# Patient Record
Sex: FEMALE | Race: WHITE | Employment: OTHER | URBAN - METROPOLITAN AREA
[De-identification: names, ages, dates, MRNs, and addresses within clinical notes are randomized per-mention and may not be internally consistent; named-entity substitution may affect disease eponyms.]

---

## 2019-06-01 ENCOUNTER — HOSPITAL ENCOUNTER (INPATIENT)
Age: 64
LOS: 4 days | Discharge: HOME HEALTH | DRG: 897 | End: 2019-06-05
Attending: EMERGENCY MEDICINE | Admitting: INTERNAL MEDICINE
Payer: COMMERCIAL

## 2019-06-01 DIAGNOSIS — F10.939 ALCOHOL WITHDRAWAL SYNDROME WITH COMPLICATION (HCC): Primary | ICD-10-CM

## 2019-06-01 DIAGNOSIS — F10.931 ALCOHOL WITHDRAWAL DELIRIUM (HCC): ICD-10-CM

## 2019-06-01 DIAGNOSIS — N30.00 ACUTE CYSTITIS WITHOUT HEMATURIA: ICD-10-CM

## 2019-06-01 PROBLEM — N39.0 UTI (URINARY TRACT INFECTION): Status: ACTIVE | Noted: 2019-06-01

## 2019-06-01 PROBLEM — I10 ESSENTIAL HYPERTENSION: Status: ACTIVE | Noted: 2019-06-01

## 2019-06-01 LAB
A/G RATIO: 1.3 (ref 1.1–2.2)
ALBUMIN SERPL-MCNC: 4.8 G/DL (ref 3.4–5)
ALP BLD-CCNC: 73 U/L (ref 40–129)
ALT SERPL-CCNC: 78 U/L (ref 10–40)
ANION GAP SERPL CALCULATED.3IONS-SCNC: 16 MMOL/L (ref 3–16)
AST SERPL-CCNC: 140 U/L (ref 15–37)
BACTERIA: ABNORMAL /HPF
BASOPHILS ABSOLUTE: 0 K/UL (ref 0–0.2)
BASOPHILS RELATIVE PERCENT: 0.6 %
BILIRUB SERPL-MCNC: 0.7 MG/DL (ref 0–1)
BILIRUBIN URINE: NEGATIVE
BLOOD, URINE: ABNORMAL
BUN BLDV-MCNC: 8 MG/DL (ref 7–20)
CALCIUM SERPL-MCNC: 10.1 MG/DL (ref 8.3–10.6)
CHLORIDE BLD-SCNC: 96 MMOL/L (ref 99–110)
CLARITY: ABNORMAL
CO2: 25 MMOL/L (ref 21–32)
COLOR: YELLOW
CREAT SERPL-MCNC: 0.6 MG/DL (ref 0.6–1.2)
EOSINOPHILS ABSOLUTE: 0.1 K/UL (ref 0–0.6)
EOSINOPHILS RELATIVE PERCENT: 1.6 %
EPITHELIAL CELLS, UA: ABNORMAL /HPF
ETHANOL: NORMAL MG/DL (ref 0–0.08)
GFR AFRICAN AMERICAN: >60
GFR NON-AFRICAN AMERICAN: >60
GLOBULIN: 3.6 G/DL
GLUCOSE BLD-MCNC: 96 MG/DL (ref 70–99)
GLUCOSE URINE: NEGATIVE MG/DL
HCT VFR BLD CALC: 41.2 % (ref 36–48)
HEMOGLOBIN: 14.2 G/DL (ref 12–16)
KETONES, URINE: NEGATIVE MG/DL
LEUKOCYTE ESTERASE, URINE: ABNORMAL
LYMPHOCYTES ABSOLUTE: 1.6 K/UL (ref 1–5.1)
LYMPHOCYTES RELATIVE PERCENT: 24.7 %
MCH RBC QN AUTO: 35.2 PG (ref 26–34)
MCHC RBC AUTO-ENTMCNC: 34.4 G/DL (ref 31–36)
MCV RBC AUTO: 102.4 FL (ref 80–100)
MICROSCOPIC EXAMINATION: YES
MONOCYTES ABSOLUTE: 0.7 K/UL (ref 0–1.3)
MONOCYTES RELATIVE PERCENT: 11.2 %
NEUTROPHILS ABSOLUTE: 4 K/UL (ref 1.7–7.7)
NEUTROPHILS RELATIVE PERCENT: 61.9 %
NITRITE, URINE: NEGATIVE
PDW BLD-RTO: 15 % (ref 12.4–15.4)
PH UA: 7 (ref 5–8)
PLATELET # BLD: 283 K/UL (ref 135–450)
PMV BLD AUTO: 7.5 FL (ref 5–10.5)
POTASSIUM SERPL-SCNC: 4.1 MMOL/L (ref 3.5–5.1)
PROTEIN UA: 30 MG/DL
RBC # BLD: 4.02 M/UL (ref 4–5.2)
RBC UA: ABNORMAL /HPF (ref 0–2)
SODIUM BLD-SCNC: 137 MMOL/L (ref 136–145)
SPECIFIC GRAVITY UA: 1.01 (ref 1–1.03)
TOTAL PROTEIN: 8.4 G/DL (ref 6.4–8.2)
URINE REFLEX TO CULTURE: YES
URINE TYPE: ABNORMAL
UROBILINOGEN, URINE: 0.2 E.U./DL
WBC # BLD: 6.5 K/UL (ref 4–11)
WBC UA: >100 /HPF (ref 0–5)

## 2019-06-01 PROCEDURE — 94760 N-INVAS EAR/PLS OXIMETRY 1: CPT

## 2019-06-01 PROCEDURE — 96361 HYDRATE IV INFUSION ADD-ON: CPT

## 2019-06-01 PROCEDURE — 85025 COMPLETE CBC W/AUTO DIFF WBC: CPT

## 2019-06-01 PROCEDURE — 2500000003 HC RX 250 WO HCPCS: Performed by: PHYSICIAN ASSISTANT

## 2019-06-01 PROCEDURE — 2060000000 HC ICU INTERMEDIATE R&B

## 2019-06-01 PROCEDURE — 6360000002 HC RX W HCPCS: Performed by: INTERNAL MEDICINE

## 2019-06-01 PROCEDURE — 2580000003 HC RX 258: Performed by: INTERNAL MEDICINE

## 2019-06-01 PROCEDURE — G0480 DRUG TEST DEF 1-7 CLASSES: HCPCS

## 2019-06-01 PROCEDURE — 87077 CULTURE AEROBIC IDENTIFY: CPT

## 2019-06-01 PROCEDURE — 80053 COMPREHEN METABOLIC PANEL: CPT

## 2019-06-01 PROCEDURE — 87086 URINE CULTURE/COLONY COUNT: CPT

## 2019-06-01 PROCEDURE — 96374 THER/PROPH/DIAG INJ IV PUSH: CPT

## 2019-06-01 PROCEDURE — 6360000002 HC RX W HCPCS

## 2019-06-01 PROCEDURE — 6370000000 HC RX 637 (ALT 250 FOR IP): Performed by: INTERNAL MEDICINE

## 2019-06-01 PROCEDURE — 2580000003 HC RX 258: Performed by: PHYSICIAN ASSISTANT

## 2019-06-01 PROCEDURE — 87186 SC STD MICRODIL/AGAR DIL: CPT

## 2019-06-01 PROCEDURE — 96375 TX/PRO/DX INJ NEW DRUG ADDON: CPT

## 2019-06-01 PROCEDURE — 99284 EMERGENCY DEPT VISIT MOD MDM: CPT

## 2019-06-01 PROCEDURE — 81001 URINALYSIS AUTO W/SCOPE: CPT

## 2019-06-01 PROCEDURE — 6360000002 HC RX W HCPCS: Performed by: PHYSICIAN ASSISTANT

## 2019-06-01 RX ORDER — POTASSIUM CHLORIDE 20 MEQ/1
40 TABLET, EXTENDED RELEASE ORAL PRN
Status: DISCONTINUED | OUTPATIENT
Start: 2019-06-01 | End: 2019-06-05 | Stop reason: HOSPADM

## 2019-06-01 RX ORDER — NIFEDIPINE 30 MG/1
30 TABLET, FILM COATED, EXTENDED RELEASE ORAL DAILY
Status: ON HOLD | COMMUNITY
End: 2019-06-05 | Stop reason: SDUPTHER

## 2019-06-01 RX ORDER — LORAZEPAM 1 MG/1
3 TABLET ORAL
Status: DISCONTINUED | OUTPATIENT
Start: 2019-06-01 | End: 2019-06-01

## 2019-06-01 RX ORDER — LORAZEPAM 2 MG/ML
4 INJECTION INTRAMUSCULAR
Status: DISCONTINUED | OUTPATIENT
Start: 2019-06-01 | End: 2019-06-01

## 2019-06-01 RX ORDER — SODIUM CHLORIDE 0.9 % (FLUSH) 0.9 %
10 SYRINGE (ML) INJECTION PRN
Status: DISCONTINUED | OUTPATIENT
Start: 2019-06-01 | End: 2019-06-01 | Stop reason: HOSPADM

## 2019-06-01 RX ORDER — MULTIVITAMIN WITH FOLIC ACID 400 MCG
1 TABLET ORAL DAILY
Status: DISCONTINUED | OUTPATIENT
Start: 2019-06-02 | End: 2019-06-05 | Stop reason: HOSPADM

## 2019-06-01 RX ORDER — LORAZEPAM 2 MG/ML
3 INJECTION INTRAMUSCULAR
Status: DISCONTINUED | OUTPATIENT
Start: 2019-06-01 | End: 2019-06-01

## 2019-06-01 RX ORDER — LORAZEPAM 2 MG/ML
INJECTION INTRAMUSCULAR
Status: COMPLETED
Start: 2019-06-01 | End: 2019-06-01

## 2019-06-01 RX ORDER — ONDANSETRON 2 MG/ML
4 INJECTION INTRAMUSCULAR; INTRAVENOUS EVERY 6 HOURS PRN
Status: DISCONTINUED | OUTPATIENT
Start: 2019-06-01 | End: 2019-06-05 | Stop reason: HOSPADM

## 2019-06-01 RX ORDER — FAMOTIDINE 20 MG/1
20 TABLET, FILM COATED ORAL 2 TIMES DAILY
Status: DISCONTINUED | OUTPATIENT
Start: 2019-06-01 | End: 2019-06-05 | Stop reason: HOSPADM

## 2019-06-01 RX ORDER — LORAZEPAM 1 MG/1
4 TABLET ORAL
Status: DISCONTINUED | OUTPATIENT
Start: 2019-06-01 | End: 2019-06-05 | Stop reason: HOSPADM

## 2019-06-01 RX ORDER — LORAZEPAM 1 MG/1
3 TABLET ORAL
Status: DISCONTINUED | OUTPATIENT
Start: 2019-06-01 | End: 2019-06-05 | Stop reason: HOSPADM

## 2019-06-01 RX ORDER — ACETAMINOPHEN 325 MG/1
650 TABLET ORAL EVERY 4 HOURS PRN
Status: DISCONTINUED | OUTPATIENT
Start: 2019-06-01 | End: 2019-06-05 | Stop reason: HOSPADM

## 2019-06-01 RX ORDER — LORAZEPAM 2 MG/ML
2 INJECTION INTRAMUSCULAR
Status: DISCONTINUED | OUTPATIENT
Start: 2019-06-01 | End: 2019-06-05 | Stop reason: HOSPADM

## 2019-06-01 RX ORDER — LORAZEPAM 2 MG/ML
1 INJECTION INTRAMUSCULAR
Status: DISCONTINUED | OUTPATIENT
Start: 2019-06-01 | End: 2019-06-01

## 2019-06-01 RX ORDER — FLUOXETINE HYDROCHLORIDE 20 MG/1
40 CAPSULE ORAL DAILY
Status: ON HOLD | COMMUNITY
End: 2019-09-15 | Stop reason: SDUPTHER

## 2019-06-01 RX ORDER — LORAZEPAM 2 MG/ML
4 INJECTION INTRAMUSCULAR
Status: DISCONTINUED | OUTPATIENT
Start: 2019-06-01 | End: 2019-06-05 | Stop reason: HOSPADM

## 2019-06-01 RX ORDER — LORAZEPAM 1 MG/1
4 TABLET ORAL
Status: DISCONTINUED | OUTPATIENT
Start: 2019-06-01 | End: 2019-06-01

## 2019-06-01 RX ORDER — PHENAZOPYRIDINE HYDROCHLORIDE 100 MG/1
200 TABLET, FILM COATED ORAL
Status: DISCONTINUED | OUTPATIENT
Start: 2019-06-02 | End: 2019-06-05 | Stop reason: HOSPADM

## 2019-06-01 RX ORDER — LORAZEPAM 2 MG/ML
1 INJECTION INTRAMUSCULAR
Status: DISCONTINUED | OUTPATIENT
Start: 2019-06-01 | End: 2019-06-05 | Stop reason: HOSPADM

## 2019-06-01 RX ORDER — FLUOXETINE HYDROCHLORIDE 20 MG/1
20 CAPSULE ORAL DAILY
Status: DISCONTINUED | OUTPATIENT
Start: 2019-06-01 | End: 2019-06-05 | Stop reason: HOSPADM

## 2019-06-01 RX ORDER — SODIUM CHLORIDE 0.9 % (FLUSH) 0.9 %
10 SYRINGE (ML) INJECTION EVERY 12 HOURS SCHEDULED
Status: DISCONTINUED | OUTPATIENT
Start: 2019-06-01 | End: 2019-06-05 | Stop reason: HOSPADM

## 2019-06-01 RX ORDER — LORAZEPAM 2 MG/ML
3 INJECTION INTRAMUSCULAR
Status: DISCONTINUED | OUTPATIENT
Start: 2019-06-01 | End: 2019-06-05 | Stop reason: HOSPADM

## 2019-06-01 RX ORDER — ZOLPIDEM TARTRATE 5 MG/1
5 TABLET ORAL NIGHTLY PRN
Status: DISCONTINUED | OUTPATIENT
Start: 2019-06-01 | End: 2019-06-05 | Stop reason: HOSPADM

## 2019-06-01 RX ORDER — SODIUM CHLORIDE 0.9 % (FLUSH) 0.9 %
10 SYRINGE (ML) INJECTION PRN
Status: DISCONTINUED | OUTPATIENT
Start: 2019-06-01 | End: 2019-06-05 | Stop reason: HOSPADM

## 2019-06-01 RX ORDER — LORAZEPAM 1 MG/1
2 TABLET ORAL
Status: DISCONTINUED | OUTPATIENT
Start: 2019-06-01 | End: 2019-06-01

## 2019-06-01 RX ORDER — LORAZEPAM 1 MG/1
1 TABLET ORAL
Status: DISCONTINUED | OUTPATIENT
Start: 2019-06-01 | End: 2019-06-01

## 2019-06-01 RX ORDER — POTASSIUM CHLORIDE 7.45 MG/ML
10 INJECTION INTRAVENOUS PRN
Status: DISCONTINUED | OUTPATIENT
Start: 2019-06-01 | End: 2019-06-05 | Stop reason: HOSPADM

## 2019-06-01 RX ORDER — LORAZEPAM 2 MG/ML
2 INJECTION INTRAMUSCULAR
Status: DISCONTINUED | OUTPATIENT
Start: 2019-06-01 | End: 2019-06-01

## 2019-06-01 RX ORDER — LORAZEPAM 1 MG/1
1 TABLET ORAL
Status: DISCONTINUED | OUTPATIENT
Start: 2019-06-01 | End: 2019-06-05 | Stop reason: HOSPADM

## 2019-06-01 RX ORDER — CHLORDIAZEPOXIDE HYDROCHLORIDE 10 MG/1
10 CAPSULE, GELATIN COATED ORAL 2 TIMES DAILY
Status: DISCONTINUED | OUTPATIENT
Start: 2019-06-01 | End: 2019-06-02

## 2019-06-01 RX ORDER — LORAZEPAM 1 MG/1
2 TABLET ORAL
Status: DISCONTINUED | OUTPATIENT
Start: 2019-06-01 | End: 2019-06-05 | Stop reason: HOSPADM

## 2019-06-01 RX ORDER — NIFEDIPINE 30 MG/1
30 TABLET, EXTENDED RELEASE ORAL DAILY
Status: DISCONTINUED | OUTPATIENT
Start: 2019-06-01 | End: 2019-06-05 | Stop reason: HOSPADM

## 2019-06-01 RX ORDER — SODIUM CHLORIDE 0.9 % (FLUSH) 0.9 %
10 SYRINGE (ML) INJECTION EVERY 12 HOURS SCHEDULED
Status: DISCONTINUED | OUTPATIENT
Start: 2019-06-01 | End: 2019-06-01 | Stop reason: HOSPADM

## 2019-06-01 RX ADMIN — CHLORDIAZEPOXIDE HYDROCHLORIDE 10 MG: 10 CAPSULE ORAL at 22:28

## 2019-06-01 RX ADMIN — NIFEDIPINE 30 MG: 30 TABLET, FILM COATED, EXTENDED RELEASE ORAL at 16:59

## 2019-06-01 RX ADMIN — FLUOXETINE 20 MG: 20 CAPSULE ORAL at 16:59

## 2019-06-01 RX ADMIN — FAMOTIDINE 20 MG: 20 TABLET ORAL at 22:28

## 2019-06-01 RX ADMIN — FOLIC ACID: 5 INJECTION, SOLUTION INTRAMUSCULAR; INTRAVENOUS; SUBCUTANEOUS at 10:51

## 2019-06-01 RX ADMIN — LORAZEPAM 1 MG: 1 TABLET ORAL at 16:59

## 2019-06-01 RX ADMIN — ZOLPIDEM TARTRATE 5 MG: 5 TABLET ORAL at 22:31

## 2019-06-01 RX ADMIN — LORAZEPAM 1 MG: 2 INJECTION, SOLUTION INTRAMUSCULAR; INTRAVENOUS at 13:28

## 2019-06-01 RX ADMIN — LORAZEPAM 3 MG: 2 INJECTION INTRAMUSCULAR; INTRAVENOUS at 19:30

## 2019-06-01 RX ADMIN — FAMOTIDINE 20 MG: 20 TABLET ORAL at 16:59

## 2019-06-01 RX ADMIN — Medication 1 G: at 13:27

## 2019-06-01 RX ADMIN — LORAZEPAM 1 MG: 2 INJECTION INTRAMUSCULAR at 13:28

## 2019-06-01 RX ADMIN — Medication 10 ML: at 22:28

## 2019-06-01 ASSESSMENT — ENCOUNTER SYMPTOMS
DIARRHEA: 0
NAUSEA: 0
BACK PAIN: 0
BLOOD IN STOOL: 0
ABDOMINAL PAIN: 0
SHORTNESS OF BREATH: 0
VOMITING: 0

## 2019-06-01 ASSESSMENT — PAIN SCALES - GENERAL
PAINLEVEL_OUTOF10: 4
PAINLEVEL_OUTOF10: 7
PAINLEVEL_OUTOF10: 0

## 2019-06-01 ASSESSMENT — PATIENT HEALTH QUESTIONNAIRE - PHQ9: SUM OF ALL RESPONSES TO PHQ QUESTIONS 1-9: 9

## 2019-06-01 NOTE — ED PROVIDER NOTES
chills and fever. Eyes: Negative for visual disturbance. Respiratory: Negative for shortness of breath. Cardiovascular: Negative for chest pain. Gastrointestinal: Negative for abdominal pain, blood in stool, diarrhea, nausea and vomiting. Genitourinary: Negative for dysuria and hematuria. Musculoskeletal: Negative for back pain and gait problem. Skin: Negative for rash. Neurological: Positive for headaches. Positives and Pertinent negatives as per HPI. Except as noted abovein the ROS, all other systems were reviewed and negative. PAST MEDICAL HISTORY     Past Medical History:   Diagnosis Date    Alcoholism [facility])          SURGICAL HISTORY   History reviewed. No pertinent surgical history. CURRENTMEDICATIONS       Previous Medications    B COMPLEX VITAMINS (B COMPLEX 1 PO)    Take  by mouth. CALCIUM CARBONATE 600 MG TABS TABLET    Take 2 tablets by mouth daily. CHOLECALCIFEROL (VITAMIN D3) 5000 UNITS TABS    Take  by mouth. FLUOXETINE (PROZAC) 20 MG CAPSULE    Take 20 mg by mouth daily    FOLIC ACID (FOLVITE) 1 MG TABLET    Take 1 mg by mouth daily. MILK THISTLE 250 MG CAPS    Take  by mouth. MULTIPLE VITAMINS-MINERALS (MULTIVITAMIN PO)    Take  by mouth.     NIFEDIPINE (ADALAT CC) 30 MG EXTENDED RELEASE TABLET    Take 30 mg by mouth daily    UNABLE TO FIND    Estrogen & testosterone injections -pellets    ZOLPIDEM (AMBIEN) 10 MG TABLET    TAKE ONE TABLET BY MOUTH EVERY NIGHT AT BEDTIME AS NEEDED FOR SLEEP         ALLERGIES     Penicillins and Sulfa antibiotics    FAMILYHISTORY       Family History   Problem Relation Age of Onset    Stroke Mother     Substance Abuse Mother     Substance Abuse Father     Heart Disease Father     Heart Disease Brother     Substance Abuse Brother     Heart Disease Paternal Aunt     Heart Disease Paternal Uncle     Heart Disease Maternal Grandmother     Diabetes Maternal Grandmother     Diabetes Maternal Grandfather     Heart Disease Maternal Grandfather     Other Paternal Grandmother     Other Paternal Grandfather           SOCIAL HISTORY       Social History     Socioeconomic History    Marital status:      Spouse name: None    Number of children: None    Years of education: None    Highest education level: None   Occupational History    None   Social Needs    Financial resource strain: None    Food insecurity:     Worry: None     Inability: None    Transportation needs:     Medical: None     Non-medical: None   Tobacco Use    Smoking status: Former Smoker     Types: Cigarettes     Last attempt to quit: 1987     Years since quittin.9    Smokeless tobacco: Never Used   Substance and Sexual Activity    Alcohol use: Yes     Alcohol/week: 33.6 oz     Types: 56 Glasses of wine per week    Drug use: No    Sexual activity: Yes     Partners: Male   Lifestyle    Physical activity:     Days per week: None     Minutes per session: None    Stress: None   Relationships    Social connections:     Talks on phone: None     Gets together: None     Attends Christianity service: None     Active member of club or organization: None     Attends meetings of clubs or organizations: None     Relationship status: None    Intimate partner violence:     Fear of current or ex partner: None     Emotionally abused: None     Physically abused: None     Forced sexual activity: None   Other Topics Concern    None   Social History Narrative    None       SCREENINGS             PHYSICAL EXAM    (up to 7 for level 4, 8 or more for level 5)     ED Triage Vitals [19 0936]   BP Temp Temp Source Pulse Resp SpO2 Height Weight   137/82 98 °F (36.7 °C) Infrared 91 17 98 % 5' 4\" (1.626 m) 149 lb (67.6 kg)       Physical Exam   Constitutional: She is oriented to person, place, and time. She appears well-developed and well-nourished. HENT:   Head: Atraumatic. Mouth/Throat: No oropharyngeal exudate.    Eyes: Pupils are equal, round, and reactive to light. EOM are normal. Right eye exhibits no discharge. Left eye exhibits no discharge. Neck: Normal range of motion. Cardiovascular: Normal rate, regular rhythm and normal heart sounds. Exam reveals no gallop and no friction rub. No murmur heard. Pulmonary/Chest: Effort normal and breath sounds normal. No stridor. No respiratory distress. She has no wheezes. She has no rales. Abdominal: Soft. Bowel sounds are normal. She exhibits no distension and no mass. There is no tenderness. There is no rebound and no guarding. No hernia. Musculoskeletal: Normal range of motion. She exhibits no edema, tenderness or deformity. Neurological: She is alert and oriented to person, place, and time. No cranial nerve deficit. Skin: Skin is warm and dry. No rash noted. She is not diaphoretic. No erythema. Psychiatric: She has a normal mood and affect. Her behavior is normal.   Nursing note and vitals reviewed.       DIAGNOSTIC RESULTS   LABS:    Labs Reviewed   CBC WITH AUTO DIFFERENTIAL - Abnormal; Notable for the following components:       Result Value    .4 (*)     MCH 35.2 (*)     All other components within normal limits    Narrative:     Performed at:  [facility] [address], [place], [address] [place] [address]   Phone [phone]   COMPREHENSIVE METABOLIC PANEL - Abnormal; Notable for the following components:    Chloride 96 (*)     Total Protein 8.4 (*)     ALT 78 (*)      (*)     All other components within normal limits    Narrative:     Performed at:  [facility] [address], [place], [facility] [address]   Phone [phone]   URINE RT REFLEX TO CULTURE - Abnormal; Notable for the following components:    Clarity, UA CLOUDY (*)     Blood, Urine SMALL (*)     Protein, UA 30 (*)     Leukocyte Esterase, Urine LARGE (*)     All other components within normal limits    Narrative:     Performed at:  [facility]  [address] [facility], [place]   Phone [phone]   MICROSCOPIC URINALYSIS - Abnormal; Notable for the following components:    WBC, UA >100 (*)     Bacteria, UA 1+ (*)     All other components within normal limits    Narrative:     Performed at:  [facility]  [address] [facility], [place]   Phone [phone]   URINE CULTURE   ETHANOL    Narrative:     Performed at:  [facility]  [address] [facility],  [place], [place]   Phone [phone]       All other labs were within normal range or not returned as of this dictation. EKG: All EKG's are interpreted by the Emergency Department Physician who either signs orCo-signs this chart in the absence of a cardiologist.  Please see their note for interpretation of EKG. RADIOLOGY:   Non-plain film images such as CT, Ultrasound and MRI are read by the radiologist. Plain radiographic images are visualized andpreliminarily interpreted by the  ED Provider with the below findings:        Interpretation [facility] Radiologist below, if available at the time of this note:    No orders to display     No results found.        PROCEDURES   Unless otherwise noted below, none     Procedures    CRITICAL CARE TIME   N/A    CONSULTS:  IP CONSULT TO HOSPITALIST      EMERGENCY DEPARTMENT COURSE and DIFFERENTIALDIAGNOSIS/MDM:   Vitals:    Vitals:    06/01/19 1100 06/01/19 1130 06/01/19 1200 06/01/19 1230   BP: (!) 167/78 (!) 155/85 (!) 159/77 (!) 154/75   Pulse:       Resp:       Temp:       TempSrc:       SpO2: 98% 99% 98% 99%   Weight:       Height:           Patient was given thefollowing medications:  Medications   sodium chloride flush 0.9 % injection 10 mL (has no administration in time range)   sodium chloride flush 0.9 % injection 10 mL (has no administration in time range)   FLUoxetine (PROZAC) capsule 20 mg (has no administration in time range)   NIFEdipine (PROCARDIA XL) extended release tablet 30 mg (has no administration in time range)   zolpidem (AMBIEN) tablet 5 mg (has no administration in time range)   sodium chloride flush 0.9 % injection 10 mL (has no administration in time range)   sodium chloride flush 0.9 % injection 10 mL (has no administration in time range)   ondansetron (ZOFRAN) injection 4 mg (has no administration in time range)   enoxaparin (LOVENOX) injection 40 mg (has no administration in time range)   sodium chloride 0.9 % 2,131 mL with folic acid 1 mg, adult multi-vitamin with vitamin k 10 mL, thiamine 100 mg (has no administration in time range)   multivitamin 1 tablet (has no administration in time range)   potassium chloride (KLOR-CON M) extended release tablet 40 mEq (has no administration in time range)     Or   potassium bicarb-citric acid (EFFER-K) effervescent tablet 40 mEq (has no administration in time range)     Or   potassium chloride 10 mEq/100 mL IVPB (Peripheral Line) (has no administration in time range)   acetaminophen (TYLENOL) tablet 650 mg (has no administration in time range)   LORazepam (ATIVAN) tablet 1 mg (has no administration in time range)     Or   LORazepam (ATIVAN) injection 1 mg (has no administration in time range)     Or   LORazepam (ATIVAN) tablet 2 mg (has no administration in time range)     Or   LORazepam (ATIVAN) injection 2 mg (has no administration in time range)     Or   LORazepam (ATIVAN) tablet 3 mg (has no administration in time range)     Or   LORazepam (ATIVAN) injection 3 mg (has no administration in time range)     Or   LORazepam (ATIVAN) tablet 4 mg (has no administration in time range)     Or   LORazepam (ATIVAN) injection 4 mg (has no administration in time range)   cefTRIAXone (ROCEPHIN) 1 g in sterile water 10 mL IV syringe (has no administration in time range)   chlordiazePOXIDE (LIBRIUM) capsule 10 mg (has no administration in time range)   famotidine (PEPCID) tablet 20 mg (has no administration in time range) sodium chloride 0.9 % 6,644 mL with folic acid 1 mg, adult multi-vitamin with vitamin k 10 mL, thiamine 100 mg ( Intravenous New Bag 6/1/19 1051)   cefTRIAXone (ROCEPHIN) 1 g in sterile water 10 mL IV syringe (1 g Intravenous Given 6/1/19 1327)       Patient presented requesting detox mouth call. Last drink was less than 12 hours before presenting to the emergency department. She had recent admission with hallucinations, confusion and tremulousness with UTI and hypertensive urgency. There is some question whether her symptoms are related to another neurologic episode where her withdrawal but she did improve with Librium. Her initial CIWA scale was 4 but during her stay here she starts to become tremulous and her blood pressure begins to elevate her recheck of her CIWA is now 8. She also continues to have urinary tract infection. Recent culture was pan sensitive. She was started on Rocephin. Even her most recent withdrawal symptoms with question for DT's and worsening CIWA's and symptoms here will admit the patient for further workup and treatment. Patient understood why she needed to be admitted with stable time of admission. FINAL IMPRESSION      1. Alcohol withdrawal syndrome with complication ([facility] [address]    2. Acute cystitis without hematuria          DISPOSITION/PLAN   DISPOSITION Admitted 06/01/2019 01:14:44 PM      PATIENT REFERREDTO:  No follow-up provider specified.     DISCHARGE MEDICATIONS:  New Prescriptions    No medications on file       DISCONTINUED MEDICATIONS:  Discontinued Medications    CITALOPRAM (CELEXA) 20 MG TABLET    TAKE ONE TABLET BY MOUTH DAILY    FUROSEMIDE (LASIX) 20 MG TABLET    TAKE ONE TABLET BY MOUTH DAILY    HYDROCHLOROTHIAZIDE (HYDRODIURIL) 25 MG TABLET    Take 1 tablet by mouth daily    TEMAZEPAM (RESTORIL) 15 MG CAPSULE    Take 1 capsule by mouth nightly as needed for Sleep              (Please note that portions ofthis note were completed with a voice recognition program.  Efforts were made to edit the dictations but occasionally words are mis-transcribed.)    Jenny Siddiqi PA-C (electronically signed)            Jenny Siddiqi PA-C  06/01/19 3740

## 2019-06-01 NOTE — ED NOTES
Emilia Lin RN @ bedside. Report given. Pt transferred to floor via wheelchair.      Mary Parkinson RN  06/01/19 0160

## 2019-06-01 NOTE — PROGRESS NOTES
4 Eyes Skin Assessment     The patient is being assess for  {Blank single:44993::\"Admission\",\"Transfer to New Unit\",\"Post-Op Surgical\",\"Cath Lab Post-Op\",\"Shift Handoff\"}    I agree that 2 RN's have performed a thorough Head to Toe Skin Assessment on the patient. ALL assessment sites listed below have been assessed. Areas assessed by both nurses: yes  [x]   Head, Face, and Ears   [x]   Shoulders, Back, and Chest  [x]   Arms, Elbows, and Hands   [x]   Coccyx, Sacrum, and IschIum  [x]   Legs, Feet, and Heels        Does the Patient have Skin Breakdown?   No         Ernie Prevention initiated:  NA   Wound Care Orders initiated:  NA      [facility] nurse consulted for Pressure Injury (Stage 3,4, Unstageable, DTI, NWPT, and Complex wounds), New and Established Ostomies:  No      Nurse 1 eSignature: Electronically signed by Amarilis Pop RN on 6/1/19 at 7:18 PM    **SHARE this note so that the co-signing nurse is able to place an eSignature**    Nurse 2 eSignature: {Esignature:[id number]}

## 2019-06-01 NOTE — PROGRESS NOTES
Pt. Admitted to the floor at this time. Admission is almost complete. CIWA is 9 pt. Is emotional and talking about her journey through trying to reach sobriety. She has been  37 years to her , no children both parents are  on both sides.

## 2019-06-01 NOTE — H&P
HOSPITALISTS HISTORY AND PHYSICAL    6/1/2019 1:17 PM    Patient Information:  Mary Yanez is a 58 y.o. female [id number]  PCP:  No primary care provider on file. (Tel: None )    Chief complaint:    Chief Complaint   Patient presents with    Withdrawal     Pt would like to withdrawal from Alcohol to continue with [address]. Last drink last night. History of Present Illness:    Arabella Eldridge is 58 y.o. female who presented with complaint of alcohol withdrawal. Symptom onset was acute for a time period of 1 day. The severity is described as moderate. The course of his symptoms over time is worsening. The symptoms improved with ativan and worsened with none. The patient's symptom is associated with depression. Arabella Eldridge is 58 y.o. female with history of alcohol withdrawal and HTN   He presents to the ER with complaint of alcohol withdrawal   She drinks 2 bottles of wine daily, binge drinks due to depression. She was recently (4/22) admitted to [facility] for 7 days for alcohol withdrawal and UTI due to Klebsiella  She follows up with [facility] for detox but she was sent this hospital due to concern of DT  Patient states she wants to stop drinking but she has relapsed in the past.       History obtained from patient and chart review   Old medical records show - no recent admission to this hospital     REVIEW OF SYSTEMS:   Constitutional:  Negative for fever,chills or night sweats  ENT:  Negative for rhinorrhea, epistaxis, hoarseness, sore throat. Respiratory:   Negative for shortness of breath,wheezing  Cardiovascular:   Negative for  chest pain, palpitations   Gastrointestinal:  Negative for nausea, vomiting, diarrhea  Genitourinary:  Negative for polyuria, dysuria   Hematologic/Lymphatic:  Negative for  bleeding tendency, easy bruising  Musculoskeletal:  Negative for myalgias and arthralgias  Neurologic:  Negative for  confusion,dysarthria.   Skin:  Negative for itching,rash  Psychiatric: Negative for depression,anxiety, agitation. Endocrine:  Negative for polydipsia,polyuria,heat /cold intolerance. Past Medical History:   has a past medical history of Alcoholism ([address] Past Surgical History: She denies past surgical history        Medications:  Prior to Admission medications    Medication Sig Start Date End Date Taking? Authorizing Provider   FLUoxetine (PROZAC) 20 MG capsule Take 20 mg by mouth daily   Yes Historical Provider, MD   NIFEdipine (ADALAT CC) 30 MG extended release tablet Take 30 mg by mouth daily   Yes Historical Provider, MD   zolpidem (AMBIEN) 10 MG tablet TAKE ONE TABLET BY MOUTH EVERY NIGHT AT BEDTIME AS NEEDED FOR SLEEP 7/26/16  Yes Jasiel Wallace, DO   Milk Thistle 250 MG CAPS Take  by mouth. Yes Historical Provider, MD   Multiple Vitamins-Minerals (MULTIVITAMIN PO) Take  by mouth. Yes Historical Provider, MD   Cholecalciferol (VITAMIN D3) 5000 UNITS TABS Take  by mouth. Yes Historical Provider, MD   B Complex Vitamins (B COMPLEX 1 PO) Take  by mouth. Yes Historical Provider, MD   folic acid (FOLVITE) 1 MG tablet Take 1 mg by mouth daily. Yes Historical Provider, MD   calcium carbonate 600 MG TABS tablet Take 2 tablets by mouth daily. Yes Historical Provider, MD   UNABLE TO FIND Estrogen & testosterone injections -pellets    Historical Provider, MD         Allergies: Allergies   Allergen Reactions    Penicillins     Sulfa Antibiotics         Social History:   reports that she quit smoking about 31 years ago. Her smoking use included cigarettes. She has never used smokeless tobacco. She reports that she drinks about 33.6 oz of alcohol per week. She reports that she does not use drugs. Family History:  family history includes Diabetes in her maternal grandfather and maternal grandmother; Heart Disease in her brother, father, maternal grandfather, maternal grandmother, paternal aunt, and paternal uncle;  Other in her paternal grandfather and paternal grandmother; Stroke in her mother; Substance Abuse in her brother, father, and mother. Physical Exam:  BP (!) 154/75   Pulse 91   Temp 98 °F (36.7 °C) (Infrared)   Resp 17   Ht 5' 4\" (1.626 m)   Wt 149 lb (67.6 kg)   SpO2 99%   BMI 25.58 kg/m²     General appearance:  Appears comfortable. Well nourished  Eyes: Sclera clear, pupils equal  ENT: Moist mucus membranes, no thrush. Trachea midline. Cardiovascular: Regular rhythm, normal S1, S2. No murmur, gallop, rub. No edema in lower extremities  Respiratory: Clear to auscultation bilaterally, no wheeze, good inspiratory effort  Gastrointestinal: Abdomen soft, non-tender, not distended, normal bowel sounds  Musculoskeletal: No cyanosis in digits, neck supple  Neurology: Cranial nerves grossly intact. Alert and oriented in time, place and person. No speech or motor deficits  Psychiatry: Appropriate affect. Not agitated  Skin: Warm, dry, normal turgor, no rash    Labs:  CBC:   Lab Results   Component Value Date    WBC 6.5 06/01/2019    RBC 4.02 06/01/2019    HGB 14.2 06/01/2019    HCT 41.2 06/01/2019    .4 06/01/2019    MCH 35.2 06/01/2019    MCHC 34.4 06/01/2019    RDW 15.0 06/01/2019     06/01/2019    MPV 7.5 06/01/2019     BMP:    Lab Results   Component Value Date     06/01/2019    K 4.1 06/01/2019    CL 96 06/01/2019    CO2 25 06/01/2019    BUN 8 06/01/2019    CREATININE 0.6 06/01/2019    CALCIUM 10.1 06/01/2019    GFRAA >60 06/01/2019    LABGLOM >60 06/01/2019    GLUCOSE 96 06/01/2019       Discussed case with ED provider - Amador Aquino PA-C     Problem List  Principal Problem:    Alcohol withdrawal delirium ([address]  Active Problems:    Essential hypertension    UTI (urinary tract infection)  Resolved Problems:    * No resolved hospital problems. *        Assessment/Plan:   1. Alcohol withdrawal - admit to telemetry unit. CIWA. Librium bid. IV banana bag ordered. PT/OT  2. UTI - IV ceftriaxone pending urine culture.  ddx colonized by gram negative bacteria without infection. 3. HTN - Continue nifedipine     Admit as inpatient. I anticipate hospitalization spanning at least two midnights for investigation and treatment of the above medically necessary diagnoses.     Humza Foote MD    6/1/2019 1:17 PM

## 2019-06-01 NOTE — ED NOTES
Pt ambulated to bathroom with unsteady gait. Arm tremors noted. Viki WINN made aware.      Kaela Earl RN  06/01/19 9730

## 2019-06-01 NOTE — ED NOTES
Pt resting quietly in bed. No c/o at this time. Respirations easy and unlabored. Side rails up x 2 with call light in reach. Bed in low position.      Kaela Earl RN  06/01/19 4200

## 2019-06-02 LAB — PHOSPHORUS: 4 MG/DL (ref 2.5–4.9)

## 2019-06-02 PROCEDURE — 94760 N-INVAS EAR/PLS OXIMETRY 1: CPT

## 2019-06-02 PROCEDURE — 6370000000 HC RX 637 (ALT 250 FOR IP): Performed by: INTERNAL MEDICINE

## 2019-06-02 PROCEDURE — 2580000003 HC RX 258: Performed by: INTERNAL MEDICINE

## 2019-06-02 PROCEDURE — 36415 COLL VENOUS BLD VENIPUNCTURE: CPT

## 2019-06-02 PROCEDURE — 2060000000 HC ICU INTERMEDIATE R&B

## 2019-06-02 PROCEDURE — 84100 ASSAY OF PHOSPHORUS: CPT

## 2019-06-02 PROCEDURE — 6360000002 HC RX W HCPCS: Performed by: NURSE PRACTITIONER

## 2019-06-02 PROCEDURE — 6370000000 HC RX 637 (ALT 250 FOR IP): Performed by: NURSE PRACTITIONER

## 2019-06-02 PROCEDURE — 6360000002 HC RX W HCPCS: Performed by: INTERNAL MEDICINE

## 2019-06-02 PROCEDURE — 2500000003 HC RX 250 WO HCPCS: Performed by: INTERNAL MEDICINE

## 2019-06-02 RX ORDER — CHLORDIAZEPOXIDE HYDROCHLORIDE 10 MG/1
10 CAPSULE, GELATIN COATED ORAL 3 TIMES DAILY
Status: DISCONTINUED | OUTPATIENT
Start: 2019-06-02 | End: 2019-06-03

## 2019-06-02 RX ADMIN — LORAZEPAM 4 MG: 2 INJECTION INTRAMUSCULAR; INTRAVENOUS at 18:40

## 2019-06-02 RX ADMIN — NIFEDIPINE 30 MG: 30 TABLET, FILM COATED, EXTENDED RELEASE ORAL at 10:40

## 2019-06-02 RX ADMIN — LORAZEPAM 1 MG: 1 TABLET ORAL at 00:05

## 2019-06-02 RX ADMIN — PHENAZOPYRIDINE HYDROCHLORIDE 200 MG: 100 TABLET ORAL at 10:40

## 2019-06-02 RX ADMIN — PHENAZOPYRIDINE HYDROCHLORIDE 200 MG: 100 TABLET ORAL at 13:27

## 2019-06-02 RX ADMIN — THERA TABS 1 TABLET: TAB at 10:41

## 2019-06-02 RX ADMIN — CHLORDIAZEPOXIDE HYDROCHLORIDE 10 MG: 10 CAPSULE ORAL at 10:41

## 2019-06-02 RX ADMIN — CHLORDIAZEPOXIDE HYDROCHLORIDE 10 MG: 10 CAPSULE ORAL at 13:28

## 2019-06-02 RX ADMIN — ZOLPIDEM TARTRATE 5 MG: 5 TABLET ORAL at 21:55

## 2019-06-02 RX ADMIN — LORAZEPAM 4 MG: 2 INJECTION INTRAMUSCULAR; INTRAVENOUS at 16:47

## 2019-06-02 RX ADMIN — LORAZEPAM 1 MG: 1 TABLET ORAL at 04:22

## 2019-06-02 RX ADMIN — LORAZEPAM 3 MG: 2 INJECTION INTRAMUSCULAR; INTRAVENOUS at 11:00

## 2019-06-02 RX ADMIN — FAMOTIDINE 20 MG: 20 TABLET ORAL at 21:55

## 2019-06-02 RX ADMIN — LORAZEPAM 3 MG: 1 TABLET ORAL at 20:14

## 2019-06-02 RX ADMIN — Medication 10 ML: at 10:39

## 2019-06-02 RX ADMIN — FAMOTIDINE 20 MG: 20 TABLET ORAL at 10:41

## 2019-06-02 RX ADMIN — LORAZEPAM 3 MG: 2 INJECTION INTRAMUSCULAR; INTRAVENOUS at 21:56

## 2019-06-02 RX ADMIN — LORAZEPAM 4 MG: 2 INJECTION INTRAMUSCULAR; INTRAVENOUS at 17:54

## 2019-06-02 RX ADMIN — Medication 10 ML: at 16:47

## 2019-06-02 RX ADMIN — Medication 1 G: at 13:28

## 2019-06-02 RX ADMIN — Medication 10 ML: at 18:39

## 2019-06-02 RX ADMIN — Medication 10 ML: at 21:55

## 2019-06-02 RX ADMIN — FOLIC ACID: 5 INJECTION, SOLUTION INTRAMUSCULAR; INTRAVENOUS; SUBCUTANEOUS at 11:00

## 2019-06-02 RX ADMIN — Medication 10 ML: at 13:37

## 2019-06-02 RX ADMIN — PHENAZOPYRIDINE HYDROCHLORIDE 200 MG: 100 TABLET ORAL at 18:25

## 2019-06-02 RX ADMIN — ENOXAPARIN SODIUM 40 MG: 40 INJECTION SUBCUTANEOUS at 10:39

## 2019-06-02 RX ADMIN — LORAZEPAM 3 MG: 2 INJECTION INTRAMUSCULAR; INTRAVENOUS at 13:38

## 2019-06-02 RX ADMIN — CHLORDIAZEPOXIDE HYDROCHLORIDE 10 MG: 10 CAPSULE ORAL at 21:56

## 2019-06-02 RX ADMIN — FLUOXETINE 20 MG: 20 CAPSULE ORAL at 10:41

## 2019-06-02 RX ADMIN — Medication 10 ML: at 17:26

## 2019-06-02 RX ADMIN — ONDANSETRON 4 MG: 2 INJECTION INTRAMUSCULAR; INTRAVENOUS at 17:25

## 2019-06-02 ASSESSMENT — PAIN DESCRIPTION - PAIN TYPE
TYPE: CHRONIC PAIN
TYPE: ACUTE PAIN

## 2019-06-02 ASSESSMENT — PAIN SCALES - GENERAL
PAINLEVEL_OUTOF10: 5
PAINLEVEL_OUTOF10: 0
PAINLEVEL_OUTOF10: 0
PAINLEVEL_OUTOF10: 4
PAINLEVEL_OUTOF10: 0

## 2019-06-02 ASSESSMENT — PAIN DESCRIPTION - LOCATION
LOCATION: BACK
LOCATION: PERINEUM

## 2019-06-02 ASSESSMENT — PAIN DESCRIPTION - ORIENTATION: ORIENTATION: LOWER

## 2019-06-02 ASSESSMENT — PAIN DESCRIPTION - DESCRIPTORS
DESCRIPTORS: BURNING;SPASM
DESCRIPTORS: SPASM

## 2019-06-02 NOTE — PLAN OF CARE
Back in bed with walker & minimal assist of 1 @ 1115. The patient is resting with eyes closed, doesn't arouse to soft voice. Bed in lowest position, wheels locked, side rails up times 3, bed alarm in place, door open, phone and call light are within reach. Will continue to monitor. Selene Gee RN, BSN, PCCN.

## 2019-06-02 NOTE — PROGRESS NOTES
The patient is resting with eyes closed, doesn't arouse to soft voice. Will continue to monitor. Gianni Pardo RN, BSN.

## 2019-06-02 NOTE — CARE COORDINATION
Discharge Planning Assessment  SW discharge planner met with patient to discuss reason for admission, current living situation, and potential needs at the time of discharge. Pt in hospital d/t alcohol withdrawal and detox    Demographics/Insurance verified Yes/No    Current type of dwelling:  [facility] arrangements:  w/spouse    Level of function/Support:  Pt reports she's a little \"unsteady\" on her feet at this time. Spouse is supportive but works full time. PCP:  Does not have one. SW provided [phone] phone number to establish PCP    Last Visit to PCP:  Saw internal med doctor at [place] [address] on 5/1/19    DME:    missy Pérez. Pt reported no needs    Active with any community resources/agencies/skilled home care:  Pt is active with [address] [place] [address] outpatient alcohol treatment program.  Pt reports she likes the agency. Reported no interesting in inpatient treatment at this time. Reported she has done inpatient before and [facility] because it is \"smaller and more intimate. \"      Medication compliance issues:  No    Financial issues that could impact healthcare:  No    Transportation at the time of discharge:  Spouse    Tentative discharge plan:  Home most likely continuing with [address] [place] [address] outpatient services. Pt reported her spouse can take her to her appointments at [address] [place] [address] in [facility], but pt needs a ride back. Pt stated [address] [place] [address] has been helping with Lyft rides back to her house after appointments, but unsure if they can always do this. SW needs to call [facility] location Monday and ask if they can do this consistently, or if not, help with other transportation resources. RN stated pt may need HHC. RN stated will be talking with doctor about PT/OT eval.  Pt agreeable to [facility] if recommended. No preference in agencies.     Electronically signed by SAJI Her, JAMMIE on 6/2/2019 at 11:44 AM

## 2019-06-02 NOTE — PLAN OF CARE
1000 Awake & restless. To bathroom, ambulated in rodriguez & up to chair with gait belt. complains of mild perineal/ urethral burning & spasms. CIWA score 20. Given ativan 3 mg iv per request. chair wheels locked, side rails up times 2, chair alarm in place, door open, encouraged to use call light for needs, phone and call light are within reach. Will continue to monitor.   Niurka Maradiaga RN, BSN

## 2019-06-02 NOTE — PROGRESS NOTES
Hospitalist Progress Note      PCP: No primary care provider on file. Date of Admission: 6/1/2019    Chief Complaint: alcohol withdrawal     Subjective: still having tremors. Confusion intermittently     Medications:  Reviewed    Infusion Medications   Scheduled Medications    chlordiazePOXIDE  10 mg Oral TID    FLUoxetine  20 mg Oral Daily    NIFEdipine  30 mg Oral Daily    sodium chloride flush  10 mL Intravenous 2 times per day    enoxaparin  40 mg Subcutaneous Daily    folic acid, thiamine, multi-vitamin with vitamin K infusion   Intravenous Daily    multivitamin  1 tablet Oral Daily    famotidine  20 mg Oral BID    cefTRIAXone (ROCEPHIN) IV  1 g Intravenous Q24H    phenazopyridine  200 mg Oral TID      PRN Meds: zolpidem, sodium chloride flush, ondansetron, potassium chloride **OR** potassium alternative oral replacement **OR** potassium chloride, acetaminophen, LORazepam **OR** LORazepam **OR** LORazepam **OR** LORazepam **OR** LORazepam **OR** LORazepam **OR** LORazepam **OR** LORazepam      Intake/Output Summary (Last 24 hours) at 6/2/2019 1026  Last data filed at 6/1/2019 1510  Gross per 24 hour   Intake 1000 ml   Output --   Net 1000 ml       Physical Exam Performed:    /82   Pulse 100   Temp 98 °F (36.7 °C) (Oral)   Resp 18   Ht 5' 4\" (1.626 m)   Wt 147 lb 9.6 oz (67 kg)   SpO2 96%   BMI 25.34 kg/m²     General appearance: No apparent distress ; confused some what about the recent events ; tremors noted. HEENT: Pupils equal, round, and reactive to light. Conjunctivae/corneas clear. Neck: Supple, with full range of motion. No jugular venous distention. Trachea midline. Respiratory:  Normal respiratory effort. Clear to auscultation, bilaterally without Rales/Wheezes/Rhonchi. Cardiovascular: Regular rate and rhythm with normal S1/S2 without murmurs, rubs or gallops. Abdomen: Soft, non-tender, non-distended with normal bowel sounds.   Musculoskeletal: No clubbing,

## 2019-06-03 LAB
ORGANISM: ABNORMAL
URINE CULTURE, ROUTINE: ABNORMAL
URINE CULTURE, ROUTINE: ABNORMAL

## 2019-06-03 PROCEDURE — 97535 SELF CARE MNGMENT TRAINING: CPT

## 2019-06-03 PROCEDURE — 97166 OT EVAL MOD COMPLEX 45 MIN: CPT

## 2019-06-03 PROCEDURE — 2060000000 HC ICU INTERMEDIATE R&B

## 2019-06-03 PROCEDURE — 97162 PT EVAL MOD COMPLEX 30 MIN: CPT

## 2019-06-03 PROCEDURE — 6360000002 HC RX W HCPCS: Performed by: NURSE PRACTITIONER

## 2019-06-03 PROCEDURE — 2500000003 HC RX 250 WO HCPCS: Performed by: INTERNAL MEDICINE

## 2019-06-03 PROCEDURE — 94760 N-INVAS EAR/PLS OXIMETRY 1: CPT

## 2019-06-03 PROCEDURE — 97530 THERAPEUTIC ACTIVITIES: CPT

## 2019-06-03 PROCEDURE — 6370000000 HC RX 637 (ALT 250 FOR IP): Performed by: NURSE PRACTITIONER

## 2019-06-03 PROCEDURE — 97116 GAIT TRAINING THERAPY: CPT

## 2019-06-03 PROCEDURE — 6370000000 HC RX 637 (ALT 250 FOR IP): Performed by: INTERNAL MEDICINE

## 2019-06-03 PROCEDURE — 6360000002 HC RX W HCPCS: Performed by: INTERNAL MEDICINE

## 2019-06-03 PROCEDURE — 2580000003 HC RX 258: Performed by: INTERNAL MEDICINE

## 2019-06-03 RX ORDER — CHLORDIAZEPOXIDE HYDROCHLORIDE 10 MG/1
10 CAPSULE, GELATIN COATED ORAL 4 TIMES DAILY
Status: DISCONTINUED | OUTPATIENT
Start: 2019-06-03 | End: 2019-06-05 | Stop reason: HOSPADM

## 2019-06-03 RX ADMIN — Medication 10 ML: at 13:54

## 2019-06-03 RX ADMIN — Medication 10 ML: at 08:55

## 2019-06-03 RX ADMIN — LORAZEPAM 3 MG: 2 INJECTION INTRAMUSCULAR; INTRAVENOUS at 08:55

## 2019-06-03 RX ADMIN — THERA TABS 1 TABLET: TAB at 08:56

## 2019-06-03 RX ADMIN — PHENAZOPYRIDINE HYDROCHLORIDE 200 MG: 100 TABLET ORAL at 17:28

## 2019-06-03 RX ADMIN — Medication 1 G: at 13:54

## 2019-06-03 RX ADMIN — Medication 10 ML: at 21:37

## 2019-06-03 RX ADMIN — ZOLPIDEM TARTRATE 5 MG: 5 TABLET ORAL at 21:36

## 2019-06-03 RX ADMIN — PHENAZOPYRIDINE HYDROCHLORIDE 200 MG: 100 TABLET ORAL at 13:55

## 2019-06-03 RX ADMIN — NIFEDIPINE 30 MG: 30 TABLET, FILM COATED, EXTENDED RELEASE ORAL at 08:56

## 2019-06-03 RX ADMIN — FAMOTIDINE 20 MG: 20 TABLET ORAL at 21:37

## 2019-06-03 RX ADMIN — FLUOXETINE 20 MG: 20 CAPSULE ORAL at 08:56

## 2019-06-03 RX ADMIN — LORAZEPAM 2 MG: 2 INJECTION INTRAMUSCULAR; INTRAVENOUS at 01:01

## 2019-06-03 RX ADMIN — LORAZEPAM 2 MG: 1 TABLET ORAL at 13:55

## 2019-06-03 RX ADMIN — LORAZEPAM 3 MG: 2 INJECTION INTRAMUSCULAR; INTRAVENOUS at 05:44

## 2019-06-03 RX ADMIN — CHLORDIAZEPOXIDE HYDROCHLORIDE 10 MG: 10 CAPSULE ORAL at 17:28

## 2019-06-03 RX ADMIN — PHENAZOPYRIDINE HYDROCHLORIDE 200 MG: 100 TABLET ORAL at 08:56

## 2019-06-03 RX ADMIN — CHLORDIAZEPOXIDE HYDROCHLORIDE 10 MG: 10 CAPSULE ORAL at 21:36

## 2019-06-03 RX ADMIN — CHLORDIAZEPOXIDE HYDROCHLORIDE 10 MG: 10 CAPSULE ORAL at 13:55

## 2019-06-03 RX ADMIN — CHLORDIAZEPOXIDE HYDROCHLORIDE 10 MG: 10 CAPSULE ORAL at 08:56

## 2019-06-03 RX ADMIN — LORAZEPAM 2 MG: 1 TABLET ORAL at 17:28

## 2019-06-03 RX ADMIN — FAMOTIDINE 20 MG: 20 TABLET ORAL at 08:56

## 2019-06-03 RX ADMIN — ENOXAPARIN SODIUM 40 MG: 40 INJECTION SUBCUTANEOUS at 08:55

## 2019-06-03 RX ADMIN — FOLIC ACID: 5 INJECTION, SOLUTION INTRAMUSCULAR; INTRAVENOUS; SUBCUTANEOUS at 11:34

## 2019-06-03 ASSESSMENT — PAIN DESCRIPTION - LOCATION
LOCATION: HEAD
LOCATION: HEAD

## 2019-06-03 ASSESSMENT — PAIN DESCRIPTION - PROGRESSION
CLINICAL_PROGRESSION: GRADUALLY WORSENING
CLINICAL_PROGRESSION: NOT CHANGED

## 2019-06-03 ASSESSMENT — PAIN SCALES - GENERAL
PAINLEVEL_OUTOF10: 5
PAINLEVEL_OUTOF10: 5
PAINLEVEL_OUTOF10: 6
PAINLEVEL_OUTOF10: 0
PAINLEVEL_OUTOF10: 0

## 2019-06-03 ASSESSMENT — PAIN DESCRIPTION - DESCRIPTORS
DESCRIPTORS: HEADACHE

## 2019-06-03 ASSESSMENT — PAIN DESCRIPTION - PAIN TYPE
TYPE: ACUTE PAIN
TYPE: ACUTE PAIN

## 2019-06-03 ASSESSMENT — PAIN DESCRIPTION - FREQUENCY
FREQUENCY: CONTINUOUS

## 2019-06-03 ASSESSMENT — PAIN DESCRIPTION - ONSET
ONSET: ON-GOING
ONSET: ON-GOING

## 2019-06-03 NOTE — PROGRESS NOTES
Pt's IV infiltrated, skin red, warm, and slight edema to the area, ice applied to site, and new 22g IV started in the right hand; will continue to monitor.

## 2019-06-03 NOTE — PLAN OF CARE
Problem: Pain:  Goal: Pain level will decrease  Description  Pain level will decrease  6/3/2019 0405 by Dany Osler, RN  Outcome: Ongoing  6/3/2019 0029 by Ursula Rodriguez RN  Outcome: Ongoing  6/3/2019 0024 by Ursula Rodriguez RN  Outcome: Ongoing  6/2/2019 1937 by Ursula Rodriguez RN  Outcome: Ongoing  6/2/2019 1934 by Ursula Rodriguez RN  Outcome: Ongoing  6/2/2019 1916 by Ursual Rodriguez RN  Outcome: Ongoing  Goal: Control of acute pain  Description  Control of acute pain  6/3/2019 0405 by Dany Osler, RN  Outcome: Ongoing  6/3/2019 0029 by Ursula Rodriguez RN  Outcome: Ongoing  6/3/2019 0024 by Ursula Rodriguez RN  Outcome: Ongoing  6/2/2019 1937 by Ursula Rodriguez RN  Outcome: Ongoing  6/2/2019 1934 by Ursula Rodriguez RN  Outcome: Ongoing  6/2/2019 1916 by Ursula Rodriguez RN  Outcome: Ongoing  Goal: Control of chronic pain  Description  Control of chronic pain  6/3/2019 0405 by Dany Osler, RN  Outcome: Ongoing  6/3/2019 0029 by Ursula Rodriguez RN  Outcome: Ongoing  6/3/2019 0024 by Ursula Rodriguez RN  Outcome: Ongoing  6/2/2019 1937 by Ursula Rodriguez RN  Outcome: Ongoing  6/2/2019 1934 by Ursula Rodriguez RN  Outcome: Ongoing  6/2/2019 1916 by Ursula Rodriguez RN  Outcome: Ongoing     Problem: Nutrition Deficit:  Goal: Ability to achieve adequate nutritional intake will improve  Description  Ability to achieve adequate nutritional intake will improve  6/3/2019 0405 by Dany Osler, RN  Outcome: Ongoing  6/3/2019 0029 by Ursula Rodriguez RN  Outcome: Ongoing  6/3/2019 0024 by Ursula Rodriguez RN  Outcome: Ongoing  6/2/2019 1937 by Ursula Rodriguez RN  Outcome: Ongoing  6/2/2019 1934 by Ursula Rodriguez RN  Outcome: Ongoing  6/2/2019 1916 by Ursula Rodriguez RN  Outcome: Ongoing     Problem: Sleep Pattern Disturbance:  Goal: Appears well-rested  Description  Appears well-rested  6/3/2019 0405 by Dany Osler, RN  Outcome: Ongoing  6/3/2019 0029 by Aj Medina

## 2019-06-03 NOTE — PROGRESS NOTES
Shift assessment complete, 2mg ativan given at this time for a ciwa score of 15. Pt is very unsteady and spasms/shakes. Pt has been calm and cooperative, eyes closed in bed. Vitals stable. No other concerns at this time, will continue to monitor.

## 2019-06-03 NOTE — PROGRESS NOTES
Assistance: Independent  Leisure & Hobbies: walking her dogs  Additional Comments: states she sometimes sits on bottom and bumps up stairs to get up to apartment, states she used to be very active, reports 1 recent falls in which she was walking dogs with her walker    Objective          AROM RLE (degrees)  RLE AROM: WFL  AROM LLE (degrees)  LLE AROM : WFL  Strength RLE  Strength RLE: WFL(grossly 5/5)  Strength LLE  Strength LLE: WFL(grossly 5/5)  Coordination  Rapid Alternating Movements: Normal  Finger to Nose: Dysmetric  Heel to Shin: Normal  Sensation  Overall Sensation Status: WFL     Transfers  Sit to Stand: Moderate Assistance;Maximum Assistance(mod A from chair - max cues for hand placement, max A from toilet)  Stand to sit: Minimal Assistance(poor eccentric control)  Ambulation 1  Surface: level tile  Device: Rolling Walker  Assistance: Minimal assistance; Moderate assistance  Quality of Gait: moderately ataxic, min A provided to give resistance to walker to dampen uncoordiated movements, occasional mod A for LOB, 1 freezing-like episode in which patient leaning anteriorly over walker, raised up on toes and eyes closed - unable to advance feet 10-20 seconds, cued to take large step and patient took 3-4 foot lunge with min/mod to correct  Distance: 300 feet     Balance  Posture: Good  Sitting - Static: Good  Sitting - Dynamic: Good  Standing - Static: Fair  Standing - Dynamic: [address]  Times per week: 3-5  Times per day: Daily  Current Treatment Recommendations: Strengthening, Endurance Training, Transfer Training, Gait Training, Balance Training, Functional Mobility Training, Stair training, Safety Education & Training, Home Exercise Program  Safety Devices  Type of devices:  All fall risk precautions in place, Call light within reach, Chair alarm in place, Gait belt, Patient at risk for falls, Left in chair, Nurse notified  Restraints  Initially in place: No      AM-PAC Score  AM-PAC Inpatient Mobility Raw Score : 14  AM-PAC Inpatient T-Scale Score : 38.1  Mobility Inpatient CMS 0-100% Score: 61.29  Mobility Inpatient CMS G-Code Modifier : CL          Goals  Short term goals  Time Frame for Short term goals: To be met prior to discharge  Short term goal 1: Bed mobility with SBA  Short term goal 2: Sit to/from stand with min A  Short term goal 3: Ambulate 300 feet with CGA  Short term goal 4: Navigate up/down 1 flight of steps  with rail with min A  Patient Goals   Patient goals :  To get better       Therapy Time   Individual Concurrent Group Co-treatment   Time In 3623         Time Out 1538         Minutes 54         Timed Code Treatment Minutes: [address], PT   Thanks, Christine Lopez, PT, DPT [id number]

## 2019-06-03 NOTE — PROGRESS NOTES
Occupational Therapy   Occupational Therapy Initial Assessment  Date: 6/3/2019   Patient Name: Yusuf Peña  MRN: [id number]     : 1955    Date of Service: 6/3/2019    Discharge Recommendations: Yusuf Peña scored a 16/24 on the AM-PAC ADL Inpatient form. Current research shows that an AM-PAC score of 17 or less is typically not associated with a discharge to the patient's home setting. Based on the patients AM-PAC score and their current ADL deficits, it is recommended that the patient have 5-7 sessions per week of Occupational Therapy at d/c to increase the patients independence. OT Equipment Recommendations  Equipment Needed: No  Other: continue to assess    Assessment   Performance deficits / Impairments: Decreased functional mobility ; Decreased endurance;Decreased coordination;Decreased ADL status; Decreased safe awareness;Decreased high-level IADLs;Decreased balance  Assessment: pt assessed secondary to not at baseline functioning and unable to return home. Treatment Diagnosis: alcohol withdrawl  Prognosis: Fair  Decision Making: Medium Complexity  Assistance / Modification: min a- max a  Patient Education: role of ot, transfers, safety d/c planning  REQUIRES OT FOLLOW UP: Yes  Activity Tolerance  Activity Tolerance: Patient Tolerated treatment well  Safety Devices  Safety Devices in place: Yes  Type of devices: All fall risk precautions in place;Gait belt;Call light within reach; Patient at risk for falls; Chair alarm in place; Left in chair  Restraints  Initially in place: No           Patient Diagnosis(es): The primary encounter diagnosis was Alcohol withdrawal syndrome with complication ([facility] [address] A diagnosis of Acute cystitis without hematuria was also pertinent to this visit. has a past medical history of Alcoholism ([address] Hyperlipidemia, and Hypertension. has a past surgical history that includes back surgery; Colonoscopy; and Endoscopy, colon, diagnostic.     Treatment Diagnosis: alcohol withdrawl      Restrictions  Restrictions/Precautions  Restrictions/Precautions: Fall Risk, Seizure(High Fall Risk)  Required Braces or Orthoses?: No  Position Activity Restriction  Other position/activity restrictions: Cammie Partida is a 58 y.o. female that presents to the emergency department for alcohol withdrawal.  Patient drinks 2 large bottles of wine on a daily basis. [place] [facility] and states she had a one-week stay she was having difficulty ambulating with generalized shaking due to her alcohol withdrawal.  She was seen at [facility] starting on May 1 but shortly after relapsed and has been drinking again on a daily basis for the past 3 weeks and not following up. She states she wants to stop drinking now. She denies suicidal homicidal ideation. She has a mild headache with some agitation this morning but denies tactile disturbances, nausea, vomiting.     Subjective   General  Chart Reviewed: Yes  Family / Caregiver Present: No  Diagnosis: Alcohol withdrawal     Social/Functional History  Social/Functional History  Lives With: Spouse  Type of Home: Apartment(2nd floor)  Home Layout: One level  Home Access: Stairs to enter with rails  Entrance Stairs - Number of Steps: 13 steps to 2nd floor apartment  Bathroom Shower/Tub: Walk-in shower  Bathroom Equipment: Grab bars in shower  Home Equipment: Rolling walker, Cane  ADL Assistance: Independent  Homemaking Responsibilities: (shares with spouse, patient does laundry (outside of her apartment))  Ambulation Assistance: Independent(states uses RW when needs it)  Transfer Assistance: [address]: walking her dogs  Additional Comments: states she sometimes sits on bottom and bumps up stairs to get up to apartment, states she used to be very active, reports 1 recent falls in which she was walking dogs with her walker       Objective   Vision: Impaired  Vision Exceptions: Wears glasses for reading  Hearing: Within functional limits    Orientation  Overall Orientation Status: Within Functional Limits  Observation/Palpation  Posture: Good  Balance  Sitting Balance: Stand by assistance  Standing Balance: Contact guard assistance  Standing Balance  Time: ~3 min   Activity: toileting, hand hygien  Functional Mobility  Functional - Mobility Device: Rolling Walker  Activity: Other  Assist Level: Moderate assistance(min - mod assist secondary to balance)  Toilet Transfers  Toilet - Technique: Ambulating  Equipment Used: Standard toilet(w/ grab bars)  Toilet Transfer: Maximum assistance(min a - sit and max a to stand due to ataxia)  ADL  Toileting: Maximum assistance(due to ataxia to pull pants up and pericare)  Additional Comments: pt declined adl skills during therapy eval, stated she got washed up earlier  Tone RUE  RUE Tone: Normotonic  Tone LUE  LUE Tone: Normotonic  Coordination  Movements Are Fluid And Coordinated: No  Coordination and Movement description: Ataxia        Transfers  Sit to stand:  Moderate assistance  Stand to sit: Minimal assistance(poor central control)     Cognition  Overall Cognitive Status: WFL  Perception  Overall Perceptual Status: WFL     Sensation  Overall Sensation Status: WFL        LUE AROM (degrees)  LUE AROM : WFL  Left Hand AROM (degrees)  Left Hand AROM: WFL  RUE AROM (degrees)  RUE AROM : WFL  Right Hand AROM (degrees)  Right Hand AROM: WFL  LUE Strength  Gross LUE Strength: WFL  RUE Strength  Gross RUE Strength: WFL                   Plan   Plan  Times per week: 3-5   Times per day: Daily  Current Treatment Recommendations: Strengthening, Endurance Training, Patient/Caregiver Education & Training, Equipment Evaluation, Education, & procurement, Balance Training, Self-Care / ADL, Safety Education & Training, Functional Mobility Training, Home Management Training    G-Code     OutComes Score                                                  AM-PAC Score        AM-PAC Inpatient Daily Activity Raw Score: 16  AM-PAC Inpatient ADL T-Scale Score : 35.96  ADL Inpatient CMS 0-100% Score: 53.32  ADL Inpatient CMS G-Code Modifier : CK    Goals  Short term goals  Time Frame for Short term goals: d/c  Short term goal 1: pt will perform toileting cga  Short term goal 2: pt will perform lb dressing sba  Short term goal 3: pt will perform toilet transfer cga  Long term goals  Time Frame for Long term goals : LTG=STG  Patient Goals   Patient goals : return home       Therapy Time   Individual Concurrent Group Co-treatment   Time In 4074         Time Out 1538         Minutes 54               Timed Code Treatment Minutes:  39 minutes    Total Treatment Minutes:  54 minutes    Alpine Elders OTR/L [id number]

## 2019-06-03 NOTE — PLAN OF CARE
CIWA 18 @ 1945, 17 @ 2151. Given ativan 3 mg per protocol with each of these CIWA scores. the patient is increasingly confused, circular thought processes, flight of ideas. the patient is increasingly unsteady, leaning backward & forward while using walker & moderate assist of 1 without warning. Given ambien per request to assist with sleep. Denies pain. Voiding hourly, no longer complaining of burning with urination. Client remains free from falls, bed in lowest position, wheels locked, side rails up times 3, bed alarm in place, door open, encouraged to use call light for needs, phone and call light are within reach. Will continue to monitor.   Michelle Lemon RN, BSN

## 2019-06-04 PROCEDURE — 2580000003 HC RX 258: Performed by: INTERNAL MEDICINE

## 2019-06-04 PROCEDURE — 6370000000 HC RX 637 (ALT 250 FOR IP): Performed by: INTERNAL MEDICINE

## 2019-06-04 PROCEDURE — 2500000003 HC RX 250 WO HCPCS: Performed by: INTERNAL MEDICINE

## 2019-06-04 PROCEDURE — 2060000000 HC ICU INTERMEDIATE R&B

## 2019-06-04 PROCEDURE — 6370000000 HC RX 637 (ALT 250 FOR IP): Performed by: NURSE PRACTITIONER

## 2019-06-04 PROCEDURE — 97116 GAIT TRAINING THERAPY: CPT

## 2019-06-04 PROCEDURE — 97535 SELF CARE MNGMENT TRAINING: CPT

## 2019-06-04 PROCEDURE — 97530 THERAPEUTIC ACTIVITIES: CPT

## 2019-06-04 PROCEDURE — 6360000002 HC RX W HCPCS: Performed by: INTERNAL MEDICINE

## 2019-06-04 RX ADMIN — NIFEDIPINE 30 MG: 30 TABLET, FILM COATED, EXTENDED RELEASE ORAL at 09:54

## 2019-06-04 RX ADMIN — CHLORDIAZEPOXIDE HYDROCHLORIDE 10 MG: 10 CAPSULE ORAL at 09:54

## 2019-06-04 RX ADMIN — CHLORDIAZEPOXIDE HYDROCHLORIDE 10 MG: 10 CAPSULE ORAL at 13:35

## 2019-06-04 RX ADMIN — FAMOTIDINE 20 MG: 20 TABLET ORAL at 09:53

## 2019-06-04 RX ADMIN — FAMOTIDINE 20 MG: 20 TABLET ORAL at 22:20

## 2019-06-04 RX ADMIN — PHENAZOPYRIDINE HYDROCHLORIDE 200 MG: 100 TABLET ORAL at 17:04

## 2019-06-04 RX ADMIN — THERA TABS 1 TABLET: TAB at 09:53

## 2019-06-04 RX ADMIN — ZOLPIDEM TARTRATE 5 MG: 5 TABLET ORAL at 22:20

## 2019-06-04 RX ADMIN — Medication 10 ML: at 22:21

## 2019-06-04 RX ADMIN — PHENAZOPYRIDINE HYDROCHLORIDE 200 MG: 100 TABLET ORAL at 09:53

## 2019-06-04 RX ADMIN — LORAZEPAM 1 MG: 2 INJECTION INTRAMUSCULAR; INTRAVENOUS at 10:01

## 2019-06-04 RX ADMIN — FLUOXETINE 20 MG: 20 CAPSULE ORAL at 09:54

## 2019-06-04 RX ADMIN — PHENAZOPYRIDINE HYDROCHLORIDE 200 MG: 100 TABLET ORAL at 13:35

## 2019-06-04 RX ADMIN — LORAZEPAM 2 MG: 2 INJECTION INTRAMUSCULAR; INTRAVENOUS at 00:42

## 2019-06-04 RX ADMIN — FOLIC ACID: 5 INJECTION, SOLUTION INTRAMUSCULAR; INTRAVENOUS; SUBCUTANEOUS at 12:16

## 2019-06-04 RX ADMIN — Medication 10 ML: at 09:54

## 2019-06-04 RX ADMIN — ENOXAPARIN SODIUM 40 MG: 40 INJECTION SUBCUTANEOUS at 09:54

## 2019-06-04 RX ADMIN — CHLORDIAZEPOXIDE HYDROCHLORIDE 10 MG: 10 CAPSULE ORAL at 17:04

## 2019-06-04 RX ADMIN — CHLORDIAZEPOXIDE HYDROCHLORIDE 10 MG: 10 CAPSULE ORAL at 22:20

## 2019-06-04 ASSESSMENT — PAIN SCALES - GENERAL
PAINLEVEL_OUTOF10: 0
PAINLEVEL_OUTOF10: 5

## 2019-06-04 NOTE — CARE COORDINATION
Patient's first choice is the [facility] pending [facility]. Christopher Daíz [phone] from the [facility]  requested an on-site visit with patient this afternoon before starting precert.

## 2019-06-04 NOTE — CARE COORDINATION
[facility]    Received referral for homecare services. [facility] will service at discharge if patient goes home. Will continue to follow. Electronically signed by Hailee Hollingsworth LPN on 1/0/96 at 9:39 AM            [facility] Transition Nurse  [phone]

## 2019-06-04 NOTE — PROGRESS NOTES
Occupational Therapy   Occupational Therapy Initial Assessment  Date: 2019   Patient Name: Valentina Coburn  MRN: [id number]     : 1955    Date of Service: 2019    Discharge Recommendations: Valentina Coburn scored a 18/24 on the AM-PAC ADL Inpatient form. Current research shows that an AM-PAC score of 18 or greater is typically associated with a discharge to the patient's home setting. Based on the patients AM-PAC score and their current ADL deficits, it is recommended that the patient have 2-3 sessions per week of Occupational Therapy at d/c to increase the patients independence. HOME HEALTH CARE: LEVEL 2 SOCIAL     - Initial home health evaluation to occur within 24-48 hours, in patient home   - Therapy to evaluate with goal of regaining prior level of functioning   - Therapy to evaluate if patient has [address] needs for personal care   -  evaluation within 24-48 hours, includes evaluation of resources and insurance to determine AL/IL/LTC/Medicaid options   - [place] on Aging Referral   - Princess Murillo to discuss home support and care needs post discharge within two weeks of discharge. OT Equipment Recommendations  Equipment Needed: No    Assessment   Performance deficits / Impairments: Decreased functional mobility ; Decreased endurance;Decreased coordination;Decreased ADL status; Decreased safe awareness;Decreased high-level IADLs;Decreased balance  Assessment: pt assessed secondary to alcohol withdrawl and decreased coordination/balance. pt reports I PLF  Treatment Diagnosis: alcohol withdrawl  Assistance / Modification: CGA- SBA  Patient Education: role of ot, transfers, safety d/c planning  REQUIRES OT FOLLOW UP: Yes  Activity Tolerance  Activity Tolerance: Patient Tolerated treatment well  Safety Devices  Safety Devices in place: Yes  Type of devices: All fall risk precautions in place;Gait belt;Patient at risk for falls;Call light within reach; Left in bed;Bed alarm in place;Nurse notified           Patient Diagnosis(es): The primary encounter diagnosis was Alcohol withdrawal syndrome with complication ([facility] [address] A diagnosis of Acute cystitis without hematuria was also pertinent to this visit. has a past medical history of Alcoholism ([facility] [address] Hyperlipidemia, and Hypertension. has a past surgical history that includes back surgery; Colonoscopy; and Endoscopy, colon, diagnostic. Treatment Diagnosis: alcohol withdrawl      Restrictions  Restrictions/Precautions  Restrictions/Precautions: Fall Risk, Seizure  Required Braces or Orthoses?: No  Position Activity Restriction  Other position/activity restrictions: Tenzin Espinal is a 58 y.o. female that presents to the emergency department for alcohol withdrawal.  Patient drinks 2 large bottles of wine on a daily basis. He was recently admitted to [facility] [place] in [place] and states she had a one-week stay she was having difficulty ambulating with generalized shaking due to her alcohol withdrawal.  She was seen at [facility] starting on May 1 but shortly after relapsed and has been drinking again on a daily basis for the past 3 weeks and not following up. She states she wants to stop drinking now. She denies suicidal homicidal ideation. She has a mild headache with some agitation this morning but denies tactile disturbances, nausea, vomiting.     Subjective   General  Chart Reviewed: Yes  Response to previous treatment: Patient with no complaints from previous session  Family / Caregiver Present: No  Diagnosis: Alcohol withdrawal  Pain Assessment  Pain Level: 5  Social/Functional History  Social/Functional History  Lives With: Spouse  Type of Home: Apartment(2nd floor)  Home Layout: One level  Home Access: Stairs to enter with rails  Entrance Stairs - Number of Steps: 13 steps to 2nd floor apartment  Bathroom Shower/Tub: Walk-in shower(w/ seat)  Bathroom Equipment: Grab bars in shower  Home Equipment: Rolling walker, Cane  ADL Assistance: Independent  Homemaking Responsibilities: (shares with spouse, patient does laundry (outside of her apartment))  Ambulation Assistance: Independent(states uses RW when needs it)  Transfer Assistance: Independent  Leisure & Hobbies: walking her dogs  Additional Comments: states she sometimes sits on bottom and bumps up stairs to get up to apartment, states she used to be very active, reports 1 recent falls in which she was walking dogs with her walker       Objective        Orientation  Overall Orientation Status: Within Functional Limits  Observation/Palpation  Posture: Good  Balance  Sitting Balance: Stand by assistance  Standing Balance: Contact guard assistance  Standing Balance  Time: ~5 min   Activity: toileting, hand hygine, transfers  Functional Mobility  Functional - Mobility Device: No device  Activity: To/from bathroom  Assist Level: Contact guard assistance(w/ verbal cues for safety)  Toilet Transfers  Toilet - Technique: Ambulating  Equipment Used: Standard toilet  Toilet Transfer: Contact guard assistance  ADL  Grooming: Setup;Contact guard assistance(seated for hair, in stance w/ CGA for oral care)  UE Bathing: Setup(seated at sink level)  LE Bathing: Contact guard assistance(CGA in stance for laquita care at sink level)  UE Dressing: Stand by assistance  LE Dressing: Contact guard assistance(CGA w/ LE dressing secondary to balance)  Toileting: Contact guard assistance(CGA secondary to balance to pull up pants)        Bed mobility  Supine to Sit: Supervision  Sit to Supine: Supervision  Scooting: Supervision  Transfers  Sit to stand: Contact guard assistance  Stand to sit: Contact guard assistance     Cognition  Overall Cognitive Status: [facility]                                        Plan   Plan  Times per week: 3-5   Times per day: Daily  Current Treatment Recommendations: Strengthening, Endurance Training, Patient/Caregiver Education & Training, Equipment Evaluation, Education, & procurement, Balance Training, Self-Care / ADL, Safety Education & Training, Functional Mobility Training, Home Management Training    G-Code     OutComes Score                                                  AM-PAC Score        AM-PAC Inpatient Daily Activity Raw Score: 18 (06/04/19 1508)  AM-PAC Inpatient ADL T-Scale Score : 38.66 (06/04/19 1508)  ADL Inpatient CMS 0-100% Score: 46.65 (06/04/19 1508)  ADL Inpatient CMS G-Code Modifier : CK (06/04/19 1508)    Goals  Short term goals  Time Frame for Short term goals: d/c  Short term goal 1: toileting CGA - met 6/4  Short term goal 2: LB dressing SBA - 6/4 CGA  Short term goal 3: toilet transfer CGA - met 6/4  Short term goal 4: new goal 6/4 - toilet transfer MOD I  Short term goal 5: new goal 6/4 - bathing SBA  Long term goals  Time Frame for Long term goals : LTG=STG  Patient Goals   Patient goals : return home       Therapy Time   Individual Concurrent Group Co-treatment   Time In 4365         Time Out 1446         Minutes 48               Total Treatment Minutes:  48 min    Rory Michelle OTR/L [id number]

## 2019-06-04 NOTE — PLAN OF CARE
Problem: Pain:  Goal: Pain level will decrease  Description  Pain level will decrease  Outcome: Not Met This Shift  Goal: Control of acute pain  Description  Control of acute pain  Outcome: Not Met This Shift  Goal: Control of chronic pain  Description  Control of chronic pain  Outcome: Not Met This Shift     Problem: Nutrition Deficit:  Goal: Ability to achieve adequate nutritional intake will improve  Description  Ability to achieve adequate nutritional intake will improve  Outcome: Not Met This Shift     Problem: Sleep Pattern Disturbance:  Goal: Appears well-rested  Description  Appears well-rested  Outcome: Not Met This Shift  Note:   Pt has slept from 2 am to 0700 without waking       Problem:   Goal: No urinary complication  Outcome: Not Met This Shift     Problem: Musculor/Skeletal Functional Status  Goal: Highest potential functional level  Outcome: Not Met This Shift  Goal: Absence of falls  Outcome: Not Met This Shift     Problem: Urinary Elimination:  Goal: Signs and symptoms of infection will decrease  Description  Signs and symptoms of infection will decrease  Outcome: Not Met This Shift

## 2019-06-04 NOTE — CARE COORDINATION
Met with patient to offer assistance with discharge, and he has no home care agency preference. Patient referred to [facility], pending a home care order. According to the patient [address] home care was never started.

## 2019-06-04 NOTE — PROGRESS NOTES
texture, turgor normal.  No rashes or lesions. Neurologic:  Neurovascularly intact without any focal sensory/motor deficits. Cranial nerves: II-XII intact, grossly non-focal.  Psychiatric: Alert and oriented, thought content appropriate, normal insight  Capillary Refill: Brisk,< 3 seconds   Peripheral Pulses: +2 palpable, equal bilaterally       Labs:   No results for input(s): WBC, HGB, HCT, PLT in the last 72 hours. Recent Labs     06/02/19  0549   PHOS 4.0     No results for input(s): AST, ALT, BILIDIR, BILITOT, ALKPHOS in the last 72 hours. No results for input(s): INR in the last 72 hours. No results for input(s): Mazin Gal in the last 72 hours. Urinalysis:      Lab Results   Component Value Date    NITRU Negative 06/01/2019    WBCUA >100 06/01/2019    BACTERIA 1+ 06/01/2019    RBCUA see below 06/01/2019    BLOODU SMALL 06/01/2019    SPECGRAV 1.006 06/01/2019    GLUCOSEU Negative 06/01/2019       Radiology:  No orders to display           Assessment/Plan:    Active Hospital Problems    Diagnosis    Alcohol withdrawal delirium ([facility] [address] [F10.231]    Essential hypertension [I10]    UTI (urinary tract infection) [N39.0]     1. Alcohol withdrawal - cont librium to 4 times a day . Cont CIWA , IV banana bag ordered. PT/OT consulted. Recent MRI brain neg for stroke  2. UTI - IV ceftriaxone -cx grew klebsiella. Cont rocephin   3.  HTN - Continue nifedipine        DVT Prophylaxis:lovenox ppx   Diet: DIET GENERAL;  Code Status: Full Code    PT/OT Eval Status: ordered    Dispo - inpt  Awaiting SNF placement     Janeth Pearson MD

## 2019-06-05 VITALS
RESPIRATION RATE: 16 BRPM | WEIGHT: 145.8 LBS | OXYGEN SATURATION: 97 % | HEART RATE: 79 BPM | HEIGHT: 64 IN | BODY MASS INDEX: 24.89 KG/M2 | DIASTOLIC BLOOD PRESSURE: 74 MMHG | TEMPERATURE: 98.1 F | SYSTOLIC BLOOD PRESSURE: 106 MMHG

## 2019-06-05 PROCEDURE — 6370000000 HC RX 637 (ALT 250 FOR IP): Performed by: NURSE PRACTITIONER

## 2019-06-05 PROCEDURE — 6370000000 HC RX 637 (ALT 250 FOR IP): Performed by: INTERNAL MEDICINE

## 2019-06-05 PROCEDURE — 2580000003 HC RX 258: Performed by: INTERNAL MEDICINE

## 2019-06-05 PROCEDURE — 6360000002 HC RX W HCPCS: Performed by: INTERNAL MEDICINE

## 2019-06-05 RX ORDER — CHLORDIAZEPOXIDE HYDROCHLORIDE 5 MG/1
CAPSULE, GELATIN COATED ORAL
Qty: 12 CAPSULE | Refills: 0 | Status: SHIPPED | OUTPATIENT
Start: 2019-06-05 | End: 2019-06-10

## 2019-06-05 RX ORDER — NIFEDIPINE 30 MG/1
30 TABLET, FILM COATED, EXTENDED RELEASE ORAL DAILY
Qty: 30 TABLET | Refills: 2 | Status: ON HOLD | OUTPATIENT
Start: 2019-06-05 | End: 2019-09-15 | Stop reason: SDUPTHER

## 2019-06-05 RX ADMIN — FAMOTIDINE 20 MG: 20 TABLET ORAL at 09:52

## 2019-06-05 RX ADMIN — CHLORDIAZEPOXIDE HYDROCHLORIDE 10 MG: 10 CAPSULE ORAL at 09:52

## 2019-06-05 RX ADMIN — THERA TABS 1 TABLET: TAB at 09:52

## 2019-06-05 RX ADMIN — Medication 10 ML: at 09:53

## 2019-06-05 RX ADMIN — PHENAZOPYRIDINE HYDROCHLORIDE 200 MG: 100 TABLET ORAL at 09:56

## 2019-06-05 RX ADMIN — NIFEDIPINE 30 MG: 30 TABLET, FILM COATED, EXTENDED RELEASE ORAL at 09:51

## 2019-06-05 RX ADMIN — ENOXAPARIN SODIUM 40 MG: 40 INJECTION SUBCUTANEOUS at 09:51

## 2019-06-05 RX ADMIN — FLUOXETINE 20 MG: 20 CAPSULE ORAL at 09:51

## 2019-06-05 ASSESSMENT — PAIN SCALES - GENERAL: PAINLEVEL_OUTOF10: 0

## 2019-06-05 NOTE — PLAN OF CARE
Problem: Pain:  Goal: Pain level will decrease  Description  Pain level will decrease  Outcome: Ongoing  Note:   Pt denies any pain at this time. Will continue to monitor.

## 2019-06-05 NOTE — PLAN OF CARE
Problem: Nutrition Deficit:  Goal: Ability to achieve adequate nutritional intake will improve  Description  Ability to achieve adequate nutritional intake will improve  Outcome: Ongoing  Note:   Pt ate 100 percent of breakfast and has been to nutrition room to several snacks and drinks this am     Problem: Musculor/Skeletal Functional Status  Goal: Absence of falls  Note:   Pt low fall risk, up ambulating in hallways with steady gait per self. Instructed pt to notify nurse if she needs assistance.

## 2019-06-05 NOTE — PROGRESS NOTES
Pt is concerned and wants MD to look at her arm where an IV infiltrated during admission before she leaves. Unknown Trey Mcdonnell to notify him.

## 2019-06-05 NOTE — DISCHARGE INSTR - COC
Continuity of Care Form    Patient Name: Lyle Smith   :  1955  MRN:  [id number]    Admit date:  2019  Discharge date:  2019    Code Status Order: Full Code   Advance Directives:   Advance Care Flowsheet Documentation     Date/Time Healthcare Directive Type of Healthcare Directive Copy in [address] Agent's Name Healthcare Agent's Phone Number    [phone]  Yes, patient has an advance directive for healthcare treatment  Living will  Other (Comment)  --  --  --          Admitting Physician:  Laci Kinney MD  PCP: No primary care provider on file. Discharging Nurse: [facility] Unit/Room#: 2LW-2003/1214-95  Discharging Unit Phone Number: [phone]    Emergency Contact:   Extended Emergency Contact Information  Primary Emergency Contact: Terence Lujan  Address: [address] [place] [address] Phone: [phone]  Mobile Phone: [phone]  Relation: Spouse    Past Surgical History:  Past Surgical History:   Procedure Laterality Date    BACK SURGERY       L5 disectomy    COLONOSCOPY      2016    ENDOSCOPY, COLON, DIAGNOSTIC             Immunization History: There is no immunization history on file for this patient.     Active Problems:  Patient Active Problem List   Diagnosis Code    Alcohol withdrawal delirium ([facility] [address] F10.231    Essential hypertension I10    UTI (urinary tract infection) N39.0       Isolation/Infection:   Isolation          No Isolation            Nurse Assessment:  Last Vital Signs: /74   Pulse 79   Temp 98.1 °F (36.7 °C) (Oral)   Resp 16   Ht 5' 4\" (1.626 m)   Wt 145 lb 12.8 oz (66.1 kg)   SpO2 97%   BMI 25.03 kg/m²     Last documented pain score (0-10 scale): Pain Level: 0  Last Weight:   Wt Readings from Last 1 Encounters:   19 145 lb 12.8 oz (66.1 kg)     Mental Status:  oriented    IV Access:  - None    Nursing Mobility/ADLs:  Walking   Independent  Transfer  Independent  Bathing  Independent  Dressing  Independent  Toileting  Independent  Feeding  Independent  Med Admin  Independent  Med Delivery   whole    Wound Care Documentation and Therapy:        Elimination:  Continence:   · Bowel: Yes  · Bladder: Yes  Urinary Catheter: None   Colostomy/Ileostomy/Ileal Conduit: No       Date of Last BM: 6/5  No intake or output data in the 24 hours ending 06/05/19 1058  No intake/output data recorded. Safety Concerns:     None    Impairments/Disabilities:      None    Nutrition Therapy:  Current Nutrition Therapy:   - Oral Diet:  General    Routes of Feeding: Oral  Liquids: Thin Liquids  Daily Fluid Restriction: no  Last Modified Barium Swallow with Video (Video Swallowing Test): not done    Treatments at the Time of Hospital Discharge:   Respiratory Treatments: n/a  Oxygen Therapy:  is not on home oxygen therapy.   Ventilator:    - No ventilator support    Rehab Therapies: Physical Therapy, and Occupational Therapy  Weight Bearing Status/Restrictions: No weight bearing restirctions  Other Medical Equipment (for information only, NOT a DME order):  {EQUIPMENT:[id number]}  Other Treatments:     Patient's personal belongings (please select all that are sent with patient):  None    RN SIGNATURE:  Electronically signed by Karen John RN on 6/5/19 at 1:40 PM    CASE MANAGEMENT/SOCIAL WORK SECTION    Inpatient Status Date:     Readmission Risk Assessment Score:  Readmission Risk              Risk of Unplanned Readmission:        8           Discharging to Facility/ [facility].  Phone: [phone]  Fax    : [phone]      Dialysis Facility (if applicable)   · Name:  · Address:  · Dialysis Schedule:  · Phone:  · Fax:    / signature: {Esignature:[id number]}    PHYSICIAN SECTION    Prognosis: Good    Condition at Discharge: Stable    Rehab Potential (if transferring to Rehab): Good    Recommended Labs or Other Treatments After Discharge: [place] [address] meetings   PCP in 1 week     Physician Certification: I certify the above information and transfer of Arabella Eldridge  is necessary for the continuing treatment of the diagnosis listed and that she requires Home Care for less 30 days.      Update Admission H&P: No change in H&P    PHYSICIAN SIGNATURE:  Electronically signed by Todd Tomlin MD on 6/5/19 at 10:58 AM

## 2019-06-05 NOTE — DISCHARGE SUMMARY
Hospital Medicine Discharge Summary    Patient ID: Bea Gary      Patient's PCP: No primary care provider on file. Admit Date: 6/1/2019   Discharge Date:   6/5/19     Admitting Physician: Mick Herr MD     Discharge Physician: Roxana Peck MD     Discharge Diagnoses: Active Hospital Problems    Diagnosis    Alcohol withdrawal delirium ([facility] [address] [F10.231]    Essential hypertension [I10]    UTI (urinary tract infection) [N39.0]       The patient was seen and examined on day of discharge and this discharge summary is in conjunction with any daily progress note from day of discharge.       History of Present Illness:    Bea Gary is 58 y.o. female who presented with complaint of alcohol withdrawal. Symptom onset was acute for a time period of 1 day. The severity is described as moderate. The course of his symptoms over time is worsening. The symptoms improved with ativan and worsened with none. The patient's symptom is associated with depression.     Bea Gary is 58 y.o. female with history of alcohol withdrawal and HTN   He presents to the ER with complaint of alcohol withdrawal   She drinks 2 bottles of wine daily, binge drinks due to depression. She was recently (4/22) admitted to [facility] for 7 days for alcohol withdrawal and UTI due to Klebsiella  She follows up with [facility] for detox but she was sent this hospital due to concern of DT  Patient states she wants to stop drinking but she has relapsed in the past.         Hospital Course:     1. Alcohol withdrawal : treated with librium ,  CIWA , IV banana bag. Discharged with librium. 2. Alcohol related ataxia:  Recent MRI brain neg for stroke prior to admission. Pt.ot- home with Ismael Arnold   3. UTI - cx grew klebsiella. Finished course of  rocephin in here. 4.  HTN -   nifedipine       Physical Exam Performed:     /74   Pulse 79   Temp 98.1 °F (36.7 °C) (Oral)   Resp 16   Ht 5' 4\" (1.626 m)   Wt 145 lb 12.8 oz (66.1 kg)   SpO2 97%   BMI 25.03 kg/m²       General appearance:  No apparent distress, appears stated age and cooperative. HEENT:  Normal cephalic, atraumatic without obvious deformity. Pupils equal, round, and reactive to light. Extra ocular muscles intact. Conjunctivae/corneas clear. Neck: Supple, with full range of motion. No jugular venous distention. Trachea midline. Respiratory:  Normal respiratory effort. Clear to auscultation, bilaterally without Rales/Wheezes/Rhonchi. Cardiovascular:  Regular rate and rhythm with normal S1/S2 without murmurs, rubs or gallops. Abdomen: Soft, non-tender, non-distended with normal bowel sounds. Musculoskeletal:  No clubbing, cyanosis or edema bilaterally. Full range of motion without deformity. Skin: Skin color, texture, turgor normal.  No rashes or lesions. Neurologic:  Neurovascularly intact without any focal sensory/motor deficits. Cranial nerves: II-XII intact, grossly non-focal.  Psychiatric:  Alert and oriented, thought content appropriate, normal insight  Capillary Refill: Brisk,< 3 seconds   Peripheral Pulses: +2 palpable, equal bilaterally       Labs:  For convenience and continuity at follow-up the following most recent labs are provided:      CBC:    Lab Results   Component Value Date    WBC 6.5 06/01/2019    HGB 14.2 06/01/2019    HCT 41.2 06/01/2019     06/01/2019       Renal:    Lab Results   Component Value Date     06/01/2019    K 4.1 06/01/2019    CL 96 06/01/2019    CO2 25 06/01/2019    BUN 8 06/01/2019    CREATININE 0.6 06/01/2019    CALCIUM 10.1 06/01/2019    PHOS 4.0 06/02/2019         Significant Diagnostic Studies    Radiology:   No orders to display          Consults:     IP CONSULT TO HOSPITALIST  IP CONSULT TO SOCIAL WORK    Disposition:  Home      Condition at Discharge: Stable    Discharge Instructions/Follow-up:  AA meetings   PCP in 1 week     Code Status:  Full Code     Activity: activity as tolerated    Diet: regular diet      Discharge Medications:     Current Discharge Medication List           Details   chlordiazePOXIDE (LIBRIUM) 5 MG capsule 1 tab three times a day for 2 days and then 1 tab two times a day for 2 days and then 1 tab once a day for 2 days  Qty: 12 capsule, Refills: 0    Associated Diagnoses: Alcohol withdrawal delirium (HCC)              Details   NIFEdipine (ADALAT CC) 30 MG extended release tablet Take 1 tablet by mouth daily  Qty: 30 tablet, Refills: 2              Details   FLUoxetine (PROZAC) 20 MG capsule Take 20 mg by mouth daily      zolpidem (AMBIEN) 10 MG tablet TAKE ONE TABLET BY MOUTH EVERY NIGHT AT BEDTIME AS NEEDED FOR SLEEP  Qty: 30 tablet, Refills: 0      Milk Thistle 250 MG CAPS Take  by mouth. Multiple Vitamins-Minerals (MULTIVITAMIN PO) Take  by mouth. Cholecalciferol (VITAMIN D3) 5000 UNITS TABS Take  by mouth. B Complex Vitamins (B COMPLEX 1 PO) Take  by mouth. folic acid (FOLVITE) 1 MG tablet Take 1 mg by mouth daily. calcium carbonate 600 MG TABS tablet Take 2 tablets by mouth daily. UNABLE TO FIND Estrogen & testosterone injections -pellets             Time Spent on discharge is more than 30 minutes in the examination, evaluation, counseling and review of medications and discharge plan. Signed:    Colton Shelton MD   6/5/2019      Thank you No primary care provider on file. for the opportunity to be involved in this patient's care. If you have any questions or concerns please feel free to contact me at [phone].

## 2019-06-05 NOTE — PROGRESS NOTES
Data- discharge order received, pt verbalized agreement to discharge, needs for [facility] with [facility] nursing  VICKI reviewed and signed by MD, to be completed by RN. Action- AVS prepared, discharge instructions prepared and given to patient, medication information packet given r/t NEW or CHANGED prescriptions, pt verbalized understanding further self-review. D/C instruction summary: Diet- as tolerated, Activity- as tolerated, follow up with Primary Care Physician No primary care provider on file. None appointment appointment made for 6/10, immunizations reviewed and up to date, medications prescriptions to be filled at preferred pharmacy. Inpatient surgical procedural reviewed: n/a. Contact information provided to above agencies used. Response- Case Management/ reported faxing completed VICKI and AVS to needed HHC/DME services stated above. Pt belongings gathered, IV removed, pt dressed . Disposition is home with HHC/DME as stated above, transported with belongings, taken to lobby via w/c with family, no complications.

## 2019-06-05 NOTE — PROGRESS NOTES
[facility] unable to staff @ this time. [facility] called and are able to accept patient. Referral faxed to [facility] ([facility]). Discharge planner notified.

## 2019-06-06 NOTE — PROGRESS NOTES
Physical Therapy    Physical Therapy Discharge Summary    Name: Eduard Worley  : 1955    The pt was evaluated by PT on 6/3/19 and seen for 1 additional treatment sessions prior to DC to home on 19 per MD order. The pt's acute therapy goals were:  Short term goals  Time Frame for Short term goals: To be met prior to discharge  Short term goal 1: Bed mobility with SBA - MET 19  Short term goal 2: Sit to/from stand with min A - MET 19  Short term goal 3: Ambulate 300 feet with CGA - MET 19  Short term goal 4: Navigate up/down 1 flight of steps  with rail with min A - MET 19  Short term goal 5: Patient will perform all bed mobility independently  Short term goal 6: Patient will perform all transfers at Mod I   Short term goal 7: Patient will ambulate 500 ft at supervision  Short term goal 8: Patient will navigate up/down flight of stairs with rail at supervision       Patient met 4 goals during stay. Number of Refusals:0  Number of Holds: 0  During this hospitalization, the patient was educated on:  Patient Education: educated on role of acute PT, POC, d/c recommendations    DC pt from PT caseload at this time. Thank you!       Favian Brock, DPT [id number]

## 2019-06-28 ENCOUNTER — HOSPITAL ENCOUNTER (INPATIENT)
Age: 64
LOS: 1 days | Discharge: LEFT BEFORE DISCHARGE | DRG: 894 | End: 2019-06-29
Attending: EMERGENCY MEDICINE | Admitting: INTERNAL MEDICINE
Payer: COMMERCIAL

## 2019-06-28 DIAGNOSIS — F10.920 ACUTE ALCOHOLIC INTOXICATION WITHOUT COMPLICATION (HCC): Primary | ICD-10-CM

## 2019-06-28 PROBLEM — F10.930 ALCOHOL WITHDRAWAL SYNDROME, UNCOMPLICATED (HCC): Status: ACTIVE | Noted: 2019-06-28

## 2019-06-28 LAB
A/G RATIO: 1.2 (ref 1.1–2.2)
ACETAMINOPHEN LEVEL: <5 UG/ML (ref 10–30)
ALBUMIN SERPL-MCNC: 4.7 G/DL (ref 3.4–5)
ALP BLD-CCNC: 78 U/L (ref 40–129)
ALT SERPL-CCNC: 53 U/L (ref 10–40)
AMPHETAMINE SCREEN, URINE: ABNORMAL
ANION GAP SERPL CALCULATED.3IONS-SCNC: 19 MMOL/L (ref 3–16)
ANISOCYTOSIS: ABNORMAL
AST SERPL-CCNC: 66 U/L (ref 15–37)
ATYPICAL LYMPHOCYTE RELATIVE PERCENT: 2 % (ref 0–6)
BACTERIA: ABNORMAL /HPF
BARBITURATE SCREEN URINE: ABNORMAL
BASOPHILS ABSOLUTE: 0.2 K/UL (ref 0–0.2)
BASOPHILS RELATIVE PERCENT: 2 %
BENZODIAZEPINE SCREEN, URINE: POSITIVE
BILIRUB SERPL-MCNC: 0.3 MG/DL (ref 0–1)
BILIRUBIN URINE: NEGATIVE
BLOOD, URINE: ABNORMAL
BUN BLDV-MCNC: 3 MG/DL (ref 7–20)
CALCIUM SERPL-MCNC: 10 MG/DL (ref 8.3–10.6)
CANNABINOID SCREEN URINE: ABNORMAL
CHLORIDE BLD-SCNC: 100 MMOL/L (ref 99–110)
CLARITY: CLEAR
CO2: 20 MMOL/L (ref 21–32)
COCAINE METABOLITE SCREEN URINE: ABNORMAL
COLOR: ABNORMAL
CREAT SERPL-MCNC: 0.6 MG/DL (ref 0.6–1.2)
EKG ATRIAL RATE: 93 BPM
EKG DIAGNOSIS: NORMAL
EKG P AXIS: 79 DEGREES
EKG P-R INTERVAL: 168 MS
EKG Q-T INTERVAL: 368 MS
EKG QRS DURATION: 80 MS
EKG QTC CALCULATION (BAZETT): 457 MS
EKG R AXIS: 72 DEGREES
EKG T AXIS: 65 DEGREES
EKG VENTRICULAR RATE: 93 BPM
EOSINOPHILS ABSOLUTE: 0 K/UL (ref 0–0.6)
EOSINOPHILS RELATIVE PERCENT: 0 %
EPITHELIAL CELLS, UA: ABNORMAL /HPF
ETHANOL: 227 MG/DL (ref 0–0.08)
GFR AFRICAN AMERICAN: >60
GFR NON-AFRICAN AMERICAN: >60
GLOBULIN: 3.8 G/DL
GLUCOSE BLD-MCNC: 100 MG/DL (ref 70–99)
GLUCOSE URINE: NEGATIVE MG/DL
HCT VFR BLD CALC: 39.6 % (ref 36–48)
HEMATOLOGY PATH CONSULT: YES
HEMOGLOBIN: 13.4 G/DL (ref 12–16)
KETONES, URINE: NEGATIVE MG/DL
LEUKOCYTE ESTERASE, URINE: NEGATIVE
LYMPHOCYTES ABSOLUTE: 5.3 K/UL (ref 1–5.1)
LYMPHOCYTES RELATIVE PERCENT: 58 %
Lab: ABNORMAL
MACROCYTES: ABNORMAL
MCH RBC QN AUTO: 34.5 PG (ref 26–34)
MCHC RBC AUTO-ENTMCNC: 33.9 G/DL (ref 31–36)
MCV RBC AUTO: 101.8 FL (ref 80–100)
METHADONE SCREEN, URINE: ABNORMAL
MICROSCOPIC EXAMINATION: YES
MONOCYTES ABSOLUTE: 0.4 K/UL (ref 0–1.3)
MONOCYTES RELATIVE PERCENT: 5 %
NEUTROPHILS ABSOLUTE: 2.9 K/UL (ref 1.7–7.7)
NEUTROPHILS RELATIVE PERCENT: 33 %
NITRITE, URINE: NEGATIVE
NUCLEATED RED BLOOD CELLS: 1 /100 WBC
OPIATE SCREEN URINE: ABNORMAL
OXYCODONE URINE: ABNORMAL
PDW BLD-RTO: 14.1 % (ref 12.4–15.4)
PH UA: 6.5
PH UA: 6.5 (ref 5–8)
PHENCYCLIDINE SCREEN URINE: ABNORMAL
PLATELET # BLD: 399 K/UL (ref 135–450)
PLATELET SLIDE REVIEW: ADEQUATE
PMV BLD AUTO: 7.3 FL (ref 5–10.5)
POTASSIUM REFLEX MAGNESIUM: 4.2 MMOL/L (ref 3.5–5.1)
PROPOXYPHENE SCREEN: ABNORMAL
PROTEIN UA: ABNORMAL MG/DL
RBC # BLD: 3.89 M/UL (ref 4–5.2)
RBC UA: ABNORMAL /HPF (ref 0–2)
SALICYLATE, SERUM: <0.3 MG/DL (ref 15–30)
SLIDE REVIEW: ABNORMAL
SODIUM BLD-SCNC: 139 MMOL/L (ref 136–145)
SPECIFIC GRAVITY UA: <=1.005 (ref 1–1.03)
TOTAL PROTEIN: 8.5 G/DL (ref 6.4–8.2)
URINE REFLEX TO CULTURE: ABNORMAL
URINE TYPE: ABNORMAL
UROBILINOGEN, URINE: 0.2 E.U./DL
WBC # BLD: 8.9 K/UL (ref 4–11)
WBC UA: ABNORMAL /HPF (ref 0–5)

## 2019-06-28 PROCEDURE — 2580000003 HC RX 258: Performed by: PHYSICIAN ASSISTANT

## 2019-06-28 PROCEDURE — 81001 URINALYSIS AUTO W/SCOPE: CPT

## 2019-06-28 PROCEDURE — 96375 TX/PRO/DX INJ NEW DRUG ADDON: CPT

## 2019-06-28 PROCEDURE — 99222 1ST HOSP IP/OBS MODERATE 55: CPT | Performed by: PHYSICIAN ASSISTANT

## 2019-06-28 PROCEDURE — 6360000002 HC RX W HCPCS: Performed by: PHYSICIAN ASSISTANT

## 2019-06-28 PROCEDURE — G0480 DRUG TEST DEF 1-7 CLASSES: HCPCS

## 2019-06-28 PROCEDURE — 93005 ELECTROCARDIOGRAM TRACING: CPT | Performed by: PHYSICIAN ASSISTANT

## 2019-06-28 PROCEDURE — 1200000000 HC SEMI PRIVATE

## 2019-06-28 PROCEDURE — 85025 COMPLETE CBC W/AUTO DIFF WBC: CPT

## 2019-06-28 PROCEDURE — 99285 EMERGENCY DEPT VISIT HI MDM: CPT

## 2019-06-28 PROCEDURE — 93010 ELECTROCARDIOGRAM REPORT: CPT | Performed by: INTERNAL MEDICINE

## 2019-06-28 PROCEDURE — 2500000003 HC RX 250 WO HCPCS: Performed by: PHYSICIAN ASSISTANT

## 2019-06-28 PROCEDURE — 6370000000 HC RX 637 (ALT 250 FOR IP): Performed by: PHYSICIAN ASSISTANT

## 2019-06-28 PROCEDURE — 80053 COMPREHEN METABOLIC PANEL: CPT

## 2019-06-28 PROCEDURE — 96361 HYDRATE IV INFUSION ADD-ON: CPT

## 2019-06-28 PROCEDURE — 96365 THER/PROPH/DIAG IV INF INIT: CPT

## 2019-06-28 PROCEDURE — 80307 DRUG TEST PRSMV CHEM ANLYZR: CPT

## 2019-06-28 RX ORDER — LORAZEPAM 2 MG/1
4 TABLET ORAL
Status: DISCONTINUED | OUTPATIENT
Start: 2019-06-28 | End: 2019-06-29 | Stop reason: HOSPADM

## 2019-06-28 RX ORDER — SODIUM CHLORIDE 0.9 % (FLUSH) 0.9 %
10 SYRINGE (ML) INJECTION EVERY 12 HOURS SCHEDULED
Status: DISCONTINUED | OUTPATIENT
Start: 2019-06-28 | End: 2019-06-29 | Stop reason: HOSPADM

## 2019-06-28 RX ORDER — LORAZEPAM 2 MG/ML
2 INJECTION INTRAMUSCULAR
Status: DISCONTINUED | OUTPATIENT
Start: 2019-06-28 | End: 2019-06-29 | Stop reason: HOSPADM

## 2019-06-28 RX ORDER — FOLIC ACID 1 MG/1
1 TABLET ORAL DAILY
Status: DISCONTINUED | OUTPATIENT
Start: 2019-06-28 | End: 2019-06-29 | Stop reason: HOSPADM

## 2019-06-28 RX ORDER — LORAZEPAM 2 MG/1
2 TABLET ORAL
Status: DISCONTINUED | OUTPATIENT
Start: 2019-06-28 | End: 2019-06-29 | Stop reason: HOSPADM

## 2019-06-28 RX ORDER — M-VIT,TX,IRON,MINS/CALC/FOLIC 27MG-0.4MG
1 TABLET ORAL DAILY
Status: DISCONTINUED | OUTPATIENT
Start: 2019-06-29 | End: 2019-06-29 | Stop reason: HOSPADM

## 2019-06-28 RX ORDER — ZOLPIDEM TARTRATE 5 MG/1
10 TABLET ORAL NIGHTLY PRN
Status: DISCONTINUED | OUTPATIENT
Start: 2019-06-28 | End: 2019-06-29 | Stop reason: HOSPADM

## 2019-06-28 RX ORDER — SODIUM CHLORIDE 0.9 % (FLUSH) 0.9 %
10 SYRINGE (ML) INJECTION PRN
Status: DISCONTINUED | OUTPATIENT
Start: 2019-06-28 | End: 2019-06-29 | Stop reason: HOSPADM

## 2019-06-28 RX ORDER — FLUOXETINE HYDROCHLORIDE 20 MG/1
40 CAPSULE ORAL DAILY
Status: DISCONTINUED | OUTPATIENT
Start: 2019-06-29 | End: 2019-06-29 | Stop reason: HOSPADM

## 2019-06-28 RX ORDER — CHLORDIAZEPOXIDE HYDROCHLORIDE 10 MG/1
10 CAPSULE, GELATIN COATED ORAL 4 TIMES DAILY PRN
COMMUNITY
End: 2019-07-30

## 2019-06-28 RX ORDER — 0.9 % SODIUM CHLORIDE 0.9 %
1000 INTRAVENOUS SOLUTION INTRAVENOUS ONCE
Status: COMPLETED | OUTPATIENT
Start: 2019-06-28 | End: 2019-06-28

## 2019-06-28 RX ORDER — LORAZEPAM 2 MG/ML
4 INJECTION INTRAMUSCULAR
Status: DISCONTINUED | OUTPATIENT
Start: 2019-06-28 | End: 2019-06-29 | Stop reason: HOSPADM

## 2019-06-28 RX ORDER — THIAMINE MONONITRATE (VIT B1) 100 MG
100 TABLET ORAL DAILY
Status: DISCONTINUED | OUTPATIENT
Start: 2019-06-29 | End: 2019-06-29 | Stop reason: HOSPADM

## 2019-06-28 RX ORDER — LORAZEPAM 2 MG/ML
1 INJECTION INTRAMUSCULAR
Status: DISCONTINUED | OUTPATIENT
Start: 2019-06-28 | End: 2019-06-29 | Stop reason: HOSPADM

## 2019-06-28 RX ORDER — LORAZEPAM 2 MG/ML
3 INJECTION INTRAMUSCULAR
Status: DISCONTINUED | OUTPATIENT
Start: 2019-06-28 | End: 2019-06-29 | Stop reason: HOSPADM

## 2019-06-28 RX ORDER — ONDANSETRON 2 MG/ML
4 INJECTION INTRAMUSCULAR; INTRAVENOUS EVERY 6 HOURS PRN
Status: DISCONTINUED | OUTPATIENT
Start: 2019-06-28 | End: 2019-06-28

## 2019-06-28 RX ORDER — LORAZEPAM 1 MG/1
1 TABLET ORAL
Status: DISCONTINUED | OUTPATIENT
Start: 2019-06-28 | End: 2019-06-29 | Stop reason: HOSPADM

## 2019-06-28 RX ORDER — ONDANSETRON 2 MG/ML
4 INJECTION INTRAMUSCULAR; INTRAVENOUS EVERY 6 HOURS PRN
Status: DISCONTINUED | OUTPATIENT
Start: 2019-06-28 | End: 2019-06-29 | Stop reason: HOSPADM

## 2019-06-28 RX ORDER — NIFEDIPINE 30 MG/1
30 TABLET, EXTENDED RELEASE ORAL DAILY
Status: DISCONTINUED | OUTPATIENT
Start: 2019-06-29 | End: 2019-06-29 | Stop reason: HOSPADM

## 2019-06-28 RX ORDER — ACETAMINOPHEN 325 MG/1
650 TABLET ORAL EVERY 6 HOURS PRN
Status: ON HOLD | COMMUNITY
End: 2019-10-14 | Stop reason: HOSPADM

## 2019-06-28 RX ADMIN — ONDANSETRON 4 MG: 2 INJECTION INTRAMUSCULAR; INTRAVENOUS at 16:28

## 2019-06-28 RX ADMIN — Medication 10 ML: at 20:13

## 2019-06-28 RX ADMIN — LORAZEPAM 3 MG: 1 TABLET ORAL at 19:25

## 2019-06-28 RX ADMIN — FOLIC ACID: 5 INJECTION, SOLUTION INTRAMUSCULAR; INTRAVENOUS; SUBCUTANEOUS at 13:59

## 2019-06-28 RX ADMIN — ONDANSETRON 4 MG: 2 INJECTION INTRAMUSCULAR; INTRAVENOUS at 12:42

## 2019-06-28 RX ADMIN — ZOLPIDEM TARTRATE 10 MG: 5 TABLET, FILM COATED ORAL at 20:37

## 2019-06-28 RX ADMIN — SODIUM CHLORIDE 1000 ML: 9 INJECTION, SOLUTION INTRAVENOUS at 12:42

## 2019-06-28 ASSESSMENT — PAIN SCALES - GENERAL
PAINLEVEL_OUTOF10: 0
PAINLEVEL_OUTOF10: 0

## 2019-06-28 ASSESSMENT — ENCOUNTER SYMPTOMS
NAUSEA: 1
RESPIRATORY NEGATIVE: 1

## 2019-06-28 NOTE — PROGRESS NOTES
4 Eyes Skin Assessment     The patient is being assess for   Admission    I agree that 2 RN's have performed a thorough Head to Toe Skin Assessment on the patient. ALL assessment sites listed below have been assessed. Areas assessed by both nurses:   [x]   Head, Face, and Ears   [x]   Shoulders, Back, and Chest, Abdomen  [x]   Arms, Elbows, and Hands   [x]   Coccyx, Sacrum, and Ischium  [x]   Legs, Feet, and Heels        Scattered bruising and abrasions noted. **SHARE this note so that the co-signing nurse is able to place an eSignature**    Co-signer eSignature: Electronically signed by Kirill Merchant RN on 6/28/19 at 4:43 PM    Does the Patient have Skin Breakdown?   No          Ernie Prevention initiated:  No   Wound Care Orders initiated:  No      [facility] nurse consulted for Pressure Injury (Stage 3,4, Unstageable, DTI, NWPT, Complex wounds)and New or Established Ostomies:  No      Primary Nurse eSignature: Electronically signed by Cristina Benjamin RN on 6/28/19 at 4:23 PM

## 2019-06-28 NOTE — H&P
Negative for syncope   Hematological: Negative for adenopathy   Psychiatric/Behavorial: + depression, + ETOH abuse     PHYSICAL EXAM:    BP (!) 153/78   Pulse 85   Temp 97.5 °F (36.4 °C) (Oral)   Resp 16   Ht 5' 4\" (1.626 m)   Wt 148 lb 1.6 oz (67.2 kg)   SpO2 98%   Breastfeeding? No   BMI 25.42 kg/m²   Gen: tearful. Alert. Eyes: PERRL. No sclera icterus. No conjunctival injection. ENT: No discharge. Pharynx clear. Neck: No JVD. No Carotid Bruit. Trachea midline. Resp: No accessory muscle use. No crackles. No wheezes. No rhonchi. CV: Regular rate. Regular rhythm. No murmur. No rub. No edema. Capillary Refill: Brisk,< 3 seconds   Peripheral Pulses: +2 palpable, equal bilaterally   GI: Non-tender. Non-distended. No masses. No organomegaly. Normal bowel sounds. No hernia. Skin: Warm and dry. No nodule on exposed extremities. No rash on exposed extremities. M/S: No cyanosis. No joint deformity. No clubbing. Neuro: Awake. Grossly nonfocal. No tremors     Psych: Oriented x 3. No anxiety or agitation.      CBC:   Recent Labs     06/28/19  1200   WBC 8.9   HGB 13.4   HCT 39.6   .8*        BMP:   Recent Labs     06/28/19  1200      K 4.2      CO2 20*   BUN 3*   CREATININE 0.6     LIVER PROFILE:   Recent Labs     06/28/19  1200   AST 66*   ALT 53*   BILITOT 0.3   ALKPHOS 78     UA:  Recent Labs     06/28/19  1300   COLORU Straw   PHUR 6.5  6.5   WBCUA 0-2   RBCUA 0-2   BACTERIA 1+*   CLARITYU Clear   SPECGRAV <=1.005   LEUKOCYTESUR Negative   UROBILINOGEN 0.2   BILIRUBINUR Negative   BLOODU SMALL*   GLUCOSEU Negative     Ethanol Lvl 592      Salicylate, Serum <7.4     Acetaminophen Level <5Low       UDS: + benzodiazepines     CULTURES  none    EKG:  I have reviewed the EKG with the following interpretation:   NSR, normal axis, normal interval, left atrial enlargement, no acute ST segment changes concerning for acute ischemia     RADIOLOGY  No orders to display     Pertinent

## 2019-06-28 NOTE — ED PROVIDER NOTES
[address] ED  EMERGENCY DEPARTMENT ENCOUNTER        Pt Name: Ying Mojica  MRN: [id number]  [place] 1955  Date of evaluation: 6/28/2019  Provider: Lord Willi PA-C  PCP: Sapphire Quinonez MD    This patient was seen and evaluated by the attending physician Dr. Kiel Motta. CHIEF COMPLAINT       Chief Complaint   Patient presents with    Alcohol Problem     pt wanting to detox off wine. Pt last drink was 15 min ago. Pt c/o abdominal pain and N/V       HISTORY OF PRESENT ILLNESS   (Location/Symptom, Timing/Onset, Context/Setting, Quality, Duration, Modifying Factors, Severity)  Note limiting factors. Ying Mojica is a 58 y.o. female brought in by private vehicle for complaints of alcohol detox. Patient states she has been drinking heavily for several months. Patient states she drinks 2 bottles of wine a day. Her last drink was about 1 hour ago. Duration of symptoms have been persistent since onset. She admits to nausea. Patient denies suicidal or homicidal ideation. Patient states she has been admitted 4 times in the past month for alcohol withdrawal.  Patient denies any history of seizures from withdrawal but does experience DTs. Patient denies any other complaints at this time. Nursing Notes were all reviewed and agreed with or any disagreements were addressed  in the HPI. REVIEW OF SYSTEMS    (2-9 systems for level 4, 10 or more for level 5)     Review of Systems   Constitutional: Negative. HENT: Negative. Respiratory: Negative. Cardiovascular: Negative. Gastrointestinal: Positive for nausea. Genitourinary: Negative. Musculoskeletal: Negative. Skin: Negative. Neurological: Negative. Psychiatric/Behavioral: The patient is nervous/anxious. Positives and Pertinent negatives as per HPI. Except as noted abovein the ROS, all other systems were reviewed and negative.        PAST MEDICAL HISTORY     Past Medical History:   Diagnosis Date    Alcoholism ([facility] [address]     Hyperlipidemia     Hypertension          SURGICAL HISTORY     Past Surgical History:   Procedure Laterality Date    BACK SURGERY      1996 L5 disectomy    COLONOSCOPY      2016    ENDOSCOPY, COLON, DIAGNOSTIC      2016         [address]       Current Discharge Medication List      CONTINUE these medications which have NOT CHANGED    Details   NIFEdipine (ADALAT CC) 30 MG extended release tablet Take 1 tablet by mouth daily  Qty: 30 tablet, Refills: 2      FLUoxetine (PROZAC) 20 MG capsule Take 40 mg by mouth daily       zolpidem (AMBIEN) 10 MG tablet TAKE ONE TABLET BY MOUTH EVERY NIGHT AT BEDTIME AS NEEDED FOR SLEEP  Qty: 30 tablet, Refills: 0      Milk Thistle 250 MG CAPS Take  by mouth. UNABLE TO FIND Estrogen & testosterone injections -pellets      Multiple Vitamins-Minerals (MULTIVITAMIN PO) Take  by mouth. Cholecalciferol (VITAMIN D3) 5000 UNITS TABS Take  by mouth. B Complex Vitamins (B COMPLEX 1 PO) Take  by mouth. folic acid (FOLVITE) 1 MG tablet Take 1 mg by mouth daily. calcium carbonate 600 MG TABS tablet Take 2 tablets by mouth daily.                ALLERGIES     Penicillins and Sulfa antibiotics    FAMILYHISTORY       Family History   Problem Relation Age of Onset    Stroke Mother     Substance Abuse Mother     Substance Abuse Father     Heart Disease Father     Heart Disease Brother     Substance Abuse Brother     Heart Disease Paternal Aunt     Heart Disease Paternal Uncle     Heart Disease Maternal Grandmother     Diabetes Maternal Grandmother     Diabetes Maternal Grandfather     Heart Disease Maternal Grandfather     Other Paternal Grandmother     Other Paternal Grandfather           SOCIAL HISTORY       Social History     Socioeconomic History    Marital status:      Spouse name: None    Number of children: None    Years of education: None    Highest education level: None   Occupational History    None

## 2019-06-29 VITALS
TEMPERATURE: 98.1 F | WEIGHT: 148.1 LBS | HEART RATE: 79 BPM | RESPIRATION RATE: 18 BRPM | HEIGHT: 64 IN | DIASTOLIC BLOOD PRESSURE: 78 MMHG | SYSTOLIC BLOOD PRESSURE: 156 MMHG | BODY MASS INDEX: 25.29 KG/M2 | OXYGEN SATURATION: 98 %

## 2019-07-01 PROBLEM — N39.0 UTI (URINARY TRACT INFECTION): Status: RESOLVED | Noted: 2019-06-01 | Resolved: 2019-07-01

## 2019-07-01 LAB — HEMATOLOGY PATH CONSULT: NORMAL

## 2019-07-06 ENCOUNTER — HOSPITAL ENCOUNTER (INPATIENT)
Age: 64
LOS: 5 days | Discharge: PSYCHIATRIC OR SUBSTANCE USE FACILITY | DRG: 896 | End: 2019-07-11
Attending: EMERGENCY MEDICINE | Admitting: HOSPITALIST
Payer: COMMERCIAL

## 2019-07-06 DIAGNOSIS — F10.920 ACUTE ALCOHOLIC INTOXICATION WITHOUT COMPLICATION (HCC): Primary | ICD-10-CM

## 2019-07-06 DIAGNOSIS — F32.A DEPRESSION, UNSPECIFIED DEPRESSION TYPE: ICD-10-CM

## 2019-07-06 DIAGNOSIS — Z72.89 DELIBERATE SELF-CUTTING: ICD-10-CM

## 2019-07-06 DIAGNOSIS — R45.851 SUICIDAL IDEATION: ICD-10-CM

## 2019-07-06 PROBLEM — F10.10 ETOH ABUSE: Status: ACTIVE | Noted: 2019-07-06

## 2019-07-06 LAB
A/G RATIO: 1.2 (ref 1.1–2.2)
ACETAMINOPHEN LEVEL: <5 UG/ML (ref 10–30)
ALBUMIN SERPL-MCNC: 4.7 G/DL (ref 3.4–5)
ALP BLD-CCNC: 91 U/L (ref 40–129)
ALT SERPL-CCNC: 76 U/L (ref 10–40)
AMPHETAMINE SCREEN, URINE: ABNORMAL
ANION GAP SERPL CALCULATED.3IONS-SCNC: 20 MMOL/L (ref 3–16)
AST SERPL-CCNC: 114 U/L (ref 15–37)
BACTERIA: ABNORMAL /HPF
BARBITURATE SCREEN URINE: ABNORMAL
BASOPHILS ABSOLUTE: 0.1 K/UL (ref 0–0.2)
BASOPHILS RELATIVE PERCENT: 0.8 %
BENZODIAZEPINE SCREEN, URINE: POSITIVE
BILIRUB SERPL-MCNC: 0.6 MG/DL (ref 0–1)
BILIRUBIN URINE: NEGATIVE
BLOOD, URINE: ABNORMAL
BUN BLDV-MCNC: 5 MG/DL (ref 7–20)
CALCIUM SERPL-MCNC: 9.8 MG/DL (ref 8.3–10.6)
CANNABINOID SCREEN URINE: ABNORMAL
CHLORIDE BLD-SCNC: 92 MMOL/L (ref 99–110)
CLARITY: CLEAR
CO2: 22 MMOL/L (ref 21–32)
COCAINE METABOLITE SCREEN URINE: ABNORMAL
COLOR: ABNORMAL
CREAT SERPL-MCNC: 0.7 MG/DL (ref 0.6–1.2)
EOSINOPHILS ABSOLUTE: 0.1 K/UL (ref 0–0.6)
EOSINOPHILS RELATIVE PERCENT: 0.8 %
EPITHELIAL CELLS, UA: ABNORMAL /HPF
ETHANOL: 185 MG/DL (ref 0–0.08)
GFR AFRICAN AMERICAN: >60
GFR NON-AFRICAN AMERICAN: >60
GLOBULIN: 4 G/DL
GLUCOSE BLD-MCNC: 101 MG/DL (ref 70–99)
GLUCOSE URINE: NEGATIVE MG/DL
HCT VFR BLD CALC: 39.9 % (ref 36–48)
HEMOGLOBIN: 13.5 G/DL (ref 12–16)
KETONES, URINE: NEGATIVE MG/DL
LEUKOCYTE ESTERASE, URINE: NEGATIVE
LIPASE: 57 U/L (ref 13–60)
LYMPHOCYTES ABSOLUTE: 3.2 K/UL (ref 1–5.1)
LYMPHOCYTES RELATIVE PERCENT: 41.1 %
Lab: ABNORMAL
MCH RBC QN AUTO: 34.6 PG (ref 26–34)
MCHC RBC AUTO-ENTMCNC: 34 G/DL (ref 31–36)
MCV RBC AUTO: 101.8 FL (ref 80–100)
METHADONE SCREEN, URINE: ABNORMAL
MICROSCOPIC EXAMINATION: YES
MONOCYTES ABSOLUTE: 0.5 K/UL (ref 0–1.3)
MONOCYTES RELATIVE PERCENT: 6.3 %
NEUTROPHILS ABSOLUTE: 3.9 K/UL (ref 1.7–7.7)
NEUTROPHILS RELATIVE PERCENT: 51 %
NITRITE, URINE: POSITIVE
OPIATE SCREEN URINE: ABNORMAL
OXYCODONE URINE: ABNORMAL
PDW BLD-RTO: 14.7 % (ref 12.4–15.4)
PH UA: 5.5
PH UA: 5.5 (ref 5–8)
PHENCYCLIDINE SCREEN URINE: ABNORMAL
PLATELET # BLD: 229 K/UL (ref 135–450)
PMV BLD AUTO: 7.8 FL (ref 5–10.5)
POTASSIUM SERPL-SCNC: 3.8 MMOL/L (ref 3.5–5.1)
PROPOXYPHENE SCREEN: ABNORMAL
PROTEIN UA: 100 MG/DL
RBC # BLD: 3.92 M/UL (ref 4–5.2)
RBC UA: ABNORMAL /HPF (ref 0–2)
SALICYLATE, SERUM: <0.3 MG/DL (ref 15–30)
SODIUM BLD-SCNC: 134 MMOL/L (ref 136–145)
SPECIFIC GRAVITY UA: 1.01 (ref 1–1.03)
TOTAL PROTEIN: 8.7 G/DL (ref 6.4–8.2)
TROPONIN: <0.01 NG/ML
URINE REFLEX TO CULTURE: YES
URINE TYPE: ABNORMAL
UROBILINOGEN, URINE: 0.2 E.U./DL
WBC # BLD: 7.7 K/UL (ref 4–11)
WBC UA: ABNORMAL /HPF (ref 0–5)

## 2019-07-06 PROCEDURE — 2580000003 HC RX 258: Performed by: HOSPITALIST

## 2019-07-06 PROCEDURE — 6360000002 HC RX W HCPCS: Performed by: HOSPITALIST

## 2019-07-06 PROCEDURE — G0480 DRUG TEST DEF 1-7 CLASSES: HCPCS

## 2019-07-06 PROCEDURE — 82746 ASSAY OF FOLIC ACID SERUM: CPT

## 2019-07-06 PROCEDURE — 81001 URINALYSIS AUTO W/SCOPE: CPT

## 2019-07-06 PROCEDURE — 99285 EMERGENCY DEPT VISIT HI MDM: CPT

## 2019-07-06 PROCEDURE — 96366 THER/PROPH/DIAG IV INF ADDON: CPT

## 2019-07-06 PROCEDURE — 80307 DRUG TEST PRSMV CHEM ANLYZR: CPT

## 2019-07-06 PROCEDURE — 96375 TX/PRO/DX INJ NEW DRUG ADDON: CPT

## 2019-07-06 PROCEDURE — 84484 ASSAY OF TROPONIN QUANT: CPT

## 2019-07-06 PROCEDURE — 96368 THER/DIAG CONCURRENT INF: CPT

## 2019-07-06 PROCEDURE — 1200000000 HC SEMI PRIVATE

## 2019-07-06 PROCEDURE — 2500000003 HC RX 250 WO HCPCS: Performed by: NURSE PRACTITIONER

## 2019-07-06 PROCEDURE — 82607 VITAMIN B-12: CPT

## 2019-07-06 PROCEDURE — 83690 ASSAY OF LIPASE: CPT

## 2019-07-06 PROCEDURE — 93005 ELECTROCARDIOGRAM TRACING: CPT | Performed by: EMERGENCY MEDICINE

## 2019-07-06 PROCEDURE — 6360000002 HC RX W HCPCS: Performed by: NURSE PRACTITIONER

## 2019-07-06 PROCEDURE — 2580000003 HC RX 258: Performed by: NURSE PRACTITIONER

## 2019-07-06 PROCEDURE — 96365 THER/PROPH/DIAG IV INF INIT: CPT

## 2019-07-06 PROCEDURE — 87086 URINE CULTURE/COLONY COUNT: CPT

## 2019-07-06 PROCEDURE — 80053 COMPREHEN METABOLIC PANEL: CPT

## 2019-07-06 PROCEDURE — 85025 COMPLETE CBC W/AUTO DIFF WBC: CPT

## 2019-07-06 RX ORDER — SODIUM CHLORIDE 0.9 % (FLUSH) 0.9 %
10 SYRINGE (ML) INJECTION PRN
Status: DISCONTINUED | OUTPATIENT
Start: 2019-07-06 | End: 2019-07-11 | Stop reason: HOSPADM

## 2019-07-06 RX ORDER — LORAZEPAM 1 MG/1
3 TABLET ORAL
Status: DISCONTINUED | OUTPATIENT
Start: 2019-07-06 | End: 2019-07-06 | Stop reason: HOSPADM

## 2019-07-06 RX ORDER — SODIUM CHLORIDE 0.9 % (FLUSH) 0.9 %
10 SYRINGE (ML) INJECTION EVERY 12 HOURS SCHEDULED
Status: DISCONTINUED | OUTPATIENT
Start: 2019-07-06 | End: 2019-07-06 | Stop reason: SDUPTHER

## 2019-07-06 RX ORDER — LORAZEPAM 1 MG/1
4 TABLET ORAL
Status: DISCONTINUED | OUTPATIENT
Start: 2019-07-06 | End: 2019-07-06 | Stop reason: HOSPADM

## 2019-07-06 RX ORDER — MULTIVITAMIN WITH FOLIC ACID 400 MCG
1 TABLET ORAL DAILY
Status: DISCONTINUED | OUTPATIENT
Start: 2019-07-07 | End: 2019-07-11 | Stop reason: HOSPADM

## 2019-07-06 RX ORDER — LORAZEPAM 1 MG/1
1 TABLET ORAL
Status: DISCONTINUED | OUTPATIENT
Start: 2019-07-06 | End: 2019-07-11 | Stop reason: HOSPADM

## 2019-07-06 RX ORDER — FLUOXETINE HYDROCHLORIDE 20 MG/1
40 CAPSULE ORAL DAILY
Status: DISCONTINUED | OUTPATIENT
Start: 2019-07-07 | End: 2019-07-11 | Stop reason: HOSPADM

## 2019-07-06 RX ORDER — FOLIC ACID 1 MG/1
1 TABLET ORAL DAILY
Status: DISCONTINUED | OUTPATIENT
Start: 2019-07-07 | End: 2019-07-11 | Stop reason: HOSPADM

## 2019-07-06 RX ORDER — 0.9 % SODIUM CHLORIDE 0.9 %
1000 INTRAVENOUS SOLUTION INTRAVENOUS ONCE
Status: COMPLETED | OUTPATIENT
Start: 2019-07-06 | End: 2019-07-06

## 2019-07-06 RX ORDER — NIFEDIPINE 30 MG/1
30 TABLET, EXTENDED RELEASE ORAL DAILY
Status: DISCONTINUED | OUTPATIENT
Start: 2019-07-07 | End: 2019-07-11 | Stop reason: HOSPADM

## 2019-07-06 RX ORDER — LORAZEPAM 1 MG/1
1 TABLET ORAL
Status: DISCONTINUED | OUTPATIENT
Start: 2019-07-06 | End: 2019-07-06 | Stop reason: HOSPADM

## 2019-07-06 RX ORDER — POTASSIUM CHLORIDE 20 MEQ/1
40 TABLET, EXTENDED RELEASE ORAL PRN
Status: DISCONTINUED | OUTPATIENT
Start: 2019-07-06 | End: 2019-07-11 | Stop reason: HOSPADM

## 2019-07-06 RX ORDER — LORAZEPAM 1 MG/1
2 TABLET ORAL
Status: DISCONTINUED | OUTPATIENT
Start: 2019-07-06 | End: 2019-07-11 | Stop reason: HOSPADM

## 2019-07-06 RX ORDER — LORAZEPAM 2 MG/ML
1 INJECTION INTRAMUSCULAR
Status: DISCONTINUED | OUTPATIENT
Start: 2019-07-06 | End: 2019-07-06 | Stop reason: HOSPADM

## 2019-07-06 RX ORDER — ACETAMINOPHEN 325 MG/1
650 TABLET ORAL EVERY 4 HOURS PRN
Status: DISCONTINUED | OUTPATIENT
Start: 2019-07-06 | End: 2019-07-11 | Stop reason: HOSPADM

## 2019-07-06 RX ORDER — LORAZEPAM 2 MG/ML
4 INJECTION INTRAMUSCULAR
Status: DISCONTINUED | OUTPATIENT
Start: 2019-07-06 | End: 2019-07-11 | Stop reason: HOSPADM

## 2019-07-06 RX ORDER — FOLIC ACID 1 MG/1
1 TABLET ORAL DAILY
Status: DISCONTINUED | OUTPATIENT
Start: 2019-07-07 | End: 2019-07-06 | Stop reason: SDUPTHER

## 2019-07-06 RX ORDER — LORAZEPAM 1 MG/1
2 TABLET ORAL
Status: DISCONTINUED | OUTPATIENT
Start: 2019-07-06 | End: 2019-07-06 | Stop reason: HOSPADM

## 2019-07-06 RX ORDER — SODIUM CHLORIDE 0.9 % (FLUSH) 0.9 %
10 SYRINGE (ML) INJECTION EVERY 12 HOURS SCHEDULED
Status: DISCONTINUED | OUTPATIENT
Start: 2019-07-06 | End: 2019-07-11 | Stop reason: HOSPADM

## 2019-07-06 RX ORDER — ZOLPIDEM TARTRATE 5 MG/1
5 TABLET ORAL NIGHTLY PRN
Status: DISCONTINUED | OUTPATIENT
Start: 2019-07-07 | End: 2019-07-11 | Stop reason: HOSPADM

## 2019-07-06 RX ORDER — LORAZEPAM 2 MG/ML
4 INJECTION INTRAMUSCULAR
Status: DISCONTINUED | OUTPATIENT
Start: 2019-07-06 | End: 2019-07-06 | Stop reason: HOSPADM

## 2019-07-06 RX ORDER — SODIUM CHLORIDE 9 MG/ML
INJECTION, SOLUTION INTRAVENOUS
Status: DISPENSED
Start: 2019-07-06 | End: 2019-07-07

## 2019-07-06 RX ORDER — LORAZEPAM 2 MG/ML
3 INJECTION INTRAMUSCULAR
Status: DISCONTINUED | OUTPATIENT
Start: 2019-07-06 | End: 2019-07-06 | Stop reason: HOSPADM

## 2019-07-06 RX ORDER — THIAMINE HCL 100 MG
100 TABLET ORAL DAILY
Status: DISCONTINUED | OUTPATIENT
Start: 2019-07-07 | End: 2019-07-11 | Stop reason: HOSPADM

## 2019-07-06 RX ORDER — LORAZEPAM 1 MG/1
3 TABLET ORAL
Status: DISCONTINUED | OUTPATIENT
Start: 2019-07-06 | End: 2019-07-11 | Stop reason: HOSPADM

## 2019-07-06 RX ORDER — LORAZEPAM 2 MG/ML
3 INJECTION INTRAMUSCULAR
Status: DISCONTINUED | OUTPATIENT
Start: 2019-07-06 | End: 2019-07-11 | Stop reason: HOSPADM

## 2019-07-06 RX ORDER — ONDANSETRON 2 MG/ML
4 INJECTION INTRAMUSCULAR; INTRAVENOUS EVERY 6 HOURS PRN
Status: DISCONTINUED | OUTPATIENT
Start: 2019-07-06 | End: 2019-07-06 | Stop reason: SDUPTHER

## 2019-07-06 RX ORDER — SODIUM CHLORIDE 0.9 % (FLUSH) 0.9 %
10 SYRINGE (ML) INJECTION PRN
Status: DISCONTINUED | OUTPATIENT
Start: 2019-07-06 | End: 2019-07-06 | Stop reason: SDUPTHER

## 2019-07-06 RX ORDER — LORAZEPAM 2 MG/ML
2 INJECTION INTRAMUSCULAR
Status: DISCONTINUED | OUTPATIENT
Start: 2019-07-06 | End: 2019-07-11 | Stop reason: HOSPADM

## 2019-07-06 RX ORDER — ONDANSETRON 2 MG/ML
4 INJECTION INTRAMUSCULAR; INTRAVENOUS EVERY 6 HOURS PRN
Status: DISCONTINUED | OUTPATIENT
Start: 2019-07-06 | End: 2019-07-09

## 2019-07-06 RX ORDER — LORAZEPAM 2 MG/ML
1 INJECTION INTRAMUSCULAR
Status: DISCONTINUED | OUTPATIENT
Start: 2019-07-06 | End: 2019-07-11 | Stop reason: HOSPADM

## 2019-07-06 RX ORDER — LORAZEPAM 2 MG/ML
2 INJECTION INTRAMUSCULAR
Status: DISCONTINUED | OUTPATIENT
Start: 2019-07-06 | End: 2019-07-06 | Stop reason: HOSPADM

## 2019-07-06 RX ORDER — POTASSIUM CHLORIDE 7.45 MG/ML
10 INJECTION INTRAVENOUS PRN
Status: DISCONTINUED | OUTPATIENT
Start: 2019-07-06 | End: 2019-07-11 | Stop reason: HOSPADM

## 2019-07-06 RX ORDER — LORAZEPAM 1 MG/1
4 TABLET ORAL
Status: DISCONTINUED | OUTPATIENT
Start: 2019-07-06 | End: 2019-07-11 | Stop reason: HOSPADM

## 2019-07-06 RX ORDER — SODIUM CHLORIDE 9 MG/ML
INJECTION, SOLUTION INTRAVENOUS CONTINUOUS
Status: DISCONTINUED | OUTPATIENT
Start: 2019-07-06 | End: 2019-07-08

## 2019-07-06 RX ORDER — MAGNESIUM SULFATE 1 G/100ML
1 INJECTION INTRAVENOUS PRN
Status: DISCONTINUED | OUTPATIENT
Start: 2019-07-06 | End: 2019-07-11 | Stop reason: HOSPADM

## 2019-07-06 RX ADMIN — THIAMINE HYDROCHLORIDE: 100 INJECTION, SOLUTION INTRAMUSCULAR; INTRAVENOUS at 20:06

## 2019-07-06 RX ADMIN — SODIUM CHLORIDE 1000 ML: 9 INJECTION, SOLUTION INTRAVENOUS at 20:06

## 2019-07-06 RX ADMIN — SODIUM CHLORIDE: 9 INJECTION, SOLUTION INTRAVENOUS at 23:11

## 2019-07-06 RX ADMIN — ONDANSETRON 4 MG: 2 INJECTION INTRAMUSCULAR; INTRAVENOUS at 20:41

## 2019-07-06 RX ADMIN — LORAZEPAM 3 MG: 2 INJECTION INTRAMUSCULAR; INTRAVENOUS at 23:09

## 2019-07-06 RX ADMIN — Medication 10 ML: at 23:12

## 2019-07-06 ASSESSMENT — PAIN DESCRIPTION - FREQUENCY: FREQUENCY: INTERMITTENT

## 2019-07-06 ASSESSMENT — PAIN DESCRIPTION - DESCRIPTORS: DESCRIPTORS: HEADACHE

## 2019-07-06 ASSESSMENT — PAIN DESCRIPTION - ONSET: ONSET: ON-GOING

## 2019-07-06 ASSESSMENT — PAIN DESCRIPTION - LOCATION: LOCATION: HEAD

## 2019-07-06 ASSESSMENT — ENCOUNTER SYMPTOMS
ABDOMINAL PAIN: 1
NAUSEA: 0
DIARRHEA: 0
CHEST TIGHTNESS: 0
SHORTNESS OF BREATH: 0
VOMITING: 0

## 2019-07-06 ASSESSMENT — PAIN - FUNCTIONAL ASSESSMENT: PAIN_FUNCTIONAL_ASSESSMENT: ACTIVITIES ARE NOT PREVENTED

## 2019-07-06 ASSESSMENT — PAIN SCALES - GENERAL
PAINLEVEL_OUTOF10: 5
PAINLEVEL_OUTOF10: 5

## 2019-07-06 ASSESSMENT — PAIN DESCRIPTION - PROGRESSION: CLINICAL_PROGRESSION: NOT CHANGED

## 2019-07-06 ASSESSMENT — PAIN DESCRIPTION - PAIN TYPE: TYPE: ACUTE PAIN

## 2019-07-06 NOTE — ED PROVIDER NOTES
[facility]        Pt Name: Steve De León  MRN: [id number]  [place] 1955  Date of evaluation: 7/6/2019  Provider: NAZ Boone CNP  PCP: No primary care provider on file. This patient was seen and evaluated by the attending physician Tejal Lauren       Chief Complaint   Patient presents with    Alcohol Problem     pt in with c/o abd pain/ chest pain when drinking wine-  states she drinks 2 bottles a day- pt states she wants to stop- pt also c/o suicidal thoughts- also states she thinks she has a UTI       HISTORY OF PRESENT ILLNESS   (Location/Symptom, Timing/Onset, Context/Setting, Quality, Duration, Modifying Factors, Severity)  Note limiting factors. Steve De León is a 58 y.o. female presents to the ER intoxicated, states that she relapsed today and drank 2 bottles of wine. Reports that she has been hospitalized 3 times since June 1 with acute alcohol withdrawal but has not gone to rehab. Patient \"swears to God that all go this time\". Patient is tearful. States that she is terribly depressed and her brain does not know what to do other than drink. She has 2 very superficial scratches to her left forearm when questioned about this, the patient states, \"I am suicidal\". Denies ever attempting to kill herself. Reports that she has not sought any psychiatric care after each hospitalization. Patient's  reports that she complains of abdominal and chest discomfort when drinking wine. Reports that she did drink 2 bottles of wine today. Patient reports that she does want to stop drinking. Patient has been reports that her alcohol withdrawal has never produced seizures but she is tremulous with abdominal pain diarrhea and gets urinary tract infections. Reports that she is also nauseated.   Patient states that she does not have any of the symptoms yet she is currently Laboratory  [address], [place], [place]   Phone [phone]   URINE CULTURE   ETHANOL    Narrative:     Performed at:  [facility]  [address],  [place], [place]   Phone [phone]   LIPASE    Narrative:     Performed at:  [facility]  [address],  [place], [address]   Phone [phone]   TROPONIN    Narrative:     Performed at:  [facility]  [address], [place], [address]   Phone [phone]   VITAMIN B12 & FOLATE       All other labs were within normal range or not returned as of this dictation. EKG: All EKG's are interpreted by the Emergency Department Physician in the absence of a cardiologist.  Please see their note for interpretation of EKG. RADIOLOGY:   Non-plain film images such as CT, Ultrasound and MRI are read by the radiologist. Plain radiographic images are visualized andpreliminarily interpreted by the  ED Provider with the below findings:        Interpretation perthe Radiologist below, if available at the time of this note:    No orders to display     No results found.         PROCEDURES   Unless otherwise noted below, none     Procedures    CRITICAL CARE TIME   N/A    CONSULTS:  IP CONSULT TO HOSPITALIST  IP CONSULT TO PSYCHIATRY      EMERGENCY DEPARTMENT COURSE and DIFFERENTIAL DIAGNOSIS/MDM:   Vitals:    Vitals:    07/06/19 2145 07/06/19 2200 07/06/19 2215 07/06/19 2258   BP: (!) 144/69 (!) 152/75 (!) 150/56    Pulse: 107 112 108    Resp: 15 15 13    Temp:       TempSrc:       SpO2: 94% 95% 95%    Weight:    137 lb 5.6 oz (62.3 kg)   Height:           Patient was given thefollowing medications:  Medications   sodium chloride flush 0.9 % injection 10 mL (has no administration in time range)   sodium chloride flush 0.9 % injection 10 mL (has no administration in time range)   LORazepam (ATIVAN) tablet 1 mg (has no administration in time range)     Or

## 2019-07-07 LAB
A/G RATIO: 1.4 (ref 1.1–2.2)
ALBUMIN SERPL-MCNC: 4 G/DL (ref 3.4–5)
ALP BLD-CCNC: 75 U/L (ref 40–129)
ALT SERPL-CCNC: 64 U/L (ref 10–40)
ANION GAP SERPL CALCULATED.3IONS-SCNC: 13 MMOL/L (ref 3–16)
AST SERPL-CCNC: 100 U/L (ref 15–37)
BASOPHILS ABSOLUTE: 0 K/UL (ref 0–0.2)
BASOPHILS RELATIVE PERCENT: 0.7 %
BILIRUB SERPL-MCNC: 0.8 MG/DL (ref 0–1)
BUN BLDV-MCNC: 5 MG/DL (ref 7–20)
CALCIUM SERPL-MCNC: 8.4 MG/DL (ref 8.3–10.6)
CHLORIDE BLD-SCNC: 101 MMOL/L (ref 99–110)
CO2: 24 MMOL/L (ref 21–32)
CREAT SERPL-MCNC: <0.5 MG/DL (ref 0.6–1.2)
EKG ATRIAL RATE: 118 BPM
EKG DIAGNOSIS: NORMAL
EKG P AXIS: 78 DEGREES
EKG P-R INTERVAL: 160 MS
EKG Q-T INTERVAL: 328 MS
EKG QRS DURATION: 86 MS
EKG QTC CALCULATION (BAZETT): 459 MS
EKG R AXIS: 69 DEGREES
EKG T AXIS: 51 DEGREES
EKG VENTRICULAR RATE: 118 BPM
EOSINOPHILS ABSOLUTE: 0.1 K/UL (ref 0–0.6)
EOSINOPHILS RELATIVE PERCENT: 1.3 %
FOLATE: >20 NG/ML (ref 4.78–24.2)
GFR AFRICAN AMERICAN: >60
GFR NON-AFRICAN AMERICAN: >60
GLOBULIN: 2.9 G/DL
GLUCOSE BLD-MCNC: 103 MG/DL (ref 70–99)
HCT VFR BLD CALC: 36.6 % (ref 36–48)
HEMOGLOBIN: 12.5 G/DL (ref 12–16)
LYMPHOCYTES ABSOLUTE: 2 K/UL (ref 1–5.1)
LYMPHOCYTES RELATIVE PERCENT: 30.8 %
MAGNESIUM: 1.8 MG/DL (ref 1.8–2.4)
MCH RBC QN AUTO: 34.7 PG (ref 26–34)
MCHC RBC AUTO-ENTMCNC: 34.1 G/DL (ref 31–36)
MCV RBC AUTO: 101.6 FL (ref 80–100)
MONOCYTES ABSOLUTE: 0.5 K/UL (ref 0–1.3)
MONOCYTES RELATIVE PERCENT: 8.2 %
NEUTROPHILS ABSOLUTE: 3.8 K/UL (ref 1.7–7.7)
NEUTROPHILS RELATIVE PERCENT: 59 %
PDW BLD-RTO: 14.6 % (ref 12.4–15.4)
PLATELET # BLD: 177 K/UL (ref 135–450)
PMV BLD AUTO: 7.9 FL (ref 5–10.5)
POTASSIUM SERPL-SCNC: 4 MMOL/L (ref 3.5–5.1)
RBC # BLD: 3.6 M/UL (ref 4–5.2)
SODIUM BLD-SCNC: 138 MMOL/L (ref 136–145)
TOTAL PROTEIN: 6.9 G/DL (ref 6.4–8.2)
VITAMIN B-12: 538 PG/ML (ref 211–911)
WBC # BLD: 6.5 K/UL (ref 4–11)

## 2019-07-07 PROCEDURE — 93010 ELECTROCARDIOGRAM REPORT: CPT | Performed by: INTERNAL MEDICINE

## 2019-07-07 PROCEDURE — 6370000000 HC RX 637 (ALT 250 FOR IP): Performed by: HOSPITALIST

## 2019-07-07 PROCEDURE — 80053 COMPREHEN METABOLIC PANEL: CPT

## 2019-07-07 PROCEDURE — 85025 COMPLETE CBC W/AUTO DIFF WBC: CPT

## 2019-07-07 PROCEDURE — 2580000003 HC RX 258: Performed by: HOSPITALIST

## 2019-07-07 PROCEDURE — 94760 N-INVAS EAR/PLS OXIMETRY 1: CPT

## 2019-07-07 PROCEDURE — 83735 ASSAY OF MAGNESIUM: CPT

## 2019-07-07 PROCEDURE — 6360000002 HC RX W HCPCS: Performed by: HOSPITALIST

## 2019-07-07 PROCEDURE — 1200000000 HC SEMI PRIVATE

## 2019-07-07 PROCEDURE — 6370000000 HC RX 637 (ALT 250 FOR IP): Performed by: INTERNAL MEDICINE

## 2019-07-07 PROCEDURE — 36415 COLL VENOUS BLD VENIPUNCTURE: CPT

## 2019-07-07 PROCEDURE — 2500000003 HC RX 250 WO HCPCS: Performed by: HOSPITALIST

## 2019-07-07 RX ORDER — CHLORDIAZEPOXIDE HYDROCHLORIDE 5 MG/1
5 CAPSULE, GELATIN COATED ORAL 3 TIMES DAILY
Status: DISCONTINUED | OUTPATIENT
Start: 2019-07-07 | End: 2019-07-11 | Stop reason: HOSPADM

## 2019-07-07 RX ADMIN — CHLORDIAZEPOXIDE HYDROCHLORIDE 5 MG: 5 CAPSULE ORAL at 20:47

## 2019-07-07 RX ADMIN — Medication 10 ML: at 12:14

## 2019-07-07 RX ADMIN — Medication 1200 MG: at 09:00

## 2019-07-07 RX ADMIN — LORAZEPAM 2 MG: 1 TABLET ORAL at 19:25

## 2019-07-07 RX ADMIN — Medication 10 ML: at 22:06

## 2019-07-07 RX ADMIN — PROGESTERONE 200 MG: 100 CAPSULE ORAL at 16:20

## 2019-07-07 RX ADMIN — SODIUM CHLORIDE: 9 INJECTION, SOLUTION INTRAVENOUS at 15:18

## 2019-07-07 RX ADMIN — ONDANSETRON 4 MG: 2 INJECTION INTRAMUSCULAR; INTRAVENOUS at 01:43

## 2019-07-07 RX ADMIN — LORAZEPAM 2 MG: 2 INJECTION INTRAMUSCULAR; INTRAVENOUS at 12:14

## 2019-07-07 RX ADMIN — CHLORDIAZEPOXIDE HYDROCHLORIDE 5 MG: 5 CAPSULE ORAL at 15:10

## 2019-07-07 RX ADMIN — Medication 10 ML: at 23:15

## 2019-07-07 RX ADMIN — FLUOXETINE 40 MG: 20 CAPSULE ORAL at 09:00

## 2019-07-07 RX ADMIN — ONDANSETRON 4 MG: 2 INJECTION INTRAMUSCULAR; INTRAVENOUS at 23:15

## 2019-07-07 RX ADMIN — LORAZEPAM 4 MG: 2 INJECTION INTRAMUSCULAR; INTRAVENOUS at 01:40

## 2019-07-07 RX ADMIN — LORAZEPAM 2 MG: 2 INJECTION INTRAMUSCULAR; INTRAVENOUS at 05:21

## 2019-07-07 RX ADMIN — LORAZEPAM 2 MG: 1 TABLET ORAL at 23:15

## 2019-07-07 RX ADMIN — Medication 10 ML: at 10:13

## 2019-07-07 RX ADMIN — LORAZEPAM 2 MG: 2 INJECTION INTRAMUSCULAR; INTRAVENOUS at 20:46

## 2019-07-07 RX ADMIN — ONDANSETRON 4 MG: 2 INJECTION INTRAMUSCULAR; INTRAVENOUS at 10:13

## 2019-07-07 RX ADMIN — Medication 10 ML: at 16:24

## 2019-07-07 RX ADMIN — Medication 10 ML: at 20:47

## 2019-07-07 RX ADMIN — THIAMINE HYDROCHLORIDE: 100 INJECTION, SOLUTION INTRAMUSCULAR; INTRAVENOUS at 20:46

## 2019-07-07 RX ADMIN — NIFEDIPINE 30 MG: 30 TABLET, FILM COATED, EXTENDED RELEASE ORAL at 09:00

## 2019-07-07 RX ADMIN — ENOXAPARIN SODIUM 40 MG: 40 INJECTION SUBCUTANEOUS at 09:00

## 2019-07-07 RX ADMIN — SODIUM CHLORIDE: 9 INJECTION, SOLUTION INTRAVENOUS at 07:30

## 2019-07-07 RX ADMIN — ONDANSETRON 4 MG: 2 INJECTION INTRAMUSCULAR; INTRAVENOUS at 16:24

## 2019-07-07 RX ADMIN — ZOLPIDEM TARTRATE 5 MG: 5 TABLET ORAL at 20:47

## 2019-07-07 RX ADMIN — LORAZEPAM 1 MG: 2 INJECTION INTRAMUSCULAR; INTRAVENOUS at 22:05

## 2019-07-07 ASSESSMENT — PAIN DESCRIPTION - ORIENTATION: ORIENTATION: PROXIMAL

## 2019-07-07 ASSESSMENT — PAIN DESCRIPTION - DIRECTION: RADIATING_TOWARDS: NON RADIATING

## 2019-07-07 ASSESSMENT — PAIN SCALES - GENERAL
PAINLEVEL_OUTOF10: 0
PAINLEVEL_OUTOF10: 5
PAINLEVEL_OUTOF10: 0
PAINLEVEL_OUTOF10: 5
PAINLEVEL_OUTOF10: 0

## 2019-07-07 ASSESSMENT — PAIN DESCRIPTION - LOCATION
LOCATION: HEAD
LOCATION: HEAD

## 2019-07-07 ASSESSMENT — PAIN DESCRIPTION - PAIN TYPE
TYPE: ACUTE PAIN
TYPE: ACUTE PAIN

## 2019-07-07 ASSESSMENT — PAIN DESCRIPTION - FREQUENCY
FREQUENCY: INTERMITTENT
FREQUENCY: INTERMITTENT

## 2019-07-07 ASSESSMENT — PAIN DESCRIPTION - DESCRIPTORS
DESCRIPTORS: HEADACHE
DESCRIPTORS: HEADACHE

## 2019-07-07 ASSESSMENT — PAIN - FUNCTIONAL ASSESSMENT: PAIN_FUNCTIONAL_ASSESSMENT: ACTIVITIES ARE NOT PREVENTED

## 2019-07-07 ASSESSMENT — PAIN DESCRIPTION - ONSET: ONSET: ON-GOING

## 2019-07-07 ASSESSMENT — PAIN DESCRIPTION - PROGRESSION
CLINICAL_PROGRESSION: NOT CHANGED
CLINICAL_PROGRESSION: NOT CHANGED

## 2019-07-07 NOTE — PROGRESS NOTES
4 Eyes Skin Assessment     The patient is being assess for  Admission    I agree that 2 RN's have performed a thorough Head to Toe Skin Assessment on the patient. ALL assessment sites listed below have been assessed. Areas assessed by both nurses: Atoka Rylan  [x]   Head, Face, and Ears   [x]   Shoulders, Back, and Chest  [x]   Arms, Elbows, and Hands   [x]   Coccyx, Sacrum, and IschIum  [x]   Legs, Feet, and Heels        Does the Patient have Skin Breakdown?   Scratches to LFA noted         Ernie Prevention initiated:  NA   Wound Care Orders initiated:  NA      WOC nurse consulted for Pressure Injury (Stage 3,4, Unstageable, DTI, NWPT, and Complex wounds), New and Established Ostomies:  NA      Nurse 1 eSignature: Electronically signed by Chaka Bazan RN on 7/7/19 at 9:05 AM    **SHARE this note so that the co-signing nurse is able to place an eSignature**    Nurse 2 eSignature: Electronically signed by Mateo Vang RN on 7/8/19 at 4:13 AM

## 2019-07-07 NOTE — H&P
_    [facility] HOSPITALIST HISTORY AND PHYSICAL    7/6/2019 9:21 PM        Patient Information:  Delano Fitzgerald is a 58 y.o. female [id number]    PCP:  No primary care provider on file. (Tel: None )        Date of Service:   Pt seen/examined on 7/6/2019   Admitted to Inpatient with expected LOS greater than two midnights due to medical therapy. Chief complaint:    Chief Complaint   Patient presents with    Alcohol Problem     pt in with c/o abd pain/ chest pain when drinking wine-  states she drinks 2 bottles a day- pt states she wants to stop- pt also c/o suicidal thoughts- also states she thinks she has a UTI       History of Present Illness:  Grayson Weston is a 58 y.o. female who presented with   · Brought to ED  : by Family   · Reported alcohol abuse at home . Drinks at least 2-3 bottles of wine each day . Las drink at 4 PM today . · Also reported suicidal ideations . Noted superficial cuts on left wrist region . Patient states that she has been depressed and feels her self as a burden on her    · Signed out AMA last admission   · Recurrent pattern of admissions noted   · Also thinks she may have a UTI  . History obtained from   · Patient  /  Family    · Prior chart  As well       So far, ED Findings/Workup/Labs shows :   · BP. HTN    · HR. Tachycardia    · Saturations. Stable   · Temperature. Afebrile       · Pertinent Abnormal Labs and assessment as below. While in ED, patient care and medications given :    · Banana Bag X 1       Currently, on my evaluation, patient is :   · Since arrival, post above Rx, patient is AO X 3   · Tremors noted on sitting upright and walking . ? Alcohol related ataxia   · No chest pain '  · No SOB         REVIEW OF SYSTEMS:     Constitutional:  Negative for fever, chills or night sweats  ENT:   Negative for rhinorrhea, epistaxis, hoarseness, sore throat.   Respiratory:   Negative for shortness of breath, wheezing  Cardiovascular:

## 2019-07-07 NOTE — PLAN OF CARE
Problem: Pain:  Goal: Control of acute pain  Description  Control of acute pain  Outcome: Ongoing     Problem: Suicide risk  Goal: Provide patient with safe environment  Description  Provide patient with safe environment  Outcome: Ongoing  Constant observer flow sheet documentation q15 minutes, constant observation 1:1 in place . Problem: Falls - Risk of:  Goal: Will remain free from falls  Description  Will remain free from falls  Outcome: Ongoing   Pt is high fall risk. Pt is wearing non skid orange socks. Bed and chair alarm on. Bed at the lowest position. [place] pt to environment.  at bedside. Tray table within reach.

## 2019-07-07 NOTE — PROGRESS NOTES
Shift assessment completed; see flowsheet data. Pt laying in bed with eyes closed, awakens to voice, denies pain. Pt A&O x 4, denies pain. CIWA 0. SR-ST on tele, VSS. Lung sounds clear, no edema noted. Scratches to L arm scabbed and NELSON. Constant observer at bedside for suicide precautions.

## 2019-07-07 NOTE — PROGRESS NOTES
Pt admitted from the ED to room 3905. Admission questionnaire done. Fall precaution and  remains in place. All pt's belongings were given to pt's .

## 2019-07-07 NOTE — PROGRESS NOTES
Reassessment completed; see flowsheet data. Pt A&O, VSS. Pt up to bathroom, with scant amount of blood in underwear and when wiping. Pt states she noticed this starting yesterday. CIWA score 11 d/t anxiety, headache, and tremors; will administer ativan per order. Pt to start scheduled librium this afternoon.

## 2019-07-08 LAB — URINE CULTURE, ROUTINE: NORMAL

## 2019-07-08 PROCEDURE — 6370000000 HC RX 637 (ALT 250 FOR IP): Performed by: INTERNAL MEDICINE

## 2019-07-08 PROCEDURE — 6360000002 HC RX W HCPCS: Performed by: HOSPITALIST

## 2019-07-08 PROCEDURE — 1200000000 HC SEMI PRIVATE

## 2019-07-08 PROCEDURE — 94760 N-INVAS EAR/PLS OXIMETRY 1: CPT

## 2019-07-08 PROCEDURE — 6370000000 HC RX 637 (ALT 250 FOR IP): Performed by: HOSPITALIST

## 2019-07-08 PROCEDURE — 2580000003 HC RX 258: Performed by: HOSPITALIST

## 2019-07-08 RX ADMIN — FLUOXETINE 40 MG: 20 CAPSULE ORAL at 07:42

## 2019-07-08 RX ADMIN — CHLORDIAZEPOXIDE HYDROCHLORIDE 5 MG: 5 CAPSULE ORAL at 22:54

## 2019-07-08 RX ADMIN — LORAZEPAM 3 MG: 1 TABLET ORAL at 07:43

## 2019-07-08 RX ADMIN — LORAZEPAM 2 MG: 2 INJECTION INTRAMUSCULAR; INTRAVENOUS at 04:47

## 2019-07-08 RX ADMIN — Medication 10 ML: at 03:03

## 2019-07-08 RX ADMIN — LORAZEPAM 4 MG: 1 TABLET ORAL at 11:21

## 2019-07-08 RX ADMIN — LORAZEPAM 4 MG: 1 TABLET ORAL at 17:34

## 2019-07-08 RX ADMIN — Medication 100 MG: at 07:43

## 2019-07-08 RX ADMIN — LORAZEPAM 2 MG: 1 TABLET ORAL at 20:05

## 2019-07-08 RX ADMIN — LORAZEPAM 3 MG: 1 TABLET ORAL at 10:07

## 2019-07-08 RX ADMIN — PROGESTERONE 200 MG: 100 CAPSULE ORAL at 20:05

## 2019-07-08 RX ADMIN — THERA TABS 1 TABLET: TAB at 07:43

## 2019-07-08 RX ADMIN — CHLORDIAZEPOXIDE HYDROCHLORIDE 5 MG: 5 CAPSULE ORAL at 14:21

## 2019-07-08 RX ADMIN — Medication 10 ML: at 06:06

## 2019-07-08 RX ADMIN — LORAZEPAM 4 MG: 1 TABLET ORAL at 14:21

## 2019-07-08 RX ADMIN — CHLORDIAZEPOXIDE HYDROCHLORIDE 5 MG: 5 CAPSULE ORAL at 07:42

## 2019-07-08 RX ADMIN — LORAZEPAM 2 MG: 2 INJECTION INTRAMUSCULAR; INTRAVENOUS at 06:06

## 2019-07-08 RX ADMIN — SODIUM CHLORIDE: 9 INJECTION, SOLUTION INTRAVENOUS at 03:02

## 2019-07-08 RX ADMIN — LORAZEPAM 2 MG: 2 INJECTION INTRAMUSCULAR; INTRAVENOUS at 03:03

## 2019-07-08 RX ADMIN — Medication 10 ML: at 07:44

## 2019-07-08 RX ADMIN — LORAZEPAM 2 MG: 1 TABLET ORAL at 01:48

## 2019-07-08 RX ADMIN — ZOLPIDEM TARTRATE 5 MG: 5 TABLET ORAL at 22:54

## 2019-07-08 RX ADMIN — FOLIC ACID 1 MG: 1 TABLET ORAL at 07:43

## 2019-07-08 RX ADMIN — Medication 10 ML: at 04:48

## 2019-07-08 RX ADMIN — Medication 1200 MG: at 07:43

## 2019-07-08 RX ADMIN — ONDANSETRON 4 MG: 2 INJECTION INTRAMUSCULAR; INTRAVENOUS at 04:47

## 2019-07-08 RX ADMIN — NIFEDIPINE 30 MG: 30 TABLET, FILM COATED, EXTENDED RELEASE ORAL at 07:42

## 2019-07-08 ASSESSMENT — PAIN SCALES - GENERAL
PAINLEVEL_OUTOF10: 0
PAINLEVEL_OUTOF10: 0

## 2019-07-08 NOTE — PROGRESS NOTES
Pt resting comfortably in bed with eyes closed. No s/s of distress or discomfort. Respirations easy and effortless. Repositioned per self as needed.

## 2019-07-08 NOTE — PLAN OF CARE
Problem: Pain:  Description  Pain management should include both nonpharmacologic and pharmacologic interventions. Goal: Control of acute pain  Description  Control of acute pain  7/7/2019 1430 by Chaz Laird RN  Outcome: Ongoing  Note:   Pt c/o slight headache, declined tylenol. Ativan given per CIWA protocol. Pt resting in bed with eyes closed upon reassessment. Problem: Suicide risk  Description  Suicide risk  Goal: Provide patient with safe environment  Description  Provide patient with safe environment  7/7/2019 2155 by Jeremy Campbell RN  Note:   One on one constant observer in place for suicide prevention. 7/7/2019 1430 by Chaz Laird RN  Outcome: Ongoing  Note:   Suicide precautions in place. Constant observer at bedside. Problem: Falls - Risk of:  Goal: Will remain free from falls  Description  Will remain free from falls  7/7/2019 2155 by Jeremy Campbell RN  Note:   Pt is a high fall risk. Bed in lowest position and wheels locked. Bed alarm engaged. One on one in room monitoring. 7/7/2019 1430 by Chaz Laird RN  Outcome: Ongoing  Note:   Pt at risk for falls. See VALENZUELA Fall Risk Scale. Universal fall precautions in place. Bed in lowest position and locked. Constant observer at bedside to ambulate pt to BR as needed.

## 2019-07-08 NOTE — PROGRESS NOTES
Pt slept two hours, awoke to urinate, worsening ataxia noted, pt encouraged to use bedside commode. Pt medicated per CIWA scale.

## 2019-07-09 PROCEDURE — 99254 IP/OBS CNSLTJ NEW/EST MOD 60: CPT | Performed by: PSYCHIATRY & NEUROLOGY

## 2019-07-09 PROCEDURE — 6370000000 HC RX 637 (ALT 250 FOR IP): Performed by: HOSPITALIST

## 2019-07-09 PROCEDURE — 94760 N-INVAS EAR/PLS OXIMETRY 1: CPT

## 2019-07-09 PROCEDURE — 6370000000 HC RX 637 (ALT 250 FOR IP): Performed by: INTERNAL MEDICINE

## 2019-07-09 PROCEDURE — 6360000002 HC RX W HCPCS: Performed by: HOSPITALIST

## 2019-07-09 PROCEDURE — 1200000000 HC SEMI PRIVATE

## 2019-07-09 RX ORDER — ONDANSETRON 4 MG/1
4 TABLET, ORALLY DISINTEGRATING ORAL EVERY 8 HOURS PRN
Status: DISCONTINUED | OUTPATIENT
Start: 2019-07-09 | End: 2019-07-11 | Stop reason: HOSPADM

## 2019-07-09 RX ORDER — DOCUSATE SODIUM 100 MG/1
100 CAPSULE, LIQUID FILLED ORAL DAILY
Status: DISCONTINUED | OUTPATIENT
Start: 2019-07-09 | End: 2019-07-11 | Stop reason: HOSPADM

## 2019-07-09 RX ORDER — PANTOPRAZOLE SODIUM 40 MG/1
40 TABLET, DELAYED RELEASE ORAL
Status: DISCONTINUED | OUTPATIENT
Start: 2019-07-09 | End: 2019-07-11 | Stop reason: HOSPADM

## 2019-07-09 RX ADMIN — CHLORDIAZEPOXIDE HYDROCHLORIDE 5 MG: 5 CAPSULE ORAL at 15:07

## 2019-07-09 RX ADMIN — LORAZEPAM 2 MG: 1 TABLET ORAL at 01:56

## 2019-07-09 RX ADMIN — CHLORDIAZEPOXIDE HYDROCHLORIDE 5 MG: 5 CAPSULE ORAL at 20:35

## 2019-07-09 RX ADMIN — ACETAMINOPHEN 650 MG: 325 TABLET, FILM COATED ORAL at 08:59

## 2019-07-09 RX ADMIN — LORAZEPAM 2 MG: 1 TABLET ORAL at 20:45

## 2019-07-09 RX ADMIN — FLUOXETINE 40 MG: 20 CAPSULE ORAL at 08:58

## 2019-07-09 RX ADMIN — ENOXAPARIN SODIUM 40 MG: 40 INJECTION SUBCUTANEOUS at 08:59

## 2019-07-09 RX ADMIN — FOLIC ACID 1 MG: 1 TABLET ORAL at 08:59

## 2019-07-09 RX ADMIN — LORAZEPAM 1 MG: 1 TABLET ORAL at 08:58

## 2019-07-09 RX ADMIN — PANTOPRAZOLE SODIUM 40 MG: 40 TABLET, DELAYED RELEASE ORAL at 08:58

## 2019-07-09 RX ADMIN — NIFEDIPINE 30 MG: 30 TABLET, FILM COATED, EXTENDED RELEASE ORAL at 08:58

## 2019-07-09 RX ADMIN — LORAZEPAM 2 MG: 1 TABLET ORAL at 04:49

## 2019-07-09 RX ADMIN — ZOLPIDEM TARTRATE 5 MG: 5 TABLET ORAL at 20:35

## 2019-07-09 RX ADMIN — Medication 1200 MG: at 08:59

## 2019-07-09 RX ADMIN — ONDANSETRON 4 MG: 4 TABLET, ORALLY DISINTEGRATING ORAL at 15:07

## 2019-07-09 RX ADMIN — LORAZEPAM 1 MG: 1 TABLET ORAL at 15:07

## 2019-07-09 RX ADMIN — Medication 100 MG: at 09:01

## 2019-07-09 RX ADMIN — THERA TABS 1 TABLET: TAB at 09:01

## 2019-07-09 RX ADMIN — CHLORDIAZEPOXIDE HYDROCHLORIDE 5 MG: 5 CAPSULE ORAL at 08:59

## 2019-07-09 RX ADMIN — PROGESTERONE 200 MG: 100 CAPSULE ORAL at 20:48

## 2019-07-09 ASSESSMENT — PAIN DESCRIPTION - LOCATION
LOCATION: HEAD
LOCATION: HEAD

## 2019-07-09 ASSESSMENT — PAIN SCALES - GENERAL
PAINLEVEL_OUTOF10: 4
PAINLEVEL_OUTOF10: 5
PAINLEVEL_OUTOF10: 4
PAINLEVEL_OUTOF10: 2

## 2019-07-09 ASSESSMENT — PAIN DESCRIPTION - PAIN TYPE: TYPE: ACUTE PAIN

## 2019-07-09 ASSESSMENT — PAIN DESCRIPTION - DESCRIPTORS: DESCRIPTORS: HEADACHE

## 2019-07-09 NOTE — CONSULTS
[address]                     [address] [facility], [address]                                  CONSULTATION    PATIENT NAME: Michael Moe                      :        1955  MED REC NO:   [id number]                          ROOM:       4567  ACCOUNT NO:   [de-identified]                           ADMIT DATE: 2019  PROVIDER:     Alondra Lewis MD    CONSULT DATE:  2019    REASON FOR CONSULTATION:  The patient is a 50-year-old  female  who was admitted to the hospital with acute intoxication of alcohol, now  presenting with suicidal thinking and acute withdrawal symptoms. CHIEF COMPLAINT AND HISTORY OF PRESENT ILLNESS:  The patient was  referred for hospitalization after having suicidal ideation. The  patient was noted to have increasing difficulty with depressive  symptoms, racing thoughts, obsessive thinking and has been  self-medicating with alcohol. The patient was drinking two to three  bottles of wine daily. The patient endorses difficulty with panic  attacks, thought of worthlessness, and was feeling increasingly  dysregulated. The patient's family was concerned about the patient's  poor impulse control and was feeling increasingly hopeless and helpless. PAST PSYCHIATRIC HISTORY:  The patient denies any previous inpatient  mental health treatment. The patient is currently on no outpatient  counseling or medication management. SUBSTANCE ABUSE HISTORY:  The patient is noted to have history of  alcoholism typically consuming two to three bottles of wine daily. MEDICAL HISTORY:  The patient denies significant medical issues    SOCIAL HISTORY:  [facility] She denies any [facility] issues, denies history of any physical or sexual  abuse. FAMILY HISTORY:  Positive for mental illness. REVIEW OF SYSTEMS:  All 12 systems are reviewed, grossly intact.      DEVELOPMENTAL HISTORY:

## 2019-07-09 NOTE — PROGRESS NOTES
[facility] PROGRESS NOTE    7/9/2019 1:44 PM        Name: Katiuska Fernández . Admitted: 7/6/2019  Primary Care Provider: No primary care provider on file. (Tel: None)    Brief Course:     CC:depression    Subjective:  .     Sitting in the chair  Having nausea  Does mentions having unsteadiness of gait    Reviewed interval ancillary notes    Current Medications    pantoprazole (PROTONIX) tablet 40 mg QAM AC   chlordiazePOXIDE (LIBRIUM) capsule 5 mg TID   progesterone (PROMETRIUM) capsule 200 mg Nightly   calcium carbonate tablet 1,200 mg Daily   FLUoxetine (PROZAC) capsule 40 mg Daily   NIFEdipine (PROCARDIA XL) extended release tablet 30 mg Daily   zolpidem (AMBIEN) tablet 5 mg Nightly PRN   sodium chloride flush 0.9 % injection 10 mL 2 times per day   sodium chloride flush 0.9 % injection 10 mL PRN   ondansetron (ZOFRAN) injection 4 mg Q6H PRN   enoxaparin (LOVENOX) injection 40 mg Daily   vitamin B-1 (THIAMINE) tablet 355 mg Daily   folic acid (FOLVITE) tablet 1 mg Daily   multivitamin 1 tablet Daily   potassium chloride (KLOR-CON M) extended release tablet 40 mEq PRN   Or    potassium bicarb-citric acid (EFFER-K) effervescent tablet 40 mEq PRN   Or    potassium chloride 10 mEq/100 mL IVPB (Peripheral Line) PRN   magnesium sulfate 1 g in dextrose 5% 100 mL IVPB PRN   acetaminophen (TYLENOL) tablet 650 mg Q4H PRN   LORazepam (ATIVAN) tablet 1 mg Q1H PRN   Or    LORazepam (ATIVAN) injection 1 mg Q1H PRN   Or    LORazepam (ATIVAN) tablet 2 mg Q1H PRN   Or    LORazepam (ATIVAN) injection 2 mg Q1H PRN   Or    LORazepam (ATIVAN) tablet 3 mg Q1H PRN   Or    LORazepam (ATIVAN) injection 3 mg Q1H PRN   Or    LORazepam (ATIVAN) tablet 4 mg Q1H PRN   Or    LORazepam (ATIVAN) injection 4 mg Q1H PRN       Objective:  BP (!) 155/71   Pulse 81   Temp 98.9 °F (37.2 °C) (Oral)   Resp 16   Ht 5' 4\" (1.626 m)   Wt 141 lb 8.6 oz (64.2 DTs  Monitor   Librium, prn ativan decreasing ativan requirements     Hypertension  Nifedipine    Elevated LFTs, Macrocytosis   Probably alcohol related     IV Access:peripheral  [place]: no  Diet: DIET GENERAL;  Code:Full Code  DVT PPX Lovenox  Disposition  Hopefully tomorrow     Courtney Osuna MD   7/9/2019 1:44 PM

## 2019-07-09 NOTE — CONSULTS
In patient Neurology consult        [facility]      MD Lory Winn  1955    Date of Service: 7/9/2019    Referring Physician: Mckenzie Nava MD      Reason for the consult and CC: Acute ataxia and tremors    HPI:   The patient is a 58y.o.  years old female with history of chronic alcohol abuse who was admitted to the hospital 3 days ago for alcohol intoxication. She was admitted to the ICU and was placed under DT precautions. The patient has been seen by psychiatry and plan for inpatient psych admission for suicidal ideation or thoughts. Neurology has been consulted for increased ataxia and tremors of the last 24 hours. Degree is moderate and duration is persistent. Other symptoms included some tremors in her hands. No weakness or significant neck or back pain. No triggers or other associated symptoms. The patient's last drink was 2 to 3 days ago. She reports intermittent dizziness and ataxia during alcohol withdrawal symptoms. She denies any headache, double vision or blurred vision or weakness or numbness or tingling. No recent or recurrent falling. Patient had an MRI of the brain 2 months ago which showed chronic small vessel disease by report. Last B12 level was within normal.  No recent TSH. She denies any other use of drugs. She denies any visual disturbance, chest pain or dysphagia or dysarthria. No focal weakness. Other review of system was unremarkable.         Past Medical History:   Diagnosis Date    Alcoholism ([address]     Hyperlipidemia     Hypertension      Family History   Problem Relation Age of Onset    Stroke Mother     Substance Abuse Mother     Substance Abuse Father     Heart Disease Father     Heart Disease Brother     Substance Abuse Brother     Heart Disease Paternal Aunt     Heart Disease Paternal Uncle     Heart Disease Maternal Grandmother     Diabetes Maternal Grandmother     Diabetes Maternal Grandfather     Heart Disease Maternal Grandfather     Other Paternal Grandmother     Other Paternal Grandfather      Past Surgical History:   Procedure Laterality Date    BACK SURGERY       L5 disectomy    COLONOSCOPY          ENDOSCOPY, COLON, DIAGNOSTIC           Past Surgical History:   Procedure Laterality Date    BACK SURGERY       L5 disectomy    COLONOSCOPY      2016    ENDOSCOPY, COLON, DIAGNOSTIC           Family History   Problem Relation Age of Onset    Stroke Mother     Substance Abuse Mother     Substance Abuse Father     Heart Disease Father     Heart Disease Brother     Substance Abuse Brother     Heart Disease Paternal Aunt     Heart Disease Paternal Uncle     Heart Disease Maternal Grandmother     Diabetes Maternal Grandmother     Diabetes Maternal Grandfather     Heart Disease Maternal Grandfather     Other Paternal Grandmother     Other Paternal Grandfather      Social History     Tobacco Use    Smoking status: Former Smoker     Types: Cigarettes     Last attempt to quit: 1987     Years since quittin.0    Smokeless tobacco: Never Used   Substance Use Topics    Alcohol use:  Yes     Alcohol/week: 33.6 oz     Types: 56 Glasses of wine per week     Comment: 2 bottles + of wine daily     Drug use: No     Allergies   Allergen Reactions    Penicillins     Sulfa Antibiotics      Current Facility-Administered Medications   Medication Dose Route Frequency Provider Last Rate Last Dose    pantoprazole (PROTONIX) tablet 40 mg  40 mg Oral QAM AC Enrique Orozco MD   40 mg at 19 0858    chlordiazePOXIDE (LIBRIUM) capsule 5 mg  5 mg Oral TID Nikita Anthony MD   5 mg at 19 0859    progesterone (PROMETRIUM) capsule 200 mg  200 mg Oral Nightly Nikita Anthony MD   200 mg at 19    calcium carbonate tablet 1,200 mg  2 tablet Oral Daily Gilson Kunz MD   1,200 mg at 19 0859    FLUoxetine (PROZAC) capsule 40 mg  40 mg Oral Daily Hearing: Intact to finger rub bilaterally  IX: Palate elevation is symmetric  XI: Shoulder shrug is intact  XII: Tongue movements are normal  Musculoskeletal: 5/5 in all 4 extremities. Tone: Normal tone. Reflexes: Bilateral biceps 2/4, triceps 2/4, brachial radialis 2/4, knee 2/4 and ankle 2/4. Planters: flexor bilaterally. Coordination: no pronator drift, no dysmetria with FNF in upper extremities. Normal REM. Intermittent hand tremors mainly intentional but no myoclonus. Sensation: normal to all modalities in both arms and legs. Gait/Posture: unsteady gait with inconsistent features and astasia-abasia. Data:  LABS:   Lab Results   Component Value Date     07/07/2019    K 4.0 07/07/2019    K 4.2 06/28/2019     07/07/2019    CO2 24 07/07/2019    BUN 5 07/07/2019    CREATININE <0.5 07/07/2019    GFRAA >60 07/07/2019    LABGLOM >60 07/07/2019    GLUCOSE 103 07/07/2019    PHOS 4.0 06/02/2019    MG 1.80 07/07/2019    CALCIUM 8.4 07/07/2019     Lab Results   Component Value Date    WBC 6.5 07/07/2019    RBC 3.60 07/07/2019    HGB 12.5 07/07/2019    HCT 36.6 07/07/2019    .6 07/07/2019    RDW 14.6 07/07/2019     07/07/2019   No results found for: INR, PROTIME    Neuroimaging and/or  labs reviewed by me and discussed results with the patient    Impression:  Acute ataxia and tremors likely secondary to alcohol withdrawal and toxic encephalopathy superimposed on some psychogenic component. Nonfocal exam otherwise. Recent MRI of the brain from 2 months ago showed small vessel disease. No need to repeat neuroimaging. She will need to recheck her thyroid function testing with her PCP.   Depression with suicidal ideation  Toxic encephalopathy  Hypothyroid    Recommendation:  Continue current supportive care  Continue DT precautions  SSRI  Blood pressure monitor with current medications  Multivitamin and B supplement  Follow-up TFT with her primary care physician  PT and OT  Telemetry  Hydration  Can be discharged from neurology if medically stable. No need for further neurological work-up  Discussed with ICU nurse  We will sign off          Thank you for referring such patient. If you have any questions regarding my consult note, please don't hesitate to call me. Pham Porter MD  [phone]    This dictation was generated by voice recognition computer software.  Although all attempts are made to edit the dictation for accuracy, there may be errors in the  transcription that are not intended

## 2019-07-10 PROCEDURE — 97535 SELF CARE MNGMENT TRAINING: CPT

## 2019-07-10 PROCEDURE — 1200000000 HC SEMI PRIVATE

## 2019-07-10 PROCEDURE — 97161 PT EVAL LOW COMPLEX 20 MIN: CPT

## 2019-07-10 PROCEDURE — 6370000000 HC RX 637 (ALT 250 FOR IP): Performed by: INTERNAL MEDICINE

## 2019-07-10 PROCEDURE — 6370000000 HC RX 637 (ALT 250 FOR IP): Performed by: PEDIATRICS

## 2019-07-10 PROCEDURE — 2580000003 HC RX 258: Performed by: HOSPITALIST

## 2019-07-10 PROCEDURE — 97530 THERAPEUTIC ACTIVITIES: CPT

## 2019-07-10 PROCEDURE — 97116 GAIT TRAINING THERAPY: CPT

## 2019-07-10 PROCEDURE — 99232 SBSQ HOSP IP/OBS MODERATE 35: CPT | Performed by: PSYCHIATRY & NEUROLOGY

## 2019-07-10 PROCEDURE — 6370000000 HC RX 637 (ALT 250 FOR IP): Performed by: HOSPITALIST

## 2019-07-10 PROCEDURE — 97165 OT EVAL LOW COMPLEX 30 MIN: CPT

## 2019-07-10 PROCEDURE — 6360000002 HC RX W HCPCS: Performed by: HOSPITALIST

## 2019-07-10 RX ADMIN — Medication 1200 MG: at 09:38

## 2019-07-10 RX ADMIN — THERA TABS 1 TABLET: TAB at 09:37

## 2019-07-10 RX ADMIN — FOLIC ACID 1 MG: 1 TABLET ORAL at 09:38

## 2019-07-10 RX ADMIN — LORAZEPAM 2 MG: 1 TABLET ORAL at 05:24

## 2019-07-10 RX ADMIN — DOCUSATE SODIUM 100 MG: 100 CAPSULE, LIQUID FILLED ORAL at 09:38

## 2019-07-10 RX ADMIN — NIFEDIPINE 30 MG: 30 TABLET, FILM COATED, EXTENDED RELEASE ORAL at 09:38

## 2019-07-10 RX ADMIN — CHLORDIAZEPOXIDE HYDROCHLORIDE 5 MG: 5 CAPSULE ORAL at 14:44

## 2019-07-10 RX ADMIN — CHLORDIAZEPOXIDE HYDROCHLORIDE 5 MG: 5 CAPSULE ORAL at 20:49

## 2019-07-10 RX ADMIN — CHLORDIAZEPOXIDE HYDROCHLORIDE 5 MG: 5 CAPSULE ORAL at 09:37

## 2019-07-10 RX ADMIN — PROGESTERONE 200 MG: 100 CAPSULE ORAL at 23:05

## 2019-07-10 RX ADMIN — ONDANSETRON 4 MG: 4 TABLET, ORALLY DISINTEGRATING ORAL at 05:15

## 2019-07-10 RX ADMIN — FLUOXETINE 40 MG: 20 CAPSULE ORAL at 09:38

## 2019-07-10 RX ADMIN — ACETAMINOPHEN 650 MG: 325 TABLET, FILM COATED ORAL at 14:44

## 2019-07-10 RX ADMIN — ZOLPIDEM TARTRATE 5 MG: 5 TABLET ORAL at 20:49

## 2019-07-10 RX ADMIN — Medication 10 ML: at 09:39

## 2019-07-10 RX ADMIN — PANTOPRAZOLE SODIUM 40 MG: 40 TABLET, DELAYED RELEASE ORAL at 05:15

## 2019-07-10 RX ADMIN — Medication 100 MG: at 09:37

## 2019-07-10 RX ADMIN — ENOXAPARIN SODIUM 40 MG: 40 INJECTION SUBCUTANEOUS at 09:38

## 2019-07-10 RX ADMIN — ONDANSETRON 4 MG: 4 TABLET, ORALLY DISINTEGRATING ORAL at 23:11

## 2019-07-10 RX ADMIN — ONDANSETRON 4 MG: 4 TABLET, ORALLY DISINTEGRATING ORAL at 14:45

## 2019-07-10 ASSESSMENT — PAIN SCALES - GENERAL
PAINLEVEL_OUTOF10: 5
PAINLEVEL_OUTOF10: 0
PAINLEVEL_OUTOF10: 0
PAINLEVEL_OUTOF10: 3
PAINLEVEL_OUTOF10: 0
PAINLEVEL_OUTOF10: 4

## 2019-07-10 ASSESSMENT — PAIN DESCRIPTION - LOCATION: LOCATION: HEAD

## 2019-07-10 ASSESSMENT — PAIN SCALES - WONG BAKER: WONGBAKER_NUMERICALRESPONSE: 0

## 2019-07-10 ASSESSMENT — PAIN DESCRIPTION - DESCRIPTORS: DESCRIPTORS: HEADACHE

## 2019-07-10 NOTE — PROGRESS NOTES
[facility] PROGRESS NOTE    7/10/2019 10:06 AM        Name: Asif Cummings . Admitted: 7/6/2019  Primary Care Provider: No primary care provider on file. (Tel: None)    Brief Course:     CC:depression    Subjective:  .     Sitting in the chair  Feels better than yesterday  No new complaints   Reviewed interval ancillary notes    Current Medications    pantoprazole (PROTONIX) tablet 40 mg QAM AC   ondansetron (ZOFRAN-ODT) disintegrating tablet 4 mg Q8H PRN   docusate sodium (COLACE) capsule 100 mg Daily   chlordiazePOXIDE (LIBRIUM) capsule 5 mg TID   progesterone (PROMETRIUM) capsule 200 mg Nightly   calcium carbonate tablet 1,200 mg Daily   FLUoxetine (PROZAC) capsule 40 mg Daily   NIFEdipine (PROCARDIA XL) extended release tablet 30 mg Daily   zolpidem (AMBIEN) tablet 5 mg Nightly PRN   sodium chloride flush 0.9 % injection 10 mL 2 times per day   sodium chloride flush 0.9 % injection 10 mL PRN   enoxaparin (LOVENOX) injection 40 mg Daily   vitamin B-1 (THIAMINE) tablet 892 mg Daily   folic acid (FOLVITE) tablet 1 mg Daily   multivitamin 1 tablet Daily   potassium chloride (KLOR-CON M) extended release tablet 40 mEq PRN   Or    potassium bicarb-citric acid (EFFER-K) effervescent tablet 40 mEq PRN   Or    potassium chloride 10 mEq/100 mL IVPB (Peripheral Line) PRN   magnesium sulfate 1 g in dextrose 5% 100 mL IVPB PRN   acetaminophen (TYLENOL) tablet 650 mg Q4H PRN   LORazepam (ATIVAN) tablet 1 mg Q1H PRN   Or    LORazepam (ATIVAN) injection 1 mg Q1H PRN   Or    LORazepam (ATIVAN) tablet 2 mg Q1H PRN   Or    LORazepam (ATIVAN) injection 2 mg Q1H PRN   Or    LORazepam (ATIVAN) tablet 3 mg Q1H PRN   Or    LORazepam (ATIVAN) injection 3 mg Q1H PRN   Or    LORazepam (ATIVAN) tablet 4 mg Q1H PRN   Or    LORazepam (ATIVAN) injection 4 mg Q1H PRN       Objective:  BP (!) 144/69   Pulse 72   Temp 97 °F (36.1 °C) (Temporal) Resp 16   Ht 5' 4\" (1.626 m)   Wt 139 lb 15.9 oz (63.5 kg)   SpO2 99%   BMI 24.03 kg/m²     Intake/Output Summary (Last 24 hours) at 7/10/2019 1006  Last data filed at 7/10/2019 0918  Gross per 24 hour   Intake 1200 ml   Output 2050 ml   Net -850 ml      Wt Readings from Last 3 Encounters:   07/10/19 139 lb 15.9 oz (63.5 kg)   06/28/19 148 lb 1.6 oz (67.2 kg)   06/05/19 145 lb 12.8 oz (66.1 kg)       General appearance:  Appears comfortable  Eyes: Sclera clear. Pupils equal.  ENT: Moist oral mucosa. Trachea midline, no adenopathy. Cardiovascular: Regular rhythm, normal S1, S2. No murmur. No edema in lower extremities  Respiratory: Not using accessory muscles. Good inspiratory effort. Clear to auscultation bilaterally, no wheeze or crackles. GI: Abdomen soft, no tenderness, not distended, normal bowel sounds  Musculoskeletal: No cyanosis in digits, neck supple  Neurology: CN 2-12 grossly intact. No speech or motor deficits  Psych: Normal affect. Alert and oriented in time, place and person  Skin: Warm, dry, normal turgor  Extremity exam shows brisk capillary refill. Peripheral pulses are palpable in lower extremities     Labs and Tests:  CBC:   No results for input(s): WBC, HGB, PLT in the last 72 hours. BMP:    No results for input(s): NA, K, CL, CO2, BUN, CREATININE, GLUCOSE in the last 72 hours. Hepatic:   No results for input(s): AST, ALT, ALB, BILITOT, ALKPHOS in the last 72 hours. No orders to display       Discussed care with family    Problem List  Active Problems:    Essential hypertension    Depression    ETOH abuse    Acute encephalopathy    Ataxia  Resolved Problems:    * No resolved hospital problems. *       Assessment & Plan:   Ataxia  New onset, get neuro input, ??  Truncal with possible psychogenic component , probably withdrawal related, improved    Suicidal ideation with attempt with depression  Psych input noticed  prozac    Alcohol withdrawal with DTs  Monitor   Librium, prn ativan

## 2019-07-10 NOTE — PROGRESS NOTES
Assessment and VS complete. See flowsheet. Pt A&O. Pt sitting up in bed, watching television. Pt states she is feeling better. Still really wants to quit drinking alcohol. States he has a headache. Rates pain 5/10. Denies pain elsewhere. States she is \"agitated. \" States she just feels like she needs to \"get up and move. \" Pt did just walk in hallway with sitter/PCA. Tolerated fairly well, with walker. Pt c/o \"constipation. \" States she hasn't had a BM since admitted to hospital and thinks her nausea could be related to that. Saltine crackers and lemon-lime soda provided for nausea. Sent message to Hospitalist per pt's request for stool softener. See new order for Colace. Night time meds given per MD order, including Ativan for CIWA of 11. See eMAR. Sitter at bedside for suicide precautions. POC discussed. Pt denies further needs.

## 2019-07-10 NOTE — PLAN OF CARE
Problem: Pain:  Goal: Pain level will decrease  Description  Pain level will decrease  Outcome: Ongoing  Goal: Control of acute pain  Description  Control of acute pain  Outcome: Ongoing  Goal: Control of chronic pain  Description  Control of chronic pain  Outcome: Ongoing     Problem: Suicide risk  Goal: Provide patient with safe environment  Description  Provide patient with safe environment  Outcome: Ongoing     Problem: Falls - Risk of:  Goal: Will remain free from falls  Description  Will remain free from falls  Outcome: Ongoing  Goal: Absence of physical injury  Description  Absence of physical injury  Outcome: Ongoing     Problem: Discharge Planning:  Goal: Discharged to appropriate level of care  Description  Discharged to appropriate level of care  Outcome: Ongoing     Problem: Fluid Volume - Deficit:  Goal: Absence of fluid volume deficit signs and symptoms  Description  Absence of fluid volume deficit signs and symptoms  Outcome: Ongoing     Problem: Sleep Pattern Disturbance:  Goal: Appears well-rested  Description  Appears well-rested  Outcome: Ongoing     Problem: Violence - Risk of, Self/Other-Directed:  Goal: Knowledge of developmental care interventions  Description  Absence of violence  Outcome: Ongoing

## 2019-07-10 NOTE — PROGRESS NOTES
history of Alcoholism ([facility] [address] Hyperlipidemia, and Hypertension. has a past surgical history that includes back surgery; Colonoscopy; and Endoscopy, colon, diagnostic. Restrictions  Restrictions/Precautions  Restrictions/Precautions: Fall Risk, Seizure(High Fall Risk: Seizure and Suicide precautions)  Required Braces or Orthoses?: No  Position Activity Restriction  Other position/activity restrictions: 58 y.o. female presents to the ER intoxicated, states that she relapsed today and drank 2 bottles of wine. Reports that she has been hospitalized 3 times since June 1 with acute alcohol withdrawal but has not gone to rehab. Patient \"swears to God that all go this time\". Patient is tearful. States that she is terribly depressed and her brain does not know what to do other than drink. She has 2 very superficial scratches to her left forearm when questioned about this, the patient states, \"I am suicidal\". Denies ever attempting to kill herself. Reports that she has not sought any psychiatric care after each hospitalization.     Subjective   General  Chart Reviewed: Yes  Family / Caregiver Present: No  Referring Practitioner: Shamar Srinivasan MD  Diagnosis: ETOH abuse  Subjective  Subjective: Pt sleeping in bed, once awake pt pleasant and agreeable to OT eval.   Patient Currently in Pain: Denies  Vital Signs  Patient Currently in Pain: Denies  Social/Functional History  Social/Functional History  Lives With: Spouse  Type of Home: Apartment  Home Layout: One level  Home Access: Stairs to enter with rails(reports she goes up and down stairs on her bottom)  Entrance Stairs - Number of Steps: 13 CALEB  Entrance Stairs - Rails: Both  Bathroom Shower/Tub: Walk-in shower  Bathroom Toilet: Standard  Bathroom Equipment: Shower chair, Hand-held shower, Grab bars in shower  Bathroom Accessibility: Walker accessible  Home Equipment: Rolling walker  ADL Assistance: [facility] [address]: [id number]

## 2019-07-10 NOTE — PROGRESS NOTES
Carma Kawasaki  Neurology Follow-up  [facility]    Date of Service: 7/10/2019    Subjective:   CC: Follow up today regarding: acute ataxia and DT. Events noted. Chart and lab reviewed. The patient denies any new symptoms today. No severe lightheadedness or dizziness or severe ataxia. Still somewhat confused. Denies any headache, chest pain, dysphagia or dysarthria. Other review of system was unremarkable. ROS : A 10-12 system review obtained and updated today and is unremarkable except as mentioned  in my interval history.        Past Medical History:   Diagnosis Date    Alcoholism ([facility] [address]     Hyperlipidemia     Hypertension      Current Facility-Administered Medications   Medication Dose Route Frequency Provider Last Rate Last Dose    pantoprazole (PROTONIX) tablet 40 mg  40 mg Oral QAM AC Enrique Saenz MD   40 mg at 07/10/19 0515    ondansetron (ZOFRAN-ODT) disintegrating tablet 4 mg  4 mg Oral Q8H PRN Enrique Saenz MD   4 mg at 07/10/19 0515    docusate sodium (COLACE) capsule 100 mg  100 mg Oral Daily Jai Flores MD        chlordiazePOXIDE (LIBRIUM) capsule 5 mg  5 mg Oral TID Raul Salinas MD   5 mg at 07/09/19 2035    progesterone (PROMETRIUM) capsule 200 mg  200 mg Oral Nightly Raul Salinas MD   200 mg at 07/09/19 2048    calcium carbonate tablet 1,200 mg  2 tablet Oral Daily Gilson Kunz MD   1,200 mg at 07/09/19 0859    FLUoxetine (PROZAC) capsule 40 mg  40 mg Oral Daily Gilson Kunz MD   40 mg at 07/09/19 0858    NIFEdipine (PROCARDIA XL) extended release tablet 30 mg  30 mg Oral Daily Gilson Kunz MD   30 mg at 07/09/19 0858    zolpidem (AMBIEN) tablet 5 mg  5 mg Oral Nightly PRN Jacob Brewster MD   5 mg at 07/09/19 2035    sodium chloride flush 0.9 % injection 10 mL  10 mL Intravenous 2 times per day Jacob Brewster MD   10 mL at 07/08/19 0744    sodium chloride flush 0.9 % injection 10 mL  10 mL Intravenous PRN Christiane Byrd MD   10 mL at 07/08/19 0606    enoxaparin (LOVENOX) injection 40 mg  40 mg Subcutaneous Daily Gilson Kunz MD   40 mg at 07/09/19 0859    vitamin B-1 (THIAMINE) tablet 100 mg  100 mg Oral Daily Gilson Kunz MD   100 mg at 03/94/02 5645    folic acid (FOLVITE) tablet 1 mg  1 mg Oral Daily Gilson Kunz MD   1 mg at 07/09/19 0859    multivitamin 1 tablet  1 tablet Oral Daily Christiane Byrd MD   1 tablet at 07/09/19 0901    potassium chloride (KLOR-CON M) extended release tablet 40 mEq  40 mEq Oral PRN Christiane Byrd MD        Or    potassium bicarb-citric acid (EFFER-K) effervescent tablet 40 mEq  40 mEq Oral PRN Christiane Byrd MD        Or    potassium chloride 10 mEq/100 mL IVPB (Peripheral Line)  10 mEq Intravenous PRN Christiane Byrd MD        magnesium sulfate 1 g in dextrose 5% 100 mL IVPB  1 g Intravenous PRN Christiane Byrd MD        acetaminophen (TYLENOL) tablet 650 mg  650 mg Oral Q4H PRN Gilson Kunz MD   650 mg at 07/09/19 0859    LORazepam (ATIVAN) tablet 1 mg  1 mg Oral Q1H PRN Christiane Byrd MD   1 mg at 07/09/19 1507    Or    LORazepam (ATIVAN) injection 1 mg  1 mg Intravenous Q1H PRN Gilson Kunz MD   1 mg at 07/07/19 2205    Or    LORazepam (ATIVAN) tablet 2 mg  2 mg Oral Q1H PRN Christiane Byrd MD   2 mg at 07/10/19 0524    Or    LORazepam (ATIVAN) injection 2 mg  2 mg Intravenous Q1H PRN Christiane Byrd MD   2 mg at 07/08/19 0606    Or    LORazepam (ATIVAN) tablet 3 mg  3 mg Oral Q1H PRN Christiane Byrd MD   3 mg at 07/08/19 1007    Or    LORazepam (ATIVAN) injection 3 mg  3 mg Intravenous Q1H PRN Gilson Kunz MD   3 mg at 07/06/19 2309    Or    LORazepam (ATIVAN) tablet 4 mg  4 mg Oral Q1H PRN Christiane Byrd MD   4 mg at 07/08/19 1734    Or    LORazepam (ATIVAN) injection 4 mg  4 mg Intravenous Q1H PRN Christiane Byrd MD   4 mg at 07/07/19 0140     Allergies   Allergen Reactions   

## 2019-07-11 VITALS
OXYGEN SATURATION: 98 % | SYSTOLIC BLOOD PRESSURE: 151 MMHG | BODY MASS INDEX: 24.13 KG/M2 | WEIGHT: 141.31 LBS | RESPIRATION RATE: 16 BRPM | HEIGHT: 64 IN | HEART RATE: 65 BPM | TEMPERATURE: 98.3 F | DIASTOLIC BLOOD PRESSURE: 71 MMHG

## 2019-07-11 PROCEDURE — 6360000002 HC RX W HCPCS: Performed by: HOSPITALIST

## 2019-07-11 PROCEDURE — 6370000000 HC RX 637 (ALT 250 FOR IP): Performed by: HOSPITALIST

## 2019-07-11 PROCEDURE — 6370000000 HC RX 637 (ALT 250 FOR IP): Performed by: PEDIATRICS

## 2019-07-11 PROCEDURE — 6370000000 HC RX 637 (ALT 250 FOR IP): Performed by: INTERNAL MEDICINE

## 2019-07-11 RX ADMIN — THERA TABS 1 TABLET: TAB at 08:33

## 2019-07-11 RX ADMIN — ENOXAPARIN SODIUM 40 MG: 40 INJECTION SUBCUTANEOUS at 08:34

## 2019-07-11 RX ADMIN — NIFEDIPINE 30 MG: 30 TABLET, FILM COATED, EXTENDED RELEASE ORAL at 08:33

## 2019-07-11 RX ADMIN — FOLIC ACID 1 MG: 1 TABLET ORAL at 08:35

## 2019-07-11 RX ADMIN — PANTOPRAZOLE SODIUM 40 MG: 40 TABLET, DELAYED RELEASE ORAL at 09:20

## 2019-07-11 RX ADMIN — FLUOXETINE 40 MG: 20 CAPSULE ORAL at 08:33

## 2019-07-11 RX ADMIN — Medication 100 MG: at 08:34

## 2019-07-11 RX ADMIN — Medication 1200 MG: at 08:33

## 2019-07-11 RX ADMIN — CHLORDIAZEPOXIDE HYDROCHLORIDE 5 MG: 5 CAPSULE ORAL at 08:34

## 2019-07-11 RX ADMIN — DOCUSATE SODIUM 100 MG: 100 CAPSULE, LIQUID FILLED ORAL at 08:33

## 2019-07-11 ASSESSMENT — PAIN SCALES - WONG BAKER
WONGBAKER_NUMERICALRESPONSE: 0

## 2019-07-11 ASSESSMENT — PAIN SCALES - GENERAL: PAINLEVEL_OUTOF10: 0

## 2019-07-11 NOTE — DISCHARGE INSTR - COC
Continuity of Care Form    Patient Name: Suman Toro   :  1955  MRN:  [id number]    Admit date:  2019  Discharge date:  2019    Code Status Order: Full Code   Advance Directives:   [facility] Documentation     Date/Time Healthcare Directive Type of Healthcare Directive Copy in [facility] [address] Agent's Name Healthcare Agent's Phone Number    [phone]  No, patient does not have an advance directive for healthcare treatment -- -- -- -- --          Admitting Physician:  Jose Angel Argueta MD  PCP: No primary care provider on file. Discharging Nurse: Carolyne Michaud Unit/Room#: OWU-3020/1640-02  Discharging Unit Phone Number: [phone]    Emergency Contact:   Extended Emergency Contact Information  Primary Emergency Contact: Terence Lujan  Address: [address] [place] [address] Phone: [phone]  Mobile Phone: [phone]  Relation: Spouse    Past Surgical History:  Past Surgical History:   Procedure Laterality Date    BACK SURGERY       L5 disectomy    COLONOSCOPY      2016    ENDOSCOPY, COLON, DIAGNOSTIC             Immunization History: There is no immunization history on file for this patient.     Active Problems:  Patient Active Problem List   Diagnosis Code    Alcohol withdrawal delirium (HCC) F10.231    Essential hypertension I10    Alcohol withdrawal syndrome, uncomplicated (HCC) H64.203    Acute alcoholic intoxication without complication ([facility] [address] R19.532    Depression F32.9    ETOH abuse F10.10    Acute encephalopathy G93.40    Ataxia R27.0       Isolation/Infection:   Isolation          No Isolation            Nurse Assessment:  Last Vital Signs: /70   Pulse 80   Temp 97.1 °F (36.2 °C) (Temporal)   Resp 16   Ht 5' 4\" (1.626 m)   Wt 141 lb 5 oz (64.1 kg)   SpO2 98%   BMI 24.26 kg/m²     Last documented pain score (0-10 scale): Pain Level: 0  Last Weight:   Wt Readings from Last 1 Encounters:   07/11/19 141 lb 5 oz (64.1 kg)     Mental Status:  oriented and alert    IV Access:  - None    Nursing Mobility/ADLs:  Walking   Independent  Transfer  Independent  Bathing  Independent  Dressing  Independent  Toileting  Independent  Feeding  Independent  Med Admin  Independent  Med Delivery   prefers mixed with whole with applesauce    Wound Care Documentation and Therapy:        Elimination:  Continence:   · Bowel: Yes  · Bladder: Yes  Urinary Catheter: None   Colostomy/Ileostomy/Ileal Conduit: No       Date of Last BM: 7/11/2019    Intake/Output Summary (Last 24 hours) at 7/11/2019 0813  Last data filed at 7/11/2019 0000  Gross per 24 hour   Intake 600 ml   Output 1300 ml   Net -700 ml     I/O last 3 completed shifts: In: 600 [P.O.:600]  Out: 1300 [Urine:1300]    Safety Concerns:     None    Impairments/Disabilities:      None    Nutrition Therapy:  Current Nutrition Therapy:   - Oral Diet:  General    Routes of Feeding: Oral  Liquids: No Restrictions  Daily Fluid Restriction: no  Last Modified Barium Swallow with Video (Video Swallowing Test): not done    Treatments at the Time of Hospital Discharge:   Respiratory Treatments: none  Oxygen Therapy:  is not on home oxygen therapy.   Ventilator:    - No ventilator support    Rehab Therapies: Physical Therapy and Occupational Therapy  Weight Bearing Status/Restrictions: No weight bearing restirctions  Other Medical Equipment (for information only, NOT a DME order):  walker  Other Treatments:       Patient's personal belongings (please select all that are sent with patient):  None    RN SIGNATURE:  Electronically signed by Avelina Carranza RN on 7/11/19 at 8:18 AM    CASE MANAGEMENT/SOCIAL WORK SECTION    Inpatient Status Date: ***    Readmission Risk Assessment Score:  Readmission Risk              Risk of Unplanned Readmission:        14           Discharging to Facility/ Agency · Name:   · Address:  · Phone:  · Fax:    Dialysis Facility (if applicable)   · Name:  · Address:  · Dialysis Schedule:  · Phone:  · Fax:    / signature: {Esignature:[id number]}    PHYSICIAN SECTION    Prognosis: {Prognosis:5888679928}    Condition at Discharge: Tina Anaya Patient Condition:[id number]}    Rehab Potential (if transferring to Rehab): {Prognosis:0387767813}    Recommended Labs or Other Treatments After Discharge: ***    Physician Certification: I certify the above information and transfer of Gaurang Martin  is necessary for the continuing treatment of the diagnosis listed and that she requires {Admit to Appropriate Level of Care:65679} for {GREATER/LESS:835094337} 30 days.      Update Admission H&P: {CHP DME Changes in SBTUK:724582718}    PHYSICIAN SIGNATURE:  {Esignature:[id number]}

## 2019-07-29 ENCOUNTER — HOSPITAL ENCOUNTER (EMERGENCY)
Age: 64
Discharge: HOME | End: 2019-07-30
Attending: EMERGENCY MEDICINE
Payer: COMMERCIAL

## 2019-07-29 DIAGNOSIS — F10.930 ALCOHOL WITHDRAWAL SYNDROME WITHOUT COMPLICATION (HCC): Primary | ICD-10-CM

## 2019-07-29 LAB
ALBUMIN SERPL-MCNC: 4.1 G/DL (ref 3.4–5)
ALP BLD-CCNC: 100 U/L (ref 40–129)
ALT SERPL-CCNC: 57 U/L (ref 10–40)
ANION GAP SERPL CALCULATED.3IONS-SCNC: 18 MMOL/L (ref 3–16)
AST SERPL-CCNC: 151 U/L (ref 15–37)
BASOPHILS ABSOLUTE: 0 K/UL (ref 0–0.2)
BASOPHILS RELATIVE PERCENT: 0.3 %
BILIRUB SERPL-MCNC: 0.7 MG/DL (ref 0–1)
BILIRUBIN DIRECT: <0.2 MG/DL (ref 0–0.3)
BILIRUBIN, INDIRECT: ABNORMAL MG/DL (ref 0–1)
BUN BLDV-MCNC: 5 MG/DL (ref 7–20)
CALCIUM SERPL-MCNC: 9.6 MG/DL (ref 8.3–10.6)
CHLORIDE BLD-SCNC: 92 MMOL/L (ref 99–110)
CO2: 24 MMOL/L (ref 21–32)
CREAT SERPL-MCNC: <0.5 MG/DL (ref 0.6–1.2)
EOSINOPHILS ABSOLUTE: 0 K/UL (ref 0–0.6)
EOSINOPHILS RELATIVE PERCENT: 0 %
ETHANOL: NORMAL MG/DL (ref 0–0.08)
GFR AFRICAN AMERICAN: >60
GFR NON-AFRICAN AMERICAN: >60
GLUCOSE BLD-MCNC: 158 MG/DL (ref 70–99)
HCT VFR BLD CALC: 36.5 % (ref 36–48)
HEMOGLOBIN: 12.9 G/DL (ref 12–16)
LIPASE: 44 U/L (ref 13–60)
LYMPHOCYTES ABSOLUTE: 1.2 K/UL (ref 1–5.1)
LYMPHOCYTES RELATIVE PERCENT: 12.1 %
MAGNESIUM: 2.1 MG/DL (ref 1.8–2.4)
MCH RBC QN AUTO: 34.7 PG (ref 26–34)
MCHC RBC AUTO-ENTMCNC: 35.3 G/DL (ref 31–36)
MCV RBC AUTO: 98.4 FL (ref 80–100)
MONOCYTES ABSOLUTE: 1.1 K/UL (ref 0–1.3)
MONOCYTES RELATIVE PERCENT: 10.6 %
NEUTROPHILS ABSOLUTE: 7.8 K/UL (ref 1.7–7.7)
NEUTROPHILS RELATIVE PERCENT: 77 %
PDW BLD-RTO: 14.1 % (ref 12.4–15.4)
PHOSPHORUS: 2.9 MG/DL (ref 2.5–4.9)
PLATELET # BLD: 289 K/UL (ref 135–450)
PMV BLD AUTO: 7.7 FL (ref 5–10.5)
POTASSIUM SERPL-SCNC: 4.4 MMOL/L (ref 3.5–5.1)
RBC # BLD: 3.71 M/UL (ref 4–5.2)
SODIUM BLD-SCNC: 134 MMOL/L (ref 136–145)
TOTAL PROTEIN: 9.8 G/DL (ref 6.4–8.2)
WBC # BLD: 10.1 K/UL (ref 4–11)

## 2019-07-29 PROCEDURE — 96361 HYDRATE IV INFUSION ADD-ON: CPT

## 2019-07-29 PROCEDURE — 83735 ASSAY OF MAGNESIUM: CPT

## 2019-07-29 PROCEDURE — 84100 ASSAY OF PHOSPHORUS: CPT

## 2019-07-29 PROCEDURE — 96360 HYDRATION IV INFUSION INIT: CPT

## 2019-07-29 PROCEDURE — 93005 ELECTROCARDIOGRAM TRACING: CPT | Performed by: EMERGENCY MEDICINE

## 2019-07-29 PROCEDURE — 80048 BASIC METABOLIC PNL TOTAL CA: CPT

## 2019-07-29 PROCEDURE — G0480 DRUG TEST DEF 1-7 CLASSES: HCPCS

## 2019-07-29 PROCEDURE — 99285 EMERGENCY DEPT VISIT HI MDM: CPT

## 2019-07-29 PROCEDURE — 85025 COMPLETE CBC W/AUTO DIFF WBC: CPT

## 2019-07-29 PROCEDURE — 2580000003 HC RX 258: Performed by: EMERGENCY MEDICINE

## 2019-07-29 PROCEDURE — 80076 HEPATIC FUNCTION PANEL: CPT

## 2019-07-29 PROCEDURE — 6370000000 HC RX 637 (ALT 250 FOR IP): Performed by: EMERGENCY MEDICINE

## 2019-07-29 PROCEDURE — 83690 ASSAY OF LIPASE: CPT

## 2019-07-29 RX ORDER — SODIUM CHLORIDE, SODIUM LACTATE, POTASSIUM CHLORIDE, CALCIUM CHLORIDE 600; 310; 30; 20 MG/100ML; MG/100ML; MG/100ML; MG/100ML
1000 INJECTION, SOLUTION INTRAVENOUS ONCE
Status: COMPLETED | OUTPATIENT
Start: 2019-07-29 | End: 2019-07-30

## 2019-07-29 RX ORDER — DIAZEPAM 10 MG/1
10 TABLET ORAL ONCE
Status: COMPLETED | OUTPATIENT
Start: 2019-07-29 | End: 2019-07-29

## 2019-07-29 RX ADMIN — SODIUM CHLORIDE, POTASSIUM CHLORIDE, SODIUM LACTATE AND CALCIUM CHLORIDE 1000 ML: 600; 310; 30; 20 INJECTION, SOLUTION INTRAVENOUS at 21:06

## 2019-07-29 RX ADMIN — DIAZEPAM 10 MG: 10 TABLET ORAL at 20:45

## 2019-07-29 ASSESSMENT — PAIN DESCRIPTION - ORIENTATION: ORIENTATION: LOWER

## 2019-07-29 ASSESSMENT — PAIN DESCRIPTION - PAIN TYPE: TYPE: ACUTE PAIN

## 2019-07-29 ASSESSMENT — PAIN DESCRIPTION - LOCATION: LOCATION: ABDOMEN

## 2019-07-29 ASSESSMENT — PAIN DESCRIPTION - FREQUENCY: FREQUENCY: CONTINUOUS

## 2019-07-29 ASSESSMENT — PAIN DESCRIPTION - ONSET: ONSET: ON-GOING

## 2019-07-29 ASSESSMENT — PAIN SCALES - GENERAL: PAINLEVEL_OUTOF10: 8

## 2019-07-29 ASSESSMENT — PAIN DESCRIPTION - PROGRESSION: CLINICAL_PROGRESSION: GRADUALLY WORSENING

## 2019-07-29 ASSESSMENT — PAIN DESCRIPTION - DESCRIPTORS: DESCRIPTORS: SPASM

## 2019-07-30 VITALS
TEMPERATURE: 98.8 F | SYSTOLIC BLOOD PRESSURE: 166 MMHG | DIASTOLIC BLOOD PRESSURE: 78 MMHG | OXYGEN SATURATION: 99 % | HEIGHT: 64 IN | RESPIRATION RATE: 14 BRPM | BODY MASS INDEX: 23.9 KG/M2 | HEART RATE: 104 BPM | WEIGHT: 140 LBS

## 2019-07-30 LAB
EKG ATRIAL RATE: 116 BPM
EKG DIAGNOSIS: NORMAL
EKG P AXIS: 88 DEGREES
EKG P-R INTERVAL: 140 MS
EKG Q-T INTERVAL: 332 MS
EKG QRS DURATION: 72 MS
EKG QTC CALCULATION (BAZETT): 461 MS
EKG R AXIS: 80 DEGREES
EKG T AXIS: 74 DEGREES
EKG VENTRICULAR RATE: 116 BPM

## 2019-07-30 RX ORDER — CHLORDIAZEPOXIDE HYDROCHLORIDE 25 MG/1
25 CAPSULE, GELATIN COATED ORAL 4 TIMES DAILY PRN
Qty: 16 CAPSULE | Refills: 0 | Status: SHIPPED | OUTPATIENT
Start: 2019-07-30 | End: 2019-08-03

## 2019-07-30 NOTE — ED PROVIDER NOTES
[address] ENCOUNTER          ATTENDING PHYSICIAN NOTE       Date of evaluation: 7/29/2019    Chief Complaint     Withdrawal (Pt sent to [facility] ED by Dr. Roly Osorio for labs and EKG, due to alcohol detox, pt reports last drink was at 1400 today (3L of wine)) and Abdominal Pain (Pt c/o lower abdominal pain x 1 day. )      History of Present Illness     Lindsay Kelly is a 58 y.o. female who presents to the emergency department with complaints of alcohol withdrawal as well as abdominal pain. Patient has a history of alcohol abuse and was cleaning up until this past weekend and since that time is been drinking relatively constantly. Last drink was at 2 PM today which is 6 hours prior to her presentation it appears that she did show up to [facility] emergency department but then left there without being seen or evaluated. She presents was now complaining of concerns that she is going through alcohol withdrawal.  She has tremor in her upper arms right worse than left which she states is a feature of her alcohol withdrawal.  She has nausea has not had any episodes of vomiting has abdominal pain which is in the mid to lower abdomen radiating through to the back. Denies any recent dysuria urinary frequency fevers or chills. Review of Systems     As documented in the HPI, otherwise all other systems were reviewed and were negative. Past Medical, Surgical, Family, and Social History     She has a past medical history of Alcoholism ([address] Hyperlipidemia, and Hypertension. She has a past surgical history that includes back surgery; Colonoscopy; and Endoscopy, colon, diagnostic. Her family history includes Diabetes in her maternal grandfather and maternal grandmother; Heart Disease in her brother, father, maternal grandfather, maternal grandmother, paternal aunt, and paternal uncle;  Other in her paternal grandfather and paternal grandmother; Stroke in her mother; Substance Abuse in her brother, father, and mother. She reports that she quit smoking about 32 years ago. Her smoking use included cigarettes. She has never used smokeless tobacco. She reports that she drinks about 56.0 standard drinks of alcohol per week. She reports that she does not use drugs. Medications     Previous Medications    ACETAMINOPHEN (TYLENOL) 325 MG TABLET    Take 650 mg by mouth every 6 hours as needed for Pain    B COMPLEX VITAMINS (B COMPLEX 1 PO)    Take  by mouth. CALCIUM CARBONATE 600 MG TABS TABLET    Take 2 tablets by mouth daily. CHLORDIAZEPOXIDE (LIBRIUM) 10 MG CAPSULE    Take 10 mg by mouth 4 times daily as needed for Anxiety. CHOLECALCIFEROL (VITAMIN D3) 5000 UNITS TABS    Take  by mouth. FLUOXETINE (PROZAC) 20 MG CAPSULE    Take 40 mg by mouth daily     MILK THISTLE 250 MG CAPS    Take  by mouth. MULTIPLE VITAMINS-MINERALS (MULTIVITAMIN PO)    Take  by mouth. NIFEDIPINE (ADALAT CC) 30 MG EXTENDED RELEASE TABLET    Take 1 tablet by mouth daily    PROGESTERONE (PROMETRIUM) 200 MG CAPSULE    Take 200 mg by mouth nightly    UNABLE TO FIND    Estrogen & testosterone injections -pellets    ZOLPIDEM (AMBIEN) 10 MG TABLET    TAKE ONE TABLET BY MOUTH EVERY NIGHT AT BEDTIME AS NEEDED FOR SLEEP       Allergies     She is allergic to penicillins and sulfa antibiotics.     Physical Exam     INITIAL VITALS: BP: (!) 179/60, Temp: 98.8 °F (37.1 °C), Pulse: 117, Resp: 20, SpO2: 98 %   General: 58-year-old female lying in bed with no acute cardiorespiratory distress  HEENT:  head is atraumatic, pupils equal round and reactive to light, sclera are clear, oropharynx is nonerythematous  Neck: supple, no lymphadenopathy  Chest: clear to auscultation bilaterally with no wheezes rhonchi, rales  Cardiovascular: Tachycardic rate with a regular rhythm, normal S1S2, no murmurs, rubs, or gallops, 2+ radial pulses bilaterally, capillary refill 2 seconds  Abdominal: Soft, nontender, nondistended, positive bowel sounds discharged.      Rodney Mcintyre MD  07/29/19 3899

## 2019-08-16 LAB
A/G RATIO: 1.5 (ref 1.1–2.2)
ALBUMIN SERPL-MCNC: 4.7 G/DL (ref 3.4–5)
ALP BLD-CCNC: 72 U/L (ref 40–129)
ALT SERPL-CCNC: 43 U/L (ref 10–40)
AMMONIA: 28 UMOL/L (ref 11–51)
ANION GAP SERPL CALCULATED.3IONS-SCNC: 12 MMOL/L (ref 3–16)
AST SERPL-CCNC: 58 U/L (ref 15–37)
BACTERIA: ABNORMAL /HPF
BILIRUB SERPL-MCNC: 0.4 MG/DL (ref 0–1)
BILIRUBIN URINE: NEGATIVE
BLOOD, URINE: NEGATIVE
BUN BLDV-MCNC: 9 MG/DL (ref 7–20)
CALCIUM SERPL-MCNC: 10.4 MG/DL (ref 8.3–10.6)
CHLORIDE BLD-SCNC: 100 MMOL/L (ref 99–110)
CLARITY: CLEAR
CO2: 28 MMOL/L (ref 21–32)
COLOR: YELLOW
COMMENT UA: ABNORMAL
CREAT SERPL-MCNC: 0.6 MG/DL (ref 0.6–1.2)
EPITHELIAL CELLS, UA: 4 /HPF (ref 0–5)
GFR AFRICAN AMERICAN: >60
GFR NON-AFRICAN AMERICAN: >60
GLOBULIN: 3.1 G/DL
GLUCOSE BLD-MCNC: 90 MG/DL (ref 70–99)
GLUCOSE URINE: NEGATIVE MG/DL
HYALINE CASTS: 5 /LPF (ref 0–8)
KETONES, URINE: NEGATIVE MG/DL
LEUKOCYTE ESTERASE, URINE: ABNORMAL
MICROSCOPIC EXAMINATION: YES
NITRITE, URINE: NEGATIVE
PH UA: 7 (ref 5–8)
POTASSIUM SERPL-SCNC: 4.5 MMOL/L (ref 3.5–5.1)
PROTEIN UA: NEGATIVE MG/DL
RBC UA: 1 /HPF (ref 0–4)
SODIUM BLD-SCNC: 140 MMOL/L (ref 136–145)
SPECIFIC GRAVITY UA: 1.01 (ref 1–1.03)
TOTAL PROTEIN: 7.8 G/DL (ref 6.4–8.2)
URINE TYPE: ABNORMAL
UROBILINOGEN, URINE: 0.2 E.U./DL
WBC UA: 1 /HPF (ref 0–5)
YEAST: PRESENT /HPF

## 2019-08-18 LAB — URINE CULTURE, ROUTINE: NORMAL

## 2019-09-13 ENCOUNTER — HOSPITAL ENCOUNTER (INPATIENT)
Age: 64
LOS: 2 days | Discharge: HOME | DRG: 897 | End: 2019-09-15
Attending: EMERGENCY MEDICINE | Admitting: INTERNAL MEDICINE
Payer: COMMERCIAL

## 2019-09-13 DIAGNOSIS — F10.230 ALCOHOL DEPENDENCE WITH UNCOMPLICATED WITHDRAWAL (HCC): ICD-10-CM

## 2019-09-13 DIAGNOSIS — F10.939 ALCOHOL WITHDRAWAL SYNDROME WITH COMPLICATION (HCC): Primary | ICD-10-CM

## 2019-09-13 PROBLEM — F10.239 ALCOHOL DEPENDENCE WITH WITHDRAWAL (HCC): Status: ACTIVE | Noted: 2019-09-13

## 2019-09-13 LAB
A/G RATIO: 1.1 (ref 1.1–2.2)
ALBUMIN SERPL-MCNC: 4.8 G/DL (ref 3.4–5)
ALP BLD-CCNC: 97 U/L (ref 40–129)
ALT SERPL-CCNC: 32 U/L (ref 10–40)
ANION GAP SERPL CALCULATED.3IONS-SCNC: 16 MMOL/L (ref 3–16)
AST SERPL-CCNC: 54 U/L (ref 15–37)
BASOPHILS ABSOLUTE: 0.1 K/UL (ref 0–0.2)
BASOPHILS RELATIVE PERCENT: 0.6 %
BILIRUB SERPL-MCNC: 1 MG/DL (ref 0–1)
BUN BLDV-MCNC: 6 MG/DL (ref 7–20)
CALCIUM SERPL-MCNC: 9.5 MG/DL (ref 8.3–10.6)
CHLORIDE BLD-SCNC: 96 MMOL/L (ref 99–110)
CO2: 24 MMOL/L (ref 21–32)
CREAT SERPL-MCNC: 0.6 MG/DL (ref 0.6–1.2)
EOSINOPHILS ABSOLUTE: 0 K/UL (ref 0–0.6)
EOSINOPHILS RELATIVE PERCENT: 0 %
ETHANOL: NORMAL MG/DL (ref 0–0.08)
GFR AFRICAN AMERICAN: >60
GFR NON-AFRICAN AMERICAN: >60
GLOBULIN: 4.4 G/DL
GLUCOSE BLD-MCNC: 134 MG/DL (ref 70–99)
HCT VFR BLD CALC: 39.1 % (ref 36–48)
HEMOGLOBIN: 13.4 G/DL (ref 12–16)
LIPASE: 34 U/L (ref 13–60)
LYMPHOCYTES ABSOLUTE: 1.2 K/UL (ref 1–5.1)
LYMPHOCYTES RELATIVE PERCENT: 13.3 %
MCH RBC QN AUTO: 33.4 PG (ref 26–34)
MCHC RBC AUTO-ENTMCNC: 34.3 G/DL (ref 31–36)
MCV RBC AUTO: 97.4 FL (ref 80–100)
MONOCYTES ABSOLUTE: 0.4 K/UL (ref 0–1.3)
MONOCYTES RELATIVE PERCENT: 4.3 %
NEUTROPHILS ABSOLUTE: 7.3 K/UL (ref 1.7–7.7)
NEUTROPHILS RELATIVE PERCENT: 81.8 %
PDW BLD-RTO: 13.7 % (ref 12.4–15.4)
PLATELET # BLD: 381 K/UL (ref 135–450)
PMV BLD AUTO: 7.1 FL (ref 5–10.5)
POTASSIUM REFLEX MAGNESIUM: 3.9 MMOL/L (ref 3.5–5.1)
RBC # BLD: 4.02 M/UL (ref 4–5.2)
SODIUM BLD-SCNC: 136 MMOL/L (ref 136–145)
TOTAL PROTEIN: 9.2 G/DL (ref 6.4–8.2)
TROPONIN: <0.01 NG/ML
WBC # BLD: 8.9 K/UL (ref 4–11)

## 2019-09-13 PROCEDURE — 2580000003 HC RX 258: Performed by: EMERGENCY MEDICINE

## 2019-09-13 PROCEDURE — 93005 ELECTROCARDIOGRAM TRACING: CPT | Performed by: EMERGENCY MEDICINE

## 2019-09-13 PROCEDURE — 2580000003 HC RX 258: Performed by: STUDENT IN AN ORGANIZED HEALTH CARE EDUCATION/TRAINING PROGRAM

## 2019-09-13 PROCEDURE — 84484 ASSAY OF TROPONIN QUANT: CPT

## 2019-09-13 PROCEDURE — 83690 ASSAY OF LIPASE: CPT

## 2019-09-13 PROCEDURE — 96374 THER/PROPH/DIAG INJ IV PUSH: CPT

## 2019-09-13 PROCEDURE — 1200000000 HC SEMI PRIVATE

## 2019-09-13 PROCEDURE — G0480 DRUG TEST DEF 1-7 CLASSES: HCPCS

## 2019-09-13 PROCEDURE — 2500000003 HC RX 250 WO HCPCS: Performed by: INTERNAL MEDICINE

## 2019-09-13 PROCEDURE — 99285 EMERGENCY DEPT VISIT HI MDM: CPT

## 2019-09-13 PROCEDURE — 80053 COMPREHEN METABOLIC PANEL: CPT

## 2019-09-13 PROCEDURE — 6360000002 HC RX W HCPCS: Performed by: STUDENT IN AN ORGANIZED HEALTH CARE EDUCATION/TRAINING PROGRAM

## 2019-09-13 PROCEDURE — 2580000003 HC RX 258: Performed by: INTERNAL MEDICINE

## 2019-09-13 PROCEDURE — 6370000000 HC RX 637 (ALT 250 FOR IP): Performed by: INTERNAL MEDICINE

## 2019-09-13 PROCEDURE — 6360000002 HC RX W HCPCS: Performed by: INTERNAL MEDICINE

## 2019-09-13 PROCEDURE — 85025 COMPLETE CBC W/AUTO DIFF WBC: CPT

## 2019-09-13 RX ORDER — LORAZEPAM 1 MG/1
3 TABLET ORAL
Status: DISCONTINUED | OUTPATIENT
Start: 2019-09-13 | End: 2019-09-15 | Stop reason: HOSPADM

## 2019-09-13 RX ORDER — LORAZEPAM 2 MG/ML
3 INJECTION INTRAMUSCULAR
Status: DISCONTINUED | OUTPATIENT
Start: 2019-09-13 | End: 2019-09-15 | Stop reason: HOSPADM

## 2019-09-13 RX ORDER — LORAZEPAM 2 MG/ML
4 INJECTION INTRAMUSCULAR
Status: DISCONTINUED | OUTPATIENT
Start: 2019-09-13 | End: 2019-09-15 | Stop reason: HOSPADM

## 2019-09-13 RX ORDER — SODIUM CHLORIDE 0.9 % (FLUSH) 0.9 %
10 SYRINGE (ML) INJECTION PRN
Status: DISCONTINUED | OUTPATIENT
Start: 2019-09-13 | End: 2019-09-15 | Stop reason: HOSPADM

## 2019-09-13 RX ORDER — LORAZEPAM 1 MG/1
4 TABLET ORAL
Status: DISCONTINUED | OUTPATIENT
Start: 2019-09-13 | End: 2019-09-15 | Stop reason: HOSPADM

## 2019-09-13 RX ORDER — LORAZEPAM 2 MG/ML
1 INJECTION INTRAMUSCULAR
Status: DISCONTINUED | OUTPATIENT
Start: 2019-09-13 | End: 2019-09-15 | Stop reason: HOSPADM

## 2019-09-13 RX ORDER — CHLORDIAZEPOXIDE HYDROCHLORIDE 25 MG/1
25 CAPSULE, GELATIN COATED ORAL 3 TIMES DAILY
Status: ON HOLD | COMMUNITY
Start: 2019-09-13 | End: 2019-09-15 | Stop reason: HOSPADM

## 2019-09-13 RX ORDER — SODIUM CHLORIDE 0.9 % (FLUSH) 0.9 %
10 SYRINGE (ML) INJECTION EVERY 12 HOURS SCHEDULED
Status: DISCONTINUED | OUTPATIENT
Start: 2019-09-13 | End: 2019-09-13 | Stop reason: SDUPTHER

## 2019-09-13 RX ORDER — SODIUM CHLORIDE 0.9 % (FLUSH) 0.9 %
10 SYRINGE (ML) INJECTION PRN
Status: DISCONTINUED | OUTPATIENT
Start: 2019-09-13 | End: 2019-09-13 | Stop reason: SDUPTHER

## 2019-09-13 RX ORDER — CHLORDIAZEPOXIDE HYDROCHLORIDE 25 MG/1
25 CAPSULE, GELATIN COATED ORAL ONCE
Status: DISCONTINUED | OUTPATIENT
Start: 2019-09-13 | End: 2019-09-13

## 2019-09-13 RX ORDER — SODIUM CHLORIDE, SODIUM LACTATE, POTASSIUM CHLORIDE, CALCIUM CHLORIDE 600; 310; 30; 20 MG/100ML; MG/100ML; MG/100ML; MG/100ML
1000 INJECTION, SOLUTION INTRAVENOUS ONCE
Status: COMPLETED | OUTPATIENT
Start: 2019-09-13 | End: 2019-09-13

## 2019-09-13 RX ORDER — LORAZEPAM 1 MG/1
2 TABLET ORAL
Status: DISCONTINUED | OUTPATIENT
Start: 2019-09-13 | End: 2019-09-15 | Stop reason: HOSPADM

## 2019-09-13 RX ORDER — ONDANSETRON 2 MG/ML
4 INJECTION INTRAMUSCULAR; INTRAVENOUS EVERY 6 HOURS PRN
Status: DISCONTINUED | OUTPATIENT
Start: 2019-09-13 | End: 2019-09-15 | Stop reason: HOSPADM

## 2019-09-13 RX ORDER — LORAZEPAM 1 MG/1
1 TABLET ORAL
Status: DISCONTINUED | OUTPATIENT
Start: 2019-09-13 | End: 2019-09-15 | Stop reason: HOSPADM

## 2019-09-13 RX ORDER — LORAZEPAM 2 MG/ML
2 INJECTION INTRAMUSCULAR
Status: DISCONTINUED | OUTPATIENT
Start: 2019-09-13 | End: 2019-09-15 | Stop reason: HOSPADM

## 2019-09-13 RX ORDER — SODIUM CHLORIDE 0.9 % (FLUSH) 0.9 %
10 SYRINGE (ML) INJECTION EVERY 12 HOURS SCHEDULED
Status: DISCONTINUED | OUTPATIENT
Start: 2019-09-13 | End: 2019-09-15 | Stop reason: HOSPADM

## 2019-09-13 RX ORDER — ONDANSETRON 4 MG/1
4 TABLET, FILM COATED ORAL EVERY 8 HOURS PRN
Status: ON HOLD | COMMUNITY
End: 2019-09-15 | Stop reason: SDUPTHER

## 2019-09-13 RX ORDER — CHLORDIAZEPOXIDE HYDROCHLORIDE 5 MG/1
10 CAPSULE, GELATIN COATED ORAL 3 TIMES DAILY
Status: DISCONTINUED | OUTPATIENT
Start: 2019-09-13 | End: 2019-09-15 | Stop reason: HOSPADM

## 2019-09-13 RX ADMIN — SODIUM CHLORIDE, POTASSIUM CHLORIDE, SODIUM LACTATE AND CALCIUM CHLORIDE 1000 ML: 600; 310; 30; 20 INJECTION, SOLUTION INTRAVENOUS at 18:17

## 2019-09-13 RX ADMIN — Medication 10 ML: at 21:44

## 2019-09-13 RX ADMIN — ENOXAPARIN SODIUM 40 MG: 40 INJECTION SUBCUTANEOUS at 21:43

## 2019-09-13 RX ADMIN — FOLIC ACID: 5 INJECTION, SOLUTION INTRAMUSCULAR; INTRAVENOUS; SUBCUTANEOUS at 21:51

## 2019-09-13 RX ADMIN — CHLORDIAZEPOXIDE HYDROCHLORIDE 10 MG: 5 CAPSULE ORAL at 21:43

## 2019-09-13 RX ADMIN — LORAZEPAM 4 MG: 2 INJECTION INTRAMUSCULAR; INTRAVENOUS at 18:17

## 2019-09-13 ASSESSMENT — ENCOUNTER SYMPTOMS
CHEST TIGHTNESS: 0
VOMITING: 0
SORE THROAT: 0
PHOTOPHOBIA: 0
NAUSEA: 1
RHINORRHEA: 0
ABDOMINAL PAIN: 0
SHORTNESS OF BREATH: 0

## 2019-09-13 ASSESSMENT — PAIN DESCRIPTION - DESCRIPTORS: DESCRIPTORS: OTHER (COMMENT)

## 2019-09-13 ASSESSMENT — PAIN DESCRIPTION - PAIN TYPE: TYPE: ACUTE PAIN

## 2019-09-13 ASSESSMENT — PAIN DESCRIPTION - LOCATION: LOCATION: CHEST

## 2019-09-13 ASSESSMENT — PAIN DESCRIPTION - FREQUENCY: FREQUENCY: CONTINUOUS

## 2019-09-13 ASSESSMENT — PAIN SCALES - GENERAL: PAINLEVEL_OUTOF10: 0

## 2019-09-13 NOTE — ED PROVIDER NOTES
[facility] RESIDENT NOTE       Date of evaluation: 9/13/2019    Chief Complaint     Withdrawal      History of Present Illness     David Doss is a 58 y.o. female who presents for alcohol withdrawal.  Her last drink was this morning, she drinks alcohol daily. States that she drinks about a liter of wine daily. Her last drink. She is currently complaining of a headache, nausea, tremors, anxiety, mild agitation, diaphoresis, and intermittent tactile hallucinations. She denies any visual or auditory hallucinations. She denies any history of seizures. She has had multiple ICU admissions for alcohol withdrawal.    Review of Systems     Review of Systems   Constitutional: Positive for diaphoresis. Negative for chills and fever. HENT: Negative for rhinorrhea and sore throat. Eyes: Negative for photophobia and visual disturbance. Respiratory: Negative for chest tightness and shortness of breath. Cardiovascular: Negative for chest pain and leg swelling. Gastrointestinal: Positive for nausea. Negative for abdominal pain and vomiting. Genitourinary: Negative for dysuria and hematuria. Musculoskeletal: Negative for arthralgias and neck pain. Neurological: Positive for tremors and headaches. Negative for dizziness, seizures, syncope, weakness, light-headedness and numbness. Psychiatric/Behavioral: Positive for agitation. The patient is nervous/anxious. All other systems reviewed and are negative. Past Medical, Surgical, Family, and Social History     She has a past medical history of Alcoholism ([address] Hyperlipidemia, and Hypertension. She has a past surgical history that includes back surgery; Colonoscopy; and Endoscopy, colon, diagnostic.   Her family history includes Diabetes in her maternal grandfather and maternal grandmother; Heart Disease in her brother, father, maternal grandfather, maternal grandmother, paternal aunt, and paternal uncle; Other in her paternal grandfather and paternal grandmother; Stroke in her mother; Substance Abuse in her brother, father, and mother. She reports that she quit smoking about 32 years ago. Her smoking use included cigarettes. She has never used smokeless tobacco. She reports that she drinks about 56.0 standard drinks of alcohol per week. She reports that she does not use drugs. Medications     Previous Medications    ACETAMINOPHEN (TYLENOL) 325 MG TABLET    Take 650 mg by mouth every 6 hours as needed for Pain    B COMPLEX VITAMINS (B COMPLEX 1 PO)    Take by mouth daily     CALCIUM CARBONATE 600 MG TABS TABLET    Take 2 tablets by mouth daily. CHLORDIAZEPOXIDE (LIBRIUM) 25 MG CAPSULE    Take 25 mg by mouth 3 times daily. CHOLECALCIFEROL (VITAMIN D3) 5000 UNITS TABS    Take by mouth daily     FLUOXETINE (PROZAC) 20 MG CAPSULE    Take 40 mg by mouth daily     MILK THISTLE 250 MG CAPS    Take by mouth daily     MULTIPLE VITAMINS-MINERALS (MULTIVITAMIN PO)    Take by mouth daily     NIFEDIPINE (ADALAT CC) 30 MG EXTENDED RELEASE TABLET    Take 1 tablet by mouth daily    ONDANSETRON (ZOFRAN) 4 MG TABLET    Take 4 mg by mouth every 8 hours as needed for Nausea or Vomiting    PROGESTERONE (PROMETRIUM) 200 MG CAPSULE    Take 200 mg by mouth nightly    UNABLE TO FIND    Estrogen & testosterone injections -pellets       Allergies     She is allergic to penicillins and sulfa antibiotics. Physical Exam     INITIAL VITALS: BP: (!) 188/93,  , Pulse: 114, Resp: 9, SpO2: 98 %   Physical Exam   Constitutional: She is oriented to person, place, and time. She appears well-developed and well-nourished. No distress. HENT:   Head: Normocephalic and atraumatic. Right Ear: External ear normal.   Left Ear: External ear normal.   Eyes: Pupils are equal, round, and reactive to light. Conjunctivae and EOM are normal. Right eye exhibits no discharge. Left eye exhibits no discharge. No scleral icterus.    Neck: Normal range of motion. Neck supple. Cardiovascular: Regular rhythm, normal heart sounds and intact distal pulses. Tachycardia present. Pulmonary/Chest: Effort normal and breath sounds normal. No respiratory distress. She has no wheezes. Abdominal: Soft. There is no tenderness. Musculoskeletal: Normal range of motion. She exhibits no edema or tenderness. Neurological: She is alert and oriented to person, place, and time. She displays tremor. No cranial nerve deficit or sensory deficit. Skin: Skin is warm. Capillary refill takes less than 2 seconds. She is diaphoretic. No erythema. Psychiatric: Her mood appears anxious. Nursing note and vitals reviewed.       DiagnosticResults     EKG   Interpreted in conjunction with emergencydepartment physician Meseret Vazquez MD  Rhythm: sinus tachycardia  Rate: 100-110  Axis: normal  Ectopy: none  Conduction: normal  ST Segments: no acute change  T Waves:no acute change  Q Waves: none  Clinical Impression: no acute changes  Comparison:  7/29/19, unchanged sinus tachy    RADIOLOGY:  No orders to display       LABS:   Results for orders placed or performed during the hospital encounter of 09/13/19   CBC Auto Differential   Result Value Ref Range    WBC 8.9 4.0 - 11.0 K/uL    RBC 4.02 4.00 - 5.20 M/uL    Hemoglobin 13.4 12.0 - 16.0 g/dL    Hematocrit 39.1 36.0 - 48.0 %    MCV 97.4 80.0 - 100.0 fL    MCH 33.4 26.0 - 34.0 pg    MCHC 34.3 31.0 - 36.0 g/dL    RDW 13.7 12.4 - 15.4 %    Platelets 313 203 - 587 K/uL    MPV 7.1 5.0 - 10.5 fL    Neutrophils % 81.8 %    Lymphocytes % 13.3 %    Monocytes % 4.3 %    Eosinophils % 0.0 %    Basophils % 0.6 %    Neutrophils Absolute 7.3 1.7 - 7.7 K/uL    Lymphocytes Absolute 1.2 1.0 - 5.1 K/uL    Monocytes Absolute 0.4 0.0 - 1.3 K/uL    Eosinophils Absolute 0.0 0.0 - 0.6 K/uL    Basophils Absolute 0.1 0.0 - 0.2 K/uL   Comprehensive Metabolic Panel w/ Reflex to MG   Result Value Ref Range    Sodium 136 136 - 145 mmol/L    Potassium

## 2019-09-14 LAB
EKG ATRIAL RATE: 97 BPM
EKG DIAGNOSIS: NORMAL
EKG P AXIS: 72 DEGREES
EKG P-R INTERVAL: 162 MS
EKG Q-T INTERVAL: 382 MS
EKG QRS DURATION: 80 MS
EKG QTC CALCULATION (BAZETT): 485 MS
EKG R AXIS: 65 DEGREES
EKG T AXIS: 50 DEGREES
EKG VENTRICULAR RATE: 97 BPM

## 2019-09-14 PROCEDURE — 2580000003 HC RX 258: Performed by: INTERNAL MEDICINE

## 2019-09-14 PROCEDURE — 2500000003 HC RX 250 WO HCPCS: Performed by: INTERNAL MEDICINE

## 2019-09-14 PROCEDURE — 6360000002 HC RX W HCPCS: Performed by: INTERNAL MEDICINE

## 2019-09-14 PROCEDURE — 2580000003 HC RX 258: Performed by: STUDENT IN AN ORGANIZED HEALTH CARE EDUCATION/TRAINING PROGRAM

## 2019-09-14 PROCEDURE — 6370000000 HC RX 637 (ALT 250 FOR IP): Performed by: HOSPITALIST

## 2019-09-14 PROCEDURE — 1200000000 HC SEMI PRIVATE

## 2019-09-14 PROCEDURE — 6370000000 HC RX 637 (ALT 250 FOR IP): Performed by: INTERNAL MEDICINE

## 2019-09-14 RX ORDER — ACETAMINOPHEN 325 MG/1
650 TABLET ORAL EVERY 6 HOURS PRN
Status: DISCONTINUED | OUTPATIENT
Start: 2019-09-14 | End: 2019-09-15 | Stop reason: HOSPADM

## 2019-09-14 RX ADMIN — FOLIC ACID: 5 INJECTION, SOLUTION INTRAMUSCULAR; INTRAVENOUS; SUBCUTANEOUS at 09:22

## 2019-09-14 RX ADMIN — CHLORDIAZEPOXIDE HYDROCHLORIDE 10 MG: 5 CAPSULE ORAL at 09:22

## 2019-09-14 RX ADMIN — LORAZEPAM 4 MG: 2 INJECTION INTRAMUSCULAR; INTRAVENOUS at 00:59

## 2019-09-14 RX ADMIN — ENOXAPARIN SODIUM 40 MG: 40 INJECTION SUBCUTANEOUS at 09:22

## 2019-09-14 RX ADMIN — LORAZEPAM 1 MG: 2 INJECTION INTRAMUSCULAR; INTRAVENOUS at 20:12

## 2019-09-14 RX ADMIN — Medication 10 ML: at 20:13

## 2019-09-14 RX ADMIN — CHLORDIAZEPOXIDE HYDROCHLORIDE 10 MG: 5 CAPSULE ORAL at 20:12

## 2019-09-14 RX ADMIN — ONDANSETRON 4 MG: 2 INJECTION INTRAMUSCULAR; INTRAVENOUS at 20:12

## 2019-09-14 RX ADMIN — Medication 10 ML: at 09:23

## 2019-09-14 RX ADMIN — LORAZEPAM 1 MG: 2 INJECTION INTRAMUSCULAR; INTRAVENOUS at 17:00

## 2019-09-14 RX ADMIN — CHLORDIAZEPOXIDE HYDROCHLORIDE 10 MG: 5 CAPSULE ORAL at 13:37

## 2019-09-14 RX ADMIN — Medication 10 ML: at 17:00

## 2019-09-14 RX ADMIN — LORAZEPAM 1 MG: 2 INJECTION INTRAMUSCULAR; INTRAVENOUS at 10:51

## 2019-09-14 RX ADMIN — ACETAMINOPHEN 650 MG: 325 TABLET ORAL at 16:59

## 2019-09-14 ASSESSMENT — PAIN DESCRIPTION - ONSET: ONSET: ON-GOING

## 2019-09-14 ASSESSMENT — PAIN DESCRIPTION - FREQUENCY: FREQUENCY: CONTINUOUS

## 2019-09-14 ASSESSMENT — PAIN DESCRIPTION - LOCATION: LOCATION: HEAD

## 2019-09-14 ASSESSMENT — PAIN SCALES - GENERAL
PAINLEVEL_OUTOF10: 3
PAINLEVEL_OUTOF10: 0

## 2019-09-14 ASSESSMENT — PAIN DESCRIPTION - DESCRIPTORS: DESCRIPTORS: ACHING

## 2019-09-14 ASSESSMENT — PAIN DESCRIPTION - PROGRESSION
CLINICAL_PROGRESSION: GRADUALLY WORSENING
CLINICAL_PROGRESSION: GRADUALLY WORSENING

## 2019-09-14 ASSESSMENT — PAIN DESCRIPTION - PAIN TYPE: TYPE: ACUTE PAIN

## 2019-09-14 ASSESSMENT — PAIN - FUNCTIONAL ASSESSMENT: PAIN_FUNCTIONAL_ASSESSMENT: ACTIVITIES ARE NOT PREVENTED

## 2019-09-15 VITALS
HEIGHT: 64 IN | TEMPERATURE: 98 F | DIASTOLIC BLOOD PRESSURE: 82 MMHG | SYSTOLIC BLOOD PRESSURE: 159 MMHG | WEIGHT: 141.09 LBS | RESPIRATION RATE: 18 BRPM | OXYGEN SATURATION: 99 % | HEART RATE: 91 BPM | BODY MASS INDEX: 24.09 KG/M2

## 2019-09-15 PROCEDURE — 6370000000 HC RX 637 (ALT 250 FOR IP): Performed by: INTERNAL MEDICINE

## 2019-09-15 PROCEDURE — 6360000002 HC RX W HCPCS: Performed by: INTERNAL MEDICINE

## 2019-09-15 PROCEDURE — 2580000003 HC RX 258: Performed by: INTERNAL MEDICINE

## 2019-09-15 PROCEDURE — 2500000003 HC RX 250 WO HCPCS: Performed by: INTERNAL MEDICINE

## 2019-09-15 PROCEDURE — 2580000003 HC RX 258: Performed by: STUDENT IN AN ORGANIZED HEALTH CARE EDUCATION/TRAINING PROGRAM

## 2019-09-15 RX ORDER — PHENOL 1.4 %
2 AEROSOL, SPRAY (ML) MUCOUS MEMBRANE DAILY
Qty: 15 TABLET | Refills: 0 | Status: ON HOLD | OUTPATIENT
Start: 2019-09-15 | End: 2020-05-11

## 2019-09-15 RX ORDER — FLUOXETINE HYDROCHLORIDE 20 MG/1
40 CAPSULE ORAL DAILY
Qty: 15 CAPSULE | Refills: 0 | Status: ON HOLD | OUTPATIENT
Start: 2019-09-15 | End: 2019-10-14 | Stop reason: SDUPTHER

## 2019-09-15 RX ORDER — ONDANSETRON 4 MG/1
4 TABLET, FILM COATED ORAL EVERY 8 HOURS PRN
Qty: 10 TABLET | Refills: 0 | Status: ON HOLD | OUTPATIENT
Start: 2019-09-15 | End: 2019-10-14 | Stop reason: SDUPTHER

## 2019-09-15 RX ORDER — CHLORDIAZEPOXIDE HYDROCHLORIDE 10 MG/1
CAPSULE, GELATIN COATED ORAL
Qty: 30 CAPSULE | Refills: 0 | Status: SHIPPED | OUTPATIENT
Start: 2019-09-15 | End: 2019-09-27 | Stop reason: ALTCHOICE

## 2019-09-15 RX ORDER — NIFEDIPINE 30 MG/1
30 TABLET, FILM COATED, EXTENDED RELEASE ORAL DAILY
Qty: 15 TABLET | Refills: 0 | Status: ON HOLD | OUTPATIENT
Start: 2019-09-15 | End: 2019-10-14 | Stop reason: SDUPTHER

## 2019-09-15 RX ADMIN — FOLIC ACID: 5 INJECTION, SOLUTION INTRAMUSCULAR; INTRAVENOUS; SUBCUTANEOUS at 09:31

## 2019-09-15 RX ADMIN — CHLORDIAZEPOXIDE HYDROCHLORIDE 10 MG: 5 CAPSULE ORAL at 09:15

## 2019-09-15 RX ADMIN — Medication 10 ML: at 09:31

## 2019-09-15 RX ADMIN — LORAZEPAM 1 MG: 1 TABLET ORAL at 09:36

## 2019-09-15 RX ADMIN — ENOXAPARIN SODIUM 40 MG: 40 INJECTION SUBCUTANEOUS at 09:31

## 2019-09-15 ASSESSMENT — PAIN SCALES - GENERAL: PAINLEVEL_OUTOF10: 0

## 2019-09-27 ENCOUNTER — HOSPITAL ENCOUNTER (INPATIENT)
Age: 64
LOS: 2 days | Discharge: HOME | DRG: 897 | End: 2019-09-29
Attending: EMERGENCY MEDICINE | Admitting: INTERNAL MEDICINE
Payer: COMMERCIAL

## 2019-09-27 DIAGNOSIS — F10.931 ACUTE HYPERACTIVE ALCOHOL WITHDRAWAL DELIRIUM (HCC): Primary | ICD-10-CM

## 2019-09-27 DIAGNOSIS — F10.931 ALCOHOL WITHDRAWAL DELIRIUM (HCC): ICD-10-CM

## 2019-09-27 LAB
A/G RATIO: 1.3 (ref 1.1–2.2)
ACETAMINOPHEN LEVEL: <5 UG/ML (ref 10–30)
ALBUMIN SERPL-MCNC: 5 G/DL (ref 3.4–5)
ALP BLD-CCNC: 75 U/L (ref 40–129)
ALT SERPL-CCNC: 58 U/L (ref 10–40)
AMPHETAMINE SCREEN, URINE: ABNORMAL
ANION GAP SERPL CALCULATED.3IONS-SCNC: 16 MMOL/L (ref 3–16)
AST SERPL-CCNC: 113 U/L (ref 15–37)
BARBITURATE SCREEN URINE: ABNORMAL
BASOPHILS ABSOLUTE: 0 K/UL (ref 0–0.2)
BASOPHILS RELATIVE PERCENT: 0.7 %
BENZODIAZEPINE SCREEN, URINE: POSITIVE
BILIRUB SERPL-MCNC: 0.4 MG/DL (ref 0–1)
BILIRUBIN DIRECT: <0.2 MG/DL (ref 0–0.3)
BILIRUBIN URINE: NEGATIVE
BILIRUBIN, INDIRECT: NORMAL MG/DL (ref 0–1)
BLOOD, URINE: NEGATIVE
BUN BLDV-MCNC: 6 MG/DL (ref 7–20)
CALCIUM SERPL-MCNC: 10 MG/DL (ref 8.3–10.6)
CANNABINOID SCREEN URINE: ABNORMAL
CHLORIDE BLD-SCNC: 100 MMOL/L (ref 99–110)
CLARITY: CLEAR
CO2: 24 MMOL/L (ref 21–32)
COCAINE METABOLITE SCREEN URINE: ABNORMAL
COLOR: YELLOW
CREAT SERPL-MCNC: 0.6 MG/DL (ref 0.6–1.2)
EOSINOPHILS ABSOLUTE: 0.1 K/UL (ref 0–0.6)
EOSINOPHILS RELATIVE PERCENT: 1.2 %
EPITHELIAL CELLS, UA: 0 /HPF (ref 0–5)
ETHANOL: 129 MG/DL (ref 0–0.08)
GFR AFRICAN AMERICAN: >60
GFR NON-AFRICAN AMERICAN: >60
GLOBULIN: 4 G/DL
GLUCOSE BLD-MCNC: 90 MG/DL (ref 70–99)
GLUCOSE URINE: NEGATIVE MG/DL
HCT VFR BLD CALC: 39.8 % (ref 36–48)
HEMOGLOBIN: 13.2 G/DL (ref 12–16)
HYALINE CASTS: 0 /LPF (ref 0–8)
INR BLD: 0.98 (ref 0.86–1.14)
KETONES, URINE: 15 MG/DL
LEUKOCYTE ESTERASE, URINE: NEGATIVE
LIPASE: 51 U/L (ref 13–60)
LYMPHOCYTES ABSOLUTE: 3.2 K/UL (ref 1–5.1)
LYMPHOCYTES RELATIVE PERCENT: 48.1 %
Lab: ABNORMAL
MCH RBC QN AUTO: 33.1 PG (ref 26–34)
MCHC RBC AUTO-ENTMCNC: 33.2 G/DL (ref 31–36)
MCV RBC AUTO: 99.6 FL (ref 80–100)
METHADONE SCREEN, URINE: ABNORMAL
MICROSCOPIC EXAMINATION: YES
MONOCYTES ABSOLUTE: 0.4 K/UL (ref 0–1.3)
MONOCYTES RELATIVE PERCENT: 6.7 %
NEUTROPHILS ABSOLUTE: 2.9 K/UL (ref 1.7–7.7)
NEUTROPHILS RELATIVE PERCENT: 43.3 %
NITRITE, URINE: NEGATIVE
OPIATE SCREEN URINE: ABNORMAL
OXYCODONE URINE: ABNORMAL
PDW BLD-RTO: 14.8 % (ref 12.4–15.4)
PH UA: 6
PH UA: 6 (ref 5–8)
PHENCYCLIDINE SCREEN URINE: ABNORMAL
PLATELET # BLD: 258 K/UL (ref 135–450)
PMV BLD AUTO: 7.8 FL (ref 5–10.5)
POTASSIUM REFLEX MAGNESIUM: 4 MMOL/L (ref 3.5–5.1)
PROPOXYPHENE SCREEN: ABNORMAL
PROTEIN UA: 30 MG/DL
PROTHROMBIN TIME: 11.2 SEC (ref 9.8–13)
RBC # BLD: 4 M/UL (ref 4–5.2)
RBC UA: 0 /HPF (ref 0–4)
SALICYLATE, SERUM: <0.3 MG/DL (ref 15–30)
SODIUM BLD-SCNC: 140 MMOL/L (ref 136–145)
SPECIFIC GRAVITY UA: 1.01 (ref 1–1.03)
TOTAL PROTEIN: 9 G/DL (ref 6.4–8.2)
URINE REFLEX TO CULTURE: ABNORMAL
URINE TYPE: ABNORMAL
UROBILINOGEN, URINE: 0.2 E.U./DL
WBC # BLD: 6.6 K/UL (ref 4–11)
WBC UA: 0 /HPF (ref 0–5)

## 2019-09-27 PROCEDURE — 2580000003 HC RX 258: Performed by: INTERNAL MEDICINE

## 2019-09-27 PROCEDURE — 6360000002 HC RX W HCPCS: Performed by: INTERNAL MEDICINE

## 2019-09-27 PROCEDURE — 81001 URINALYSIS AUTO W/SCOPE: CPT

## 2019-09-27 PROCEDURE — 6370000000 HC RX 637 (ALT 250 FOR IP): Performed by: INTERNAL MEDICINE

## 2019-09-27 PROCEDURE — G0480 DRUG TEST DEF 1-7 CLASSES: HCPCS

## 2019-09-27 PROCEDURE — 80307 DRUG TEST PRSMV CHEM ANLYZR: CPT

## 2019-09-27 PROCEDURE — 2580000003 HC RX 258: Performed by: PHYSICIAN ASSISTANT

## 2019-09-27 PROCEDURE — 80053 COMPREHEN METABOLIC PANEL: CPT

## 2019-09-27 PROCEDURE — 99284 EMERGENCY DEPT VISIT MOD MDM: CPT

## 2019-09-27 PROCEDURE — 2500000003 HC RX 250 WO HCPCS: Performed by: PHYSICIAN ASSISTANT

## 2019-09-27 PROCEDURE — 83690 ASSAY OF LIPASE: CPT

## 2019-09-27 PROCEDURE — 96374 THER/PROPH/DIAG INJ IV PUSH: CPT

## 2019-09-27 PROCEDURE — 36415 COLL VENOUS BLD VENIPUNCTURE: CPT

## 2019-09-27 PROCEDURE — 94760 N-INVAS EAR/PLS OXIMETRY 1: CPT

## 2019-09-27 PROCEDURE — 6360000002 HC RX W HCPCS: Performed by: EMERGENCY MEDICINE

## 2019-09-27 PROCEDURE — 1200000000 HC SEMI PRIVATE

## 2019-09-27 PROCEDURE — 85025 COMPLETE CBC W/AUTO DIFF WBC: CPT

## 2019-09-27 PROCEDURE — 96375 TX/PRO/DX INJ NEW DRUG ADDON: CPT

## 2019-09-27 PROCEDURE — 85610 PROTHROMBIN TIME: CPT

## 2019-09-27 PROCEDURE — 96376 TX/PRO/DX INJ SAME DRUG ADON: CPT

## 2019-09-27 PROCEDURE — 6360000002 HC RX W HCPCS: Performed by: PHYSICIAN ASSISTANT

## 2019-09-27 RX ORDER — NIFEDIPINE 30 MG/1
30 TABLET, EXTENDED RELEASE ORAL DAILY
Status: DISCONTINUED | OUTPATIENT
Start: 2019-09-27 | End: 2019-09-29 | Stop reason: HOSPADM

## 2019-09-27 RX ORDER — LORAZEPAM 1 MG/1
3 TABLET ORAL
Status: DISCONTINUED | OUTPATIENT
Start: 2019-09-27 | End: 2019-09-27 | Stop reason: HOSPADM

## 2019-09-27 RX ORDER — LORAZEPAM 2 MG/ML
1 INJECTION INTRAMUSCULAR
Status: DISCONTINUED | OUTPATIENT
Start: 2019-09-27 | End: 2019-09-27 | Stop reason: HOSPADM

## 2019-09-27 RX ORDER — LORAZEPAM 1 MG/1
3 TABLET ORAL
Status: DISCONTINUED | OUTPATIENT
Start: 2019-09-27 | End: 2019-09-29 | Stop reason: HOSPADM

## 2019-09-27 RX ORDER — LORAZEPAM 2 MG/ML
2 INJECTION INTRAMUSCULAR ONCE
Status: COMPLETED | OUTPATIENT
Start: 2019-09-27 | End: 2019-09-27

## 2019-09-27 RX ORDER — ONDANSETRON 2 MG/ML
4 INJECTION INTRAMUSCULAR; INTRAVENOUS ONCE
Status: COMPLETED | OUTPATIENT
Start: 2019-09-27 | End: 2019-09-27

## 2019-09-27 RX ORDER — LORAZEPAM 2 MG/ML
4 INJECTION INTRAMUSCULAR
Status: DISCONTINUED | OUTPATIENT
Start: 2019-09-27 | End: 2019-09-29 | Stop reason: HOSPADM

## 2019-09-27 RX ORDER — FLUOXETINE HYDROCHLORIDE 20 MG/1
40 CAPSULE ORAL DAILY
Status: DISCONTINUED | OUTPATIENT
Start: 2019-09-27 | End: 2019-09-29 | Stop reason: HOSPADM

## 2019-09-27 RX ORDER — LORAZEPAM 2 MG/ML
2 INJECTION INTRAMUSCULAR
Status: DISCONTINUED | OUTPATIENT
Start: 2019-09-27 | End: 2019-09-27 | Stop reason: HOSPADM

## 2019-09-27 RX ORDER — LORAZEPAM 2 MG/ML
4 INJECTION INTRAMUSCULAR
Status: DISCONTINUED | OUTPATIENT
Start: 2019-09-27 | End: 2019-09-27 | Stop reason: HOSPADM

## 2019-09-27 RX ORDER — SODIUM CHLORIDE 0.9 % (FLUSH) 0.9 %
10 SYRINGE (ML) INJECTION EVERY 12 HOURS SCHEDULED
Status: DISCONTINUED | OUTPATIENT
Start: 2019-09-27 | End: 2019-09-27 | Stop reason: HOSPADM

## 2019-09-27 RX ORDER — SODIUM CHLORIDE 0.9 % (FLUSH) 0.9 %
10 SYRINGE (ML) INJECTION PRN
Status: DISCONTINUED | OUTPATIENT
Start: 2019-09-27 | End: 2019-09-29 | Stop reason: HOSPADM

## 2019-09-27 RX ORDER — SODIUM CHLORIDE 9 MG/ML
INJECTION, SOLUTION INTRAVENOUS CONTINUOUS
Status: DISCONTINUED | OUTPATIENT
Start: 2019-09-27 | End: 2019-09-29 | Stop reason: HOSPADM

## 2019-09-27 RX ORDER — LORAZEPAM 2 MG/ML
1 INJECTION INTRAMUSCULAR
Status: DISCONTINUED | OUTPATIENT
Start: 2019-09-27 | End: 2019-09-29 | Stop reason: HOSPADM

## 2019-09-27 RX ORDER — LORAZEPAM 2 MG/ML
3 INJECTION INTRAMUSCULAR
Status: DISCONTINUED | OUTPATIENT
Start: 2019-09-27 | End: 2019-09-27 | Stop reason: HOSPADM

## 2019-09-27 RX ORDER — LORAZEPAM 1 MG/1
1 TABLET ORAL
Status: DISCONTINUED | OUTPATIENT
Start: 2019-09-27 | End: 2019-09-29 | Stop reason: HOSPADM

## 2019-09-27 RX ORDER — LORAZEPAM 2 MG/ML
3 INJECTION INTRAMUSCULAR
Status: DISCONTINUED | OUTPATIENT
Start: 2019-09-27 | End: 2019-09-29 | Stop reason: HOSPADM

## 2019-09-27 RX ORDER — LORAZEPAM 1 MG/1
1 TABLET ORAL
Status: DISCONTINUED | OUTPATIENT
Start: 2019-09-27 | End: 2019-09-27 | Stop reason: HOSPADM

## 2019-09-27 RX ORDER — PHENOL 1.4 %
2 AEROSOL, SPRAY (ML) MUCOUS MEMBRANE DAILY
Status: DISCONTINUED | OUTPATIENT
Start: 2019-09-27 | End: 2019-09-27 | Stop reason: RX

## 2019-09-27 RX ORDER — ONDANSETRON 2 MG/ML
4 INJECTION INTRAMUSCULAR; INTRAVENOUS EVERY 6 HOURS PRN
Status: DISCONTINUED | OUTPATIENT
Start: 2019-09-27 | End: 2019-09-29 | Stop reason: HOSPADM

## 2019-09-27 RX ORDER — SODIUM CHLORIDE 0.9 % (FLUSH) 0.9 %
10 SYRINGE (ML) INJECTION EVERY 12 HOURS SCHEDULED
Status: DISCONTINUED | OUTPATIENT
Start: 2019-09-27 | End: 2019-09-29 | Stop reason: HOSPADM

## 2019-09-27 RX ORDER — LORAZEPAM 1 MG/1
4 TABLET ORAL
Status: DISCONTINUED | OUTPATIENT
Start: 2019-09-27 | End: 2019-09-27 | Stop reason: HOSPADM

## 2019-09-27 RX ORDER — LORAZEPAM 2 MG/ML
2 INJECTION INTRAMUSCULAR
Status: DISCONTINUED | OUTPATIENT
Start: 2019-09-27 | End: 2019-09-29 | Stop reason: HOSPADM

## 2019-09-27 RX ORDER — LORAZEPAM 1 MG/1
2 TABLET ORAL
Status: DISCONTINUED | OUTPATIENT
Start: 2019-09-27 | End: 2019-09-27 | Stop reason: HOSPADM

## 2019-09-27 RX ORDER — SODIUM CHLORIDE 0.9 % (FLUSH) 0.9 %
10 SYRINGE (ML) INJECTION PRN
Status: DISCONTINUED | OUTPATIENT
Start: 2019-09-27 | End: 2019-09-27 | Stop reason: HOSPADM

## 2019-09-27 RX ORDER — LORAZEPAM 1 MG/1
2 TABLET ORAL
Status: DISCONTINUED | OUTPATIENT
Start: 2019-09-27 | End: 2019-09-29 | Stop reason: HOSPADM

## 2019-09-27 RX ORDER — LORAZEPAM 1 MG/1
4 TABLET ORAL
Status: DISCONTINUED | OUTPATIENT
Start: 2019-09-27 | End: 2019-09-29 | Stop reason: HOSPADM

## 2019-09-27 RX ORDER — ACETAMINOPHEN 325 MG/1
650 TABLET ORAL EVERY 6 HOURS PRN
Status: DISCONTINUED | OUTPATIENT
Start: 2019-09-27 | End: 2019-09-29 | Stop reason: HOSPADM

## 2019-09-27 RX ADMIN — LORAZEPAM 2 MG: 2 INJECTION, SOLUTION INTRAMUSCULAR; INTRAVENOUS at 09:15

## 2019-09-27 RX ADMIN — LORAZEPAM 3 MG: 1 TABLET ORAL at 19:47

## 2019-09-27 RX ADMIN — ONDANSETRON 4 MG: 2 INJECTION INTRAMUSCULAR; INTRAVENOUS at 07:23

## 2019-09-27 RX ADMIN — LORAZEPAM 4 MG: 2 INJECTION INTRAMUSCULAR; INTRAVENOUS at 14:01

## 2019-09-27 RX ADMIN — LORAZEPAM 2 MG: 2 INJECTION INTRAMUSCULAR; INTRAVENOUS at 10:05

## 2019-09-27 RX ADMIN — LORAZEPAM 2 MG: 2 INJECTION, SOLUTION INTRAMUSCULAR; INTRAVENOUS at 07:28

## 2019-09-27 RX ADMIN — ONDANSETRON 4 MG: 2 INJECTION INTRAMUSCULAR; INTRAVENOUS at 10:05

## 2019-09-27 RX ADMIN — FOLIC ACID: 5 INJECTION, SOLUTION INTRAMUSCULAR; INTRAVENOUS; SUBCUTANEOUS at 10:10

## 2019-09-27 RX ADMIN — SODIUM CHLORIDE: 9 INJECTION, SOLUTION INTRAVENOUS at 07:22

## 2019-09-27 RX ADMIN — LORAZEPAM 4 MG: 1 TABLET ORAL at 18:02

## 2019-09-27 RX ADMIN — Medication 10 ML: at 19:29

## 2019-09-27 RX ADMIN — ONDANSETRON 4 MG: 2 INJECTION INTRAMUSCULAR; INTRAVENOUS at 19:29

## 2019-09-27 ASSESSMENT — ENCOUNTER SYMPTOMS
SHORTNESS OF BREATH: 0
VOMITING: 0
NAUSEA: 1
BACK PAIN: 0
CHEST TIGHTNESS: 0
DIARRHEA: 0
ABDOMINAL PAIN: 0

## 2019-09-27 ASSESSMENT — PAIN SCALES - GENERAL
PAINLEVEL_OUTOF10: 0
PAINLEVEL_OUTOF10: 0

## 2019-09-28 LAB
A/G RATIO: 1.3 (ref 1.1–2.2)
ALBUMIN SERPL-MCNC: 4.2 G/DL (ref 3.4–5)
ALP BLD-CCNC: 61 U/L (ref 40–129)
ALT SERPL-CCNC: 40 U/L (ref 10–40)
ANION GAP SERPL CALCULATED.3IONS-SCNC: 13 MMOL/L (ref 3–16)
AST SERPL-CCNC: 67 U/L (ref 15–37)
BILIRUB SERPL-MCNC: 0.6 MG/DL (ref 0–1)
BUN BLDV-MCNC: 5 MG/DL (ref 7–20)
CALCIUM SERPL-MCNC: 9.3 MG/DL (ref 8.3–10.6)
CHLORIDE BLD-SCNC: 98 MMOL/L (ref 99–110)
CO2: 26 MMOL/L (ref 21–32)
CREAT SERPL-MCNC: 0.6 MG/DL (ref 0.6–1.2)
GFR AFRICAN AMERICAN: >60
GFR NON-AFRICAN AMERICAN: >60
GLOBULIN: 3.2 G/DL
GLUCOSE BLD-MCNC: 126 MG/DL (ref 70–99)
HCT VFR BLD CALC: 36.4 % (ref 36–48)
HEMOGLOBIN: 12.6 G/DL (ref 12–16)
MCH RBC QN AUTO: 34.3 PG (ref 26–34)
MCHC RBC AUTO-ENTMCNC: 34.6 G/DL (ref 31–36)
MCV RBC AUTO: 99.1 FL (ref 80–100)
PDW BLD-RTO: 15 % (ref 12.4–15.4)
PLATELET # BLD: 185 K/UL (ref 135–450)
PMV BLD AUTO: 7.4 FL (ref 5–10.5)
POTASSIUM REFLEX MAGNESIUM: 3.7 MMOL/L (ref 3.5–5.1)
RBC # BLD: 3.67 M/UL (ref 4–5.2)
SODIUM BLD-SCNC: 137 MMOL/L (ref 136–145)
TOTAL PROTEIN: 7.4 G/DL (ref 6.4–8.2)
WBC # BLD: 4.4 K/UL (ref 4–11)

## 2019-09-28 PROCEDURE — 6360000002 HC RX W HCPCS: Performed by: INTERNAL MEDICINE

## 2019-09-28 PROCEDURE — 94760 N-INVAS EAR/PLS OXIMETRY 1: CPT

## 2019-09-28 PROCEDURE — 85027 COMPLETE CBC AUTOMATED: CPT

## 2019-09-28 PROCEDURE — 2580000003 HC RX 258: Performed by: INTERNAL MEDICINE

## 2019-09-28 PROCEDURE — 36415 COLL VENOUS BLD VENIPUNCTURE: CPT

## 2019-09-28 PROCEDURE — 2500000003 HC RX 250 WO HCPCS: Performed by: INTERNAL MEDICINE

## 2019-09-28 PROCEDURE — 1200000000 HC SEMI PRIVATE

## 2019-09-28 PROCEDURE — 80053 COMPREHEN METABOLIC PANEL: CPT

## 2019-09-28 PROCEDURE — 6370000000 HC RX 637 (ALT 250 FOR IP): Performed by: INTERNAL MEDICINE

## 2019-09-28 RX ADMIN — Medication 10 ML: at 08:05

## 2019-09-28 RX ADMIN — FLUOXETINE 40 MG: 20 CAPSULE ORAL at 08:05

## 2019-09-28 RX ADMIN — ENOXAPARIN SODIUM 40 MG: 40 INJECTION SUBCUTANEOUS at 08:05

## 2019-09-28 RX ADMIN — LORAZEPAM 1 MG: 2 INJECTION, SOLUTION INTRAMUSCULAR; INTRAVENOUS at 20:20

## 2019-09-28 RX ADMIN — LORAZEPAM 4 MG: 1 TABLET ORAL at 23:39

## 2019-09-28 RX ADMIN — ONDANSETRON 4 MG: 2 INJECTION INTRAMUSCULAR; INTRAVENOUS at 11:00

## 2019-09-28 RX ADMIN — NIFEDIPINE 30 MG: 30 TABLET, FILM COATED, EXTENDED RELEASE ORAL at 08:05

## 2019-09-28 RX ADMIN — LORAZEPAM 4 MG: 2 INJECTION INTRAMUSCULAR; INTRAVENOUS at 04:23

## 2019-09-28 RX ADMIN — LORAZEPAM 2 MG: 2 INJECTION INTRAMUSCULAR; INTRAVENOUS at 14:36

## 2019-09-28 RX ADMIN — LORAZEPAM 2 MG: 2 INJECTION INTRAMUSCULAR; INTRAVENOUS at 11:00

## 2019-09-28 RX ADMIN — FOLIC ACID: 5 INJECTION, SOLUTION INTRAMUSCULAR; INTRAVENOUS; SUBCUTANEOUS at 13:00

## 2019-09-28 RX ADMIN — ONDANSETRON 4 MG: 2 INJECTION INTRAMUSCULAR; INTRAVENOUS at 04:23

## 2019-09-28 RX ADMIN — LORAZEPAM 2 MG: 2 INJECTION INTRAMUSCULAR; INTRAVENOUS at 08:13

## 2019-09-28 ASSESSMENT — PAIN SCALES - GENERAL
PAINLEVEL_OUTOF10: 0

## 2019-09-29 VITALS
RESPIRATION RATE: 14 BRPM | SYSTOLIC BLOOD PRESSURE: 169 MMHG | BODY MASS INDEX: 24.28 KG/M2 | OXYGEN SATURATION: 98 % | DIASTOLIC BLOOD PRESSURE: 75 MMHG | WEIGHT: 142.2 LBS | HEART RATE: 86 BPM | TEMPERATURE: 98.2 F | HEIGHT: 64 IN

## 2019-09-29 PROCEDURE — 6370000000 HC RX 637 (ALT 250 FOR IP): Performed by: INTERNAL MEDICINE

## 2019-09-29 PROCEDURE — 6360000002 HC RX W HCPCS: Performed by: INTERNAL MEDICINE

## 2019-09-29 PROCEDURE — 94760 N-INVAS EAR/PLS OXIMETRY 1: CPT

## 2019-09-29 PROCEDURE — 2580000003 HC RX 258: Performed by: INTERNAL MEDICINE

## 2019-09-29 RX ORDER — FOLIC ACID 1 MG/1
1 TABLET ORAL DAILY
Qty: 30 TABLET | Refills: 0 | Status: ON HOLD | OUTPATIENT
Start: 2019-09-29 | End: 2019-10-14 | Stop reason: SDUPTHER

## 2019-09-29 RX ORDER — CHLORDIAZEPOXIDE HYDROCHLORIDE 5 MG/1
5 CAPSULE, GELATIN COATED ORAL 3 TIMES DAILY PRN
Qty: 21 CAPSULE | Refills: 0 | Status: SHIPPED | OUTPATIENT
Start: 2019-09-29 | End: 2019-10-06

## 2019-09-29 RX ADMIN — Medication 10 ML: at 08:28

## 2019-09-29 RX ADMIN — NIFEDIPINE 30 MG: 30 TABLET, FILM COATED, EXTENDED RELEASE ORAL at 08:24

## 2019-09-29 RX ADMIN — LORAZEPAM 1 MG: 1 TABLET ORAL at 08:24

## 2019-09-29 RX ADMIN — FLUOXETINE 40 MG: 20 CAPSULE ORAL at 08:24

## 2019-09-29 RX ADMIN — ONDANSETRON 4 MG: 2 INJECTION INTRAMUSCULAR; INTRAVENOUS at 03:43

## 2019-09-29 RX ADMIN — ENOXAPARIN SODIUM 40 MG: 40 INJECTION SUBCUTANEOUS at 08:24

## 2019-09-29 RX ADMIN — ONDANSETRON 4 MG: 2 INJECTION INTRAMUSCULAR; INTRAVENOUS at 11:10

## 2019-09-29 ASSESSMENT — PAIN SCALES - GENERAL
PAINLEVEL_OUTOF10: 0

## 2019-10-12 ENCOUNTER — HOSPITAL ENCOUNTER (INPATIENT)
Age: 64
LOS: 2 days | Discharge: HOME | DRG: 897 | End: 2019-10-14
Attending: EMERGENCY MEDICINE | Admitting: INTERNAL MEDICINE
Payer: COMMERCIAL

## 2019-10-12 DIAGNOSIS — F10.939 ALCOHOL WITHDRAWAL SYNDROME WITH COMPLICATION (HCC): Primary | ICD-10-CM

## 2019-10-12 LAB
A/G RATIO: 1.2 (ref 1.1–2.2)
ACETAMINOPHEN LEVEL: <5 UG/ML (ref 10–30)
ALBUMIN SERPL-MCNC: 4.7 G/DL (ref 3.4–5)
ALP BLD-CCNC: 75 U/L (ref 40–129)
ALT SERPL-CCNC: 53 U/L (ref 10–40)
AMORPHOUS: ABNORMAL /HPF
AMPHETAMINE SCREEN, URINE: ABNORMAL
ANION GAP SERPL CALCULATED.3IONS-SCNC: 15 MMOL/L (ref 3–16)
AST SERPL-CCNC: 79 U/L (ref 15–37)
BACTERIA: ABNORMAL /HPF
BARBITURATE SCREEN URINE: ABNORMAL
BASE EXCESS VENOUS: 0 (ref -3–3)
BASE EXCESS VENOUS: 3.5 MMOL/L (ref -2–3)
BASOPHILS ABSOLUTE: 0 K/UL (ref 0–0.2)
BASOPHILS RELATIVE PERCENT: 0.7 %
BENZODIAZEPINE SCREEN, URINE: POSITIVE
BILIRUB SERPL-MCNC: 0.7 MG/DL (ref 0–1)
BILIRUBIN URINE: NEGATIVE
BLOOD, URINE: ABNORMAL
BUN BLDV-MCNC: 6 MG/DL (ref 7–20)
CALCIUM SERPL-MCNC: 9.6 MG/DL (ref 8.3–10.6)
CANNABINOID SCREEN URINE: ABNORMAL
CARBOXYHEMOGLOBIN: 2.2 % (ref 0–1.5)
CHLORIDE BLD-SCNC: 98 MMOL/L (ref 99–110)
CLARITY: CLEAR
CO2: 24 MMOL/L (ref 21–32)
COCAINE METABOLITE SCREEN URINE: ABNORMAL
COLOR: YELLOW
CREAT SERPL-MCNC: 0.7 MG/DL (ref 0.6–1.2)
EOSINOPHILS ABSOLUTE: 0.1 K/UL (ref 0–0.6)
EOSINOPHILS RELATIVE PERCENT: 0.8 %
EPITHELIAL CELLS, UA: ABNORMAL /HPF
ETHANOL: NORMAL MG/DL (ref 0–0.08)
GFR AFRICAN AMERICAN: >60
GFR NON-AFRICAN AMERICAN: >60
GLOBULIN: 3.8 G/DL
GLUCOSE BLD-MCNC: 153 MG/DL (ref 70–99)
GLUCOSE URINE: NEGATIVE MG/DL
HCO3 VENOUS: 24.6 MMOL/L (ref 23–29)
HCO3 VENOUS: 28 MMOL/L (ref 24–28)
HCT VFR BLD CALC: 42 % (ref 36–48)
HEMOGLOBIN, VEN, REDUCED: 20.8 %
HEMOGLOBIN: 14.3 G/DL (ref 12–16)
KETONES, URINE: ABNORMAL MG/DL
LACTATE: 1.99 MMOL/L (ref 0.4–2)
LACTIC ACID: 1.4 MMOL/L (ref 0.4–2)
LEUKOCYTE ESTERASE, URINE: ABNORMAL
LIPASE: 42 U/L (ref 13–60)
LYMPHOCYTES ABSOLUTE: 1.6 K/UL (ref 1–5.1)
LYMPHOCYTES RELATIVE PERCENT: 21.8 %
Lab: ABNORMAL
MCH RBC QN AUTO: 34.5 PG (ref 26–34)
MCHC RBC AUTO-ENTMCNC: 34 G/DL (ref 31–36)
MCV RBC AUTO: 101.7 FL (ref 80–100)
METHADONE SCREEN, URINE: ABNORMAL
METHEMOGLOBIN VENOUS: 0.5 % (ref 0–1.5)
MICROSCOPIC EXAMINATION: YES
MONOCYTES ABSOLUTE: 0.5 K/UL (ref 0–1.3)
MONOCYTES RELATIVE PERCENT: 7.3 %
NEUTROPHILS ABSOLUTE: 5 K/UL (ref 1.7–7.7)
NEUTROPHILS RELATIVE PERCENT: 69.4 %
NITRITE, URINE: POSITIVE
O2 SAT, VEN: 79 %
O2 SAT, VEN: 82 %
OPIATE SCREEN URINE: ABNORMAL
OXYCODONE URINE: ABNORMAL
PCO2, VEN: 37.8 MM HG (ref 40–50)
PCO2, VEN: 43.6 MMHG (ref 41–51)
PDW BLD-RTO: 15.8 % (ref 12.4–15.4)
PERFORMED ON: ABNORMAL
PH UA: 7
PH UA: 7 (ref 5–8)
PH VENOUS: 7.42 (ref 7.35–7.45)
PH VENOUS: 7.42 (ref 7.35–7.45)
PHENCYCLIDINE SCREEN URINE: ABNORMAL
PLATELET # BLD: 255 K/UL (ref 135–450)
PMV BLD AUTO: 7.9 FL (ref 5–10.5)
PO2, VEN: 44.4 MMHG (ref 25–40)
PO2, VEN: 45 MM HG
POC SAMPLE TYPE: ABNORMAL
POTASSIUM REFLEX MAGNESIUM: 4 MMOL/L (ref 3.5–5.1)
PROPOXYPHENE SCREEN: ABNORMAL
PROTEIN UA: ABNORMAL MG/DL
RBC # BLD: 4.13 M/UL (ref 4–5.2)
RBC UA: ABNORMAL /HPF (ref 0–2)
SALICYLATE, SERUM: <0.3 MG/DL (ref 15–30)
SODIUM BLD-SCNC: 137 MMOL/L (ref 136–145)
SPECIFIC GRAVITY UA: 1.01 (ref 1–1.03)
TCO2 CALC VENOUS: 26 MMOL/L
TCO2 CALC VENOUS: 29 MMOL/L
TOTAL PROTEIN: 8.5 G/DL (ref 6.4–8.2)
URINE REFLEX TO CULTURE: YES
URINE TYPE: ABNORMAL
UROBILINOGEN, URINE: 0.2 E.U./DL
WBC # BLD: 7.2 K/UL (ref 4–11)
WBC UA: ABNORMAL /HPF (ref 0–5)

## 2019-10-12 PROCEDURE — 6370000000 HC RX 637 (ALT 250 FOR IP): Performed by: EMERGENCY MEDICINE

## 2019-10-12 PROCEDURE — G0480 DRUG TEST DEF 1-7 CLASSES: HCPCS

## 2019-10-12 PROCEDURE — 80307 DRUG TEST PRSMV CHEM ANLYZR: CPT

## 2019-10-12 PROCEDURE — 83605 ASSAY OF LACTIC ACID: CPT

## 2019-10-12 PROCEDURE — 6370000000 HC RX 637 (ALT 250 FOR IP): Performed by: INTERNAL MEDICINE

## 2019-10-12 PROCEDURE — 96361 HYDRATE IV INFUSION ADD-ON: CPT

## 2019-10-12 PROCEDURE — 96374 THER/PROPH/DIAG INJ IV PUSH: CPT

## 2019-10-12 PROCEDURE — 6360000002 HC RX W HCPCS: Performed by: INTERNAL MEDICINE

## 2019-10-12 PROCEDURE — 2580000003 HC RX 258: Performed by: INTERNAL MEDICINE

## 2019-10-12 PROCEDURE — 6360000002 HC RX W HCPCS: Performed by: EMERGENCY MEDICINE

## 2019-10-12 PROCEDURE — 96375 TX/PRO/DX INJ NEW DRUG ADDON: CPT

## 2019-10-12 PROCEDURE — 81001 URINALYSIS AUTO W/SCOPE: CPT

## 2019-10-12 PROCEDURE — 96372 THER/PROPH/DIAG INJ SC/IM: CPT

## 2019-10-12 PROCEDURE — 2580000003 HC RX 258: Performed by: EMERGENCY MEDICINE

## 2019-10-12 PROCEDURE — 2060000000 HC ICU INTERMEDIATE R&B

## 2019-10-12 PROCEDURE — 2500000003 HC RX 250 WO HCPCS: Performed by: INTERNAL MEDICINE

## 2019-10-12 PROCEDURE — 99285 EMERGENCY DEPT VISIT HI MDM: CPT

## 2019-10-12 PROCEDURE — 83690 ASSAY OF LIPASE: CPT

## 2019-10-12 PROCEDURE — 36415 COLL VENOUS BLD VENIPUNCTURE: CPT

## 2019-10-12 PROCEDURE — 87086 URINE CULTURE/COLONY COUNT: CPT

## 2019-10-12 PROCEDURE — 87077 CULTURE AEROBIC IDENTIFY: CPT

## 2019-10-12 PROCEDURE — 85025 COMPLETE CBC W/AUTO DIFF WBC: CPT

## 2019-10-12 PROCEDURE — 87186 SC STD MICRODIL/AGAR DIL: CPT

## 2019-10-12 PROCEDURE — 80053 COMPREHEN METABOLIC PANEL: CPT

## 2019-10-12 PROCEDURE — 82803 BLOOD GASES ANY COMBINATION: CPT

## 2019-10-12 RX ORDER — LORAZEPAM 1 MG/1
3 TABLET ORAL
Status: DISCONTINUED | OUTPATIENT
Start: 2019-10-12 | End: 2019-10-14 | Stop reason: HOSPADM

## 2019-10-12 RX ORDER — LORAZEPAM 2 MG/ML
2 INJECTION INTRAMUSCULAR
Status: DISCONTINUED | OUTPATIENT
Start: 2019-10-12 | End: 2019-10-14 | Stop reason: HOSPADM

## 2019-10-12 RX ORDER — FLUOXETINE HYDROCHLORIDE 20 MG/1
40 CAPSULE ORAL DAILY
Status: DISCONTINUED | OUTPATIENT
Start: 2019-10-12 | End: 2019-10-14 | Stop reason: HOSPADM

## 2019-10-12 RX ORDER — CHLORDIAZEPOXIDE HYDROCHLORIDE 5 MG/1
5 CAPSULE, GELATIN COATED ORAL EVERY 6 HOURS PRN
Status: DISCONTINUED | OUTPATIENT
Start: 2019-10-12 | End: 2019-10-14 | Stop reason: HOSPADM

## 2019-10-12 RX ORDER — LORAZEPAM 2 MG/ML
3 INJECTION INTRAMUSCULAR
Status: DISCONTINUED | OUTPATIENT
Start: 2019-10-12 | End: 2019-10-14 | Stop reason: HOSPADM

## 2019-10-12 RX ORDER — LORAZEPAM 2 MG/ML
4 INJECTION INTRAMUSCULAR
Status: DISCONTINUED | OUTPATIENT
Start: 2019-10-12 | End: 2019-10-14 | Stop reason: HOSPADM

## 2019-10-12 RX ORDER — THIAMINE HYDROCHLORIDE 100 MG/ML
100 INJECTION, SOLUTION INTRAMUSCULAR; INTRAVENOUS ONCE
Status: COMPLETED | OUTPATIENT
Start: 2019-10-12 | End: 2019-10-12

## 2019-10-12 RX ORDER — M-VIT,TX,IRON,MINS/CALC/FOLIC 27MG-0.4MG
1 TABLET ORAL ONCE
Status: COMPLETED | OUTPATIENT
Start: 2019-10-12 | End: 2019-10-12

## 2019-10-12 RX ORDER — IBUPROFEN 400 MG/1
400 TABLET ORAL ONCE
Status: COMPLETED | OUTPATIENT
Start: 2019-10-12 | End: 2019-10-12

## 2019-10-12 RX ORDER — LORAZEPAM 1 MG/1
4 TABLET ORAL ONCE
Status: COMPLETED | OUTPATIENT
Start: 2019-10-12 | End: 2019-10-12

## 2019-10-12 RX ORDER — NIFEDIPINE 30 MG/1
30 TABLET, FILM COATED, EXTENDED RELEASE ORAL DAILY
Status: DISCONTINUED | OUTPATIENT
Start: 2019-10-12 | End: 2019-10-14 | Stop reason: HOSPADM

## 2019-10-12 RX ORDER — ONDANSETRON 2 MG/ML
4 INJECTION INTRAMUSCULAR; INTRAVENOUS EVERY 6 HOURS PRN
Status: DISCONTINUED | OUTPATIENT
Start: 2019-10-12 | End: 2019-10-14 | Stop reason: HOSPADM

## 2019-10-12 RX ORDER — CALCIUM CARBONATE 500(1250)
2 TABLET ORAL DAILY
Status: DISCONTINUED | OUTPATIENT
Start: 2019-10-12 | End: 2019-10-14 | Stop reason: HOSPADM

## 2019-10-12 RX ORDER — LORAZEPAM 1 MG/1
2 TABLET ORAL
Status: DISCONTINUED | OUTPATIENT
Start: 2019-10-12 | End: 2019-10-14 | Stop reason: HOSPADM

## 2019-10-12 RX ORDER — ZOLPIDEM TARTRATE 10 MG/1
TABLET ORAL NIGHTLY PRN
Status: ON HOLD | COMMUNITY
End: 2019-10-14 | Stop reason: HOSPADM

## 2019-10-12 RX ORDER — LORAZEPAM 1 MG/1
1 TABLET ORAL
Status: DISCONTINUED | OUTPATIENT
Start: 2019-10-12 | End: 2019-10-14 | Stop reason: HOSPADM

## 2019-10-12 RX ORDER — MULTIVITAMIN WITH FOLIC ACID 400 MCG
1 TABLET ORAL DAILY
Status: DISCONTINUED | OUTPATIENT
Start: 2019-10-12 | End: 2019-10-14 | Stop reason: HOSPADM

## 2019-10-12 RX ORDER — THIAMINE MONONITRATE (VIT B1) 100 MG
100 TABLET ORAL DAILY
Status: DISCONTINUED | OUTPATIENT
Start: 2019-10-12 | End: 2019-10-12

## 2019-10-12 RX ORDER — SODIUM CHLORIDE 9 MG/ML
INJECTION, SOLUTION INTRAVENOUS CONTINUOUS
Status: ACTIVE | OUTPATIENT
Start: 2019-10-12 | End: 2019-10-13

## 2019-10-12 RX ORDER — ONDANSETRON 2 MG/ML
4 INJECTION INTRAMUSCULAR; INTRAVENOUS ONCE
Status: COMPLETED | OUTPATIENT
Start: 2019-10-12 | End: 2019-10-12

## 2019-10-12 RX ORDER — SODIUM CHLORIDE, SODIUM LACTATE, POTASSIUM CHLORIDE, CALCIUM CHLORIDE 600; 310; 30; 20 MG/100ML; MG/100ML; MG/100ML; MG/100ML
1000 INJECTION, SOLUTION INTRAVENOUS ONCE
Status: COMPLETED | OUTPATIENT
Start: 2019-10-12 | End: 2019-10-12

## 2019-10-12 RX ORDER — POTASSIUM CHLORIDE 20 MEQ/1
40 TABLET, EXTENDED RELEASE ORAL PRN
Status: DISCONTINUED | OUTPATIENT
Start: 2019-10-12 | End: 2019-10-14 | Stop reason: HOSPADM

## 2019-10-12 RX ORDER — SODIUM CHLORIDE 0.9 % (FLUSH) 0.9 %
10 SYRINGE (ML) INJECTION PRN
Status: DISCONTINUED | OUTPATIENT
Start: 2019-10-12 | End: 2019-10-14 | Stop reason: HOSPADM

## 2019-10-12 RX ORDER — MAGNESIUM SULFATE 1 G/100ML
1 INJECTION INTRAVENOUS PRN
Status: DISCONTINUED | OUTPATIENT
Start: 2019-10-12 | End: 2019-10-14 | Stop reason: HOSPADM

## 2019-10-12 RX ORDER — LORAZEPAM 1 MG/1
2 TABLET ORAL ONCE
Status: COMPLETED | OUTPATIENT
Start: 2019-10-12 | End: 2019-10-12

## 2019-10-12 RX ORDER — SODIUM CHLORIDE 0.9 % (FLUSH) 0.9 %
10 SYRINGE (ML) INJECTION EVERY 12 HOURS SCHEDULED
Status: DISCONTINUED | OUTPATIENT
Start: 2019-10-12 | End: 2019-10-14 | Stop reason: HOSPADM

## 2019-10-12 RX ORDER — POTASSIUM CHLORIDE 7.45 MG/ML
10 INJECTION INTRAVENOUS PRN
Status: DISCONTINUED | OUTPATIENT
Start: 2019-10-12 | End: 2019-10-14 | Stop reason: HOSPADM

## 2019-10-12 RX ORDER — LORAZEPAM 2 MG/ML
2 INJECTION INTRAMUSCULAR ONCE
Status: COMPLETED | OUTPATIENT
Start: 2019-10-12 | End: 2019-10-12

## 2019-10-12 RX ORDER — LORAZEPAM 2 MG/ML
1 INJECTION INTRAMUSCULAR
Status: DISCONTINUED | OUTPATIENT
Start: 2019-10-12 | End: 2019-10-14 | Stop reason: HOSPADM

## 2019-10-12 RX ORDER — ACETAMINOPHEN 325 MG/1
650 TABLET ORAL EVERY 6 HOURS PRN
Status: DISCONTINUED | OUTPATIENT
Start: 2019-10-12 | End: 2019-10-14 | Stop reason: HOSPADM

## 2019-10-12 RX ORDER — FOLIC ACID 1 MG/1
1 TABLET ORAL DAILY
Status: DISCONTINUED | OUTPATIENT
Start: 2019-10-12 | End: 2019-10-14 | Stop reason: HOSPADM

## 2019-10-12 RX ORDER — CHLORDIAZEPOXIDE HYDROCHLORIDE 5 MG/1
10 CAPSULE, GELATIN COATED ORAL EVERY 6 HOURS
Status: DISCONTINUED | OUTPATIENT
Start: 2019-10-12 | End: 2019-10-13

## 2019-10-12 RX ORDER — LORAZEPAM 1 MG/1
4 TABLET ORAL
Status: DISCONTINUED | OUTPATIENT
Start: 2019-10-12 | End: 2019-10-14 | Stop reason: HOSPADM

## 2019-10-12 RX ADMIN — SODIUM CHLORIDE: 9 INJECTION, SOLUTION INTRAVENOUS at 06:54

## 2019-10-12 RX ADMIN — FOLIC ACID: 5 INJECTION, SOLUTION INTRAMUSCULAR; INTRAVENOUS; SUBCUTANEOUS at 06:58

## 2019-10-12 RX ADMIN — LORAZEPAM 2 MG: 2 INJECTION INTRAMUSCULAR; INTRAVENOUS at 10:00

## 2019-10-12 RX ADMIN — THERA TABS 1 TABLET: TAB at 08:04

## 2019-10-12 RX ADMIN — LORAZEPAM 2 MG: 2 INJECTION INTRAMUSCULAR; INTRAVENOUS at 14:43

## 2019-10-12 RX ADMIN — ONDANSETRON 4 MG: 2 INJECTION INTRAMUSCULAR; INTRAVENOUS at 09:23

## 2019-10-12 RX ADMIN — LORAZEPAM 2 MG: 1 TABLET ORAL at 03:07

## 2019-10-12 RX ADMIN — Medication 10 ML: at 20:17

## 2019-10-12 RX ADMIN — MULTIPLE VITAMINS W/ MINERALS TAB 1 TABLET: TAB at 03:07

## 2019-10-12 RX ADMIN — ENOXAPARIN SODIUM 40 MG: 40 INJECTION SUBCUTANEOUS at 08:07

## 2019-10-12 RX ADMIN — Medication 100 MG: at 08:04

## 2019-10-12 RX ADMIN — LORAZEPAM 2 MG: 2 INJECTION INTRAMUSCULAR; INTRAVENOUS at 07:04

## 2019-10-12 RX ADMIN — FOLIC ACID 1 MG: 1 TABLET ORAL at 08:04

## 2019-10-12 RX ADMIN — CHLORDIAZEPOXIDE HYDROCHLORIDE 10 MG: 5 CAPSULE ORAL at 08:04

## 2019-10-12 RX ADMIN — SODIUM CHLORIDE: 9 INJECTION, SOLUTION INTRAVENOUS at 19:00

## 2019-10-12 RX ADMIN — THIAMINE HYDROCHLORIDE 100 MG: 100 INJECTION, SOLUTION INTRAMUSCULAR; INTRAVENOUS at 04:14

## 2019-10-12 RX ADMIN — CALCIUM 1000 MG: 500 TABLET ORAL at 08:04

## 2019-10-12 RX ADMIN — CHLORDIAZEPOXIDE HYDROCHLORIDE 10 MG: 5 CAPSULE ORAL at 13:39

## 2019-10-12 RX ADMIN — LORAZEPAM 1 MG: 1 TABLET ORAL at 17:03

## 2019-10-12 RX ADMIN — THIAMINE HYDROCHLORIDE 250 MG: 100 INJECTION, SOLUTION INTRAMUSCULAR; INTRAVENOUS at 11:59

## 2019-10-12 RX ADMIN — ONDANSETRON 4 MG: 2 INJECTION INTRAMUSCULAR; INTRAVENOUS at 03:07

## 2019-10-12 RX ADMIN — SODIUM CHLORIDE, POTASSIUM CHLORIDE, SODIUM LACTATE AND CALCIUM CHLORIDE 1000 ML: 600; 310; 30; 20 INJECTION, SOLUTION INTRAVENOUS at 03:05

## 2019-10-12 RX ADMIN — LORAZEPAM 2 MG: 2 INJECTION INTRAMUSCULAR; INTRAVENOUS at 03:07

## 2019-10-12 RX ADMIN — LORAZEPAM 4 MG: 1 TABLET ORAL at 04:34

## 2019-10-12 RX ADMIN — LORAZEPAM 2 MG: 1 TABLET ORAL at 20:17

## 2019-10-12 RX ADMIN — NIFEDIPINE 30 MG: 30 TABLET, EXTENDED RELEASE ORAL at 08:04

## 2019-10-12 RX ADMIN — LORAZEPAM 2 MG: 1 TABLET ORAL at 23:45

## 2019-10-12 RX ADMIN — THIAMINE HYDROCHLORIDE 250 MG: 100 INJECTION, SOLUTION INTRAMUSCULAR; INTRAVENOUS at 20:25

## 2019-10-12 RX ADMIN — ONDANSETRON 4 MG: 2 INJECTION INTRAMUSCULAR; INTRAVENOUS at 20:17

## 2019-10-12 RX ADMIN — FLUOXETINE HYDROCHLORIDE 40 MG: 20 CAPSULE ORAL at 08:04

## 2019-10-12 RX ADMIN — IBUPROFEN 400 MG: 400 TABLET, FILM COATED ORAL at 04:33

## 2019-10-12 RX ADMIN — CEFTRIAXONE 1 G: 1 INJECTION, POWDER, FOR SOLUTION INTRAMUSCULAR; INTRAVENOUS at 06:34

## 2019-10-12 RX ADMIN — CHLORDIAZEPOXIDE HYDROCHLORIDE 10 MG: 5 CAPSULE ORAL at 20:17

## 2019-10-12 ASSESSMENT — ENCOUNTER SYMPTOMS
SHORTNESS OF BREATH: 0
VOMITING: 0
NAUSEA: 1

## 2019-10-12 ASSESSMENT — PAIN SCALES - GENERAL
PAINLEVEL_OUTOF10: 0
PAINLEVEL_OUTOF10: 1
PAINLEVEL_OUTOF10: 0

## 2019-10-13 LAB
A/G RATIO: 1.3 (ref 1.1–2.2)
ALBUMIN SERPL-MCNC: 3.7 G/DL (ref 3.4–5)
ALP BLD-CCNC: 58 U/L (ref 40–129)
ALT SERPL-CCNC: 38 U/L (ref 10–40)
ANION GAP SERPL CALCULATED.3IONS-SCNC: 11 MMOL/L (ref 3–16)
AST SERPL-CCNC: 60 U/L (ref 15–37)
BASOPHILS ABSOLUTE: 0 K/UL (ref 0–0.2)
BASOPHILS RELATIVE PERCENT: 0.5 %
BILIRUB SERPL-MCNC: 0.6 MG/DL (ref 0–1)
BUN BLDV-MCNC: 4 MG/DL (ref 7–20)
CALCIUM SERPL-MCNC: 9 MG/DL (ref 8.3–10.6)
CHLORIDE BLD-SCNC: 104 MMOL/L (ref 99–110)
CO2: 25 MMOL/L (ref 21–32)
CREAT SERPL-MCNC: 0.6 MG/DL (ref 0.6–1.2)
EOSINOPHILS ABSOLUTE: 0.1 K/UL (ref 0–0.6)
EOSINOPHILS RELATIVE PERCENT: 2.2 %
GFR AFRICAN AMERICAN: >60
GFR NON-AFRICAN AMERICAN: >60
GLOBULIN: 2.8 G/DL
GLUCOSE BLD-MCNC: 109 MG/DL (ref 70–99)
HCT VFR BLD CALC: 34.9 % (ref 36–48)
HEMOGLOBIN: 11.9 G/DL (ref 12–16)
LYMPHOCYTES ABSOLUTE: 1 K/UL (ref 1–5.1)
LYMPHOCYTES RELATIVE PERCENT: 22.1 %
MCH RBC QN AUTO: 34.7 PG (ref 26–34)
MCHC RBC AUTO-ENTMCNC: 34.1 G/DL (ref 31–36)
MCV RBC AUTO: 101.7 FL (ref 80–100)
MONOCYTES ABSOLUTE: 0.3 K/UL (ref 0–1.3)
MONOCYTES RELATIVE PERCENT: 7 %
NEUTROPHILS ABSOLUTE: 3.2 K/UL (ref 1.7–7.7)
NEUTROPHILS RELATIVE PERCENT: 68.2 %
PDW BLD-RTO: 15.7 % (ref 12.4–15.4)
PLATELET # BLD: 167 K/UL (ref 135–450)
PMV BLD AUTO: 7.5 FL (ref 5–10.5)
POTASSIUM REFLEX MAGNESIUM: 4 MMOL/L (ref 3.5–5.1)
RBC # BLD: 3.43 M/UL (ref 4–5.2)
SODIUM BLD-SCNC: 140 MMOL/L (ref 136–145)
TOTAL PROTEIN: 6.5 G/DL (ref 6.4–8.2)
WBC # BLD: 4.7 K/UL (ref 4–11)

## 2019-10-13 PROCEDURE — 2500000003 HC RX 250 WO HCPCS: Performed by: INTERNAL MEDICINE

## 2019-10-13 PROCEDURE — 2060000000 HC ICU INTERMEDIATE R&B

## 2019-10-13 PROCEDURE — 85025 COMPLETE CBC W/AUTO DIFF WBC: CPT

## 2019-10-13 PROCEDURE — 36415 COLL VENOUS BLD VENIPUNCTURE: CPT

## 2019-10-13 PROCEDURE — 6370000000 HC RX 637 (ALT 250 FOR IP): Performed by: INTERNAL MEDICINE

## 2019-10-13 PROCEDURE — 80053 COMPREHEN METABOLIC PANEL: CPT

## 2019-10-13 PROCEDURE — 2580000003 HC RX 258: Performed by: INTERNAL MEDICINE

## 2019-10-13 PROCEDURE — 6360000002 HC RX W HCPCS: Performed by: INTERNAL MEDICINE

## 2019-10-13 RX ORDER — CHLORDIAZEPOXIDE HYDROCHLORIDE 25 MG/1
25 CAPSULE, GELATIN COATED ORAL EVERY 6 HOURS
Status: DISCONTINUED | OUTPATIENT
Start: 2019-10-13 | End: 2019-10-14 | Stop reason: HOSPADM

## 2019-10-13 RX ADMIN — NIFEDIPINE 30 MG: 30 TABLET, EXTENDED RELEASE ORAL at 08:24

## 2019-10-13 RX ADMIN — CHLORDIAZEPOXIDE HYDROCHLORIDE 10 MG: 5 CAPSULE ORAL at 01:24

## 2019-10-13 RX ADMIN — Medication 10 ML: at 10:29

## 2019-10-13 RX ADMIN — CALCIUM 1000 MG: 500 TABLET ORAL at 08:23

## 2019-10-13 RX ADMIN — LORAZEPAM 2 MG: 1 TABLET ORAL at 12:22

## 2019-10-13 RX ADMIN — CHLORDIAZEPOXIDE HYDROCHLORIDE 10 MG: 5 CAPSULE ORAL at 06:23

## 2019-10-13 RX ADMIN — THIAMINE HYDROCHLORIDE 250 MG: 100 INJECTION, SOLUTION INTRAMUSCULAR; INTRAVENOUS at 10:28

## 2019-10-13 RX ADMIN — ENOXAPARIN SODIUM 40 MG: 40 INJECTION SUBCUTANEOUS at 08:24

## 2019-10-13 RX ADMIN — CHLORDIAZEPOXIDE HYDROCHLORIDE 25 MG: 25 CAPSULE ORAL at 21:09

## 2019-10-13 RX ADMIN — ONDANSETRON 4 MG: 2 INJECTION INTRAMUSCULAR; INTRAVENOUS at 10:40

## 2019-10-13 RX ADMIN — THIAMINE HYDROCHLORIDE 250 MG: 100 INJECTION, SOLUTION INTRAMUSCULAR; INTRAVENOUS at 17:12

## 2019-10-13 RX ADMIN — LORAZEPAM 2 MG: 1 TABLET ORAL at 08:24

## 2019-10-13 RX ADMIN — FOLIC ACID 1 MG: 1 TABLET ORAL at 08:24

## 2019-10-13 RX ADMIN — CHLORDIAZEPOXIDE HYDROCHLORIDE 25 MG: 25 CAPSULE ORAL at 12:30

## 2019-10-13 RX ADMIN — FLUOXETINE HYDROCHLORIDE 40 MG: 20 CAPSULE ORAL at 08:24

## 2019-10-13 RX ADMIN — CEFTRIAXONE 1 G: 1 INJECTION, POWDER, FOR SOLUTION INTRAMUSCULAR; INTRAVENOUS at 06:24

## 2019-10-13 RX ADMIN — LORAZEPAM 3 MG: 1 TABLET ORAL at 06:24

## 2019-10-13 RX ADMIN — FOLIC ACID: 5 INJECTION, SOLUTION INTRAMUSCULAR; INTRAVENOUS; SUBCUTANEOUS at 08:28

## 2019-10-13 RX ADMIN — THERA TABS 1 TABLET: TAB at 08:24

## 2019-10-13 ASSESSMENT — PAIN SCALES - GENERAL
PAINLEVEL_OUTOF10: 0

## 2019-10-14 VITALS
HEIGHT: 64 IN | WEIGHT: 147.27 LBS | BODY MASS INDEX: 25.14 KG/M2 | TEMPERATURE: 98.4 F | RESPIRATION RATE: 18 BRPM | SYSTOLIC BLOOD PRESSURE: 112 MMHG | OXYGEN SATURATION: 95 % | DIASTOLIC BLOOD PRESSURE: 67 MMHG | HEART RATE: 92 BPM

## 2019-10-14 LAB
ORGANISM: ABNORMAL
URINE CULTURE, ROUTINE: ABNORMAL

## 2019-10-14 PROCEDURE — 2580000003 HC RX 258: Performed by: INTERNAL MEDICINE

## 2019-10-14 PROCEDURE — 6370000000 HC RX 637 (ALT 250 FOR IP): Performed by: INTERNAL MEDICINE

## 2019-10-14 PROCEDURE — 6360000002 HC RX W HCPCS: Performed by: INTERNAL MEDICINE

## 2019-10-14 PROCEDURE — 2500000003 HC RX 250 WO HCPCS: Performed by: INTERNAL MEDICINE

## 2019-10-14 RX ORDER — FOLIC ACID 1 MG/1
1 TABLET ORAL DAILY
Qty: 30 TABLET | Refills: 0 | Status: ON HOLD | OUTPATIENT
Start: 2019-10-14 | End: 2021-06-17 | Stop reason: CLARIF

## 2019-10-14 RX ORDER — CHLORDIAZEPOXIDE HYDROCHLORIDE 25 MG/1
CAPSULE, GELATIN COATED ORAL
Qty: 18 CAPSULE | Refills: 3 | Status: SHIPPED | OUTPATIENT
Start: 2019-10-14 | End: 2019-10-22

## 2019-10-14 RX ORDER — ONDANSETRON 4 MG/1
4 TABLET, FILM COATED ORAL EVERY 8 HOURS PRN
Qty: 10 TABLET | Refills: 0 | Status: ON HOLD | OUTPATIENT
Start: 2019-10-14 | End: 2021-06-17 | Stop reason: CLARIF

## 2019-10-14 RX ORDER — NIFEDIPINE 30 MG/1
30 TABLET, FILM COATED, EXTENDED RELEASE ORAL DAILY
Qty: 15 TABLET | Refills: 0 | Status: ON HOLD | OUTPATIENT
Start: 2019-10-14 | End: 2020-05-15 | Stop reason: SDUPTHER

## 2019-10-14 RX ORDER — FLUOXETINE HYDROCHLORIDE 20 MG/1
40 CAPSULE ORAL DAILY
Qty: 15 CAPSULE | Refills: 0 | Status: ON HOLD | OUTPATIENT
Start: 2019-10-14 | End: 2020-05-15 | Stop reason: SDUPTHER

## 2019-10-14 RX ADMIN — NIFEDIPINE 30 MG: 30 TABLET, EXTENDED RELEASE ORAL at 08:57

## 2019-10-14 RX ADMIN — CEFTRIAXONE 1 G: 1 INJECTION, POWDER, FOR SOLUTION INTRAMUSCULAR; INTRAVENOUS at 05:36

## 2019-10-14 RX ADMIN — THIAMINE HYDROCHLORIDE 250 MG: 100 INJECTION, SOLUTION INTRAMUSCULAR; INTRAVENOUS at 00:16

## 2019-10-14 RX ADMIN — THERA TABS 1 TABLET: TAB at 08:57

## 2019-10-14 RX ADMIN — ENOXAPARIN SODIUM 40 MG: 40 INJECTION SUBCUTANEOUS at 08:57

## 2019-10-14 RX ADMIN — CALCIUM 1000 MG: 500 TABLET ORAL at 08:57

## 2019-10-14 RX ADMIN — THIAMINE HYDROCHLORIDE 250 MG: 100 INJECTION, SOLUTION INTRAMUSCULAR; INTRAVENOUS at 10:10

## 2019-10-14 RX ADMIN — FOLIC ACID 1 MG: 1 TABLET ORAL at 08:57

## 2019-10-14 RX ADMIN — LORAZEPAM 1 MG: 1 TABLET ORAL at 00:16

## 2019-10-14 RX ADMIN — ACETAMINOPHEN 650 MG: 325 TABLET ORAL at 08:12

## 2019-10-14 RX ADMIN — FOLIC ACID: 5 INJECTION, SOLUTION INTRAMUSCULAR; INTRAVENOUS; SUBCUTANEOUS at 08:12

## 2019-10-14 RX ADMIN — CHLORDIAZEPOXIDE HYDROCHLORIDE 25 MG: 25 CAPSULE ORAL at 03:17

## 2019-10-14 RX ADMIN — Medication 10 ML: at 00:17

## 2019-10-14 RX ADMIN — CHLORDIAZEPOXIDE HYDROCHLORIDE 25 MG: 25 CAPSULE ORAL at 08:57

## 2019-10-14 RX ADMIN — FLUOXETINE HYDROCHLORIDE 40 MG: 20 CAPSULE ORAL at 08:57

## 2019-10-14 RX ADMIN — LORAZEPAM 1 MG: 1 TABLET ORAL at 08:13

## 2019-10-14 RX ADMIN — ONDANSETRON 4 MG: 2 INJECTION INTRAMUSCULAR; INTRAVENOUS at 08:13

## 2019-10-14 ASSESSMENT — PAIN DESCRIPTION - DESCRIPTORS: DESCRIPTORS: HEADACHE

## 2019-10-14 ASSESSMENT — PAIN DESCRIPTION - PROGRESSION: CLINICAL_PROGRESSION: GRADUALLY WORSENING

## 2019-10-14 ASSESSMENT — PAIN DESCRIPTION - PAIN TYPE: TYPE: ACUTE PAIN

## 2019-10-14 ASSESSMENT — PAIN DESCRIPTION - ONSET: ONSET: ON-GOING

## 2019-10-14 ASSESSMENT — PAIN DESCRIPTION - FREQUENCY: FREQUENCY: INTERMITTENT

## 2019-10-14 ASSESSMENT — PAIN DESCRIPTION - LOCATION: LOCATION: HEAD

## 2019-10-14 ASSESSMENT — PAIN SCALES - GENERAL
PAINLEVEL_OUTOF10: 6
PAINLEVEL_OUTOF10: 0

## 2019-10-14 ASSESSMENT — PAIN - FUNCTIONAL ASSESSMENT: PAIN_FUNCTIONAL_ASSESSMENT: ACTIVITIES ARE NOT PREVENTED

## 2019-10-31 ENCOUNTER — HOSPITAL ENCOUNTER (EMERGENCY)
Age: 64
Discharge: HOME | End: 2019-10-31
Attending: EMERGENCY MEDICINE
Payer: COMMERCIAL

## 2019-10-31 VITALS
HEIGHT: 64 IN | SYSTOLIC BLOOD PRESSURE: 184 MMHG | BODY MASS INDEX: 25.61 KG/M2 | TEMPERATURE: 98.2 F | HEART RATE: 92 BPM | OXYGEN SATURATION: 96 % | WEIGHT: 150 LBS | RESPIRATION RATE: 14 BRPM | DIASTOLIC BLOOD PRESSURE: 93 MMHG

## 2019-10-31 DIAGNOSIS — F10.930 ALCOHOL WITHDRAWAL SYNDROME WITHOUT COMPLICATION (HCC): Primary | ICD-10-CM

## 2019-10-31 LAB
ANION GAP SERPL CALCULATED.3IONS-SCNC: 13 MMOL/L (ref 3–16)
BUN BLDV-MCNC: 10 MG/DL (ref 7–20)
CALCIUM SERPL-MCNC: 10.2 MG/DL (ref 8.3–10.6)
CHLORIDE BLD-SCNC: 96 MMOL/L (ref 99–110)
CO2: 29 MMOL/L (ref 21–32)
CREAT SERPL-MCNC: 0.6 MG/DL (ref 0.6–1.2)
GFR AFRICAN AMERICAN: >60
GFR NON-AFRICAN AMERICAN: >60
GLUCOSE BLD-MCNC: 111 MG/DL (ref 70–99)
POTASSIUM REFLEX MAGNESIUM: 3.9 MMOL/L (ref 3.5–5.1)
SODIUM BLD-SCNC: 138 MMOL/L (ref 136–145)

## 2019-10-31 PROCEDURE — 99284 EMERGENCY DEPT VISIT MOD MDM: CPT

## 2019-10-31 PROCEDURE — 96360 HYDRATION IV INFUSION INIT: CPT

## 2019-10-31 PROCEDURE — 2580000003 HC RX 258: Performed by: STUDENT IN AN ORGANIZED HEALTH CARE EDUCATION/TRAINING PROGRAM

## 2019-10-31 PROCEDURE — 6370000000 HC RX 637 (ALT 250 FOR IP): Performed by: STUDENT IN AN ORGANIZED HEALTH CARE EDUCATION/TRAINING PROGRAM

## 2019-10-31 PROCEDURE — 80048 BASIC METABOLIC PNL TOTAL CA: CPT

## 2019-10-31 RX ORDER — DIAZEPAM 5 MG/1
5 TABLET ORAL ONCE
Status: COMPLETED | OUTPATIENT
Start: 2019-10-31 | End: 2019-10-31

## 2019-10-31 RX ORDER — NIFEDIPINE 30 MG/1
30 TABLET, FILM COATED, EXTENDED RELEASE ORAL DAILY
Status: DISCONTINUED | OUTPATIENT
Start: 2019-10-31 | End: 2019-10-31 | Stop reason: HOSPADM

## 2019-10-31 RX ORDER — SODIUM CHLORIDE, SODIUM LACTATE, POTASSIUM CHLORIDE, CALCIUM CHLORIDE 600; 310; 30; 20 MG/100ML; MG/100ML; MG/100ML; MG/100ML
1000 INJECTION, SOLUTION INTRAVENOUS ONCE
Status: COMPLETED | OUTPATIENT
Start: 2019-10-31 | End: 2019-10-31

## 2019-10-31 RX ORDER — FOLIC ACID 1 MG/1
1 TABLET ORAL DAILY
Status: DISCONTINUED | OUTPATIENT
Start: 2019-10-31 | End: 2019-10-31 | Stop reason: HOSPADM

## 2019-10-31 RX ADMIN — DIAZEPAM 5 MG: 5 TABLET ORAL at 13:07

## 2019-10-31 RX ADMIN — NIFEDIPINE 30 MG: 30 TABLET, EXTENDED RELEASE ORAL at 15:56

## 2019-10-31 RX ADMIN — FOLIC ACID 1 MG: 1 TABLET ORAL at 14:40

## 2019-10-31 RX ADMIN — SODIUM CHLORIDE, POTASSIUM CHLORIDE, SODIUM LACTATE AND CALCIUM CHLORIDE 1000 ML: 600; 310; 30; 20 INJECTION, SOLUTION INTRAVENOUS at 13:20

## 2019-10-31 ASSESSMENT — PAIN DESCRIPTION - DESCRIPTORS: DESCRIPTORS: HEADACHE

## 2019-10-31 ASSESSMENT — PAIN SCALES - GENERAL: PAINLEVEL_OUTOF10: 5

## 2019-10-31 ASSESSMENT — ENCOUNTER SYMPTOMS
SORE THROAT: 0
VOMITING: 0
ABDOMINAL PAIN: 0
SHORTNESS OF BREATH: 0
NAUSEA: 1
PHOTOPHOBIA: 0
WHEEZING: 0

## 2019-10-31 ASSESSMENT — PAIN DESCRIPTION - PAIN TYPE: TYPE: ACUTE PAIN

## 2019-10-31 ASSESSMENT — PAIN DESCRIPTION - LOCATION: LOCATION: HEAD

## 2019-10-31 ASSESSMENT — PAIN DESCRIPTION - FREQUENCY: FREQUENCY: CONTINUOUS

## 2019-11-18 ENCOUNTER — HOSPITAL ENCOUNTER (INPATIENT)
Age: 64
LOS: 4 days | Discharge: HOME HEALTH | DRG: 897 | End: 2019-11-22
Attending: EMERGENCY MEDICINE | Admitting: INTERNAL MEDICINE
Payer: COMMERCIAL

## 2019-11-18 DIAGNOSIS — F10.929 ACUTE ALCOHOL ABUSE, WITH UNSPECIFIED COMPLICATION (HCC): Primary | ICD-10-CM

## 2019-11-18 LAB
A/G RATIO: 1.3 (ref 1.1–2.2)
ALBUMIN SERPL-MCNC: 4.9 G/DL (ref 3.4–5)
ALP BLD-CCNC: 81 U/L (ref 40–129)
ALT SERPL-CCNC: 57 U/L (ref 10–40)
AMPHETAMINE SCREEN, URINE: ABNORMAL
ANION GAP SERPL CALCULATED.3IONS-SCNC: 16 MMOL/L (ref 3–16)
AST SERPL-CCNC: 96 U/L (ref 15–37)
BARBITURATE SCREEN URINE: ABNORMAL
BASOPHILS ABSOLUTE: 0 K/UL (ref 0–0.2)
BASOPHILS RELATIVE PERCENT: 0.3 %
BENZODIAZEPINE SCREEN, URINE: POSITIVE
BILIRUB SERPL-MCNC: 0.5 MG/DL (ref 0–1)
BUN BLDV-MCNC: 8 MG/DL (ref 7–20)
CALCIUM SERPL-MCNC: 10.2 MG/DL (ref 8.3–10.6)
CANNABINOID SCREEN URINE: ABNORMAL
CHLORIDE BLD-SCNC: 95 MMOL/L (ref 99–110)
CO2: 24 MMOL/L (ref 21–32)
COCAINE METABOLITE SCREEN URINE: ABNORMAL
CREAT SERPL-MCNC: 0.6 MG/DL (ref 0.6–1.2)
EOSINOPHILS ABSOLUTE: 0.1 K/UL (ref 0–0.6)
EOSINOPHILS RELATIVE PERCENT: 1.1 %
ETHANOL: 16 MG/DL (ref 0–0.08)
GFR AFRICAN AMERICAN: >60
GFR NON-AFRICAN AMERICAN: >60
GLOBULIN: 3.7 G/DL
GLUCOSE BLD-MCNC: 87 MG/DL (ref 70–99)
HCT VFR BLD CALC: 40.5 % (ref 36–48)
HEMOGLOBIN: 14 G/DL (ref 12–16)
LIPASE: 29 U/L (ref 13–60)
LYMPHOCYTES ABSOLUTE: 2.1 K/UL (ref 1–5.1)
LYMPHOCYTES RELATIVE PERCENT: 30.9 %
Lab: ABNORMAL
MCH RBC QN AUTO: 34.1 PG (ref 26–34)
MCHC RBC AUTO-ENTMCNC: 34.6 G/DL (ref 31–36)
MCV RBC AUTO: 98.8 FL (ref 80–100)
METHADONE SCREEN, URINE: ABNORMAL
MONOCYTES ABSOLUTE: 0.3 K/UL (ref 0–1.3)
MONOCYTES RELATIVE PERCENT: 4.4 %
NEUTROPHILS ABSOLUTE: 4.3 K/UL (ref 1.7–7.7)
NEUTROPHILS RELATIVE PERCENT: 63.3 %
OPIATE SCREEN URINE: ABNORMAL
OXYCODONE URINE: ABNORMAL
PDW BLD-RTO: 14.1 % (ref 12.4–15.4)
PH UA: 6
PHENCYCLIDINE SCREEN URINE: ABNORMAL
PLATELET # BLD: 226 K/UL (ref 135–450)
PMV BLD AUTO: 8 FL (ref 5–10.5)
POTASSIUM REFLEX MAGNESIUM: 4.1 MMOL/L (ref 3.5–5.1)
PROPOXYPHENE SCREEN: ABNORMAL
RBC # BLD: 4.1 M/UL (ref 4–5.2)
SODIUM BLD-SCNC: 135 MMOL/L (ref 136–145)
TOTAL PROTEIN: 8.6 G/DL (ref 6.4–8.2)
WBC # BLD: 6.7 K/UL (ref 4–11)

## 2019-11-18 PROCEDURE — 96374 THER/PROPH/DIAG INJ IV PUSH: CPT

## 2019-11-18 PROCEDURE — 2580000003 HC RX 258: Performed by: INTERNAL MEDICINE

## 2019-11-18 PROCEDURE — 96376 TX/PRO/DX INJ SAME DRUG ADON: CPT

## 2019-11-18 PROCEDURE — 83690 ASSAY OF LIPASE: CPT

## 2019-11-18 PROCEDURE — 6360000002 HC RX W HCPCS: Performed by: EMERGENCY MEDICINE

## 2019-11-18 PROCEDURE — 99284 EMERGENCY DEPT VISIT MOD MDM: CPT

## 2019-11-18 PROCEDURE — 1200000000 HC SEMI PRIVATE

## 2019-11-18 PROCEDURE — 85025 COMPLETE CBC W/AUTO DIFF WBC: CPT

## 2019-11-18 PROCEDURE — 2580000003 HC RX 258: Performed by: EMERGENCY MEDICINE

## 2019-11-18 PROCEDURE — 80307 DRUG TEST PRSMV CHEM ANLYZR: CPT

## 2019-11-18 PROCEDURE — 2500000003 HC RX 250 WO HCPCS: Performed by: EMERGENCY MEDICINE

## 2019-11-18 PROCEDURE — 80053 COMPREHEN METABOLIC PANEL: CPT

## 2019-11-18 PROCEDURE — G0480 DRUG TEST DEF 1-7 CLASSES: HCPCS

## 2019-11-18 RX ORDER — LORAZEPAM 1 MG/1
1 TABLET ORAL
Status: DISCONTINUED | OUTPATIENT
Start: 2019-11-18 | End: 2019-11-18 | Stop reason: SDUPTHER

## 2019-11-18 RX ORDER — BUSPIRONE HYDROCHLORIDE 10 MG/1
10 TABLET ORAL 2 TIMES DAILY
Status: ON HOLD | COMMUNITY
Start: 2019-11-09 | End: 2020-05-15 | Stop reason: SDUPTHER

## 2019-11-18 RX ORDER — LORAZEPAM 2 MG/ML
4 INJECTION INTRAMUSCULAR
Status: DISCONTINUED | OUTPATIENT
Start: 2019-11-18 | End: 2019-11-18 | Stop reason: SDUPTHER

## 2019-11-18 RX ORDER — ONDANSETRON 2 MG/ML
4 INJECTION INTRAMUSCULAR; INTRAVENOUS EVERY 6 HOURS PRN
Status: DISCONTINUED | OUTPATIENT
Start: 2019-11-18 | End: 2019-11-22 | Stop reason: HOSPADM

## 2019-11-18 RX ORDER — SODIUM CHLORIDE 0.9 % (FLUSH) 0.9 %
10 SYRINGE (ML) INJECTION EVERY 12 HOURS SCHEDULED
Status: DISCONTINUED | OUTPATIENT
Start: 2019-11-18 | End: 2019-11-22 | Stop reason: HOSPADM

## 2019-11-18 RX ORDER — FLUOXETINE HYDROCHLORIDE 20 MG/1
40 CAPSULE ORAL DAILY
Status: DISCONTINUED | OUTPATIENT
Start: 2019-11-19 | End: 2019-11-22 | Stop reason: HOSPADM

## 2019-11-18 RX ORDER — LORAZEPAM 2 MG/ML
2 INJECTION INTRAMUSCULAR
Status: DISCONTINUED | OUTPATIENT
Start: 2019-11-18 | End: 2019-11-22 | Stop reason: HOSPADM

## 2019-11-18 RX ORDER — LORAZEPAM 1 MG/1
1 TABLET ORAL 3 TIMES DAILY PRN
Qty: 12 TABLET | Refills: 0 | Status: SHIPPED | OUTPATIENT
Start: 2019-11-18 | End: 2019-11-22 | Stop reason: HOSPADM

## 2019-11-18 RX ORDER — NIFEDIPINE 30 MG/1
30 TABLET, EXTENDED RELEASE ORAL DAILY
Qty: 30 TABLET | Refills: 0 | Status: SHIPPED | OUTPATIENT
Start: 2019-11-18 | End: 2019-11-22 | Stop reason: HOSPADM

## 2019-11-18 RX ORDER — LORAZEPAM 1 MG/1
2 TABLET ORAL
Status: DISCONTINUED | OUTPATIENT
Start: 2019-11-18 | End: 2019-11-22 | Stop reason: HOSPADM

## 2019-11-18 RX ORDER — LORAZEPAM 2 MG/ML
1 INJECTION INTRAMUSCULAR
Status: DISCONTINUED | OUTPATIENT
Start: 2019-11-18 | End: 2019-11-18 | Stop reason: SDUPTHER

## 2019-11-18 RX ORDER — LORAZEPAM 1 MG/1
3 TABLET ORAL
Status: DISCONTINUED | OUTPATIENT
Start: 2019-11-18 | End: 2019-11-22 | Stop reason: HOSPADM

## 2019-11-18 RX ORDER — DIAZEPAM 5 MG/1
5 TABLET ORAL EVERY 6 HOURS PRN
Status: DISCONTINUED | OUTPATIENT
Start: 2019-11-18 | End: 2019-11-18

## 2019-11-18 RX ORDER — LORAZEPAM 2 MG/ML
3 INJECTION INTRAMUSCULAR
Status: DISCONTINUED | OUTPATIENT
Start: 2019-11-18 | End: 2019-11-18 | Stop reason: SDUPTHER

## 2019-11-18 RX ORDER — HYDROXYZINE PAMOATE 25 MG/1
25 CAPSULE ORAL 3 TIMES DAILY PRN
Qty: 15 CAPSULE | Refills: 0 | Status: SHIPPED | OUTPATIENT
Start: 2019-11-18 | End: 2019-11-22 | Stop reason: SDUPTHER

## 2019-11-18 RX ORDER — SODIUM CHLORIDE 0.9 % (FLUSH) 0.9 %
10 SYRINGE (ML) INJECTION PRN
Status: DISCONTINUED | OUTPATIENT
Start: 2019-11-18 | End: 2019-11-22 | Stop reason: HOSPADM

## 2019-11-18 RX ORDER — NIFEDIPINE 30 MG/1
30 TABLET, EXTENDED RELEASE ORAL DAILY
Status: DISCONTINUED | OUTPATIENT
Start: 2019-11-18 | End: 2019-11-22 | Stop reason: HOSPADM

## 2019-11-18 RX ORDER — LORAZEPAM 1 MG/1
4 TABLET ORAL
Status: DISCONTINUED | OUTPATIENT
Start: 2019-11-18 | End: 2019-11-18 | Stop reason: SDUPTHER

## 2019-11-18 RX ORDER — LORAZEPAM 2 MG/ML
1 INJECTION INTRAMUSCULAR
Status: DISCONTINUED | OUTPATIENT
Start: 2019-11-18 | End: 2019-11-22 | Stop reason: HOSPADM

## 2019-11-18 RX ORDER — AMITRIPTYLINE HYDROCHLORIDE 50 MG/1
50 TABLET, FILM COATED ORAL NIGHTLY PRN
Status: ON HOLD | COMMUNITY
Start: 2019-11-09 | End: 2020-01-30

## 2019-11-18 RX ORDER — LORAZEPAM 2 MG/ML
3 INJECTION INTRAMUSCULAR
Status: DISCONTINUED | OUTPATIENT
Start: 2019-11-18 | End: 2019-11-22 | Stop reason: HOSPADM

## 2019-11-18 RX ORDER — SODIUM CHLORIDE 0.9 % (FLUSH) 0.9 %
10 SYRINGE (ML) INJECTION PRN
Status: DISCONTINUED | OUTPATIENT
Start: 2019-11-18 | End: 2019-11-18

## 2019-11-18 RX ORDER — LORAZEPAM 2 MG/ML
4 INJECTION INTRAMUSCULAR
Status: DISCONTINUED | OUTPATIENT
Start: 2019-11-18 | End: 2019-11-22 | Stop reason: HOSPADM

## 2019-11-18 RX ORDER — LORAZEPAM 1 MG/1
3 TABLET ORAL
Status: DISCONTINUED | OUTPATIENT
Start: 2019-11-18 | End: 2019-11-18 | Stop reason: SDUPTHER

## 2019-11-18 RX ORDER — LORAZEPAM 1 MG/1
2 TABLET ORAL
Status: DISCONTINUED | OUTPATIENT
Start: 2019-11-18 | End: 2019-11-18 | Stop reason: SDUPTHER

## 2019-11-18 RX ORDER — LORAZEPAM 2 MG/ML
2 INJECTION INTRAMUSCULAR
Status: DISCONTINUED | OUTPATIENT
Start: 2019-11-18 | End: 2019-11-18 | Stop reason: SDUPTHER

## 2019-11-18 RX ORDER — LORAZEPAM 1 MG/1
4 TABLET ORAL
Status: DISCONTINUED | OUTPATIENT
Start: 2019-11-18 | End: 2019-11-22 | Stop reason: HOSPADM

## 2019-11-18 RX ORDER — LORAZEPAM 1 MG/1
1 TABLET ORAL
Status: DISCONTINUED | OUTPATIENT
Start: 2019-11-18 | End: 2019-11-22 | Stop reason: HOSPADM

## 2019-11-18 RX ORDER — CLONIDINE HYDROCHLORIDE 0.1 MG/1
0.1 TABLET ORAL 2 TIMES DAILY
Qty: 20 TABLET | Refills: 0 | Status: SHIPPED | OUTPATIENT
Start: 2019-11-18 | End: 2019-11-22 | Stop reason: HOSPADM

## 2019-11-18 RX ORDER — SODIUM CHLORIDE 0.9 % (FLUSH) 0.9 %
10 SYRINGE (ML) INJECTION EVERY 12 HOURS SCHEDULED
Status: DISCONTINUED | OUTPATIENT
Start: 2019-11-18 | End: 2019-11-18

## 2019-11-18 RX ADMIN — LORAZEPAM 3 MG: 2 INJECTION, SOLUTION INTRAMUSCULAR; INTRAVENOUS at 13:45

## 2019-11-18 RX ADMIN — Medication 10 ML: at 22:39

## 2019-11-18 RX ADMIN — FOLIC ACID: 5 INJECTION, SOLUTION INTRAMUSCULAR; INTRAVENOUS; SUBCUTANEOUS at 10:51

## 2019-11-18 RX ADMIN — LORAZEPAM 2 MG: 2 INJECTION, SOLUTION INTRAMUSCULAR; INTRAVENOUS at 15:21

## 2019-11-18 RX ADMIN — LORAZEPAM 4 MG: 2 INJECTION, SOLUTION INTRAMUSCULAR; INTRAVENOUS at 10:51

## 2019-11-18 RX ADMIN — LORAZEPAM 4 MG: 2 INJECTION, SOLUTION INTRAMUSCULAR; INTRAVENOUS at 11:54

## 2019-11-18 ASSESSMENT — PAIN DESCRIPTION - DESCRIPTORS: DESCRIPTORS: PRESSURE

## 2019-11-18 ASSESSMENT — PAIN DESCRIPTION - LOCATION: LOCATION: ABDOMEN

## 2019-11-18 ASSESSMENT — PAIN DESCRIPTION - FREQUENCY: FREQUENCY: CONTINUOUS

## 2019-11-18 ASSESSMENT — PAIN DESCRIPTION - PAIN TYPE: TYPE: ACUTE PAIN

## 2019-11-18 ASSESSMENT — PAIN SCALES - GENERAL: PAINLEVEL_OUTOF10: 8

## 2019-11-19 PROBLEM — K70.10 ALCOHOLIC HEPATITIS: Status: ACTIVE | Noted: 2019-11-19

## 2019-11-19 PROBLEM — R11.2 NAUSEA & VOMITING: Status: ACTIVE | Noted: 2019-11-19

## 2019-11-19 PROBLEM — F10.10 ALCOHOL ABUSE, CONTINUOUS: Status: ACTIVE | Noted: 2019-11-19

## 2019-11-19 LAB
A/G RATIO: 1.5 (ref 1.1–2.2)
ALBUMIN SERPL-MCNC: 4 G/DL (ref 3.4–5)
ALP BLD-CCNC: 72 U/L (ref 40–129)
ALT SERPL-CCNC: 44 U/L (ref 10–40)
ANION GAP SERPL CALCULATED.3IONS-SCNC: 15 MMOL/L (ref 3–16)
AST SERPL-CCNC: 84 U/L (ref 15–37)
BILIRUB SERPL-MCNC: 0.7 MG/DL (ref 0–1)
BUN BLDV-MCNC: 9 MG/DL (ref 7–20)
CALCIUM IONIZED: 1.15 MMOL/L (ref 1.12–1.32)
CALCIUM SERPL-MCNC: 9.5 MG/DL (ref 8.3–10.6)
CHLORIDE BLD-SCNC: 101 MMOL/L (ref 99–110)
CO2: 22 MMOL/L (ref 21–32)
CREAT SERPL-MCNC: 0.6 MG/DL (ref 0.6–1.2)
GFR AFRICAN AMERICAN: >60
GFR NON-AFRICAN AMERICAN: >60
GLOBULIN: 2.6 G/DL
GLUCOSE BLD-MCNC: 95 MG/DL (ref 70–99)
HCT VFR BLD CALC: 41.7 % (ref 36–48)
HEMOGLOBIN: 14.2 G/DL (ref 12–16)
MCH RBC QN AUTO: 34.3 PG (ref 26–34)
MCHC RBC AUTO-ENTMCNC: 34.1 G/DL (ref 31–36)
MCV RBC AUTO: 100.7 FL (ref 80–100)
PDW BLD-RTO: 14 % (ref 12.4–15.4)
PH VENOUS: 7.42 (ref 7.35–7.45)
PLATELET # BLD: 203 K/UL (ref 135–450)
PMV BLD AUTO: 7.8 FL (ref 5–10.5)
POTASSIUM REFLEX MAGNESIUM: 4.3 MMOL/L (ref 3.5–5.1)
RBC # BLD: 4.14 M/UL (ref 4–5.2)
SODIUM BLD-SCNC: 138 MMOL/L (ref 136–145)
TOTAL PROTEIN: 6.6 G/DL (ref 6.4–8.2)
WBC # BLD: 5.2 K/UL (ref 4–11)

## 2019-11-19 PROCEDURE — 6360000002 HC RX W HCPCS: Performed by: INTERNAL MEDICINE

## 2019-11-19 PROCEDURE — 1200000000 HC SEMI PRIVATE

## 2019-11-19 PROCEDURE — 36415 COLL VENOUS BLD VENIPUNCTURE: CPT

## 2019-11-19 PROCEDURE — 2500000003 HC RX 250 WO HCPCS: Performed by: INTERNAL MEDICINE

## 2019-11-19 PROCEDURE — 2580000003 HC RX 258: Performed by: INTERNAL MEDICINE

## 2019-11-19 PROCEDURE — 94760 N-INVAS EAR/PLS OXIMETRY 1: CPT

## 2019-11-19 PROCEDURE — 85027 COMPLETE CBC AUTOMATED: CPT

## 2019-11-19 PROCEDURE — 6370000000 HC RX 637 (ALT 250 FOR IP): Performed by: INTERNAL MEDICINE

## 2019-11-19 PROCEDURE — 82330 ASSAY OF CALCIUM: CPT

## 2019-11-19 PROCEDURE — 80053 COMPREHEN METABOLIC PANEL: CPT

## 2019-11-19 RX ORDER — HYDRALAZINE HYDROCHLORIDE 20 MG/ML
10 INJECTION INTRAMUSCULAR; INTRAVENOUS EVERY 4 HOURS PRN
Status: DISCONTINUED | OUTPATIENT
Start: 2019-11-19 | End: 2019-11-22 | Stop reason: HOSPADM

## 2019-11-19 RX ORDER — ACETAMINOPHEN 325 MG/1
650 TABLET ORAL EVERY 4 HOURS PRN
Status: DISCONTINUED | OUTPATIENT
Start: 2019-11-19 | End: 2019-11-22 | Stop reason: HOSPADM

## 2019-11-19 RX ORDER — AMITRIPTYLINE HYDROCHLORIDE 50 MG/1
50 TABLET, FILM COATED ORAL NIGHTLY PRN
Status: DISCONTINUED | OUTPATIENT
Start: 2019-11-19 | End: 2019-11-22 | Stop reason: HOSPADM

## 2019-11-19 RX ORDER — KETOROLAC TROMETHAMINE 30 MG/ML
15 INJECTION, SOLUTION INTRAMUSCULAR; INTRAVENOUS EVERY 6 HOURS PRN
Status: DISCONTINUED | OUTPATIENT
Start: 2019-11-19 | End: 2019-11-22 | Stop reason: HOSPADM

## 2019-11-19 RX ORDER — BUSPIRONE HYDROCHLORIDE 5 MG/1
10 TABLET ORAL 2 TIMES DAILY
Status: DISCONTINUED | OUTPATIENT
Start: 2019-11-19 | End: 2019-11-22 | Stop reason: HOSPADM

## 2019-11-19 RX ORDER — MAGNESIUM HYDROXIDE/ALUMINUM HYDROXICE/SIMETHICONE 120; 1200; 1200 MG/30ML; MG/30ML; MG/30ML
30 SUSPENSION ORAL EVERY 6 HOURS PRN
Status: DISCONTINUED | OUTPATIENT
Start: 2019-11-19 | End: 2019-11-22 | Stop reason: HOSPADM

## 2019-11-19 RX ORDER — PANTOPRAZOLE SODIUM 40 MG/1
40 TABLET, DELAYED RELEASE ORAL
Status: DISCONTINUED | OUTPATIENT
Start: 2019-11-20 | End: 2019-11-22 | Stop reason: HOSPADM

## 2019-11-19 RX ORDER — HALOPERIDOL 5 MG/ML
5 INJECTION INTRAMUSCULAR EVERY 4 HOURS PRN
Status: DISCONTINUED | OUTPATIENT
Start: 2019-11-19 | End: 2019-11-22 | Stop reason: HOSPADM

## 2019-11-19 RX ORDER — SODIUM CHLORIDE 9 MG/ML
INJECTION, SOLUTION INTRAVENOUS CONTINUOUS
Status: DISCONTINUED | OUTPATIENT
Start: 2019-11-19 | End: 2019-11-22 | Stop reason: HOSPADM

## 2019-11-19 RX ORDER — LORAZEPAM 1 MG/1
1 TABLET ORAL EVERY 6 HOURS
Status: DISCONTINUED | OUTPATIENT
Start: 2019-11-19 | End: 2019-11-20

## 2019-11-19 RX ADMIN — LORAZEPAM 1 MG: 1 TABLET ORAL at 12:25

## 2019-11-19 RX ADMIN — Medication 10 ML: at 21:52

## 2019-11-19 RX ADMIN — FOLIC ACID: 5 INJECTION, SOLUTION INTRAMUSCULAR; INTRAVENOUS; SUBCUTANEOUS at 11:12

## 2019-11-19 RX ADMIN — Medication 1200 MG: at 08:51

## 2019-11-19 RX ADMIN — LORAZEPAM 1 MG: 2 INJECTION, SOLUTION INTRAMUSCULAR; INTRAVENOUS at 09:55

## 2019-11-19 RX ADMIN — Medication 10 ML: at 08:53

## 2019-11-19 RX ADMIN — ONDANSETRON 4 MG: 2 INJECTION INTRAMUSCULAR; INTRAVENOUS at 09:56

## 2019-11-19 RX ADMIN — BUSPIRONE HYDROCHLORIDE 10 MG: 5 TABLET ORAL at 21:52

## 2019-11-19 RX ADMIN — LORAZEPAM 1 MG: 1 TABLET ORAL at 15:39

## 2019-11-19 RX ADMIN — LORAZEPAM 1 MG: 1 TABLET ORAL at 18:38

## 2019-11-19 RX ADMIN — FLUOXETINE 40 MG: 20 CAPSULE ORAL at 08:51

## 2019-11-19 RX ADMIN — CHOLECALCIFEROL TAB 125 MCG (5000 UNIT) 5000 UNITS: 125 TAB at 08:51

## 2019-11-19 RX ADMIN — BUSPIRONE HYDROCHLORIDE 10 MG: 5 TABLET ORAL at 12:25

## 2019-11-19 RX ADMIN — SODIUM CHLORIDE: 9 INJECTION, SOLUTION INTRAVENOUS at 21:52

## 2019-11-19 RX ADMIN — LORAZEPAM 1 MG: 1 TABLET ORAL at 17:07

## 2019-11-19 RX ADMIN — NIFEDIPINE 30 MG: 30 TABLET, FILM COATED, EXTENDED RELEASE ORAL at 08:51

## 2019-11-19 ASSESSMENT — PAIN SCALES - GENERAL
PAINLEVEL_OUTOF10: 0
PAINLEVEL_OUTOF10: 4
PAINLEVEL_OUTOF10: 4

## 2019-11-20 LAB
ANION GAP SERPL CALCULATED.3IONS-SCNC: 12 MMOL/L (ref 3–16)
BASOPHILS ABSOLUTE: 0 K/UL (ref 0–0.2)
BASOPHILS RELATIVE PERCENT: 0.7 %
BUN BLDV-MCNC: 8 MG/DL (ref 7–20)
CALCIUM SERPL-MCNC: 9.3 MG/DL (ref 8.3–10.6)
CHLORIDE BLD-SCNC: 104 MMOL/L (ref 99–110)
CO2: 24 MMOL/L (ref 21–32)
CREAT SERPL-MCNC: 0.6 MG/DL (ref 0.6–1.2)
EOSINOPHILS ABSOLUTE: 0.2 K/UL (ref 0–0.6)
EOSINOPHILS RELATIVE PERCENT: 3.4 %
GFR AFRICAN AMERICAN: >60
GFR NON-AFRICAN AMERICAN: >60
GLUCOSE BLD-MCNC: 109 MG/DL (ref 70–99)
HCT VFR BLD CALC: 37.1 % (ref 36–48)
HEMOGLOBIN: 12.8 G/DL (ref 12–16)
LYMPHOCYTES ABSOLUTE: 1.2 K/UL (ref 1–5.1)
LYMPHOCYTES RELATIVE PERCENT: 24.7 %
MAGNESIUM: 1.5 MG/DL (ref 1.8–2.4)
MCH RBC QN AUTO: 34.4 PG (ref 26–34)
MCHC RBC AUTO-ENTMCNC: 34.6 G/DL (ref 31–36)
MCV RBC AUTO: 99.4 FL (ref 80–100)
MONOCYTES ABSOLUTE: 0.3 K/UL (ref 0–1.3)
MONOCYTES RELATIVE PERCENT: 6.1 %
NEUTROPHILS ABSOLUTE: 3.2 K/UL (ref 1.7–7.7)
NEUTROPHILS RELATIVE PERCENT: 65.1 %
PDW BLD-RTO: 14 % (ref 12.4–15.4)
PHOSPHORUS: 3.9 MG/DL (ref 2.5–4.9)
PLATELET # BLD: 189 K/UL (ref 135–450)
PMV BLD AUTO: 7.8 FL (ref 5–10.5)
POTASSIUM SERPL-SCNC: 3.7 MMOL/L (ref 3.5–5.1)
RBC # BLD: 3.73 M/UL (ref 4–5.2)
SODIUM BLD-SCNC: 140 MMOL/L (ref 136–145)
WBC # BLD: 4.9 K/UL (ref 4–11)

## 2019-11-20 PROCEDURE — 85025 COMPLETE CBC W/AUTO DIFF WBC: CPT

## 2019-11-20 PROCEDURE — 2500000003 HC RX 250 WO HCPCS: Performed by: INTERNAL MEDICINE

## 2019-11-20 PROCEDURE — 6360000002 HC RX W HCPCS: Performed by: INTERNAL MEDICINE

## 2019-11-20 PROCEDURE — 80048 BASIC METABOLIC PNL TOTAL CA: CPT

## 2019-11-20 PROCEDURE — 2580000003 HC RX 258: Performed by: INTERNAL MEDICINE

## 2019-11-20 PROCEDURE — 97530 THERAPEUTIC ACTIVITIES: CPT

## 2019-11-20 PROCEDURE — 94760 N-INVAS EAR/PLS OXIMETRY 1: CPT

## 2019-11-20 PROCEDURE — 1200000000 HC SEMI PRIVATE

## 2019-11-20 PROCEDURE — 97535 SELF CARE MNGMENT TRAINING: CPT

## 2019-11-20 PROCEDURE — 6370000000 HC RX 637 (ALT 250 FOR IP): Performed by: INTERNAL MEDICINE

## 2019-11-20 PROCEDURE — 83735 ASSAY OF MAGNESIUM: CPT

## 2019-11-20 PROCEDURE — 97161 PT EVAL LOW COMPLEX 20 MIN: CPT

## 2019-11-20 PROCEDURE — 84100 ASSAY OF PHOSPHORUS: CPT

## 2019-11-20 PROCEDURE — 97116 GAIT TRAINING THERAPY: CPT

## 2019-11-20 PROCEDURE — 97165 OT EVAL LOW COMPLEX 30 MIN: CPT

## 2019-11-20 RX ORDER — LORAZEPAM 1 MG/1
1 TABLET ORAL EVERY 8 HOURS
Status: DISCONTINUED | OUTPATIENT
Start: 2019-11-20 | End: 2019-11-21

## 2019-11-20 RX ORDER — MAGNESIUM SULFATE IN WATER 40 MG/ML
2 INJECTION, SOLUTION INTRAVENOUS ONCE
Status: COMPLETED | OUTPATIENT
Start: 2019-11-20 | End: 2019-11-20

## 2019-11-20 RX ADMIN — LORAZEPAM 3 MG: 1 TABLET ORAL at 22:04

## 2019-11-20 RX ADMIN — NIFEDIPINE 30 MG: 30 TABLET, FILM COATED, EXTENDED RELEASE ORAL at 10:35

## 2019-11-20 RX ADMIN — LORAZEPAM 1 MG: 1 TABLET ORAL at 15:43

## 2019-11-20 RX ADMIN — BUSPIRONE HYDROCHLORIDE 10 MG: 5 TABLET ORAL at 10:35

## 2019-11-20 RX ADMIN — Medication 1200 MG: at 10:35

## 2019-11-20 RX ADMIN — SODIUM CHLORIDE: 9 INJECTION, SOLUTION INTRAVENOUS at 06:47

## 2019-11-20 RX ADMIN — LORAZEPAM 2 MG: 1 TABLET ORAL at 18:02

## 2019-11-20 RX ADMIN — FOLIC ACID: 5 INJECTION, SOLUTION INTRAMUSCULAR; INTRAVENOUS; SUBCUTANEOUS at 15:41

## 2019-11-20 RX ADMIN — ENOXAPARIN SODIUM 40 MG: 40 INJECTION SUBCUTANEOUS at 21:47

## 2019-11-20 RX ADMIN — Medication 10 ML: at 21:47

## 2019-11-20 RX ADMIN — PANTOPRAZOLE SODIUM 40 MG: 40 TABLET, DELAYED RELEASE ORAL at 06:44

## 2019-11-20 RX ADMIN — ONDANSETRON 4 MG: 2 INJECTION INTRAMUSCULAR; INTRAVENOUS at 10:35

## 2019-11-20 RX ADMIN — LORAZEPAM 1 MG: 1 TABLET ORAL at 06:44

## 2019-11-20 RX ADMIN — AMITRIPTYLINE HYDROCHLORIDE 50 MG: 50 TABLET, FILM COATED ORAL at 22:42

## 2019-11-20 RX ADMIN — CHOLECALCIFEROL TAB 125 MCG (5000 UNIT) 5000 UNITS: 125 TAB at 10:37

## 2019-11-20 RX ADMIN — Medication 10 ML: at 10:36

## 2019-11-20 RX ADMIN — BUSPIRONE HYDROCHLORIDE 10 MG: 5 TABLET ORAL at 21:47

## 2019-11-20 RX ADMIN — MAGNESIUM SULFATE HEPTAHYDRATE 2 G: 40 INJECTION, SOLUTION INTRAVENOUS at 13:10

## 2019-11-20 RX ADMIN — LORAZEPAM 3 MG: 1 TABLET ORAL at 10:34

## 2019-11-20 RX ADMIN — FLUOXETINE 40 MG: 20 CAPSULE ORAL at 10:35

## 2019-11-20 RX ADMIN — LORAZEPAM 1 MG: 1 TABLET ORAL at 00:05

## 2019-11-20 RX ADMIN — SODIUM CHLORIDE: 9 INJECTION, SOLUTION INTRAVENOUS at 03:56

## 2019-11-20 ASSESSMENT — PAIN DESCRIPTION - LOCATION: LOCATION: ABDOMEN

## 2019-11-20 ASSESSMENT — PAIN SCALES - GENERAL
PAINLEVEL_OUTOF10: 6
PAINLEVEL_OUTOF10: 8
PAINLEVEL_OUTOF10: 5

## 2019-11-20 ASSESSMENT — PAIN DESCRIPTION - PAIN TYPE: TYPE: ACUTE PAIN

## 2019-11-21 LAB
ANION GAP SERPL CALCULATED.3IONS-SCNC: 11 MMOL/L (ref 3–16)
BASOPHILS ABSOLUTE: 0 K/UL (ref 0–0.2)
BASOPHILS RELATIVE PERCENT: 0.6 %
BUN BLDV-MCNC: 6 MG/DL (ref 7–20)
CALCIUM SERPL-MCNC: 8.9 MG/DL (ref 8.3–10.6)
CHLORIDE BLD-SCNC: 105 MMOL/L (ref 99–110)
CO2: 21 MMOL/L (ref 21–32)
CREAT SERPL-MCNC: 0.5 MG/DL (ref 0.6–1.2)
EOSINOPHILS ABSOLUTE: 0.2 K/UL (ref 0–0.6)
EOSINOPHILS RELATIVE PERCENT: 3.6 %
GFR AFRICAN AMERICAN: >60
GFR NON-AFRICAN AMERICAN: >60
GLUCOSE BLD-MCNC: 101 MG/DL (ref 70–99)
GLUCOSE BLD-MCNC: 104 MG/DL (ref 70–99)
GLUCOSE BLD-MCNC: 80 MG/DL (ref 70–99)
GLUCOSE BLD-MCNC: 95 MG/DL (ref 70–99)
GLUCOSE BLD-MCNC: 99 MG/DL (ref 70–99)
HCT VFR BLD CALC: 35.5 % (ref 36–48)
HEMOGLOBIN: 12.2 G/DL (ref 12–16)
LYMPHOCYTES ABSOLUTE: 1.9 K/UL (ref 1–5.1)
LYMPHOCYTES RELATIVE PERCENT: 42.1 %
MAGNESIUM: 2.3 MG/DL (ref 1.8–2.4)
MCH RBC QN AUTO: 34.3 PG (ref 26–34)
MCHC RBC AUTO-ENTMCNC: 34.3 G/DL (ref 31–36)
MCV RBC AUTO: 99.8 FL (ref 80–100)
MONOCYTES ABSOLUTE: 0.5 K/UL (ref 0–1.3)
MONOCYTES RELATIVE PERCENT: 11.4 %
NEUTROPHILS ABSOLUTE: 1.9 K/UL (ref 1.7–7.7)
NEUTROPHILS RELATIVE PERCENT: 42.3 %
PDW BLD-RTO: 14.1 % (ref 12.4–15.4)
PERFORMED ON: ABNORMAL
PERFORMED ON: NORMAL
PHOSPHORUS: 3.5 MG/DL (ref 2.5–4.9)
PLATELET # BLD: 178 K/UL (ref 135–450)
PMV BLD AUTO: 7.9 FL (ref 5–10.5)
POTASSIUM SERPL-SCNC: 4.1 MMOL/L (ref 3.5–5.1)
RBC # BLD: 3.55 M/UL (ref 4–5.2)
REASON FOR REJECTION: NORMAL
REJECTED TEST: NORMAL
SODIUM BLD-SCNC: 137 MMOL/L (ref 136–145)
WBC # BLD: 4.5 K/UL (ref 4–11)

## 2019-11-21 PROCEDURE — 6370000000 HC RX 637 (ALT 250 FOR IP): Performed by: INTERNAL MEDICINE

## 2019-11-21 PROCEDURE — 80048 BASIC METABOLIC PNL TOTAL CA: CPT

## 2019-11-21 PROCEDURE — 6360000002 HC RX W HCPCS: Performed by: INTERNAL MEDICINE

## 2019-11-21 PROCEDURE — 1200000000 HC SEMI PRIVATE

## 2019-11-21 PROCEDURE — 2580000003 HC RX 258: Performed by: INTERNAL MEDICINE

## 2019-11-21 PROCEDURE — 2500000003 HC RX 250 WO HCPCS: Performed by: INTERNAL MEDICINE

## 2019-11-21 PROCEDURE — 83735 ASSAY OF MAGNESIUM: CPT

## 2019-11-21 PROCEDURE — 84100 ASSAY OF PHOSPHORUS: CPT

## 2019-11-21 PROCEDURE — 85025 COMPLETE CBC W/AUTO DIFF WBC: CPT

## 2019-11-21 PROCEDURE — 36415 COLL VENOUS BLD VENIPUNCTURE: CPT

## 2019-11-21 PROCEDURE — 94760 N-INVAS EAR/PLS OXIMETRY 1: CPT

## 2019-11-21 RX ORDER — LORAZEPAM 1 MG/1
1 TABLET ORAL 2 TIMES DAILY
Status: DISCONTINUED | OUTPATIENT
Start: 2019-11-21 | End: 2019-11-22 | Stop reason: HOSPADM

## 2019-11-21 RX ADMIN — BUSPIRONE HYDROCHLORIDE 10 MG: 5 TABLET ORAL at 08:42

## 2019-11-21 RX ADMIN — ONDANSETRON 4 MG: 2 INJECTION INTRAMUSCULAR; INTRAVENOUS at 08:53

## 2019-11-21 RX ADMIN — NIFEDIPINE 30 MG: 30 TABLET, FILM COATED, EXTENDED RELEASE ORAL at 08:42

## 2019-11-21 RX ADMIN — Medication 10 ML: at 08:57

## 2019-11-21 RX ADMIN — AMITRIPTYLINE HYDROCHLORIDE 50 MG: 50 TABLET, FILM COATED ORAL at 21:30

## 2019-11-21 RX ADMIN — PANTOPRAZOLE SODIUM 40 MG: 40 TABLET, DELAYED RELEASE ORAL at 08:52

## 2019-11-21 RX ADMIN — FLUOXETINE 40 MG: 20 CAPSULE ORAL at 08:42

## 2019-11-21 RX ADMIN — SODIUM CHLORIDE: 9 INJECTION, SOLUTION INTRAVENOUS at 18:57

## 2019-11-21 RX ADMIN — LORAZEPAM 3 MG: 2 INJECTION, SOLUTION INTRAMUSCULAR; INTRAVENOUS at 11:45

## 2019-11-21 RX ADMIN — FOLIC ACID: 5 INJECTION, SOLUTION INTRAMUSCULAR; INTRAVENOUS; SUBCUTANEOUS at 10:18

## 2019-11-21 RX ADMIN — CHOLECALCIFEROL TAB 125 MCG (5000 UNIT) 5000 UNITS: 125 TAB at 08:42

## 2019-11-21 RX ADMIN — LORAZEPAM 1 MG: 1 TABLET ORAL at 21:30

## 2019-11-21 RX ADMIN — LORAZEPAM 3 MG: 1 TABLET ORAL at 02:09

## 2019-11-21 RX ADMIN — Medication 1200 MG: at 08:42

## 2019-11-21 RX ADMIN — BUSPIRONE HYDROCHLORIDE 10 MG: 5 TABLET ORAL at 21:30

## 2019-11-21 RX ADMIN — LORAZEPAM 4 MG: 2 INJECTION, SOLUTION INTRAMUSCULAR; INTRAVENOUS at 08:52

## 2019-11-21 RX ADMIN — ENOXAPARIN SODIUM 40 MG: 40 INJECTION SUBCUTANEOUS at 21:30

## 2019-11-21 RX ADMIN — LORAZEPAM 3 MG: 1 TABLET ORAL at 05:47

## 2019-11-21 ASSESSMENT — PAIN DESCRIPTION - LOCATION
LOCATION: ABDOMEN
LOCATION: ABDOMEN
LOCATION: HEAD

## 2019-11-21 ASSESSMENT — PAIN DESCRIPTION - DESCRIPTORS: DESCRIPTORS: ACHING;PRESSURE

## 2019-11-21 ASSESSMENT — PAIN DESCRIPTION - PAIN TYPE: TYPE: ACUTE PAIN

## 2019-11-21 ASSESSMENT — PAIN SCALES - GENERAL
PAINLEVEL_OUTOF10: 5
PAINLEVEL_OUTOF10: 7
PAINLEVEL_OUTOF10: 7
PAINLEVEL_OUTOF10: 4

## 2019-11-21 ASSESSMENT — PAIN DESCRIPTION - FREQUENCY: FREQUENCY: CONTINUOUS

## 2019-11-22 VITALS
RESPIRATION RATE: 18 BRPM | HEART RATE: 76 BPM | DIASTOLIC BLOOD PRESSURE: 78 MMHG | HEIGHT: 64 IN | TEMPERATURE: 96.8 F | SYSTOLIC BLOOD PRESSURE: 162 MMHG | OXYGEN SATURATION: 99 % | BODY MASS INDEX: 27.33 KG/M2 | WEIGHT: 160.1 LBS

## 2019-11-22 LAB
ANION GAP SERPL CALCULATED.3IONS-SCNC: 10 MMOL/L (ref 3–16)
BASOPHILS ABSOLUTE: 0 K/UL (ref 0–0.2)
BASOPHILS RELATIVE PERCENT: 0.5 %
BUN BLDV-MCNC: 6 MG/DL (ref 7–20)
CALCIUM SERPL-MCNC: 9.3 MG/DL (ref 8.3–10.6)
CHLORIDE BLD-SCNC: 104 MMOL/L (ref 99–110)
CO2: 24 MMOL/L (ref 21–32)
CREAT SERPL-MCNC: <0.5 MG/DL (ref 0.6–1.2)
EOSINOPHILS ABSOLUTE: 0.2 K/UL (ref 0–0.6)
EOSINOPHILS RELATIVE PERCENT: 3.6 %
GFR AFRICAN AMERICAN: >60
GFR NON-AFRICAN AMERICAN: >60
GLUCOSE BLD-MCNC: 101 MG/DL (ref 70–99)
HCT VFR BLD CALC: 36.3 % (ref 36–48)
HEMOGLOBIN: 12.4 G/DL (ref 12–16)
LYMPHOCYTES ABSOLUTE: 2 K/UL (ref 1–5.1)
LYMPHOCYTES RELATIVE PERCENT: 37.7 %
MAGNESIUM: 1.7 MG/DL (ref 1.8–2.4)
MCH RBC QN AUTO: 34 PG (ref 26–34)
MCHC RBC AUTO-ENTMCNC: 34 G/DL (ref 31–36)
MCV RBC AUTO: 99.9 FL (ref 80–100)
MONOCYTES ABSOLUTE: 0.5 K/UL (ref 0–1.3)
MONOCYTES RELATIVE PERCENT: 9.1 %
NEUTROPHILS ABSOLUTE: 2.6 K/UL (ref 1.7–7.7)
NEUTROPHILS RELATIVE PERCENT: 49.1 %
PDW BLD-RTO: 14.2 % (ref 12.4–15.4)
PHOSPHORUS: 3.7 MG/DL (ref 2.5–4.9)
PLATELET # BLD: 200 K/UL (ref 135–450)
PMV BLD AUTO: 7.6 FL (ref 5–10.5)
POTASSIUM SERPL-SCNC: 4 MMOL/L (ref 3.5–5.1)
RBC # BLD: 3.64 M/UL (ref 4–5.2)
SODIUM BLD-SCNC: 138 MMOL/L (ref 136–145)
WBC # BLD: 5.3 K/UL (ref 4–11)

## 2019-11-22 PROCEDURE — 80048 BASIC METABOLIC PNL TOTAL CA: CPT

## 2019-11-22 PROCEDURE — 84100 ASSAY OF PHOSPHORUS: CPT

## 2019-11-22 PROCEDURE — 6370000000 HC RX 637 (ALT 250 FOR IP): Performed by: INTERNAL MEDICINE

## 2019-11-22 PROCEDURE — 85025 COMPLETE CBC W/AUTO DIFF WBC: CPT

## 2019-11-22 PROCEDURE — 83735 ASSAY OF MAGNESIUM: CPT

## 2019-11-22 PROCEDURE — 36415 COLL VENOUS BLD VENIPUNCTURE: CPT

## 2019-11-22 RX ORDER — HYDROXYZINE PAMOATE 25 MG/1
25 CAPSULE ORAL 3 TIMES DAILY PRN
Qty: 30 CAPSULE | Refills: 0 | Status: SHIPPED | OUTPATIENT
Start: 2019-11-22 | End: 2019-11-30 | Stop reason: SDUPTHER

## 2019-11-22 RX ORDER — OMEPRAZOLE 20 MG/1
20 CAPSULE, DELAYED RELEASE ORAL
Qty: 30 CAPSULE | Refills: 0 | Status: ON HOLD | OUTPATIENT
Start: 2019-11-22 | End: 2020-01-30

## 2019-11-22 RX ADMIN — FLUOXETINE 40 MG: 20 CAPSULE ORAL at 08:40

## 2019-11-22 RX ADMIN — CHOLECALCIFEROL TAB 125 MCG (5000 UNIT) 5000 UNITS: 125 TAB at 08:40

## 2019-11-22 RX ADMIN — BUSPIRONE HYDROCHLORIDE 10 MG: 5 TABLET ORAL at 08:40

## 2019-11-22 RX ADMIN — Medication 1200 MG: at 08:40

## 2019-11-22 RX ADMIN — LORAZEPAM 1 MG: 1 TABLET ORAL at 08:41

## 2019-11-22 RX ADMIN — NIFEDIPINE 30 MG: 30 TABLET, FILM COATED, EXTENDED RELEASE ORAL at 08:41

## 2019-11-22 ASSESSMENT — PAIN SCALES - GENERAL
PAINLEVEL_OUTOF10: 0
PAINLEVEL_OUTOF10: 0
PAINLEVEL_OUTOF10: 4

## 2019-11-30 ENCOUNTER — APPOINTMENT (OUTPATIENT)
Dept: GENERAL RADIOLOGY | Age: 64
End: 2019-11-30
Payer: COMMERCIAL

## 2019-11-30 ENCOUNTER — HOSPITAL ENCOUNTER (EMERGENCY)
Age: 64
Discharge: HOME | End: 2019-11-30
Attending: EMERGENCY MEDICINE
Payer: COMMERCIAL

## 2019-11-30 VITALS
RESPIRATION RATE: 14 BRPM | DIASTOLIC BLOOD PRESSURE: 77 MMHG | WEIGHT: 155 LBS | TEMPERATURE: 98.1 F | OXYGEN SATURATION: 95 % | SYSTOLIC BLOOD PRESSURE: 152 MMHG | HEART RATE: 102 BPM | BODY MASS INDEX: 26.46 KG/M2 | HEIGHT: 64 IN

## 2019-11-30 DIAGNOSIS — F10.10 CHRONIC ALCOHOL ABUSE: ICD-10-CM

## 2019-11-30 DIAGNOSIS — R11.0 NAUSEA: ICD-10-CM

## 2019-11-30 DIAGNOSIS — R07.9 CHEST PAIN, UNSPECIFIED TYPE: Primary | ICD-10-CM

## 2019-11-30 DIAGNOSIS — R06.02 SHORTNESS OF BREATH: ICD-10-CM

## 2019-11-30 LAB
A/G RATIO: 1.5 (ref 1.1–2.2)
ALBUMIN SERPL-MCNC: 4.6 G/DL (ref 3.4–5)
ALP BLD-CCNC: 70 U/L (ref 40–129)
ALT SERPL-CCNC: 33 U/L (ref 10–40)
ANION GAP SERPL CALCULATED.3IONS-SCNC: 11 MMOL/L (ref 3–16)
AST SERPL-CCNC: 49 U/L (ref 15–37)
BASOPHILS ABSOLUTE: 0 K/UL (ref 0–0.2)
BASOPHILS RELATIVE PERCENT: 0.6 %
BILIRUB SERPL-MCNC: 0.5 MG/DL (ref 0–1)
BUN BLDV-MCNC: 6 MG/DL (ref 7–20)
CALCIUM SERPL-MCNC: 9.8 MG/DL (ref 8.3–10.6)
CHLORIDE BLD-SCNC: 97 MMOL/L (ref 99–110)
CO2: 29 MMOL/L (ref 21–32)
CREAT SERPL-MCNC: 0.6 MG/DL (ref 0.6–1.2)
EKG ATRIAL RATE: 104 BPM
EKG DIAGNOSIS: NORMAL
EKG P AXIS: 73 DEGREES
EKG P-R INTERVAL: 182 MS
EKG Q-T INTERVAL: 368 MS
EKG QRS DURATION: 86 MS
EKG QTC CALCULATION (BAZETT): 483 MS
EKG R AXIS: 69 DEGREES
EKG T AXIS: 57 DEGREES
EKG VENTRICULAR RATE: 104 BPM
EOSINOPHILS ABSOLUTE: 0.1 K/UL (ref 0–0.6)
EOSINOPHILS RELATIVE PERCENT: 1.6 %
GFR AFRICAN AMERICAN: >60
GFR NON-AFRICAN AMERICAN: >60
GLOBULIN: 3.1 G/DL
GLUCOSE BLD-MCNC: 111 MG/DL (ref 70–99)
HCT VFR BLD CALC: 37 % (ref 36–48)
HEMOGLOBIN: 12.9 G/DL (ref 12–16)
LIPASE: 31 U/L (ref 13–60)
LYMPHOCYTES ABSOLUTE: 1.5 K/UL (ref 1–5.1)
LYMPHOCYTES RELATIVE PERCENT: 27.5 %
MCH RBC QN AUTO: 34.4 PG (ref 26–34)
MCHC RBC AUTO-ENTMCNC: 34.8 G/DL (ref 31–36)
MCV RBC AUTO: 98.9 FL (ref 80–100)
MONOCYTES ABSOLUTE: 0.5 K/UL (ref 0–1.3)
MONOCYTES RELATIVE PERCENT: 8.8 %
NEUTROPHILS ABSOLUTE: 3.4 K/UL (ref 1.7–7.7)
NEUTROPHILS RELATIVE PERCENT: 61.5 %
PDW BLD-RTO: 13.5 % (ref 12.4–15.4)
PLATELET # BLD: 237 K/UL (ref 135–450)
PMV BLD AUTO: 7.2 FL (ref 5–10.5)
POTASSIUM SERPL-SCNC: 4.2 MMOL/L (ref 3.5–5.1)
RBC # BLD: 3.73 M/UL (ref 4–5.2)
SODIUM BLD-SCNC: 137 MMOL/L (ref 136–145)
TOTAL PROTEIN: 7.7 G/DL (ref 6.4–8.2)
TROPONIN: <0.01 NG/ML
WBC # BLD: 5.6 K/UL (ref 4–11)

## 2019-11-30 PROCEDURE — 71045 X-RAY EXAM CHEST 1 VIEW: CPT

## 2019-11-30 PROCEDURE — 96375 TX/PRO/DX INJ NEW DRUG ADDON: CPT

## 2019-11-30 PROCEDURE — 83690 ASSAY OF LIPASE: CPT

## 2019-11-30 PROCEDURE — 80053 COMPREHEN METABOLIC PANEL: CPT

## 2019-11-30 PROCEDURE — 84484 ASSAY OF TROPONIN QUANT: CPT

## 2019-11-30 PROCEDURE — 6360000002 HC RX W HCPCS: Performed by: PHYSICIAN ASSISTANT

## 2019-11-30 PROCEDURE — 96365 THER/PROPH/DIAG IV INF INIT: CPT

## 2019-11-30 PROCEDURE — 99285 EMERGENCY DEPT VISIT HI MDM: CPT

## 2019-11-30 PROCEDURE — 85025 COMPLETE CBC W/AUTO DIFF WBC: CPT

## 2019-11-30 PROCEDURE — 2500000003 HC RX 250 WO HCPCS: Performed by: PHYSICIAN ASSISTANT

## 2019-11-30 PROCEDURE — 2580000003 HC RX 258: Performed by: PHYSICIAN ASSISTANT

## 2019-11-30 PROCEDURE — 93010 ELECTROCARDIOGRAM REPORT: CPT | Performed by: INTERNAL MEDICINE

## 2019-11-30 PROCEDURE — 93005 ELECTROCARDIOGRAM TRACING: CPT | Performed by: EMERGENCY MEDICINE

## 2019-11-30 RX ORDER — ONDANSETRON 2 MG/ML
4 INJECTION INTRAMUSCULAR; INTRAVENOUS ONCE
Status: COMPLETED | OUTPATIENT
Start: 2019-11-30 | End: 2019-11-30

## 2019-11-30 RX ORDER — HYDROXYZINE PAMOATE 25 MG/1
25 CAPSULE ORAL 3 TIMES DAILY PRN
Qty: 30 CAPSULE | Refills: 0 | Status: ON HOLD | OUTPATIENT
Start: 2019-11-30 | End: 2020-01-30

## 2019-11-30 RX ORDER — LORAZEPAM 2 MG/ML
1 INJECTION INTRAMUSCULAR ONCE
Status: COMPLETED | OUTPATIENT
Start: 2019-11-30 | End: 2019-11-30

## 2019-11-30 RX ADMIN — ONDANSETRON 4 MG: 2 INJECTION INTRAMUSCULAR; INTRAVENOUS at 04:14

## 2019-11-30 RX ADMIN — LORAZEPAM 1 MG: 2 INJECTION, SOLUTION INTRAMUSCULAR; INTRAVENOUS at 04:15

## 2019-11-30 RX ADMIN — FOLIC ACID: 5 INJECTION, SOLUTION INTRAMUSCULAR; INTRAVENOUS; SUBCUTANEOUS at 04:14

## 2019-11-30 ASSESSMENT — ENCOUNTER SYMPTOMS
COUGH: 0
ABDOMINAL PAIN: 0
DIARRHEA: 0
SHORTNESS OF BREATH: 1
CHEST TIGHTNESS: 0
NAUSEA: 1
WHEEZING: 0
VOMITING: 0

## 2020-01-30 ENCOUNTER — HOSPITAL ENCOUNTER (INPATIENT)
Age: 65
LOS: 5 days | Discharge: HOME | DRG: 897 | End: 2020-02-04
Attending: EMERGENCY MEDICINE | Admitting: INTERNAL MEDICINE
Payer: COMMERCIAL

## 2020-01-30 LAB
A/G RATIO: 1.5 (ref 1.1–2.2)
ALBUMIN SERPL-MCNC: 4.6 G/DL (ref 3.4–5)
ALP BLD-CCNC: 58 U/L (ref 40–129)
ALT SERPL-CCNC: 32 U/L (ref 10–40)
AMPHETAMINE SCREEN, URINE: NORMAL
ANION GAP SERPL CALCULATED.3IONS-SCNC: 16 MMOL/L (ref 3–16)
AST SERPL-CCNC: 49 U/L (ref 15–37)
BARBITURATE SCREEN URINE: NORMAL
BASOPHILS ABSOLUTE: 0 K/UL (ref 0–0.2)
BASOPHILS RELATIVE PERCENT: 0.6 %
BENZODIAZEPINE SCREEN, URINE: NORMAL
BILIRUB SERPL-MCNC: 0.3 MG/DL (ref 0–1)
BUN BLDV-MCNC: 8 MG/DL (ref 7–20)
CALCIUM SERPL-MCNC: 9.8 MG/DL (ref 8.3–10.6)
CANNABINOID SCREEN URINE: NORMAL
CHLORIDE BLD-SCNC: 97 MMOL/L (ref 99–110)
CO2: 24 MMOL/L (ref 21–32)
COCAINE METABOLITE SCREEN URINE: NORMAL
CREAT SERPL-MCNC: 0.6 MG/DL (ref 0.6–1.2)
EKG ATRIAL RATE: 106 BPM
EKG DIAGNOSIS: NORMAL
EKG P AXIS: 74 DEGREES
EKG P-R INTERVAL: 170 MS
EKG Q-T INTERVAL: 344 MS
EKG QRS DURATION: 88 MS
EKG QTC CALCULATION (BAZETT): 456 MS
EKG R AXIS: 62 DEGREES
EKG T AXIS: 63 DEGREES
EKG VENTRICULAR RATE: 106 BPM
EOSINOPHILS ABSOLUTE: 0.1 K/UL (ref 0–0.6)
EOSINOPHILS RELATIVE PERCENT: 2 %
ETHANOL: NORMAL MG/DL (ref 0–0.08)
GFR AFRICAN AMERICAN: >60
GFR NON-AFRICAN AMERICAN: >60
GLOBULIN: 3 G/DL
GLUCOSE BLD-MCNC: 95 MG/DL (ref 70–99)
HCT VFR BLD CALC: 34.8 % (ref 36–48)
HEMOGLOBIN: 11.9 G/DL (ref 12–16)
LIPASE: 35 U/L (ref 13–60)
LYMPHOCYTES ABSOLUTE: 1.9 K/UL (ref 1–5.1)
LYMPHOCYTES RELATIVE PERCENT: 40.7 %
Lab: NORMAL
MCH RBC QN AUTO: 33.9 PG (ref 26–34)
MCHC RBC AUTO-ENTMCNC: 34.3 G/DL (ref 31–36)
MCV RBC AUTO: 99 FL (ref 80–100)
METHADONE SCREEN, URINE: NORMAL
MONOCYTES ABSOLUTE: 0.5 K/UL (ref 0–1.3)
MONOCYTES RELATIVE PERCENT: 9.8 %
NEUTROPHILS ABSOLUTE: 2.2 K/UL (ref 1.7–7.7)
NEUTROPHILS RELATIVE PERCENT: 46.9 %
OPIATE SCREEN URINE: NORMAL
OXYCODONE URINE: NORMAL
PDW BLD-RTO: 14.2 % (ref 12.4–15.4)
PH UA: 6
PHENCYCLIDINE SCREEN URINE: NORMAL
PLATELET # BLD: 238 K/UL (ref 135–450)
PMV BLD AUTO: 7.7 FL (ref 5–10.5)
POTASSIUM REFLEX MAGNESIUM: 4 MMOL/L (ref 3.5–5.1)
PROPOXYPHENE SCREEN: NORMAL
RBC # BLD: 3.51 M/UL (ref 4–5.2)
SODIUM BLD-SCNC: 137 MMOL/L (ref 136–145)
TOTAL PROTEIN: 7.6 G/DL (ref 6.4–8.2)
TROPONIN: <0.01 NG/ML
WBC # BLD: 4.7 K/UL (ref 4–11)

## 2020-01-30 PROCEDURE — 2580000003 HC RX 258: Performed by: INTERNAL MEDICINE

## 2020-01-30 PROCEDURE — 1200000000 HC SEMI PRIVATE

## 2020-01-30 PROCEDURE — 85025 COMPLETE CBC W/AUTO DIFF WBC: CPT

## 2020-01-30 PROCEDURE — 96376 TX/PRO/DX INJ SAME DRUG ADON: CPT

## 2020-01-30 PROCEDURE — 6370000000 HC RX 637 (ALT 250 FOR IP): Performed by: EMERGENCY MEDICINE

## 2020-01-30 PROCEDURE — 6370000000 HC RX 637 (ALT 250 FOR IP): Performed by: INTERNAL MEDICINE

## 2020-01-30 PROCEDURE — 80307 DRUG TEST PRSMV CHEM ANLYZR: CPT

## 2020-01-30 PROCEDURE — 6360000002 HC RX W HCPCS: Performed by: EMERGENCY MEDICINE

## 2020-01-30 PROCEDURE — 93005 ELECTROCARDIOGRAM TRACING: CPT | Performed by: EMERGENCY MEDICINE

## 2020-01-30 PROCEDURE — 2580000003 HC RX 258: Performed by: EMERGENCY MEDICINE

## 2020-01-30 PROCEDURE — 97165 OT EVAL LOW COMPLEX 30 MIN: CPT

## 2020-01-30 PROCEDURE — G0480 DRUG TEST DEF 1-7 CLASSES: HCPCS

## 2020-01-30 PROCEDURE — 96375 TX/PRO/DX INJ NEW DRUG ADDON: CPT

## 2020-01-30 PROCEDURE — 97116 GAIT TRAINING THERAPY: CPT

## 2020-01-30 PROCEDURE — 80053 COMPREHEN METABOLIC PANEL: CPT

## 2020-01-30 PROCEDURE — 83690 ASSAY OF LIPASE: CPT

## 2020-01-30 PROCEDURE — 6360000002 HC RX W HCPCS: Performed by: INTERNAL MEDICINE

## 2020-01-30 PROCEDURE — 96365 THER/PROPH/DIAG IV INF INIT: CPT

## 2020-01-30 PROCEDURE — 84484 ASSAY OF TROPONIN QUANT: CPT

## 2020-01-30 PROCEDURE — 36415 COLL VENOUS BLD VENIPUNCTURE: CPT

## 2020-01-30 PROCEDURE — 2500000003 HC RX 250 WO HCPCS: Performed by: INTERNAL MEDICINE

## 2020-01-30 PROCEDURE — 97161 PT EVAL LOW COMPLEX 20 MIN: CPT

## 2020-01-30 PROCEDURE — 99285 EMERGENCY DEPT VISIT HI MDM: CPT

## 2020-01-30 RX ORDER — LORAZEPAM 2 MG/ML
1 INJECTION INTRAMUSCULAR EVERY 30 MIN PRN
Status: COMPLETED | OUTPATIENT
Start: 2020-01-30 | End: 2020-01-30

## 2020-01-30 RX ORDER — PANTOPRAZOLE SODIUM 40 MG/1
40 TABLET, DELAYED RELEASE ORAL
Status: DISCONTINUED | OUTPATIENT
Start: 2020-01-30 | End: 2020-02-04 | Stop reason: HOSPADM

## 2020-01-30 RX ORDER — MAGNESIUM SULFATE 1 G/100ML
1 INJECTION INTRAVENOUS PRN
Status: DISCONTINUED | OUTPATIENT
Start: 2020-01-30 | End: 2020-02-04 | Stop reason: HOSPADM

## 2020-01-30 RX ORDER — CLONIDINE HYDROCHLORIDE 0.1 MG/1
0.1 TABLET ORAL ONCE
Status: COMPLETED | OUTPATIENT
Start: 2020-01-30 | End: 2020-01-30

## 2020-01-30 RX ORDER — LORAZEPAM 1 MG/1
1 TABLET ORAL
Status: DISCONTINUED | OUTPATIENT
Start: 2020-01-30 | End: 2020-02-04

## 2020-01-30 RX ORDER — LORAZEPAM 1 MG/1
2 TABLET ORAL
Status: DISCONTINUED | OUTPATIENT
Start: 2020-01-30 | End: 2020-02-04

## 2020-01-30 RX ORDER — SODIUM CHLORIDE 0.9 % (FLUSH) 0.9 %
10 SYRINGE (ML) INJECTION PRN
Status: DISCONTINUED | OUTPATIENT
Start: 2020-01-30 | End: 2020-02-04 | Stop reason: HOSPADM

## 2020-01-30 RX ORDER — FOLIC ACID 1 MG/1
1 TABLET ORAL DAILY
Status: DISCONTINUED | OUTPATIENT
Start: 2020-01-30 | End: 2020-02-04 | Stop reason: HOSPADM

## 2020-01-30 RX ORDER — DIAZEPAM 10 MG/1
10 TABLET ORAL ONCE
Status: COMPLETED | OUTPATIENT
Start: 2020-01-30 | End: 2020-01-30

## 2020-01-30 RX ORDER — LORAZEPAM 1 MG/1
3 TABLET ORAL
Status: DISCONTINUED | OUTPATIENT
Start: 2020-01-30 | End: 2020-02-04

## 2020-01-30 RX ORDER — ONDANSETRON 2 MG/ML
4 INJECTION INTRAMUSCULAR; INTRAVENOUS ONCE
Status: COMPLETED | OUTPATIENT
Start: 2020-01-30 | End: 2020-01-30

## 2020-01-30 RX ORDER — BUSPIRONE HYDROCHLORIDE 10 MG/1
10 TABLET ORAL 2 TIMES DAILY
Status: DISCONTINUED | OUTPATIENT
Start: 2020-01-30 | End: 2020-02-04 | Stop reason: HOSPADM

## 2020-01-30 RX ORDER — LORAZEPAM 2 MG/ML
2 INJECTION INTRAMUSCULAR
Status: DISCONTINUED | OUTPATIENT
Start: 2020-01-30 | End: 2020-02-04

## 2020-01-30 RX ORDER — FLUOXETINE HYDROCHLORIDE 20 MG/1
40 CAPSULE ORAL DAILY
Status: DISCONTINUED | OUTPATIENT
Start: 2020-01-30 | End: 2020-02-04 | Stop reason: HOSPADM

## 2020-01-30 RX ORDER — SODIUM CHLORIDE, SODIUM LACTATE, POTASSIUM CHLORIDE, CALCIUM CHLORIDE 600; 310; 30; 20 MG/100ML; MG/100ML; MG/100ML; MG/100ML
1000 INJECTION, SOLUTION INTRAVENOUS ONCE
Status: COMPLETED | OUTPATIENT
Start: 2020-01-30 | End: 2020-01-30

## 2020-01-30 RX ORDER — NIFEDIPINE 30 MG/1
30 TABLET, FILM COATED, EXTENDED RELEASE ORAL DAILY
Status: DISCONTINUED | OUTPATIENT
Start: 2020-01-30 | End: 2020-02-04 | Stop reason: HOSPADM

## 2020-01-30 RX ORDER — FOLIC ACID 1 MG/1
1 TABLET ORAL DAILY
Status: DISCONTINUED | OUTPATIENT
Start: 2020-01-30 | End: 2020-01-30

## 2020-01-30 RX ORDER — LORAZEPAM 2 MG/ML
4 INJECTION INTRAMUSCULAR
Status: DISCONTINUED | OUTPATIENT
Start: 2020-01-30 | End: 2020-02-04

## 2020-01-30 RX ORDER — LORAZEPAM 2 MG/ML
3 INJECTION INTRAMUSCULAR
Status: DISCONTINUED | OUTPATIENT
Start: 2020-01-30 | End: 2020-02-04

## 2020-01-30 RX ORDER — POTASSIUM CHLORIDE 7.45 MG/ML
10 INJECTION INTRAVENOUS PRN
Status: DISCONTINUED | OUTPATIENT
Start: 2020-01-30 | End: 2020-02-04 | Stop reason: HOSPADM

## 2020-01-30 RX ORDER — KETOROLAC TROMETHAMINE 30 MG/ML
15 INJECTION, SOLUTION INTRAMUSCULAR; INTRAVENOUS ONCE
Status: COMPLETED | OUTPATIENT
Start: 2020-01-30 | End: 2020-01-30

## 2020-01-30 RX ORDER — M-VIT,TX,IRON,MINS/CALC/FOLIC 27MG-0.4MG
1 TABLET ORAL ONCE
Status: COMPLETED | OUTPATIENT
Start: 2020-01-30 | End: 2020-01-30

## 2020-01-30 RX ORDER — THIAMINE MONONITRATE (VIT B1) 100 MG
100 TABLET ORAL DAILY
Status: DISCONTINUED | OUTPATIENT
Start: 2020-01-30 | End: 2020-02-04 | Stop reason: HOSPADM

## 2020-01-30 RX ORDER — ZOLPIDEM TARTRATE 5 MG/1
5 TABLET ORAL ONCE
Status: COMPLETED | OUTPATIENT
Start: 2020-01-30 | End: 2020-01-30

## 2020-01-30 RX ORDER — SODIUM CHLORIDE 0.9 % (FLUSH) 0.9 %
10 SYRINGE (ML) INJECTION EVERY 12 HOURS SCHEDULED
Status: DISCONTINUED | OUTPATIENT
Start: 2020-01-30 | End: 2020-02-04 | Stop reason: HOSPADM

## 2020-01-30 RX ORDER — LORAZEPAM 1 MG/1
4 TABLET ORAL
Status: DISCONTINUED | OUTPATIENT
Start: 2020-01-30 | End: 2020-02-04

## 2020-01-30 RX ORDER — ONDANSETRON 2 MG/ML
4 INJECTION INTRAMUSCULAR; INTRAVENOUS EVERY 6 HOURS PRN
Status: DISCONTINUED | OUTPATIENT
Start: 2020-01-30 | End: 2020-02-04 | Stop reason: HOSPADM

## 2020-01-30 RX ORDER — CLONIDINE HYDROCHLORIDE 0.1 MG/1
0.1 TABLET ORAL EVERY 8 HOURS PRN
Status: DISCONTINUED | OUTPATIENT
Start: 2020-01-30 | End: 2020-02-04 | Stop reason: HOSPADM

## 2020-01-30 RX ORDER — THIAMINE MONONITRATE (VIT B1) 100 MG
100 TABLET ORAL DAILY
Status: DISCONTINUED | OUTPATIENT
Start: 2020-01-30 | End: 2020-01-30

## 2020-01-30 RX ORDER — LORAZEPAM 2 MG/ML
1 INJECTION INTRAMUSCULAR
Status: DISCONTINUED | OUTPATIENT
Start: 2020-01-30 | End: 2020-02-04

## 2020-01-30 RX ORDER — LORAZEPAM 2 MG/ML
1 INJECTION INTRAMUSCULAR ONCE
Status: DISCONTINUED | OUTPATIENT
Start: 2020-01-30 | End: 2020-01-30

## 2020-01-30 RX ADMIN — Medication 10 ML: at 21:31

## 2020-01-30 RX ADMIN — CLONIDINE HYDROCHLORIDE 0.1 MG: 0.1 TABLET ORAL at 05:59

## 2020-01-30 RX ADMIN — ENOXAPARIN SODIUM 40 MG: 40 INJECTION SUBCUTANEOUS at 13:10

## 2020-01-30 RX ADMIN — SODIUM CHLORIDE, POTASSIUM CHLORIDE, SODIUM LACTATE AND CALCIUM CHLORIDE 1000 ML: 600; 310; 30; 20 INJECTION, SOLUTION INTRAVENOUS at 04:44

## 2020-01-30 RX ADMIN — LORAZEPAM 1 MG: 2 INJECTION INTRAMUSCULAR; INTRAVENOUS at 06:00

## 2020-01-30 RX ADMIN — LORAZEPAM 1 MG: 2 INJECTION INTRAMUSCULAR; INTRAVENOUS at 04:45

## 2020-01-30 RX ADMIN — DIAZEPAM 10 MG: 10 TABLET ORAL at 06:33

## 2020-01-30 RX ADMIN — BUSPIRONE HYDROCHLORIDE 10 MG: 10 TABLET ORAL at 13:59

## 2020-01-30 RX ADMIN — BUSPIRONE HYDROCHLORIDE 10 MG: 10 TABLET ORAL at 21:33

## 2020-01-30 RX ADMIN — ZOLPIDEM TARTRATE 5 MG: 5 TABLET ORAL at 21:33

## 2020-01-30 RX ADMIN — MULTIPLE VITAMINS W/ MINERALS TAB 1 TABLET: TAB at 05:59

## 2020-01-30 RX ADMIN — ONDANSETRON 4 MG: 2 INJECTION INTRAMUSCULAR; INTRAVENOUS at 04:45

## 2020-01-30 RX ADMIN — FOLIC ACID: 5 INJECTION, SOLUTION INTRAMUSCULAR; INTRAVENOUS; SUBCUTANEOUS at 10:20

## 2020-01-30 RX ADMIN — KETOROLAC TROMETHAMINE 15 MG: 30 INJECTION, SOLUTION INTRAMUSCULAR at 04:44

## 2020-01-30 RX ADMIN — NIFEDIPINE 30 MG: 30 TABLET, EXTENDED RELEASE ORAL at 13:59

## 2020-01-30 RX ADMIN — FLUOXETINE 40 MG: 20 CAPSULE ORAL at 13:59

## 2020-01-30 RX ADMIN — Medication 10 ML: at 09:44

## 2020-01-30 ASSESSMENT — PAIN SCALES - GENERAL
PAINLEVEL_OUTOF10: 0
PAINLEVEL_OUTOF10: 1
PAINLEVEL_OUTOF10: 5
PAINLEVEL_OUTOF10: 0

## 2020-01-30 NOTE — ED PROVIDER NOTES
Medications    AMITRIPTYLINE (ELAVIL) 50 MG TABLET    Take 50 mg by mouth nightly as needed     B COMPLEX VITAMINS (B COMPLEX 1 PO)    Take by mouth daily     BUSPIRONE (BUSPAR) 10 MG TABLET    Take 10 mg by mouth 2 times daily     CALCIUM CARBONATE 600 MG TABS TABLET    Take 2 tablets by mouth daily    CHOLECALCIFEROL (VITAMIN D3) 5000 UNITS TABS    Take 1 tablet by mouth daily    FLUOXETINE (PROZAC) 20 MG CAPSULE    Take 2 capsules by mouth daily    FOLIC ACID (FOLVITE) 1 MG TABLET    Take 1 tablet by mouth daily    HYDROXYZINE (VISTARIL) 25 MG CAPSULE    Take 1 capsule by mouth 3 times daily as needed for Itching or Anxiety    MILK THISTLE 250 MG CAPS    Take 1 capsule by mouth daily    MULTIPLE VITAMINS-MINERALS (MULTIVITAMIN PO)    Take by mouth daily     NIFEDIPINE (ADALAT CC) 30 MG EXTENDED RELEASE TABLET    Take 1 tablet by mouth daily    OMEPRAZOLE (PRILOSEC) 20 MG DELAYED RELEASE CAPSULE    Take 1 capsule by mouth every morning (before breakfast)    ONDANSETRON (ZOFRAN) 4 MG TABLET    Take 1 tablet by mouth every 8 hours as needed for Nausea or Vomiting       Allergies     She is allergic to penicillins and sulfa antibiotics. Physical Exam     INITIAL VITALS: BP: (!) 171/92, Temp: 98.2 °F (36.8 °C), Pulse: 105, Resp: 16, SpO2: 99 %   Physical Exam  General--alert, no obvious distress, mildly anxious  HEENT--no signs of trauma, oropharynx is mildly dry, no facial asymmetry on examination  Neck--trachea midline, no JVD  Chest--equal breath sounds bilaterally,  Heart--tachycardic but regular rhythm  Abdomen--soft, nontender, nondistended, bowel sounds are present  Extremities--good range of motion, good pulses throughout  Neuro---awake and alert, no facial asymmetry, voice is steady and clear, no focal weakness noted  Psych--appropriate affect and mood, slightly anxious  Diagnostic Results     EKG   Sinus tachycardia rate 106 bpm.  PA interval 170 ms. QRS duration 88 ms. QTc 456 ms.     RADIOLOGY:  No orders to display       LABS:   Results for orders placed or performed during the hospital encounter of 01/30/20   CBC Auto Differential   Result Value Ref Range    WBC 4.7 4.0 - 11.0 K/uL    RBC 3.51 (L) 4.00 - 5.20 M/uL    Hemoglobin 11.9 (L) 12.0 - 16.0 g/dL    Hematocrit 34.8 (L) 36.0 - 48.0 %    MCV 99.0 80.0 - 100.0 fL    MCH 33.9 26.0 - 34.0 pg    MCHC 34.3 31.0 - 36.0 g/dL    RDW 14.2 12.4 - 15.4 %    Platelets 260 526 - 494 K/uL    MPV 7.7 5.0 - 10.5 fL    Neutrophils % 46.9 %    Lymphocytes % 40.7 %    Monocytes % 9.8 %    Eosinophils % 2.0 %    Basophils % 0.6 %    Neutrophils Absolute 2.2 1.7 - 7.7 K/uL    Lymphocytes Absolute 1.9 1.0 - 5.1 K/uL    Monocytes Absolute 0.5 0.0 - 1.3 K/uL    Eosinophils Absolute 0.1 0.0 - 0.6 K/uL    Basophils Absolute 0.0 0.0 - 0.2 K/uL   Comprehensive Metabolic Panel w/ Reflex to MG   Result Value Ref Range    Sodium 137 136 - 145 mmol/L    Potassium reflex Magnesium 4.0 3.5 - 5.1 mmol/L    Chloride 97 (L) 99 - 110 mmol/L    CO2 24 21 - 32 mmol/L    Anion Gap 16 3 - 16    Glucose 95 70 - 99 mg/dL    BUN 8 7 - 20 mg/dL    CREATININE 0.6 0.6 - 1.2 mg/dL    GFR Non-African American >60 >60    GFR African American >60 >60    Calcium 9.8 8.3 - 10.6 mg/dL    Total Protein 7.6 6.4 - 8.2 g/dL    Alb 4.6 3.4 - 5.0 g/dL    Albumin/Globulin Ratio 1.5 1.1 - 2.2    Total Bilirubin 0.3 0.0 - 1.0 mg/dL    Alkaline Phosphatase 58 40 - 129 U/L    ALT 32 10 - 40 U/L    AST 49 (H) 15 - 37 U/L    Globulin 3.0 g/dL   Lipase   Result Value Ref Range    Lipase 35.0 13.0 - 60.0 U/L   Troponin   Result Value Ref Range    Troponin <0.01 <0.01 ng/mL   Ethanol   Result Value Ref Range    Ethanol Lvl None Detected mg/dL           RECENT VITALS:  BP: (!) 158/84,Temp: 98.2 °F (36.8 °C), Pulse: 103, Resp: 15, SpO2: 98 %     Procedures         ED Course     Nursing Notes, Past Medical Hx, Past Surgical Hx, Social Hx,Allergies, and Family Hx were reviewed.     patient was given the following medications:  Orders Placed This Encounter   Medications    lactated ringers infusion 1,000 mL    ketorolac (TORADOL) injection 15 mg    ondansetron (ZOFRAN) injection 4 mg    LORazepam (ATIVAN) injection 1 mg    cloNIDine (CATAPRES) tablet 0.1 mg    therapeutic multivitamin-minerals 1 tablet    vitamin B-1 (THIAMINE) tablet 265 mg    folic acid (FOLVITE) tablet 1 mg       CONSULTS:  None    MEDICAL DECISIONMAKING / ASSESSMENT / Price Menard is a 71 y.o. female who comes in complaining of alcohol withdrawal.  She is mildly tachycardic upon arrival.  Labs of been drawn, IV started with IV fluids along with Zofran and Ativan for her symptoms. 5:35 AM the patient was reassessed, she was calm, not tachycardic while at rest, not elevated blood pressure, had very well controlled withdrawal symptoms with 1 mg of Ativan IV and some Zofran. The patient be given multivitamin along with clonidine here, she has been in discussions with an inpatient treatment program where she would like to go this morning for treatment at that facility. She knows we can treat her in the hospital for withdrawal symptoms but we are not an alcohol treatment facility. She would prefer to be inpatient at a alcohol treatment facility, if remains stable be discharged from the ER to go to the [facility] by the [facility] for evaluation and treatment of her withdrawal.    At 6:15 AM patient was persistently tachycardic and hypertensive after treatment. In further discussion with the patient she does not have a clear treatment plan upon leaving here with an inpatient unit, I do not think it is safe for her to be discharged without further withdrawal treatment. I have ordered more Ativan and Valium orally for her alcohol withdrawal and will discuss her with the hospitalist for admission.         Clinical Impression     Alcohol withdrawal and dependence    Disposition     PATIENT REFERRED TO:  No follow-up provider specified.     DISCHARGE MEDICATIONS:  New Prescriptions    No medications on file       DISPOSITION admission       Bahman William MD  01/30/20 5320

## 2020-01-30 NOTE — PROGRESS NOTES
Occupational Therapy   Occupational Therapy Initial Assessment and Discharge  Date: 2020   Patient Name: Jessica Mcclellan  MRN: [id number]     : 1955    Date of Service: 2020    Discharge Recommendations:    Jessica Mcclellan scored a 24/24 on the AM-PAC ADL Inpatient form. At this time, no further OT is recommended upon discharge due as pt is presenting with the ability to perform all ADL and functional mobility at the SBA level or higher. Recommend patient returns to prior setting with prior services. OT Equipment Recommendations  Equipment Needed: No    Assessment    After evaluation and treatment, pt is presenting with the ability to perform all ADL and functional mobility at the SBA level or higher. Pt with no further acute OT or equipment needs, thus d/c pt. Rec d/c home with family assist prn. Decision Making: Low Complexity  No Skilled OT: No OT goals identified  REQUIRES OT FOLLOW UP: No  Activity Tolerance  Activity Tolerance: Patient Tolerated treatment well  Safety Devices  Safety Devices in place: Yes  Type of devices: Call light within reach; Left in bed;Gait belt;Nurse notified         Patient Diagnosis(es): The encounter diagnosis was Alcohol withdrawal syndrome with complication ([address] has a past medical history of Alcoholism ([address] Anxiety, Ataxia, Depression, Hyperlipidemia, and Hypertension. has a past surgical history that includes back surgery; Colonoscopy; and Endoscopy, colon, diagnostic. Restrictions  Position Activity Restriction  Other position/activity restrictions: RN approved eval despite strict bedrest order    Subjective   General  Chart Reviewed: Yes  Patient assessed for rehabilitation services?: Yes  Family / Caregiver Present: No  Referring Practitioner: Harshal Worthington MD  Diagnosis: ETOH withdrawal  Subjective  Subjective: RN cleared pt for tx. Pt supine at session start.      Social/Functional History  Social/Functional History  Lives With: Spouse()  Type of Home: Apartment  Home Layout: One level  Home Access: Stairs to enter with rails(13 CALEB)  Bathroom Shower/Tub: Walk-in shower  Bathroom Toilet: Standard  Bathroom Equipment: Shower chair  Home Equipment: Rolling walker  ADL Assistance: Independent  Homemaking Assistance: Independent  Ambulation Assistance: (no device)  Transfer Assistance: Independent  Active : No       Objective   Vision: Impaired  Vision Exceptions: Wears glasses for reading;Wears glasses for distance  Hearing: Within functional limits    Orientation  Overall Orientation Status: Within Functional Limits     Balance  Sitting Balance: Independent  Standing Balance: Independent(no device)  Functional Mobility  Functional - Mobility Device: No device  Activity: Other  Assist Level: Independent  ADL  Feeding: Independent(to drink standing)  LE Dressing: Supervision(to don/doff shoes seated)  Additional Comments: Pt declining further ADL at this time. Tone RUE  RUE Tone: Normotonic  Tone LUE  LUE Tone: Normotonic  Coordination  Movements Are Fluid And Coordinated: Yes     Bed mobility  Supine to Sit: Independent  Sit to Supine: Independent  Scooting: Independent  Comment: HOB flat, no rail  Transfers  Sit to stand: Independent  Stand to sit: Independent     Cognition  Overall Cognitive Status: WFL                                       BUE strength and AROM are WFL based on functional presentation. Education: Role of OT, discharge planning, ADL as therapeutic exercise, importance of [place]  Times per week: Eval and d/c    AM-PAC Score        AM-[facility] Inpatient Daily Activity Raw Score: 24 (01/30/20 1215)  AM-PAC Inpatient ADL T-Scale Score : 57.54 (01/30/20 1215)  ADL Inpatient CMS 0-100% Score: 0 (01/30/20 1215)  ADL Inpatient CMS G-Code Modifier : [facility] (01/30/20 1215)    Goals  Patient Goals   Patient goals : \"Go to alcohol rehab. \"       Therapy Time   Individual Concurrent Group Co-treatment   Time In

## 2020-01-30 NOTE — ED NOTES
Pt asleep at this time, no distress noted, call light with in reach will continue to monitor.      Camelia Lambert RN  01/30/20 1300

## 2020-01-30 NOTE — PROGRESS NOTES
Physical Therapy    Facility/Department: 0 [facility]  Initial Assessment/Treatment/Discharge Summary     NAME: Marco Menezes  : 1955  MRN: [id number]    Date of Service: 2020    Discharge Recommendations:    Marco Menezes scored a 24/24 on the AM-PAC short mobility form. At this time, no further PT is recommended upon discharge due to pt performing all functional .  Recommend patient returns to prior setting with prior services. PT Equipment Recommendations  Equipment Needed: No    Assessment   Assessment: 71 y.o. female who presents alcohol withdrawal symptoms. Patient states at home she can feel her heart pounding, some chest discomfort, some nausea and a feeling of ants crawling on her skin. Pt currently at functional baseline, performing all functional mobility safely and independently. Pt planning to d/c to Rehab center. Pt with no further acute PT needs at this time. Will sign off from PT services. Prognosis: Excellent  Decision Making: Low Complexity  Patient Education: role of PT, d/c planning; pt verb understanding  Barriers to Learning: none  REQUIRES PT FOLLOW UP: No  Activity Tolerance  Activity Tolerance: Patient Tolerated treatment well       Patient Diagnosis(es): The encounter diagnosis was Alcohol withdrawal syndrome with complication ([facility] [address] has a past medical history of Alcoholism ([facility] [address] Anxiety, Ataxia, Depression, Hyperlipidemia, and Hypertension. has a past surgical history that includes back surgery; Colonoscopy; and Endoscopy, colon, diagnostic.     Restrictions  Position Activity Restriction  Other position/activity restrictions: RN approved eval despite strict bedrest order  Vision/Hearing  Vision: Impaired  Vision Exceptions: Wears glasses for reading;Wears glasses for distance  Hearing: Within functional limits     Subjective  General  Chart Reviewed: Yes  Patient assessed for rehabilitation services?: Yes  Additional Pertinent Hx: 64 y.o. female who presents alcohol withdrawal symptoms. Patient states at home she can feel her heart pounding, some chest discomfort, some nausea and a feeling of ants crawling on her skin. Family / Caregiver Present: No  Referring Practitioner: Jules Paul MD  Diagnosis: Alcohol Withdrawal delirium   Follows Commands: Within Functional Limits  Subjective  Subjective: Pt found supine in bed upon arrival, denying pain and agreeable to therapy. Orientation  Orientation  Overall Orientation Status: Within Functional Limits  Social/Functional History  Social/Functional History  Lives With: Spouse()  Type of Home: Apartment  Home Layout: One level  Home Access: Stairs to enter with rails(13 CALEB)  Bathroom Shower/Tub: Walk-in shower  Bathroom Toilet: Standard  Bathroom Equipment: Shower chair  Home Equipment: Rolling walker  ADL Assistance: Independent  Homemaking Assistance: Independent  Ambulation Assistance: (no device)  Transfer Assistance: Independent  Active : No    Objective  AROM RLE (degrees)  RLE AROM: WNL  AROM LLE (degrees)  LLE AROM : WNL  Strength RLE  Strength RLE: WFL  Strength LLE  Strength LLE: WFL        Bed mobility  Supine to Sit: Independent  Sit to Supine: Independent  Scooting: Independent  Transfers  Sit to Stand: Independent  Stand to sit: Independent  Ambulation  Ambulation?: Yes  Ambulation 1  Surface: level tile  Device: No Device  Assistance: Independent  Quality of Gait: quick yue, moderate stride length and moderate Qasim. Slightly unsteady but no LOB or near LOB. Distance: 400'   Stairs/Curb  Stairs?: Yes  Stairs  # Steps : 12  Stairs Height: 6\"  Rails: Left ascending  Device: No Device  Assistance: Modified independent      Balance  Posture: Good  Sitting - Static: Good  Sitting - Dynamic: Good  Standing - Static: Good  Standing - Dynamic: Good      PT evaluation and treatment performed.  Treatment included gait and transfer training as well as patient education. Plan   Plan  Times per week: d/c Acute PT   Safety Devices  Type of devices: Nurse notified, Gait belt, Left in bed    AM-PAC Score  AM-PAC Inpatient Mobility Raw Score : 24 (01/30/20 1233)  AM-PAC Inpatient T-Scale Score : 61.14 (01/30/20 1233)  Mobility Inpatient CMS 0-100% Score: 0 (01/30/20 1233)  Mobility Inpatient CMS G-Code Modifier : [address] (01/30/20 1233)        Therapy Time   Individual Concurrent Group Co-treatment   Time In 1058         Time Out 1121         Minutes 23           Timed Code Treatment Minutes:  8    Total Treatment Minutes:  23    If the patient is discharged before the next treatment session, this note will serve as the discharge summary.      Kelly Davey, PT, DPT [id number]

## 2020-01-30 NOTE — H&P
History and Physical    Admit Date: 1/30/2020    Patient's PCP: Dr. Natasha Orantes MD    HISTORY OF PRESENT ILLNESS:    This is a very pleasant 71 y.o. female with a history of Alcoholism ([address] Anxiety, Depression, Hyperlipidemia, and Hypertension who presented to the ED with palpitations, chest pressure and nausea. She was at home and developed heart pounding, chest pressure  And some nausea and a feeling of ants crawling on her skin. She equated these to alcohol withdrawal symptoms since she has been through these before. She has required admission for this in the past as recently as November of last year. Last drink was around 2000 last night. She has been in  discussions with an inpatient treatment program for her ETOH issues, but has yet to go. In the ED she was afebrile. She was persistently tachycardic and hypertensive and was given Ativan IV and some Zofran as well as multivitamin with clonidine. She was planned for transfer from  ER to go to the [facility] by the [facility] for evaluation and treatment of her withdrawal, but this was not possible. .    She denies any new complaints. Past Medical History:   Diagnosis Date    Alcoholism ([facility] [address]     Anxiety     Ataxia     Depression     Hyperlipidemia     Hypertension        Past Surgical History:   Procedure Laterality Date    BACK SURGERY      1996 L5 disectomy    COLONOSCOPY      2016    ENDOSCOPY, COLON, DIAGNOSTIC      2016       Medications Prior to Admission:    No current facility-administered medications on file prior to encounter.       Current Outpatient Medications on File Prior to Encounter   Medication Sig Dispense Refill    Cholecalciferol (VITAMIN D3) 5000 units TABS Take 1 tablet by mouth daily 30 tablet 0    Multiple Vitamins-Minerals (MULTIVITAMIN PO) Take by mouth daily       B Complex Vitamins (B COMPLEX 1 PO) Take by mouth daily       busPIRone (BUSPAR) 10 MG tablet Take 10 mg by mouth 2 times daily       deterioration related to progression of her withdrawal/DTs and need for higher level of care including intubation for airway protection. Discussed code status: she wants to be full code. The patient and / or the family were informed of the results of any tests, a time was given to answer questions, a plan was proposed and they agreed with plan. Thank you Dr. Tameka Puente MD for the opportunity to be involved in this patients care. If you have any questions or concerns please feel free to contact me at [phone].   Full Code    Ochoa Evans MD

## 2020-01-30 NOTE — ED TRIAGE NOTES
Pt arrived with c/o withdrawal from alcohol. Last drink 2000. Pt also reports chest pressure. Pt is awake alert and oriented to baseline. Respirations equal and unlabored. Skin is warm, dry and appropriate color.
yes

## 2020-01-30 NOTE — PROGRESS NOTES
4 Eyes Admission Assessment     I agree as the admission nurse that 2 RN's have performed a thorough Head to Toe Skin Assessment on the patient. ALL assessment sites listed below have been assessed on admission. Areas assessed by both nurses:   [x]   Head, Face, and Ears   [x]   Shoulders, Back, and Chest  [x]   Arms, Elbows, and Hands   [x]   Coccyx, Sacrum, and Ischum  [x]   Legs, Feet, and Heels        Does the Patient have Skin Breakdown?   No         Ernie Prevention initiated:  NA   Wound Care Orders initiated:  NA      WOC nurse consulted for Pressure Injury (Stage 3,4, Unstageable, DTI, NWPT, and Complex wounds):  NA      Nurse 1 eSignature: Electronically signed by Gay Galeas RN on 1/30/20 at 1:36 PM    **SHARE this note so that the co-signing nurse is able to place an eSignature**    Nurse 2 eSignature: Electronically signed by Jf Watson RN on 1/30/20 at 2:40 PM

## 2020-01-30 NOTE — CARE COORDINATION
Referred to patient for ETOH withdrawal.  Spoke to patient at bedside. Patient states she has been drinking heavily since April 2019. Patient has been to [facility] in past, refuses to return there. States she was not sober for any length of time after that stay. Patient usually lives at home with her . States she no longer uses a walker for mobility. Reports she is independent in ADLs. Patient is agreeable to [facility] by the [place]. Referral made. They are requesting labs and PT/OT eval.  Referral made to PT/OT. RN and MD updated. Per facility, they do have beds available today. They are sending a representative here to do a face to face evaluation. Patient updated. Electronically signed by SAJI Mora LISW-S on 1/30/2020 at 11:03 AM     UPDATE:  Facility now states they no longer have a bed available. Once patient is through with detox, they may accept her into their residential program, however, they have stairs and would want her evaluated again for mobility. Patient has been with [facility] in past as well. RN and MD updated. Case Management Assessment           Initial Evaluation                Date / Time of Evaluation: 1/30/2020 1:02 PM                 Assessment Completed by: Pippa Patel    Patient Name: Yamilex Schumacher     YOB: 1955  Diagnosis: Alcohol withdrawal delirium ([facility] [F10.231]  Alcohol withdrawal delirium ([facility] [F10.231]     Date / Time: 1/30/2020  3:50 AM    Patient Admission Status: Inpatient    If patient is discharged prior to next notation, then this note serves as note for discharge by case management.      Current PCP: Allen Goodman MD  Clinic Patient: No    Chart Reviewed: Yes  Patient/ Family Interviewed: Yes    Initial assessment completed at bedside with: Patient    Hospitalization in the last 30 days: No    Emergency Contacts:  Extended Emergency Contact Information  Primary Emergency Contact: Destiney Low A  Address: [address], [address] [place] [address] Phone: [phone]  Mobile Phone: [phone]  Relation: Spouse    Advance Directives:   Code Status: Full 2021 Brandy Sparks: No    Financial  Payor: Melia Look / Plan: MEDICARE PART A / Product Type: *No Product type* /     Pre-cert required for SNF: Yes    Pharmacy    [facility], [address]  Phone: [phone] Fax: [phone]      Potential assistance Purchasing Medications:    Does Patient want to participate in local refill/ meds to beds program?:      Meds To Beds General Rules:  1. Can ONLY be done Monday- Friday between 8:30am-5pm  2. Prescription(s) must be in pharmacy by 3pm to be filled same day  3. Copy of patient's insurance/ prescription drug card and patient face sheet must be sent along with the prescription(s)  4. Cost of Rx cannot be added to hospital bill. If financial assistance is needed, please contact unit  or ;  or  CANNOT provide pharmacy voucher for patients co-pays  5.  Patients can then  the prescription on their way out of the hospital at discharge, or pharmacy can deliver to the bedside if staff is available. (payment due at time of pick-up or delivery - cash, check, or card accepted)     Able to afford home medications/ co-pay costs: Yes    ADLS  Support Systems:      PT AM-PAC: 24 /24  OT AM-PAC: 24 /24    [place]: apartment with 13 stairs up to it with   Steps:     Plans to RETURN to current housing: Yes  Barriers to RETURNING to current housing: medical complications    Home Care Information  Currently ACTIVE with Home Health Care: No  Home Care Agency: Not Applicable    Currently ACTIVE with [facility]: No      Durable Medical Equipment  DME Provider:   Equipment: walker    Home Oxygen and Respiratory Equipment  Has HOME OXYGEN prior to admission: No  Oxygen Company: Not Applicable      Dialysis  Active with HD/PD prior to admission: No  Nephrologist:     HD Center:  Not Applicable    DISCHARGE PLAN:  Disposition: Home- No Services Needed    Transportation PLAN for discharge: family     Factors facilitating achievement of predicted outcomes: Family support, Cooperative and Pleasant    Barriers to discharge: Medical complications    Additional Case Management Notes: see note above    The Plan for Transition of Care is related to the following treatment goals of Alcohol withdrawal delirium ([facility] [F10.231]  Alcohol withdrawal delirium ([facility] [F10.231]    The Patient and/or patient representative Elder Manjarrez and her family were provided with a choice of provider and agrees with the discharge plan Not Indicated    Freedom of choice list was provided with basic dialogue that supports the patient's individualized plan of care/goals and shares the quality data associated with the providers.  Not Indicated      Care Transition patient: No    SAJI Wing  [facility] [employer]  Case Management Department  Ph: [phone]

## 2020-01-30 NOTE — DISCHARGE INSTR - COC
Continuity of Care Form    Patient Name: Jamison Gordon   :  1955  MRN:  [id number]    Admit date:  2020  Discharge date:  ***    Code Status Order: Full Code   Advance Directives:   Advance Care Flowsheet Documentation     Date/Time Healthcare Directive Type of Healthcare Directive Copy in [address] Agent's Name Healthcare Agent's Phone Number    20 1402  Yes, patient has an advance directive for healthcare treatment  --  --  --  --  --          Admitting Physician:  Jocelyn Taylor MD  PCP: Mira Franklin MD    Discharging Nurse: [facility] Care Unit/Room#: 3646/7952-46  Discharging Unit Phone Number: ***    Emergency Contact:   Extended Emergency Contact Information  Primary Emergency Contact: Terence Lujan  Address: [address] [place] [address] Phone: [phone]  Mobile Phone: [phone]  Relation: Spouse    Past Surgical History:  Past Surgical History:   Procedure Laterality Date    BACK SURGERY       L5 disectomy    COLONOSCOPY      2016    ENDOSCOPY, COLON, DIAGNOSTIC             Immunization History: There is no immunization history on file for this patient.     Active Problems:  Patient Active Problem List   Diagnosis Code    Alcohol withdrawal delirium (HCC) F10.231    Essential hypertension I10    Alcohol withdrawal syndrome, uncomplicated (HCC) Q42.586    Acute alcoholic intoxication without complication ([facility] P48.418    Depression F32.9    ETOH abuse F10.10    Acute encephalopathy G93.40    Ataxia R27.0    Alcohol dependence with withdrawal ([facility] F10.239    Alcohol abuse, continuous U10.99    Alcoholic hepatitis N25.32    Nausea & vomiting R11.2       Isolation/Infection:   Isolation          No Isolation        Patient Infection Status     None to display          Nurse Assessment:  Last Vital Signs: BP (!) 165/80   Pulse 87   Temp Treatment  [facility] [address]     Phone: [phone]      Dialysis Facility (if applicable)   NA   · Name:  · Address:  · Dialysis Schedule:  · Phone:  · Fax:    / signature: Electronically signed by Rebecca Rodrigues, RN on 2/4/20 at 12:44 PM    PHYSICIAN SECTION    Prognosis: Good    Condition at Discharge: Stable    Rehab Potential (if transferring to Rehab): {Prognosis:3984293214}    Recommended Labs or Other Treatments After Discharge: ***    Physician Certification: I certify the above information and transfer of Guerline Galicia  is necessary for the continuing treatment of the diagnosis listed and that she requires {Admit to Appropriate Level of Care:58300} for {GREATER/LESS:866181005} 30 days.      Update Admission H&P: No change in H&P    PHYSICIAN SIGNATURE:  Electronically signed by Hilario Myrick MD on 2/4/20 at 10:53 AM

## 2020-01-30 NOTE — ED NOTES
Assumed care of patient from [facility]. Patient found asleep in bed, vitals WNL. Reported patient drinks large quantity of alcohol daily, last drink was 2000 last janna. Patient in process of admitting, will continue to monitor.       Michael Marks RN  01/30/20 0179

## 2020-01-30 NOTE — PROGRESS NOTES
Pt admitted to room 6314 by stretcher. Alert and oriented. Call light in reach, instructed on use. Tele monitor applied.

## 2020-01-31 LAB
A/G RATIO: 1.5 (ref 1.1–2.2)
ALBUMIN SERPL-MCNC: 4.5 G/DL (ref 3.4–5)
ALP BLD-CCNC: 61 U/L (ref 40–129)
ALT SERPL-CCNC: 40 U/L (ref 10–40)
ANION GAP SERPL CALCULATED.3IONS-SCNC: 14 MMOL/L (ref 3–16)
AST SERPL-CCNC: 71 U/L (ref 15–37)
BILIRUB SERPL-MCNC: 0.5 MG/DL (ref 0–1)
BUN BLDV-MCNC: 8 MG/DL (ref 7–20)
CALCIUM SERPL-MCNC: 9.9 MG/DL (ref 8.3–10.6)
CHLORIDE BLD-SCNC: 99 MMOL/L (ref 99–110)
CO2: 26 MMOL/L (ref 21–32)
CREAT SERPL-MCNC: 0.6 MG/DL (ref 0.6–1.2)
GFR AFRICAN AMERICAN: >60
GFR NON-AFRICAN AMERICAN: >60
GLOBULIN: 3.1 G/DL
GLUCOSE BLD-MCNC: 101 MG/DL (ref 70–99)
HCT VFR BLD CALC: 36.7 % (ref 36–48)
HEMOGLOBIN: 12.4 G/DL (ref 12–16)
MCH RBC QN AUTO: 33.6 PG (ref 26–34)
MCHC RBC AUTO-ENTMCNC: 33.7 G/DL (ref 31–36)
MCV RBC AUTO: 99.4 FL (ref 80–100)
PDW BLD-RTO: 14 % (ref 12.4–15.4)
PLATELET # BLD: 238 K/UL (ref 135–450)
PMV BLD AUTO: 7.6 FL (ref 5–10.5)
POTASSIUM REFLEX MAGNESIUM: 4.4 MMOL/L (ref 3.5–5.1)
RBC # BLD: 3.7 M/UL (ref 4–5.2)
SODIUM BLD-SCNC: 139 MMOL/L (ref 136–145)
TOTAL PROTEIN: 7.6 G/DL (ref 6.4–8.2)
WBC # BLD: 4.2 K/UL (ref 4–11)

## 2020-01-31 PROCEDURE — 36415 COLL VENOUS BLD VENIPUNCTURE: CPT

## 2020-01-31 PROCEDURE — 1200000000 HC SEMI PRIVATE

## 2020-01-31 PROCEDURE — 80053 COMPREHEN METABOLIC PANEL: CPT

## 2020-01-31 PROCEDURE — 6370000000 HC RX 637 (ALT 250 FOR IP): Performed by: EMERGENCY MEDICINE

## 2020-01-31 PROCEDURE — 6360000002 HC RX W HCPCS: Performed by: INTERNAL MEDICINE

## 2020-01-31 PROCEDURE — 6370000000 HC RX 637 (ALT 250 FOR IP): Performed by: INTERNAL MEDICINE

## 2020-01-31 PROCEDURE — 85027 COMPLETE CBC AUTOMATED: CPT

## 2020-01-31 PROCEDURE — 2500000003 HC RX 250 WO HCPCS: Performed by: INTERNAL MEDICINE

## 2020-01-31 PROCEDURE — 2580000003 HC RX 258: Performed by: INTERNAL MEDICINE

## 2020-01-31 RX ORDER — POLYETHYLENE GLYCOL 3350 17 G/17G
17 POWDER, FOR SOLUTION ORAL DAILY
Status: DISCONTINUED | OUTPATIENT
Start: 2020-01-31 | End: 2020-02-04 | Stop reason: HOSPADM

## 2020-01-31 RX ORDER — ZOLPIDEM TARTRATE 5 MG/1
5 TABLET ORAL ONCE
Status: COMPLETED | OUTPATIENT
Start: 2020-01-31 | End: 2020-01-31

## 2020-01-31 RX ADMIN — LORAZEPAM 1 MG: 1 TABLET ORAL at 22:18

## 2020-01-31 RX ADMIN — MAGNESIUM HYDROXIDE 30 ML: 400 SUSPENSION ORAL at 09:56

## 2020-01-31 RX ADMIN — PANTOPRAZOLE SODIUM 40 MG: 40 TABLET, DELAYED RELEASE ORAL at 08:19

## 2020-01-31 RX ADMIN — FOLIC ACID 1 MG: 1 TABLET ORAL at 08:19

## 2020-01-31 RX ADMIN — BUSPIRONE HYDROCHLORIDE 10 MG: 10 TABLET ORAL at 22:18

## 2020-01-31 RX ADMIN — POLYETHYLENE GLYCOL 3350 17 G: 17 POWDER, FOR SOLUTION ORAL at 09:56

## 2020-01-31 RX ADMIN — Medication 100 MG: at 08:19

## 2020-01-31 RX ADMIN — ZOLPIDEM TARTRATE 5 MG: 5 TABLET ORAL at 22:18

## 2020-01-31 RX ADMIN — FOLIC ACID: 5 INJECTION, SOLUTION INTRAMUSCULAR; INTRAVENOUS; SUBCUTANEOUS at 09:18

## 2020-01-31 RX ADMIN — ENOXAPARIN SODIUM 40 MG: 40 INJECTION SUBCUTANEOUS at 08:18

## 2020-01-31 RX ADMIN — NIFEDIPINE 30 MG: 30 TABLET, EXTENDED RELEASE ORAL at 08:18

## 2020-01-31 RX ADMIN — Medication 10 ML: at 08:18

## 2020-01-31 RX ADMIN — LORAZEPAM 1 MG: 1 TABLET ORAL at 16:02

## 2020-01-31 RX ADMIN — FLUOXETINE 40 MG: 20 CAPSULE ORAL at 08:18

## 2020-01-31 RX ADMIN — BUSPIRONE HYDROCHLORIDE 10 MG: 10 TABLET ORAL at 08:19

## 2020-01-31 RX ADMIN — LORAZEPAM 2 MG: 1 TABLET ORAL at 04:09

## 2020-01-31 RX ADMIN — Medication 10 ML: at 22:18

## 2020-01-31 RX ADMIN — LORAZEPAM 1 MG: 1 TABLET ORAL at 08:18

## 2020-01-31 ASSESSMENT — PAIN - FUNCTIONAL ASSESSMENT: PAIN_FUNCTIONAL_ASSESSMENT: PREVENTS OR INTERFERES SOME ACTIVE ACTIVITIES AND ADLS

## 2020-01-31 ASSESSMENT — PAIN SCALES - GENERAL
PAINLEVEL_OUTOF10: 0
PAINLEVEL_OUTOF10: 7
PAINLEVEL_OUTOF10: 0
PAINLEVEL_OUTOF10: 0

## 2020-01-31 ASSESSMENT — PAIN DESCRIPTION - LOCATION: LOCATION: HEAD

## 2020-01-31 ASSESSMENT — PAIN DESCRIPTION - PAIN TYPE: TYPE: ACUTE PAIN

## 2020-01-31 ASSESSMENT — PAIN DESCRIPTION - ONSET: ONSET: AWAKENED FROM SLEEP

## 2020-01-31 ASSESSMENT — PAIN DESCRIPTION - DESCRIPTORS: DESCRIPTORS: ACHING;HEADACHE

## 2020-01-31 ASSESSMENT — PAIN DESCRIPTION - PROGRESSION: CLINICAL_PROGRESSION: NOT CHANGED

## 2020-01-31 ASSESSMENT — PAIN DESCRIPTION - ORIENTATION: ORIENTATION: POSTERIOR

## 2020-01-31 ASSESSMENT — PAIN DESCRIPTION - FREQUENCY: FREQUENCY: CONTINUOUS

## 2020-01-31 NOTE — PROGRESS NOTES
Progress Note    Admit Date: 1/30/2020    Patient's PCP: Dr. Callie Villareal MD     71 y.o. female with a history of Alcoholism ([address] Anxiety, Depression, Hyperlipidemia, and Hypertension who presented to the ED with palpitations, chest pressure and nausea. She was at home and developed heart pounding, chest pressure  And some nausea and a feeling of ants crawling on her skin. She equated these to alcohol withdrawal symptoms since she has been through these before. She has required admission for this in the past as recently as November of last year. Last drink was around 2000 last night. She has been in  discussions with an inpatient treatment program for her ETOH issues, but has yet to go. In the ED she was afebrile. She was persistently tachycardic and hypertensive and was given Ativan IV and some Zofran as well as multivitamin with clonidine. She was planned for transfer from  ER to go to the [facility] by the [facility] for evaluation and treatment of her withdrawal, but this was not possible. .          Interval HPI  No new complaints  No abd pain    Required few doses of ativan. No DTs      She denies any new complaints. Medications: Reviewed        PHYSICAL EXAM:  /77   Pulse 94   Temp 97.6 °F (36.4 °C) (Oral)   Resp 16   Ht 5' 4\" (1.626 m)   Wt 175 lb (79.4 kg)   SpO2 96%   BMI 30.04 kg/m²     No results for input(s): POCGLU in the last 72 hours.   General--alert, no obvious distress, mildly anxious  HEENT--no signs of trauma, oropharynx is mildly dry, no facial asymmetry on examination  Neck--trachea midline, no JVD  Chest--equal breath sounds bilaterally,  Heart--tachycardic but regular rhythm  Abdomen--soft, nontender, nondistended, bowel sounds are present  Extremities--good range of motion, good pulses throughout  Neuro---awake and alert, no facial asymmetry, voice is steady and clear, no focal weakness noted  Psych--appropriate affect and mood, slightly anxious    LABS:  Recent Labs     01/30/20  0436 01/31/20  0654   WBC 4.7 4.2   HGB 11.9* 12.4   HCT 34.8* 36.7    238                                                                  Recent Labs     01/30/20  0436 01/31/20  0654    139   K 4.0 4.4   CL 97* 99   CO2 24 26   BUN 8 8   CREATININE 0.6 0.6   GLUCOSE 95 101*     Recent Labs     01/30/20  0436 01/31/20  0654   AST 49* 71*   ALT 32 40   BILITOT 0.3 0.5   ALKPHOS 58 61     Recent Labs     01/30/20  0436   TROPONINI <0.01     No results for input(s): BNP in the last 72 hours. No results found for: PHART, UXT4PJQ, PO2ART  No results for input(s): INR in the last 72 hours. Recent Labs     01/30/20 2137   PHUR 6.0      EKG Independently reviewed:Sinus tachycardia rate 106 bpm.  IN interval 170 ms. QRS duration 88 ms. QTc 456 ms        Assessment & Plan:     71 y.o. female with a history of Alcoholism ([address] Anxiety, Depression, Hyperlipidemia, and Hypertension who presented to the ED with palpitations, chest pressure and nausea. Patient Active Problem List:     Alcohol withdrawal delirium ([address]     Essential hypertension     Alcohol withdrawal syndrome, uncomplicated ([address]     Acute alcoholic intoxication without complication ([address]     Depression     ETOH abuse     Acute encephalopathy     Alcohol dependence with withdrawal ([address]     Alcohol abuse, continuous     Nausea & vomiting            Cont. CIWA protocol (last drink about 20:00 1/29/2020)  Rally Pack  DC IV hydration. Encourage orally  Supportive care    Bed rest for now  Monitor and replenish electrolytes  Telemetry      Case mgt/MSW eval.      Pxt is at high risk for deterioration related to progression of her withdrawal/DTs and need for higher level of care including intubation for airway protection.        Full Code    Heather Hernandez MD

## 2020-01-31 NOTE — PLAN OF CARE
D: Woke up around 0315 to use BR and then c/o feeling nervous and not being able to sleep and requesting Ativan for withdrawal symptoms. Per CIWA received Ativan 2mg PO. Concerned that staff would know if she was having any ectopy on monitor since she has had problems in past. Emotional support & reassurance given. NSR on monitor. Bed alarm & non skid socks on for safety. Instructed to call for assist to amb for safety.    A: Cont to monitor during hourly rounds

## 2020-01-31 NOTE — PLAN OF CARE
Problem: Falls - Risk of:  Goal: Will remain free from falls  Description  Will remain free from falls  Outcome: Ongoing   Pt is a fall risk. See Rogelio Samueli Fall Score. Pt bed is in low position, side rails up, call light and belongings are in reach. Bed alarm is on. Pt encouraged to call for assistance. Will continue with hourly rounds for po intake, pain needs, toileting and repositioning as needed. Will continue to monitor for needs     Problem: Discharge Planning:  Goal: Discharged to appropriate level of care  Description  Discharged to appropriate level of care  Outcome: Ongoing   Pt admitted for alcohol withdrawal. Pt plans to go to inpatient rehab facility at discharge. Pt currently showing little signs of withdrawal, having anxiety, tremors, HA. Pt will discharge after she has withdrawn from alcohol safely.

## 2020-01-31 NOTE — CARE COORDINATION
Case Management Assessment           Daily Note                 Date/ Time of Note: 1/31/2020 10:46 AM         Note completed by: Chely Bertrand    Patient Name: Loreto Junior  YOB: 1955    Diagnosis:Alcohol withdrawal delirium ([facility] [F10.231]  Al[facility] withdrawal delirium ([facility] [F10.231]  Patient Admission Status: Inpatient    Date of Admission:1/30/2020  3:50 AM Length of Stay: 1 GLOS:        Current Plan of Care:    Chest pain , Tachycardia nausea  r/t  ETOH  Withdrawal,      Plan  Per  MD:  We will start her on CIWA protocol. Rally Pack  IV hydration  Supportive care ________________________________________________________________________________________  PT AM-PAC: 24 / 24 per last evaluation on: 01/30/2020    OT AM-PAC: 24 / 24 per last evaluation on: 01/30/2020    DME Needs for discharge: NA    ________________________________________________________________________________________  Discharge Plan: To Be Determined DUE TO: would like to go to  [facility]  by the  [place], [place]     Tentative discharge date: 1-2  Days         Current barriers to discharge: w/d      Referrals completed: Inpatient Rehab: [facility]     Resources/ information provided: Not indicated at this time  ________________________________________________________________________________________  Case Management Notes:  CM  Following for  D/C  Planning:  Potentially  D/c to    [facility]  By the [address], [place], [address] [place] [address]   Phone: [phone]      [facility] and her family were provided with choice of provider; she and her family are in agreement with the discharge plan.     Care Transition Patient: Kate Bertrand RN  [facility]  Case Management Department  Ph: [phone]

## 2020-02-01 PROCEDURE — 6370000000 HC RX 637 (ALT 250 FOR IP): Performed by: INTERNAL MEDICINE

## 2020-02-01 PROCEDURE — 2500000003 HC RX 250 WO HCPCS: Performed by: INTERNAL MEDICINE

## 2020-02-01 PROCEDURE — 1200000000 HC SEMI PRIVATE

## 2020-02-01 PROCEDURE — 6360000002 HC RX W HCPCS: Performed by: INTERNAL MEDICINE

## 2020-02-01 PROCEDURE — 2580000003 HC RX 258: Performed by: INTERNAL MEDICINE

## 2020-02-01 PROCEDURE — 6370000000 HC RX 637 (ALT 250 FOR IP): Performed by: EMERGENCY MEDICINE

## 2020-02-01 RX ORDER — ZOLPIDEM TARTRATE 5 MG/1
5 TABLET ORAL ONCE
Status: COMPLETED | OUTPATIENT
Start: 2020-02-01 | End: 2020-02-01

## 2020-02-01 RX ADMIN — POLYETHYLENE GLYCOL 3350 17 G: 17 POWDER, FOR SOLUTION ORAL at 09:04

## 2020-02-01 RX ADMIN — ONDANSETRON 4 MG: 2 INJECTION INTRAMUSCULAR; INTRAVENOUS at 12:05

## 2020-02-01 RX ADMIN — FOLIC ACID 1 MG: 1 TABLET ORAL at 09:04

## 2020-02-01 RX ADMIN — FLUOXETINE 40 MG: 20 CAPSULE ORAL at 09:04

## 2020-02-01 RX ADMIN — Medication 10 ML: at 22:32

## 2020-02-01 RX ADMIN — LORAZEPAM 2 MG: 1 TABLET ORAL at 23:56

## 2020-02-01 RX ADMIN — LORAZEPAM 2 MG: 1 TABLET ORAL at 21:29

## 2020-02-01 RX ADMIN — BUSPIRONE HYDROCHLORIDE 10 MG: 10 TABLET ORAL at 21:29

## 2020-02-01 RX ADMIN — Medication 10 ML: at 09:04

## 2020-02-01 RX ADMIN — ONDANSETRON 4 MG: 2 INJECTION INTRAMUSCULAR; INTRAVENOUS at 21:29

## 2020-02-01 RX ADMIN — LORAZEPAM 4 MG: 1 TABLET ORAL at 05:45

## 2020-02-01 RX ADMIN — LORAZEPAM 1 MG: 1 TABLET ORAL at 22:32

## 2020-02-01 RX ADMIN — FOLIC ACID: 5 INJECTION, SOLUTION INTRAMUSCULAR; INTRAVENOUS; SUBCUTANEOUS at 09:12

## 2020-02-01 RX ADMIN — LORAZEPAM 3 MG: 2 INJECTION INTRAMUSCULAR; INTRAVENOUS at 14:06

## 2020-02-01 RX ADMIN — ONDANSETRON 4 MG: 2 INJECTION INTRAMUSCULAR; INTRAVENOUS at 05:45

## 2020-02-01 RX ADMIN — ZOLPIDEM TARTRATE 5 MG: 5 TABLET ORAL at 22:32

## 2020-02-01 RX ADMIN — BUSPIRONE HYDROCHLORIDE 10 MG: 10 TABLET ORAL at 09:04

## 2020-02-01 RX ADMIN — ENOXAPARIN SODIUM 40 MG: 40 INJECTION SUBCUTANEOUS at 09:04

## 2020-02-01 RX ADMIN — LORAZEPAM 3 MG: 2 INJECTION INTRAMUSCULAR; INTRAVENOUS at 12:05

## 2020-02-01 RX ADMIN — NIFEDIPINE 30 MG: 30 TABLET, EXTENDED RELEASE ORAL at 09:04

## 2020-02-01 RX ADMIN — Medication 100 MG: at 09:04

## 2020-02-01 RX ADMIN — LORAZEPAM 2 MG: 2 INJECTION INTRAMUSCULAR; INTRAVENOUS at 09:11

## 2020-02-01 RX ADMIN — PANTOPRAZOLE SODIUM 40 MG: 40 TABLET, DELAYED RELEASE ORAL at 09:11

## 2020-02-01 ASSESSMENT — PAIN SCALES - GENERAL
PAINLEVEL_OUTOF10: 0

## 2020-02-01 NOTE — PLAN OF CARE
Problem: Falls - Risk of:  Goal: Will remain free from falls  Description  Will remain free from falls  2/1/2020 1030 by Tyrell Trevizo RN  Outcome: Ongoing  Note:   Patient is a risk for falls. Bed locked in the lowest position. Bed alarm in use. Call light and personal belongings within reach. Instructed to call for help before getting up, patient verbalizes understanding. Door to room left open. Wearing non-skid socks. Fall sign posted. Will continue to monitor. Problem: Pain:  Goal: Control of acute pain  Description  Control of acute pain  Outcome: Ongoing  Note:   Patient resting comfortably in bed at this time, denying pain. Will continue to monitor. Problem: Discharge Planning:  Goal: Discharged to appropriate level of care  Description  Discharged to appropriate level of care  Outcome: Ongoing  Note:   Patient verbalizes wanting to go to a rehab facility. Will discuss with social work.

## 2020-02-01 NOTE — PROGRESS NOTES
Progress Note    Admit Date: 1/30/2020    Patient's PCP: Dr. Yassine Huang MD     71 y.o. female with a history of Alcoholism ([address] Anxiety, Depression, Hyperlipidemia, and Hypertension who presented to the ED with palpitations, chest pressure and nausea. She was at home and developed heart pounding, chest pressure  And some nausea and a feeling of ants crawling on her skin. She equated these to alcohol withdrawal symptoms since she has been through these before. She has required admission for this in the past as recently as November of last year. Last drink was around 2000 last night. She has been in  discussions with an inpatient treatment program for her ETOH issues, but has yet to go. In the ED she was afebrile. She was persistently tachycardic and hypertensive and was given Ativan IV and some Zofran as well as multivitamin with clonidine. She was planned for transfer from  [facility] to go to the [facility] by the [facility] for evaluation and treatment of her withdrawal, but this was not possible. .          Interval HPI  No new complaints  No abd pain    Required few doses of ativan. No DTs      She denies any new complaints. Medications: Reviewed        PHYSICAL EXAM:  BP (!) 155/79   Pulse 88   Temp 98 °F (36.7 °C) (Oral)   Resp 17   Ht 5' 4\" (1.626 m)   Wt 149 lb 14.6 oz (68 kg)   SpO2 100%   BMI 25.73 kg/m²     No results for input(s): POCGLU in the last 72 hours.   General--alert, no obvious distress, mildly anxious  HEENT--no signs of trauma, oropharynx is mildly dry, no facial asymmetry on examination  Neck--trachea midline, no JVD  Chest--equal breath sounds bilaterally,  Heart--tachycardic but regular rhythm  Abdomen--soft, nontender, nondistended, bowel sounds are present  Extremities--good range of motion, good pulses throughout  Neuro---awake and alert, no facial asymmetry, voice is steady and clear, no focal weakness noted  Psych--appropriate affect and mood, slightly anxious    LABS:  Recent Labs     01/30/20  0436 01/31/20  0654   WBC 4.7 4.2   HGB 11.9* 12.4   HCT 34.8* 36.7    238                                                                  Recent Labs     01/30/20  0436 01/31/20  0654    139   K 4.0 4.4   CL 97* 99   CO2 24 26   BUN 8 8   CREATININE 0.6 0.6   GLUCOSE 95 101*     Recent Labs     01/30/20  0436 01/31/20  0654   AST 49* 71*   ALT 32 40   BILITOT 0.3 0.5   ALKPHOS 58 61     Recent Labs     01/30/20  0436   TROPONINI <0.01     No results for input(s): BNP in the last 72 hours. No results found for: PHART, JMA2QRJ, PO2ART  No results for input(s): INR in the last 72 hours. Recent Labs     01/30/20 2137   PHUR 6.0      EKG Independently reviewed:Sinus tachycardia rate 106 bpm.  PA interval 170 ms. QRS duration 88 ms. QTc 456 ms        Assessment & Plan:     71 y.o. female with a history of Alcoholism ([address] Anxiety, Depression, Hyperlipidemia, and Hypertension who presented to the ED with palpitations, chest pressure and nausea. Patient Active Problem List:     Alcohol withdrawal delirium ([address]     Essential hypertension     Alcohol withdrawal syndrome, uncomplicated ([address]     Acute alcoholic intoxication without complication ([address]     Depression     ETOH abuse     Acute encephalopathy     Alcohol dependence with withdrawal ([address]     Alcohol abuse, continuous     Nausea & vomiting            Cont. CIWA protocol (last drink about 20:00 1/29/2020)  Rally Pack    Discontinued IV hydration. Encourage orally  Supportive care    Bed rest for now  Monitor and replenish electrolytes  Telemetry      Case mgt/MSW eval.      Disposition: Re-eval for possible DC tomorrow if no significant withdrawal noted.        Full Code    Perico Anderson MD

## 2020-02-01 NOTE — PLAN OF CARE
Problem: Falls - Risk of:  Goal: Will remain free from falls  Description  Will remain free from falls  Outcome: Ongoing  Note:   Patient at risk for falls. Patient resting quietly in bed. Side rails up x 2. Bed locked in lowest position. Bedside table and call light within reach. Patient instructed to call for assistance. Patient verbalized understanding and calls appropriately. Will continue to monitor. Problem: Pain:  Goal: Pain level will decrease  Description  Pain level will decrease  Outcome: Ongoing  Note:   Denies any pain this shift, will cont to monitor.       Problem: Sleep Pattern Disturbance:  Goal: Appears well-rested  Description  Appears well-rested  Outcome: Ongoing

## 2020-02-02 LAB
HCT VFR BLD CALC: 36.3 % (ref 36–48)
HEMOGLOBIN: 12.4 G/DL (ref 12–16)
MCH RBC QN AUTO: 33.5 PG (ref 26–34)
MCHC RBC AUTO-ENTMCNC: 34 G/DL (ref 31–36)
MCV RBC AUTO: 98.5 FL (ref 80–100)
PDW BLD-RTO: 13.9 % (ref 12.4–15.4)
PLATELET # BLD: 183 K/UL (ref 135–450)
PMV BLD AUTO: 8.1 FL (ref 5–10.5)
RBC # BLD: 3.69 M/UL (ref 4–5.2)
WBC # BLD: 5.1 K/UL (ref 4–11)

## 2020-02-02 PROCEDURE — 6370000000 HC RX 637 (ALT 250 FOR IP): Performed by: EMERGENCY MEDICINE

## 2020-02-02 PROCEDURE — 36415 COLL VENOUS BLD VENIPUNCTURE: CPT

## 2020-02-02 PROCEDURE — 6370000000 HC RX 637 (ALT 250 FOR IP): Performed by: INTERNAL MEDICINE

## 2020-02-02 PROCEDURE — 1200000000 HC SEMI PRIVATE

## 2020-02-02 PROCEDURE — 85027 COMPLETE CBC AUTOMATED: CPT

## 2020-02-02 PROCEDURE — 6360000002 HC RX W HCPCS: Performed by: INTERNAL MEDICINE

## 2020-02-02 PROCEDURE — 2580000003 HC RX 258: Performed by: INTERNAL MEDICINE

## 2020-02-02 RX ORDER — ZOLPIDEM TARTRATE 5 MG/1
5 TABLET ORAL ONCE
Status: COMPLETED | OUTPATIENT
Start: 2020-02-02 | End: 2020-02-02

## 2020-02-02 RX ADMIN — NIFEDIPINE 30 MG: 30 TABLET, EXTENDED RELEASE ORAL at 08:31

## 2020-02-02 RX ADMIN — ZOLPIDEM TARTRATE 5 MG: 5 TABLET ORAL at 20:58

## 2020-02-02 RX ADMIN — BUSPIRONE HYDROCHLORIDE 10 MG: 10 TABLET ORAL at 08:30

## 2020-02-02 RX ADMIN — ONDANSETRON 4 MG: 2 INJECTION INTRAMUSCULAR; INTRAVENOUS at 07:04

## 2020-02-02 RX ADMIN — ENOXAPARIN SODIUM 40 MG: 40 INJECTION SUBCUTANEOUS at 08:30

## 2020-02-02 RX ADMIN — Medication 100 MG: at 08:30

## 2020-02-02 RX ADMIN — Medication 10 ML: at 20:59

## 2020-02-02 RX ADMIN — FOLIC ACID 1 MG: 1 TABLET ORAL at 08:30

## 2020-02-02 RX ADMIN — LORAZEPAM 2 MG: 1 TABLET ORAL at 07:04

## 2020-02-02 RX ADMIN — Medication 10 ML: at 08:31

## 2020-02-02 RX ADMIN — LORAZEPAM 3 MG: 2 INJECTION INTRAMUSCULAR; INTRAVENOUS at 14:55

## 2020-02-02 RX ADMIN — PANTOPRAZOLE SODIUM 40 MG: 40 TABLET, DELAYED RELEASE ORAL at 06:20

## 2020-02-02 RX ADMIN — FLUOXETINE 40 MG: 20 CAPSULE ORAL at 08:30

## 2020-02-02 RX ADMIN — BUSPIRONE HYDROCHLORIDE 10 MG: 10 TABLET ORAL at 20:58

## 2020-02-02 RX ADMIN — LORAZEPAM 2 MG: 2 INJECTION INTRAMUSCULAR; INTRAVENOUS at 03:44

## 2020-02-02 RX ADMIN — POLYETHYLENE GLYCOL 3350 17 G: 17 POWDER, FOR SOLUTION ORAL at 08:30

## 2020-02-02 RX ADMIN — LORAZEPAM 1 MG: 1 TABLET ORAL at 20:58

## 2020-02-02 ASSESSMENT — PAIN SCALES - GENERAL
PAINLEVEL_OUTOF10: 0

## 2020-02-02 NOTE — PLAN OF CARE
Problem: Falls - Risk of:  Goal: Will remain free from falls  Description  Will remain free from falls  Outcome: Ongoing  Note:   Patient is a risk for falls. Bed locked in the lowest position. Bed alarm in use. Call light and personal belongings within reach. Instructed to call for help before getting up, patient verbalizes understanding. Door to room left open. Wearing non-skid socks. Fall sign posted. Will continue to monitor. Problem: Pain:  Goal: Control of acute pain  Description  Control of acute pain  Outcome: Ongoing  Note:   Patient denies pain, resting comfortably in bed at this time. Will continue to monitor.

## 2020-02-02 NOTE — PROGRESS NOTES
Progress Note    Admit Date: 1/30/2020    Patient's PCP: Dr. Mariia Foster MD     71 y.o. female with a history of Alcoholism ([address] Anxiety, Depression, Hyperlipidemia, and Hypertension who presented to the ED with palpitations, chest pressure and nausea. She was at home and developed heart pounding, chest pressure  And some nausea and a feeling of ants crawling on her skin. She equated these to alcohol withdrawal symptoms since she has been through these before. She has required admission for this in the past as recently as November of last year. Last drink was around 2000 last night. She has been in  discussions with an inpatient treatment program for her ETOH issues, but has yet to go. In the ED she was afebrile. She was persistently tachycardic and hypertensive and was given Ativan IV and some Zofran as well as multivitamin with clonidine. She was planned for transfer from  ER to go to the [facility] by the [facility] for evaluation and treatment of her withdrawal, but this was not possible. .          Interval HPI  No new complaints  No abd pain    Required few doses of ativan. No DTs      She denies any new complaints. Medications: Reviewed        PHYSICAL EXAM:  /78   Pulse 95   Temp 97.8 °F (36.6 °C) (Oral)   Resp 15   Ht 5' 4\" (1.626 m)   Wt 149 lb 14.6 oz (68 kg)   SpO2 99%   BMI 25.73 kg/m²     No results for input(s): POCGLU in the last 72 hours.   General--alert, no obvious distress, mildly anxious  HEENT--no signs of trauma, oropharynx is mildly dry, no facial asymmetry on examination  Neck--trachea midline, no JVD  Chest--equal breath sounds bilaterally,  Heart--tachycardic but regular rhythm  Abdomen--soft, nontender, nondistended, bowel sounds are present  Extremities--good range of motion, good pulses throughout  Neuro---awake and alert, no facial asymmetry, voice is steady and clear, no focal weakness noted  Psych--appropriate affect and mood, slightly anxious    LABS:  Recent Labs     01/31/20  0654 02/02/20  0645   WBC 4.2 5.1   HGB 12.4 12.4   HCT 36.7 36.3    183                                                                  Recent Labs     01/31/20  0654      K 4.4   CL 99   CO2 26   BUN 8   CREATININE 0.6   GLUCOSE 101*     Recent Labs     01/31/20  0654   AST 71*   ALT 40   BILITOT 0.5   ALKPHOS 61     No results for input(s): TROPONINI in the last 72 hours. No results for input(s): BNP in the last 72 hours. No results found for: PHART, ONZ5CDC, PO2ART  No results for input(s): INR in the last 72 hours. Recent Labs     01/30/20  2137   PHUR 6.0      EKG Independently reviewed:Sinus tachycardia rate 106 bpm.  KY interval 170 ms. QRS duration 88 ms. QTc 456 ms        Assessment & Plan:     71 y.o. female with a history of Alcoholism ([facility] Anxiety, Depression, Hyperlipidemia, and Hypertension who presented to the ED with palpitations, chest pressure and nausea. Patient Active Problem List:     Acute metabolic encephalopathy secondary to alcohol intoxication and withdrawal, treated with CIWA protocol. Alcohol withdrawal delirium (HCC)     Essential hypertension     Alcohol withdrawal syndrome, uncomplicated ([address]     Acute alcoholic intoxication without complication ([address]     Depression     ETOH abuse     Alcohol dependence with withdrawal ([address]     Alcohol abuse, continuous     Nausea & vomiting          Received some Ativan last night. Cont. CIWA protocol (last drink about 20:00 1/29/2020)  Rally Pack    Discontinued IV hydration. Encourage orally  Supportive care    Bed rest for now  Monitor and replenish electrolytes  Telemetry      Case mgt/MSW eval.      Disposition: Re-eval for possible DC tomorrow if no significant withdrawal noted.  PT/OT eval.      Full Code    Ochoa Evans MD

## 2020-02-03 PROCEDURE — 1200000000 HC SEMI PRIVATE

## 2020-02-03 PROCEDURE — 97116 GAIT TRAINING THERAPY: CPT

## 2020-02-03 PROCEDURE — 6370000000 HC RX 637 (ALT 250 FOR IP): Performed by: INTERNAL MEDICINE

## 2020-02-03 PROCEDURE — 6360000002 HC RX W HCPCS: Performed by: INTERNAL MEDICINE

## 2020-02-03 PROCEDURE — 2580000003 HC RX 258: Performed by: INTERNAL MEDICINE

## 2020-02-03 PROCEDURE — 97168 OT RE-EVAL EST PLAN CARE: CPT

## 2020-02-03 PROCEDURE — 97161 PT EVAL LOW COMPLEX 20 MIN: CPT

## 2020-02-03 PROCEDURE — 97530 THERAPEUTIC ACTIVITIES: CPT

## 2020-02-03 PROCEDURE — 6370000000 HC RX 637 (ALT 250 FOR IP): Performed by: EMERGENCY MEDICINE

## 2020-02-03 RX ADMIN — Medication 100 MG: at 08:35

## 2020-02-03 RX ADMIN — FOLIC ACID 1 MG: 1 TABLET ORAL at 08:35

## 2020-02-03 RX ADMIN — NIFEDIPINE 30 MG: 30 TABLET, EXTENDED RELEASE ORAL at 08:35

## 2020-02-03 RX ADMIN — Medication 10 ML: at 08:36

## 2020-02-03 RX ADMIN — ENOXAPARIN SODIUM 40 MG: 40 INJECTION SUBCUTANEOUS at 08:35

## 2020-02-03 RX ADMIN — BUSPIRONE HYDROCHLORIDE 10 MG: 10 TABLET ORAL at 20:39

## 2020-02-03 RX ADMIN — LORAZEPAM 2 MG: 1 TABLET ORAL at 20:39

## 2020-02-03 RX ADMIN — LORAZEPAM 2 MG: 1 TABLET ORAL at 05:20

## 2020-02-03 RX ADMIN — LORAZEPAM 2 MG: 1 TABLET ORAL at 08:35

## 2020-02-03 RX ADMIN — Medication 10 ML: at 20:39

## 2020-02-03 RX ADMIN — PANTOPRAZOLE SODIUM 40 MG: 40 TABLET, DELAYED RELEASE ORAL at 05:20

## 2020-02-03 RX ADMIN — POLYETHYLENE GLYCOL 3350 17 G: 17 POWDER, FOR SOLUTION ORAL at 08:35

## 2020-02-03 RX ADMIN — FLUOXETINE 40 MG: 20 CAPSULE ORAL at 08:34

## 2020-02-03 RX ADMIN — ONDANSETRON 4 MG: 2 INJECTION INTRAMUSCULAR; INTRAVENOUS at 00:43

## 2020-02-03 RX ADMIN — LORAZEPAM 1 MG: 1 TABLET ORAL at 00:43

## 2020-02-03 RX ADMIN — BUSPIRONE HYDROCHLORIDE 10 MG: 10 TABLET ORAL at 08:35

## 2020-02-03 ASSESSMENT — PAIN SCALES - GENERAL
PAINLEVEL_OUTOF10: 0
PAINLEVEL_OUTOF10: 0

## 2020-02-03 NOTE — PLAN OF CARE
Problem: Falls - Risk of:  Goal: Will remain free from falls  Description  Will remain free from falls  2/3/2020 1029 by Faith Stroud RN  Outcome: Ongoing  Note:   Patient at risk for falls. Patient resting quietly in bed. Side rails up x 2. Bed locked in lowest position. Bed alarm on. Bedside table and call light within reach. Patient instructed to call for assistance. Patient verbalized understanding. Will continue to monitor. Problem: Pain:  Description  Pain management should include both nonpharmacologic and pharmacologic interventions. Goal: Pain level will decrease  Description  Pain level will decrease  Note:   Pt resting at this time. No complaints of pain. Encouraged patient to call out with any needs. Will continue to monitor. Problem: Sleep Pattern Disturbance:  Goal: Appears well-rested  Description  Appears well-rested  2/3/2020 1029 by Faith Stroud RN  Note:   Pt has no c/o sleep disturbance and appears well rested at this time. Pt given ativan per CIWA scale. Call light within reach. Will continue to monitor.

## 2020-02-03 NOTE — PROGRESS NOTES
Physical Therapy    Facility/Department: [address]  Initial Assessment and Treatment/Discharge    NAME: Michel Cotton  : 1955  MRN: [id number]    Date of Service: 2/3/2020    Discharge [address] scored a 22/24 on the AM-PAC short mobility form. At this time, no further PT is recommended upon discharge due to pt supervision with mobility. Recommend patient returns to prior setting with prior services. PT Equipment Recommendations  Equipment Needed: No    Assessment   Assessment: Pt demonstrating safe mobility with supervision. Plans to return home prior to going to [facility]. Denies any concerns. No further PT needs identified. Decision Making: Low Complexity  Patient Education: role of PT, d/c planning; pt verb understanding  REQUIRES PT FOLLOW UP: No       Patient Diagnosis(es): The encounter diagnosis was Alcohol withdrawal syndrome with complication ([facility] has a past medical history of Alcoholism ([facility] Anxiety, Ataxia, Depression, Hyperlipidemia, and Hypertension. has a past surgical history that includes back surgery; Colonoscopy; and Endoscopy, colon, diagnostic. Restrictions  Position Activity Restriction  Other position/activity restrictions: activity as tolerated  Vision/Hearing  Vision: Impaired  Vision Exceptions: Wears glasses for reading;Wears glasses for distance  Hearing: Within functional limits     Subjective  General  Chart Reviewed: Yes  Additional Pertinent Hx: 71 y.o. female who presents alcohol withdrawal symptoms. Patient states at home she can feel her heart pounding, some chest discomfort, some nausea and a feeling of ants crawling on her skin. PMH: HTN, hyperlipidemia, depression, anxiety, alcoholism, back surgery  Referring Practitioner: Gudelia Deleon MD  Referral Date : 20  Subjective  Subjective: Pt found sitting up in recliner. Agreeable to PT.     Pain Screening  Patient Currently in Pain: Denies  Vital Treatment Minutes:  Remy Childs PT

## 2020-02-03 NOTE — CARE COORDINATION
Case Management Assessment           Daily Note                 Date/ Time of Note: 2/3/2020 5:14 PM         Note completed by: Chely Bertrand    Patient Name: Loreto Junior  YOB: 1955    Diagnosis:Alcohol withdrawal delirium ([facility] [F10.231]  Alcohol withdrawal delirium ([facility] [F10.231]  Patient Admission Status: Inpatient    Date of Admission:1/30/2020  3:50 AM Length of Stay: 4 GLOS:        Current Plan of Care:   etoh  Withdrawal:  CIWA  Score  Still 8   ________________________________________________________________________________________  PT AM-PAC: 22 / 24 per last evaluation on: 02/03/2020  OT AM-PAC: 24 / 24 per last evaluation on: 01/30/2020    DME Needs for discharge: NA    ________________________________________________________________________________________  Discharge Plan: To Be Determined DUE TO: would like to go to  [facility]  by the  [facility]      Tentative discharge date: 02/04/2020     Current barriers to discharge: w/d      Referrals completed: Inpatient Rehab: [facility]     Resources/ information provided: Not indicated at this time  ________________________________________________________________________________________  Case Management Notes:  CM  Following for  D/C  Planning:  Potentially  D/c to    [facility]  By the [facility], [address]   Phone: [phone]    MD  To  Follow up in AM poss  D/c  pending  W/D  . Chel Barksdale and her family were provided with choice of provider; she and her family are in agreement with the discharge plan.     Care Transition Patient: Kate Bertrand RN  [facility]  Case Management Department  Ph: [phone]

## 2020-02-03 NOTE — PROGRESS NOTES
Progress Note    Admit Date: 1/30/2020    Patient's PCP: Dr. Yassine Huang MD     71 y.o. female with a history of Alcoholism ([address] Anxiety, Depression, Hyperlipidemia, and Hypertension who presented to the ED with palpitations, chest pressure and nausea. She was at home and developed heart pounding, chest pressure  And some nausea and a feeling of ants crawling on her skin. She equated these to alcohol withdrawal symptoms since she has been through these before. She has required admission for this in the past as recently as November of last year. Last drink was around 2000 last night. She has been in  discussions with an inpatient treatment program for her ETOH issues, but has yet to go. In the ED she was afebrile. She was persistently tachycardic and hypertensive and was given Ativan IV and some Zofran as well as multivitamin with clonidine. She was planned for transfer from  ER to go to the [facility] by the [facility] for evaluation and treatment of her withdrawal, but this was not possible. .        Subjective  Patient was examined this morning. Still CIWA of 8. Patient did say that she can feel on crawling on her arms. Feeling some anxious. Denies any nausea, vomiting, fever, chills. She wants to go to the alcohol rehab. She is voluntary not into 1. Medications: Reviewed        PHYSICAL EXAM:  BP (!) 149/74   Pulse 84   Temp 97.5 °F (36.4 °C) (Oral)   Resp 18   Ht 5' 4\" (1.626 m)   Wt 149 lb 14.6 oz (68 kg)   SpO2 100%   BMI 25.73 kg/m²     No results for input(s): POCGLU in the last 72 hours.   General--alert, no obvious distress, mildly anxious  HEENT--no signs of trauma, oropharynx is mildly dry, no facial asymmetry on examination  Neck--trachea midline, no JVD  Chest--equal breath sounds bilaterally,  Heart--tachycardic but regular rhythm  Abdomen--soft, nontender, nondistended, bowel sounds are present  Extremities--good range of motion, good pulses

## 2020-02-03 NOTE — PLAN OF CARE
Problem: Falls - Risk of:  Goal: Will remain free from falls  Description  Will remain free from falls  Outcome: Ongoing  Note:   Patient at risk for falls. Patient resting quietly in bed. Side rails up x 2. Bed locked in lowest position. Bed alarm on. Bedside table and call light within reach. Patient instructed to call for assistance. Patient verbalized understanding. Will continue to monitor. Problem: Discharge Planning:  Goal: Discharged to appropriate level of care  Description  Discharged to appropriate level of care  Outcome: Ongoing  Note:   Pt desires to go to rehab center,  following for discharge planning. Pt is currently scoring 8 on CIWA scale. Given 2mg Ativan total this shift so far per orders, see MAR. Will continue to monitor.       Problem: Sleep Pattern Disturbance:  Goal: Appears well-rested  Description  Appears well-rested  Outcome: Ongoing

## 2020-02-03 NOTE — PROGRESS NOTES
Occupational Therapy   Occupational Therapy Re-evaluation/Discharge Note   Date: 2/3/2020   Patient Name: Marcelino Jaquez  MRN: [id number]     : 1955    Date of Service: 2/3/2020  Assessment: Pt doing well s/p ETOH withdrawl. Pt spvn with functional mobility and ADL performance during session. Pt plans to return home for a couple days and then attend rehab. Recommend return to prior level of care. No acute OT needs identified. Discharge [address] scored a 24/24 on the AM-PAC ADL Inpatient form. Current research shows that an AM-PAC score of 18 or greater is typically associated with a discharge to the patient's home setting. Based on the patients AM-PAC score and their current ADL deficits, it is recommended that the patient have 2-3 sessions per week of Occupational Therapy at d/c to increase the patients independence. See assessment. OT Equipment Recommendations  Equipment Needed: No    Assessment   Decision Making: Low Complexity  No Skilled OT: No OT goals identified  Activity Tolerance  Activity Tolerance: Patient Tolerated treatment well  Safety Devices  Safety Devices in place: Yes  Type of devices: Call light within reach; Left in bed;Gait belt;Nurse notified                Restrictions  Position Activity Restriction  Other position/activity restrictions: activity as tolerated    Subjective   General  Chart Reviewed: Yes  Patient assessed for rehabilitation services?: Yes  Family / Caregiver Present: No  Referring Practitioner: Georgette Flores MD  Diagnosis: ETOH withdrawal  Subjective  Subjective: Pt in bathroom with nursing upon entry.  Pt pleasant and agreeable to therapy   Patient Currently in Pain: Denies  Pain Assessment  Pain Assessment: 0-10(no c/o pain )    Social/Functional History  Social/Functional History  Lives With: Spouse()  Type of Home: Apartment  Home Layout: One level  Home Access: Stairs to enter with rails(13 [address])  Bathroom Shower/Tub: Walk-in shower  Bathroom Toilet: Standard  Bathroom Equipment: Shower chair  Home Equipment: Rolling walker  ADL Assistance: Independent  Homemaking Assistance: Independent  Ambulation Assistance: Independent(no device)  Transfer Assistance: Independent  Active : No  Additional Comments: Denies falls. Reports gets [place] when I detox. \"  Will use walker sometimes when this happens.  had stroke in September - assists him with dressing sometimes. Objective   Vision: Impaired  Vision Exceptions: Wears glasses for distance;Wears glasses for reading  Hearing: Within functional limits    Orientation  Overall Orientation Status: Within Functional Limits     Balance  Sitting Balance: Independent  Standing Balance: Independent  Standing Balance  Time: 1 min + 1 min + 5 min   Activity: functional mobility in room and bathoom; ambulation in hallway and staircase   Comment: Pt spvn for gait and transfers, independent in static stance at sink   Functional Mobility  Functional Mobility Comments: slight vearing toward L with ambulation, but pt attributes to not being out of bed in 4 days and slight dizziness.  /72   ADL  Grooming: Independent  LE Dressing: Supervision  Toileting: Supervision  Additional Comments: educated pt on safe shower technique at d/c (seated showers due to reports of dizziness) - pt verbal understanding   Coordination  Movements Are Fluid And Coordinated: Yes        Transfers  Sit to stand: Supervision  Stand to sit: Supervision     Cognition  Overall Cognitive Status: WFL        Sensation  Overall Sensation Status: WNL              Plan Discharge OT services       AM-PAC Score  AM-PAC Inpatient Daily Activity Raw Score: 24 (02/03/20 1534)  AM-PAC Inpatient ADL T-Scale Score : 57.54 (02/03/20 1534)  ADL Inpatient CMS 0-100% Score: 0 (02/03/20 1534)  ADL Inpatient CMS G-Code Modifier : CH (02/03/20 1534)      Therapy Time   Individual Concurrent Group Co-treatment   Time In 1430

## 2020-02-04 VITALS
RESPIRATION RATE: 16 BRPM | OXYGEN SATURATION: 97 % | SYSTOLIC BLOOD PRESSURE: 137 MMHG | TEMPERATURE: 98.3 F | HEIGHT: 64 IN | DIASTOLIC BLOOD PRESSURE: 78 MMHG | BODY MASS INDEX: 25.59 KG/M2 | HEART RATE: 91 BPM | WEIGHT: 149.91 LBS

## 2020-02-04 PROCEDURE — 6360000002 HC RX W HCPCS: Performed by: INTERNAL MEDICINE

## 2020-02-04 PROCEDURE — 6370000000 HC RX 637 (ALT 250 FOR IP): Performed by: EMERGENCY MEDICINE

## 2020-02-04 PROCEDURE — 2580000003 HC RX 258: Performed by: INTERNAL MEDICINE

## 2020-02-04 PROCEDURE — 6370000000 HC RX 637 (ALT 250 FOR IP): Performed by: INTERNAL MEDICINE

## 2020-02-04 RX ADMIN — Medication 10 ML: at 08:32

## 2020-02-04 RX ADMIN — Medication 100 MG: at 08:32

## 2020-02-04 RX ADMIN — PANTOPRAZOLE SODIUM 40 MG: 40 TABLET, DELAYED RELEASE ORAL at 06:08

## 2020-02-04 RX ADMIN — NIFEDIPINE 30 MG: 30 TABLET, EXTENDED RELEASE ORAL at 08:31

## 2020-02-04 RX ADMIN — FLUOXETINE 40 MG: 20 CAPSULE ORAL at 08:32

## 2020-02-04 RX ADMIN — BUSPIRONE HYDROCHLORIDE 10 MG: 10 TABLET ORAL at 08:32

## 2020-02-04 RX ADMIN — LORAZEPAM 1 MG: 1 TABLET ORAL at 00:48

## 2020-02-04 RX ADMIN — LORAZEPAM 1 MG: 1 TABLET ORAL at 08:46

## 2020-02-04 RX ADMIN — POLYETHYLENE GLYCOL 3350 17 G: 17 POWDER, FOR SOLUTION ORAL at 08:33

## 2020-02-04 RX ADMIN — ENOXAPARIN SODIUM 40 MG: 40 INJECTION SUBCUTANEOUS at 08:32

## 2020-02-04 RX ADMIN — FOLIC ACID 1 MG: 1 TABLET ORAL at 08:32

## 2020-02-04 ASSESSMENT — PAIN SCALES - GENERAL
PAINLEVEL_OUTOF10: 0
PAINLEVEL_OUTOF10: 0

## 2020-02-04 NOTE — FLOWSHEET NOTE
02/04/20 1426   Encounter Summary   Services provided to: Patient   Referral/Consult From: Rounding   Continue Visiting   (Seen 2/4/2020, CHERRY. )   Complexity of Encounter Moderate   Length of Encounter 15 minutes   Routine   Type Initial   Assessment Approachable   Intervention Nurtured hope   Outcome Expressed gratitude

## 2020-02-04 NOTE — PLAN OF CARE
Problem: Falls - Risk of:  Goal: Will remain free from falls  Description  Will remain free from falls  2/3/2020 2057 by Isis Ramirez RN  Outcome: Ongoing  Note:   Pt is free from falls at this time. Bed is in lowest position, wheels are locked and bed alarm is set. Call light and belongings are within reach. Visual fall sign/blanket are posted. Will continue to monitor. Problem: Falls - Risk of:  Goal: Absence of physical injury  Description  Absence of physical injury  Outcome: Ongoing  Note:   Pt is free from accidental physical injury at this time. Pt is AxOx4. Fall Risk assessed per shift. ID band in place. Bed in lowest position, wheels locked and alarm is set. Call light and belongings are within reach. Maintaining a safe environment. Will continue to assess and monitor. Problem: Pain:  Goal: Control of acute pain  Description  Control of acute pain  Outcome: Ongoing  Note:   Pt has no c/o pain at this time. Pt instructed to inform staff if signs of pain occur. Will continue to assess and monitor. Problem: Sleep Pattern Disturbance:  Goal: Appears well-rested  Description  Appears well-rested  2/3/2020 2057 by Isis Ramirez RN  Outcome: Ongoing  Note:   Pt appears rested. Pt reports rest as difficult. Lights turned down, curtains pulled and comfortable temperature maintained to aid in pt ability to achieve adequate rest. Will continue to monitor.

## 2020-02-04 NOTE — DISCHARGE INSTR - DIET

## 2020-02-04 NOTE — PLAN OF CARE
Problem: Falls - Risk of:  Goal: Will remain free from falls  Description  Will remain free from falls  2/4/2020 0954 by Cece Ervin RN  Outcome: Ongoing  Note:   Patient at risk for falls. Patient resting quietly in bed, A/O x 4. Side rails up x 2. Bed locked in lowest position. Bed alarm on. Bedside table and call light within reach. Patient instructed to call for assistance. Patient verbalized understanding. Will continue to monitor.        Problem: Pain:  Goal: Pain level will decrease  Description  Pain level will decrease  Outcome: Ongoing

## 2020-02-04 NOTE — CARE COORDINATION
Case Management Assessment            Discharge Note                    Date / Time of Note: 2/4/2020 12:38 PM                  Discharge Note Completed by: Nallely Winter    Patient Name: Marco Menezes   YOB: 1955  Diagnosis: Alcohol withdrawal delirium ([facility] [F10.231]  Alcohol withdrawal delirium ([facility] [facility] [F10.231]   Date / Time: 1/30/2020  3:50 AM    Current PCP: Magalie Zamora MD  Clinic patient: No    Hospitalization in the last 30 days: No    Advance Directives:  Code Status: Full Code  [place] DNR form completed and on chart: No    Financial:  Payor: Maldonado Roberts / Plan: MEDICARE PART A / Product Type: *No Product type* /      Pharmacy:    [facility] [address] [phone] Gabriel Encarnacion [phone]  [address] [place] [address]  Phone: [phone] Fax: [phone]      Assistance purchasing medications?: Potential Assistance Purchasing Medications: No  Assistance provided by Case Management: None at this time    Does patient want to participate in local refill/ meds to beds program?: No    Meds To Beds General Rules:  1. Can ONLY be done Monday- Friday between 8:30am-5pm  2. Prescription(s) must be in pharmacy by 3pm to be filled same day  3. Copy of patient's insurance/ prescription drug card and patient face sheet must be sent along with the prescription(s)  4. Cost of Rx cannot be added to hospital bill. If financial assistance is needed, please contact unit  or ;  or  CANNOT provide pharmacy voucher for patients co-pays  5.  Patients can then  the prescription on their way out of the hospital at discharge, or pharmacy can deliver to the bedside if staff is available. (payment due at time of pick-up or delivery - cash, check, or card accepted)     Able to afford home medications/ co-pay costs: Yes    ADLS:  Current PT AM-PAC Score: 22 /24  Current OT AM-PAC Score: 24 /24      DISCHARGE Disposition: Home- No Services Needed    LOC at discharge: Not Applicable  VICKI Completed: No    Notification completed in HENS/PAS?:  Not Applicable    IMM Completed:   Yes, Case management has presented and reviewed IMM letter #2 to the patient and/or family/ POA. Patient and/or family/POA verbalized understanding of their medicare rights and appeal process if needed. Patient and/or family/POA has signed, initialed and placed today's date (02/04/2020) and time ([phone]) on IMM letter #2 on the the appropriate lines. Patient and/or family/POA, copy of letter offered and they are aware that this original copy of IMM letter #2 is available prior to discharge from the paper chart on the unit. Electronic documentation has been entered into epic for IMM letter #2 and original paper copy has been added to the paper chart at the nurses station.      Transportation:  Transportation PLAN for discharge: lyft    Mode of Transport: Lyft  Reason for medical transport: Not Applicable  Name of [address] [place] [address],[address]: Not Applicable  Time of Transport: when ready to  D/C  After  YANCY moseley  Paperwork and  D/c instructions     Transport form completed: No    Home Care:  [address] ordered at discharge: No  [address]: Not Applicable  Orders faxed: No    Durable Medical Equipment:  DME Provider: none    Equipment obtained during hospitalization: none  Has  A  Walker at  home    Home Oxygen and Respiratory Equipment:  Oxygen needed at discharge?: No  [address]: Not Applicable  Portable tank available for discharge?: No    Dialysis:  Dialysis patient: No    Dialysis Center:  Not Applicable    Hospice Services:  Location: Not Applicable  Agency: Not Applicable    Consents signed: Not Indicated    Referrals made at [facility] for outpatient continued care:  Not Applicable    Additional CM Notes: CM  Met with pt at bedside  And  Confirmed  D/c plan , she is not going to [facility] , they were not able to meet her needs : She  Has  Agreed to go to the [facility], spoke with  Renetta Horton and Say Hernández from the facility , and  Brought her   An admission informational folder. Will d/c home and  Go there on Friday. The [facility], [facility]  [place] [address] [address], [address]    Phone: [phone]        The Plan for Transition of Care is related to the following treatment goals of Alcohol withdrawal delirium ([facility] [F10.231]  Alcohol withdrawal delirium ([facility] [F10.231]    The Patient and/or patient representative Geovany Knight and her family were provided with a choice of provider and agrees with the discharge plan Yes    Freedom of choice list was provided with basic dialogue that supports the patient's individualized plan of care/goals and shares the quality data associated with the providers.  Yes    Care Transitions patient: No    Mandy Benjamin RN  [facility] [employer] [facility]  Case Management Department  Ph: [phone]

## 2020-02-04 NOTE — DISCHARGE SUMMARY
Full range of motion without deformity. Skin: Skin color, texture, turgor normal.  No rashes or lesions. Neurologic:  Neurovascularly intact without any focal sensory/motor deficits. Cranial nerves: II-XII intact, grossly non-focal.  Psychiatric:  Alert and oriented, thought content appropriate, normal insight  Capillary Refill: Brisk,< 3 seconds   Peripheral Pulses: +2 palpable, equal bilaterally       Labs: For convenience and continuity at follow-up the following most recent labs are provided:      CBC:    Lab Results   Component Value Date    WBC 5.1 02/02/2020    HGB 12.4 02/02/2020    HCT 36.3 02/02/2020     02/02/2020       Renal:    Lab Results   Component Value Date     01/31/2020    K 4.4 01/31/2020    CL 99 01/31/2020    CO2 26 01/31/2020    BUN 8 01/31/2020    CREATININE 0.6 01/31/2020    CALCIUM 9.9 01/31/2020    PHOS 3.7 11/22/2019         Significant Diagnostic Studies    Radiology:   No orders to display          Consults:     IP CONSULT TO HOSPITALIST  IP CONSULT TO SOCIAL WORK    Disposition:  home     Condition at Discharge: Stable    Discharge Instructions/Follow-up: PCP, alcohol rehab.     Code Status:  Full Code     Activity: activity as tolerated    Diet: regular diet      Discharge Medications:     Current Discharge Medication List           Details   Cholecalciferol (VITAMIN D3) 5000 units TABS Take 1 tablet by mouth daily  Qty: 30 tablet, Refills: 0      Multiple Vitamins-Minerals (MULTIVITAMIN PO) Take by mouth daily       B Complex Vitamins (B COMPLEX 1 PO) Take by mouth daily       busPIRone (BUSPAR) 10 MG tablet Take 10 mg by mouth 2 times daily       FLUoxetine (PROZAC) 20 MG capsule Take 2 capsules by mouth daily  Qty: 15 capsule, Refills: 0      ondansetron (ZOFRAN) 4 MG tablet Take 1 tablet by mouth every 8 hours as needed for Nausea or Vomiting  Qty: 10 tablet, Refills: 0      NIFEdipine (ADALAT CC) 30 MG extended release tablet Take 1 tablet by mouth daily  Qty: 15 tablet, Refills: 0      folic acid (FOLVITE) 1 MG tablet Take 1 tablet by mouth daily  Qty: 30 tablet, Refills: 0      Milk Thistle 250 MG CAPS Take 1 capsule by mouth daily  Qty: 15 capsule, Refills: 0      calcium carbonate 600 MG TABS tablet Take 2 tablets by mouth daily  Qty: 15 tablet, Refills: 0             Time Spent on discharge is more than 20 minutes in the examination, evaluation, counseling and review of medications and discharge plan. Signed:    Roberta Frances MD   2/4/2020      Thank you Bam Childers MD for the opportunity to be involved in this patient's care. If you have any questions or concerns please feel free to contact me at [phone].

## 2020-02-08 ENCOUNTER — HOSPITAL ENCOUNTER (EMERGENCY)
Age: 65
Discharge: HOME | End: 2020-02-08
Attending: EMERGENCY MEDICINE
Payer: COMMERCIAL

## 2020-02-08 ENCOUNTER — APPOINTMENT (OUTPATIENT)
Dept: GENERAL RADIOLOGY | Age: 65
End: 2020-02-08
Payer: COMMERCIAL

## 2020-02-08 VITALS
SYSTOLIC BLOOD PRESSURE: 181 MMHG | WEIGHT: 155 LBS | RESPIRATION RATE: 20 BRPM | OXYGEN SATURATION: 98 % | HEIGHT: 64 IN | DIASTOLIC BLOOD PRESSURE: 80 MMHG | BODY MASS INDEX: 26.46 KG/M2 | TEMPERATURE: 98.2 F | HEART RATE: 99 BPM

## 2020-02-08 LAB
ALBUMIN SERPL-MCNC: 4.2 G/DL (ref 3.4–5)
ALP BLD-CCNC: 55 U/L (ref 40–129)
ALT SERPL-CCNC: 58 U/L (ref 10–40)
ANION GAP SERPL CALCULATED.3IONS-SCNC: 14 MMOL/L (ref 3–16)
AST SERPL-CCNC: 81 U/L (ref 15–37)
BASOPHILS ABSOLUTE: 0 K/UL (ref 0–0.2)
BASOPHILS RELATIVE PERCENT: 0.5 %
BILIRUB SERPL-MCNC: 0.3 MG/DL (ref 0–1)
BILIRUBIN DIRECT: <0.2 MG/DL (ref 0–0.3)
BILIRUBIN, INDIRECT: ABNORMAL MG/DL (ref 0–1)
BUN BLDV-MCNC: 9 MG/DL (ref 7–20)
CALCIUM SERPL-MCNC: 10 MG/DL (ref 8.3–10.6)
CHLORIDE BLD-SCNC: 101 MMOL/L (ref 99–110)
CO2: 20 MMOL/L (ref 21–32)
CREAT SERPL-MCNC: <0.5 MG/DL (ref 0.6–1.2)
EOSINOPHILS ABSOLUTE: 0.1 K/UL (ref 0–0.6)
EOSINOPHILS RELATIVE PERCENT: 1.5 %
GFR AFRICAN AMERICAN: >60
GFR NON-AFRICAN AMERICAN: >60
GLUCOSE BLD-MCNC: 105 MG/DL (ref 70–99)
HCT VFR BLD CALC: 38.5 % (ref 36–48)
HEMOGLOBIN: 12.7 G/DL (ref 12–16)
LYMPHOCYTES ABSOLUTE: 2.5 K/UL (ref 1–5.1)
LYMPHOCYTES RELATIVE PERCENT: 39.2 %
MCH RBC QN AUTO: 33.5 PG (ref 26–34)
MCHC RBC AUTO-ENTMCNC: 33.1 G/DL (ref 31–36)
MCV RBC AUTO: 101.1 FL (ref 80–100)
MONOCYTES ABSOLUTE: 0.7 K/UL (ref 0–1.3)
MONOCYTES RELATIVE PERCENT: 11.4 %
NEUTROPHILS ABSOLUTE: 3.1 K/UL (ref 1.7–7.7)
NEUTROPHILS RELATIVE PERCENT: 47.4 %
PDW BLD-RTO: 13.9 % (ref 12.4–15.4)
PLATELET # BLD: 157 K/UL (ref 135–450)
PMV BLD AUTO: 8.5 FL (ref 5–10.5)
POTASSIUM REFLEX MAGNESIUM: 4.9 MMOL/L (ref 3.5–5.1)
RBC # BLD: 3.8 M/UL (ref 4–5.2)
SODIUM BLD-SCNC: 135 MMOL/L (ref 136–145)
TOTAL PROTEIN: 7.8 G/DL (ref 6.4–8.2)
WBC # BLD: 6.5 K/UL (ref 4–11)

## 2020-02-08 PROCEDURE — 99285 EMERGENCY DEPT VISIT HI MDM: CPT

## 2020-02-08 PROCEDURE — 71046 X-RAY EXAM CHEST 2 VIEWS: CPT

## 2020-02-08 PROCEDURE — 85025 COMPLETE CBC W/AUTO DIFF WBC: CPT

## 2020-02-08 PROCEDURE — 93005 ELECTROCARDIOGRAM TRACING: CPT | Performed by: STUDENT IN AN ORGANIZED HEALTH CARE EDUCATION/TRAINING PROGRAM

## 2020-02-08 PROCEDURE — 80048 BASIC METABOLIC PNL TOTAL CA: CPT

## 2020-02-08 PROCEDURE — 80076 HEPATIC FUNCTION PANEL: CPT

## 2020-02-08 PROCEDURE — 6360000002 HC RX W HCPCS: Performed by: EMERGENCY MEDICINE

## 2020-02-08 PROCEDURE — 96374 THER/PROPH/DIAG INJ IV PUSH: CPT

## 2020-02-08 RX ORDER — LORAZEPAM 2 MG/ML
1 INJECTION INTRAMUSCULAR ONCE
Status: COMPLETED | OUTPATIENT
Start: 2020-02-08 | End: 2020-02-08

## 2020-02-08 RX ADMIN — LORAZEPAM 1 MG: 2 INJECTION INTRAMUSCULAR; INTRAVENOUS at 16:02

## 2020-02-08 ASSESSMENT — ENCOUNTER SYMPTOMS
RESPIRATORY NEGATIVE: 1
GASTROINTESTINAL NEGATIVE: 1

## 2020-02-08 NOTE — ED NOTES
Pt continues to be anxious with tremors. Ativan 1mg IVP given.       Alexandro Rivera, YANCY  02/08/20 8401

## 2020-02-08 NOTE — ED NOTES
Bed: A10-10  Expected date:   Expected time:   Means of arrival:   Comments:  [address] [place] [address]     Halina Vick RN  02/08/20 7305

## 2020-02-08 NOTE — ED PROVIDER NOTES
[address] [facility] RESIDENT NOTE       Date of evaluation: 2020    Chief Complaint     Dizziness and Anxiety      History of Present Illness     Tra Sparks is a 71 y.o. female who presents to the emergency department for shaking episodes. Pt was recently admitted to the hospital from 2020 to 2020 for alcohol withdrawal. Pt is currently staying at [facility]. Last alcoholic drink was 9371. Pt reports for the past 6-8 months she has had intermittent episodes of \"body shaking\" and having a hard time walking. She has \"spells where I get real spasticy. \" She had a similar episode today and the physician at [facility] wanted her to get evaluated. She denies alcohol withdrawal seizures. Does endorse hx of DTs but unsure when. She endorses chest pressure during the \"spells\" of her body being shaky. She does endorse a headache during these episodes as well. She denies SOB. Pt endorses strong cardiac hx with on her paternal side. Her father had 11 siblings and only 2 survived to 48years old due to atherosclerosis. Her father  at age 52 from CAD. Her brother passed away suddenly at 64 from an MI. Pt takes folic acid, vit c, vit B complex, vit D intermittently. Review of Systems     Review of Systems   Constitutional: Negative. HENT: Negative. Respiratory: Negative. Cardiovascular: Negative. Gastrointestinal: Negative. Genitourinary: Negative. Musculoskeletal: Negative. Neurological: Positive for dizziness and light-headedness. Psychiatric/Behavioral: Negative. Past Medical, Surgical, Family, and Social History     She has a past medical history of Alcoholism ([address] Anxiety, Ataxia, Depression, Hyperlipidemia, and Hypertension. She has a past surgical history that includes back surgery; Colonoscopy; and Endoscopy, colon, diagnostic.   Her family history includes Diabetes in her maternal grandfather and maternal grandmother; Heart Disease in her brother, father, maternal grandfather, maternal grandmother, paternal aunt, and paternal uncle; Other in her paternal grandfather and paternal grandmother; Stroke in her mother; Substance Abuse in her brother, father, and mother. She reports that she quit smoking about 32 years ago. Her smoking use included cigarettes. She has never used smokeless tobacco. She reports current alcohol use of about 56.0 standard drinks of alcohol per week. She reports that she does not use drugs. Medications     Previous Medications    B COMPLEX VITAMINS (B COMPLEX 1 PO)    Take by mouth daily     BUSPIRONE (BUSPAR) 10 MG TABLET    Take 10 mg by mouth 2 times daily     CALCIUM CARBONATE 600 MG TABS TABLET    Take 2 tablets by mouth daily    CHOLECALCIFEROL (VITAMIN D3) 5000 UNITS TABS    Take 1 tablet by mouth daily    FLUOXETINE (PROZAC) 20 MG CAPSULE    Take 2 capsules by mouth daily    FOLIC ACID (FOLVITE) 1 MG TABLET    Take 1 tablet by mouth daily    MILK THISTLE 250 MG CAPS    Take 1 capsule by mouth daily    MULTIPLE VITAMINS-MINERALS (MULTIVITAMIN PO)    Take by mouth daily     NIFEDIPINE (ADALAT CC) 30 MG EXTENDED RELEASE TABLET    Take 1 tablet by mouth daily    ONDANSETRON (ZOFRAN) 4 MG TABLET    Take 1 tablet by mouth every 8 hours as needed for Nausea or Vomiting       Allergies     She is allergic to penicillins and sulfa antibiotics. Physical Exam     INITIAL VITALS: BP: (!) 161/80, Temp: 98.2 °F (36.8 °C), Pulse: 99, Resp: 20, SpO2: 98 %   Physical Exam  Constitutional:       Appearance: Normal appearance. HENT:      Head: Normocephalic and atraumatic. Nose: Nose normal.      Mouth/Throat:      Mouth: Mucous membranes are moist.   Eyes:      Extraocular Movements: Extraocular movements intact. Conjunctiva/sclera: Conjunctivae normal.      Pupils: Pupils are equal, round, and reactive to light. Neck:      Musculoskeletal: Normal range of motion. No neck rigidity. Cardiovascular:      Rate and Rhythm: Normal rate and regular rhythm. Pulmonary:      Effort: Pulmonary effort is normal. No respiratory distress. Abdominal:      General: There is no distension. Palpations: Abdomen is soft. Tenderness: There is no abdominal tenderness. Skin:     General: Skin is warm and dry. Neurological:      Mental Status: She is alert. Cranial Nerves: Cranial nerves are intact. No dysarthria or facial asymmetry. Sensory: Sensation is intact. Motor: No weakness. Coordination: Finger-Nose-Finger Test normal.      Comments: RUE with resting tremor. Finger to nose intact. Unstable gait with tremors in all extremities on attempt to ambulate. DiagnosticResults     EKG   Interpreted in conjunction with emergencydepartment physician Tom Quiroz MD  Rhythm: normal sinus   Rate: normal  Axis: normal  Ectopy: none  Conduction: normal  ST Segments: normal  T Waves:normal  Q Waves: none  Clinical Impression: no acute changes. Normal sinus rhythm without ischemia or infarction  Comparison:  1/30/2020    RADIOLOGY:  XR CHEST STANDARD (2 VW)   Final Result      1. Mild bilateral basilar atelectasis.                  LABS:   Results for orders placed or performed during the hospital encounter of 17/72/50   Basic Metabolic Panel w/ Reflex to MG   Result Value Ref Range    Sodium 135 (L) 136 - 145 mmol/L    Potassium reflex Magnesium 4.9 3.5 - 5.1 mmol/L    Chloride 101 99 - 110 mmol/L    CO2 20 (L) 21 - 32 mmol/L    Anion Gap 14 3 - 16    Glucose 105 (H) 70 - 99 mg/dL    BUN 9 7 - 20 mg/dL    CREATININE <0.5 (L) 0.6 - 1.2 mg/dL    GFR Non-African American >60 >60    GFR African American >60 >60    Calcium 10.0 8.3 - 10.6 mg/dL   Hepatic Function Panel   Result Value Ref Range    Total Protein 7.8 6.4 - 8.2 g/dL    Alb 4.2 3.4 - 5.0 g/dL    Alkaline Phosphatase 55 40 - 129 U/L    ALT 58 (H) 10 - 40 U/L    AST 81 (H) 15 - 37 U/L    Total Bilirubin 0.3 0.0 - 1.0 mg/dL    Bilirubin, Direct <0.2 0.0 - 0.3 mg/dL    Bilirubin, Indirect see below 0.0 - 1.0 mg/dL   CBC Auto Differential   Result Value Ref Range    WBC 6.5 4.0 - 11.0 K/uL    RBC 3.80 (L) 4.00 - 5.20 M/uL    Hemoglobin 12.7 12.0 - 16.0 g/dL    Hematocrit 38.5 36.0 - 48.0 %    .1 (H) 80.0 - 100.0 fL    MCH 33.5 26.0 - 34.0 pg    MCHC 33.1 31.0 - 36.0 g/dL    RDW 13.9 12.4 - 15.4 %    Platelets 619 496 - 786 K/uL    MPV 8.5 5.0 - 10.5 fL    Neutrophils % 47.4 %    Lymphocytes % 39.2 %    Monocytes % 11.4 %    Eosinophils % 1.5 %    Basophils % 0.5 %    Neutrophils Absolute 3.1 1.7 - 7.7 K/uL    Lymphocytes Absolute 2.5 1.0 - 5.1 K/uL    Monocytes Absolute 0.7 0.0 - 1.3 K/uL    Eosinophils Absolute 0.1 0.0 - 0.6 K/uL    Basophils Absolute 0.0 0.0 - 0.2 K/uL     RECENT VITALS:  BP: (!) 181/80, Temp: 98.2 °F (36.8 °C), Pulse: 99,Resp: 20, SpO2: 98 %     Procedures         ED Course     Nursing Notes, Past Medical Hx, Past Surgical Hx, Social Hx, Allergies, and Family Hx were reviewed. The patient was given the followingmedications:  Orders Placed This Encounter   Medications    LORazepam (ATIVAN) injection 1 mg       CONSULTS:  None    [address] [facility] is a 71 y.o. female who presents to the ED with concern of shaking episodes and unstable gait. Pt's examination as above. Pt's workup was significant for no leukocytosis with WBC of 6.5, electrolytes WNL, mildly elevated AST and ALT, EKG with normal sinus rhythm, and CXR with mild bibasilar atelectasis. When pt was seated in comfortable position, pt did have resting tremor in RUE. However, it was not present on finger to nose test. When pt attempted to walk, she had an unsteady gait and had shaking in all extremities. Pt reports this usually happens when she is \"detoxing\" but her last drink was >8 days prior. Pt was given ativan in the emergency department.  On reassessment, pt had no tremor and was ambulating with a steady gait and without any difficulty. Neurological exam repeated without deficits. Unclear etiology of pt's unsteady gait. Pt reports she has been receiving vitamins. Additionally, she denies any recent trauma. Per pt, she has a prescription of ativan that is waiting to be filled at the [facility]. This was confirmed by pt's nurse. At this time, pt was deemed appropriate for discharge. All questions answered. Strict return precautions provided. This patient was also evaluated by the attending physician. All care plans werediscussed and agreed upon. Clinical Impression     1. Dizziness        Disposition     PATIENT REFERRED TO:  No follow-up provider specified.     DISCHARGE MEDICATIONS:  New Prescriptions    No medications on file       DISPOSITION Decision To Discharge 02/08/2020 05:02:28 Talha Rapp MD  Resident  02/11/20 2026

## 2020-02-08 NOTE — ED PROVIDER NOTES
ED Attending Attestation Note     Date of evaluation: 2/8/2020    This patient was seen by the resident. I have seen and examined the patient, agree with the workup, evaluation, management and diagnosis. The care plan has been discussed. I have reviewed the ECG and concur with the resident's interpretation. My assessment reveals patient here from the [facility] rehab where she is getting treatment for alcoholism with complaints of tremors and ataxia similar to when she goes thru detox. Sxs accentuated when walking. Labs unremarkable and ativan \"cured\" sxs. Patient will need continued small doses of benzos upon return to the [facility].      Norah Perez MD  02/08/20 4551

## 2020-02-08 NOTE — ED NOTES
Pt left the unit ambulatory accompanied by transportation staff from [facility].       Haley Pierre, YANCY  02/08/20 9756

## 2020-02-09 LAB
EKG ATRIAL RATE: 96 BPM
EKG DIAGNOSIS: NORMAL
EKG P AXIS: 75 DEGREES
EKG P-R INTERVAL: 164 MS
EKG Q-T INTERVAL: 346 MS
EKG QRS DURATION: 76 MS
EKG QTC CALCULATION (BAZETT): 437 MS
EKG R AXIS: 67 DEGREES
EKG T AXIS: 57 DEGREES
EKG VENTRICULAR RATE: 96 BPM

## 2020-02-16 ENCOUNTER — HOSPITAL ENCOUNTER (EMERGENCY)
Age: 65
Discharge: HOME | End: 2020-02-16
Attending: EMERGENCY MEDICINE
Payer: COMMERCIAL

## 2020-02-16 VITALS
BODY MASS INDEX: 24.99 KG/M2 | SYSTOLIC BLOOD PRESSURE: 157 MMHG | OXYGEN SATURATION: 100 % | RESPIRATION RATE: 14 BRPM | DIASTOLIC BLOOD PRESSURE: 73 MMHG | TEMPERATURE: 97.6 F | HEART RATE: 103 BPM | HEIGHT: 65 IN | WEIGHT: 150 LBS

## 2020-02-16 PROCEDURE — 99283 EMERGENCY DEPT VISIT LOW MDM: CPT

## 2020-02-16 NOTE — ED PROVIDER NOTES
units TABS Take 1 tablet by mouth daily 30 tablet 0    Milk Thistle 250 MG CAPS Take 1 capsule by mouth daily 15 capsule 0    calcium carbonate 600 MG TABS tablet Take 2 tablets by mouth daily 15 tablet 0    Multiple Vitamins-Minerals (MULTIVITAMIN PO) Take by mouth daily       B Complex Vitamins (B COMPLEX 1 PO) Take by mouth daily        Allergies   Allergen Reactions    Penicillins     Sulfa Antibiotics        REVIEW OF SYSTEMS  10 systems reviewed, pertinent positives per HPI otherwise noted to be negative. PHYSICAL EXAM  BP (!) 157/73   Pulse 103   Temp 97.6 °F (36.4 °C) (Oral)   Resp 14   Ht 5' 5\" (1.651 m)   Wt 150 lb (68 kg)   SpO2 100%   BMI 24.96 kg/m²    GENERAL APPEARANCE: Awake and alert. Cooperative. No acute distress. HENT: Normocephalic. Atraumatic. Mucous membranes are Moist.  No drooling or stridor. NECK: Supple. EYES: PERRL. EOM's grossly intact. HEART/CHEST: RRR. No murmurs. LUNGS: Respirations unlabored. CTAB. Good air exchange. Speaking comfortably in full sentences. ABDOMEN: No tenderness. Soft. Non-distended. No masses. No organomegaly. No guarding or rebound. MUSCULOSKELETAL: No extremity edema. Compartments soft. No deformity. No tenderness in the extremities. All extremities neurovascularly intact. SKIN: Warm and dry. No acute rashes. NEUROLOGICAL: Alert and oriented. CN's 2-12 intact. No gross facial drooping. Strength 5/5, sensation intact. No gross focal deficit. Patient with steady gait. While talking to her her tremor becomes more notable especially in the right upper extremity but whenever I distract her, have her do something or stopping attention the tremor completely resolved. PSYCHIATRIC: Normal mood and affect. LABS  I have reviewed all labs for this visit. No results found for this visit on 02/16/20. RADIOLOGY  None     ED COURSE/MDM  Patient seen and evaluated. Old records reviewed.  Labs and imaging reviewed and results discussed with patient. Patient presenting due to concern for tremors and I do not feel that this is related to alcohol withdrawal.  He seems much more intentional or psychogenic. Will discharge home with close outpatient follow-up. She is to continue with her alcohol abuse treatment at the [facility] and I do not see any evidence of medical condition that would preclude discharge at this time or any evidence of acute life-threatening cause such as stroke or significant withdrawal.  Patient be discharged at this time. I estimate there is LOW risk for ACUTE CORONARY SYNDROME, INTRACRANIAL HEMORRHAGE, MALIGNANT DYSRHYTHMIA or HYPERTENSION, PULMONARY EMBOLISM, SEPSIS, SUBARACHNOID HEMORRHAGE, SUBDURAL HEMATOMA, STROKE, or THORACIC AORTIC DISSECTION, thus I consider the discharge disposition reasonable. Estuardo Carias and I have discussed the diagnosis and risks, and we agree with discharging home to follow-up with their primary doctor. We also discussed returning to the Emergency Department immediately if new or worsening symptoms occur. We have discussed the symptoms which are most concerning (e.g., bloody sputum, fever, worsening pain or shortness of breath, vomiting, weakness) that necessitate immediate return. During the patient's ED course, the patient was given:  Medications - No data to display     CLINICAL IMPRESSION  1. Coarse tremor        Blood pressure (!) 157/73, pulse 103, temperature 97.6 °F (36.4 °C), temperature source Oral, resp. rate 14, height 5' 5\" (1.651 m), weight 150 lb (68 kg), SpO2 100 %, not currently breastfeeding. DISPOSITION  Estuardo Carias was discharged to home in stable condition. Patient was given scripts for the following medications. I counseled patient how to take these medications.    Discharge Medication List as of 2/16/2020  2:33 PM          Follow-up with:  Khai Field MD  [address] [place] [address]  [phone]    Schedule an

## 2020-02-16 NOTE — ED NOTES
Dr Hilario Florentino spoke with patient and DC instructions were given     Joana Sow, YANCY  02/16/20 0478

## 2020-05-11 ENCOUNTER — HOSPITAL ENCOUNTER (INPATIENT)
Age: 65
LOS: 4 days | Discharge: HOME | DRG: 897 | End: 2020-05-15
Attending: EMERGENCY MEDICINE | Admitting: INTERNAL MEDICINE
Payer: COMMERCIAL

## 2020-05-11 LAB
ANION GAP SERPL CALCULATED.3IONS-SCNC: 15 MMOL/L (ref 3–16)
BASOPHILS ABSOLUTE: 0 K/UL (ref 0–0.2)
BASOPHILS RELATIVE PERCENT: 0.6 %
BUN BLDV-MCNC: 12 MG/DL (ref 7–20)
CALCIUM SERPL-MCNC: 9.7 MG/DL (ref 8.3–10.6)
CHLORIDE BLD-SCNC: 93 MMOL/L (ref 99–110)
CO2: 22 MMOL/L (ref 21–32)
CREAT SERPL-MCNC: 0.7 MG/DL (ref 0.6–1.2)
EKG ATRIAL RATE: 120 BPM
EKG DIAGNOSIS: NORMAL
EKG P AXIS: 84 DEGREES
EKG P-R INTERVAL: 156 MS
EKG Q-T INTERVAL: 318 MS
EKG QRS DURATION: 88 MS
EKG QTC CALCULATION (BAZETT): 449 MS
EKG R AXIS: 78 DEGREES
EKG T AXIS: 70 DEGREES
EKG VENTRICULAR RATE: 120 BPM
EOSINOPHILS ABSOLUTE: 0.1 K/UL (ref 0–0.6)
EOSINOPHILS RELATIVE PERCENT: 1.1 %
ETHANOL: NORMAL MG/DL (ref 0–0.08)
GFR AFRICAN AMERICAN: >60
GFR NON-AFRICAN AMERICAN: >60
GLUCOSE BLD-MCNC: 119 MG/DL (ref 70–99)
HCT VFR BLD CALC: 38 % (ref 36–48)
HEMOGLOBIN: 12.7 G/DL (ref 12–16)
LACTIC ACID: 2.1 MMOL/L (ref 0.4–2)
LACTIC ACID: 2.2 MMOL/L (ref 0.4–2)
LYMPHOCYTES ABSOLUTE: 3 K/UL (ref 1–5.1)
LYMPHOCYTES RELATIVE PERCENT: 36.4 %
MCH RBC QN AUTO: 32.3 PG (ref 26–34)
MCHC RBC AUTO-ENTMCNC: 33.4 G/DL (ref 31–36)
MCV RBC AUTO: 96.6 FL (ref 80–100)
MONOCYTES ABSOLUTE: 0.6 K/UL (ref 0–1.3)
MONOCYTES RELATIVE PERCENT: 7.3 %
NEUTROPHILS ABSOLUTE: 4.4 K/UL (ref 1.7–7.7)
NEUTROPHILS RELATIVE PERCENT: 54.6 %
PDW BLD-RTO: 13.4 % (ref 12.4–15.4)
PLATELET # BLD: 252 K/UL (ref 135–450)
PMV BLD AUTO: 7.2 FL (ref 5–10.5)
POTASSIUM SERPL-SCNC: 4 MMOL/L (ref 3.5–5.1)
RBC # BLD: 3.94 M/UL (ref 4–5.2)
SODIUM BLD-SCNC: 130 MMOL/L (ref 136–145)
WBC # BLD: 8.2 K/UL (ref 4–11)

## 2020-05-11 PROCEDURE — 1200000000 HC SEMI PRIVATE

## 2020-05-11 PROCEDURE — 2580000003 HC RX 258: Performed by: INTERNAL MEDICINE

## 2020-05-11 PROCEDURE — 97161 PT EVAL LOW COMPLEX 20 MIN: CPT

## 2020-05-11 PROCEDURE — 6360000002 HC RX W HCPCS

## 2020-05-11 PROCEDURE — 85025 COMPLETE CBC W/AUTO DIFF WBC: CPT

## 2020-05-11 PROCEDURE — G0480 DRUG TEST DEF 1-7 CLASSES: HCPCS

## 2020-05-11 PROCEDURE — 93005 ELECTROCARDIOGRAM TRACING: CPT | Performed by: EMERGENCY MEDICINE

## 2020-05-11 PROCEDURE — 83605 ASSAY OF LACTIC ACID: CPT

## 2020-05-11 PROCEDURE — 97530 THERAPEUTIC ACTIVITIES: CPT

## 2020-05-11 PROCEDURE — 6360000002 HC RX W HCPCS: Performed by: EMERGENCY MEDICINE

## 2020-05-11 PROCEDURE — 6370000000 HC RX 637 (ALT 250 FOR IP): Performed by: INTERNAL MEDICINE

## 2020-05-11 PROCEDURE — 97116 GAIT TRAINING THERAPY: CPT

## 2020-05-11 PROCEDURE — 96375 TX/PRO/DX INJ NEW DRUG ADDON: CPT

## 2020-05-11 PROCEDURE — 6370000000 HC RX 637 (ALT 250 FOR IP): Performed by: EMERGENCY MEDICINE

## 2020-05-11 PROCEDURE — 96374 THER/PROPH/DIAG INJ IV PUSH: CPT

## 2020-05-11 PROCEDURE — 96376 TX/PRO/DX INJ SAME DRUG ADON: CPT

## 2020-05-11 PROCEDURE — 36415 COLL VENOUS BLD VENIPUNCTURE: CPT

## 2020-05-11 PROCEDURE — 97535 SELF CARE MNGMENT TRAINING: CPT

## 2020-05-11 PROCEDURE — 80048 BASIC METABOLIC PNL TOTAL CA: CPT

## 2020-05-11 PROCEDURE — 97166 OT EVAL MOD COMPLEX 45 MIN: CPT

## 2020-05-11 PROCEDURE — 99285 EMERGENCY DEPT VISIT HI MDM: CPT

## 2020-05-11 PROCEDURE — 6360000002 HC RX W HCPCS: Performed by: INTERNAL MEDICINE

## 2020-05-11 RX ORDER — UREA 10 %
5 LOTION (ML) TOPICAL NIGHTLY PRN
Status: DISCONTINUED | OUTPATIENT
Start: 2020-05-12 | End: 2020-05-15 | Stop reason: HOSPADM

## 2020-05-11 RX ORDER — LORAZEPAM 1 MG/1
4 TABLET ORAL
Status: DISCONTINUED | OUTPATIENT
Start: 2020-05-11 | End: 2020-05-12

## 2020-05-11 RX ORDER — THIAMINE MONONITRATE (VIT B1) 100 MG
100 TABLET ORAL 2 TIMES DAILY
Status: ON HOLD | COMMUNITY
End: 2021-06-17 | Stop reason: CLARIF

## 2020-05-11 RX ORDER — LORAZEPAM 1 MG/1
3 TABLET ORAL
Status: DISCONTINUED | OUTPATIENT
Start: 2020-05-11 | End: 2020-05-12

## 2020-05-11 RX ORDER — CHLORDIAZEPOXIDE HYDROCHLORIDE 25 MG/1
25 CAPSULE, GELATIN COATED ORAL 4 TIMES DAILY
Status: DISCONTINUED | OUTPATIENT
Start: 2020-05-11 | End: 2020-05-12

## 2020-05-11 RX ORDER — SODIUM CHLORIDE 9 MG/ML
INJECTION, SOLUTION INTRAVENOUS CONTINUOUS
Status: DISCONTINUED | OUTPATIENT
Start: 2020-05-11 | End: 2020-05-12

## 2020-05-11 RX ORDER — PROMETHAZINE HYDROCHLORIDE 12.5 MG/1
12.5 TABLET ORAL EVERY 6 HOURS PRN
Status: DISCONTINUED | OUTPATIENT
Start: 2020-05-11 | End: 2020-05-15 | Stop reason: HOSPADM

## 2020-05-11 RX ORDER — M-VIT,TX,IRON,MINS/CALC/FOLIC 27MG-0.4MG
1 TABLET ORAL ONCE
Status: COMPLETED | OUTPATIENT
Start: 2020-05-11 | End: 2020-05-11

## 2020-05-11 RX ORDER — ACETAMINOPHEN 325 MG/1
650 TABLET ORAL EVERY 6 HOURS PRN
Status: DISCONTINUED | OUTPATIENT
Start: 2020-05-11 | End: 2020-05-15 | Stop reason: HOSPADM

## 2020-05-11 RX ORDER — THIAMINE MONONITRATE (VIT B1) 100 MG
100 TABLET ORAL ONCE
Status: COMPLETED | OUTPATIENT
Start: 2020-05-11 | End: 2020-05-11

## 2020-05-11 RX ORDER — ACETAMINOPHEN 650 MG/1
650 SUPPOSITORY RECTAL EVERY 6 HOURS PRN
Status: DISCONTINUED | OUTPATIENT
Start: 2020-05-11 | End: 2020-05-15 | Stop reason: HOSPADM

## 2020-05-11 RX ORDER — CHLORDIAZEPOXIDE HYDROCHLORIDE 25 MG/1
25 CAPSULE, GELATIN COATED ORAL ONCE
Status: COMPLETED | OUTPATIENT
Start: 2020-05-11 | End: 2020-05-11

## 2020-05-11 RX ORDER — FOLIC ACID 1 MG/1
1 TABLET ORAL DAILY
Status: DISCONTINUED | OUTPATIENT
Start: 2020-05-11 | End: 2020-05-15 | Stop reason: HOSPADM

## 2020-05-11 RX ORDER — M-VIT,TX,IRON,MINS/CALC/FOLIC 27MG-0.4MG
1 TABLET ORAL
Status: DISCONTINUED | OUTPATIENT
Start: 2020-05-11 | End: 2020-05-15 | Stop reason: HOSPADM

## 2020-05-11 RX ORDER — SODIUM CHLORIDE 0.9 % (FLUSH) 0.9 %
10 SYRINGE (ML) INJECTION PRN
Status: DISCONTINUED | OUTPATIENT
Start: 2020-05-11 | End: 2020-05-11 | Stop reason: SDUPTHER

## 2020-05-11 RX ORDER — DIAZEPAM 5 MG/1
5 TABLET ORAL ONCE
Status: COMPLETED | OUTPATIENT
Start: 2020-05-11 | End: 2020-05-11

## 2020-05-11 RX ORDER — FLUOXETINE HYDROCHLORIDE 20 MG/1
20 CAPSULE ORAL DAILY
Status: DISCONTINUED | OUTPATIENT
Start: 2020-05-11 | End: 2020-05-12

## 2020-05-11 RX ORDER — LORAZEPAM 2 MG/ML
2 INJECTION INTRAMUSCULAR ONCE
Status: COMPLETED | OUTPATIENT
Start: 2020-05-11 | End: 2020-05-11

## 2020-05-11 RX ORDER — LORAZEPAM 2 MG/ML
4 INJECTION INTRAMUSCULAR
Status: DISCONTINUED | OUTPATIENT
Start: 2020-05-11 | End: 2020-05-12

## 2020-05-11 RX ORDER — OMEGA-3S/DHA/EPA/FISH OIL/D3 300MG-1000
1000 CAPSULE ORAL DAILY
Status: DISCONTINUED | OUTPATIENT
Start: 2020-05-11 | End: 2020-05-15 | Stop reason: HOSPADM

## 2020-05-11 RX ORDER — SODIUM CHLORIDE 0.9 % (FLUSH) 0.9 %
10 SYRINGE (ML) INJECTION EVERY 12 HOURS SCHEDULED
Status: DISCONTINUED | OUTPATIENT
Start: 2020-05-11 | End: 2020-05-11 | Stop reason: SDUPTHER

## 2020-05-11 RX ORDER — LORAZEPAM 2 MG/ML
3 INJECTION INTRAMUSCULAR
Status: DISCONTINUED | OUTPATIENT
Start: 2020-05-11 | End: 2020-05-12

## 2020-05-11 RX ORDER — PHENOL 1.4 %
2 AEROSOL, SPRAY (ML) MUCOUS MEMBRANE DAILY
Status: DISCONTINUED | OUTPATIENT
Start: 2020-05-11 | End: 2020-05-11

## 2020-05-11 RX ORDER — ONDANSETRON 2 MG/ML
4 INJECTION INTRAMUSCULAR; INTRAVENOUS ONCE
Status: COMPLETED | OUTPATIENT
Start: 2020-05-11 | End: 2020-05-11

## 2020-05-11 RX ORDER — FLUORIDE TOOTHPASTE
15 TOOTHPASTE DENTAL 3 TIMES DAILY
COMMUNITY
End: 2021-04-08 | Stop reason: CLARIF

## 2020-05-11 RX ORDER — SODIUM CHLORIDE 0.9 % (FLUSH) 0.9 %
10 SYRINGE (ML) INJECTION EVERY 12 HOURS SCHEDULED
Status: DISCONTINUED | OUTPATIENT
Start: 2020-05-11 | End: 2020-05-15 | Stop reason: HOSPADM

## 2020-05-11 RX ORDER — HYDROXYZINE PAMOATE 25 MG/1
25 CAPSULE ORAL EVERY 4 HOURS PRN
COMMUNITY
End: 2020-11-11 | Stop reason: SDUPTHER

## 2020-05-11 RX ORDER — THIAMINE MONONITRATE (VIT B1) 100 MG
100 TABLET ORAL DAILY
Status: DISCONTINUED | OUTPATIENT
Start: 2020-05-11 | End: 2020-05-15 | Stop reason: HOSPADM

## 2020-05-11 RX ORDER — FLUOXETINE HYDROCHLORIDE 20 MG/1
40 CAPSULE ORAL DAILY
Status: DISCONTINUED | OUTPATIENT
Start: 2020-05-11 | End: 2020-05-11 | Stop reason: DRUGHIGH

## 2020-05-11 RX ORDER — LORAZEPAM 2 MG/ML
2 INJECTION INTRAMUSCULAR
Status: DISCONTINUED | OUTPATIENT
Start: 2020-05-11 | End: 2020-05-12

## 2020-05-11 RX ORDER — NIFEDIPINE 30 MG/1
30 TABLET, FILM COATED, EXTENDED RELEASE ORAL DAILY
Status: DISCONTINUED | OUTPATIENT
Start: 2020-05-11 | End: 2020-05-15 | Stop reason: HOSPADM

## 2020-05-11 RX ORDER — LORAZEPAM 1 MG/1
1 TABLET ORAL
Status: DISCONTINUED | OUTPATIENT
Start: 2020-05-11 | End: 2020-05-12

## 2020-05-11 RX ORDER — AMITRIPTYLINE HYDROCHLORIDE 50 MG/1
50 TABLET, FILM COATED ORAL NIGHTLY
COMMUNITY
Start: 2019-11-09 | End: 2020-11-11 | Stop reason: SDUPTHER

## 2020-05-11 RX ORDER — ASCORBIC ACID 500 MG
500 TABLET ORAL DAILY
Status: ON HOLD | COMMUNITY
End: 2021-06-17 | Stop reason: CLARIF

## 2020-05-11 RX ORDER — POLYETHYLENE GLYCOL 3350 17 G/17G
17 POWDER, FOR SOLUTION ORAL DAILY PRN
Status: DISCONTINUED | OUTPATIENT
Start: 2020-05-11 | End: 2020-05-15 | Stop reason: HOSPADM

## 2020-05-11 RX ORDER — ONDANSETRON 2 MG/ML
4 INJECTION INTRAMUSCULAR; INTRAVENOUS EVERY 6 HOURS PRN
Status: DISCONTINUED | OUTPATIENT
Start: 2020-05-11 | End: 2020-05-15 | Stop reason: HOSPADM

## 2020-05-11 RX ORDER — ONDANSETRON 2 MG/ML
INJECTION INTRAMUSCULAR; INTRAVENOUS
Status: COMPLETED
Start: 2020-05-11 | End: 2020-05-11

## 2020-05-11 RX ORDER — LORAZEPAM 2 MG/ML
1 INJECTION INTRAMUSCULAR
Status: DISCONTINUED | OUTPATIENT
Start: 2020-05-11 | End: 2020-05-12

## 2020-05-11 RX ORDER — LORAZEPAM 1 MG/1
2 TABLET ORAL
Status: DISCONTINUED | OUTPATIENT
Start: 2020-05-11 | End: 2020-05-12

## 2020-05-11 RX ORDER — BUSPIRONE HYDROCHLORIDE 10 MG/1
10 TABLET ORAL 2 TIMES DAILY
Status: DISCONTINUED | OUTPATIENT
Start: 2020-05-11 | End: 2020-05-15 | Stop reason: HOSPADM

## 2020-05-11 RX ORDER — FOLIC ACID 1 MG/1
1 TABLET ORAL ONCE
Status: COMPLETED | OUTPATIENT
Start: 2020-05-11 | End: 2020-05-11

## 2020-05-11 RX ORDER — LORAZEPAM 2 MG/ML
1 INJECTION INTRAMUSCULAR ONCE
Status: COMPLETED | OUTPATIENT
Start: 2020-05-11 | End: 2020-05-11

## 2020-05-11 RX ORDER — SODIUM CHLORIDE 0.9 % (FLUSH) 0.9 %
10 SYRINGE (ML) INJECTION PRN
Status: DISCONTINUED | OUTPATIENT
Start: 2020-05-11 | End: 2020-05-15 | Stop reason: HOSPADM

## 2020-05-11 RX ORDER — GABAPENTIN 100 MG/1
200 CAPSULE ORAL 3 TIMES DAILY
Status: ON HOLD | COMMUNITY
End: 2020-05-15 | Stop reason: HOSPADM

## 2020-05-11 RX ADMIN — LORAZEPAM 2 MG: 2 INJECTION INTRAMUSCULAR; INTRAVENOUS at 04:11

## 2020-05-11 RX ADMIN — MULTIPLE VITAMINS W/ MINERALS TAB 1 TABLET: TAB at 01:11

## 2020-05-11 RX ADMIN — Medication 100 MG: at 01:11

## 2020-05-11 RX ADMIN — Medication 10 ML: at 10:00

## 2020-05-11 RX ADMIN — NIFEDIPINE 30 MG: 30 TABLET, EXTENDED RELEASE ORAL at 10:02

## 2020-05-11 RX ADMIN — SODIUM CHLORIDE: 9 INJECTION, SOLUTION INTRAVENOUS at 13:31

## 2020-05-11 RX ADMIN — LORAZEPAM 1 MG: 1 TABLET ORAL at 13:37

## 2020-05-11 RX ADMIN — ONDANSETRON 4 MG: 2 INJECTION INTRAMUSCULAR; INTRAVENOUS at 04:35

## 2020-05-11 RX ADMIN — LORAZEPAM 1 MG: 1 TABLET ORAL at 12:12

## 2020-05-11 RX ADMIN — FLUOXETINE 20 MG: 20 CAPSULE ORAL at 12:06

## 2020-05-11 RX ADMIN — ONDANSETRON 4 MG: 2 INJECTION INTRAMUSCULAR; INTRAVENOUS at 01:11

## 2020-05-11 RX ADMIN — LORAZEPAM 1 MG: 2 INJECTION INTRAMUSCULAR; INTRAVENOUS at 01:11

## 2020-05-11 RX ADMIN — LORAZEPAM 2 MG: 1 TABLET ORAL at 21:52

## 2020-05-11 RX ADMIN — FOLIC ACID 1 MG: 1 TABLET ORAL at 01:11

## 2020-05-11 RX ADMIN — BUSPIRONE HYDROCHLORIDE 10 MG: 10 TABLET ORAL at 21:52

## 2020-05-11 RX ADMIN — CHLORDIAZEPOXIDE HYDROCHLORIDE 25 MG: 25 CAPSULE ORAL at 01:11

## 2020-05-11 RX ADMIN — CHLORDIAZEPOXIDE HYDROCHLORIDE 25 MG: 25 CAPSULE ORAL at 22:08

## 2020-05-11 RX ADMIN — ENOXAPARIN SODIUM 40 MG: 40 INJECTION SUBCUTANEOUS at 09:59

## 2020-05-11 RX ADMIN — SODIUM CHLORIDE: 9 INJECTION, SOLUTION INTRAVENOUS at 21:51

## 2020-05-11 RX ADMIN — MULTIPLE VITAMINS W/ MINERALS TAB 1 TABLET: TAB at 17:48

## 2020-05-11 RX ADMIN — DIAZEPAM 5 MG: 5 TABLET ORAL at 04:10

## 2020-05-11 RX ADMIN — CHOLECALCIFEROL (VITAMIN D3) 10 MCG (400 UNIT) TABLET 1000 UNITS: at 12:06

## 2020-05-11 RX ADMIN — FOLIC ACID 1 MG: 1 TABLET ORAL at 09:59

## 2020-05-11 RX ADMIN — BUSPIRONE HYDROCHLORIDE 10 MG: 10 TABLET ORAL at 09:59

## 2020-05-11 RX ADMIN — Medication 100 MG: at 09:59

## 2020-05-11 ASSESSMENT — PAIN DESCRIPTION - ONSET: ONSET: ON-GOING

## 2020-05-11 ASSESSMENT — PAIN DESCRIPTION - ORIENTATION: ORIENTATION: ANTERIOR;POSTERIOR

## 2020-05-11 ASSESSMENT — PAIN DESCRIPTION - FREQUENCY: FREQUENCY: CONTINUOUS

## 2020-05-11 ASSESSMENT — PAIN DESCRIPTION - PROGRESSION: CLINICAL_PROGRESSION: NOT CHANGED

## 2020-05-11 ASSESSMENT — PAIN SCALES - GENERAL
PAINLEVEL_OUTOF10: 0
PAINLEVEL_OUTOF10: 0
PAINLEVEL_OUTOF10: 3

## 2020-05-11 ASSESSMENT — PAIN DESCRIPTION - DESCRIPTORS: DESCRIPTORS: HEADACHE

## 2020-05-11 ASSESSMENT — PAIN - FUNCTIONAL ASSESSMENT: PAIN_FUNCTIONAL_ASSESSMENT: PREVENTS OR INTERFERES SOME ACTIVE ACTIVITIES AND ADLS

## 2020-05-11 ASSESSMENT — PAIN DESCRIPTION - PAIN TYPE: TYPE: ACUTE PAIN

## 2020-05-11 ASSESSMENT — PAIN DESCRIPTION - LOCATION: LOCATION: HEAD

## 2020-05-11 NOTE — CARE COORDINATION
day of DISCHARGE for nursing to connect prior to leaving: No  Verbalized agreement/Understanding: No  Person to bring portable tank at discharge: NA    Dialysis  Active with HD/PD prior to admission: No  Nephrologist: Joe [address]:  Not Applicable    DISCHARGE PLAN:  Disposition: Home- No Services Needed    Transportation PLAN for discharge: family     Factors facilitating achievement of predicted outcomes: Family support    Barriers to discharge: Depression and Anxiety    Additional Case Management Notes: Cm  Following for  D/C planning , PT OT 24/24  For  Both :      Psych consulted:     ANAY ;  protocol      The Plan for Transition of Care is related to the following treatment goals of [facility] dependence with withdrawal [facility] [F10.239]    The Patient and/or patient representative Lizbet Rhoades and her family were provided with a choice of provider and agrees with the discharge plan Yes    Freedom of choice list was provided with basic dialogue that supports the patient's individualized plan of care/goals and shares the quality data associated with the providers.  Yes    Care Transition patient: No    Flii Cabrera RN  [facility], [facility]  Case Management Department  Ph: [phone]

## 2020-05-11 NOTE — PROGRESS NOTES
Occupational Therapy   Occupational Therapy Initial Assessment, Treatment and D/c Note   Date: 2020   Patient Name: Meme Ceballos  MRN: [id number]     : 1955    Date of Service: 2020    Discharge Recommendations:April Warren scored a 24/24 on the AM-PAC ADL Inpatient form. At this time, no further OT is recommended upon discharge. Recommend patient returns to prior setting with prior services. OT Equipment Recommendations  Equipment Needed: No    Assessment   Assessment: Pt from home with spouse - pt demo functioning very close to baseline . Pt independent with functional mobility and ADLs. No Acute OT needs. NO DME needs. Will sign off from OT services. Prognosis: Good  Decision Making: Low Complexity  OT Education: OT Role;Plan of Care;IADL Safety  Patient Education: verb understanding   REQUIRES OT FOLLOW UP: No  Activity Tolerance  Activity Tolerance: Patient Tolerated treatment well  Activity Tolerance: No HEART noted    Safety Devices  Safety Devices in place: Yes  Type of devices: Call light within reach; Left in chair;Nurse notified; Chair alarm in place           Patient Diagnosis(es): The encounter diagnosis was Alcohol withdrawal syndrome without complication ([address] has a past medical history of Alcoholism ([facility] Anxiety, Ataxia, Depression, Hyperlipidemia, and Hypertension. has a past surgical history that includes back surgery; Colonoscopy; and Endoscopy, colon, diagnostic. Restrictions  Position Activity Restriction  Other position/activity restrictions: Up as tolerated    Subjective   General  Chart Reviewed: Yes  Additional Pertinent Hx: Admit  with ETOH withdrawal                                      PMHX: HTN,HLD,Ataxia,Depression,ETOH abuse, and anxiety   Family / Caregiver Present: No  Diagnosis: ETOH withdrawal   Subjective  Subjective: \" I feel pretty good \" pt in bed awakens easily and agreeable for OOB/OT eval and tx.  Pt reports she often Independent  Additional Comments: No difficult noted with above tasks   Tone RUE  RUE Tone: Normotonic  Tone LUE  LUE Tone: Normotonic  Coordination  Movements Are Fluid And Coordinated: Yes     Bed mobility  Supine to Sit: Independent  Transfers  Sit to stand: Independent  Stand to sit: Independent  Vision - Basic Assessment  Prior Vision: No visual deficits  Cognition  Overall Cognitive Status: [facility]  Cognition Comment: drowsy /sleepy for pain medication     LUE AROM (degrees)  LUE AROM : WFL  Left Hand AROM (degrees)  Left Hand AROM: WFL  RUE AROM (degrees)  RUE AROM : WFL  Right Hand AROM (degrees)  Right Hand AROM: WFL  LUE Strength  Gross LUE Strength: [facility]  L Hand General: 4+/5  RUE Strength  Gross RUE Strength: [facility]  R Hand General: 4+/5     Plan D/c Acute OT services.        AM-PAC Score  AM-[facility] Inpatient Daily Activity Raw Score: 24 (05/11/20 1326)  AM-PAC Inpatient ADL T-Scale Score : 57.54 (05/11/20 1326)  ADL Inpatient CMS 0-100% Score: 0 (05/11/20 1326)  ADL Inpatient CMS G-Code Modifier : CH (05/11/20 1326)       Therapy Time   Individual Concurrent Group Co-treatment   Time In 1129         Time Out 1208         Minutes 39            Timed Code Treatment Minutes:   24 mins     Total Treatment Minutes:  39 mins       Ismael Jeans, OT

## 2020-05-11 NOTE — ED PROVIDER NOTES
never used smokeless tobacco. She reports current alcohol use of about 56.0 standard drinks of alcohol per week. She reports that she does not use drugs. Medications     Previous Medications    AMITRIPTYLINE (ELAVIL) 50 MG TABLET    Take 50 mg by mouth nightly as needed    B COMPLEX VITAMINS (B COMPLEX 1 PO)    Take by mouth daily     BUSPIRONE (BUSPAR) 10 MG TABLET    Take 10 mg by mouth 2 times daily     CALCIUM CARBONATE 600 MG TABS TABLET    Take 2 tablets by mouth daily    CHOLECALCIFEROL (VITAMIN D3) 5000 UNITS TABS    Take 1 tablet by mouth daily    FLUOXETINE (PROZAC) 20 MG CAPSULE    Take 2 capsules by mouth daily    FOLIC ACID (FOLVITE) 1 MG TABLET    Take 1 tablet by mouth daily    MILK THISTLE 250 MG CAPS    Take 1 capsule by mouth daily    MULTIPLE VITAMINS-MINERALS (MULTIVITAMIN PO)    Take by mouth daily     NIFEDIPINE (ADALAT CC) 30 MG EXTENDED RELEASE TABLET    Take 1 tablet by mouth daily    ONDANSETRON (ZOFRAN) 4 MG TABLET    Take 1 tablet by mouth every 8 hours as needed for Nausea or Vomiting       Allergies     She is allergic to penicillins and sulfa antibiotics. Physical Exam     INITIAL VITALS: BP: (!) 146/73, Temp: 98.3 °F (36.8 °C), Pulse: 122, Resp: 13, SpO2: 99 %   Physical Exam  Vitals signs reviewed. Constitutional:       Appearance: Normal appearance. She is normal weight. HENT:      Head: Normocephalic. Mouth/Throat:      Mouth: Mucous membranes are moist.   Cardiovascular:      Rate and Rhythm: Normal rate and regular rhythm. Pulses: Normal pulses. Heart sounds: Normal heart sounds. Pulmonary:      Effort: Pulmonary effort is normal.      Breath sounds: Normal breath sounds. Abdominal:      General: Abdomen is flat. There is no distension. Palpations: Abdomen is soft. Tenderness: There is no abdominal tenderness. There is no guarding or rebound. Musculoskeletal: Normal range of motion. Skin:     General: Skin is warm and dry.       Capillary I spent 35 minutes providing critical care. This time excludes time spent performing procedures but includes time spent on direct patient care, history retrieval, review of the chart, and discussions with patient, family, and consultant(s). Clinical Impression     Alcohol withdrawal     Disposition     PATIENT REFERRED TO:  No follow-up provider specified.     DISCHARGE MEDICATIONS:  New Prescriptions    No medications on file       [address], MD  05/12/20 8296

## 2020-05-12 LAB — LACTIC ACID: 2.5 MMOL/L (ref 0.4–2)

## 2020-05-12 PROCEDURE — 36415 COLL VENOUS BLD VENIPUNCTURE: CPT

## 2020-05-12 PROCEDURE — 6360000002 HC RX W HCPCS: Performed by: INTERNAL MEDICINE

## 2020-05-12 PROCEDURE — 6370000000 HC RX 637 (ALT 250 FOR IP): Performed by: PSYCHIATRY & NEUROLOGY

## 2020-05-12 PROCEDURE — 2580000003 HC RX 258: Performed by: INTERNAL MEDICINE

## 2020-05-12 PROCEDURE — 6370000000 HC RX 637 (ALT 250 FOR IP): Performed by: INTERNAL MEDICINE

## 2020-05-12 PROCEDURE — 83605 ASSAY OF LACTIC ACID: CPT

## 2020-05-12 PROCEDURE — 99221 1ST HOSP IP/OBS SF/LOW 40: CPT | Performed by: PSYCHIATRY & NEUROLOGY

## 2020-05-12 PROCEDURE — 1200000000 HC SEMI PRIVATE

## 2020-05-12 RX ORDER — LORAZEPAM 0.5 MG/1
0.5 TABLET ORAL EVERY 8 HOURS PRN
Status: DISCONTINUED | OUTPATIENT
Start: 2020-05-12 | End: 2020-05-12

## 2020-05-12 RX ORDER — NALTREXONE HYDROCHLORIDE 50 MG/1
50 TABLET, FILM COATED ORAL DAILY
Status: DISCONTINUED | OUTPATIENT
Start: 2020-05-12 | End: 2020-05-15 | Stop reason: HOSPADM

## 2020-05-12 RX ORDER — IBUPROFEN 400 MG/1
400 TABLET ORAL EVERY 6 HOURS PRN
Status: DISCONTINUED | OUTPATIENT
Start: 2020-05-12 | End: 2020-05-15 | Stop reason: HOSPADM

## 2020-05-12 RX ORDER — LORAZEPAM 0.5 MG/1
0.5 TABLET ORAL ONCE
Status: COMPLETED | OUTPATIENT
Start: 2020-05-13 | End: 2020-05-12

## 2020-05-12 RX ORDER — LORAZEPAM 1 MG/1
1 TABLET ORAL EVERY 8 HOURS PRN
Status: DISCONTINUED | OUTPATIENT
Start: 2020-05-12 | End: 2020-05-12

## 2020-05-12 RX ORDER — FLUOXETINE HYDROCHLORIDE 20 MG/1
20 CAPSULE ORAL ONCE
Status: COMPLETED | OUTPATIENT
Start: 2020-05-12 | End: 2020-05-12

## 2020-05-12 RX ORDER — FLUOXETINE HYDROCHLORIDE 20 MG/1
40 CAPSULE ORAL DAILY
Status: DISCONTINUED | OUTPATIENT
Start: 2020-05-13 | End: 2020-05-15 | Stop reason: HOSPADM

## 2020-05-12 RX ORDER — LORAZEPAM 1 MG/1
1 TABLET ORAL EVERY 6 HOURS PRN
Status: DISCONTINUED | OUTPATIENT
Start: 2020-05-12 | End: 2020-05-13

## 2020-05-12 RX ADMIN — Medication 10 ML: at 19:52

## 2020-05-12 RX ADMIN — PROMETHAZINE HYDROCHLORIDE 12.5 MG: 12.5 TABLET ORAL at 23:11

## 2020-05-12 RX ADMIN — CHOLECALCIFEROL (VITAMIN D3) 10 MCG (400 UNIT) TABLET 1000 UNITS: at 08:41

## 2020-05-12 RX ADMIN — BUSPIRONE HYDROCHLORIDE 10 MG: 10 TABLET ORAL at 19:52

## 2020-05-12 RX ADMIN — LORAZEPAM 0.5 MG: 0.5 TABLET ORAL at 12:04

## 2020-05-12 RX ADMIN — ENOXAPARIN SODIUM 40 MG: 40 INJECTION SUBCUTANEOUS at 08:39

## 2020-05-12 RX ADMIN — IBUPROFEN 400 MG: 400 TABLET, FILM COATED ORAL at 23:58

## 2020-05-12 RX ADMIN — Medication 100 MG: at 08:40

## 2020-05-12 RX ADMIN — SODIUM CHLORIDE: 9 INJECTION, SOLUTION INTRAVENOUS at 08:37

## 2020-05-12 RX ADMIN — FLUOXETINE 20 MG: 20 CAPSULE ORAL at 08:40

## 2020-05-12 RX ADMIN — FLUOXETINE 20 MG: 20 CAPSULE ORAL at 13:42

## 2020-05-12 RX ADMIN — LORAZEPAM 1 MG: 1 TABLET ORAL at 02:55

## 2020-05-12 RX ADMIN — FOLIC ACID 1 MG: 1 TABLET ORAL at 08:40

## 2020-05-12 RX ADMIN — BUSPIRONE HYDROCHLORIDE 10 MG: 10 TABLET ORAL at 08:40

## 2020-05-12 RX ADMIN — NIFEDIPINE 30 MG: 30 TABLET, EXTENDED RELEASE ORAL at 08:39

## 2020-05-12 RX ADMIN — MULTIPLE VITAMINS W/ MINERALS TAB 1 TABLET: TAB at 17:49

## 2020-05-12 RX ADMIN — LORAZEPAM 0.5 MG: 0.5 TABLET ORAL at 20:15

## 2020-05-12 RX ADMIN — NALTREXONE HYDROCHLORIDE 50 MG: 50 TABLET, FILM COATED ORAL at 14:50

## 2020-05-12 RX ADMIN — LORAZEPAM 0.5 MG: 0.5 TABLET ORAL at 23:53

## 2020-05-12 RX ADMIN — CHLORDIAZEPOXIDE HYDROCHLORIDE 25 MG: 25 CAPSULE ORAL at 08:39

## 2020-05-12 ASSESSMENT — PAIN DESCRIPTION - PAIN TYPE: TYPE: ACUTE PAIN

## 2020-05-12 ASSESSMENT — ENCOUNTER SYMPTOMS
ABDOMINAL PAIN: 0
SHORTNESS OF BREATH: 0
CHEST TIGHTNESS: 0
VOMITING: 0
NAUSEA: 1

## 2020-05-12 ASSESSMENT — PAIN SCALES - GENERAL
PAINLEVEL_OUTOF10: 0
PAINLEVEL_OUTOF10: 3
PAINLEVEL_OUTOF10: 0

## 2020-05-12 ASSESSMENT — PAIN DESCRIPTION - LOCATION: LOCATION: HEAD

## 2020-05-12 ASSESSMENT — PAIN DESCRIPTION - ONSET: ONSET: ON-GOING

## 2020-05-12 ASSESSMENT — PAIN DESCRIPTION - PROGRESSION: CLINICAL_PROGRESSION: NOT CHANGED

## 2020-05-12 ASSESSMENT — PAIN - FUNCTIONAL ASSESSMENT: PAIN_FUNCTIONAL_ASSESSMENT: ACTIVITIES ARE NOT PREVENTED

## 2020-05-12 ASSESSMENT — PAIN DESCRIPTION - ORIENTATION: ORIENTATION: POSTERIOR

## 2020-05-12 ASSESSMENT — PAIN DESCRIPTION - FREQUENCY: FREQUENCY: CONTINUOUS

## 2020-05-12 ASSESSMENT — PAIN DESCRIPTION - DESCRIPTORS: DESCRIPTORS: HEADACHE

## 2020-05-12 NOTE — CONSULTS
MG extended release tablet Take 1 tablet by mouth daily 15 tablet 0    folic acid (FOLVITE) 1 MG tablet Take 1 tablet by mouth daily 30 tablet 0       Current Medications ordered:  Scheduled Meds:   busPIRone  10 mg Oral BID    folic acid  1 mg Oral Daily    NIFEdipine  30 mg Oral Daily    therapeutic multivitamin-minerals  1 tablet Oral Dinner    sodium chloride flush  10 mL Intravenous 2 times per day    enoxaparin  40 mg Subcutaneous Daily    thiamine  100 mg Oral Daily    vitamin D3  1,000 Units Oral Daily    FLUoxetine  20 mg Oral Daily    chlordiazePOXIDE  25 mg Oral 4x Daily     Continuous Infusions:   sodium chloride 100 mL/hr at 05/12/20 0837     PRN Meds:.sodium chloride flush, acetaminophen **OR** acetaminophen, polyethylene glycol, promethazine **OR** ondansetron, LORazepam **OR** LORazepam **OR** LORazepam **OR** LORazepam **OR** LORazepam **OR** LORazepam **OR** LORazepam **OR** LORazepam, melatonin    ROS: Denies trouble with fever, rash, headache, vision changes, chest pain, shortness of breath, nausea, extremity pain, weakness, dysuria. OBJECTIVE:  Vitals: Jacey Juarez Vitals:    05/12/20 0009 05/12/20 0248 05/12/20 0251 05/12/20 0738   BP: 135/78  135/78 135/77   Pulse: 109  86 91   Resp: 16  16 18   Temp: 97.1 °F (36.2 °C)  98.2 °F (36.8 °C) 97.8 °F (36.6 °C)   TempSrc: Oral  Oral Oral   SpO2: 96%  97% 99%   Weight:  156 lb 1.4 oz (70.8 kg)     Height:         Body mass index is 25.97 kg/m².     Mental Status Exam:   Appearance: appears stated age, fair eye contact  Behavior/Movement/Muscle Tone: no PMR/PMA, no abnormal movements appreciated, anti-gravity  Gait/station: not tested  Speech:tone, prosody, volume, rate, production wnl, fluent nonpressured  Mood/Affect: depressed, full range, mood congruent, content appropriate  Thought Process: goal directed, linear, no FOI, no MARTA  Thought Content: Denies SI/HI, no delusions expressed, no AH/VH and not RTIS  Orientation: alert, oriented to person place and situation  Fund Of Knowledge: appears consistent with average intellect  Memory:grossly intact to recent and remote content discussed  Insight/Judgment: intact/intact    Labs:   Complete Blood Count:  Recent Labs     20   WBC 8.2   HGB 12.7   HCT 38.0   MCV 96.6          Basic Metabolic Panel:  Recent Labs     20   *   K 4.0   CL 93*   CO2 22   BUN 12       Hepatic Panel:  No results for input(s): AST, ALT, ALBUMIN, TOTALPROTIEN in the last 72 hours. Invalid input(s): BILT, BILD     Lab Results   Component Value Date    COLORU Yellow 10/12/2019    NITRU POSITIVE 10/12/2019    GLUCOSEU Negative 10/12/2019    KETUA TRACE 10/12/2019    UROBILINOGEN 0.2 10/12/2019    BILIRUBINUR Negative 10/12/2019         Imagin19 No acute intracranial pathology appreciated.  Diffuse atrophy again noted    Axis I  MDD recurrent moderate, Alcohol use disorder severe dependence, alcohol withdrawal    Axis II: No diagnosis     Axis III       Diagnosis Date    Alcoholism [facility]     Anxiety     Ataxia     Depression     Hyperlipidemia     Hypertension       Active Problems:    Alcohol dependence with withdrawal ([facility]  Resolved Problems:    * No resolved hospital problems. *       Axis IV  Problems with primary support group and Problems related to the social environment      ASSESSMENT AND RECOMMENDATIONS: 71 y.o. female admitted to medicine service on 2020 for etoh w/d    1. Psych: d/w pt at length that her primary focus should be getting into an outpatient substance abuse treatment program, will ask SW to see her and go over resources for that. I will also adjust her Prozac to help with her chronic depression, and start naltrexone to possibly help with etoh cravings.  Will give resources for outpatient psychiatry.  -Continue alcohol withdrawal protocol and have SW connect with addiction treatment resources per hospital policy  -INCREASE Prozac to 40mg qAM   -START

## 2020-05-12 NOTE — DISCHARGE INSTR - COC
Continuity of Care Form    Patient Name: Jesusita    :  1955  MRN:  [id number]    Admit date:  2020  Discharge date:  ***    Code Status Order: Full Code   Advance Directives:   Advance Care Flowsheet Documentation     Date/Time Healthcare Directive Type of Healthcare Directive Copy in [address] Agent's Name Healthcare Agent's Phone Number    20 0820  Yes, patient has an advance directive for healthcare treatment  --  --  --  --  --          Admitting Physician:  Yaakov Geiger MD  PCP: Mari Hemphill MD    Discharging Nurse: [facility] Unit/Room#: 9041/1385-94  Discharging Unit Phone Number: ***    Emergency Contact:   Extended Emergency Contact Information  Primary Emergency Contact: Terence Lujan  Address: [address] [place] [address] Phone: [phone]  Mobile Phone: [phone]  Relation: Spouse    Past Surgical History:  Past Surgical History:   Procedure Laterality Date    BACK SURGERY       L5 disectomy    COLONOSCOPY      2016    ENDOSCOPY, COLON, DIAGNOSTIC             Immunization History: There is no immunization history on file for this patient.     Active Problems:  Patient Active Problem List   Diagnosis Code    Alcohol withdrawal delirium (HCC) F10.231    Essential hypertension I10    Alcohol withdrawal syndrome, uncomplicated (HCC) E92.505    Acute alcoholic intoxication without complication ([facility] X51.585    Depression F32.9    ETOH abuse F10.10    Acute encephalopathy G93.40    Ataxia R27.0    Alcohol dependence with withdrawal ([facility] F10.239    Alcohol abuse, continuous U83.55    Alcoholic hepatitis N07.39    Nausea & vomiting R11.2       Isolation/Infection:   Isolation          No Isolation        Patient Infection Status     None to display          Nurse Assessment:  Last Vital Signs: /77   Pulse 91   Temp 97.8

## 2020-05-12 NOTE — PLAN OF CARE
Problem: Sleep Pattern Disturbance:  Goal: Appears well-rested  Description: Appears well-rested  5/12/2020 0942 by Keyonna Hart RN  Outcome: Ongoing  Note: Pt appears well rested. Pt up to chair with lights on and curtains open. Problem: Falls - Risk of:  Goal: Will remain free from falls  Description: Will remain free from falls  Outcome: Ongoing  Note: Patient at risk for falls. Patient resting quietly in bed. Side rails up x 2/4. Bed locked in lowest position. Bed alarm on. Chair alarm on while pt in chair. Bedside table and call light within reach. Patient instructed to call for assistance. Patient verbalized understanding. Will continue to monitor. [employer] monitor in place for safety.

## 2020-05-13 LAB
ANION GAP SERPL CALCULATED.3IONS-SCNC: 8 MMOL/L (ref 3–16)
BUN BLDV-MCNC: 7 MG/DL (ref 7–20)
CALCIUM SERPL-MCNC: 8.9 MG/DL (ref 8.3–10.6)
CHLORIDE BLD-SCNC: 96 MMOL/L (ref 99–110)
CO2: 25 MMOL/L (ref 21–32)
CREAT SERPL-MCNC: 0.6 MG/DL (ref 0.6–1.2)
GFR AFRICAN AMERICAN: >60
GFR NON-AFRICAN AMERICAN: >60
GLUCOSE BLD-MCNC: 104 MG/DL (ref 70–99)
POTASSIUM REFLEX MAGNESIUM: 3.9 MMOL/L (ref 3.5–5.1)
SODIUM BLD-SCNC: 129 MMOL/L (ref 136–145)

## 2020-05-13 PROCEDURE — 6360000002 HC RX W HCPCS: Performed by: INTERNAL MEDICINE

## 2020-05-13 PROCEDURE — 6370000000 HC RX 637 (ALT 250 FOR IP): Performed by: INTERNAL MEDICINE

## 2020-05-13 PROCEDURE — 6370000000 HC RX 637 (ALT 250 FOR IP): Performed by: PSYCHIATRY & NEUROLOGY

## 2020-05-13 PROCEDURE — 1200000000 HC SEMI PRIVATE

## 2020-05-13 PROCEDURE — 36415 COLL VENOUS BLD VENIPUNCTURE: CPT

## 2020-05-13 PROCEDURE — 2580000003 HC RX 258: Performed by: INTERNAL MEDICINE

## 2020-05-13 PROCEDURE — 80048 BASIC METABOLIC PNL TOTAL CA: CPT

## 2020-05-13 RX ORDER — HYDROXYZINE PAMOATE 25 MG/1
25 CAPSULE ORAL EVERY 4 HOURS PRN
Status: DISCONTINUED | OUTPATIENT
Start: 2020-05-13 | End: 2020-05-15 | Stop reason: HOSPADM

## 2020-05-13 RX ORDER — LORAZEPAM 0.5 MG/1
0.5 TABLET ORAL EVERY 8 HOURS PRN
Status: DISCONTINUED | OUTPATIENT
Start: 2020-05-13 | End: 2020-05-14

## 2020-05-13 RX ADMIN — LORAZEPAM 0.5 MG: 0.5 TABLET ORAL at 15:26

## 2020-05-13 RX ADMIN — Medication 5 MG: at 20:10

## 2020-05-13 RX ADMIN — BUSPIRONE HYDROCHLORIDE 10 MG: 10 TABLET ORAL at 09:48

## 2020-05-13 RX ADMIN — BUSPIRONE HYDROCHLORIDE 10 MG: 10 TABLET ORAL at 20:09

## 2020-05-13 RX ADMIN — Medication 100 MG: at 09:48

## 2020-05-13 RX ADMIN — LORAZEPAM 1 MG: 1 TABLET ORAL at 06:47

## 2020-05-13 RX ADMIN — Medication 10 ML: at 20:10

## 2020-05-13 RX ADMIN — CHOLECALCIFEROL (VITAMIN D3) 10 MCG (400 UNIT) TABLET 1000 UNITS: at 09:53

## 2020-05-13 RX ADMIN — HYDROXYZINE PAMOATE 25 MG: 25 CAPSULE ORAL at 21:30

## 2020-05-13 RX ADMIN — MULTIPLE VITAMINS W/ MINERALS TAB 1 TABLET: TAB at 18:03

## 2020-05-13 RX ADMIN — NIFEDIPINE 30 MG: 30 TABLET, EXTENDED RELEASE ORAL at 09:48

## 2020-05-13 RX ADMIN — Medication 10 ML: at 09:54

## 2020-05-13 RX ADMIN — ENOXAPARIN SODIUM 40 MG: 40 INJECTION SUBCUTANEOUS at 09:48

## 2020-05-13 RX ADMIN — IBUPROFEN 400 MG: 400 TABLET, FILM COATED ORAL at 20:09

## 2020-05-13 RX ADMIN — FLUOXETINE 40 MG: 20 CAPSULE ORAL at 09:48

## 2020-05-13 RX ADMIN — NALTREXONE HYDROCHLORIDE 50 MG: 50 TABLET, FILM COATED ORAL at 09:58

## 2020-05-13 RX ADMIN — FOLIC ACID 1 MG: 1 TABLET ORAL at 09:48

## 2020-05-13 RX ADMIN — PROMETHAZINE HYDROCHLORIDE 12.5 MG: 12.5 TABLET ORAL at 06:48

## 2020-05-13 ASSESSMENT — PAIN SCALES - GENERAL
PAINLEVEL_OUTOF10: 0
PAINLEVEL_OUTOF10: 8
PAINLEVEL_OUTOF10: 0

## 2020-05-13 ASSESSMENT — PAIN DESCRIPTION - PAIN TYPE: TYPE: ACUTE PAIN

## 2020-05-13 ASSESSMENT — PAIN - FUNCTIONAL ASSESSMENT: PAIN_FUNCTIONAL_ASSESSMENT: ACTIVITIES ARE NOT PREVENTED

## 2020-05-13 ASSESSMENT — PAIN DESCRIPTION - DESCRIPTORS: DESCRIPTORS: HEADACHE;BURNING

## 2020-05-13 ASSESSMENT — PAIN DESCRIPTION - FREQUENCY: FREQUENCY: CONTINUOUS

## 2020-05-13 ASSESSMENT — PAIN DESCRIPTION - PROGRESSION: CLINICAL_PROGRESSION: NOT CHANGED

## 2020-05-13 ASSESSMENT — PAIN DESCRIPTION - LOCATION: LOCATION: HEAD;GENERALIZED

## 2020-05-13 ASSESSMENT — PAIN DESCRIPTION - ONSET: ONSET: ON-GOING

## 2020-05-13 NOTE — PROGRESS NOTES
Pt c/o nausea, anxiety, and exhibiting tremors. Pt states \"my skin is on fire. \" CIWA 17. Gave pt po phenergan and messaged Dr. Alberto Maldonado via perfect serve. Po ativan prn changed from 0.5 mg q 8 to 1mg q 6. Can give another 0.5 mg ativan PO now. Will continue to monitor.

## 2020-05-13 NOTE — CARE COORDINATION
CM  Following for  D/c planning , CIWA  Protocol :  Ativan  Requirement continues to increase. Electronically signed by Gunnar Pedro RN on 5/13/2020 at 10:39 AM     CM  Met with pt  At  Bedside  To discuss   D/C , rehab , pt reviewed  Resource  List  And not sure where she wants to go , states she will go home first then see. Stated she would  Call a few  Places  To see about accepting her . Pt stated she would  Call Juliane   [phone]  C: [address] [facility] reach Coordinator for  [place] addiction [address] [place] [address] Electronically signed by Gunnar Pedro, RN on 05/13/2020 at  4:45 PM       Gunnar Pedor RN Case Manager  [facility]  [facility] [address]   [facility] [address]  [phone]  Fax [phone]

## 2020-05-13 NOTE — PROGRESS NOTES
withdrawal  #Anxiety/depression. #Lactic acidosis  #Hyponatremia likely due to dehydration  #Hypertension        PLAN:  Continue monitor for alcohol withdrawal with CIWA protocol. PRN Ativan for anxiety. Change Ativan 0.5 mg every 8 hours for anxiety. Seizure precaution  Psych recommended increasing Prozac to 40 mg and  naltrexone 50 mg daily. Discussed with  to follow-up on home situation and provide referrals.       DVT Prophylaxis: Lovenox  Diet: DIET GENERAL;  Code Status: Full Code    PT/OT Eval Status: Completed    Dispo -inpatient    This chart was likely completed using voice recognition technology and may contain unintended grammatical , phraseology,and/or punctuation errors      Daphney Baron MD

## 2020-05-14 PROCEDURE — 6370000000 HC RX 637 (ALT 250 FOR IP): Performed by: PSYCHIATRY & NEUROLOGY

## 2020-05-14 PROCEDURE — 6370000000 HC RX 637 (ALT 250 FOR IP): Performed by: INTERNAL MEDICINE

## 2020-05-14 PROCEDURE — 2580000003 HC RX 258: Performed by: INTERNAL MEDICINE

## 2020-05-14 PROCEDURE — 1200000000 HC SEMI PRIVATE

## 2020-05-14 PROCEDURE — 6360000002 HC RX W HCPCS: Performed by: INTERNAL MEDICINE

## 2020-05-14 RX ORDER — LORAZEPAM 0.5 MG/1
0.5 TABLET ORAL 2 TIMES DAILY PRN
Status: DISCONTINUED | OUTPATIENT
Start: 2020-05-14 | End: 2020-05-15 | Stop reason: HOSPADM

## 2020-05-14 RX ADMIN — HYDROXYZINE PAMOATE 25 MG: 25 CAPSULE ORAL at 11:08

## 2020-05-14 RX ADMIN — MULTIPLE VITAMINS W/ MINERALS TAB 1 TABLET: TAB at 16:39

## 2020-05-14 RX ADMIN — FLUOXETINE 40 MG: 20 CAPSULE ORAL at 08:28

## 2020-05-14 RX ADMIN — LORAZEPAM 0.5 MG: 0.5 TABLET ORAL at 20:48

## 2020-05-14 RX ADMIN — FOLIC ACID 1 MG: 1 TABLET ORAL at 08:29

## 2020-05-14 RX ADMIN — Medication 100 MG: at 08:29

## 2020-05-14 RX ADMIN — Medication 10 ML: at 08:32

## 2020-05-14 RX ADMIN — ENOXAPARIN SODIUM 40 MG: 40 INJECTION SUBCUTANEOUS at 08:29

## 2020-05-14 RX ADMIN — HYDROXYZINE PAMOATE 25 MG: 25 CAPSULE ORAL at 05:24

## 2020-05-14 RX ADMIN — HYDROXYZINE PAMOATE 25 MG: 25 CAPSULE ORAL at 16:49

## 2020-05-14 RX ADMIN — NIFEDIPINE 30 MG: 30 TABLET, EXTENDED RELEASE ORAL at 08:29

## 2020-05-14 RX ADMIN — BUSPIRONE HYDROCHLORIDE 10 MG: 10 TABLET ORAL at 20:48

## 2020-05-14 RX ADMIN — LORAZEPAM 0.5 MG: 0.5 TABLET ORAL at 08:29

## 2020-05-14 RX ADMIN — Medication 5 MG: at 20:48

## 2020-05-14 RX ADMIN — Medication 10 ML: at 20:49

## 2020-05-14 RX ADMIN — POLYETHYLENE GLYCOL 3350 17 G: 17 POWDER, FOR SOLUTION ORAL at 16:40

## 2020-05-14 RX ADMIN — IBUPROFEN 400 MG: 400 TABLET, FILM COATED ORAL at 08:35

## 2020-05-14 RX ADMIN — CHOLECALCIFEROL (VITAMIN D3) 10 MCG (400 UNIT) TABLET 1000 UNITS: at 08:29

## 2020-05-14 RX ADMIN — PROMETHAZINE HYDROCHLORIDE 12.5 MG: 12.5 TABLET ORAL at 18:27

## 2020-05-14 RX ADMIN — BUSPIRONE HYDROCHLORIDE 10 MG: 10 TABLET ORAL at 08:29

## 2020-05-14 RX ADMIN — PROMETHAZINE HYDROCHLORIDE 12.5 MG: 12.5 TABLET ORAL at 08:35

## 2020-05-14 RX ADMIN — NALTREXONE HYDROCHLORIDE 50 MG: 50 TABLET, FILM COATED ORAL at 12:32

## 2020-05-14 RX ADMIN — LORAZEPAM 0.5 MG: 0.5 TABLET ORAL at 00:06

## 2020-05-14 ASSESSMENT — PAIN SCALES - GENERAL
PAINLEVEL_OUTOF10: 5
PAINLEVEL_OUTOF10: 0
PAINLEVEL_OUTOF10: 3

## 2020-05-14 NOTE — PROGRESS NOTES
Hospitalist Progress Note      PCP: Quinton Rebolledo MD    Date of Admission: 5/11/2020    Chief Complaint: Alcohol withdrawal    Hospital Course: Patient 43-year-old female with history of anxiety, depression, recent hospitalization in February for alcohol withdrawal in February 2020 patient reported that she went to [place] for rehab where she was there for 37 days. As per epic patient reported to [facility] ED on 2/16 was discharged home.     Presented to ER with a complaint of tremors, generalized anxiety     Subjective: Seen and examined today. Patient reported that she is not feeling herself today. She is having watery eyes I did not appreciated. She reported feeling anxious. She was yelling at nurses earlier this morning. Prior to my entry in the room patient was calm sitting up on the chair. After I started my conversation with patient she started having tremors on her right arm and leg later on both upper and lower extremities. As soon as I walked out of the room as her tremors were resolved I was suspicious she was faking it. Discussed with case management she was given options for rehab apparently patient has been on some of them in the past.  Discussed with patient that I will decrease her Ativan dose she seems not to be happy with that.     Medications:  Reviewed    Infusion Medications   Scheduled Medications    FLUoxetine  40 mg Oral Daily    naltrexone  50 mg Oral Daily    busPIRone  10 mg Oral BID    folic acid  1 mg Oral Daily    NIFEdipine  30 mg Oral Daily    therapeutic multivitamin-minerals  1 tablet Oral Dinner    sodium chloride flush  10 mL Intravenous 2 times per day    enoxaparin  40 mg Subcutaneous Daily    thiamine  100 mg Oral Daily    vitamin D3  1,000 Units Oral Daily     PRN Meds: LORazepam, hydrOXYzine, ibuprofen, sodium chloride flush, acetaminophen **OR** acetaminophen, polyethylene glycol, promethazine **OR** ondansetron, melatonin      Intake/Output Summary

## 2020-05-15 VITALS
HEART RATE: 83 BPM | HEIGHT: 65 IN | WEIGHT: 156.09 LBS | RESPIRATION RATE: 18 BRPM | DIASTOLIC BLOOD PRESSURE: 77 MMHG | TEMPERATURE: 98.4 F | BODY MASS INDEX: 26.01 KG/M2 | SYSTOLIC BLOOD PRESSURE: 121 MMHG | OXYGEN SATURATION: 97 %

## 2020-05-15 PROCEDURE — 6360000002 HC RX W HCPCS: Performed by: INTERNAL MEDICINE

## 2020-05-15 PROCEDURE — 2580000003 HC RX 258: Performed by: INTERNAL MEDICINE

## 2020-05-15 PROCEDURE — 6370000000 HC RX 637 (ALT 250 FOR IP): Performed by: INTERNAL MEDICINE

## 2020-05-15 PROCEDURE — 6370000000 HC RX 637 (ALT 250 FOR IP): Performed by: PSYCHIATRY & NEUROLOGY

## 2020-05-15 RX ORDER — NIFEDIPINE 30 MG/1
30 TABLET, FILM COATED, EXTENDED RELEASE ORAL DAILY
Qty: 30 TABLET | Refills: 1 | Status: SHIPPED | OUTPATIENT
Start: 2020-05-15 | End: 2020-11-11

## 2020-05-15 RX ORDER — FLUOXETINE HYDROCHLORIDE 40 MG/1
40 CAPSULE ORAL DAILY
Qty: 30 CAPSULE | Refills: 0 | Status: SHIPPED | OUTPATIENT
Start: 2020-05-15 | End: 2020-11-11 | Stop reason: CLARIF

## 2020-05-15 RX ORDER — M-VIT,TX,IRON,MINS/CALC/FOLIC 27MG-0.4MG
1 TABLET ORAL
Qty: 30 TABLET | Refills: 0 | Status: ON HOLD | OUTPATIENT
Start: 2020-05-15 | End: 2021-06-17 | Stop reason: CLARIF

## 2020-05-15 RX ORDER — NALTREXONE HYDROCHLORIDE 50 MG/1
50 TABLET, FILM COATED ORAL DAILY
Qty: 30 TABLET | Refills: 0 | Status: SHIPPED | OUTPATIENT
Start: 2020-05-16 | End: 2020-11-11 | Stop reason: CLARIF

## 2020-05-15 RX ORDER — BUSPIRONE HYDROCHLORIDE 10 MG/1
10 TABLET ORAL 2 TIMES DAILY
Qty: 60 TABLET | Refills: 0 | Status: SHIPPED | OUTPATIENT
Start: 2020-05-15 | End: 2020-11-11 | Stop reason: SDUPTHER

## 2020-05-15 RX ADMIN — LORAZEPAM 0.5 MG: 0.5 TABLET ORAL at 08:38

## 2020-05-15 RX ADMIN — BUSPIRONE HYDROCHLORIDE 10 MG: 10 TABLET ORAL at 08:38

## 2020-05-15 RX ADMIN — ENOXAPARIN SODIUM 40 MG: 40 INJECTION SUBCUTANEOUS at 08:37

## 2020-05-15 RX ADMIN — FLUOXETINE 40 MG: 20 CAPSULE ORAL at 08:38

## 2020-05-15 RX ADMIN — HYDROXYZINE PAMOATE 25 MG: 25 CAPSULE ORAL at 00:53

## 2020-05-15 RX ADMIN — Medication 10 ML: at 08:39

## 2020-05-15 RX ADMIN — FOLIC ACID 1 MG: 1 TABLET ORAL at 08:38

## 2020-05-15 RX ADMIN — NIFEDIPINE 30 MG: 30 TABLET, EXTENDED RELEASE ORAL at 08:38

## 2020-05-15 RX ADMIN — NALTREXONE HYDROCHLORIDE 50 MG: 50 TABLET, FILM COATED ORAL at 09:40

## 2020-05-15 RX ADMIN — Medication 100 MG: at 08:38

## 2020-05-15 RX ADMIN — PROMETHAZINE HYDROCHLORIDE 12.5 MG: 12.5 TABLET ORAL at 08:38

## 2020-05-15 RX ADMIN — CHOLECALCIFEROL (VITAMIN D3) 10 MCG (400 UNIT) TABLET 1000 UNITS: at 09:39

## 2020-05-15 RX ADMIN — PROMETHAZINE HYDROCHLORIDE 12.5 MG: 12.5 TABLET ORAL at 01:38

## 2020-05-15 ASSESSMENT — PAIN SCALES - GENERAL
PAINLEVEL_OUTOF10: 0

## 2020-05-15 NOTE — CARE COORDINATION
CM following for  D/C planning , pt  plans to d/c home w/ spouse and  Still reviewing  Resources  , for ETOH rehab:  Pt states she does not want inpt  , but  Plans to do out pt  And  wants to follow up with  out pt Psych as well. Pt  Requested  Any new Rx  Be  Sent to [facility]   Address: [address], [address] [facility]   Phone: [phone]    Pt aware of d/c later today and requested assist with transport , pt  recent had a stroke and does not drive. Electronically signed by Florencia Borrego RN on 5/15/2020 at 10:55 AM               Case Management Assessment            Discharge Note                    Date / Time of Note: 5/15/2020 12:13 PM                  Discharge Note Completed by: Florencia Borrego    Patient Name: Le Miles   YOB: 1955  Diagnosis: Alcohol dependence with withdrawal [facility]) [F10.239]   Date / Time: 5/11/2020 12:15 AM    Current PCP: Henri Lee MD  Clinic patient: No    Hospitalization in the last 30 days: No    Advance Directives:  Code Status: Full Code  [place] DNR form completed and on chart: No    Financial:  Payor: Kalpana Harrington / Plan: Levine, Susan. \Hospital Has a New Name and Outlook.\"" / Product Type: *No Product type* /      Pharmacy:    [address] [phone]  [address] [place] [address]  Phone: [phone] Fax: [phone]      Assistance purchasing medications?: Potential Assistance Purchasing Medications: No  Assistance provided by Case Management: None at this time    Does patient want to participate in local refill/ meds to beds program?: No    Meds To Beds General Rules:  1. Can ONLY be done Monday- Friday between 8:30am-5pm  2. Prescription(s) must be in pharmacy by 3pm to be filled same day  3. Copy of patient's insurance/ prescription drug card and patient face sheet must be sent along with the prescription(s)  4.  Cost of Rx cannot be added to hospital bill. If financial assistance is needed, please contact unit  or ;  or  CANNOT provide pharmacy voucher for patients co-pays  5. Patients can then  the prescription on their way out of the hospital at discharge, or pharmacy can deliver to the bedside if staff is available. (payment due at time of pick-up or delivery - cash, check, or card accepted)     Able to afford home medications/ co-pay costs: Yes    ADLS:  Current PT AM-PAC Score: 24 /24  Current OT AM-PAC Score: 24 /24      DISCHARGE Disposition: Home- No Services Needed    LOC at discharge: Not Applicable  VICKI Completed: No    Notification completed in HENS/PAS?:  Not Applicable    IMM Completed:   Not Indicated    Transportation:  Transportation PLAN for discharge: Lyft   Mode of Transport: Lyft  Reason for medical transport: Not Applicable  Name of Transport Company: Not Applicable  Time of Transport: [phone]      LCHGQWUML form completed: Not Indicated    Home Care:  [address] ordered at discharge: No  [address]: Not Applicable  Orders faxed: No    Durable Medical Equipment:  DME Provider: ANDRE  Equipment obtained during hospitalization: NA    Home Oxygen and Respiratory Equipment:  Oxygen needed at discharge?: No    Dialysis:  Dialysis patient: No    Dialysis Center:  Not Applicable    Hospice Services:  Location: Not Applicable  Agency: Not Applicable    Consents signed: No    Referrals made at [employer] for outpatient continued care:  [facility]    Additional CM Notes: CM confirmed  D/c home  . As noted  Above ,  Confirmed  W/ RN rx sent to [facility]  Arranged  For  1- 1:15  PM  Today .      The Plan for Transition of Care is related to the following treatment goals of Alcohol dependence with withdrawal [facility]) [F10.239]    The Patient and/or patient representative Mehul Vora and her family were provided with a choice of provider and agrees with the discharge plan Yes    Freedom of choice list was provided with basic dialogue that supports the patient's individualized plan of care/goals and shares the quality data associated with the providers.  Yes    Care Transitions patient: No    Huber Ortega RN  [facility] [employer]  Case Management Department  Ph: [phone]

## 2020-05-15 NOTE — DISCHARGE SUMMARY
Hospital Medicine Discharge Summary    Patient ID: Tejal Ling      Patient's PCP: Shorty Glass MD    Admit Date: 5/11/2020     Discharge Date:  5/15/2020    Admitting Physician: Prashant Harris MD     Discharge Physician: Frank Noriega MD     Discharge Diagnoses: Active Hospital Problems    Diagnosis Date Noted    Alcohol dependence with withdrawal [facility]) [F10.239] 09/13/2019       The patient was seen and examined on day of discharge and this discharge summary is in conjunction with any daily progress note from day of discharge. Hospital Course: Patient 69-year-old female with history of anxiety, depression, recent hospitalization in February for alcohol withdrawal in February 2020 patient reported that she went to [place] for rehab where she was there for 37 days.  As per epic patient reported to [facility] ED on 2/16 was discharged home. Patient was monitored for alcohol withdrawal while in-house. She endorses anxiety/depression. Psych was consulted recommended adjustment in the medications. Patient was offered inpatient alcohol rehab options which he refused. Patient was seen and examined on day of discharge denies any nausea, vomiting, fever, chills, chest pain, shortness of breath. She was again counseled regarding alcohol cessation. Patient verbalizes instructions back. Final diagnosis  #Alcohol use  #Alcohol withdrawal.  #Anxiety/depression  #Lactic acidosis  #Hyponatremia likely due to dehydration. #Hypertension. Physical Exam Performed:     /77   Pulse 83   Temp 98.4 °F (36.9 °C) (Oral)   Resp 18   Ht 5' 5\" (1.651 m)   Wt 156 lb 1.4 oz (70.8 kg)   SpO2 97%   BMI 25.97 kg/m²       General appearance:  No apparent distress, appears stated age and cooperative. HEENT:  Normal cephalic, atraumatic without obvious deformity. Pupils equal, round, and reactive to light. Extra ocular muscles intact. Conjunctivae/corneas clear.   Neck: Supple, with full range of mouth Daily with supper  Qty: 30 tablet, Refills: 0              Details   busPIRone (BUSPAR) 10 MG tablet Take 1 tablet by mouth 2 times daily  Qty: 60 tablet, Refills: 0      FLUoxetine (PROZAC) 40 MG capsule Take 1 capsule by mouth daily  Qty: 30 capsule, Refills: 0      NIFEdipine (ADALAT CC) 30 MG extended release tablet Take 1 tablet by mouth daily  Qty: 30 tablet, Refills: 1              Details   amitriptyline (ELAVIL) 50 MG tablet Take 50 mg by mouth nightly       Mouthwashes (BIOTENE) LIQD oral solution Swish and spit 15 mLs 3 times daily      hydrOXYzine (VISTARIL) 25 MG capsule Take 25 mg by mouth every 4 hours as needed for Itching      vitamin B-1 (THIAMINE) 100 MG tablet Take 100 mg by mouth 2 times daily      vitamin C (ASCORBIC ACID) 500 MG tablet Take 500 mg by mouth daily      Cholecalciferol (VITAMIN D3) 25 MCG (1000 UT) CAPS Take 1 capsule by mouth daily      ondansetron (ZOFRAN) 4 MG tablet Take 1 tablet by mouth every 8 hours as needed for Nausea or Vomiting  Qty: 10 tablet, Refills: 0      folic acid (FOLVITE) 1 MG tablet Take 1 tablet by mouth daily  Qty: 30 tablet, Refills: 0             Time Spent on discharge is more than 30 minutes in the examination, evaluation, counseling and review of medications and discharge plan. Signed:    Amalia Joseph MD   5/15/2020      Thank you Sin Christina MD for the opportunity to be involved in this patient's care. If you have any questions or concerns please feel free to contact me at [phone].

## 2020-05-15 NOTE — PLAN OF CARE
Problem: Falls - Risk of:  Goal: Will remain free from falls  Description: Will remain free from falls  Outcome: Ongoing  Note: Pt remains free from falls through this shift, alert and oriented x4, fall protocol in place, bed alarm on and in low, locked position, side rails elevated x2, nonskid footwear on, video monitor in use. Call light and pt belongings within reach. Will continue to monitor for pt safety. Problem: Discharge Planning:  Goal: Discharged to appropriate level of care  Description: Discharged to appropriate level of care  Outcome: Ongoing     Problem: Pain:  Description: Pain management should include both nonpharmacologic and pharmacologic interventions. Goal: Pain level will decrease  Description: Pain level will decrease  Outcome: Ongoing  Note: Pt had no complaints of pain this shift. Will continue to monitor.

## 2020-05-24 ENCOUNTER — HOSPITAL ENCOUNTER (EMERGENCY)
Age: 65
Discharge: HOME | End: 2020-05-24
Attending: EMERGENCY MEDICINE
Payer: COMMERCIAL

## 2020-05-24 VITALS
WEIGHT: 156 LBS | RESPIRATION RATE: 12 BRPM | OXYGEN SATURATION: 96 % | HEART RATE: 93 BPM | SYSTOLIC BLOOD PRESSURE: 135 MMHG | DIASTOLIC BLOOD PRESSURE: 80 MMHG | BODY MASS INDEX: 25.99 KG/M2 | TEMPERATURE: 98 F | HEIGHT: 65 IN

## 2020-05-24 LAB
ANION GAP SERPL CALCULATED.3IONS-SCNC: 17 MMOL/L (ref 3–16)
BASOPHILS ABSOLUTE: 0.1 K/UL (ref 0–0.2)
BASOPHILS RELATIVE PERCENT: 0.8 %
BUN BLDV-MCNC: 7 MG/DL (ref 7–20)
CALCIUM SERPL-MCNC: 9.2 MG/DL (ref 8.3–10.6)
CHLORIDE BLD-SCNC: 93 MMOL/L (ref 99–110)
CO2: 23 MMOL/L (ref 21–32)
CREAT SERPL-MCNC: 0.6 MG/DL (ref 0.6–1.2)
EOSINOPHILS ABSOLUTE: 0.1 K/UL (ref 0–0.6)
EOSINOPHILS RELATIVE PERCENT: 1 %
ETHANOL: 174 MG/DL (ref 0–0.08)
GFR AFRICAN AMERICAN: >60
GFR NON-AFRICAN AMERICAN: >60
GLUCOSE BLD-MCNC: 94 MG/DL (ref 70–99)
HCT VFR BLD CALC: 35.8 % (ref 36–48)
HEMOGLOBIN: 12.3 G/DL (ref 12–16)
LYMPHOCYTES ABSOLUTE: 4.1 K/UL (ref 1–5.1)
LYMPHOCYTES RELATIVE PERCENT: 53.1 %
MCH RBC QN AUTO: 32.9 PG (ref 26–34)
MCHC RBC AUTO-ENTMCNC: 34.3 G/DL (ref 31–36)
MCV RBC AUTO: 95.8 FL (ref 80–100)
MONOCYTES ABSOLUTE: 0.5 K/UL (ref 0–1.3)
MONOCYTES RELATIVE PERCENT: 6 %
NEUTROPHILS ABSOLUTE: 3.1 K/UL (ref 1.7–7.7)
NEUTROPHILS RELATIVE PERCENT: 39.1 %
PDW BLD-RTO: 14.5 % (ref 12.4–15.4)
PLATELET # BLD: 341 K/UL (ref 135–450)
PMV BLD AUTO: 7.3 FL (ref 5–10.5)
POTASSIUM REFLEX MAGNESIUM: 3.9 MMOL/L (ref 3.5–5.1)
RBC # BLD: 3.73 M/UL (ref 4–5.2)
SODIUM BLD-SCNC: 133 MMOL/L (ref 136–145)
WBC # BLD: 7.8 K/UL (ref 4–11)

## 2020-05-24 PROCEDURE — G0480 DRUG TEST DEF 1-7 CLASSES: HCPCS

## 2020-05-24 PROCEDURE — 96365 THER/PROPH/DIAG IV INF INIT: CPT

## 2020-05-24 PROCEDURE — 2580000003 HC RX 258

## 2020-05-24 PROCEDURE — 85025 COMPLETE CBC W/AUTO DIFF WBC: CPT

## 2020-05-24 PROCEDURE — 2500000003 HC RX 250 WO HCPCS: Performed by: STUDENT IN AN ORGANIZED HEALTH CARE EDUCATION/TRAINING PROGRAM

## 2020-05-24 PROCEDURE — 2580000003 HC RX 258: Performed by: STUDENT IN AN ORGANIZED HEALTH CARE EDUCATION/TRAINING PROGRAM

## 2020-05-24 PROCEDURE — 96375 TX/PRO/DX INJ NEW DRUG ADDON: CPT

## 2020-05-24 PROCEDURE — 96361 HYDRATE IV INFUSION ADD-ON: CPT

## 2020-05-24 PROCEDURE — 80048 BASIC METABOLIC PNL TOTAL CA: CPT

## 2020-05-24 PROCEDURE — 99285 EMERGENCY DEPT VISIT HI MDM: CPT

## 2020-05-24 PROCEDURE — 6360000002 HC RX W HCPCS: Performed by: STUDENT IN AN ORGANIZED HEALTH CARE EDUCATION/TRAINING PROGRAM

## 2020-05-24 RX ORDER — ONDANSETRON 2 MG/ML
8 INJECTION INTRAMUSCULAR; INTRAVENOUS ONCE
Status: COMPLETED | OUTPATIENT
Start: 2020-05-24 | End: 2020-05-24

## 2020-05-24 RX ORDER — SODIUM CHLORIDE, SODIUM LACTATE, POTASSIUM CHLORIDE, CALCIUM CHLORIDE 600; 310; 30; 20 MG/100ML; MG/100ML; MG/100ML; MG/100ML
1000 INJECTION, SOLUTION INTRAVENOUS ONCE
Status: DISCONTINUED | OUTPATIENT
Start: 2020-05-24 | End: 2020-05-24

## 2020-05-24 RX ORDER — SODIUM CHLORIDE 9 MG/ML
INJECTION, SOLUTION INTRAVENOUS
Status: COMPLETED
Start: 2020-05-24 | End: 2020-05-24

## 2020-05-24 RX ORDER — 0.9 % SODIUM CHLORIDE 0.9 %
1000 INTRAVENOUS SOLUTION INTRAVENOUS ONCE
Status: COMPLETED | OUTPATIENT
Start: 2020-05-24 | End: 2020-05-24

## 2020-05-24 RX ORDER — CHLORDIAZEPOXIDE HYDROCHLORIDE 5 MG/1
10 CAPSULE, GELATIN COATED ORAL 3 TIMES DAILY PRN
Qty: 37 CAPSULE | Refills: 0 | Status: SHIPPED | OUTPATIENT
Start: 2020-05-24 | End: 2020-05-30

## 2020-05-24 RX ADMIN — ONDANSETRON 8 MG: 2 INJECTION INTRAMUSCULAR; INTRAVENOUS at 21:16

## 2020-05-24 RX ADMIN — Medication 1000 ML: at 21:16

## 2020-05-24 RX ADMIN — SODIUM CHLORIDE 1000 ML: 9 INJECTION, SOLUTION INTRAVENOUS at 21:16

## 2020-05-24 RX ADMIN — FOLIC ACID: 5 INJECTION, SOLUTION INTRAMUSCULAR; INTRAVENOUS; SUBCUTANEOUS at 22:11

## 2020-05-24 ASSESSMENT — ENCOUNTER SYMPTOMS
RESPIRATORY NEGATIVE: 1
EYES NEGATIVE: 1
VOMITING: 0
NAUSEA: 1
ABDOMINAL DISTENTION: 0
ABDOMINAL PAIN: 0
ALLERGIC/IMMUNOLOGIC NEGATIVE: 1

## 2020-05-25 NOTE — ED PROVIDER NOTES
[address]          EM RESIDENT NOTE       Date of evaluation: 5/24/2020    Chief Complaint     Alcohol Intoxication (Withdrawal)      History of Present Illness     Eyal Harding is a 71 y.o. female who presents with a PMHx of alcohol dependence complicated by alcohol withdrawal who is presenting today with tremors and ataxia. Patient states she typically drinks 1.5 L of alcohol daily, and was drinking her normal amount prior to calling EMS. Patient states she called EMS because she started to notice she was having \"ataxia\". She also notes tremor and some mild nausea. She denies any chest pain. She denies any homicidal or suicidal ideation. She denies any other ingestion such as hard liquor, or IV drugs. She denies any auditory or visual hallucinations and no reported seizure activities. Review of Systems     Review of Systems   Constitutional: Negative. HENT: Negative. Eyes: Negative. Respiratory: Negative. Cardiovascular: Negative. Gastrointestinal: Positive for nausea. Negative for abdominal distention, abdominal pain and vomiting. Endocrine: Negative. Genitourinary: Negative. Musculoskeletal: Negative. Skin: Negative. Allergic/Immunologic: Negative. Neurological: Positive for tremors. Negative for dizziness, seizures, syncope and headaches. Hematological: Negative. Psychiatric/Behavioral: Negative. Past Medical, Surgical, Family, and Social History     She has a past medical history of Alcoholism ([address] Anxiety, Ataxia, Depression, Hyperlipidemia, and Hypertension. She has a past surgical history that includes back surgery; Colonoscopy; and Endoscopy, colon, diagnostic. Her family history includes Diabetes in her maternal grandfather and maternal grandmother; Heart Disease in her brother, father, maternal grandfather, maternal grandmother, paternal aunt, and paternal uncle;  Other in her paternal grandfather and mmol/L    Potassium reflex Magnesium 3.9 3.5 - 5.1 mmol/L    Chloride 93 (L) 99 - 110 mmol/L    CO2 23 21 - 32 mmol/L    Anion Gap 17 (H) 3 - 16    Glucose 94 70 - 99 mg/dL    BUN 7 7 - 20 mg/dL    CREATININE 0.6 0.6 - 1.2 mg/dL    GFR Non-African American >60 >60    GFR African American >60 >60    Calcium 9.2 8.3 - 10.6 mg/dL   Ethanol   Result Value Ref Range    Ethanol Lvl 174 mg/dL       ED BEDSIDE ULTRASOUND:  None     RECENT VITALS:  BP: 138/73, Temp: 98 °F (36.7 °C), Pulse: 101,Resp: 8, SpO2: 94 %     Procedures     None     ED Course     Nursing Notes, Past Medical Hx, Past Surgical Hx, Social Hx, Allergies, and Family Hx were reviewed. The patient was given the followingmedications:  Orders Placed This Encounter   Medications    DISCONTD: lactated ringers infusion 1,000 mL    ondansetron (ZOFRAN) injection 8 mg    sodium chloride 0.9 % 50 mL with folic acid 1 mg, adult multi-vitamin with vitamin k 10 mL, thiamine 100 mg    0.9 % sodium chloride bolus    sodium chloride 0.9 % infusion     DANIELLE FAJARDO: cabinet override       CONSULTS:  None    MEDICAL [place] [facility] / ASSESSMENT / Sharron Linda is a 71 y.o. female with alcohol dependence complicated by alcohol withdrawal presenting with ataxia. On my exam the patient is afebrile and hemodynamically stable. She is alert and oriented x4, and does not have a visible tremor with an otherwise normal neurologic exam.  Her CIWA was calculated at 1. She otherwise had a normal cardiopulmonary exam.  Laboratory evaluation was significant for a mild hyponatremia and mild hyperchloremia which are at the patient's baseline but otherwise stable kidney function and other electrolytes. Patient's CBC did not show any evidence of leukocytosis or anemia with stable platelet counts. An ethanol was obtained which was 174. At this time this patient wishes for inpatient rehab admission for alcohol detoxification.   Patient has been medically cleared not show any signs or symptoms of active alcohol withdrawal.  Patient has no indication for inpatient admission at this time, but will attempt to arrange rehab at [facility]. Patient declined admission to rehab and was sent home with a Librium taper. This patient was also evaluated by the attending physician. All care plans werediscussed and agreed upon. Clinical Impression     1. Alcohol dependence with unspecified alcohol-induced disorder ([facility]        Disposition     PATIENT REFERRED TO:  No follow-up provider specified.     DISCHARGE MEDICATIONS:  New Prescriptions    No medications on file       Micah Moctezuma MD  Resident  05/24/20 0305       Jaelyn Morgan MD  Resident  05/24/20 9118       Jaelyn Morgan MD  Resident  05/24/20 9076       Jaelyn Morgan MD  Resident  05/24/20 53260 [phone]

## 2020-05-25 NOTE — ED PROVIDER NOTES
ED Attending Attestation Note     Date of evaluation: 5/24/2020    This patient was seen by the resident. I have seen and examined the patient, agree with the workup, evaluation, management and diagnosis. The care plan has been discussed. My assessment reveals patient with history of alcohol abuse presents to the emergency department complaining of alcohol dependence and requesting detox. Patient does have a history of alcohol withdrawal in the past and most recently was admitted approximately 3 weeks ago. Patient states she drinks approximately a liter and a half of wine daily. Patient states her last drink was just prior to EMS arrival and states that she is hoping to get into alcohol rehabilitation. Patient is mildly tachycardic in the emergency department but otherwise stable vital signs. She has no obvious tremor or other signs or symptoms of alcohol withdrawal.  Ethanol level is 174. Patient is hyponatremic at 133 but this appears to be her baseline. Patient has no evidence at this time of alcohol withdrawal so is stable for alcohol rehabilitation.      Raudel Yan MD  05/24/20 0074

## 2020-06-27 ENCOUNTER — HOSPITAL ENCOUNTER (EMERGENCY)
Age: 65
Discharge: HOME | End: 2020-06-27
Attending: EMERGENCY MEDICINE
Payer: COMMERCIAL

## 2020-06-27 VITALS
DIASTOLIC BLOOD PRESSURE: 74 MMHG | BODY MASS INDEX: 26.66 KG/M2 | TEMPERATURE: 98.9 F | HEIGHT: 65 IN | WEIGHT: 160 LBS | SYSTOLIC BLOOD PRESSURE: 135 MMHG | RESPIRATION RATE: 18 BRPM | OXYGEN SATURATION: 96 % | HEART RATE: 95 BPM

## 2020-06-27 PROCEDURE — 99284 EMERGENCY DEPT VISIT MOD MDM: CPT

## 2020-06-27 PROCEDURE — 6360000002 HC RX W HCPCS: Performed by: EMERGENCY MEDICINE

## 2020-06-27 PROCEDURE — 96374 THER/PROPH/DIAG INJ IV PUSH: CPT

## 2020-06-27 RX ORDER — LORAZEPAM 2 MG/ML
1 INJECTION INTRAMUSCULAR ONCE
Status: COMPLETED | OUTPATIENT
Start: 2020-06-27 | End: 2020-06-27

## 2020-06-27 RX ORDER — LORAZEPAM 1 MG/1
1 TABLET ORAL EVERY 8 HOURS PRN
Qty: 10 TABLET | Refills: 0 | Status: SHIPPED | OUTPATIENT
Start: 2020-06-27 | End: 2020-07-27

## 2020-06-27 RX ADMIN — LORAZEPAM 1 MG: 2 INJECTION, SOLUTION INTRAMUSCULAR; INTRAVENOUS at 03:45

## 2020-06-27 ASSESSMENT — PAIN SCALES - GENERAL: PAINLEVEL_OUTOF10: 5

## 2020-06-27 ASSESSMENT — PAIN DESCRIPTION - PAIN TYPE: TYPE: ACUTE PAIN

## 2020-06-27 ASSESSMENT — PAIN DESCRIPTION - LOCATION: LOCATION: HEAD

## 2020-06-27 NOTE — ED PROVIDER NOTES
eMERGENCY dEPARTMENT eNCOUnter      [facility]    Chief Complaint   Patient presents with    Alcohol Intoxication     Patient arrived by [facility] from home for c/o alcohol detox problems. Patient states that she was going through withdraw from alcohol detox and drank 1 ltr of wine yesterday afternoon, 2030 took 5 mg valium, 2200 took 25mg amitriptyline, 2230 unknown dose of Clonidine. Pt c/o SOB and chest pressure, ringing in both ears. Pt going to detox treatment at [facility] in [place] on Sunday. HPI    Rober Banks is a 71 y.o. female who presents from her home with a complaint of alcohol detox. She states that she was going through withdrawal from alcohol and drink a liter of wine, took 5 mg of Valium, 25 mg of amitriptyline, and some clonidine. She states that she was feeling short of breath and shaky and ringing in both ears. She has an appointment in a detoxification inpatient center in [place] and is supposed to be leaving tomorrow. She states that she was to come in and make sure she was okay before she left. No exacerbating or relieving factors. This patient has a longstanding history of alcoholism. PAST MEDICAL HISTORY    Past Medical History:   Diagnosis Date    Alcoholism ([address]     Anxiety     Ataxia     Depression     Hyperlipidemia     Hypertension        SURGICAL HISTORY    Past Surgical History:   Procedure Laterality Date    BACK SURGERY      1996 L5 disectomy    COLONOSCOPY      2016    ENDOSCOPY, COLON, DIAGNOSTIC      2016       CURRENT MEDICATIONS    Current Outpatient Rx   Medication Sig Dispense Refill    LORazepam (ATIVAN) 1 MG tablet Take 1 tablet by mouth every 8 hours as needed for Anxiety for up to 30 days.  10 tablet 0    busPIRone (BUSPAR) 10 MG tablet Take 1 tablet by mouth 2 times daily 60 tablet 0    FLUoxetine (PROZAC) 40 MG capsule Take 1 capsule by mouth daily 30 capsule 0    naltrexone (DEPADE) 50 MG tablet Take 1 tablet by mouth daily 30 Food insecurity     Worry: None     Inability: None    Transportation needs     Medical: None     Non-medical: None   Tobacco Use    Smoking status: Former Smoker     Types: Cigarettes     Last attempt to quit: 1987     Years since quittin.9    Smokeless tobacco: Never Used   Substance and Sexual Activity    Alcohol use: Yes     Alcohol/week: 56.0 standard drinks     Types: 56 Glasses of wine per week    Drug use: No    Sexual activity: Yes     Partners: Male   Lifestyle    Physical activity     Days per week: None     Minutes per session: None    Stress: None   Relationships    Social connections     Talks on phone: None     Gets together: None     Attends Buddhist service: None     Active member of club or organization: None     Attends meetings of clubs or organizations: None     Relationship status: None    Intimate partner violence     Fear of current or ex partner: None     Emotionally abused: None     Physically abused: None     Forced sexual activity: None   Other Topics Concern    None   Social History Narrative    None       REVIEW OF SYSTEMS    Constitutional:  Denies fever, chills, weight loss or weakness   Eyes:  Denies photophobia or discharge   HENT:  Denies sore throat or ear pain   Respiratory:   shortness of breath   Cardiovascular:  Denies chest pain, palpitations or swelling   GI:  Denies abdominal pain, nausea, vomiting, or diarrhea   Musculoskeletal:  Denies back pain   Skin:  Denies rash   Neurologic:  Denies headache, focal weakness or sensory changes feeling nervous  Endocrine:  Denies polyuria or polydypsia   Lymphatic:  Denies swollen glands   Psychiatric:  Denies depression, suicidal ideation or homicidal ideation   All systems negative except as marked.      PHYSICAL EXAM    VITAL SIGNS: BP (!) 144/70   Pulse 102   Temp 98.9 °F (37.2 °C) (Oral)   Resp 18   Ht 5' 5\" (1.651 m)   Wt 160 lb (72.6 kg)   SpO2 98%   BMI 26.63 kg/m²    Constitutional:  Well developed, Well nourished, No acute distress, Non-toxic appearance. HENT:  Normocephalic, Atraumatic, Bilateral external ears normal, Oropharynx moist, No oral exudates, Nose normal. Neck- Normal range of motion, No tenderness, Supple, No stridor. Eyes:  PERRL, EOMI, Conjunctiva normal, No discharge. Respiratory:  Normal breath sounds, No respiratory distress, No wheezing, No chest tenderness. Cardiovascular:  Normal heart rate, Normal rhythm, No murmurs, No rubs, No gallops. GI:  Bowel sounds normal, Soft, No tenderness, No masses, No pulsatile masses. Musculoskeletal:  Intact distal pulses, No edema, No tenderness, No cyanosis, No clubbing. Good range of motion in all major joints. No tenderness to palpation or major deformities noted. Back- No tenderness. Integument:  Warm, Dry, No erythema, No rash. Lymphatic:  No lymphadenopathy noted. Neurologic:  Alert & oriented x 3, Normal motor function, Normal sensory function, No focal deficits noted. She does have some unintentional tremor particularly in her right upper extremity. Psychiatric:  Affect normal, Judgment normal, Mood normal.     EKG        RADIOLOGY        PROCEDURES        ED COURSE & MEDICAL DECISION MAKING    Pertinent Labs & Imaging studies reviewed. (See chart for details)  This patient states that the tremor that she has happens worse when she is withdrawing from alcohol. She seemed to have at least done some partial treatment at home. I gave her a milligram of Ativan here in the emergency department. She was a little bit hypertensive and tachycardic. I am trying to correct those vital signs somewhat and I would love to be able to discharge her so that she can make her flight to go to her inpatient treatment. She was feeling much better after treatment here in the department. She was less anxious appearing and her heart rate was better on the monitor. She was not tremulous.   I am giving her a few tablets of Ativan so that she can

## 2020-10-14 ENCOUNTER — HOSPITAL ENCOUNTER (EMERGENCY)
Age: 65
Discharge: HOME | End: 2020-10-14
Attending: EMERGENCY MEDICINE
Payer: COMMERCIAL

## 2020-10-14 VITALS
HEART RATE: 82 BPM | WEIGHT: 160 LBS | OXYGEN SATURATION: 97 % | DIASTOLIC BLOOD PRESSURE: 74 MMHG | RESPIRATION RATE: 16 BRPM | TEMPERATURE: 98.8 F | BODY MASS INDEX: 26.63 KG/M2 | SYSTOLIC BLOOD PRESSURE: 122 MMHG

## 2020-10-14 LAB
A/G RATIO: 1.5 (ref 1.1–2.2)
ALBUMIN SERPL-MCNC: 4.5 G/DL (ref 3.4–5)
ALP BLD-CCNC: 72 U/L (ref 40–129)
ALT SERPL-CCNC: 19 U/L (ref 10–40)
ANION GAP SERPL CALCULATED.3IONS-SCNC: 12 MMOL/L (ref 3–16)
AST SERPL-CCNC: 32 U/L (ref 15–37)
BASOPHILS ABSOLUTE: 0 K/UL (ref 0–0.2)
BASOPHILS RELATIVE PERCENT: 0.8 %
BILIRUB SERPL-MCNC: <0.2 MG/DL (ref 0–1)
BUN BLDV-MCNC: 12 MG/DL (ref 7–20)
CALCIUM SERPL-MCNC: 9.5 MG/DL (ref 8.3–10.6)
CHLORIDE BLD-SCNC: 98 MMOL/L (ref 99–110)
CO2: 26 MMOL/L (ref 21–32)
CREAT SERPL-MCNC: 0.6 MG/DL (ref 0.6–1.2)
EOSINOPHILS ABSOLUTE: 0.1 K/UL (ref 0–0.6)
EOSINOPHILS RELATIVE PERCENT: 2.3 %
ETHANOL: NORMAL MG/DL (ref 0–0.08)
GFR AFRICAN AMERICAN: >60
GFR NON-AFRICAN AMERICAN: >60
GLOBULIN: 3 G/DL
GLUCOSE BLD-MCNC: 119 MG/DL (ref 70–99)
HCT VFR BLD CALC: 38 % (ref 36–48)
HEMOGLOBIN: 13 G/DL (ref 12–16)
LYMPHOCYTES ABSOLUTE: 2 K/UL (ref 1–5.1)
LYMPHOCYTES RELATIVE PERCENT: 32.8 %
MCH RBC QN AUTO: 31.5 PG (ref 26–34)
MCHC RBC AUTO-ENTMCNC: 34.2 G/DL (ref 31–36)
MCV RBC AUTO: 92.2 FL (ref 80–100)
MONOCYTES ABSOLUTE: 0.5 K/UL (ref 0–1.3)
MONOCYTES RELATIVE PERCENT: 8.8 %
NEUTROPHILS ABSOLUTE: 3.4 K/UL (ref 1.7–7.7)
NEUTROPHILS RELATIVE PERCENT: 55.3 %
PDW BLD-RTO: 13 % (ref 12.4–15.4)
PLATELET # BLD: 260 K/UL (ref 135–450)
PMV BLD AUTO: 7.5 FL (ref 5–10.5)
POTASSIUM REFLEX MAGNESIUM: 4.2 MMOL/L (ref 3.5–5.1)
RBC # BLD: 4.12 M/UL (ref 4–5.2)
SODIUM BLD-SCNC: 136 MMOL/L (ref 136–145)
TOTAL PROTEIN: 7.5 G/DL (ref 6.4–8.2)
WBC # BLD: 6.1 K/UL (ref 4–11)

## 2020-10-14 PROCEDURE — 2500000003 HC RX 250 WO HCPCS: Performed by: EMERGENCY MEDICINE

## 2020-10-14 PROCEDURE — 6370000000 HC RX 637 (ALT 250 FOR IP): Performed by: EMERGENCY MEDICINE

## 2020-10-14 PROCEDURE — 2580000003 HC RX 258: Performed by: EMERGENCY MEDICINE

## 2020-10-14 PROCEDURE — G0480 DRUG TEST DEF 1-7 CLASSES: HCPCS

## 2020-10-14 PROCEDURE — 99284 EMERGENCY DEPT VISIT MOD MDM: CPT

## 2020-10-14 PROCEDURE — 85025 COMPLETE CBC W/AUTO DIFF WBC: CPT

## 2020-10-14 PROCEDURE — 6360000002 HC RX W HCPCS: Performed by: EMERGENCY MEDICINE

## 2020-10-14 PROCEDURE — 93005 ELECTROCARDIOGRAM TRACING: CPT | Performed by: EMERGENCY MEDICINE

## 2020-10-14 PROCEDURE — 96374 THER/PROPH/DIAG INJ IV PUSH: CPT

## 2020-10-14 PROCEDURE — 80053 COMPREHEN METABOLIC PANEL: CPT

## 2020-10-14 RX ORDER — LORAZEPAM 2 MG/ML
1 INJECTION INTRAMUSCULAR ONCE
Status: DISCONTINUED | OUTPATIENT
Start: 2020-10-14 | End: 2020-10-14

## 2020-10-14 RX ORDER — LORAZEPAM 1 MG/1
TABLET ORAL
Qty: 20 TABLET | Refills: 0 | Status: ON HOLD | OUTPATIENT
Start: 2020-10-14 | End: 2020-10-20 | Stop reason: HOSPADM

## 2020-10-14 RX ORDER — LORAZEPAM 1 MG/1
1 TABLET ORAL ONCE
Status: COMPLETED | OUTPATIENT
Start: 2020-10-14 | End: 2020-10-14

## 2020-10-14 RX ORDER — LORAZEPAM 2 MG/ML
1 INJECTION INTRAMUSCULAR ONCE
Status: COMPLETED | OUTPATIENT
Start: 2020-10-14 | End: 2020-10-14

## 2020-10-14 RX ADMIN — LORAZEPAM 1 MG: 2 INJECTION INTRAMUSCULAR; INTRAVENOUS at 06:44

## 2020-10-14 RX ADMIN — LORAZEPAM 1 MG: 1 TABLET ORAL at 10:06

## 2020-10-14 RX ADMIN — FOLIC ACID: 5 INJECTION, SOLUTION INTRAMUSCULAR; INTRAVENOUS; SUBCUTANEOUS at 05:25

## 2020-10-14 NOTE — ED PROVIDER NOTES
[address] [facility]  [facility]      Pt Name: Emma Nurse  MRN: [id number]  Phuonggflorri 1955  Date of evaluation: 10/14/2020  Provider: Ashley Daigle MD    CHIEF COMPLAINT       Chief Complaint   Patient presents with    Alcohol Intoxication     Pt presents in alcohol withdrawl, pt was six months sober and had relapse three days ago. Drinks about 1.5 bottles of wine. Last drink was a christin cup full before arriving to ED. Pt states she has never had a seizure when in withdrawl but reports she awoke this morning slapping herself. Pt requesting that she detox here but then get treatment outpatient          HISTORY OF PRESENT ILLNESS   (Location/Symptom, Timing/Onset,Context/Setting, Quality, Duration, Modifying Factors, Severity)  Note limiting factors. Emma Nurse is a 76 y.o. female who presents to the emergency department for elderly female who is an alcoholic who comes in with what she describes as detox. She said that she had 2 glasses of wine after a brief period of 6 months when she stopped drinking. After that she started having withdrawal symptoms since then she has had to drink to avoid the symptoms. She would like to go through medical detox as she has done it in the past.  She feels anxious. She feels like there are ants crawling on her body. She reports nausea but this is mild no vomiting. No headache. No chest pain shortness of breath or abdominal pain. She said she last drank prior to arrival.      Nursing notes were reviewed. REVIEW OF SYSTEMS    (2-9 systems for level 4, 10 or more for level 5)     Review of Systems    Positive and pertinent negative as per HPI. Except as noted above in the ROS, all other systems were reviewed and were negative.     PAST MEDICAL HISTORY     Past Medical History:   Diagnosis Date    Alcoholism ([facility] [address]     Anxiety     Ataxia     Depression     Hyperlipidemia     Hypertension SURGICALHISTORY       Past Surgical History:   Procedure Laterality Date    BACK SURGERY      1996 L5 disectomy    COLONOSCOPY      2016    ENDOSCOPY, COLON, DIAGNOSTIC      2016         CURRENT MEDICATIONS       Previous Medications    AMITRIPTYLINE (ELAVIL) 50 MG TABLET    Take 50 mg by mouth nightly     BUSPIRONE (BUSPAR) 10 MG TABLET    Take 1 tablet by mouth 2 times daily    CHOLECALCIFEROL (VITAMIN D3) 25 MCG (1000 UT) CAPS    Take 1 capsule by mouth daily    FLUOXETINE (PROZAC) 40 MG CAPSULE    Take 1 capsule by mouth daily    FOLIC ACID (FOLVITE) 1 MG TABLET    Take 1 tablet by mouth daily    HYDROXYZINE (VISTARIL) 25 MG CAPSULE    Take 25 mg by mouth every 4 hours as needed for Itching    MOUTHWASHES (BIOTENE) LIQD ORAL SOLUTION    Swish and spit 15 mLs 3 times daily    MULTIPLE VITAMINS-MINERALS (THERAPEUTIC MULTIVITAMIN-MINERALS) TABLET    Take 1 tablet by mouth Daily with supper    NALTREXONE (DEPADE) 50 MG TABLET    Take 1 tablet by mouth daily    NIFEDIPINE (ADALAT CC) 30 MG EXTENDED RELEASE TABLET    Take 1 tablet by mouth daily    ONDANSETRON (ZOFRAN) 4 MG TABLET    Take 1 tablet by mouth every 8 hours as needed for Nausea or Vomiting    VITAMIN B-1 (THIAMINE) 100 MG TABLET    Take 100 mg by mouth 2 times daily    VITAMIN C (ASCORBIC ACID) 500 MG TABLET    Take 500 mg by mouth daily       ALLERGIES     Penicillins and Sulfa antibiotics    FAMILY HISTORY       Family History   Problem Relation Age of Onset    Stroke Mother     Substance Abuse Mother     Substance Abuse Father     Heart Disease Father     Heart Disease Brother     Substance Abuse Brother     Heart Disease Paternal Aunt     Heart Disease Paternal Uncle     Heart Disease Maternal Grandmother     Diabetes Maternal Grandmother     Diabetes Maternal Grandfather     Heart Disease Maternal Grandfather     Other Paternal Grandmother     Other Paternal Grandfather           SOCIAL HISTORY       Social History icterus. Right eye: No discharge. Left eye: No discharge. Conjunctiva/sclera: Conjunctivae normal.   Neck:      Musculoskeletal: Neck supple. Cardiovascular:      Rate and Rhythm: Normal rate and regular rhythm. Heart sounds: Normal heart sounds. Pulmonary:      Effort: Pulmonary effort is normal. No respiratory distress. Breath sounds: Normal breath sounds. No wheezing or rales. Abdominal:      General: Bowel sounds are normal. There is no distension. Palpations: Abdomen is soft. Tenderness: There is no abdominal tenderness. There is no guarding or rebound. Skin:     Coloration: Skin is not pale. Neurological:      Mental Status: She is alert. Comments: No tremors   Psychiatric:         Mood and Affect: Mood normal.         Behavior: Behavior normal.             DIAGNOSTIC RESULTS     EKG: All EKG's are interpreted by the Emergency Department Physician who either signs or Co-signs this chart in the absence of a cardiologist.    12 lead EKG shows sinus tachycardia at 101 bpm, KS interval QRS QTC normal.  No acute ischemic findings. No significant changes when compared to May 2020 besides improvement of the rate.     RADIOLOGY:   Non-plain film images such as CT, Ultrasound and MRI are read by the radiologist. Plain radiographic images are visualized and preliminarily interpreted by the emergency physician with the below findings:        Interpretation per the Radiologist below, if available at the time of this note:    No orders to display         ED BEDSIDE ULTRASOUND:   Performed by ED Physician - none    LABS:  Labs Reviewed   COMPREHENSIVE METABOLIC PANEL W/ REFLEX TO MG FOR LOW K - Abnormal; Notable for the following components:       Result Value    Chloride 98 (*)     Glucose 119 (*)     All other components within normal limits    Narrative:     Performed at:  [facility] [address], [place], [address]   Phone [phone]   CBC WITH AUTO DIFFERENTIAL    Narrative:     Performed at:  [facility]  [address] [facility]  [place], [address]   Phone [phone]   ETHANOL    Narrative:     Performed at:  [facility]  [address] [facility] [address]   Phone [phone]       All other labs were within normal range or not returned as of this dictation. EMERGENCY DEPARTMENT COURSE and DIFFERENTIAL DIAGNOSIS/MDM:   Vitals:    Vitals:    10/14/20 0500 10/14/20 0515 10/14/20 0634 10/14/20 0642   BP: 135/68 (!) 157/77  136/80   Pulse: 93 111 88 93   Resp: 12 20 12    Temp:       TempSrc:       SpO2:  93% 98%    Weight:           Elderly female who comes in with what she describes as alcohol withdrawal symptoms. Patient CIWA score is 4. Laboratory studies are ordered. Patient is given a banana bag. Presently I do not have a reason for inpatient admission. He did attempt to get this patient evaluated at a facility that does inpatient alcohol detox. The patient is currently pending transfer to that facility. She is given 1 mg of Ativan here in the emergency room to help prevent worsening of her withdrawal symptoms. The patient is medically cleared. Patient is updated on the treatment plan. CRITICAL CARE TIME   None       CONSULTS:  None    PROCEDURES:  Unless otherwise noted above, none     Procedures    FINAL IMPRESSION      1. Alcohol withdrawal syndrome without complication [facility])          DISPOSITION/PLAN   DISPOSITION Discharge - Pending Orders Complete 10/14/2020 06:40:13 AM      PATIENT REFERREDTO:  No follow-up provider specified.     DISCHARGEMEDICATIONS:  New Prescriptions    No medications on file          (Please note that portions of this note were completed with a voice recognition program.  Efforts were made to edit the dictations but occasionally words are mis-transcribed.)    Anat Hernandez MD (electronically signed)  Attending Emergency Physician       Jackie Jacobo MD  10/14/20 9793

## 2020-10-14 NOTE — ED PROVIDER NOTES
Emergency Department Encounter  Location: [address]    Patient: Jocelyne Sky  MRN: [id number]  : 1955  Date of evaluation: 10/14/2020  ED Provider: Ofelia Krishnan MD    6:00 AM  Jocelyne Sky was checked out to me by Dr. Naveed Jara. Please see his/her initial documentation for details of the patient's initial ED presentation, physical exam and completed studies. In brief, Jocelyne Sky is a 76 y.o. female that presented to the emergency department with alcohol withdrawal needing medical clearance for [facility]. Her withdrawal has been mild during the course of her ED visit. She received a dose of Ativan 1mg for tachycardia, but otherwise has not required treatment. She reports she was sent here from [facility] [facility]. [facility] has been called and the patient can go there at 8am.    On my exam, the patient is anxious, but otherwise well, non-toxic, and in no acute distress. Her tachycardia has resolved. She appears well-hydrated, well-nourished, and in no acute distress. Mucous membranes are moist. Speech is clear. Breathing is unlabored. Skin is dry. Mental status is normal. The patient moves all extremities. Face is symmetric without droop.      I have reviewed and interpreted all of the currently available lab results and diagnostics from this visit:    Labs  Results for orders placed or performed during the hospital encounter of 10/14/20   CBC Auto Differential   Result Value Ref Range    WBC 6.1 4.0 - 11.0 K/uL    RBC 4.12 4.00 - 5.20 M/uL    Hemoglobin 13.0 12.0 - 16.0 g/dL    Hematocrit 38.0 36.0 - 48.0 %    MCV 92.2 80.0 - 100.0 fL    MCH 31.5 26.0 - 34.0 pg    MCHC 34.2 31.0 - 36.0 g/dL    RDW 13.0 12.4 - 15.4 %    Platelets 810 757 - 534 K/uL    MPV 7.5 5.0 - 10.5 fL    Neutrophils % 55.3 %    Lymphocytes % 32.8 %    Monocytes % 8.8 %    Eosinophils % 2.3 %    Basophils % 0.8 %    Neutrophils Absolute 3.4 1.7 - 7.7 K/uL    Lymphocytes Absolute 2.0 1.0 - 5.1 K/uL Monocytes Absolute 0.5 0.0 - 1.3 K/uL    Eosinophils Absolute 0.1 0.0 - 0.6 K/uL    Basophils Absolute 0.0 0.0 - 0.2 K/uL   Comprehensive Metabolic Panel w/ Reflex to MG   Result Value Ref Range    Sodium 136 136 - 145 mmol/L    Potassium reflex Magnesium 4.2 3.5 - 5.1 mmol/L    Chloride 98 (L) 99 - 110 mmol/L    CO2 26 21 - 32 mmol/L    Anion Gap 12 3 - 16    Glucose 119 (H) 70 - 99 mg/dL    BUN 12 7 - 20 mg/dL    CREATININE 0.6 0.6 - 1.2 mg/dL    GFR Non-African American >60 >60    GFR African American >60 >60    Calcium 9.5 8.3 - 10.6 mg/dL    Total Protein 7.5 6.4 - 8.2 g/dL    Alb 4.5 3.4 - 5.0 g/dL    Albumin/Globulin Ratio 1.5 1.1 - 2.2    Total Bilirubin <0.2 0.0 - 1.0 mg/dL    Alkaline Phosphatase 72 40 - 129 U/L    ALT 19 10 - 40 U/L    AST 32 15 - 37 U/L    Globulin 3.0 g/dL   Ethanol   Result Value Ref Range    Ethanol Lvl None Detected mg/dL     MDM and ED Course  Later in the course of her visit, we learned that [facility] had never accepted her and she does not have follow up arranged. She also felt anxious and restless and requested another dose of Ativan for withdrawal. I re-evaluated her and found her to have a CIWA score of 9, so I treated her again with Ativan 1mg, after which she was feeling better. Her symptoms had resolved. She feels safe for outpatient Ativan taper to prevent serious alcohol withdrawal as she has done this before. She plans to follow up with either [facility] or [facility] for rehab. At this time, the patient is without tremors or ataxia. She is alert and oriented to person, place, time, and situation. She understands her illness and our plan. She is able to walk without difficult. She is safe for discharge home and outpatient treatment. The total Critical Care time is 20 minutes which excludes separately billable procedures. Final Impression  1.  Alcohol withdrawal syndrome without complication (HCC)        Blood pressure 130/73, pulse 89, temperature 98.7 °F (37.1 °C), temperature source Oral, resp. rate 13, weight 160 lb (72.6 kg), SpO2 96 %, not currently breastfeeding. Disposition:  DISPOSITION Decision To Discharge 10/14/2020 09:54:15 AM      Patient Referrals:  [facility] Emergency Department  [address] [place] [address]  [phone]    As needed, If symptoms worsen or new symptoms develop    [facility] or [facility] for outpatient REHAB    Schedule an appointment as soon as possible for a visit         Discharge Medications:  New Prescriptions    LORAZEPAM (ATIVAN) 1 MG TABLET    Take 1 tablet by mouth every 4 hours for first day. Take 1 tablet by mouth every 6 hours on second day  Take 0.5 tablet by mouth every 4 hours for days 3-4  Take 0.5 tablet by mouth every 6 hours for days 5-6          [employer]    This chart was generated using the [address] [place] [address] [address]  [employer] system. I created this record but it may contain dictation errors given the limitations of this technology.        Patricio Gee MD  10/18/20 0001

## 2020-10-14 NOTE — ED NOTES
Provided resources for inpatient and outpatient treatment for alcohol and drug treatment services. Patient states \" I did treatment years ago I bought two bottles of wine who knew I would do that\". Patient would like to go to  [address] for treatment services for outpatient treatment only. Provided patient with information to [facility] outpatient as well.       Nallely Reddy  10/14/20 1067

## 2020-10-15 LAB
EKG ATRIAL RATE: 101 BPM
EKG DIAGNOSIS: NORMAL
EKG P AXIS: 80 DEGREES
EKG P-R INTERVAL: 180 MS
EKG Q-T INTERVAL: 350 MS
EKG QRS DURATION: 82 MS
EKG QTC CALCULATION (BAZETT): 453 MS
EKG R AXIS: 73 DEGREES
EKG T AXIS: 70 DEGREES
EKG VENTRICULAR RATE: 101 BPM

## 2020-10-15 PROCEDURE — 93010 ELECTROCARDIOGRAM REPORT: CPT | Performed by: INTERNAL MEDICINE

## 2020-10-17 ENCOUNTER — HOSPITAL ENCOUNTER (OUTPATIENT)
Age: 65
Setting detail: OBSERVATION
Discharge: HOME | End: 2020-10-20
Attending: EMERGENCY MEDICINE | Admitting: INTERNAL MEDICINE
Payer: COMMERCIAL

## 2020-10-17 LAB
BILIRUBIN URINE: NEGATIVE
BLOOD, URINE: NEGATIVE
CLARITY: CLEAR
COLOR: YELLOW
EPITHELIAL CELLS, UA: NORMAL /HPF (ref 0–5)
GLUCOSE URINE: NEGATIVE MG/DL
KETONES, URINE: NEGATIVE MG/DL
LEUKOCYTE ESTERASE, URINE: ABNORMAL
MICROSCOPIC EXAMINATION: YES
NITRITE, URINE: NEGATIVE
PH UA: 7 (ref 5–8)
PROTEIN UA: NEGATIVE MG/DL
RBC UA: NORMAL /HPF (ref 0–4)
SPECIFIC GRAVITY UA: <1.005 (ref 1–1.03)
URINE REFLEX TO CULTURE: ABNORMAL
URINE TYPE: ABNORMAL
UROBILINOGEN, URINE: 0.2 E.U./DL
WBC UA: NORMAL /HPF (ref 0–5)

## 2020-10-17 PROCEDURE — 81001 URINALYSIS AUTO W/SCOPE: CPT

## 2020-10-17 PROCEDURE — 99285 EMERGENCY DEPT VISIT HI MDM: CPT

## 2020-10-17 ASSESSMENT — PAIN SCALES - GENERAL: PAINLEVEL_OUTOF10: 7

## 2020-10-18 ENCOUNTER — APPOINTMENT (OUTPATIENT)
Dept: GENERAL RADIOLOGY | Age: 65
End: 2020-10-18
Payer: COMMERCIAL

## 2020-10-18 PROBLEM — R07.9 CHEST PAIN: Status: ACTIVE | Noted: 2020-10-18

## 2020-10-18 LAB
A/G RATIO: 1.2 (ref 1.1–2.2)
ALBUMIN SERPL-MCNC: 4.5 G/DL (ref 3.4–5)
ALP BLD-CCNC: 80 U/L (ref 40–129)
ALT SERPL-CCNC: 18 U/L (ref 10–40)
ANION GAP SERPL CALCULATED.3IONS-SCNC: 13 MMOL/L (ref 3–16)
AST SERPL-CCNC: 33 U/L (ref 15–37)
BACTERIA: ABNORMAL /HPF
BASOPHILS ABSOLUTE: 0 K/UL (ref 0–0.2)
BASOPHILS RELATIVE PERCENT: 0.3 %
BILIRUB SERPL-MCNC: 0.4 MG/DL (ref 0–1)
BILIRUBIN URINE: NEGATIVE
BLOOD, URINE: ABNORMAL
BUN BLDV-MCNC: 10 MG/DL (ref 7–20)
CALCIUM SERPL-MCNC: 9.9 MG/DL (ref 8.3–10.6)
CHLORIDE BLD-SCNC: 99 MMOL/L (ref 99–110)
CLARITY: ABNORMAL
CO2: 24 MMOL/L (ref 21–32)
COLOR: ABNORMAL
CREAT SERPL-MCNC: 0.6 MG/DL (ref 0.6–1.2)
D DIMER: 209 NG/ML DDU (ref 0–229)
EKG ATRIAL RATE: 100 BPM
EKG DIAGNOSIS: NORMAL
EKG P AXIS: 81 DEGREES
EKG P-R INTERVAL: 160 MS
EKG Q-T INTERVAL: 378 MS
EKG QRS DURATION: 84 MS
EKG QTC CALCULATION (BAZETT): 487 MS
EKG R AXIS: 77 DEGREES
EKG T AXIS: 60 DEGREES
EKG VENTRICULAR RATE: 100 BPM
EOSINOPHILS ABSOLUTE: 0.2 K/UL (ref 0–0.6)
EOSINOPHILS RELATIVE PERCENT: 1.7 %
EPITHELIAL CELLS, UA: 2 /HPF (ref 0–5)
ETHANOL: NORMAL MG/DL (ref 0–0.08)
GFR AFRICAN AMERICAN: >60
GFR NON-AFRICAN AMERICAN: >60
GLOBULIN: 3.7 G/DL
GLUCOSE BLD-MCNC: 113 MG/DL (ref 70–99)
GLUCOSE URINE: NEGATIVE MG/DL
HCT VFR BLD CALC: 39.5 % (ref 36–48)
HEMOGLOBIN: 13.7 G/DL (ref 12–16)
HYALINE CASTS: 1 /LPF (ref 0–8)
KETONES, URINE: NEGATIVE MG/DL
LEUKOCYTE ESTERASE, URINE: ABNORMAL
LYMPHOCYTES ABSOLUTE: 2.7 K/UL (ref 1–5.1)
LYMPHOCYTES RELATIVE PERCENT: 27.2 %
MCH RBC QN AUTO: 32 PG (ref 26–34)
MCHC RBC AUTO-ENTMCNC: 34.6 G/DL (ref 31–36)
MCV RBC AUTO: 92.6 FL (ref 80–100)
MICROSCOPIC EXAMINATION: YES
MONOCYTES ABSOLUTE: 0.6 K/UL (ref 0–1.3)
MONOCYTES RELATIVE PERCENT: 6.4 %
NEUTROPHILS ABSOLUTE: 6.3 K/UL (ref 1.7–7.7)
NEUTROPHILS RELATIVE PERCENT: 64.4 %
NITRITE, URINE: POSITIVE
PDW BLD-RTO: 12.8 % (ref 12.4–15.4)
PH UA: 6.5 (ref 5–8)
PLATELET # BLD: 245 K/UL (ref 135–450)
PMV BLD AUTO: 7.4 FL (ref 5–10.5)
POTASSIUM SERPL-SCNC: 4 MMOL/L (ref 3.5–5.1)
PROTEIN UA: NEGATIVE MG/DL
RBC # BLD: 4.27 M/UL (ref 4–5.2)
RBC UA: 7 /HPF (ref 0–4)
SODIUM BLD-SCNC: 136 MMOL/L (ref 136–145)
SPECIFIC GRAVITY UA: 1 (ref 1–1.03)
TOTAL PROTEIN: 8.2 G/DL (ref 6.4–8.2)
TROPONIN: <0.01 NG/ML
URINE TYPE: ABNORMAL
UROBILINOGEN, URINE: 1 E.U./DL
WBC # BLD: 9.8 K/UL (ref 4–11)
WBC UA: 6 /HPF (ref 0–5)

## 2020-10-18 PROCEDURE — G0480 DRUG TEST DEF 1-7 CLASSES: HCPCS

## 2020-10-18 PROCEDURE — 84484 ASSAY OF TROPONIN QUANT: CPT

## 2020-10-18 PROCEDURE — 6370000000 HC RX 637 (ALT 250 FOR IP): Performed by: NURSE PRACTITIONER

## 2020-10-18 PROCEDURE — 96374 THER/PROPH/DIAG INJ IV PUSH: CPT

## 2020-10-18 PROCEDURE — 6370000000 HC RX 637 (ALT 250 FOR IP): Performed by: INTERNAL MEDICINE

## 2020-10-18 PROCEDURE — 2580000003 HC RX 258: Performed by: INTERNAL MEDICINE

## 2020-10-18 PROCEDURE — G0378 HOSPITAL OBSERVATION PER HR: HCPCS

## 2020-10-18 PROCEDURE — 36415 COLL VENOUS BLD VENIPUNCTURE: CPT

## 2020-10-18 PROCEDURE — 81001 URINALYSIS AUTO W/SCOPE: CPT

## 2020-10-18 PROCEDURE — 93005 ELECTROCARDIOGRAM TRACING: CPT | Performed by: EMERGENCY MEDICINE

## 2020-10-18 PROCEDURE — 96372 THER/PROPH/DIAG INJ SC/IM: CPT

## 2020-10-18 PROCEDURE — 71045 X-RAY EXAM CHEST 1 VIEW: CPT

## 2020-10-18 PROCEDURE — 6370000000 HC RX 637 (ALT 250 FOR IP): Performed by: EMERGENCY MEDICINE

## 2020-10-18 PROCEDURE — 85379 FIBRIN DEGRADATION QUANT: CPT

## 2020-10-18 PROCEDURE — 80053 COMPREHEN METABOLIC PANEL: CPT

## 2020-10-18 PROCEDURE — 93010 ELECTROCARDIOGRAM REPORT: CPT | Performed by: INTERNAL MEDICINE

## 2020-10-18 PROCEDURE — 85025 COMPLETE CBC W/AUTO DIFF WBC: CPT

## 2020-10-18 PROCEDURE — 6360000002 HC RX W HCPCS: Performed by: INTERNAL MEDICINE

## 2020-10-18 PROCEDURE — 99204 OFFICE O/P NEW MOD 45 MIN: CPT | Performed by: INTERNAL MEDICINE

## 2020-10-18 RX ORDER — LORAZEPAM 1 MG/1
1 TABLET ORAL ONCE
Status: COMPLETED | OUTPATIENT
Start: 2020-10-18 | End: 2020-10-18

## 2020-10-18 RX ORDER — ASPIRIN 81 MG/1
81 TABLET, CHEWABLE ORAL DAILY
Status: DISCONTINUED | OUTPATIENT
Start: 2020-10-18 | End: 2020-10-20 | Stop reason: HOSPADM

## 2020-10-18 RX ORDER — PROMETHAZINE HYDROCHLORIDE 25 MG/1
12.5 TABLET ORAL EVERY 6 HOURS PRN
Status: DISCONTINUED | OUTPATIENT
Start: 2020-10-18 | End: 2020-10-20 | Stop reason: HOSPADM

## 2020-10-18 RX ORDER — POTASSIUM CHLORIDE 7.45 MG/ML
10 INJECTION INTRAVENOUS PRN
Status: DISCONTINUED | OUTPATIENT
Start: 2020-10-18 | End: 2020-10-20 | Stop reason: HOSPADM

## 2020-10-18 RX ORDER — LORAZEPAM 2 MG/ML
4 INJECTION INTRAMUSCULAR
Status: DISCONTINUED | OUTPATIENT
Start: 2020-10-18 | End: 2020-10-20 | Stop reason: HOSPADM

## 2020-10-18 RX ORDER — LORAZEPAM 1 MG/1
4 TABLET ORAL
Status: DISCONTINUED | OUTPATIENT
Start: 2020-10-18 | End: 2020-10-20 | Stop reason: HOSPADM

## 2020-10-18 RX ORDER — LORAZEPAM 2 MG/ML
1 INJECTION INTRAMUSCULAR
Status: DISCONTINUED | OUTPATIENT
Start: 2020-10-18 | End: 2020-10-20 | Stop reason: HOSPADM

## 2020-10-18 RX ORDER — MAGNESIUM SULFATE IN WATER 40 MG/ML
2 INJECTION, SOLUTION INTRAVENOUS PRN
Status: DISCONTINUED | OUTPATIENT
Start: 2020-10-18 | End: 2020-10-20 | Stop reason: HOSPADM

## 2020-10-18 RX ORDER — CARVEDILOL 6.25 MG/1
6.25 TABLET ORAL 2 TIMES DAILY WITH MEALS
Status: DISCONTINUED | OUTPATIENT
Start: 2020-10-18 | End: 2020-10-20 | Stop reason: HOSPADM

## 2020-10-18 RX ORDER — LORAZEPAM 1 MG/1
2 TABLET ORAL
Status: DISCONTINUED | OUTPATIENT
Start: 2020-10-18 | End: 2020-10-20 | Stop reason: HOSPADM

## 2020-10-18 RX ORDER — CLONIDINE HYDROCHLORIDE 0.1 MG/1
0.1 TABLET ORAL 2 TIMES DAILY
Status: DISCONTINUED | OUTPATIENT
Start: 2020-10-18 | End: 2020-10-20 | Stop reason: HOSPADM

## 2020-10-18 RX ORDER — LORAZEPAM 1 MG/1
3 TABLET ORAL
Status: DISCONTINUED | OUTPATIENT
Start: 2020-10-18 | End: 2020-10-20 | Stop reason: HOSPADM

## 2020-10-18 RX ORDER — SODIUM CHLORIDE 0.9 % (FLUSH) 0.9 %
10 SYRINGE (ML) INJECTION PRN
Status: DISCONTINUED | OUTPATIENT
Start: 2020-10-18 | End: 2020-10-20 | Stop reason: HOSPADM

## 2020-10-18 RX ORDER — PHENAZOPYRIDINE HYDROCHLORIDE 100 MG/1
200 TABLET, FILM COATED ORAL
Status: DISPENSED | OUTPATIENT
Start: 2020-10-18 | End: 2020-10-20

## 2020-10-18 RX ORDER — NIFEDIPINE 30 MG/1
30 TABLET, EXTENDED RELEASE ORAL DAILY
Status: DISCONTINUED | OUTPATIENT
Start: 2020-10-18 | End: 2020-10-20 | Stop reason: HOSPADM

## 2020-10-18 RX ORDER — LORAZEPAM 2 MG/ML
2 INJECTION INTRAMUSCULAR
Status: DISCONTINUED | OUTPATIENT
Start: 2020-10-18 | End: 2020-10-20 | Stop reason: HOSPADM

## 2020-10-18 RX ORDER — SODIUM CHLORIDE 0.9 % (FLUSH) 0.9 %
10 SYRINGE (ML) INJECTION EVERY 12 HOURS SCHEDULED
Status: DISCONTINUED | OUTPATIENT
Start: 2020-10-18 | End: 2020-10-20 | Stop reason: HOSPADM

## 2020-10-18 RX ORDER — LORAZEPAM 2 MG/ML
3 INJECTION INTRAMUSCULAR
Status: DISCONTINUED | OUTPATIENT
Start: 2020-10-18 | End: 2020-10-20 | Stop reason: HOSPADM

## 2020-10-18 RX ORDER — NITROGLYCERIN 0.4 MG/1
0.4 TABLET SUBLINGUAL ONCE
Status: COMPLETED | OUTPATIENT
Start: 2020-10-18 | End: 2020-10-18

## 2020-10-18 RX ORDER — LORAZEPAM 1 MG/1
1 TABLET ORAL
Status: DISCONTINUED | OUTPATIENT
Start: 2020-10-18 | End: 2020-10-20 | Stop reason: HOSPADM

## 2020-10-18 RX ORDER — ONDANSETRON 2 MG/ML
4 INJECTION INTRAMUSCULAR; INTRAVENOUS EVERY 6 HOURS PRN
Status: DISCONTINUED | OUTPATIENT
Start: 2020-10-18 | End: 2020-10-20 | Stop reason: HOSPADM

## 2020-10-18 RX ORDER — FLUOXETINE HYDROCHLORIDE 20 MG/1
40 CAPSULE ORAL DAILY
Status: DISCONTINUED | OUTPATIENT
Start: 2020-10-18 | End: 2020-10-20 | Stop reason: HOSPADM

## 2020-10-18 RX ORDER — ASPIRIN 81 MG/1
324 TABLET, CHEWABLE ORAL ONCE
Status: COMPLETED | OUTPATIENT
Start: 2020-10-18 | End: 2020-10-18

## 2020-10-18 RX ORDER — BUSPIRONE HYDROCHLORIDE 5 MG/1
10 TABLET ORAL 2 TIMES DAILY
Status: DISCONTINUED | OUTPATIENT
Start: 2020-10-18 | End: 2020-10-20 | Stop reason: HOSPADM

## 2020-10-18 RX ADMIN — PHENAZOPYRIDINE 200 MG: 100 TABLET ORAL at 17:14

## 2020-10-18 RX ADMIN — CLONIDINE HYDROCHLORIDE 0.1 MG: 0.1 TABLET ORAL at 20:41

## 2020-10-18 RX ADMIN — ENOXAPARIN SODIUM 40 MG: 40 INJECTION SUBCUTANEOUS at 08:05

## 2020-10-18 RX ADMIN — ASPIRIN 324 MG: 81 TABLET, CHEWABLE ORAL at 01:31

## 2020-10-18 RX ADMIN — NIFEDIPINE 30 MG: 30 TABLET, FILM COATED, EXTENDED RELEASE ORAL at 08:05

## 2020-10-18 RX ADMIN — NITROGLYCERIN 0.4 MG: 0.4 TABLET SUBLINGUAL at 01:31

## 2020-10-18 RX ADMIN — Medication 10 ML: at 20:43

## 2020-10-18 RX ADMIN — CLONIDINE HYDROCHLORIDE 0.1 MG: 0.1 TABLET ORAL at 13:27

## 2020-10-18 RX ADMIN — LORAZEPAM 1 MG: 1 TABLET ORAL at 01:31

## 2020-10-18 RX ADMIN — PHENAZOPYRIDINE 200 MG: 100 TABLET ORAL at 11:35

## 2020-10-18 RX ADMIN — LORAZEPAM 2 MG: 2 INJECTION INTRAMUSCULAR; INTRAVENOUS at 08:06

## 2020-10-18 RX ADMIN — Medication 10 ML: at 08:06

## 2020-10-18 RX ADMIN — LORAZEPAM 1 MG: 1 TABLET ORAL at 21:37

## 2020-10-18 RX ADMIN — CARVEDILOL 6.25 MG: 6.25 TABLET, FILM COATED ORAL at 17:14

## 2020-10-18 ASSESSMENT — PAIN SCALES - GENERAL
PAINLEVEL_OUTOF10: 7
PAINLEVEL_OUTOF10: 0
PAINLEVEL_OUTOF10: 7
PAINLEVEL_OUTOF10: 0

## 2020-10-18 ASSESSMENT — PAIN DESCRIPTION - LOCATION: LOCATION: HEAD

## 2020-10-18 ASSESSMENT — HEART SCORE: ECG: 0

## 2020-10-18 ASSESSMENT — PAIN DESCRIPTION - PAIN TYPE: TYPE: ACUTE PAIN

## 2020-10-18 NOTE — PROGRESS NOTES
H/p dictation id [id number]. Date of service 10/18/20. Chest pain. Alcohol withdrawal.  Htn. Alcohol abuse.

## 2020-10-18 NOTE — PROGRESS NOTES
Pt admitted to room 3315. Telemetry on. BP elevated but all other vitals are stable. Pt's respirations are even and unlabored without any signs of distress. Call light within reach, bed in lowest position, bed alarm on, pt educated to call out for assistance before getting up, and non-skid socks in place.

## 2020-10-18 NOTE — PROGRESS NOTES
Pt admitted after MN for etoh withdrawal.  H & P was reviewed  No current tremor , however HR and BP currently elevated. I have added clonidine. She is on ciwal protocol  Pt is convinced she has UTI and wants U/A re checked. She reported chest pain in the ED .   Anticipate stress test and echo on Monday

## 2020-10-18 NOTE — H&P
[facility]                     [facility], [address]                              HISTORY AND PHYSICAL    PATIENT NAME: Cayden Cotter                      :        1955  MED REC NO:   [id number]                          ROOM:       7647  ACCOUNT NO:   [de-identified]                           ADMIT DATE: 10/17/2020  PROVIDER:     Sterling Carney MD    DATE OF SERVICE:  I obtained the history and performed physical exam on  the patient on the medical floor on 10/18/2020. CHIEF COMPLAINTS:  Anxiety. HISTORY OF PRESENT ILLNESS:  The patient is a 18-year-old   female who has a longstanding history of alcohol abuse, states she has  been drinking round a liter to a liter and half of wine a day to  \"maintain\" without nausea or vomiting. The patient notes that she was  seen a few days ago and was on an Ativan taper, went to bed and then  just woke up anxious with what she describes as palpitations and  tremors. The patient also had some chest tightness with radiation to  the left side of the jaw. No other constitutional symptoms. PAST MEDICAL/PAST SURGICAL HISTORY:  1. Chronic alcoholism. 2.  Hypertension. 3.  Depression. ALLERGIC HISTORY:  No known drug allergies. FAMILY HISTORY:  Reviewed by me and is currently noncontributory. SOCIAL HISTORY:  No illicit substance use. Drinks alcohol on a regular  basis. She is a retired radiology technologist.    MEDICATIONS:  The patient's home medication list reviewed. The patient  is on lorazepam, nifedipine sustained-release, multivitamins, Vistaril,  vitamin D1, C, vitamin D3, Zofran, Folvite, BuSpar, Prozac, naltrexone,  Elavil, biotin. REVIEW OF SYSTEMS:  The patient's review of systems is significant for  the anxiety and the chest pressure and per the history of present  illness.   All other systems have been reviewed and are negative except  for the history of present illness. PHYSICAL EXAMINATION:  VITAL SIGNS:  Temperature 98.4, respiratory rate 20, pulse 106, blood  pressure 154/93, saturating 98%. CNS:  Alert, awake and oriented. PSYCH:  The patient is anxious, but cooperative. EYES:  Pupils are bilaterally equal.  ENT:  No oral mucosal lesions. RESPIRATORY SYSTEM:  No obvious rales or rhonchi. CVS:  Non-tachycardic. Regular. ABDOMEN:  Soft. No guarding, rigidity or rebound. MUSCULOSKELETAL:  No acute deformities. SKIN:  Appears clammy, but without rashes or lesions. DIAGNOSTIC DATA:  Alcohol not detected. Chest x-ray shows no acute  findings. Comprehensive metabolic panel showed BUN 10, creatinine 0.6. Troponin less than 0.01. CBC showed white count of 9.8. D-dimer 209. EKG shows normal sinus rhythm with a rate of 100 beats per minute. UA  shows no evidence of infection. CONSULTATIONS:  Cardiology. ASSESSMENT:  1. Atypical chest pain. 2.  Alcohol abuse with early alcohol withdrawal.  3.  Hypertension. 4.  Depression and anxiety. PLAN OF CARE:  The patient is admitted to Internal Medicine Service to  observation. Telemetry monitoring will be continued. Serial troponins  have been requested. Cardiology consult has been requested. Further  management will be per Cardiology recommendations. CIWA protocol has  been initiated. CODE STATUS:  Full. EXPECTED LENGTH OF STAY:  Less than two midnights based on plan of care  above. RISK:  High due to the patient's presentation with the chest pain and  the early alcohol withdrawal.    DISPOSITION:  Observation telemetry.         Joshua Finch MD    D: 10/18/2020 6:47:39       T: 10/18/2020 6:50:57     [id number]  Job#: [id number]     Doc#: [id number]    CC:

## 2020-10-18 NOTE — CONSULTS
[facility]  [phone]      Attending Physician: Linh Webb MD  Reason for Consultation/Chief Complaint: Chest pain and shortness of breath    Subjective   History of Present Illness:  Catherine Ruelas is a 76 y.o. patient who presented to the hospital with complaints of chest pain shortness of breath, patient states the symptoms been going on for a few months. She says she notices when she walks her \"pups\". She described the chest pain as a pressure sensation that is in the middle of her chest radiates up to her neck and jaw area. She has associated shortness of breath but is also had shortness of breath with exertion when she walks her dogs. She is also noted a headache and presented to the emergency room yesterday she was try to detox from alcohol but was not doing well with that. She was admitted to the hospital, serial troponins have been negative, she is on a CIWA protocol. Chronically, she states she has hypertension, has been on nifedipine and has been stable on that. She denies hypercholesterolemia or diabetes. Past Medical History:   has a past medical history of Alcoholism ([address] Anxiety, Ataxia, Depression, Hyperlipidemia, and Hypertension. Surgical History:   has a past surgical history that includes back surgery; Colonoscopy; and Endoscopy, colon, diagnostic. Social History:   reports that she quit smoking about 33 years ago. Her smoking use included cigarettes. She has never used smokeless tobacco. She reports current alcohol use of about 56.0 standard drinks of alcohol per week. She reports that she does not use drugs. Family History:  family history includes Diabetes in her maternal grandfather and maternal grandmother; Heart Disease in her brother, father, maternal grandfather, maternal grandmother, paternal aunt, and paternal uncle;  Other in her paternal grandfather and paternal grandmother; Stroke in her mother; Substance Abuse in her brother, father, and mother. Home Medications:  Were reviewed and are listed in nursing record and/or below  Prior to Admission medications    Medication Sig Start Date End Date Taking? Authorizing Provider   LORazepam (ATIVAN) 1 MG tablet Take 1 tablet by mouth every 4 hours for first day.   Take 1 tablet by mouth every 6 hours on second day  Take 0.5 tablet by mouth every 4 hours for days 3-4  Take 0.5 tablet by mouth every 6 hours for days 5-6 10/14/20 10/19/20 Yes Rolando Shaffer MD   NIFEdipine (ADALAT CC) 30 MG extended release tablet Take 1 tablet by mouth daily 5/15/20  Yes Jay Patel MD   Multiple Vitamins-Minerals (THERAPEUTIC MULTIVITAMIN-MINERALS) tablet Take 1 tablet by mouth Daily with supper 5/15/20  Yes Jay Patel MD   hydrOXYzine (VISTARIL) 25 MG capsule Take 25 mg by mouth every 4 hours as needed for Itching   Yes Historical Provider, MD   vitamin B-1 (THIAMINE) 100 MG tablet Take 100 mg by mouth 2 times daily   Yes Historical Provider, MD   vitamin C (ASCORBIC ACID) 500 MG tablet Take 500 mg by mouth daily   Yes Historical Provider, MD   Cholecalciferol (VITAMIN D3) 25 MCG (1000 UT) CAPS Take 1 capsule by mouth daily   Yes Historical Provider, MD   ondansetron (ZOFRAN) 4 MG tablet Take 1 tablet by mouth every 8 hours as needed for Nausea or Vomiting  Patient taking differently: Take 4 mg by mouth every 6 hours as needed for Nausea or Vomiting  10/14/19  Yes Cici Medina MD   folic acid (FOLVITE) 1 MG tablet Take 1 tablet by mouth daily 10/14/19  Yes Cici Medina MD   busPIRone (BUSPAR) 10 MG tablet Take 1 tablet by mouth 2 times daily 5/15/20   Jay Patel MD   FLUoxetine (PROZAC) 40 MG capsule Take 1 capsule by mouth daily 5/15/20   Jay Patel MD   naltrexone (DEPADE) 50 MG tablet Take 1 tablet by mouth daily 5/16/20   Jay Patel MD   amitriptyline (ELAVIL) 50 MG tablet Take 50 mg by mouth nightly  11/9/19   Historical Provider, MD [place] LIQD oral solution Swish and spit 15 mLs 3 times daily    Historical Provider, MD        CURRENT Medications:  NIFEdipine (PROCARDIA XL) extended release tablet 30 mg, Daily  FLUoxetine (PROZAC) capsule 40 mg, Daily  busPIRone (BUSPAR) tablet 10 mg, BID  sodium chloride flush 0.9 % injection 10 mL, 2 times per day  sodium chloride flush 0.9 % injection 10 mL, PRN  promethazine (PHENERGAN) tablet 12.5 mg, Q6H PRN    Or  ondansetron (ZOFRAN) injection 4 mg, Q6H PRN  enoxaparin (LOVENOX) injection 40 mg, Daily  potassium chloride 10 mEq/100 mL IVPB (Peripheral Line), PRN  magnesium sulfate 2 g in 50 mL IVPB premix, PRN  LORazepam (ATIVAN) tablet 1 mg, Q1H PRN    Or  LORazepam (ATIVAN) injection 1 mg, Q1H PRN    Or  LORazepam (ATIVAN) tablet 2 mg, Q1H PRN    Or  LORazepam (ATIVAN) injection 2 mg, Q1H PRN    Or  LORazepam (ATIVAN) tablet 3 mg, Q1H PRN    Or  LORazepam (ATIVAN) injection 3 mg, Q1H PRN    Or  LORazepam (ATIVAN) tablet 4 mg, Q1H PRN    Or  LORazepam (ATIVAN) injection 4 mg, Q1H PRN  aspirin chewable tablet 81 mg, Daily  regadenoson (LEXISCAN) injection 0.4 mg, ONCE PRN  perflutren lipid microspheres (DEFINITY) injection 1.65 mg, ONCE PRN        Allergies:  Penicillins and Sulfa antibiotics     Review of Systems:   A 14 point review of symptoms completed. Pertinent positives identified in the HPI, all other review of symptoms negative as below.       Objective   PHYSICAL EXAM:    Vitals:    10/18/20 0800   BP: (!) 165/102   Pulse: 65   Resp: 16   Temp: 97.7 °F (36.5 °C)   SpO2: 96%    Weight: 170 lb (77.1 kg)         General Appearance:  Alert, cooperative, no distress, appears stated age   Head:  Normocephalic, without obvious abnormality, atraumatic   Eyes:  PERRL, conjunctiva/corneas clear   Nose: Nares normal, no drainage or sinus tenderness   Throat: Lips, mucosa, and tongue normal   Neck: Supple, symmetrical, trachea midline, no adenopathy, thyroid: not enlarged, symmetric, no tenderness/mass/nodules, no carotid bruit or JVD   Lungs:   Clear to auscultation bilaterally, respirations unlabored   Chest Wall:  No deformity or tenderness   Heart:  Regular rate and rhythm, S1, S2 normal, no murmur, rub or gallop   Abdomen:   Soft, non-tender, bowel sounds active all four quadrants,  no masses, no organomegaly   Extremities: Extremities normal, atraumatic, no cyanosis or edema   Pulses: 2+ and symmetric   Skin: Skin color, texture, turgor normal, no rashes or lesions   Pysch:  Anxious mood and affect   Neurologic: Normal gross motor and sensory exam.         Labs   CBC:   Lab Results   Component Value Date    WBC 9.8 10/18/2020    RBC 4.27 10/18/2020    HGB 13.7 10/18/2020    HCT 39.5 10/18/2020    MCV 92.6 10/18/2020    RDW 12.8 10/18/2020     10/18/2020     CMP:  Lab Results   Component Value Date     10/18/2020    K 4.0 10/18/2020    K 4.2 10/14/2020    CL 99 10/18/2020    CO2 24 10/18/2020    BUN 10 10/18/2020    CREATININE 0.6 10/18/2020    GFRAA >60 10/18/2020    AGRATIO 1.2 10/18/2020    LABGLOM >60 10/18/2020    GLUCOSE 113 10/18/2020    PROT 8.2 10/18/2020    CALCIUM 9.9 10/18/2020    BILITOT 0.4 10/18/2020    ALKPHOS 80 10/18/2020    AST 33 10/18/2020    ALT 18 10/18/2020     PT/INR:  No results found for: PTINR  HgBA1c:No results found for: LABA1C  Lab Results   Component Value Date    TROPONINI <0.01 10/18/2020         Cardiac Data     Last EKG: Normal sinus rhythm, possible right/left atrial enlargement, no ischemic changes noted, EKG is similar to prior EKG from October 2020    Echo:    Stress Test:    Cath:    Studies:     cxr       Impression    No acute findings.                 I have reviewed labs and imaging/xray/diagnostic testing in this note.     Assessment and Plan          Patient Active Problem List   Diagnosis    Alcohol withdrawal delirium ([facility]    Essential hypertension    Alcohol withdrawal syndrome, uncomplicated ([facility]    Acute alcoholic intoxication without complication ([facility]    Depression  ETOH abuse    Acute encephalopathy    Ataxia    Alcohol dependence with withdrawal (HCC)    Alcohol abuse, continuous    Alcoholic hepatitis    Nausea & vomiting    Chest pain       Chest pain shortness of breath, will evaluate further with echocardiogram and Lexiscan nuclear stress test tomorrow    Hypertension, not well controlled, likely related to alcohol withdrawal, add Coreg 6.25 mg p.o. twice daily    Alcoholism/withdrawal, as per primary service    Thank you for allowing us to participate in the care of [facility]. Please call me with any questions [phone].     Marty Rico MD, [facility] [place]   Interventional Cardiologist  [address]  [phone] [facility]  [phone] [address] [place] [address]  10/18/2020 10:14 AM

## 2020-10-18 NOTE — PROGRESS NOTES
Pt complaining of burning with urination. Pt states she thinks she has a UTI. UA negative. MD paged for Pyridium. Awaiting call back. Will monitor.

## 2020-10-18 NOTE — ED PROVIDER NOTES
[address] PROVIDER NOTE    Patient Identification  Pt Name: Bong Hameed  MRN: [id number]  [place] 1955  Date of evaluation: 10/17/2020  Provider: Michael Ayala MD  PCP: No primary care provider on file. Chief Complaint  Alcohol Problem (Pt was here on 10/14/2020 for detox states that she is having tremoring, states that it normally occurs like this on the 3rd day of detox, pt states that she feels shaky and weak. wants erasmo. Pt is going to inpt rehab on monday. last drink 10/14/2020)      HPI  (History provided by patient)  This is a 76 y.o. female who was brought in by self after suddenly waking at 3 PM with chest pressure, pounding heartbeat, and headache. The patient also felt anxious and restless. The patient has had nausea, but no vomiting. The patient thinks this may be related to alcohol withdrawal.  I saw her for similar approximately 4 days ago. At that time, her withdrawal symptoms were minimal and very responsive to Ativan, so she was deemed safe and appropriate for discharge home. She has done well until this episode occurred today. Patient denies any recent drinking or drug use. She has not had fever, chills, cough, shortness of breath. Patient chest pain was initially 7 out of 10, but it is improved since onset. It radiates from her substernal chest up her left neck to her jaw. She is never had anything like it before. Of note, the patient has never had a stress test.  Her father, brother, and uncle all  at young ages of cardiovascular causes. Specifically, her father was 51 y/o and brother was 63 y/o when they . Patient also reports dysuria and suprapubic discomfort as well as headache. The patient's headache is 6 out of 10. The patient has not had nausea, vomiting, or diarrhea. ROS  10 systems reviewed, pertinent positives/negatives per HPI otherwise noted to be negative.     I have reviewed the following nursing documentation:  Allergies: Penicillins and Sulfa antibiotics    Past medical history:   Past Medical History:   Diagnosis Date    Alcoholism ([address]     Anxiety     Ataxia     Depression     Hyperlipidemia     Hypertension      Past surgical history:   Past Surgical History:   Procedure Laterality Date    BACK SURGERY      1996 L5 disectomy    COLONOSCOPY      2016    ENDOSCOPY, COLON, DIAGNOSTIC      2016       Home medications:   Previous Medications    AMITRIPTYLINE (ELAVIL) 50 MG TABLET    Take 50 mg by mouth nightly     BUSPIRONE (BUSPAR) 10 MG TABLET    Take 1 tablet by mouth 2 times daily    CHOLECALCIFEROL (VITAMIN D3) 25 MCG (1000 UT) CAPS    Take 1 capsule by mouth daily    FLUOXETINE (PROZAC) 40 MG CAPSULE    Take 1 capsule by mouth daily    FOLIC ACID (FOLVITE) 1 MG TABLET    Take 1 tablet by mouth daily    HYDROXYZINE (VISTARIL) 25 MG CAPSULE    Take 25 mg by mouth every 4 hours as needed for Itching    LORAZEPAM (ATIVAN) 1 MG TABLET    Take 1 tablet by mouth every 4 hours for first day. Take 1 tablet by mouth every 6 hours on second day  Take 0.5 tablet by mouth every 4 hours for days 3-4  Take 0.5 tablet by mouth every 6 hours for days 5-6    MOUTHWASHES (BIOTENE) LIQD ORAL SOLUTION    Swish and spit 15 mLs 3 times daily    MULTIPLE VITAMINS-MINERALS (THERAPEUTIC MULTIVITAMIN-MINERALS) TABLET    Take 1 tablet by mouth Daily with supper    NALTREXONE (DEPADE) 50 MG TABLET    Take 1 tablet by mouth daily    NIFEDIPINE (ADALAT CC) 30 MG EXTENDED RELEASE TABLET    Take 1 tablet by mouth daily    ONDANSETRON (ZOFRAN) 4 MG TABLET    Take 1 tablet by mouth every 8 hours as needed for Nausea or Vomiting    VITAMIN B-1 (THIAMINE) 100 MG TABLET    Take 100 mg by mouth 2 times daily    VITAMIN C (ASCORBIC ACID) 500 MG TABLET    Take 500 mg by mouth daily       Social history:  reports that she quit smoking about 33 years ago. Her smoking use included cigarettes.  She has never used smokeless tobacco. She reports current alcohol use of about 56.0 standard drinks of alcohol per week. She reports that she does not use drugs. Family history:    Family History   Problem Relation Age of Onset    Stroke Mother     Substance Abuse Mother     Substance Abuse Father     Heart Disease Father     Heart Disease Brother     Substance Abuse Brother     Heart Disease Paternal Aunt     Heart Disease Paternal Uncle     Heart Disease Maternal Grandmother     Diabetes Maternal Grandmother     Diabetes Maternal Grandfather     Heart Disease Maternal Grandfather     Other Paternal Memory Shallow Other Paternal Grandfather        Exam  ED Triage Vitals [10/17/20 2058]   BP Temp Temp src Pulse Resp SpO2 Height Weight   (!) 154/93 98.4 °F (36.9 °C) -- 106 20 98 % 5' 4\" (1.626 m) 170 lb (77.1 kg)     Nursing note and vitals reviewed. Constitutional: Well developed, well nourished. Non-toxic in appearance. HENT:      Head: Normocephalic and atraumatic. Ears: External ears normal.      Nose: Nose normal.     Mouth: Membrane mucosa moist and pink. Eyes: Anicteric sclera. No discharge. Neck: Supple. Trachea midline. Cardiovascular: Tachycardic, but regular; no murmurs, rubs, or gallops. Pulmonary/Chest: Effort normal. No respiratory distress. CTAB. No stridor. No wheezes. No rales. Abdominal: Soft. No distension. Non-tender to palpation. Musculoskeletal: Moves all extremities. No gross deformity. Neurological: Alert and oriented. Face symmetric. Speech is clear. Skin: Warm and dry. No rash. Psychiatric: Normal mood and affect. Behavior is normal.    EKG  The Ekg interpreted by me in the absence of a cardiologist shows. sinus tachycardia, [id number]  Axis is   Normal  QTc is  normal  Intervals and Durations are unremarkable. No specific ST-T wave changes appreciated. No evidence of acute ischemia.    No significant change from prior EKG dated 10/14/2020      Radiology  XR CHEST PORTABLE   Final Result   No acute findings.              Labs  Results for orders placed or performed during the hospital encounter of 10/17/20   Urinalysis Reflex to Culture    Specimen: Urine, clean catch   Result Value Ref Range    Color, UA YELLOW Straw/Yellow    Clarity, UA Clear Clear    Glucose, Ur Negative Negative mg/dL    Bilirubin Urine Negative Negative    Ketones, Urine Negative Negative mg/dL    Specific Gravity, UA <1.005 1.005 - 1.030    Blood, Urine Negative Negative    pH, UA 7.0 5.0 - 8.0    Protein, UA Negative Negative mg/dL    Urobilinogen, Urine 0.2 <2.0 E.U./dL    Nitrite, Urine Negative Negative    Leukocyte Esterase, Urine SMALL (A) Negative    Microscopic Examination YES     Urine Type NotGiven     Urine Reflex to Culture Not Indicated    Microscopic Urinalysis   Result Value Ref Range    WBC, UA 6-9 0 - 5 /HPF    RBC, UA None seen 0 - 4 /HPF    Epithelial Cells, UA 0-1 0 - 5 /HPF   CBC Auto Differential   Result Value Ref Range    WBC 9.8 4.0 - 11.0 K/uL    RBC 4.27 4.00 - 5.20 M/uL    Hemoglobin 13.7 12.0 - 16.0 g/dL    Hematocrit 39.5 36.0 - 48.0 %    MCV 92.6 80.0 - 100.0 fL    MCH 32.0 26.0 - 34.0 pg    MCHC 34.6 31.0 - 36.0 g/dL    RDW 12.8 12.4 - 15.4 %    Platelets 286 033 - 045 K/uL    MPV 7.4 5.0 - 10.5 fL    Neutrophils % 64.4 %    Lymphocytes % 27.2 %    Monocytes % 6.4 %    Eosinophils % 1.7 %    Basophils % 0.3 %    Neutrophils Absolute 6.3 1.7 - 7.7 K/uL    Lymphocytes Absolute 2.7 1.0 - 5.1 K/uL    Monocytes Absolute 0.6 0.0 - 1.3 K/uL    Eosinophils Absolute 0.2 0.0 - 0.6 K/uL    Basophils Absolute 0.0 0.0 - 0.2 K/uL   Comprehensive Metabolic Panel   Result Value Ref Range    Sodium 136 136 - 145 mmol/L    Potassium 4.0 3.5 - 5.1 mmol/L    Chloride 99 99 - 110 mmol/L    CO2 24 21 - 32 mmol/L    Anion Gap 13 3 - 16    Glucose 113 (H) 70 - 99 mg/dL    BUN 10 7 - 20 mg/dL    CREATININE 0.6 0.6 - 1.2 mg/dL    GFR Non-African American >60 >60    GFR African American >60 >60    Calcium 9.9 8.3 - 10.6 mg/dL Total Protein 8.2 6.4 - 8.2 g/dL    Alb 4.5 3.4 - 5.0 g/dL    Albumin/Globulin Ratio 1.2 1.1 - 2.2    Total Bilirubin 0.4 0.0 - 1.0 mg/dL    Alkaline Phosphatase 80 40 - 129 U/L    ALT 18 10 - 40 U/L    AST 33 15 - 37 U/L    Globulin 3.7 g/dL   Troponin   Result Value Ref Range    Troponin <0.01 <0.01 ng/mL   Ethanol   Result Value Ref Range    Ethanol Lvl None Detected mg/dL   D-dimer, quantitative   Result Value Ref Range    D-Dimer, Quant 209 0 - 229 ng/mL DDU       Screenings  Heart Score for chest pain patients  History: Highly Suspicious  ECG: Normal  Patient Age: > 65 years  *Risk factors for Atherosclerotic disease: Positive family History, Hypertension, Hypercholesterolemia, Cigarette smoking  Risk Factors: > 3 Risk factors or history of atherosclerotic disease*  Troponin: < 1X normal limit  Heart Score Total: 6     MDM and ED Course  Patient's chest pain is classic sounding with pressure-like character and radiation to the left jaw. Further, she has extensive risk factors for cardiac disease, including multiple first-degree family members who  of likely MI at a young age. This gives her heart score of 6. She will need admission for chest pain/ACS rule out. Her alcohol withdrawal is otherwise very mild and would not warrant inpatient admission. He responded well to only 1 dose of Ativan 1 mg. The on-call hospitalist, Dr. Terrence Groves, thoroughly reviewed the patient's history, physical exam, laboratory and imaging studies, as well as, emergency department course. He has decided to admit Bong Hameed for further observation and evaluation of her chest pain.   As I have deemed necessary from their history, physical, and studies, I have considered and evaluated Bong Hameed for the following diagnoses:  PULMONARY EMBOLISM, ACUTE CORONARY SYNDROME, CARDIAC TAMPONADE, PNEUMOTHORAX, PNEUMONIA, PERICARDITIS, AORTIC DISSECTION/ANEURYSM, HEMOTHORAX    The total Critical Care time is 20 minutes which excludes separately billable procedures. Final Impression  1. Chest pain, unspecified type    2. Alcohol withdrawal syndrome without complication (HCC)        Blood pressure (!) 161/83, pulse 88, temperature 97 °F (36.1 °C), temperature source Oral, resp. rate 18, height 5' 4\" (1.626 m), weight 170 lb (77.1 kg), SpO2 97 %, not currently breastfeeding. Disposition:  Admit    This chart was generated using the [address] [place], [place] [address]  dictation system. I created this record but it may contain dictation errors given the limitations of this technology.        Delia Aragon MD  10/18/20 7468

## 2020-10-19 LAB
LV EF: 75 %
LVEF MODALITY: NORMAL

## 2020-10-19 PROCEDURE — 6360000002 HC RX W HCPCS: Performed by: INTERNAL MEDICINE

## 2020-10-19 PROCEDURE — 96376 TX/PRO/DX INJ SAME DRUG ADON: CPT

## 2020-10-19 PROCEDURE — 93017 CV STRESS TEST TRACING ONLY: CPT | Performed by: INTERNAL MEDICINE

## 2020-10-19 PROCEDURE — A9502 TC99M TETROFOSMIN: HCPCS | Performed by: INTERNAL MEDICINE

## 2020-10-19 PROCEDURE — G0378 HOSPITAL OBSERVATION PER HR: HCPCS

## 2020-10-19 PROCEDURE — 99214 OFFICE O/P EST MOD 30 MIN: CPT | Performed by: INTERNAL MEDICINE

## 2020-10-19 PROCEDURE — 6370000000 HC RX 637 (ALT 250 FOR IP): Performed by: INTERNAL MEDICINE

## 2020-10-19 PROCEDURE — 2580000003 HC RX 258: Performed by: INTERNAL MEDICINE

## 2020-10-19 PROCEDURE — 6370000000 HC RX 637 (ALT 250 FOR IP): Performed by: PHYSICIAN ASSISTANT

## 2020-10-19 PROCEDURE — 78452 HT MUSCLE IMAGE SPECT MULT: CPT | Performed by: INTERNAL MEDICINE

## 2020-10-19 PROCEDURE — 3430000000 HC RX DIAGNOSTIC RADIOPHARMACEUTICAL: Performed by: INTERNAL MEDICINE

## 2020-10-19 PROCEDURE — 96375 TX/PRO/DX INJ NEW DRUG ADDON: CPT

## 2020-10-19 PROCEDURE — 6370000000 HC RX 637 (ALT 250 FOR IP): Performed by: NURSE PRACTITIONER

## 2020-10-19 RX ORDER — FLUCONAZOLE 100 MG/1
150 TABLET ORAL ONCE
Status: COMPLETED | OUTPATIENT
Start: 2020-10-19 | End: 2020-10-19

## 2020-10-19 RX ORDER — CIPROFLOXACIN 500 MG/1
500 TABLET, FILM COATED ORAL EVERY 12 HOURS SCHEDULED
Status: DISCONTINUED | OUTPATIENT
Start: 2020-10-19 | End: 2020-10-20 | Stop reason: HOSPADM

## 2020-10-19 RX ADMIN — CLONIDINE HYDROCHLORIDE 0.1 MG: 0.1 TABLET ORAL at 21:22

## 2020-10-19 RX ADMIN — Medication 10 ML: at 21:23

## 2020-10-19 RX ADMIN — TETROFOSMIN 30 MILLICURIE: 1.38 INJECTION, POWDER, LYOPHILIZED, FOR SOLUTION INTRAVENOUS at 09:16

## 2020-10-19 RX ADMIN — ASPIRIN 81 MG: 81 TABLET, CHEWABLE ORAL at 10:58

## 2020-10-19 RX ADMIN — ONDANSETRON 4 MG: 2 INJECTION INTRAMUSCULAR; INTRAVENOUS at 04:03

## 2020-10-19 RX ADMIN — CIPROFLOXACIN 500 MG: 500 TABLET, FILM COATED ORAL at 10:58

## 2020-10-19 RX ADMIN — Medication 10 ML: at 11:25

## 2020-10-19 RX ADMIN — LORAZEPAM 2 MG: 1 TABLET ORAL at 16:59

## 2020-10-19 RX ADMIN — PHENAZOPYRIDINE 200 MG: 100 TABLET ORAL at 10:58

## 2020-10-19 RX ADMIN — CARVEDILOL 6.25 MG: 6.25 TABLET, FILM COATED ORAL at 16:53

## 2020-10-19 RX ADMIN — LORAZEPAM 1 MG: 1 TABLET ORAL at 22:12

## 2020-10-19 RX ADMIN — CARVEDILOL 6.25 MG: 6.25 TABLET, FILM COATED ORAL at 10:58

## 2020-10-19 RX ADMIN — FLUCONAZOLE 150 MG: 100 TABLET ORAL at 16:53

## 2020-10-19 RX ADMIN — LORAZEPAM 1 MG: 1 TABLET ORAL at 04:00

## 2020-10-19 RX ADMIN — TETROFOSMIN 10 MILLICURIE: 1.38 INJECTION, POWDER, LYOPHILIZED, FOR SOLUTION INTRAVENOUS at 07:50

## 2020-10-19 RX ADMIN — NIFEDIPINE 30 MG: 30 TABLET, FILM COATED, EXTENDED RELEASE ORAL at 10:58

## 2020-10-19 RX ADMIN — CLONIDINE HYDROCHLORIDE 0.1 MG: 0.1 TABLET ORAL at 10:59

## 2020-10-19 RX ADMIN — LORAZEPAM 1 MG: 1 TABLET ORAL at 10:59

## 2020-10-19 RX ADMIN — PHENAZOPYRIDINE 200 MG: 100 TABLET ORAL at 16:53

## 2020-10-19 RX ADMIN — ONDANSETRON 4 MG: 2 INJECTION INTRAMUSCULAR; INTRAVENOUS at 22:12

## 2020-10-19 RX ADMIN — CIPROFLOXACIN 500 MG: 500 TABLET, FILM COATED ORAL at 21:22

## 2020-10-19 ASSESSMENT — PAIN SCALES - GENERAL
PAINLEVEL_OUTOF10: 0
PAINLEVEL_OUTOF10: 0
PAINLEVEL_OUTOF10: 7
PAINLEVEL_OUTOF10: 0

## 2020-10-19 ASSESSMENT — PAIN DESCRIPTION - LOCATION: LOCATION: HEAD

## 2020-10-19 ASSESSMENT — PAIN DESCRIPTION - DESCRIPTORS: DESCRIPTORS: HEADACHE

## 2020-10-19 NOTE — PROGRESS NOTES
Cardiovascular Progress Note      Chief Complaint:   Chief Complaint   Patient presents with    Alcohol Problem     Pt was here on 10/14/2020 for detox states that she is having tremoring, states that it normally occurs like this on the 3rd day of detox, pt states that she feels shaky and weak. wants earsmo. Pt is going to inpt rehab on monday. last drink 10/14/2020     Impression/Recommendations:    76year old female patient presented to ER 10/17/20:    Chest pain, resolved  Dyspnea on exertion   ETOH dependence  Hypertension   Former Tobacco abuse      Discussed low risk nuclear stress test with Filomena Valverde and her . Questions answered. No changes at this time. [facility] protocol in place. Echocardiogram pending. LVEF normal by SPECT MPI. No S/S if CHF. OK to discharge from cardiac perspective if normal valvular function. Interval History:   Pt. S/E. Pacing room and hallway intermittently- reportedly anxious for staff. Denies chest pain overnight or during stress test; feels \"breathing may be due to needing to exercise more\". No new concerns. Questions answered. No orthopnea, LE edema or oxygen requirements. 10/19/20 SPECT MPI        Conclusions          Summary     Normal myocardial perfusion study.     Normal LV size and systolic function.          Medications:  technetium tetrofosmin (Tc-MYOVIEW) injection 30 millicurie, ONCE PRN  NIFEdipine (PROCARDIA XL) extended release tablet 30 mg, Daily  FLUoxetine (PROZAC) capsule 40 mg, Daily  busPIRone (BUSPAR) tablet 10 mg, BID  sodium chloride flush 0.9 % injection 10 mL, 2 times per day  sodium chloride flush 0.9 % injection 10 mL, PRN  promethazine (PHENERGAN) tablet 12.5 mg, Q6H PRN    Or  ondansetron (ZOFRAN) injection 4 mg, Q6H PRN  enoxaparin (LOVENOX) injection 40 mg, Daily  potassium chloride 10 mEq/100 mL IVPB (Peripheral Line), PRN  magnesium sulfate 2 g in 50 mL IVPB premix, PRN  LORazepam (ATIVAN) tablet 1 mg, Q1H PRN    Or  LORazepam (ATIVAN) injection 1 mg, Q1H PRN    Or  LORazepam (ATIVAN) tablet 2 mg, Q1H PRN    Or  LORazepam (ATIVAN) injection 2 mg, Q1H PRN    Or  LORazepam (ATIVAN) tablet 3 mg, Q1H PRN    Or  LORazepam (ATIVAN) injection 3 mg, Q1H PRN    Or  LORazepam (ATIVAN) tablet 4 mg, Q1H PRN    Or  LORazepam (ATIVAN) injection 4 mg, Q1H PRN  aspirin chewable tablet 81 mg, Daily  regadenoson (LEXISCAN) injection 0.4 mg, ONCE PRN  perflutren lipid microspheres (DEFINITY) injection 1.65 mg, ONCE PRN  phenazopyridine (PYRIDIUM) tablet 200 mg, TID WC  carvedilol (COREG) tablet 6.25 mg, BID WC  cloNIDine (CATAPRES) tablet 0.1 mg, BID        I/O:   No intake or output data in the 24 hours ending 10/19/20 0759    Physical Exam:    /86   Pulse 84   Temp 98 °F (36.7 °C) (Oral)   Resp 18   Ht 5' 4\" (1.626 m)   Wt 168 lb 10.4 oz (76.5 kg)   SpO2 95%   BMI 28.95 kg/m²   Wt Readings from Last 3 Encounters:   10/19/20 168 lb 10.4 oz (76.5 kg)   10/14/20 160 lb (72.6 kg)   06/27/20 160 lb (72.6 kg)       GENERAL: Well developed, well nourished, no acute distress  NEUROLOGICAL: Alert and oriented x3  PSYCH: Normal mood and affect   SKIN: Warm and dry, without lesions  HEENT: Normocephalic, atraumatic, Sclera non-icteric, mucous membranes moist  NECK: supple, JVP normal, thyroid not enlarged   CAROTID: Normal upstroke, no bruits  CARDIAC: Normal PMI, regular rate and rhythm, normal S1S2, no murmur, rub  RESPIRATORY: Normal respiratory effort, clear to auscultation bilaterally  EXTREMITIES: No cyanosis, clubbing or edema, palpable pulses bilaterally   MUSCULOSKELETAL: No joint swelling or tenderness, no chest wall tenderness  GASTROINTESTINAL:  soft, non-tender, no bruit    Data Review:    CBC:   Recent Labs     10/18/20  0145   WBC 9.8   HGB 13.7   HCT 39.5   MCV 92.6        BMP:   Recent Labs     10/18/20  0145      K 4.0   CL 99   CO2 24   BUN 10   CREATININE 0.6   GFRAA >60     LFTS:   Recent Labs     10/18/20  0145   ALT 18   AST 33   ALKPHOS 80   PROT 8.2   AGRATIO 1.2   BILITOT 0.4     Cardiac Enzymes:   Recent Labs     10/18/20  0145 10/18/20  1018 10/18/20  1249   TROPONINI <0.01 <0.01 <0.01      [facility] ([facility] [place] [facility]  Interventional Cardiology     o: [phone]  [address], [address]      NOTE:  This report was transcribed using voice recognition software. Every effort was made to ensure accuracy; however, inadvertent computerized transcription errors may be present.

## 2020-10-19 NOTE — PROGRESS NOTES
Pt returned to floor from stress lab. Denies pain or discomfort. Pt medicated per MAR. Discussed POC, all questions answered.

## 2020-10-19 NOTE — PROGRESS NOTES
Patient instructed on Geoff Protocol Stress Test Procedure including possible side effects and adverse reactions. Verbalizes knowledge and understanding and denies having any questions.  -

## 2020-10-19 NOTE — PROGRESS NOTES
PRN  phenazopyridine (PYRIDIUM) tablet 200 mg, TID WC  carvedilol (COREG) tablet 6.25 mg, BID WC  cloNIDine (CATAPRES) tablet 0.1 mg, BID        Objective:  BP (!) 167/75   Pulse 85   Temp 97 °F (36.1 °C) (Temporal)   Resp 16   Ht 5' 4\" (1.626 m)   Wt 168 lb 10.4 oz (76.5 kg)   SpO2 97%   BMI 28.95 kg/m²   No intake or output data in the 24 hours ending 10/19/20 1552   Wt Readings from Last 3 Encounters:   10/19/20 168 lb 10.4 oz (76.5 kg)   10/14/20 160 lb (72.6 kg)   06/27/20 160 lb (72.6 kg)       General appearance:  Appears comfortable  Eyes: Sclera clear. Pupils equal.  ENT: Moist oral mucosa. Trachea midline, no adenopathy. Cardiovascular: Regular rhythm, normal S1, S2. No murmur. No edema in lower extremities  Respiratory: Not using accessory muscles. Good inspiratory effort. Clear to auscultation bilaterally, no wheeze or crackles. GI: Abdomen soft, no tenderness, not distended, normal bowel sounds  Musculoskeletal: No cyanosis in digits, neck supple  Neurology: CN 2-12 grossly intact. No speech or motor deficits  Psych: Normal affect. Alert and oriented in time, place and person  Skin: Warm, dry, normal turgor    Labs and Tests:  CBC:   Recent Labs     10/18/20  0145   WBC 9.8   HGB 13.7        BMP:    Recent Labs     10/18/20  0145      K 4.0   CL 99   CO2 24   BUN 10   CREATININE 0.6   GLUCOSE 113*     Hepatic:   Recent Labs     10/18/20  0145   AST 33   ALT 18   BILITOT 0.4   ALKPHOS 80         Problem List  Active Problems:    Chest pain  Resolved Problems:    * No resolved hospital problems. *       Assessment & Plan:   1. Chest pain-seems resolved. Stress test normal.  Discussed with cardiology. Echo has been ordered and should be done prior to discharge due to alcoholism. 2. Alcohol abuse-patient seems anxious in general but does not seem to be having active alcohol withdrawal.  She is not tremulous, tacky etc. she did get a small dose of Ativan this morning.   Last drink was on Thursday. 3. UTI-has some dysuria, will start Cipro. 4. Vaginal candidiasis-Per patient, will give dose of Diflucan x1.  5. Disposition-await echo, hopefully DC once that is back.       Diet: DIET GENERAL;  Code:Full Code  DVT PPX: carlene Burton PA-C   10/19/2020 3:52 PM

## 2020-10-20 VITALS
BODY MASS INDEX: 28 KG/M2 | WEIGHT: 164 LBS | RESPIRATION RATE: 18 BRPM | HEIGHT: 64 IN | OXYGEN SATURATION: 99 % | TEMPERATURE: 97.5 F | SYSTOLIC BLOOD PRESSURE: 127 MMHG | DIASTOLIC BLOOD PRESSURE: 69 MMHG | HEART RATE: 80 BPM

## 2020-10-20 LAB
LV EF: 55 %
LVEF MODALITY: NORMAL

## 2020-10-20 PROCEDURE — 6370000000 HC RX 637 (ALT 250 FOR IP): Performed by: PHYSICIAN ASSISTANT

## 2020-10-20 PROCEDURE — G0378 HOSPITAL OBSERVATION PER HR: HCPCS

## 2020-10-20 PROCEDURE — 93306 TTE W/DOPPLER COMPLETE: CPT

## 2020-10-20 PROCEDURE — 2580000003 HC RX 258: Performed by: INTERNAL MEDICINE

## 2020-10-20 PROCEDURE — 6360000002 HC RX W HCPCS: Performed by: INTERNAL MEDICINE

## 2020-10-20 PROCEDURE — 6370000000 HC RX 637 (ALT 250 FOR IP): Performed by: INTERNAL MEDICINE

## 2020-10-20 PROCEDURE — 6370000000 HC RX 637 (ALT 250 FOR IP): Performed by: NURSE PRACTITIONER

## 2020-10-20 PROCEDURE — 96376 TX/PRO/DX INJ SAME DRUG ADON: CPT

## 2020-10-20 PROCEDURE — 96372 THER/PROPH/DIAG INJ SC/IM: CPT

## 2020-10-20 RX ORDER — LORAZEPAM 0.5 MG/1
0.5 TABLET ORAL EVERY 6 HOURS PRN
Qty: 12 TABLET | Refills: 0 | Status: SHIPPED | OUTPATIENT
Start: 2020-10-20 | End: 2020-10-23

## 2020-10-20 RX ORDER — CLONIDINE HYDROCHLORIDE 0.1 MG/1
0.1 TABLET ORAL 2 TIMES DAILY
Qty: 60 TABLET | Refills: 1 | Status: SHIPPED | OUTPATIENT
Start: 2020-10-20 | End: 2020-11-11 | Stop reason: SDUPTHER

## 2020-10-20 RX ORDER — CARVEDILOL 6.25 MG/1
6.25 TABLET ORAL 2 TIMES DAILY WITH MEALS
Qty: 60 TABLET | Refills: 1 | Status: SHIPPED | OUTPATIENT
Start: 2020-10-20 | End: 2020-11-11 | Stop reason: SDUPTHER

## 2020-10-20 RX ORDER — CIPROFLOXACIN 500 MG/1
500 TABLET, FILM COATED ORAL EVERY 12 HOURS SCHEDULED
Qty: 8 TABLET | Refills: 0 | Status: SHIPPED | OUTPATIENT
Start: 2020-10-20 | End: 2020-10-24

## 2020-10-20 RX ADMIN — CIPROFLOXACIN 500 MG: 500 TABLET, FILM COATED ORAL at 09:52

## 2020-10-20 RX ADMIN — CLONIDINE HYDROCHLORIDE 0.1 MG: 0.1 TABLET ORAL at 09:51

## 2020-10-20 RX ADMIN — ONDANSETRON 4 MG: 2 INJECTION INTRAMUSCULAR; INTRAVENOUS at 03:35

## 2020-10-20 RX ADMIN — NIFEDIPINE 30 MG: 30 TABLET, FILM COATED, EXTENDED RELEASE ORAL at 09:52

## 2020-10-20 RX ADMIN — PHENAZOPYRIDINE 200 MG: 100 TABLET ORAL at 09:52

## 2020-10-20 RX ADMIN — Medication 10 ML: at 09:55

## 2020-10-20 RX ADMIN — ASPIRIN 81 MG: 81 TABLET, CHEWABLE ORAL at 09:52

## 2020-10-20 RX ADMIN — CARVEDILOL 6.25 MG: 6.25 TABLET, FILM COATED ORAL at 09:52

## 2020-10-20 RX ADMIN — ENOXAPARIN SODIUM 40 MG: 40 INJECTION SUBCUTANEOUS at 09:51

## 2020-10-20 RX ADMIN — LORAZEPAM 1 MG: 1 TABLET ORAL at 09:51

## 2020-10-20 RX ADMIN — LORAZEPAM 1 MG: 1 TABLET ORAL at 03:29

## 2020-10-20 ASSESSMENT — PAIN SCALES - GENERAL
PAINLEVEL_OUTOF10: 0

## 2020-10-20 NOTE — CARE COORDINATION
Patient discharged 10/20/20  to home provided with the list of PCPs.   All discharge needs met per case management

## 2020-10-20 NOTE — PROGRESS NOTES
Data- discharge order received, pt verbalized agreement to discharge, disposition to previous residence, no needs for HHC/DME. Action- discharge instructions prepared and given to pt, pt verbalized understanding. Medication information packet given r/t NEW and/or CHANGED prescriptions emphasizing name/purpose/side effects, pt verbalized understanding. Discharge instruction summary: Diet- general, Activity- as tolerated, Primary Care Physician as follows: No primary care provider on file. None f/u appointment: pt to establish with and follow up with pcp, [address] [place] [address] list of pcps given to pt, immunizations reviewed and n/a, prescription medications filled at [facility]. Inpatient surgical procedure precautions reviewed: n/a CHF Education reviewed. Pt/ Family has had a total of 60 minutes CHF education this admission encounter. Response- Pt belongings gathered, IV removed. Disposition is home (no HHC/DME needs), transported with Lyft (pt to call), taken to lobby via w/c w/ d/c lounge staff, no complications.

## 2020-10-20 NOTE — DISCHARGE SUMMARY
home in stable condition. Her plan is to go to rehab for the 28th time in the past 2-1/2 years. She anticipates going to [place] for rehab which is where she has been before. Consults. IP CONSULT TO HOSPITALIST  IP CONSULT TO SPIRITUAL SERVICES  IP CONSULT TO SOCIAL WORK  IP CONSULT TO CARDIOLOGY    Physical examination on discharge day. /69   Pulse 80   Temp 97.5 °F (36.4 °C) (Oral)   Resp 18   Ht 5' 4\" (1.626 m)   Wt 164 lb (74.4 kg)   SpO2 99%   BMI 28.15 kg/m²   General appearance. Alert. Looks comfortable. HEENT. Sclera clear. Moist mucus membranes. Cardiovascular. Regular rate and rhythm, normal S1, S2. No murmur. Respiratory. Not using accessory muscles. Clear to auscultation bilaterally, no wheeze. Gastrointestinal. Abdomen soft, non-tender, not distended, normal bowel sounds  Neurology. Facial symmetry. No speech deficits. Moving all extremities equally. Extremities. No edema in lower extremities. Skin. Warm, dry, normal turgor    Condition at time of discharge stable    Medication instructions provided to patient at discharge.      Medication List      START taking these medications    carvedilol 6.25 MG tablet  Commonly known as:  COREG  Take 1 tablet by mouth 2 times daily (with meals)  Notes to patient:  Use: to treat high blood pressure and to treat heart failure  Side effects: dizziness, feeling tired, diarrhea, upset stomach     ciprofloxacin 500 MG tablet  Commonly known as:  CIPRO  Take 1 tablet by mouth every 12 hours for 4 days  Notes to patient:  Antibiotic  Use: treats infections caused by bacteria   Side effects: may cause diarrhea or stomach upset     cloNIDine 0.1 MG tablet  Commonly known as:  CATAPRES  Take 1 tablet by mouth 2 times daily  Notes to patient:  Use: to treat high blood pressure  Side effects: dry mouth, constipation, upset stomach        CHANGE how you take these medications    LORazepam 0.5 MG tablet  Commonly known as:  ATIVAN  Take 1 tablet by mouth every 6 hours as needed for Anxiety for up to 3 days. What changed:    · medication strength  · how much to take  · how to take this  · when to take this  · reasons to take this  · additional instructions  Notes to patient:  Use: to treat anxiety or seizures  Side effects: drowsy, nausea     ondansetron 4 MG tablet  Commonly known as:  ZOFRAN  Take 1 tablet by mouth every 8 hours as needed for Nausea or Vomiting  What changed:  when to take this  Notes to patient:  Ondansetron/ Zofran  Use: nausea and vomiting  Side effects: headache, weakness or dizziness        CONTINUE taking these medications    amitriptyline 50 MG tablet  Commonly known as:  ELAVIL  Notes to patient:  Use: to treat low mood and depression  Side effects: headache, anxiety, change in taste     biotene Liqd oral solution     busPIRone 10 MG tablet  Commonly known as:  BUSPAR  Take 1 tablet by mouth 2 times daily  Notes to patient:  Use: to treat anxiety  Side effects: dizziness, upset stomach, headache     FLUoxetine 40 MG capsule  Commonly known as:  PROZAC  Take 1 capsule by mouth daily  Notes to patient:  Use: (SSRI) Selective serotonin reuptake inhibitor used to treat depression  Side effects: Strange dreams, decreased sex drive, decreased appetite, anxiety, weakness dry & mouth indigestion.      folic acid 1 MG tablet  Commonly known as:  FOLVITE  Take 1 tablet by mouth daily  Notes to patient:  Use: to help with some kinds of anemia  Side effects: upset stomach, indigestion      hydrOXYzine 25 MG capsule  Commonly known as:  VISTARIL  Notes to patient:  Use: to treat itching, anxiety or mood problems  Side effects: dry mouth, feeling sleepy     naltrexone 50 MG tablet  Commonly known as:  DEPADE  Take 1 tablet by mouth daily  Notes to patient:  Use: to help keep you alcohol-free, or keep a drug-free state  Side effects: anxiety, headache, muscle cramps, thirst, trouble sleeping     NIFEdipine 30 MG extended release tablet  Commonly known as:  ADALAT CC  Take 1 tablet by mouth daily  Notes to patient:  Use: high blood pressure  Side effects: swelling, headache, dizziness     therapeutic multivitamin-minerals tablet  Take 1 tablet by mouth Daily with supper  Notes to patient:  Use: dietary supplement  Side effects: upset stomach, nausea     vitamin B-1 100 MG tablet  Commonly known as:  THIAMINE  Notes to patient:  Use: Dietary supplement  Side effect: Upset stomach     vitamin C 500 MG tablet  Commonly known as:  ASCORBIC ACID  Notes to patient:  Use: Dietary supplement  Side effect: Upset stomach     Vitamin D3 25 MCG (1000 UT) Caps  Notes to patient:  Use: Dietary supplement, helps absorb calcium better. Side effects: upset stomach, nausea           Where to Get Your Medications      These medications were sent to [address] [place] [address], [address] [place] [address]    Phone:  [phone]   · carvedilol 6.25 MG tablet  · ciprofloxacin 500 MG tablet  · cloNIDine 0.1 MG tablet  · LORazepam 0.5 MG tablet         Discharge recommendations given to patient. Follow Up. PCP in 1 week   Disposition. home  Activity. activity as tolerated  Diet: DIET GENERAL;      Spent 35 minutes in discharge process. Signed:   Cameron Anna PA-C     10/20/2020 3:49 PM

## 2020-10-20 NOTE — CARE COORDINATION
Discharge Planning Assessment  RN discharge planner met with patient to discuss reason for admission, current living situation, and potential needs at the time of discharge    Demographics/Insurance verified Yes    Current type of dwelling: apartment with 13 steps to get in    Patient from ECF/SW confirmed with: N/A    Living arrangements:  Lives with spouse    Level of function/Support: Independent     PCP: none-  Provided with the list of PCPs  . Last Visit to PCP: N/A    DME: None    Active with any community resources/agencies/skilled home care: No [facility]  Needs  Patient reports that she has been at the [facility] for ETOH   treatment   in the past. She states she has  been in  ETOH treatment facility  X 27 in the past two and half yrs . She is scheduled to go to [facility] for dual  Treatment in [place] for Alcoholism and Bipolar once she  leaves the hospital and cataract surgery  completed. Medication compliance issues: Denies    Financial issues that could impact healthcare:  NO        Tentative discharge plan:  home    Discussed and provided facilities of choice if transition to a skilled nursing facility is required at the time of discharge- No      Discussed with patient and/or family that on the day of discharge home tentative time of discharge will be between 10 AM and noon.     Transportation at the time of discharge: [employer] or [employer]

## 2020-10-20 NOTE — PLAN OF CARE
Problem: Falls - Risk of:  Goal: Will remain free from falls  Description: Will remain free from falls  10/20/2020 0232 by Marquis Sanders RN  Outcome: Ongoing  10/20/2020 0231 by Marquis Sanders RN  Outcome: Ongoing

## 2020-11-11 ENCOUNTER — OFFICE VISIT (OUTPATIENT)
Dept: PRIMARY CARE CLINIC | Age: 65
End: 2020-11-11
Payer: COMMERCIAL

## 2020-11-11 VITALS
WEIGHT: 160 LBS | SYSTOLIC BLOOD PRESSURE: 164 MMHG | HEIGHT: 64 IN | BODY MASS INDEX: 27.31 KG/M2 | DIASTOLIC BLOOD PRESSURE: 94 MMHG | OXYGEN SATURATION: 98 % | TEMPERATURE: 97.4 F | HEART RATE: 92 BPM

## 2020-11-11 DIAGNOSIS — K70.10 ALCOHOLIC HEPATITIS, UNSPECIFIED WHETHER ASCITES PRESENT: ICD-10-CM

## 2020-11-11 DIAGNOSIS — I10 ESSENTIAL HYPERTENSION: ICD-10-CM

## 2020-11-11 PROBLEM — F10.931 ALCOHOL WITHDRAWAL DELIRIUM (HCC): Status: RESOLVED | Noted: 2019-06-01 | Resolved: 2020-11-11

## 2020-11-11 PROBLEM — F10.10 ALCOHOL ABUSE, CONTINUOUS: Status: RESOLVED | Noted: 2019-11-19 | Resolved: 2020-11-11

## 2020-11-11 PROBLEM — H26.9 CATARACT OF BOTH EYES: Status: ACTIVE | Noted: 2020-11-11

## 2020-11-11 PROBLEM — Z82.49 FAMILY HISTORY OF HEART DISEASE: Status: ACTIVE | Noted: 2020-11-11

## 2020-11-11 PROBLEM — D53.9 MACROCYTIC ANEMIA: Status: ACTIVE | Noted: 2019-04-19

## 2020-11-11 LAB
A/G RATIO: 1.6 (ref 1.1–2.2)
ALBUMIN SERPL-MCNC: 4.9 G/DL (ref 3.4–5)
ALP BLD-CCNC: 67 U/L (ref 40–129)
ALT SERPL-CCNC: 35 U/L (ref 10–40)
ANION GAP SERPL CALCULATED.3IONS-SCNC: 11 MMOL/L (ref 3–16)
AST SERPL-CCNC: 40 U/L (ref 15–37)
BILIRUB SERPL-MCNC: 0.3 MG/DL (ref 0–1)
BUN BLDV-MCNC: 13 MG/DL (ref 7–20)
CALCIUM SERPL-MCNC: 10.5 MG/DL (ref 8.3–10.6)
CHLORIDE BLD-SCNC: 101 MMOL/L (ref 99–110)
CO2: 25 MMOL/L (ref 21–32)
CREAT SERPL-MCNC: 0.8 MG/DL (ref 0.6–1.2)
FOLATE: 18.58 NG/ML (ref 4.78–24.2)
GFR AFRICAN AMERICAN: >60
GFR NON-AFRICAN AMERICAN: >60
GLOBULIN: 3.1 G/DL
GLUCOSE BLD-MCNC: 113 MG/DL (ref 70–99)
HCT VFR BLD CALC: 41.2 % (ref 36–48)
HEMOGLOBIN: 13.9 G/DL (ref 12–16)
MCH RBC QN AUTO: 31.2 PG (ref 26–34)
MCHC RBC AUTO-ENTMCNC: 33.8 G/DL (ref 31–36)
MCV RBC AUTO: 92.3 FL (ref 80–100)
PDW BLD-RTO: 13.6 % (ref 12.4–15.4)
PLATELET # BLD: 300 K/UL (ref 135–450)
PMV BLD AUTO: 8.1 FL (ref 5–10.5)
POTASSIUM SERPL-SCNC: 4.9 MMOL/L (ref 3.5–5.1)
RBC # BLD: 4.47 M/UL (ref 4–5.2)
SODIUM BLD-SCNC: 137 MMOL/L (ref 136–145)
TOTAL PROTEIN: 8 G/DL (ref 6.4–8.2)
VITAMIN B-12: 518 PG/ML (ref 211–911)
WBC # BLD: 6.9 K/UL (ref 4–11)

## 2020-11-11 PROCEDURE — G8399 PT W/DXA RESULTS DOCUMENT: HCPCS | Performed by: NURSE PRACTITIONER

## 2020-11-11 PROCEDURE — 4040F PNEUMOC VAC/ADMIN/RCVD: CPT | Performed by: NURSE PRACTITIONER

## 2020-11-11 PROCEDURE — 1036F TOBACCO NON-USER: CPT | Performed by: NURSE PRACTITIONER

## 2020-11-11 PROCEDURE — 99204 OFFICE O/P NEW MOD 45 MIN: CPT | Performed by: NURSE PRACTITIONER

## 2020-11-11 PROCEDURE — 1090F PRES/ABSN URINE INCON ASSESS: CPT | Performed by: NURSE PRACTITIONER

## 2020-11-11 PROCEDURE — 3017F COLORECTAL CA SCREEN DOC REV: CPT | Performed by: NURSE PRACTITIONER

## 2020-11-11 PROCEDURE — 1123F ACP DISCUSS/DSCN MKR DOCD: CPT | Performed by: NURSE PRACTITIONER

## 2020-11-11 PROCEDURE — G8417 CALC BMI ABV UP PARAM F/U: HCPCS | Performed by: NURSE PRACTITIONER

## 2020-11-11 PROCEDURE — G8484 FLU IMMUNIZE NO ADMIN: HCPCS | Performed by: NURSE PRACTITIONER

## 2020-11-11 PROCEDURE — G8427 DOCREV CUR MEDS BY ELIG CLIN: HCPCS | Performed by: NURSE PRACTITIONER

## 2020-11-11 RX ORDER — CLONIDINE HYDROCHLORIDE 0.1 MG/1
0.1 TABLET ORAL 2 TIMES DAILY
Qty: 60 TABLET | Refills: 1 | Status: SHIPPED | OUTPATIENT
Start: 2020-11-11 | End: 2021-02-13

## 2020-11-11 RX ORDER — NIFEDIPINE 30 MG/1
30 TABLET, EXTENDED RELEASE ORAL DAILY
Qty: 30 TABLET | Refills: 1 | Status: SHIPPED | OUTPATIENT
Start: 2020-11-11 | End: 2021-01-26

## 2020-11-11 RX ORDER — HYDROXYZINE PAMOATE 25 MG/1
CAPSULE ORAL
Qty: 90 CAPSULE | Refills: 1 | Status: SHIPPED | OUTPATIENT
Start: 2020-11-11 | End: 2022-04-07

## 2020-11-11 RX ORDER — BUSPIRONE HYDROCHLORIDE 10 MG/1
10 TABLET ORAL 2 TIMES DAILY
Qty: 60 TABLET | Refills: 1 | Status: SHIPPED | OUTPATIENT
Start: 2020-11-11 | End: 2021-03-10

## 2020-11-11 RX ORDER — AMITRIPTYLINE HYDROCHLORIDE 50 MG/1
50 TABLET, FILM COATED ORAL NIGHTLY
Qty: 30 TABLET | Refills: 1 | Status: SHIPPED | OUTPATIENT
Start: 2020-11-11 | End: 2021-01-06

## 2020-11-11 RX ORDER — NIFEDIPINE 30 MG/1
30 TABLET, EXTENDED RELEASE ORAL DAILY
COMMUNITY
Start: 2019-04-25 | End: 2020-11-11 | Stop reason: SDUPTHER

## 2020-11-11 RX ORDER — CARVEDILOL 6.25 MG/1
6.25 TABLET ORAL 2 TIMES DAILY WITH MEALS
Qty: 60 TABLET | Refills: 1 | Status: SHIPPED | OUTPATIENT
Start: 2020-11-11 | End: 2021-01-26

## 2020-11-11 SDOH — ECONOMIC STABILITY: FOOD INSECURITY: WITHIN THE PAST 12 MONTHS, THE FOOD YOU BOUGHT JUST DIDN'T LAST AND YOU DIDN'T HAVE MONEY TO GET MORE.: NEVER TRUE

## 2020-11-11 SDOH — ECONOMIC STABILITY: INCOME INSECURITY: HOW HARD IS IT FOR YOU TO PAY FOR THE VERY BASICS LIKE FOOD, HOUSING, MEDICAL CARE, AND HEATING?: NOT HARD AT ALL

## 2020-11-11 SDOH — ECONOMIC STABILITY: TRANSPORTATION INSECURITY
IN THE PAST 12 MONTHS, HAS LACK OF TRANSPORTATION KEPT YOU FROM MEETINGS, WORK, OR FROM GETTING THINGS NEEDED FOR DAILY LIVING?: NO

## 2020-11-11 SDOH — ECONOMIC STABILITY: FOOD INSECURITY: WITHIN THE PAST 12 MONTHS, YOU WORRIED THAT YOUR FOOD WOULD RUN OUT BEFORE YOU GOT MONEY TO BUY MORE.: NEVER TRUE

## 2020-11-11 SDOH — ECONOMIC STABILITY: TRANSPORTATION INSECURITY
IN THE PAST 12 MONTHS, HAS THE LACK OF TRANSPORTATION KEPT YOU FROM MEDICAL APPOINTMENTS OR FROM GETTING MEDICATIONS?: NO

## 2020-11-11 ASSESSMENT — ENCOUNTER SYMPTOMS
DIARRHEA: 0
NAUSEA: 0
ABDOMINAL PAIN: 0
WHEEZING: 0
EYE DISCHARGE: 0
EYE PAIN: 0
BLOOD IN STOOL: 0
VOICE CHANGE: 0
BACK PAIN: 0
SHORTNESS OF BREATH: 0
CHEST TIGHTNESS: 1
VOMITING: 0
COUGH: 0
SORE THROAT: 0
EYE ITCHING: 0
COLOR CHANGE: 0
TROUBLE SWALLOWING: 0
ABDOMINAL DISTENTION: 0
CONSTIPATION: 0

## 2020-11-11 ASSESSMENT — PATIENT HEALTH QUESTIONNAIRE - PHQ9
1. LITTLE INTEREST OR PLEASURE IN DOING THINGS: 0
2. FEELING DOWN, DEPRESSED OR HOPELESS: 0
SUM OF ALL RESPONSES TO PHQ QUESTIONS 1-9: 0
SUM OF ALL RESPONSES TO PHQ QUESTIONS 1-9: 0
SUM OF ALL RESPONSES TO PHQ9 QUESTIONS 1 & 2: 0
SUM OF ALL RESPONSES TO PHQ QUESTIONS 1-9: 0

## 2020-11-11 NOTE — PATIENT INSTRUCTIONS
Check labs today. Proceed pending results. Refer to cardio for pre op clearance. Refer to GI for further management of hepatitis. Start on clonidine 0.1mg twice daily. Monitor BP    Restart amitryptline at night. May also increase vistaril to 3 times per day as needed for anxiety. Continue on coreg per hospital rx. Establish with local psyc or outpatient program asap.

## 2020-11-11 NOTE — PROGRESS NOTES
ENCOUNTER DATE: 11/11/2020     NAME: Jamarcus Mata   AGE: 76 y.o. GENDER: female   YOB: 1955    Patient Active Problem List   Diagnosis    Essential hypertension    [facility] withdrawal syndrome, uncomplicated ([facility]    Depression    ETOH abuse    Acute encephalopathy    Ataxia    Alcohol dependence with withdrawal (HCC)    Alcoholic hepatitis    Nausea & vomiting    Chest pain    Family history of heart disease    Macrocytic anemia    Mixed hyperlipidemia    Cataract of both eyes      Allergies   Allergen Reactions    Penicillins Anaphylaxis, Hives, Rash and Swelling    Sulfa Antibiotics      Current Outpatient Medications on File Prior to Visit   Medication Sig Dispense Refill    Multiple Vitamins-Minerals (THERAPEUTIC MULTIVITAMIN-MINERALS) tablet Take 1 tablet by mouth Daily with supper 30 tablet 0    Mouthwashes (BIOTENE) LIQD oral solution Swish and spit 15 mLs 3 times daily      vitamin B-1 (THIAMINE) 100 MG tablet Take 100 mg by mouth 2 times daily      vitamin C (ASCORBIC ACID) 500 MG tablet Take 500 mg by mouth daily      Cholecalciferol (VITAMIN D3) 25 MCG (1000 UT) CAPS Take 1 capsule by mouth daily      ondansetron (ZOFRAN) 4 MG tablet Take 1 tablet by mouth every 8 hours as needed for Nausea or Vomiting (Patient taking differently: Take 4 mg by mouth every 6 hours as needed for Nausea or Vomiting ) 10 tablet 0    folic acid (FOLVITE) 1 MG tablet Take 1 tablet by mouth daily 30 tablet 0     No current facility-administered medications on file prior to visit.          Past Medical History:   Diagnosis Date    Alcohol abuse, continuous 11/19/2019    Alcohol withdrawal delirium ([facility] 6/1/2019    Alcoholism ([facility]     Anxiety     Ataxia     Depression     Hyperlipidemia     Hypertension      Past Surgical History:   Procedure Laterality Date    BACK SURGERY      1996 L5 disectomy    COLONOSCOPY      2016    ENDOSCOPY, COLON, DIAGNOSTIC      2016      Family History   Problem Relation Age of Onset    Stroke Mother     Substance Abuse Mother     Substance Abuse Father     Heart Disease Father     Heart Disease Brother     Substance Abuse Brother     Heart Disease Paternal Aunt     Heart Disease Paternal Uncle     Heart Disease Maternal Grandmother     Diabetes Maternal Grandmother     Diabetes Maternal Grandfather     Heart Disease Maternal Grandfather     Other Paternal Grandmother     Other Paternal Grandfather      Social History     Tobacco Use    Smoking status: Former Smoker     Types: Cigarettes     Last attempt to quit: 1987     Years since quittin.3    Smokeless tobacco: Never Used   Substance Use Topics    Alcohol use: Yes     Alcohol/week: 56.0 standard drinks     Types: 56 Glasses of wine per week      No care team member to display    Chief Complaint   Patient presents with    Anxiety     new  patient  to get established        HPI:  Bong Hameed is here as a new patient with a complicated history. Previous PCP Dr Reagan Pereira [facility]    Recently in hospital for chest pains and etoh withdrawal  Admitted 10/17  Discharged 10/20  Cardiology consulted inpatient (Dr Chen Yanes)  ECHO and Lexiscan stress test completed    HTN:  Not well controlled. Added coreg 6.25mg BID while in the hospital, wasn't sure if she was supposed to continue on this. Also on nifedipine. Takes clonidine PRN for her BP, which she takes in the am usually cause her BP is typically high in the am.     ETOH:  history of alcoholism. States she \"had a mental breakdown about 2-3yrs ago and this is when she started drinking\"  Has been to rehab 27 times in the past few yrs. Last rehab stint this past . States she went several months without drinking last year, and went to dinner \"and had 1 glass of wine and this sent her in to DTs\"     Currently talking with a counselor once/week. Speaks with Sandoval Vicente through her [employer] company and this helps her a lot. Doesn't attend AA meetings. \"they made her drink more\"  Plans on getting est with a psychiatrist and an outpatient program that has been recommended through her insurance company. Has their contact info. Would like to attend [facility]. Hasn't had a drink since before hospitalization (mid October). Recently treated with ativan taper in hospital.   Was reportedly going to [facility] for rehab upon discharge from hospital, but she did not. States she has not had any withdraw symptoms since discharge from the hospital.   Taking folic acid and b  States she has a good support system with her . No children. GI:  Has been diagnosed with alcoholic hepatitis. Supposed to have follow up testing, but doesn't have a GI.     PSYC:  Was on buspar, prozac and amitriptyline. Has been out of amitriptyline x 3 wks. Hasn't been able to sleep. Has been very stressed lately. Takes buspar BID. Stopped prozac about a month ago on her own cause she was on this for a long time. Didn't feel like this was working anymore. Takes vistaril once daily in the afternoon and this helps her anxiety. Denies any previous suicide attempt/ideations. States her main problem is anxiety and not depression. CARDIO:  Has significant FH of early death likely related heart disease. \"states they all  in their sleep\"  Recent cardiac studies were completed. HTN remains uncontrolled. EYE:  Has cataract removal surgery scheduled for next wk and needs a preop form completed and clearance. Pt has never been seen here before for Primary Care. ROS:  Review of Systems   Constitutional: Negative for activity change, appetite change, chills, fatigue and unexpected weight change. HENT: Negative for congestion, ear pain, hearing loss, nosebleeds, postnasal drip, sneezing, sore throat, trouble swallowing and voice change. Eyes: Negative for pain, discharge and itching.    Respiratory: Positive for chest tightness (occ). Negative for cough, shortness of breath and wheezing. Cardiovascular: Negative for chest pain, palpitations and leg swelling. Gastrointestinal: Negative for abdominal distention, abdominal pain, blood in stool, constipation (takes daily colace and fiber), diarrhea, nausea and vomiting. Endocrine: Negative for cold intolerance, heat intolerance, polydipsia, polyphagia and polyuria. Genitourinary: Negative for difficulty urinating, dysuria and pelvic pain. Musculoskeletal: Negative for arthralgias, back pain and neck pain. Skin: Negative for color change, pallor and rash. Neurological: Negative for dizziness, tremors, seizures, weakness, numbness and headaches. Hematological: Does not bruise/bleed easily. Psychiatric/Behavioral: Positive for sleep disturbance. Negative for agitation, dysphoric mood, self-injury and suicidal ideas. The patient is nervous/anxious. VITALS:  BP (!) 164/94   Pulse 92   Temp 97.4 °F (36.3 °C) (Oral)   Ht 5' 4\" (1.626 m)   Wt 160 lb (72.6 kg)   SpO2 98%   BMI 27.46 kg/m²      PE:  Physical Exam  Vitals signs and nursing note reviewed. Constitutional:       General: She is not in acute distress. Appearance: Normal appearance. She is well-developed and normal weight. She is not diaphoretic. HENT:      Head: Normocephalic and atraumatic. Eyes:      Pupils: Pupils are equal, round, and reactive to light. Neck:      Musculoskeletal: Normal range of motion. Normal range of motion. No neck rigidity. Thyroid: No thyroid mass, thyromegaly or thyroid tenderness. Vascular: No carotid bruit. Cardiovascular:      Rate and Rhythm: Normal rate and regular rhythm. Heart sounds: Normal heart sounds. No murmur. No friction rub. Pulmonary:      Effort: Pulmonary effort is normal. No respiratory distress. Breath sounds: Normal breath sounds. No wheezing, rhonchi or rales. Abdominal:      General: Abdomen is flat.  There is distension (+minimum ascites. ). Palpations: Abdomen is soft. There is no mass. Tenderness: There is no abdominal tenderness. There is no guarding or rebound. Hernia: No hernia is present. Musculoskeletal:         General: No tenderness or deformity. Right lower leg: No edema. Left lower leg: No edema. Lymphadenopathy:      Cervical: No cervical adenopathy. Skin:     General: Skin is warm and dry. Coloration: Skin is not pale. Findings: No erythema or rash. Neurological:      General: No focal deficit present. Mental Status: She is alert and oriented to person, place, and time. Motor: No weakness. Gait: Gait normal.   Psychiatric:         Mood and Affect: Mood normal.         Behavior: Behavior normal.         Thought Content: Thought content normal.         Judgment: Judgment normal.      Comments: anxious        ASSESSMENT/PLAN:  1. Essential hypertension  Uncontrolled. Pt has not been taking coreg as rx'd. Advised to restart coreg. Continue on nifedipine. Also discussed clonidine. Pt has been taking only PRN. Encouraged to take BID to help with BP as well as withdrawal symptoms. Recent cardiac testing reviewed. Due to her extensive family history of CAD and recent chest pains and uncontrolled HTN, will refer to cardio outpatient to review her recent cardiac testing and obtain cardiac clearance letter for upcoming cataract surgery. Since pt is new to [facility] and I have never seen her before, I do not feel comfortable with clearance for pre op without cardiology input. Pt agrees. Will call for apt[facility]  - CBC; Future  - Comprehensive Metabolic Panel; Future    2. Family history of heart disease  See above. Pt will call for apt. Will need cardiac clearance for upcoming surgery. - [facility]    3.  Alcoholic hepatitis, unspecified whether ascites present  Reviewed chart extensively, including pts most recent admission. Will refer to GI for further eval. Pt states she is due for \"rpt testing\". - Vitamin B12 & Folate; Future  - AFL - Valencia Jacobs MD, Gastroenterology, [facility]    4. Recurrent major depressive disorder, in partial remission ([facility]  Pt weaned herself off of prozac. Reports she is doing well. Encouraged to est care with psyc and an outpatient program. Pt states she has contact info for a program and psychiatrist through her insurance she is going to schedule with. She will continue with weekly calls with her counselor. Restart amitriptyline as previously rx'd. 5. Mixed hyperlipidemia  Pt is not fasting today, therefore no lipid panel checked. 6. [facility] with [facility]complicated [facility]  Reviewed chart extensively. Pt has been in numerous rehab programs over the past few yrs (27 times). Last rehab visit was in June. Pt denies any etoh use since mid October. States she does not have withdrawal symptoms, but does get anxious. Discussed clonidine rx. Encouraged to take BID to help with symptoms. Praised for recent etoh cessation. Strongly encouraged to est with an outpatient program and psychiatry. Pt has contact info and will call. 7. Cataract of both eyes, unspecified cataract type  Pt will see cardio, will check labs today. Return to clinic next wk for pre op physical.       Return in about 5 days (around 11/16/2020) for pre op physical.     Spent >45min in room with pt reviewing pts history, including several hospital records and previous PCP visit, labs and cardiac imaging.      Electronically signed by Majel Seip, APRN - CNP on 11/11/2020 at 7:08 PM

## 2020-11-16 ENCOUNTER — OFFICE VISIT (OUTPATIENT)
Dept: CARDIOLOGY CLINIC | Age: 65
End: 2020-11-16
Payer: COMMERCIAL

## 2020-11-16 VITALS
DIASTOLIC BLOOD PRESSURE: 72 MMHG | TEMPERATURE: 98 F | BODY MASS INDEX: 29.04 KG/M2 | HEART RATE: 96 BPM | SYSTOLIC BLOOD PRESSURE: 130 MMHG | WEIGHT: 169.2 LBS

## 2020-11-16 PROBLEM — Z01.810 PREOP CARDIOVASCULAR EXAM: Status: ACTIVE | Noted: 2020-11-16

## 2020-11-16 PROCEDURE — 93000 ELECTROCARDIOGRAM COMPLETE: CPT | Performed by: INTERNAL MEDICINE

## 2020-11-16 PROCEDURE — 99204 OFFICE O/P NEW MOD 45 MIN: CPT | Performed by: INTERNAL MEDICINE

## 2020-11-16 PROCEDURE — 3017F COLORECTAL CA SCREEN DOC REV: CPT | Performed by: INTERNAL MEDICINE

## 2020-11-16 PROCEDURE — 1090F PRES/ABSN URINE INCON ASSESS: CPT | Performed by: INTERNAL MEDICINE

## 2020-11-16 PROCEDURE — G8399 PT W/DXA RESULTS DOCUMENT: HCPCS | Performed by: INTERNAL MEDICINE

## 2020-11-16 PROCEDURE — G8427 DOCREV CUR MEDS BY ELIG CLIN: HCPCS | Performed by: INTERNAL MEDICINE

## 2020-11-16 PROCEDURE — 4040F PNEUMOC VAC/ADMIN/RCVD: CPT | Performed by: INTERNAL MEDICINE

## 2020-11-16 PROCEDURE — 1036F TOBACCO NON-USER: CPT | Performed by: INTERNAL MEDICINE

## 2020-11-16 PROCEDURE — G8484 FLU IMMUNIZE NO ADMIN: HCPCS | Performed by: INTERNAL MEDICINE

## 2020-11-16 PROCEDURE — G8417 CALC BMI ABV UP PARAM F/U: HCPCS | Performed by: INTERNAL MEDICINE

## 2020-11-16 PROCEDURE — 1123F ACP DISCUSS/DSCN MKR DOCD: CPT | Performed by: INTERNAL MEDICINE

## 2020-11-16 ASSESSMENT — ENCOUNTER SYMPTOMS
COLOR CHANGE: 0
FACIAL SWELLING: 0
VOMITING: 0
COUGH: 0
ABDOMINAL DISTENTION: 0
BACK PAIN: 0
CHEST TIGHTNESS: 0
ABDOMINAL PAIN: 0
EYE DISCHARGE: 0
SHORTNESS OF BREATH: 0
WHEEZING: 0
BLOOD IN STOOL: 0

## 2020-11-16 NOTE — PROGRESS NOTES
[facility]     Outpatient Cardiology         Chief Complaint   Patient presents with    Pre-op Exam     for cataract surgery 2020     DONI Pacheco a 76 y.o. female here as a new patient referred for HTN. She has a PMH including HTN, CP, HLD, ETOH prev use. She is here for preop cardiovascular assessment, will be getting cardiac surgery tomorrow, he is able to perform more than 4 METS on a regular basis, he had a normal stress test.  She does not need any more work-up prior to surgery. Hypertension, well-controlled current medications, no side effects. Hyperlipidemia. She is not on a statin. Previously seen in the hospital for chest pain, no prior negative work-up, no recurrence of symptoms. PMH  Past Medical History:   Diagnosis Date    Alcohol abuse, continuous 2019    Alcohol withdrawal delirium ([facility] 2019    Alcoholism ([facility]     Anxiety     Ataxia     Depression     Hyperlipidemia     Hypertension        PSH  Past Surgical History:   Procedure Laterality Date    BACK SURGERY       L5 disectomy    COLONOSCOPY      2016    ENDOSCOPY, COLON, DIAGNOSTIC      2016        Social HIstory  Social History     Tobacco Use    Smoking status: Former Smoker     Types: Cigarettes     Last attempt to quit: 1987     Years since quittin.3    Smokeless tobacco: Never Used   Substance Use Topics    Alcohol use:  Yes     Alcohol/week: 56.0 standard drinks     Types: 56 Glasses of wine per week    Drug use: No       Family History  Family History   Problem Relation Age of Onset    Stroke Mother     Substance Abuse Mother     Substance Abuse Father     Heart Disease Father     Heart Disease Brother     Substance Abuse Brother     Heart Disease Paternal Aunt     Heart Disease Paternal Uncle     Heart Disease Maternal Grandmother     Diabetes Maternal Grandmother     Diabetes Maternal Grandfather     Heart Disease Maternal Grandfather  Other Paternal Grandmother     Other Paternal Grandfather        Allergies   Allergies   Allergen Reactions    Penicillins Anaphylaxis, Hives, Rash and Swelling    Sulfa Antibiotics        Medications:     Home Medications:  Were reviewed and are listed in nursing record. and/or listed below    Prior to Admission medications    Medication Sig Start Date End Date Taking?  Authorizing Provider   amitriptyline (ELAVIL) 50 MG tablet Take 1 tablet by mouth nightly 11/11/20  Yes Romana Bobby Simpers, APRN - CNP   busPIRone (BUSPAR) 10 MG tablet Take 1 tablet by mouth 2 times daily 11/11/20  Yes Romana Bobby Simpers, APRN - CNP   cloNIDine (CATAPRES) 0.1 MG tablet Take 1 tablet by mouth 2 times daily 11/11/20  Yes Romana Bobby Simpers, APRN - CNP   carvedilol (COREG) 6.25 MG tablet Take 1 tablet by mouth 2 times daily (with meals) 11/11/20  Yes Romana Bobby Simpers, APRN - CNP   NIFEdipine (PROCARDIA XL) 30 MG extended release tablet Take 1 tablet by mouth daily 11/11/20  Yes Romana Bobby Simpers, APRN - CNP   hydrOXYzine (VISTARIL) 25 MG capsule Take 1 cap TID PRN for anxiety 11/11/20  Yes Romana Bobby Simpers, APRN - CNP   Multiple Vitamins-Minerals (THERAPEUTIC MULTIVITAMIN-MINERALS) tablet Take 1 tablet by mouth Daily with supper 5/15/20  Yes Stefani Vinson MD   Mouthwashes Fredrick Mateo) LIQD oral solution Swish and spit 15 mLs 3 times daily   Yes Historical Provider, MD   vitamin B-1 (THIAMINE) 100 MG tablet Take 100 mg by mouth 2 times daily   Yes Historical Provider, MD   vitamin C (ASCORBIC ACID) 500 MG tablet Take 500 mg by mouth daily   Yes Historical Provider, MD   Cholecalciferol (VITAMIN D3) 25 MCG (1000 UT) CAPS Take 1 capsule by mouth daily   Yes Historical Provider, MD   ondansetron (ZOFRAN) 4 MG tablet Take 1 tablet by mouth every 8 hours as needed for Nausea or Vomiting  Patient taking differently: Take 4 mg by mouth every 6 hours as needed for Nausea or Vomiting  10/14/19  Yes Sudeep Hernandez MD   folic acid (FOLVITE) 1 MG tablet Take diaphoretic. HENT:      Head: Normocephalic and atraumatic. Eyes:      Pupils: Pupils are equal, round, and reactive to light. Neck:      Musculoskeletal: Normal range of motion. Thyroid: No thyromegaly. Vascular: No JVD. Cardiovascular:      Rate and Rhythm: Normal rate and regular rhythm. Chest Wall: PMI is not displaced. Heart sounds: Normal heart sounds, S1 normal and S2 normal. No murmur. No friction rub. No gallop. Pulmonary:      Effort: Pulmonary effort is normal. No respiratory distress. Breath sounds: Normal breath sounds. No stridor. No wheezing or rales. Chest:      Chest wall: No tenderness. Abdominal:      General: Bowel sounds are normal. There is no distension. Palpations: Abdomen is soft. Tenderness: There is no abdominal tenderness. There is no guarding or rebound. Musculoskeletal: Normal range of motion. General: No tenderness. Lymphadenopathy:      Cervical: No cervical adenopathy. Skin:     General: Skin is warm and dry. Findings: No erythema or rash. Neurological:      Mental Status: She is alert and oriented to person, place, and time. Coordination: Coordination normal.   Psychiatric:         Behavior: Behavior normal.         Thought Content:  Thought content normal.         Judgment: Judgment normal.         Labs:       Lab Results   Component Value Date    WBC 6.9 11/11/2020    HGB 13.9 11/11/2020    HCT 41.2 11/11/2020    MCV 92.3 11/11/2020     11/11/2020     Lab Results   Component Value Date     11/11/2020    K 4.9 11/11/2020     11/11/2020    CO2 25 11/11/2020    BUN 13 11/11/2020    CREATININE 0.8 11/11/2020    GLUCOSE 113 (H) 11/11/2020    CALCIUM 10.5 11/11/2020    PROT 8.0 11/11/2020    LABALBU 4.9 11/11/2020    BILITOT 0.3 11/11/2020    ALKPHOS 67 11/11/2020    AST 40 (H) 11/11/2020    ALT 35 11/11/2020    LABGLOM >60 11/11/2020    GFRAA >60 11/11/2020    AGRATIO 1.6 11/11/2020    GLOB 3.1 11/11/2020         Lab Results   Component Value Date    CHOL 250 (H) 03/28/2015    CHOL 261 (H) 07/03/2014     Lab Results   Component Value Date    TRIG 57 03/28/2015    TRIG 78 07/03/2014     Lab Results   Component Value Date     (H) 03/28/2015     07/03/2014     Lab Results   Component Value Date    LDLCALC 93 03/28/2015    LDLCALC 130 (H) 07/03/2014     Lab Results   Component Value Date    LABVLDL 11 03/28/2015    LABVLDL 16 07/03/2014     No results found for: [facility]    Lab Results   Component Value Date    INR 0.98 09/27/2019    PROTIME 11.2 09/27/2019       The ASCVD Risk score (Devon Rome, et al., 2013) failed to calculate for the following reasons:    Cannot find a previous HDL lab    Cannot find a previous total cholesterol lab      Imaging:       Last ECG (if available):    Last Monitor/Holter    Last Stress (if available): 10/17/20  Summary     Normal myocardial perfusion study.     Normal LV size and systolic function. Last Cath (if available):    Last TTE/HEATHER(if available): 10/17/20  Summary   -Normal left ventricle size, wall thickness and systolic function with an   estimated ejection fraction of 55%.   -No regional wall motion abnormalities.   -Normal diastolic function. -Mild mitral regurgitation.   -Mild to moderate tricuspid regurgitation. RVSP = 27 mmHg. Last CMR  (if available):      Assessment / Plan:     Preop cardiovascular exam  Acceptable risk for surgery. No further testing prior to surgery. Continue current medications. Essential hypertension  Well-controlled on current regimen, taking medications as instructed. No side effects. Chest pain  No recurrence, had a normal stress test.      I had the opportunity to review the clinical symptoms and presentation of Liss Ling. Patient's allergies and medications were reviewed and updated. Patient's past medical, surgical, social and family history were reviewed and updated.    Patient's testing including laboratory, ECGs, monitor, imaging (TTE,HEATHER,CMR,cath) were reviewed. Tobacco use was discussed with the patient and educated on the negative effects. I have asked the patient to not utilize these agents. All questions and concerns were addressed to the patient/family. Alternatives to my treatment were discussed. The note was completed using EMR. Every effort wasmade to ensure accuracy; however, inadvertent computerized transcription errors may be present. Thank you for allowing me to participate in thecare or [address]  Kenzie Laboy MD, [facility], [facility]

## 2020-12-16 PROBLEM — Z01.810 PREOP CARDIOVASCULAR EXAM: Status: RESOLVED | Noted: 2020-11-16 | Resolved: 2020-12-16

## 2020-12-21 ENCOUNTER — TELEPHONE (OUTPATIENT)
Dept: PRIMARY CARE CLINIC | Age: 65
End: 2020-12-21

## 2020-12-21 NOTE — TELEPHONE ENCOUNTER
Pt is requesting rx below:    furosemide (LASIX) 20 MG tablet [[id number]]      [facility], [address] [place], [place] [address]ate [facility] -  [phone]

## 2020-12-31 ENCOUNTER — NURSE TRIAGE (OUTPATIENT)
Dept: OTHER | Facility: CLINIC | Age: 65
End: 2020-12-31

## 2020-12-31 NOTE — TELEPHONE ENCOUNTER
avoid going in detox. Triage indicates for patient to see PCP w/i 24 hours. However pt is refusing this, states she has seen pcp and they are aware of her problem. States that she is looking for treatment options. Info provided on [address] for Addiction Treatment. Care advice provided, patient verbalizes understanding; denies any other questions or concerns; instructed to call back for any new or worsening symptoms. Attention Provider: Thank you for allowing me to participate in the care of your patient. The patient was connected to triage in response to information provided to the ECC. Please do not respond through this encounter as the response is not directed to a shared pool.

## 2021-01-06 RX ORDER — AMITRIPTYLINE HYDROCHLORIDE 50 MG/1
TABLET, FILM COATED ORAL
Qty: 30 TABLET | Refills: 0 | Status: SHIPPED
Start: 2021-01-06 | End: 2021-01-26 | Stop reason: CLARIF

## 2021-01-06 NOTE — TELEPHONE ENCOUNTER
Called patient. She refused to make an appt. She wanted a rx for Ativan not Elavil. She stated that Romana could not help her. Consider this note complete.

## 2021-01-06 NOTE — TELEPHONE ENCOUNTER
Medication:   Requested Prescriptions     Pending Prescriptions Disp Refills    amitriptyline (ELAVIL) 50 MG tablet [Pharmacy Med Name: AMITRIPTYLINE HCL 50 MG TAB] 30 tablet 0     Sig: TAKE ONE TABLET BY MOUTH ONCE NIGHTLY     Last Filled:  11/11/20    Last appt:  11/11/20  Next appt: Visit date not found    Last OARRS: No flowsheet data found.

## 2021-01-26 ENCOUNTER — OFFICE VISIT (OUTPATIENT)
Dept: PRIMARY CARE CLINIC | Age: 66
End: 2021-01-26
Payer: MEDICARE

## 2021-01-26 VITALS
HEART RATE: 78 BPM | TEMPERATURE: 96.8 F | HEIGHT: 64 IN | OXYGEN SATURATION: 98 % | WEIGHT: 170 LBS | SYSTOLIC BLOOD PRESSURE: 120 MMHG | BODY MASS INDEX: 29.02 KG/M2 | DIASTOLIC BLOOD PRESSURE: 80 MMHG

## 2021-01-26 DIAGNOSIS — E78.2 MIXED HYPERLIPIDEMIA: ICD-10-CM

## 2021-01-26 DIAGNOSIS — K70.10 ALCOHOLIC HEPATITIS, UNSPECIFIED WHETHER ASCITES PRESENT: ICD-10-CM

## 2021-01-26 DIAGNOSIS — F10.230 ALCOHOL DEPENDENCE WITH UNCOMPLICATED WITHDRAWAL (HCC): Primary | ICD-10-CM

## 2021-01-26 DIAGNOSIS — I10 ESSENTIAL HYPERTENSION: ICD-10-CM

## 2021-01-26 DIAGNOSIS — F33.41 RECURRENT MAJOR DEPRESSIVE DISORDER, IN PARTIAL REMISSION (HCC): ICD-10-CM

## 2021-01-26 DIAGNOSIS — R60.9 EDEMA, UNSPECIFIED TYPE: ICD-10-CM

## 2021-01-26 PROCEDURE — 99214 OFFICE O/P EST MOD 30 MIN: CPT | Performed by: NURSE PRACTITIONER

## 2021-01-26 RX ORDER — POTASSIUM CHLORIDE 750 MG/1
10 TABLET, EXTENDED RELEASE ORAL DAILY
Qty: 30 TABLET | Refills: 2 | Status: SHIPPED | OUTPATIENT
Start: 2021-01-26 | End: 2021-04-23

## 2021-01-26 RX ORDER — GABAPENTIN 100 MG/1
100 CAPSULE ORAL 2 TIMES DAILY
Qty: 60 CAPSULE | Refills: 2 | Status: SHIPPED | OUTPATIENT
Start: 2021-01-26 | End: 2021-06-02

## 2021-01-26 RX ORDER — NIFEDIPINE 60 MG/1
60 TABLET, EXTENDED RELEASE ORAL DAILY
Qty: 30 TABLET | Refills: 1 | Status: SHIPPED | OUTPATIENT
Start: 2021-01-26 | End: 2021-03-24

## 2021-01-26 RX ORDER — FUROSEMIDE 20 MG/1
20 TABLET ORAL DAILY
Qty: 30 TABLET | Refills: 2 | Status: SHIPPED | OUTPATIENT
Start: 2021-01-26 | Stop reason: SDUPTHER

## 2021-01-26 RX ORDER — LORAZEPAM 1 MG/1
TABLET ORAL
Qty: 20 TABLET | Refills: 0 | Status: SHIPPED | OUTPATIENT
Start: 2021-01-26 | End: 2021-03-23 | Stop reason: SDUPTHER

## 2021-01-26 ASSESSMENT — ENCOUNTER SYMPTOMS
NAUSEA: 0
EYE ITCHING: 0
VOMITING: 0
DIARRHEA: 0
TROUBLE SWALLOWING: 0
VOICE CHANGE: 0
WHEEZING: 0
CHEST TIGHTNESS: 1
SHORTNESS OF BREATH: 0
BACK PAIN: 0
ABDOMINAL DISTENTION: 0
EYE DISCHARGE: 0
COLOR CHANGE: 0
COUGH: 0
CONSTIPATION: 0
EYE PAIN: 0
ABDOMINAL PAIN: 0
BLOOD IN STOOL: 0
SORE THROAT: 0

## 2021-01-26 ASSESSMENT — PATIENT HEALTH QUESTIONNAIRE - PHQ9
SUM OF ALL RESPONSES TO PHQ9 QUESTIONS 1 & 2: 0
SUM OF ALL RESPONSES TO PHQ QUESTIONS 1-9: 0
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
## 2021-01-26 NOTE — PATIENT INSTRUCTIONS
Increase nifedipine to 60mg daily for BP    Start on lasix and potassium daily. Check labs in 2 wks. Will need to be completed fasting. Start on gabapentin daily.  rx sent for ativan to take as needed for withdrawal.     Encourage to establish with a program

## 2021-02-04 ENCOUNTER — TELEPHONE (OUTPATIENT)
Dept: PRIMARY CARE CLINIC | Age: 66
End: 2021-02-04

## 2021-02-04 DIAGNOSIS — E55.9 VITAMIN D DEFICIENCY: Primary | ICD-10-CM

## 2021-02-04 NOTE — TELEPHONE ENCOUNTER
----- Message from Florin Sullivan sent at 2/4/2021 10:08 AM EST -----  Subject: Referral Request    QUESTIONS   Reason for referral request? add a vitamin D lab order   Has the physician seen you for this condition before? No   Preferred Specialist (if applicable)? Do you already have an appointment scheduled? Yes  Select Scheduled Date? 2021-02-11  Select Scheduled Physician? Melba Garza   Additional Information for Provider? patient would like to add a vitamin D   lab test to her blood orders and have she is having her blood orders taken   on Monday 2/8 and has apt set for 2/11. PT wanted Dr to know that her   categoric surgery was cancelled. ---------------------------------------------------------------------------  --------------  Iqra AHMADI  What is the best way for the office to contact you? OK to leave message on   voicemail  Preferred Call Back Phone Number?  [phone]

## 2021-02-05 NOTE — TELEPHONE ENCOUNTER
Called patient [phone]. Talked with patient.    Patient exhibited clear understanding of Romana's message below

## 2021-02-08 DIAGNOSIS — E55.9 VITAMIN D DEFICIENCY: ICD-10-CM

## 2021-02-08 DIAGNOSIS — K70.10 ALCOHOLIC HEPATITIS, UNSPECIFIED WHETHER ASCITES PRESENT: ICD-10-CM

## 2021-02-08 DIAGNOSIS — I10 ESSENTIAL HYPERTENSION: ICD-10-CM

## 2021-02-08 DIAGNOSIS — E78.2 MIXED HYPERLIPIDEMIA: ICD-10-CM

## 2021-02-08 DIAGNOSIS — F10.230 ALCOHOL DEPENDENCE WITH UNCOMPLICATED WITHDRAWAL (HCC): ICD-10-CM

## 2021-02-08 DIAGNOSIS — R60.9 EDEMA, UNSPECIFIED TYPE: ICD-10-CM

## 2021-02-08 LAB
ALBUMIN SERPL-MCNC: 4.4 G/DL (ref 3.4–5)
ALP BLD-CCNC: 57 U/L (ref 40–129)
ALT SERPL-CCNC: 26 U/L (ref 10–40)
ANION GAP SERPL CALCULATED.3IONS-SCNC: 11 MMOL/L (ref 3–16)
AST SERPL-CCNC: 33 U/L (ref 15–37)
BILIRUB SERPL-MCNC: 0.5 MG/DL (ref 0–1)
BILIRUBIN DIRECT: <0.2 MG/DL (ref 0–0.3)
BILIRUBIN, INDIRECT: NORMAL MG/DL (ref 0–1)
BUN BLDV-MCNC: 9 MG/DL (ref 7–20)
CALCIUM SERPL-MCNC: 9.7 MG/DL (ref 8.3–10.6)
CHLORIDE BLD-SCNC: 102 MMOL/L (ref 99–110)
CHOLESTEROL, TOTAL: 181 MG/DL (ref 0–199)
CO2: 25 MMOL/L (ref 21–32)
CREAT SERPL-MCNC: 0.7 MG/DL (ref 0.6–1.2)
FOLATE: >20 NG/ML (ref 4.78–24.2)
GFR AFRICAN AMERICAN: >60
GFR NON-AFRICAN AMERICAN: >60
GLUCOSE BLD-MCNC: 102 MG/DL (ref 70–99)
HDLC SERPL-MCNC: 60 MG/DL (ref 40–60)
LDL CHOLESTEROL CALCULATED: 105 MG/DL
POTASSIUM SERPL-SCNC: 4.3 MMOL/L (ref 3.5–5.1)
SODIUM BLD-SCNC: 138 MMOL/L (ref 136–145)
TOTAL PROTEIN: 7.3 G/DL (ref 6.4–8.2)
TRIGL SERPL-MCNC: 80 MG/DL (ref 0–150)
TSH SERPL DL<=0.05 MIU/L-ACNC: 0.7 UIU/ML (ref 0.27–4.2)
VITAMIN B-12: 770 PG/ML (ref 211–911)
VITAMIN D 25-HYDROXY: 48.6 NG/ML
VLDLC SERPL CALC-MCNC: 16 MG/DL

## 2021-02-13 LAB — VITAMIN B1, PLASMA: 28 NMOL/L (ref 4–15)

## 2021-02-25 ENCOUNTER — OFFICE VISIT (OUTPATIENT)
Dept: PRIMARY CARE CLINIC | Age: 66
End: 2021-02-25
Payer: MEDICARE

## 2021-02-25 VITALS
SYSTOLIC BLOOD PRESSURE: 139 MMHG | DIASTOLIC BLOOD PRESSURE: 77 MMHG | WEIGHT: 165.8 LBS | BODY MASS INDEX: 28.46 KG/M2 | HEART RATE: 76 BPM | OXYGEN SATURATION: 98 %

## 2021-02-25 DIAGNOSIS — H26.9 CATARACT OF BOTH EYES, UNSPECIFIED CATARACT TYPE: ICD-10-CM

## 2021-02-25 DIAGNOSIS — R60.9 EDEMA, UNSPECIFIED TYPE: ICD-10-CM

## 2021-02-25 DIAGNOSIS — I10 ESSENTIAL HYPERTENSION: ICD-10-CM

## 2021-02-25 DIAGNOSIS — Z01.818 PRE-OP EVALUATION: Primary | ICD-10-CM

## 2021-02-25 PROCEDURE — G8427 DOCREV CUR MEDS BY ELIG CLIN: HCPCS | Performed by: NURSE PRACTITIONER

## 2021-02-25 PROCEDURE — 1090F PRES/ABSN URINE INCON ASSESS: CPT | Performed by: NURSE PRACTITIONER

## 2021-02-25 PROCEDURE — 99213 OFFICE O/P EST LOW 20 MIN: CPT | Performed by: NURSE PRACTITIONER

## 2021-02-25 PROCEDURE — G8417 CALC BMI ABV UP PARAM F/U: HCPCS | Performed by: NURSE PRACTITIONER

## 2021-02-25 PROCEDURE — G8484 FLU IMMUNIZE NO ADMIN: HCPCS | Performed by: NURSE PRACTITIONER

## 2021-02-25 ASSESSMENT — ENCOUNTER SYMPTOMS
NAUSEA: 0
DIARRHEA: 0
CONSTIPATION: 0
TROUBLE SWALLOWING: 0
BACK PAIN: 0
EYE ITCHING: 0
EYE PAIN: 0
EYE DISCHARGE: 0
VOICE CHANGE: 0
SORE THROAT: 0
SHORTNESS OF BREATH: 0
COLOR CHANGE: 0
CHEST TIGHTNESS: 1
VOMITING: 0
BLOOD IN STOOL: 0
COUGH: 0
WHEEZING: 0
ABDOMINAL PAIN: 0
ABDOMINAL DISTENTION: 0

## 2021-02-25 NOTE — PROGRESS NOTES
Preoperative Consultation    Name: Joe Bowers  YOB: 1955    This patient presents to the office today for a preoperative consultation at the request of surgeon, Tab Mackenzie, who [facility]. Planned anesthesia: General   Known anesthesia problems: None   Bleeding risk: No recent or remote history of abnormal bleeding  Personal or FH ofDVT/PE: No      CARDIO:  Saw Dr Aly Mac. Received cardiac clearance in November after full cardiac workup    HTN:  Much improved today. Increased nifedipine to 60mg  Stopped her night dose of clonidine due to low BP    ETOH:  Stopped gabapentin due to side effects. Hasn't had a drink since Jan.   Not taking ativan. Just has on hand for withdraw s/s if she needs it. EDEMA:  Restarted lasix and K. Has helped a lot.      Patient Active Problem List   Diagnosis    Essential hypertension    Alcohol withdrawal syndrome, uncomplicated (HCC)    Depression    ETOH abuse    Acute encephalopathy    Ataxia    Alcohol dependence with withdrawal ([address]    Alcoholic hepatitis    Nausea & vomiting    Chest pain    Family history of heart disease    Macrocytic anemia    Mixed hyperlipidemia    Cataract of both eyes    Recurrent major depressive disorder, in partial remission ([address]    Edema     Past Surgical History:   Procedure Laterality Date    BACK SURGERY      1996 L5 disectomy    COLONOSCOPY      2016    ENDOSCOPY, COLON, DIAGNOSTIC      2016     Allergies   Allergen Reactions    Penicillins Anaphylaxis, Hives, Rash and Swelling    Sulfa Antibiotics      Outpatient Medications Marked as Taking for the 2/25/21 encounter (Office Visit) with NAZ Corbett CNP   Medication Sig Dispense Refill    cloNIDine (CATAPRES) 0.1 MG tablet TAKE ONE TABLET BY MOUTH TWICE A DAY (Patient taking differently: Take 0.1 mg by mouth daily ) 60 tablet 2    furosemide (LASIX) 20 MG tablet Take 1 tablet by mouth daily 30 tablet 2    potassium chloride (KLOR-CON M) 10 MEQ extended release tablet Take 1 tablet by mouth daily 30 tablet 2    NIFEdipine (PROCARDIA XL) 60 MG extended release tablet Take 1 tablet by mouth daily 30 tablet 1    busPIRone (BUSPAR) 10 MG tablet Take 1 tablet by mouth 2 times daily 60 tablet 1    hydrOXYzine (VISTARIL) 25 MG capsule Take 1 cap TID PRN for anxiety 90 capsule 1    Multiple Vitamins-Minerals (THERAPEUTIC MULTIVITAMIN-MINERALS) tablet Take 1 tablet by mouth Daily with supper 30 tablet 0    Mouthwashes (BIOTENE) LIQD oral solution Swish and spit 15 mLs 3 times daily      vitamin B-1 (THIAMINE) 100 MG tablet Take 100 mg by mouth 2 times daily      vitamin C (ASCORBIC ACID) 500 MG tablet Take 500 mg by mouth daily      Cholecalciferol (VITAMIN D3) 25 MCG (1000 UT) CAPS Take 1 capsule by mouth daily      folic acid (FOLVITE) 1 MG tablet Take 1 tablet by mouth daily 30 tablet 0       Social History     Tobacco Use    Smoking status: Former Smoker     Packs/day: 0.50     Years: 10.00     Pack years: 5.00     Types: Cigarettes     Quit date: 1987     Years since quittin.6    Smokeless tobacco: Never Used   Substance Use Topics    Alcohol use: Yes     Alcohol/week: 56.0 standard drinks     Types: 56 Glasses of wine per week     Family History   Problem Relation Age of Onset    Stroke Mother     Substance Abuse Mother     Substance Abuse Father     Heart Disease Father     Heart Disease Brother     Substance Abuse Brother     Heart Disease Paternal Aunt     Heart Disease Paternal Uncle     Heart Disease Maternal Grandmother     Diabetes Maternal Grandmother     Diabetes Maternal Grandfather     Heart Disease Maternal Grandfather     Other Paternal Grandmother     Other Paternal Grandfather      Review of Systems  Review of Systems   Constitutional: Negative for activity change, appetite change, chills, fatigue and unexpected weight change.    HENT: Negative for congestion, ear pain, hearing loss, nosebleeds, postnasal drip, sneezing, sore throat, trouble swallowing and voice change. Eyes: Negative for pain, discharge and itching. Respiratory: Positive for chest tightness (occ). Negative for cough, shortness of breath and wheezing. Cardiovascular: Negative for chest pain, palpitations and leg swelling. Gastrointestinal: Negative for abdominal distention, abdominal pain, blood in stool, constipation (takes daily colace and fiber), diarrhea, nausea and vomiting. Endocrine: Negative for cold intolerance, heat intolerance, polydipsia, polyphagia and polyuria. Genitourinary: Negative for difficulty urinating, dysuria and pelvic pain. Musculoskeletal: Negative for arthralgias, back pain and neck pain. Skin: Negative for color change, pallor and rash. Neurological: Negative for dizziness, tremors, seizures, weakness, numbness and headaches. Hematological: Does not bruise/bleed easily. Psychiatric/Behavioral: Positive for sleep disturbance. Negative for agitation, dysphoric mood, self-injury and suicidal ideas. The patient is nervous/anxious.          +alcoholism with recent relapse      RECENT LABS:  Lab Results   Component Value Date     02/08/2021    K 4.3 02/08/2021    K 4.2 10/14/2020     02/08/2021    CO2 25 02/08/2021    BUN 9 02/08/2021    CREATININE 0.7 02/08/2021    GLUCOSE 102 02/08/2021    CALCIUM 9.7 02/08/2021      Lab Results   Component Value Date    ALKPHOS 57 02/08/2021    ALT 26 02/08/2021    AST 33 02/08/2021    PROT 7.3 02/08/2021    BILITOT 0.5 02/08/2021    BILIDIR <0.2 02/08/2021    LABALBU 4.4 02/08/2021     Lab Results   Component Value Date    WBC 6.9 11/11/2020    HGB 13.9 11/11/2020    HCT 41.2 11/11/2020    MCV 92.3 11/11/2020     11/11/2020     HBA1C: No results found for: LABA1C, EAG    OBJECTIVE:   /77 (Site: Left Upper Arm, Position: Sitting, Cuff Size: Medium Adult)   Pulse 76   Wt 165 lb 12.8 oz (75.2 kg)   SpO2 98%   BMI 28.46 kg/m²  Weight: 165 lb 12.8 oz (75.2 kg)   Wt Readings from Last 3 Encounters:   02/25/21 165 lb 12.8 oz (75.2 kg)   01/26/21 170 lb (77.1 kg)   11/16/20 169 lb 3.2 oz (76.7 kg)     Physical Exam  Vitals signs and nursing note reviewed. Constitutional:       General: She is not in acute distress. Appearance: Normal appearance. She is well-developed and normal weight. She is not diaphoretic. HENT:      Head: Normocephalic and atraumatic. Eyes:      Pupils: Pupils are equal, round, and reactive to light. Neck:      Musculoskeletal: Normal range of motion. Normal range of motion. No neck rigidity. Thyroid: No thyroid mass, thyromegaly or thyroid tenderness. Vascular: No carotid bruit. Cardiovascular:      Rate and Rhythm: Normal rate and regular rhythm. Heart sounds: Normal heart sounds. No murmur. No friction rub. Pulmonary:      Effort: Pulmonary effort is normal. No respiratory distress. Breath sounds: Normal breath sounds. No wheezing, rhonchi or rales. Abdominal:      General: Abdomen is flat. There is no distension (no further ascites). Palpations: Abdomen is soft. There is no mass. Tenderness: There is no abdominal tenderness. There is no guarding or rebound. Hernia: No hernia is present. Musculoskeletal:         General: No tenderness or deformity. Right lower leg: No edema. Left lower leg: No edema. Lymphadenopathy:      Cervical: No cervical adenopathy. Skin:     General: Skin is warm and dry. Coloration: Skin is not pale. Findings: No erythema or rash. Neurological:      General: No focal deficit present. Mental Status: She is alert and oriented to person, place, and time. Motor: No weakness. Gait: Gait normal.   Psychiatric:         Mood and Affect: Mood normal.         Behavior: Behavior normal.         Thought Content:  Thought content normal.         Judgment: Judgment normal.      Comments: anxious     Lab Review: Yes    ASSESSMENT:      1. Pre-op evaluation  Ok to proceed with upcoming cataract removal as scheduled. Will fax recent lab results, cardiac clearance letter  Will complete H&P form and fax to [address] [place] [address]    2. Cataract of both eyes, unspecified cataract type  Continue per Dr Jannette Zuleta    3. Essential hypertension  Improved. Now stable on current regimen. 4. Edema, unspecified type  Improved. Lab stable. 76 y.o. patient approved for Surgery      PLAN:    1. Preoperative workup as follows: previous cardiac clearance obtained. Labs from 2/8/21. 2. Change in medication regimen before surgery:Hold all medications on morning of surgery  3.  No contraindications to planned surgery    Electronically signed by NAZ Mohan CNP on 2/25/2021 at 11:36 AM

## 2021-03-23 DIAGNOSIS — F10.230 ALCOHOL DEPENDENCE WITH UNCOMPLICATED WITHDRAWAL (HCC): ICD-10-CM

## 2021-03-23 RX ORDER — LORAZEPAM 1 MG/1
TABLET ORAL
Qty: 20 TABLET | Refills: 0 | Status: SHIPPED | OUTPATIENT
Start: 2021-03-23 | End: 2021-04-07 | Stop reason: SDUPTHER

## 2021-03-23 NOTE — TELEPHONE ENCOUNTER
Medication:   Requested Prescriptions     Pending Prescriptions Disp Refills    LORazepam (ATIVAN) 1 MG tablet 20 tablet 0     Sig: Take 1 mg every 4 hours on day 1, 1 mg every 6 hours on day 2, 0.5 mg every 4 hours on days 3 and 4, 0.5 mg every 6 hours on days 5 and 6     Last Filled:  1.26.21    Last appt: 2/25/2021   Next appt: Visit date not found    Last OARRS: No flowsheet data found.

## 2021-03-23 NOTE — TELEPHONE ENCOUNTER
Pt requesting refill for the following:    Medication:LORazepam (ATIVAN) 0.5 MG tablet [[id number]]  ENDED        Pharmacy Contact:  Coni Esposito [facility], [address] [facility] [phone]   [place] , [address] [address]   Phone:  [phone]  Fax:  [phone]

## 2021-03-23 NOTE — TELEPHONE ENCOUNTER
rx sent. Never did receive request from [facility]. Patient is also due for AWV. See if she will schedule. Can be done virtually.

## 2021-03-23 NOTE — TELEPHONE ENCOUNTER
----- Message from Yasmani Marsh sent at 3/22/2021  7:08 PM EDT -----  Subject: Message to Provider    QUESTIONS  Information for Provider? Pt stated she needed Ativan refilled for her   detox. Pt stated that Sophia sent refill request to [facility] morning   of 03/22. Pt called [address] [place] [address] and they did not have her script to   fill. Pt was sent to after hours number. ---------------------------------------------------------------------------  --------------  [employer] INFO  What is the best way for the office to contact you? OK to leave message on   voicemail  Preferred Call Back Phone Number? [phone]  ---------------------------------------------------------------------------  --------------  SCRIPT ANSWERS  Relationship to Patient?  Self

## 2021-04-07 DIAGNOSIS — F10.230 ALCOHOL DEPENDENCE WITH UNCOMPLICATED WITHDRAWAL (HCC): ICD-10-CM

## 2021-04-07 RX ORDER — LORAZEPAM 1 MG/1
TABLET ORAL
Qty: 20 TABLET | Refills: 0 | Status: SHIPPED | OUTPATIENT
Start: 2021-04-07 | End: 2021-04-14 | Stop reason: SDUPTHER

## 2021-04-07 NOTE — TELEPHONE ENCOUNTER
Pt requesting refill for the following:    Medication:  LORazepam (ATIVAN) 1 MG tablet [[id number]]         Pharmacy Contact:    [facility], [address] [place] [address]   Phone:  [phone]  Fax:  [phone]    She also scheduled a follow up for the medication for 4/21

## 2021-04-08 ENCOUNTER — OFFICE VISIT (OUTPATIENT)
Dept: PRIMARY CARE CLINIC | Age: 66
End: 2021-04-08
Payer: MEDICARE

## 2021-04-08 VITALS
DIASTOLIC BLOOD PRESSURE: 80 MMHG | OXYGEN SATURATION: 98 % | HEART RATE: 72 BPM | BODY MASS INDEX: 27.35 KG/M2 | HEIGHT: 64 IN | WEIGHT: 160.2 LBS | SYSTOLIC BLOOD PRESSURE: 120 MMHG | TEMPERATURE: 98.1 F

## 2021-04-08 DIAGNOSIS — Z00.00 ROUTINE GENERAL MEDICAL EXAMINATION AT A HEALTH CARE FACILITY: Primary | ICD-10-CM

## 2021-04-08 DIAGNOSIS — Z71.89 ACP (ADVANCE CARE PLANNING): ICD-10-CM

## 2021-04-08 DIAGNOSIS — F13.182: ICD-10-CM

## 2021-04-08 DIAGNOSIS — I10 ESSENTIAL HYPERTENSION: ICD-10-CM

## 2021-04-08 DIAGNOSIS — F10.930 ALCOHOL WITHDRAWAL SYNDROME, UNCOMPLICATED (HCC): ICD-10-CM

## 2021-04-08 PROBLEM — F10.239 ALCOHOL DEPENDENCE WITH WITHDRAWAL (HCC): Status: RESOLVED | Noted: 2019-09-13 | Resolved: 2021-04-08

## 2021-04-08 PROBLEM — R07.9 CHEST PAIN: Status: RESOLVED | Noted: 2020-10-18 | Resolved: 2021-04-08

## 2021-04-08 PROCEDURE — 1090F PRES/ABSN URINE INCON ASSESS: CPT | Performed by: NURSE PRACTITIONER

## 2021-04-08 PROCEDURE — G0402 INITIAL PREVENTIVE EXAM: HCPCS | Performed by: NURSE PRACTITIONER

## 2021-04-08 PROCEDURE — 99214 OFFICE O/P EST MOD 30 MIN: CPT | Performed by: NURSE PRACTITIONER

## 2021-04-08 PROCEDURE — 3017F COLORECTAL CA SCREEN DOC REV: CPT | Performed by: NURSE PRACTITIONER

## 2021-04-08 PROCEDURE — 4040F PNEUMOC VAC/ADMIN/RCVD: CPT | Performed by: NURSE PRACTITIONER

## 2021-04-08 PROCEDURE — G8427 DOCREV CUR MEDS BY ELIG CLIN: HCPCS | Performed by: NURSE PRACTITIONER

## 2021-04-08 PROCEDURE — G8399 PT W/DXA RESULTS DOCUMENT: HCPCS | Performed by: NURSE PRACTITIONER

## 2021-04-08 PROCEDURE — G8417 CALC BMI ABV UP PARAM F/U: HCPCS | Performed by: NURSE PRACTITIONER

## 2021-04-08 PROCEDURE — 1036F TOBACCO NON-USER: CPT | Performed by: NURSE PRACTITIONER

## 2021-04-08 PROCEDURE — 1123F ACP DISCUSS/DSCN MKR DOCD: CPT | Performed by: NURSE PRACTITIONER

## 2021-04-08 ASSESSMENT — ENCOUNTER SYMPTOMS
ABDOMINAL PAIN: 0
CHEST TIGHTNESS: 1
CONSTIPATION: 0
BACK PAIN: 0
COUGH: 0
WHEEZING: 0
BLOOD IN STOOL: 0
SHORTNESS OF BREATH: 0
VOICE CHANGE: 0
COLOR CHANGE: 0
DIARRHEA: 0
SORE THROAT: 0
VOMITING: 0
EYE DISCHARGE: 0
ABDOMINAL DISTENTION: 0
NAUSEA: 0
EYE ITCHING: 0
EYE PAIN: 0
TROUBLE SWALLOWING: 0

## 2021-04-08 ASSESSMENT — PATIENT HEALTH QUESTIONNAIRE - PHQ9
1. LITTLE INTEREST OR PLEASURE IN DOING THINGS: 0
SUM OF ALL RESPONSES TO PHQ QUESTIONS 1-9: 0
2. FEELING DOWN, DEPRESSED OR HOPELESS: 0
2. FEELING DOWN, DEPRESSED OR HOPELESS: 0
SUM OF ALL RESPONSES TO PHQ QUESTIONS 1-9: 0

## 2021-04-08 NOTE — PATIENT INSTRUCTIONS
something else in your state. Here are some things to know about living [place]. · You don't need an  to complete a living will. But legal advice can be helpful if your state's laws are unclear. It can also help if your health history is complicated or your family can't agree on what should be in your living will. · You can change your living will at any time. Some people find that their wishes about end-of-life care change as their health changes. If you make big changes to your living will, complete a new form. · If you move to another state, make sure that your living will is legal in the state where you now live. In most cases, doctors will respect your wishes even if you have a form from a different state. · You might use a universal form that has been approved by many states. This kind of form can sometimes be filled out and stored online. Your digital copy will then be available wherever you have a connection to the internet. The doctors and nurses who need to treat you can find it right away. · Your state may offer an online registry. This is another place where you can store your living will online. · It's a good idea to get your living will notarized. This means using a person called a  to watch two people sign, or witness, your living will. What should you know when you create a living will? Here are some questions to ask yourself as you make your living will:  · Do you know enough about life support methods that might be used? If not, talk to your doctor so you know what might be done if you can't breathe on your own, your heart stops, or you can't swallow. · What things would you still want to be able to do after you receive life-support methods? Would you want to be able to walk? To speak? To eat on your own? To live without the help of machines? · Do you want certain Christian practices performed if you become very ill?   · If you have a choice, where do you want to be cared for? In your home? At a hospital or nursing home? · If you have a choice at the end of your life, where would you prefer to die? At home? In a hospital or nursing home? Somewhere else? · Would you prefer to be buried or cremated? · Do you want your organs to be donated after you die? What should you do with your living will? · Make sure that your family members and your health care agent have copies of your living will (also called a declaration). · Give your doctor a copy of your living will. Ask him or her to keep it as part of your medical record. If you have more than one doctor, make sure that each one has a copy. · Put a copy of your living will where it can be easily found. For example, some people may put a copy on their refrigerator door. If you are using a digital copy, be sure your doctor, family members, and health care agent know how to find and access it. Where can you learn more? Go to https://i[employer] Dein [employer]ernprogram[employer] im Le[employer]beacone[employer]. org and sign in to your [employer] account. Enter S010 in the My True Fit box to learn more about \"Learning About Living [place]. \"     If you do not have an account, please click on the \"Sign Up Now\" link. Current as of: July 17, 2020               Content Version: 12.8  © 3014-8270 Healthwise, Incorporated. Care instructions adapted under license by [facility] ([facility]). If you have questions about a medical condition or this instruction, always ask your healthcare professional. [place] [address] any warranty or liability for your use of this information. Personalized Preventive Plan for Glenora Blades - 4/8/2021  Medicare offers a range of preventive health benefits. Some of the tests and screenings are paid in full while other may be subject to a deductible, co-insurance, and/or copay.     Some of these benefits include a comprehensive review of your medical history including lifestyle, illnesses that may run in your family, and various assessments and screenings as appropriate. After reviewing your medical record and screening and assessments performed today your provider may have ordered immunizations, labs, imaging, and/or referrals for you. A list of these orders (if applicable) as well as your Preventive Care list are included within your After Visit Summary for your review. Other Preventive Recommendations:    · A preventive eye exam performed by an eye specialist is recommended every 1-2 years to screen for glaucoma; cataracts, macular degeneration, and other eye disorders. · A preventive dental visit is recommended every 6 months. · Try to get at least 150 minutes of exercise per week or 10,000 steps per day on a pedometer . · Order or download the FREE \"Exercise & Physical Activity: Your Everyday Guide\" from [employer]. Call 2-938.986.8702 or search [employer] online. · You need 2815-7180 mg of calcium and 4110-8153 IU of vitamin D per day. It is possible to meet your calcium requirement with diet alone, but a vitamin D supplement is usually necessary to meet this goal.  · When exposed to the sun, use a sunscreen that protects against both UVA and UVB radiation with an SPF of 30 or greater. Reapply every 2 to 3 hours or after sweating, drying off with a towel, or swimming. · Always wear a seat belt when traveling in a car. Always wear a helmet when riding a bicycle or motorcycle.

## 2021-04-08 NOTE — PROGRESS NOTES
Medicare Annual Wellness Visit  Name: Mando Jeffery Date: 2021   MRN: [id number] Sex: Female   Age: 76 y.o. Ethnicity: Non-/Non    : 1955 Race: Lorie Huizar is here for Medicare AWV and Insomnia    Screenings for behavioral, psychosocial and functional/safety risks, and cognitive dysfunction are all negative except as indicated below. These results, as well as other patient data from the [address] form, are documented in Flowsheets linked to this Encounter. HTN:  Stable on regimen of nifedipine 60mg  Only taking clonidine 0.1mg once daily. ETOH:  Stopped taking gabapentin due to side effects  This initially helped with her etoh cravings. She had a glass of wine and this sent her into detox. She has restarted gabapentin as of yesterday. She has not been drinking etoh. SLEEP:  States she has been taking [place] for years from a previous rx from her PCP. Feels like she has been going through detox from [place]. Noted she was excited to go to bed and take her ambien. She then started taking another 0.5mg in the middle of the night. She was taking up to 80mg in one night. She started decreasing her ambien intake about 3-4 days ago. Her last dose of ambien was yesterday. Picked up ativan yesterday afternoon and this has helped. Allergies   Allergen Reactions    Penicillins Anaphylaxis, Hives, Rash and Swelling    Sulfa Antibiotics          Prior to Visit Medications    Medication Sig Taking?  Authorizing Provider   LORazepam (ATIVAN) 1 MG tablet Take 1 mg every 4 hours on day 1, 1 mg every 6 hours on day 2, 0.5 mg every 4 hours on days 3 and 4, 0.5 mg every 6 hours on days 5 and 6 Yes NAZ Mahoney CNP   NIFEdipine (PROCARDIA XL) 60 MG extended release tablet TAKE ONE TABLET BY MOUTH DAILY Yes NAZ Martinez CNP   busPIRone (BUSPAR) 10 MG tablet TAKE ONE TABLET BY MOUTH TWICE A DAY Yes NAZ Martinez CNP cloNIDine (CATAPRES) 0.1 MG tablet TAKE ONE TABLET BY MOUTH TWICE A DAY  Patient taking differently: Take 0.1 mg by mouth daily  Yes NAZ Morgan CNP   gabapentin (NEURONTIN) 100 MG capsule Take 1 capsule by mouth 2 times daily for 180 days.  Intended supply: 30 days Yes NAZ Morgan - CNP   furosemide (LASIX) 20 MG tablet Take 1 tablet by mouth daily Yes NAZ Morgan CNP   potassium chloride (KLOR-CON M) 10 MEQ extended release tablet Take 1 tablet by mouth daily Yes NAZ Morgan - CNP   hydrOXYzine (VISTARIL) 25 MG capsule Take 1 cap TID PRN for anxiety Yes NAZ Morgan CNP   Multiple Vitamins-Minerals (THERAPEUTIC MULTIVITAMIN-MINERALS) tablet Take 1 tablet by mouth Daily with supper Yes Juni Randall MD   vitamin B-1 (THIAMINE) 100 MG tablet Take 100 mg by mouth 2 times daily Yes Historical Provider, MD   vitamin C (ASCORBIC ACID) 500 MG tablet Take 500 mg by mouth daily Yes Historical Provider, MD   Cholecalciferol (VITAMIN D3) 25 MCG (1000 UT) CAPS Take 1 capsule by mouth daily Yes Historical Provider, MD   ondansetron (ZOFRAN) 4 MG tablet Take 1 tablet by mouth every 8 hours as needed for Nausea or Vomiting Yes Johnathan Maria MD   folic acid (Perfecto Crazier) 1 MG tablet Take 1 tablet by mouth daily Yes Johnathan Maria MD         Past Medical History:   Diagnosis Date    Alcohol abuse, continuous 11/19/2019    Alcohol dependence with withdrawal ([facility] 9/13/2019    Alcohol withdrawal delirium ([facility] 6/1/2019    Alcoholism ([facility]     Anxiety     Ataxia     Depression     Hyperlipidemia     Hypertension        Past Surgical History:   Procedure Laterality Date    BACK SURGERY      1996 L5 disectomy    COLONOSCOPY      2016    ENDOSCOPY, COLON, DIAGNOSTIC      2016         Family History   Problem Relation Age of Onset    Stroke Mother     Substance Abuse Mother     Substance Abuse Father     Heart Disease Father     Heart Disease Brother     Substance Abuse Not on File      General Health Risk Interventions:  · No Living Will: Advance Care Planning addressed with patient today    Health Habits/Nutrition:  Health Habits/Nutrition  Do you exercise for at least 20 minutes 2-3 times per week?: Yes  Have you lost any weight without trying in the past 3 months?: No  Do you eat only one meal per day?: No  Have you seen the dentist within the past year?: (!) No  Body mass index: (!) 27.5  Health Habits/Nutrition Interventions:  · Inadequate physical activity:  patient is not ready to increase his/her physical activity level at this time    Hearing/Vision:  No exam data present  Hearing/Vision  Do you or your family notice any trouble with your hearing that hasn't been managed with hearing aids?: (!) Yes  Do you have difficulty driving, watching TV, or doing any of your daily activities because of your eyesight?: No  Have you had an eye exam within the past year?: Yes  Hearing/Vision Interventions:  · Hearing concerns:  patient declines any further evaluation/treatment for hearing issues    Safety:  Safety  Do you have working smoke detectors?: (!) No  Have all throw rugs been removed or fastened?: (!) No  Do you have non-slip mats or surfaces in all bathtubs/showers?: (!) No  Do all of your stairways have a railing or banister?: (!) No  Are your doorways, halls and stairs free of clutter?: (!) No  Do you always fasten your seatbelt when you are in a car?: (!) No  Safety Interventions:  · Home safety tips provided     Personalized Preventive Plan   Current Health Maintenance Status  Immunization History   Administered Date(s) Administered    Zoster Live (Zostavax) 09/27/2014        Health Maintenance   Topic Date Due    COVID-19 Vaccine (1) Never done    DTaP/Tdap/Td vaccine (1 - Tdap) Never done    Diabetes screen  Never done    Shingles Vaccine (2 of 3) 11/22/2014    Annual Wellness Visit (AWV)  Never done    Pneumococcal 65+ years Vaccine (1 of 1 - PPSV23) 01/10/2022 (Originally 9/27/2019)    Flu vaccine (Season Ended) 01/26/2022 (Originally 9/1/2021)    Potassium monitoring  02/08/2022    Creatinine monitoring  02/08/2022    Breast cancer screen  09/23/2022    Lipid screen  02/08/2026    Colon cancer screen colonoscopy  03/03/2026    DEXA (modify frequency per FRAX score)  Completed    Hepatitis C screen  Completed    Hepatitis A vaccine  Aged Out    Hepatitis B vaccine  Aged Out    Hib vaccine  Aged Out    Meningococcal (ACWY) vaccine  Aged Out     Recommendations for [employer] Due: see orders and patient instructions/AVS.  . Recommended screening schedule for the next 5-10 years is provided to the patient in written form: see Patient Leda Araya was seen today for medicare awv and insomnia.     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

## 2021-04-08 NOTE — PROGRESS NOTES
ENCOUNTER DATE: 4/8/2021     NAME: Nhung Foreman   AGE: 76 y.o. GENDER: female   YOB: 1955    Patient Active Problem List   Diagnosis    Essential hypertension    Alcohol withdrawal syndrome, uncomplicated ([facility] [address] [facility]    Depression    [facility] /al[facility] abuse ([facility]    Acute encephalopathy    Ataxia    Alcoholic hepatitis    Nausea & vomiting    Family history of heart disease    Macrocytic anemia    Mixed hyperlipidemia    Cataract of both eyes    Recurrent major depressive disorder, in partial remission (HCC)    Edema    Sedative, hypnotic or anxiolytic abuse w sleep disorder (HCC)      Allergies   Allergen Reactions    Penicillins Anaphylaxis, Hives, Rash and Swelling    Sulfa Antibiotics      Current Outpatient Medications on File Prior to Visit   Medication Sig Dispense Refill    LORazepam (ATIVAN) 1 MG tablet Take 1 mg every 4 hours on day 1, 1 mg every 6 hours on day 2, 0.5 mg every 4 hours on days 3 and 4, 0.5 mg every 6 hours on days 5 and 6 20 tablet 0    NIFEdipine (PROCARDIA XL) 60 MG extended release tablet TAKE ONE TABLET BY MOUTH DAILY 90 tablet 0    busPIRone (BUSPAR) 10 MG tablet TAKE ONE TABLET BY MOUTH TWICE A DAY 60 tablet 2    cloNIDine (CATAPRES) 0.1 MG tablet TAKE ONE TABLET BY MOUTH TWICE A DAY (Patient taking differently: Take 0.1 mg by mouth daily ) 60 tablet 2    gabapentin (NEURONTIN) 100 MG capsule Take 1 capsule by mouth 2 times daily for 180 days.  Intended supply: 30 days 60 capsule 2    furosemide (LASIX) 20 MG tablet Take 1 tablet by mouth daily 30 tablet 2    potassium chloride (KLOR-CON M) 10 MEQ extended release tablet Take 1 tablet by mouth daily 30 tablet 2    hydrOXYzine (VISTARIL) 25 MG capsule Take 1 cap TID PRN for anxiety 90 capsule 1    Multiple Vitamins-Minerals (THERAPEUTIC MULTIVITAMIN-MINERALS) tablet Take 1 tablet by mouth Daily with supper 30 tablet 0    vitamin B-1 (THIAMINE) 100 MG tablet Take 100 mg by mouth 2 times daily  vitamin C (ASCORBIC ACID) 500 MG tablet Take 500 mg by mouth daily      Cholecalciferol (VITAMIN D3) 25 MCG (1000 UT) CAPS Take 1 capsule by mouth daily      ondansetron (ZOFRAN) 4 MG tablet Take 1 tablet by mouth every 8 hours as needed for Nausea or Vomiting 10 tablet 0    folic acid (FOLVITE) 1 MG tablet Take 1 tablet by mouth daily 30 tablet 0     No current facility-administered medications on file prior to visit. Social History     Tobacco Use    Smoking status: Former Smoker     Packs/day: 0.50     Years: 10.00     Pack years: 5.00     Types: Cigarettes     Quit date: 1987     Years since quittin.7    Smokeless tobacco: Never Used   Substance Use Topics    Alcohol use: Yes     Alcohol/week: 56.0 standard drinks     Types: 56 Glasses of wine per week      CARE TEAM  Patient Care Team:  NAZ Chapa CNP as PCP - General (Family Nurse Practitioner)  NAZ Chapa CNP as PCP - [facility] Empaneled Provider    Chief Complaint   Patient presents with    Medicare AWV    Insomnia      HPI:   Patient is here for an AWV and has other concerns to discuss. ETOH:  Stopped taking gabapentin previously due to side effects  This initially helped with her etoh cravings. She had a glass of wine few weeks ago and this sent her into detox, therefore she needed a refill on ativan. States she likes to have an occasional glass of wine with dinner with her  and she doesn't see anything wrong with that, except her body goes into detox. She has been to multiple rehab facilities and programs, but has not stuck with any program.   [facility]. Does not want to go to inpatient program. Doesn't feel it is necessary. States her  drinks beer/wine daily as well. She has restarted gabapentin as of yesterday for withdraw s/s. Ativan helps her with her detox s/s.      SLEEP:  States she has been taking [place] for years from a previous rx from her PCP and GYN (never disclosed this to me). States she has been taking 10mg every night, will sleep for a few hrs, then wake up and take another 1/2 [place] and do this several times per night. She has even been taking ambien during the day \"to sleep through the day if it is a good day to sleep\"  States she was taking up to 80mg of ambien daily. States Cynthia Dong was told by other people in rehab that [place] was not addictive\"  She realized a few days ago this was a problem and decided to wean herself off about 3-4 days ago. Last dose ambien yesterday am. She was able to sleep last night. Started on ativan taper yesterday afternoon and this has helped a lot with her withdrawal s/s. Is taking hydroxyzine daily for anxiety. ROS:  Review of Systems   Constitutional: Negative for activity change, appetite change, chills, fatigue and unexpected weight change. HENT: Negative for congestion, ear pain, hearing loss, nosebleeds, postnasal drip, sneezing, sore throat, trouble swallowing and voice change. Eyes: Negative for pain, discharge and itching. Respiratory: Positive for chest tightness (occ). Negative for cough, shortness of breath and wheezing. Cardiovascular: Negative for chest pain, palpitations and leg swelling. Gastrointestinal: Negative for abdominal distention, abdominal pain, blood in stool, constipation (takes daily colace and fiber), diarrhea, nausea and vomiting. Endocrine: Negative for cold intolerance, heat intolerance, polydipsia, polyphagia and polyuria. Genitourinary: Negative for difficulty urinating, dysuria and pelvic pain. Musculoskeletal: Negative for arthralgias, back pain and neck pain. Skin: Negative for color change, pallor and rash. Neurological: Negative for dizziness, tremors, seizures, weakness, numbness and headaches. Hematological: Does not bruise/bleed easily.    Psychiatric/Behavioral: Positive for sleep disturbance (h/o [place] abuse). Negative for agitation, dysphoric mood, self-injury and suicidal ideas. The patient is nervous/anxious. +alcoholism with recent relapse      VITALS:  /80   Pulse 72   Temp 98.1 °F (36.7 °C) (Oral)   Ht 5' 4\" (1.626 m)   Wt 160 lb 3.2 oz (72.7 kg)   SpO2 98%   BMI 27.50 kg/m²      PE:  Physical Exam  Vitals signs and nursing note reviewed. Constitutional:       General: She is not in acute distress. Appearance: Normal appearance. She is well-developed and normal weight. She is not diaphoretic. HENT:      Head: Normocephalic and atraumatic. Eyes:      Pupils: Pupils are equal, round, and reactive to light. Neck:      Musculoskeletal: Normal range of motion. Normal range of motion. No neck rigidity. Cardiovascular:      Rate and Rhythm: Normal rate and regular rhythm. Heart sounds: Normal heart sounds. No murmur. No friction rub. Pulmonary:      Effort: Pulmonary effort is normal. No respiratory distress. Breath sounds: Normal breath sounds. No wheezing, rhonchi or rales. Musculoskeletal:         General: No tenderness or deformity. Right lower leg: No edema. Left lower leg: No edema. Skin:     General: Skin is warm and dry. Coloration: Skin is not pale. Findings: No erythema or rash. Neurological:      General: No focal deficit present. Mental Status: She is alert and oriented to person, place, and time. Motor: No weakness. Gait: Gait normal.   Psychiatric:         Mood and Affect: Mood normal.         Behavior: Behavior normal.         Thought Content:  Thought content normal.         Judgment: Judgment normal.      Comments: anxious        Lab Results   Component Value Date     02/08/2021    K 4.3 02/08/2021    K 4.2 10/14/2020     02/08/2021    CO2 25 02/08/2021    BUN 9 02/08/2021    CREATININE 0.7 02/08/2021    GLUCOSE 102 02/08/2021    CALCIUM 9.7 02/08/2021      Lab Results   Component Value Date    CHOL 181 02/08/2021    CHOL 250 (H) 03/28/2015    CHOL 261 (H) 07/03/2014     Lab Results   Component Value Date    TRIG 80 02/08/2021    TRIG 57 03/28/2015    TRIG 78 07/03/2014     Lab Results   Component Value Date    HDL 60 02/08/2021     (H) 03/28/2015     07/03/2014     Lab Results   Component Value Date    LDLCALC 105 (H) 02/08/2021    LDLCALC 93 03/28/2015    LDLCALC 130 (H) 07/03/2014     Lab Results   Component Value Date    LABVLDL 16 02/08/2021    LABVLDL 11 03/28/2015    LABVLDL 16 07/03/2014     No results found for: CHOLHDLRATIO    PDMP Monitoring:    Last PDMP Jj as Reviewed [facility]):  Review User Review Instant Review Result   LindyCASIMIRO Moore 4/8/2021  1:09 PM Reviewed PDMP [1]     Last Controlled Substance Monitoring Documentation      ED from 7/29/2019 in [facility] Emergency Department   Comments  Discharge instructions reviewed with patient and caregiver from facility at bedside, both verbalized understanding and denied further needs at this time. Patient ambulated from ED without difficulty. filed at 07/30/2019 0046        Urine Drug Screenings (1 yr)     Urine Drug Screen  Collected: 1/30/2020  9:37 PM (Final result)    Narrative: Performed at: [facility] [employer] [facility]  [address] [address] [address]   Phone [phone]    Complete Results          Urine Drug Screen  Collected: 11/18/2019 12:10 PM (Final result)    Narrative: Performed at:  [facility]  [address]  [place], [address]   Phone [phone]    Complete Results          Urine Drug Screen  Collected: 10/12/2019  4:50 AM (Final result)    Narrative: Performed at:   [facility]  [address] [facility] [address]   Phone [phone]    Complete Results          Drug screen multi urine  Collected: 9/27/2019  9:39 AM (Final result)    Narrative: Performed at:  [facility]  [address], [address]   Phone [phone]    Complete Results          Drug screen multi urine  Collected: 7/6/2019  7:39 PM (Final result)    Narrative: Performed at:  [facility]  [address],  [place], [address]   Phone [phone]    Complete Results              Medication Contract and Consent for Opioid Use Documents Filed      No documents found                  ASSESSMENT/PLAN:  1. Routine general medical examination at a health care facility  AWV completed. See separate note    2. ACP (advance care planning)  Living will discussed/reviewed. Copy given to pt    3. 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 Discussed dangers of taking excessive amounts of ambien, including addiction and overdose/death. Patient has recognized this is a problem and has reportedly stopped taking ambien. She is currently taking ativan for her withdrawal s/s and this is helping at this time. Encouraged to enter into an inpatient treatment facility, which patient does not feel is necessary. Strongly advised to establish with outpatient program asap and attend consistently. Would likely benefit from seeing psych for medication management. Patient states [facility]. Advised she may increase hydroxyzine to 50mg bid prn. She may also take melatonin 10mg XR to help with her sleep. She states she will no longer take [place]. She does have 1 refill left from her previous rx from PCP/GYN, strongly encouraged her to call pharmacy and cancel rx. 4. Alcoholism /alcohol abuse [facility])  Patient has been in numerous rehab programs including inpatient and outpatient. Encouraged to enter into an inpatient treatment facility since she is in active withdrawal, now from [place].     patient does not feel this necessary and states her s/s are currently controlled with the ativan taper. Strongly advised to establish with outpatient program asap and attend consistently. Would likely benefit from seeing psych for medication management. Patient states [facility] will [facility]. Has resources and contact information on available programs. Patient still feels she is able to drink an occasional glass of wine, however I advised this is not recommended due to her disease. 5. Alcohol withdrawal syndrome, uncomplicated ([address]  Currently on ativan taper for withdrawal s/s. Discussed ativan rx and advised I will only prescribe on a requested basis for when she relapses so I am aware of when/how often she relapses. I am concerned she will use the ativan rx as crutch to continue to drink occasionally. I discussed this with patient and she is in agreement to plan. 6. Essential hypertension  Stable on current regimen. TIME: SPENT 40 MIN IN ROOM WITH PATIENT DISCUSSING ABOVE CONCERNS, IN ADDITION TO AWV      Return in 4 weeks (on 5/6/2021) for  FOR MED FOLLOW UP AND RETURN IN 1 YR FOR AWV.      Electronically signed by NAZ Paris CNP on 4/8/2021 at 1:08 PM

## 2021-04-14 ENCOUNTER — TELEPHONE (OUTPATIENT)
Dept: PRIMARY CARE CLINIC | Age: 66
End: 2021-04-14

## 2021-04-14 DIAGNOSIS — F10.230 ALCOHOL DEPENDENCE WITH UNCOMPLICATED WITHDRAWAL (HCC): ICD-10-CM

## 2021-04-14 RX ORDER — LORAZEPAM 1 MG/1
TABLET ORAL
Qty: 20 TABLET | Refills: 0 | Status: SHIPPED | OUTPATIENT
Start: 2021-04-14 | End: 2021-05-15

## 2021-04-14 NOTE — PROGRESS NOTES
See patient msg. Advised to seek inpatient treatment. Will not continue to prescribe ativan. Will send refill in today, to prevent her from going into DT and going to the ED.

## 2021-04-14 NOTE — TELEPHONE ENCOUNTER
Called and spoke with patient. States she found a bottle of wine in her 's car last night and drank it. She was up all night. Had apt at [facility] today, but canceled since she was up all night. Discussed ativan in detail. This is the 3rd refill in the past few wks for relapses. Strongly advised to seek inpatient treatment asap for further help. Advised I will no longer continue to prescribe ativan, since I now feel it is enabling her to drink to a degree. I will send in a refill today to prevent her from DT and going to the ED, which she has multiple times in the past.     She is agreeable to seek inpatient treatment. Patient has all contact information since she has been admitted to several different programs. She states she will call today. If she is unable to be accepted into an inpatient program in the next 2 days, she will keep her apt at [facility] on Friday.

## 2021-05-06 ENCOUNTER — OFFICE VISIT (OUTPATIENT)
Dept: PRIMARY CARE CLINIC | Age: 66
End: 2021-05-06
Payer: MEDICARE

## 2021-05-06 VITALS
SYSTOLIC BLOOD PRESSURE: 120 MMHG | TEMPERATURE: 98.1 F | HEIGHT: 64 IN | HEART RATE: 72 BPM | DIASTOLIC BLOOD PRESSURE: 80 MMHG | WEIGHT: 160.8 LBS | BODY MASS INDEX: 27.45 KG/M2 | OXYGEN SATURATION: 99 %

## 2021-05-06 DIAGNOSIS — Z78.0 POST-MENOPAUSAL: ICD-10-CM

## 2021-05-06 DIAGNOSIS — I10 ESSENTIAL HYPERTENSION: ICD-10-CM

## 2021-05-06 DIAGNOSIS — F33.41 RECURRENT MAJOR DEPRESSIVE DISORDER, IN PARTIAL REMISSION (HCC): ICD-10-CM

## 2021-05-06 DIAGNOSIS — F41.9 ANXIETY: ICD-10-CM

## 2021-05-06 DIAGNOSIS — F13.182: ICD-10-CM

## 2021-05-06 DIAGNOSIS — R51.9 INTRACTABLE HEADACHE, UNSPECIFIED CHRONICITY PATTERN, UNSPECIFIED HEADACHE TYPE: ICD-10-CM

## 2021-05-06 DIAGNOSIS — R42 DIZZINESS: ICD-10-CM

## 2021-05-06 PROBLEM — R11.2 NAUSEA & VOMITING: Status: RESOLVED | Noted: 2019-11-19 | Resolved: 2021-05-06

## 2021-05-06 PROCEDURE — 1090F PRES/ABSN URINE INCON ASSESS: CPT | Performed by: NURSE PRACTITIONER

## 2021-05-06 PROCEDURE — 99214 OFFICE O/P EST MOD 30 MIN: CPT | Performed by: NURSE PRACTITIONER

## 2021-05-06 PROCEDURE — 4040F PNEUMOC VAC/ADMIN/RCVD: CPT | Performed by: NURSE PRACTITIONER

## 2021-05-06 PROCEDURE — G8399 PT W/DXA RESULTS DOCUMENT: HCPCS | Performed by: NURSE PRACTITIONER

## 2021-05-06 PROCEDURE — G8427 DOCREV CUR MEDS BY ELIG CLIN: HCPCS | Performed by: NURSE PRACTITIONER

## 2021-05-06 PROCEDURE — G8417 CALC BMI ABV UP PARAM F/U: HCPCS | Performed by: NURSE PRACTITIONER

## 2021-05-06 PROCEDURE — 1123F ACP DISCUSS/DSCN MKR DOCD: CPT | Performed by: NURSE PRACTITIONER

## 2021-05-06 PROCEDURE — 3017F COLORECTAL CA SCREEN DOC REV: CPT | Performed by: NURSE PRACTITIONER

## 2021-05-06 PROCEDURE — 1036F TOBACCO NON-USER: CPT | Performed by: NURSE PRACTITIONER

## 2021-05-06 RX ORDER — CLINDAMYCIN HYDROCHLORIDE 150 MG/1
CAPSULE ORAL
COMMUNITY
Start: 2021-05-05 | End: 2021-06-02 | Stop reason: CLARIF

## 2021-05-06 RX ORDER — PROGESTERONE 200 MG/1
1 CAPSULE ORAL NIGHTLY
COMMUNITY
Start: 2021-04-22

## 2021-05-06 RX ORDER — ZOLPIDEM TARTRATE 10 MG/1
TABLET ORAL NIGHTLY PRN
COMMUNITY
End: 2021-06-02 | Stop reason: CLARIF

## 2021-05-06 RX ORDER — FLUOXETINE HYDROCHLORIDE 40 MG/1
40 CAPSULE ORAL DAILY
COMMUNITY
End: 2021-06-02

## 2021-05-06 ASSESSMENT — PATIENT HEALTH QUESTIONNAIRE - PHQ9
SUM OF ALL RESPONSES TO PHQ QUESTIONS 1-9: 0
1. LITTLE INTEREST OR PLEASURE IN DOING THINGS: 0
2. FEELING DOWN, DEPRESSED OR HOPELESS: 0
SUM OF ALL RESPONSES TO PHQ QUESTIONS 1-9: 0

## 2021-05-06 ASSESSMENT — ENCOUNTER SYMPTOMS
NAUSEA: 0
BLOOD IN STOOL: 0
COUGH: 0
DIARRHEA: 0
SHORTNESS OF BREATH: 0
BACK PAIN: 0
COLOR CHANGE: 0
CHEST TIGHTNESS: 0
CONSTIPATION: 0
ABDOMINAL DISTENTION: 0
VOMITING: 0
WHEEZING: 0
ABDOMINAL PAIN: 0

## 2021-05-06 NOTE — PROGRESS NOTES
ENCOUNTER DATE: 5/6/2021     NAME: iDaz Barlow   AGE: 76 y.o. 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  FLUoxetine (PROZAC) 40 MG capsule Take 40 mg by mouth daily      potassium chloride (KLOR-CON M) 10 MEQ extended release tablet TAKE ONE TABLET BY MOUTH DAILY 30 tablet 2    NIFEdipine (PROCARDIA XL) 60 MG extended release tablet TAKE ONE TABLET BY MOUTH DAILY 90 tablet 0    busPIRone (BUSPAR) 10 MG tablet TAKE ONE TABLET BY MOUTH TWICE A DAY 60 tablet 2    cloNIDine (CATAPRES) 0.1 MG tablet TAKE ONE TABLET BY MOUTH TWICE A DAY (Patient taking differently: Take 0.1 mg by mouth daily ) 60 tablet 2    gabapentin (NEURONTIN) 100 MG capsule Take 1 capsule by mouth 2 times daily for 180 days.  Intended supply: 30 days 60 capsule 2    furosemide (LASIX) 20 MG tablet Take 1 tablet by mouth daily 30 tablet 2    hydrOXYzine (VISTARIL) 25 MG capsule Take 1 cap TID PRN for anxiety 90 capsule 1    Multiple Vitamins-Minerals (THERAPEUTIC MULTIVITAMIN-MINERALS) tablet Take 1 tablet by mouth Daily with supper 30 tablet 0    vitamin B-1 (THIAMINE) 100 MG tablet Take 100 mg by mouth 2 times daily      vitamin C (ASCORBIC ACID) 500 MG tablet Take 500 mg by mouth daily      Cholecalciferol (VITAMIN D3) 25 MCG (1000 UT) CAPS Take 1 capsule by mouth daily      ondansetron (ZOFRAN) 4 MG tablet Take 1 tablet by mouth every 8 hours as needed for Nausea or Vomiting 10 tablet 0    folic acid (FOLVITE) 1 MG tablet Take 1 tablet by mouth daily 30 tablet 0    clindamycin (CLEOCIN) 150 MG capsule       LORazepam (ATIVAN) 1 MG tablet Take 1 mg every 4 hours on day 1, 1 mg every 6 hours on day 2, 0.5 mg every 4 hours on days 3 and 4, 0.5 mg every 6 hours on days 5 and 6 (Patient not taking: Reported on 2021) 20 tablet 0     No current facility-administered medications on file prior to visit. Social History     Tobacco Use    Smoking status: Former Smoker     Packs/day: 0.50     Years: 10.00     Pack years: 5.00     Types: Cigarettes     Quit date: 1987     Years since quittin.8    Smokeless tobacco: Never Used   Substance Use Topics    Alcohol use: Yes     Alcohol/week: 56.0 standard drinks     Types: 56 Glasses of wine per week      CARE TEAM  Patient Care Team:  NAZ Paris CNP as PCP - General (Family Nurse Practitioner)  NAZ Paris CNP as PCP - [facility] Empaneled Provider    Chief Complaint   Patient presents with    Alcohol Problem     follow   up      HPI:   Patient is here for a 1mo follow up. ETOH:  Has had multiple relapses  She went to [facility] in April. She is now seeing an addiction specialist there. Goes to group counseling weekly and seeing counselor at [facility]. Taking gabapentin to prevent withdraw signs and symptoms. She only takes this prn, which is usually once per day when she gets the desire to drink. They suggested vivtrol or naltrexone, however patient hasn't tolerated these in the past due to side effects. They have not given her any prescriptions.    States her last drink was in April until last night.   She ordered 2 glasses of wine last night at dinner. ANXIETY:  Taking prozac 40mg. Has been on this for years. Her GYN increased this about 6mo ago. Taking hydroxizine only prn, which is few times per wk. On buspar bid and this seems to help. Takes clonodine 0.1mg daily. SLEEP:  Had previously admitted to abusing [place]. Was taking multiple ambien per day. She had weaned herself off of [place], which sent her into detox in April  She states her addiction specialist has recommended she restart the [place] due to her poor sleep patterns. States she averages 3hrs per night if she doesn't take an [place]. States she disussed her previous abuse of [place] with the addiction specialist as well. She has tried melatonin XR, benadryl. States she has only been taking 1 ambien 10mg per night. HYPERTENSION:  Stable on current regimen    DIZZINESS:  States she has been dizzy ever since April. Has some ataxia at times. Feels like dizziness is constant. Feels confusion at times or foggy headed. Having increase in headaches. Will get headache in the back of her head as well as frontal. Recently having more frontal headaches. No vision changes. Has some numbness off and on in her right cheek. Also requesting order for dexa scan    ROS:  Review of Systems   Constitutional: Negative for activity change, appetite change, chills, fatigue and unexpected weight change. Eyes: Negative for visual disturbance. Respiratory: Negative for cough, chest tightness, shortness of breath and wheezing. Cardiovascular: Negative for chest pain, palpitations and leg swelling. Gastrointestinal: Negative for abdominal distention, abdominal pain, blood in stool, constipation (takes daily colace and fiber), diarrhea, nausea and vomiting. Genitourinary: Negative for difficulty urinating, dysuria and pelvic pain. Musculoskeletal: Negative for arthralgias, back pain and neck pain.    Skin: Negative for color change, pallor and rash. Neurological: Positive for dizziness and headaches. Negative for tremors, seizures, facial asymmetry, weakness, light-headedness and numbness. Hematological: Does not bruise/bleed easily. Psychiatric/Behavioral: Positive for decreased concentration and sleep disturbance (h/o ambien abuse). Negative for agitation, dysphoric mood, self-injury and suicidal ideas. The patient is nervous/anxious. +alcoholism with recent relapse      VITALS:  /80   Pulse 72   Temp 98.1 °F (36.7 °C) (Oral)   Ht 5' 4\" (1.626 m)   Wt 160 lb 12.8 oz (72.9 kg)   SpO2 99%   BMI 27.60 kg/m²      PE:  Physical Exam  Vitals signs and nursing note reviewed. Constitutional:       General: She is not in acute distress. Appearance: Normal appearance. She is well-developed and normal weight. She is not diaphoretic. HENT:      Head: Normocephalic and atraumatic. Right Ear: Tympanic membrane, ear canal and external ear normal.      Left Ear: Tympanic membrane, ear canal and external ear normal.   Eyes:      Conjunctiva/sclera: Conjunctivae normal.      Pupils: Pupils are equal, round, and reactive to light. Neck:      Musculoskeletal: Normal range of motion. Normal range of motion. No neck rigidity. Vascular: No carotid bruit. Cardiovascular:      Rate and Rhythm: Normal rate and regular rhythm. Heart sounds: Normal heart sounds. No murmur. No friction rub. Pulmonary:      Effort: Pulmonary effort is normal. No respiratory distress. Breath sounds: Normal breath sounds. No wheezing, rhonchi or rales. Abdominal:      General: Abdomen is flat. There is no distension. Palpations: Abdomen is soft. Musculoskeletal:         General: No tenderness or deformity. Right lower leg: No edema. Left lower leg: No edema. Lymphadenopathy:      Cervical: No cervical adenopathy. Skin:     General: Skin is warm and dry. Coloration: Skin is not pale. Findings: No erythema or rash. Neurological:      General: No focal deficit present. Mental Status: She is alert and oriented to person, place, and time. Motor: No weakness. Gait: Gait normal.   Psychiatric:         Mood and Affect: Mood normal.         Behavior: Behavior normal.         Thought Content: Thought content normal.         Judgment: Judgment normal.      Comments: anxious        Lab Results   Component Value Date     02/08/2021    K 4.3 02/08/2021    K 4.2 10/14/2020     02/08/2021    CO2 25 02/08/2021    BUN 9 02/08/2021    CREATININE 0.7 02/08/2021    GLUCOSE 102 02/08/2021    CALCIUM 9.7 02/08/2021      ASSESSMENT/PLAN:  1. Alcoholism /al[facility] abuse ([facility]  Now following with Carolyn. Discussed importance of continuing long term treatment program  Patient still does not see a problem with having a few glasses of wine at dinner and then taking ativan when she goes into withdrawal.   Advised I will no longer prescribe ativan since I feel this in enabling her to drink. She will need to discuss this further with her addiction specialist.   She may continue with gabapentin for prn use for her cravings. Advised she may increase her clonidine to bid dosing to help with restlessness/cravings. 2. 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 Discussed dosing guidelines and dangers of overmedicating in women her age. Explained this may be contributing to her brain fog during the day in combination with her other medications. States she will try to decrease ambien to 5mg nightly. 3. Recurrent major depressive disorder, in partial remission ([facility]  On prozac per her GYN. 4. Anxiety  Continue with buspar bid as prescribed. Takes vistaril prn few times per wk. 5. Dizziness  Discussed differentials including adverse effects of her medications. Patient states she does not feel it is medication related since she has been on these medications for years. Will check CT. Patient will call to schedule. Info given. - CT HEAD WO CONTRAST; Future    6. Intractable headache, unspecified chronicity pattern, unspecified headache type  Check CT. Patient will call to schedule  - CT HEAD WO CONTRAST; Future    7. Post-menopausal  Patient will call to schedule. - DEXA BONE DENSITY 2 SITES; Future    8. Essential hypertension  Stable on current regimen. Return in about 6 weeks (around 6/17/2021) for chronic visit.      Electronically signed by NAZ Kent CNP on 5/6/2021 at 12:36 PM

## 2021-05-10 NOTE — TELEPHONE ENCOUNTER
Patient called and would like a script for       LORazepam (ATIVAN) 1 MG tablet [[id number]]     Order Details  Dose, Route, Frequency: As Directed   Dispense Quantity: 20 tablet Refills: 0          Sig: Take 1 mg every 4 hours on day 1, 1 mg every 6 hours on day 2, 0.5 mg every 4 hours on days 3 and 4, 0.5 mg every 6 hours on days 5 and 6   Patient not taking: Reported on 5/6/2021     Patient states that she needs it one more time as she is in detox from alcohol, and needs one last script.       Pharmacy    [facility], [address] [place], [place] [id number]   Phone:  [phone]  Fax:  [phone]     Thank you

## 2021-05-12 NOTE — TELEPHONE ENCOUNTER
Pt called and would like to request rx below:      LORazepam (ATIVAN) 1 MG tablet      [address], [address] [place] [address], [place] [address]   Phone:  [phone]  Fax:  [phone]       She states that she needs in one more time and will go to 90 day meetings.

## 2021-05-18 RX ORDER — CLONIDINE HYDROCHLORIDE 0.1 MG/1
TABLET ORAL
Qty: 60 TABLET | Refills: 2 | Status: SHIPPED | OUTPATIENT
Start: 2021-05-18 | End: 2021-08-16

## 2021-05-18 NOTE — TELEPHONE ENCOUNTER
Medication:   Requested Prescriptions     Pending Prescriptions Disp Refills    cloNIDine (CATAPRES) 0.1 MG tablet [Pharmacy Med Name: cloNIDine HCL 0.1MG TABLET] 60 tablet 1     Sig: TAKE ONE TABLET BY MOUTH TWICE A DAY     Last Filled:  02/13/21    Last appt: 5/6/2021   Next appt: Visit date not found    Last OARRS: No flowsheet data found.

## 2021-05-23 DIAGNOSIS — R60.9 EDEMA, UNSPECIFIED TYPE: ICD-10-CM

## 2021-05-24 RX ORDER — FUROSEMIDE 20 MG/1
TABLET ORAL
Qty: 30 TABLET | Refills: 1 | Status: SHIPPED | OUTPATIENT
Start: 2021-05-24 | End: 2021-07-16

## 2021-05-24 NOTE — TELEPHONE ENCOUNTER
Medication:   Requested Prescriptions     Pending Prescriptions Disp Refills    furosemide (LASIX) 20 MG tablet [Pharmacy Med Name: FUROSEMIDE 20 MG TABLET] 30 tablet 1     Sig: TAKE ONE TABLET BY MOUTH DAILY     Last Filled:  01/26/2021    Last appt: 5/6/2021   Next appt: Visit date not found    Last OARRS: No flowsheet data found.

## 2021-05-28 ENCOUNTER — TELEPHONE (OUTPATIENT)
Dept: PRIMARY CARE CLINIC | Age: 66
End: 2021-05-28

## 2021-05-28 NOTE — TELEPHONE ENCOUNTER
the [facility] [address] asked on Friday if that has to be with or witout contrast. Evon from [facility] suggested we call back the pt.  Pt is scheduled to do CT in there on 6.23.21

## 2021-06-02 ENCOUNTER — OFFICE VISIT (OUTPATIENT)
Dept: PRIMARY CARE CLINIC | Age: 66
End: 2021-06-02
Payer: MEDICARE

## 2021-06-02 ENCOUNTER — OFFICE VISIT (OUTPATIENT)
Dept: PULMONOLOGY | Age: 66
End: 2021-06-02
Payer: MEDICARE

## 2021-06-02 VITALS
WEIGHT: 162.8 LBS | BODY MASS INDEX: 27.79 KG/M2 | TEMPERATURE: 97.4 F | HEIGHT: 64 IN | OXYGEN SATURATION: 99 % | HEART RATE: 70 BPM | DIASTOLIC BLOOD PRESSURE: 80 MMHG | SYSTOLIC BLOOD PRESSURE: 138 MMHG

## 2021-06-02 VITALS
DIASTOLIC BLOOD PRESSURE: 86 MMHG | HEART RATE: 61 BPM | HEIGHT: 64 IN | SYSTOLIC BLOOD PRESSURE: 158 MMHG | WEIGHT: 162.6 LBS | BODY MASS INDEX: 27.76 KG/M2 | OXYGEN SATURATION: 99 %

## 2021-06-02 DIAGNOSIS — F13.182: ICD-10-CM

## 2021-06-02 DIAGNOSIS — F41.9 ANXIETY: ICD-10-CM

## 2021-06-02 DIAGNOSIS — F33.41 RECURRENT MAJOR DEPRESSIVE DISORDER, IN PARTIAL REMISSION (HCC): ICD-10-CM

## 2021-06-02 DIAGNOSIS — G47.00 INSOMNIA, UNSPECIFIED TYPE: ICD-10-CM

## 2021-06-02 DIAGNOSIS — F13.182: Chronic | ICD-10-CM

## 2021-06-02 DIAGNOSIS — F10.982 ALCOHOL-INDUCED INSOMNIA (HCC): Primary | ICD-10-CM

## 2021-06-02 DIAGNOSIS — I10 ESSENTIAL HYPERTENSION: ICD-10-CM

## 2021-06-02 PROCEDURE — G8399 PT W/DXA RESULTS DOCUMENT: HCPCS | Performed by: NURSE PRACTITIONER

## 2021-06-02 PROCEDURE — G8427 DOCREV CUR MEDS BY ELIG CLIN: HCPCS | Performed by: NURSE PRACTITIONER

## 2021-06-02 PROCEDURE — 1123F ACP DISCUSS/DSCN MKR DOCD: CPT | Performed by: NURSE PRACTITIONER

## 2021-06-02 PROCEDURE — 4040F PNEUMOC VAC/ADMIN/RCVD: CPT | Performed by: NURSE PRACTITIONER

## 2021-06-02 PROCEDURE — 1036F TOBACCO NON-USER: CPT | Performed by: INTERNAL MEDICINE

## 2021-06-02 PROCEDURE — 1036F TOBACCO NON-USER: CPT | Performed by: NURSE PRACTITIONER

## 2021-06-02 PROCEDURE — G8417 CALC BMI ABV UP PARAM F/U: HCPCS | Performed by: INTERNAL MEDICINE

## 2021-06-02 PROCEDURE — 3017F COLORECTAL CA SCREEN DOC REV: CPT | Performed by: INTERNAL MEDICINE

## 2021-06-02 PROCEDURE — 3017F COLORECTAL CA SCREEN DOC REV: CPT | Performed by: NURSE PRACTITIONER

## 2021-06-02 PROCEDURE — G8399 PT W/DXA RESULTS DOCUMENT: HCPCS | Performed by: INTERNAL MEDICINE

## 2021-06-02 PROCEDURE — G8427 DOCREV CUR MEDS BY ELIG CLIN: HCPCS | Performed by: INTERNAL MEDICINE

## 2021-06-02 PROCEDURE — 1090F PRES/ABSN URINE INCON ASSESS: CPT | Performed by: INTERNAL MEDICINE

## 2021-06-02 PROCEDURE — 4040F PNEUMOC VAC/ADMIN/RCVD: CPT | Performed by: INTERNAL MEDICINE

## 2021-06-02 PROCEDURE — 1123F ACP DISCUSS/DSCN MKR DOCD: CPT | Performed by: INTERNAL MEDICINE

## 2021-06-02 PROCEDURE — 99204 OFFICE O/P NEW MOD 45 MIN: CPT | Performed by: INTERNAL MEDICINE

## 2021-06-02 PROCEDURE — G8417 CALC BMI ABV UP PARAM F/U: HCPCS | Performed by: NURSE PRACTITIONER

## 2021-06-02 PROCEDURE — 1090F PRES/ABSN URINE INCON ASSESS: CPT | Performed by: NURSE PRACTITIONER

## 2021-06-02 PROCEDURE — 99214 OFFICE O/P EST MOD 30 MIN: CPT | Performed by: NURSE PRACTITIONER

## 2021-06-02 RX ORDER — LORAZEPAM 1 MG/1
TABLET ORAL
Status: ON HOLD | COMMUNITY
Start: 2021-05-20 | End: 2021-06-17 | Stop reason: CLARIF

## 2021-06-02 RX ORDER — ZOLPIDEM TARTRATE 5 MG/1
5 TABLET ORAL NIGHTLY PRN
Qty: 15 TABLET | Refills: 0 | Status: SHIPPED | OUTPATIENT
Start: 2021-06-02 | End: 2021-06-17

## 2021-06-02 RX ORDER — ZOLPIDEM TARTRATE 5 MG/1
5 TABLET ORAL NIGHTLY PRN
Qty: 15 TABLET | Refills: 0 | Status: SHIPPED | OUTPATIENT
Start: 2021-06-18 | End: 2021-07-03

## 2021-06-02 RX ORDER — ZOLPIDEM TARTRATE 5 MG/1
5 TABLET ORAL NIGHTLY PRN
Qty: 15 TABLET | Refills: 0 | Status: SHIPPED
Start: 2021-07-04 | End: 2021-07-13 | Stop reason: CLARIF

## 2021-06-02 ASSESSMENT — ENCOUNTER SYMPTOMS
DIARRHEA: 0
BLOOD IN STOOL: 0
VOMITING: 0
WHEEZING: 0
BACK PAIN: 0
NAUSEA: 0
SHORTNESS OF BREATH: 0
CONSTIPATION: 0
ABDOMINAL DISTENTION: 0
COUGH: 0
CHEST TIGHTNESS: 0
COLOR CHANGE: 0
ABDOMINAL PAIN: 0

## 2021-06-02 ASSESSMENT — PATIENT HEALTH QUESTIONNAIRE - PHQ9
SUM OF ALL RESPONSES TO PHQ QUESTIONS 1-9: 0
SUM OF ALL RESPONSES TO PHQ9 QUESTIONS 1 & 2: 0
2. FEELING DOWN, DEPRESSED OR HOPELESS: 0
1. LITTLE INTEREST OR PLEASURE IN DOING THINGS: 0
SUM OF ALL RESPONSES TO PHQ QUESTIONS 1-9: 0
SUM OF ALL RESPONSES TO PHQ QUESTIONS 1-9: 0

## 2021-06-02 ASSESSMENT — SLEEP AND FATIGUE QUESTIONNAIRES
HOW LIKELY ARE YOU TO NOD OFF OR FALL ASLEEP WHILE WATCHING TV: 0
HOW LIKELY ARE YOU TO NOD OFF OR FALL ASLEEP WHEN YOU ARE A PASSENGER IN A CAR FOR AN HOUR WITHOUT A BREAK: 0
HOW LIKELY ARE YOU TO NOD OFF OR FALL ASLEEP WHILE SITTING INACTIVE IN A PUBLIC PLACE: 0
HOW LIKELY ARE YOU TO NOD OFF OR FALL ASLEEP WHILE SITTING QUIETLY AFTER LUNCH WITHOUT ALCOHOL: 0
HOW LIKELY ARE YOU TO NOD OFF OR FALL ASLEEP WHILE LYING DOWN TO REST IN THE AFTERNOON WHEN CIRCUMSTANCES PERMIT: 0
HOW LIKELY ARE YOU TO NOD OFF OR FALL ASLEEP IN A CAR, WHILE STOPPED FOR A FEW MINUTES IN TRAFFIC: 0
HOW LIKELY ARE YOU TO NOD OFF OR FALL ASLEEP WHILE SITTING AND TALKING TO SOMEONE: 0
ESS TOTAL SCORE: 0
HOW LIKELY ARE YOU TO NOD OFF OR FALL ASLEEP WHILE SITTING AND READING: 0

## 2021-06-02 NOTE — LETTER
[facility] Sleep Medicine  [place] [address]  Phone: [phone]  Fax: [phone]    Andrzej Carpenter MD    June 2, 2021     NAZ Canela - CNP  [id number] [facility] [id number]    Patient: Natalia Duffy   MR Number: [id number]   YOB: 1955   Date of Visit: 6/2/2021       Dear Anna Mansfield: Thank you for referring Jay Martinez to me for evaluation/treatment. Below are the relevant portions of my assessment and plan of care. 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 We discussed the importance of her seeing a psychiatrist to look for underlying depression/anxiety that could be causing anxiety and having that effectively treated and not using medications as a Band-Aid. She needs aggressive rehab for her alcoholism but at this point states that she is unwilling to change. Concerns about the patient abusing Ambien due to reports of her taking up to 8 Ambien at a time. PDMP report was reviewed today with the patient and she states the benzodiazepines and gabapentin are for when she has active alcohol withdrawals but that contradicts herself when she states she only drinks in the winter because she thinks she has seasonal affective disorder. There is a recent prescription for Ativan from month ago.     Had extended direct discussion with the patient's primary care doctor. We discussed that the patient really needs a psychiatrist to manage her issues and that we both have high concerns of drug abuse/drug-seeking behavior. At this point, with agreement the patient's primary care doctor and direct discussion, will get the patient a prescription for Ambien 5 mg for up to 6 weeks until patient can be assessed by psychiatrist and then will need to resume her care through that provider. I explained to the patient that we cannot continue to prescribe Ambien and that she needs a psychiatrist to manage her further medical conditions. Total amount of time spent reviewing the patient's chart, direct patient care, active discussion with the patient's primary care physician, and documenting in the record system = total 47 minutes. If you have questions, please do not hesitate to call me. I look forward to following Brandie Gee along with you.     Sincerely,        Isaac Sky MD

## 2021-06-02 NOTE — PATIENT INSTRUCTIONS
Call [address] [place] [address] to schedule CT of head and DEXA scan    Refer to sleep medicine for further treatment of sleep problems. Schedule apt with a psychiatrist to establish care asap. I will no longer write any prescriptions for controlled medications. (ambien, ativan etc)    Continue on nifedipine for BP. May use clonidine as needed for high BP readings or withdraw symptoms.

## 2021-06-02 NOTE — PROGRESS NOTES
ENCOUNTER DATE: 6/2/2021     NAME: Natalia Duffy   AGE: 76 y.o.    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 ([facility]    Anxiety    Insomnia, unspecified      Allergies   Allergen Reactions    Penicillins Anaphylaxis, Hives, Rash and Swelling    Sulfa Antibiotics      Current Outpatient Medications on File Prior to Visit   Medication Sig Dispense Refill    furosemide (LASIX) 20 MG tablet TAKE ONE TABLET BY MOUTH DAILY 30 tablet 1    cloNIDine (CATAPRES) 0.1 MG tablet TAKE ONE TABLET BY MOUTH TWICE A DAY 60 tablet 2    progesterone (PROMETRIUM) 200 MG CAPS capsule Take 1 capsule by mouth nightly      potassium chloride (KLOR-CON M) 10 MEQ extended release tablet TAKE ONE TABLET BY MOUTH DAILY 30 tablet 2    busPIRone (BUSPAR) 10 MG tablet TAKE ONE TABLET BY MOUTH TWICE A DAY 60 tablet 2    hydrOXYzine (VISTARIL) 25 MG capsule Take 1 cap TID PRN for anxiety 90 capsule 1    vitamin B-1 (THIAMINE) 100 MG tablet Take 100 mg by mouth 2 times daily      vitamin C (ASCORBIC ACID) 500 MG tablet Take 500 mg by mouth daily      Cholecalciferol (VITAMIN D3) 25 MCG (1000 UT) CAPS Take 1 capsule by mouth daily      ondansetron (ZOFRAN) 4 MG tablet Take 1 tablet by mouth every 8 hours as needed for Nausea or Vomiting 10 tablet 0    folic acid (FOLVITE) 1 MG tablet Take 1 tablet by mouth daily 30 tablet 0    [DISCONTINUED] furosemide (LASIX) 20 MG tablet Take 1 tablet by mouth daily 30 tablet 2    Multiple Vitamins-Minerals (THERAPEUTIC MULTIVITAMIN-MINERALS) tablet Take 1 tablet by mouth Daily with supper 30 tablet 0     No current facility-administered medications on file prior to visit. Social History     Tobacco Use    Smoking status: Former Smoker     Packs/day: 0.50     Years: 10.00     Pack years: 5.00     Types: Cigarettes     Quit date: 1987     Years since quittin.9    Smokeless tobacco: Never Used   Substance Use Topics    Alcohol use: Yes     Alcohol/week: 56.0 standard drinks     Types: 56 Glasses of wine per week      CARE TEAM  Patient Care Team:  NAZ Waller CNP as PCP - General (Family Nurse Practitioner)  NAZ Waller CNP as PCP - [facility] Empaneled Provider    Chief Complaint   Patient presents with    Insomnia        HPI:   Patient is here for a problem visit. Complains of continued trouble sleeping. Was previously taking ambien, which was prescribed by her GYN     Also is an alcoholic. In treatment program at [facility]. She continues to drink occasionally. INSOMNIA:  She has had sleep study in the past and was dx with \"insomnia\". She is requesting a referral to sleep med for a repeat sleep study. Has been on trazadone, seroquel, doxepin, respiradol, ambien, melatonin, vistaril, amitriptylene, lunesta in the past and didn't tolerate for various reasons, or they didn't work. States the only med that has worked for her is Alexsandra Simon. Has been taking ambien, which was previously prescribed by her GYN. Patient has previously admited to taking more ambien than prescribed. Was taking up to 8 ambien/day. Her GYN has now refused to write her any further prescriptions for ambien due to her recent OARRS report per patient. ETOH:  Continues to drink etoh, usually wine. She has relapsed several times in the past few wks. Denies any recent detox signs and symptoms.    Goes to [facility] occasionally, but not consistently  She is no longer taking gabapentin, which she took to mood, self-injury and suicidal ideas. The patient is nervous/anxious. +alcoholism with recent relapse      VITALS:  /80   Pulse 70   Temp 97.4 °F (36.3 °C) (Oral)   Ht 5' 4\" (1.626 m)   Wt 162 lb 12.8 oz (73.8 kg)   SpO2 99%   BMI 27.94 kg/m²      PE:  Physical Exam  Vitals and nursing note reviewed. Constitutional:       General: She is not in acute distress. Appearance: Normal appearance. She is well-developed and normal weight. She is not diaphoretic. HENT:      Head: Normocephalic and atraumatic. Cardiovascular:      Rate and Rhythm: Normal rate and regular rhythm. Heart sounds: Normal heart sounds. No murmur heard. No friction rub. Pulmonary:      Effort: Pulmonary effort is normal. No respiratory distress. Breath sounds: Normal breath sounds. No wheezing, rhonchi or rales. Abdominal:      General: Abdomen is flat. There is no distension. Palpations: Abdomen is soft. Musculoskeletal:         General: No tenderness. Right lower leg: No edema. Left lower leg: No edema. Skin:     General: Skin is warm and dry. Coloration: Skin is not pale. Findings: No erythema or rash. Neurological:      General: No focal deficit present. Mental Status: She is alert and oriented to person, place, and time. Motor: No weakness. Gait: Gait (no ataxia noted. ) normal.      Comments: No tremors noted   Psychiatric:         Mood and Affect: Mood normal.         Thought Content: Thought content normal.         Judgment: Judgment normal.      Comments: Anxious, agitated        ASSESSMENT/PLAN:  1. Alcoholism /alcohol abuse [facility])  Patient continues to drink etoh. Patient states \"I am 75 y/o and i'm going to do what i'm going to do, if I want to drink, i'm going to drink and I don't see anything wrong with that. \"  She has failed multiple inpatient detox/treatment programs as well as outpatient programs.    She continues to ask for ativan prescription to take for her \"withdraw symptoms\" for after she drinks. She has been asking for ativan rx more frequently. Advised I will no longer prescribe ativan since I feel this in enabling her to drink etoh. She has been instructed to follow up with her addiction specialist at [facility]. Advised she may increase her clonidine to bid dosing to help with restlessness/cravings  Patient expressed her discontent and states she will be seeking a new PCP that will write her medications. Strongly advised to establish care with a psychiatrist for further treatment and medication management, regardless if she continues to drink, she needs medication for depression etc. I gave patient extensive list of Medicare psych providers and encouraged to call and schedule apt asap. After further review of pts chart after patient had already left the office, it was discovered she was seen in ED at [facility] on 5/20, 5/25 and at [address] [place] [address] 5/26 for etoh related complaints. Patient did not disclose any of these visits during her visit. 2. Insomnia, unspecified type  Patient requesting referral to sleep med for repeat sleep study and treatment for insomnia. Patient requesting 1mo supply of [place] since GYN refusing to write rx, I explained I will not rx [place] Nate for her due to her history of ambien overuse and continued etoh use. Discussed ambien dosing as well. Needs to est with psychiatrist for further medication management. - Gary Mortimer, MD, [facility]    3. 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 - Gary Mortimer, MD, [facility]    4.  Anxiety  Strongly advised to establish care with a psychiatrist for further treatment and medication management, regardless if she continues to drink, she needs medication for depression etc. I gave patient extensive list of Medicare psych providers and encouraged to call and schedule apt

## 2021-06-03 NOTE — PROGRESS NOTES
Charanjit Fuller MD, Berna Adamson, [facility]  Tiffanie Kehrt CNP Werner Gwen CNP Micheal [place] [address]  Sunita Mckenna [address] [facility], [address] [facility] [phone]   [facility] [address] Sunita Mckenna. [address] [facility]. 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 We discussed the importance of her seeing a psychiatrist to look for underlying depression/anxiety that could be causing anxiety and having that effectively treated and not using medications as a Band-Aid. She needs aggressive rehab for her alcoholism but at this point states that she is unwilling to change. Concerns about the patient abusing Ambien due to reports of her taking up to 8 Ambien at a time. PDMP report was reviewed today with the patient and she states the benzodiazepines and gabapentin are for when she has active alcohol withdrawals but that contradicts herself when she states she only drinks in the winter because she thinks she has seasonal affective disorder. There is a recent prescription for Ativan from month ago. Had extended direct discussion with the patient's primary care doctor.   We discussed that the patient really needs a psychiatrist to manage her issues and that we both have high concerns of drug abuse/drug-seeking behavior. At this point, with agreement the patient's primary care doctor and direct discussion, will get the patient a prescription for Ambien 5 mg for up to 6 weeks until patient can be assessed by psychiatrist and then will need to resume her care through that provider. I explained to the patient that we cannot continue to prescribe Ambien and that she needs a psychiatrist to manage her further medical conditions. Total amount of time spent reviewing the patient's chart, direct patient care, active discussion with the patient's primary care physician, and documenting in the record system = total 47 minutes. Subjective:     Patient ID: Sebastián Sierra is a 76 y.o. female. Chief Complaint   Patient presents with    Insomnia       HPI:      Sebastián Sierra is a 76 y.o. female referred by NAZ Stockton CNP for a sleep evaluation. She has a longstanding history of alcohol abuse with several episodes of alcohol withdrawal requiring benzodiazepines and gabapentin. She states she has an ongoing prescription to use when she does withdraw. She states she told her primary care doctor that she is too old to quit drinking and will no longer seek any rehab for her alcoholism. She has a longstanding history of inability to initiate sleep. According to the patient she has been tried on several medications both inpatient and outpatient including: Ambien, Lunesta, Ativan, BuSpar, doxepin, trazodone, and amitriptyline. She states other medications of work except for Ambien. She has been prescribed Ambien 10 mg by her OB/GYN for over 10 years but recently she has declined to prescribe it further. In reviewing notes the patient at one point has taken up to 8 pills of Ambien at a time and had significant withdrawal when she had to stop it.   Patient states that she is going through alcohol withdrawal that she will take Ativan and gabapentin but will not take her Ambien but there is question if that is a factual statement. She had a sleep study over 10 years ago as part of a research project to look for medications for insomnia but did not continue with the study. She denies snoring, witnessed apneas, daytime napping, or significant caffeine use. DOT/CDL - No  FAA/'s license -No    Previous Report(s) Reviewed: historical medical records, office notes, andreferral letter(s). Pertinent data has been documented. Pollock - Total score: 0    Caffeine Intake - None. 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    Highest education level: Not on file   Occupational History    Not on file   Tobacco Use    Smoking status: Former Smoker     Packs/day: 0.50     Years: 10.00     Pack years: 5.00     Types: Cigarettes     Quit date: 1987     Years since quittin.9    Smokeless tobacco: Never Used   Vaping Use    Vaping Use: Never used   Substance and Sexual Activity    Alcohol use: Yes     Alcohol/week: 56.0 standard drinks     Types: 56 Glasses of wine per week    Drug use: No    Sexual activity: Yes     Partners: Male   Other Topics Concern    Not on file   Social History Narrative    Not on file     Social Determinants of Health     Financial Resource Strain: Low Risk     Difficulty of Paying Living Expenses: Not hard at all   Food Insecurity: No Food Insecurity    Worried About Running Out of Food in the Last Year: Never true    [address] in the Last Year: Never true   Transportation Needs: No Transportation Needs    Lack of Transportation (Medical): No    Lack of Transportation (Non-Medical):  No   Physical Activity:     Days of Exercise per Week:     Minutes of Exercise per Session:    Stress:     Feeling of Stress :    Social Connections:     Frequency of Communication with Friends and Family:  Frequency of Social Gatherings with Friends and Family:     Attends [employer] Services:     Active Member of Clubs or Organizations:     Attends Club or Organization Meetings:     Marital Status:    Intimate Partner Violence:     Fear of Current or Ex-Partner:     Emotionally Abused:     Physically Abused:     Sexually Abused:         Current Outpatient Medications   Medication Instructions    busPIRone (BUSPAR) 10 MG tablet TAKE ONE TABLET BY MOUTH TWICE A DAY    Cholecalciferol (VITAMIN D3) 25 MCG (1000 UT) CAPS 1 capsule, Oral, DAILY    cloNIDine (CATAPRES) 0.1 MG tablet TAKE ONE TABLET BY MOUTH TWICE A DAY    folic acid (FOLVITE) 1 mg, Oral, DAILY    furosemide (LASIX) 20 MG tablet TAKE ONE TABLET BY MOUTH DAILY    hydrOXYzine (VISTARIL) 25 MG capsule Take 1 cap TID PRN for anxiety    LORazepam (ATIVAN) 1 MG tablet No dose, route, or frequency recorded.  Multiple Vitamins-Minerals (THERAPEUTIC MULTIVITAMIN-MINERALS) tablet 1 tablet, Oral, DAILY WITH DINNER    ondansetron (ZOFRAN) 4 mg, Oral, EVERY 8 HOURS PRN    potassium chloride (KLOR-CON M) 10 MEQ extended release tablet TAKE ONE TABLET BY MOUTH DAILY    progesterone (PROMETRIUM) 200 MG CAPS capsule 1 capsule, Oral, NIGHTLY    vitamin B-1 (THIAMINE) 100 mg, Oral, 2 TIMES DAILY    vitamin C (ASCORBIC ACID) 500 mg, Oral, DAILY    zolpidem (AMBIEN) 5 mg, Oral, NIGHTLY PRN    [START ON 6/18/2021] zolpidem (AMBIEN) 5 mg, Oral, NIGHTLY PRN    [START ON 7/4/2021] zolpidem (AMBIEN) 5 mg, Oral, NIGHTLY PRN          Objective:     Vitals:  Weight BMI   Wt Readings from Last 3 Encounters:   06/02/21 162 lb 9.6 oz (73.8 kg)   06/02/21 162 lb 12.8 oz (73.8 kg)   05/06/21 160 lb 12.8 oz (72.9 kg)    Body mass index is 27.91 kg/m².      BP HR SaO2   BP Readings from Last 3 Encounters:   06/02/21 (!) 158/86   06/02/21 138/80   05/06/21 120/80    Pulse Readings from Last 3 Encounters:   06/02/21 61   06/02/21 70   05/06/21 72    SpO2 Readings from Last 3 Encounters:   06/02/21 99%   06/02/21 99%   05/06/21 99%        Electronically signed by Edgar Lang MD on6/2/2021 at 10:37 PM

## 2021-06-08 ENCOUNTER — TELEPHONE (OUTPATIENT)
Dept: PULMONOLOGY | Age: 66
End: 2021-06-08

## 2021-06-08 NOTE — TELEPHONE ENCOUNTER
Pt is stating that the Zolpidem script for 5 mg sent over to [facility] was supposed to include one refill as well. The pt says no refill was included in the script. She needs a refill included in the script.     Send TO [facility], [address] [place] [address] 80, [address] [place] [address]   Phone:  [phone]  Fax:  [phone]

## 2021-06-08 NOTE — TELEPHONE ENCOUNTER
Patient LMVM, her prescription for Darion Quale was not ordered correctly with her pharmacy.  [phone]

## 2021-06-09 ENCOUNTER — TELEPHONE (OUTPATIENT)
Dept: PULMONOLOGY | Age: 66
End: 2021-06-09

## 2021-06-10 ENCOUNTER — HOSPITAL ENCOUNTER (EMERGENCY)
Age: 66
Discharge: HOME | End: 2021-06-10
Payer: MEDICARE

## 2021-06-10 ENCOUNTER — APPOINTMENT (OUTPATIENT)
Dept: GENERAL RADIOLOGY | Age: 66
End: 2021-06-10
Payer: MEDICARE

## 2021-06-10 ENCOUNTER — TELEPHONE (OUTPATIENT)
Dept: PULMONOLOGY | Age: 66
End: 2021-06-10

## 2021-06-10 VITALS
HEART RATE: 98 BPM | HEIGHT: 64 IN | SYSTOLIC BLOOD PRESSURE: 142 MMHG | OXYGEN SATURATION: 96 % | BODY MASS INDEX: 27.66 KG/M2 | WEIGHT: 162 LBS | RESPIRATION RATE: 16 BRPM | TEMPERATURE: 97 F | DIASTOLIC BLOOD PRESSURE: 72 MMHG

## 2021-06-10 DIAGNOSIS — F10.10 ALCOHOL ABUSE: Primary | ICD-10-CM

## 2021-06-10 LAB
A/G RATIO: 1.3 (ref 1.1–2.2)
ALBUMIN SERPL-MCNC: 4.5 G/DL (ref 3.4–5)
ALP BLD-CCNC: 59 U/L (ref 40–129)
ALT SERPL-CCNC: 23 U/L (ref 10–40)
ANION GAP SERPL CALCULATED.3IONS-SCNC: 16 MMOL/L (ref 3–16)
AST SERPL-CCNC: 36 U/L (ref 15–37)
ATYPICAL LYMPHOCYTE RELATIVE PERCENT: 1 % (ref 0–6)
BASOPHILS ABSOLUTE: 0 K/UL (ref 0–0.2)
BASOPHILS RELATIVE PERCENT: 0 %
BILIRUB SERPL-MCNC: <0.2 MG/DL (ref 0–1)
BUN BLDV-MCNC: 11 MG/DL (ref 7–20)
CALCIUM SERPL-MCNC: 9.6 MG/DL (ref 8.3–10.6)
CHLORIDE BLD-SCNC: 99 MMOL/L (ref 99–110)
CO2: 23 MMOL/L (ref 21–32)
CREAT SERPL-MCNC: 0.6 MG/DL (ref 0.6–1.2)
EOSINOPHILS ABSOLUTE: 0 K/UL (ref 0–0.6)
EOSINOPHILS RELATIVE PERCENT: 0 %
ETHANOL: 167 MG/DL (ref 0–0.08)
GFR AFRICAN AMERICAN: >60
GFR NON-AFRICAN AMERICAN: >60
GLOBULIN: 3.4 G/DL
GLUCOSE BLD-MCNC: 101 MG/DL (ref 70–99)
HCT VFR BLD CALC: 38.9 % (ref 36–48)
HEMOGLOBIN: 13.6 G/DL (ref 12–16)
LIPASE: 64 U/L (ref 13–60)
LYMPHOCYTES ABSOLUTE: 5.7 K/UL (ref 1–5.1)
LYMPHOCYTES RELATIVE PERCENT: 52 %
MACROCYTES: ABNORMAL
MCH RBC QN AUTO: 33.4 PG (ref 26–34)
MCHC RBC AUTO-ENTMCNC: 35 G/DL (ref 31–36)
MCV RBC AUTO: 95.2 FL (ref 80–100)
MONOCYTES ABSOLUTE: 1.2 K/UL (ref 0–1.3)
MONOCYTES RELATIVE PERCENT: 11 %
NEUTROPHILS ABSOLUTE: 3.9 K/UL (ref 1.7–7.7)
NEUTROPHILS RELATIVE PERCENT: 36 %
PDW BLD-RTO: 15 % (ref 12.4–15.4)
PLATELET # BLD: 320 K/UL (ref 135–450)
PLATELET SLIDE REVIEW: ADEQUATE
PMV BLD AUTO: 8 FL (ref 5–10.5)
POLYCHROMASIA: ABNORMAL
POTASSIUM REFLEX MAGNESIUM: 4.1 MMOL/L (ref 3.5–5.1)
RBC # BLD: 4.08 M/UL (ref 4–5.2)
SLIDE REVIEW: ABNORMAL
SODIUM BLD-SCNC: 138 MMOL/L (ref 136–145)
TOTAL PROTEIN: 7.9 G/DL (ref 6.4–8.2)
TROPONIN: <0.01 NG/ML
WBC # BLD: 10.7 K/UL (ref 4–11)

## 2021-06-10 PROCEDURE — 93005 ELECTROCARDIOGRAM TRACING: CPT | Performed by: PHYSICIAN ASSISTANT

## 2021-06-10 PROCEDURE — 85025 COMPLETE CBC W/AUTO DIFF WBC: CPT

## 2021-06-10 PROCEDURE — 84484 ASSAY OF TROPONIN QUANT: CPT

## 2021-06-10 PROCEDURE — 99283 EMERGENCY DEPT VISIT LOW MDM: CPT

## 2021-06-10 PROCEDURE — 83690 ASSAY OF LIPASE: CPT

## 2021-06-10 PROCEDURE — 80053 COMPREHEN METABOLIC PANEL: CPT

## 2021-06-10 PROCEDURE — 71045 X-RAY EXAM CHEST 1 VIEW: CPT

## 2021-06-10 PROCEDURE — 82077 ASSAY SPEC XCP UR&BREATH IA: CPT

## 2021-06-10 RX ORDER — CHLORDIAZEPOXIDE HYDROCHLORIDE 25 MG/1
CAPSULE, GELATIN COATED ORAL
Qty: 24 CAPSULE | Refills: 0 | Status: SHIPPED | OUTPATIENT
Start: 2021-06-10 | End: 2021-06-14

## 2021-06-10 ASSESSMENT — ENCOUNTER SYMPTOMS
ABDOMINAL PAIN: 0
SHORTNESS OF BREATH: 0
VOMITING: 0
NAUSEA: 0

## 2021-06-10 NOTE — ED PROVIDER NOTES
10 or more for level 5)     Review of Systems   Constitutional: Negative for fatigue and fever. HENT: Negative. Eyes: Negative for visual disturbance. Respiratory: Negative for shortness of breath. Cardiovascular: Positive for chest pain. Gastrointestinal: Negative for abdominal pain, nausea and vomiting. Genitourinary: Negative. Musculoskeletal: Negative. Skin: Negative. Neurological: Negative. Positives and Pertinent negatives as per HPI. Except as noted above in the ROS, all other systems were reviewed and negative. PAST MEDICAL HISTORY     Past Medical History:   Diagnosis Date    Alcohol abuse, continuous 11/19/2019    Alcohol dependence with withdrawal ([facility] 9/13/2019    Alcohol withdrawal delirium ([facility] 6/1/2019    Alcoholism ([facility]     Anxiety     Ataxia     Depression     Hyperlipidemia     Hypertension          SURGICAL HISTORY     Past Surgical History:   Procedure Laterality Date    BACK SURGERY      1996 L5 disectomy    COLONOSCOPY      2016    ENDOSCOPY, COLON, DIAGNOSTIC      2016         CURRENTMEDICATIONS       Discharge Medication List as of 6/10/2021  9:21 PM      CONTINUE these medications which have NOT CHANGED    Details   LORazepam (ATIVAN) 1 MG tablet Historical Med      !! zolpidem (AMBIEN) 5 MG tablet Take 1 tablet by mouth nightly as needed for Sleep for up to 15 days. , Disp-15 tablet, R-0Normal      furosemide (LASIX) 20 MG tablet TAKE ONE TABLET BY MOUTH DAILY, Disp-30 tablet, R-1Normal      cloNIDine (CATAPRES) 0.1 MG tablet TAKE ONE TABLET BY MOUTH TWICE A DAY, Disp-60 tablet, R-2Normal      progesterone (PROMETRIUM) 200 MG CAPS capsule Take 1 capsule by mouth nightlyHistorical Med      potassium chloride (KLOR-CON M) 10 MEQ extended release tablet TAKE ONE TABLET BY MOUTH DAILY, Disp-30 tablet, R-2Normal      busPIRone (BUSPAR) 10 MG tablet TAKE ONE TABLET BY MOUTH TWICE A DAY, Disp-60 tablet, R-2Normal      hydrOXYzine (VISTARIL) 25 MG capsule Take 1 cap TID PRN for anxiety, Disp-90 capsule,R-1Normal      Multiple Vitamins-Minerals (THERAPEUTIC MULTIVITAMIN-MINERALS) tablet Take 1 tablet by mouth Daily with supper, Disp-30 tablet, R-0Normal      vitamin B-1 (THIAMINE) 100 MG tablet Take 100 mg by mouth 2 times dailyHistorical Med      vitamin C (ASCORBIC ACID) 500 MG tablet Take 500 mg by mouth dailyHistorical Med      Cholecalciferol (VITAMIN D3) 25 MCG (1000 UT) CAPS Take 1 capsule by mouth dailyHistorical Med      ondansetron (ZOFRAN) 4 MG tablet Take 1 tablet by mouth every 8 hours as needed for Nausea or Vomiting, Disp-10 tablet, [id number]      folic acid (FOLVITE) 1 MG tablet Take 1 tablet by mouth daily, Disp-30 tablet, R-0Print      !! zolpidem (AMBIEN) 5 MG tablet Take 1 tablet by mouth nightly as needed for Sleep for up to 15 days. , Disp-15 tablet, R-0Normal      !! zolpidem (AMBIEN) 5 MG tablet Take 1 tablet by mouth nightly as needed for Sleep for up to 15 days. , Disp-15 tablet, R-0Normal       !! - Potential duplicate medications found. Please discuss with provider.             ALLERGIES     Penicillins and Sulfa antibiotics    FAMILYHISTORY       Family History   Problem Relation Age of Onset    Stroke Mother     Substance Abuse Mother     Substance Abuse Father     Heart Disease Father     Heart Disease Brother     Substance Abuse Brother     Heart Disease Paternal Aunt     Heart Disease Paternal Uncle     Heart Disease Maternal Grandmother     Diabetes Maternal Grandmother     Diabetes Maternal Grandfather     Heart Disease Maternal Grandfather     Other Paternal Grandmother     Other Paternal Grandfather           SOCIAL HISTORY       Social History     Tobacco Use    Smoking status: Former Smoker     Packs/day: 0.50     Years: 10.00     Pack years: 5.00     Types: Cigarettes     Quit date: 1987     Years since quittin.9    Smokeless tobacco: Never Used   Vaping Use    Vaping Use: Never used   Substance Use Topics    Alcohol use: Yes     Alcohol/week: 56.0 standard drinks     Types: 56 Glasses of wine per week    Drug use: No       SCREENINGS             PHYSICAL EXAM    (up to 7 for level 4, 8 or more for level 5)     ED Triage Vitals [06/10/21 1940]   BP Temp Temp Source Pulse Resp SpO2 Height Weight   (!) 142/72 97 °F (36.1 °C) Infrared 98 16 96 % 5' 4\" (1.626 m) 162 lb (73.5 kg)       Physical Exam  Vitals and nursing note reviewed. Constitutional:       General: She is not in acute distress. Appearance: Normal appearance. She is well-developed. She is not toxic-appearing or diaphoretic. HENT:      Head: Normocephalic and atraumatic. Nose: Nose normal.      Mouth/Throat:      Mouth: Mucous membranes are moist.      Pharynx: Oropharynx is clear. Eyes:      General:         Right eye: No discharge. Left eye: No discharge. Conjunctiva/sclera: Conjunctivae normal.      Pupils: Pupils are equal, round, and reactive to light. Cardiovascular:      Rate and Rhythm: Normal rate and regular rhythm. Heart sounds: Normal heart sounds. No murmur heard. No gallop. Pulmonary:      Effort: Pulmonary effort is normal. No respiratory distress. Breath sounds: Normal breath sounds. No wheezing, rhonchi or rales. Abdominal:      General: Bowel sounds are normal.      Tenderness: There is no abdominal tenderness. There is no guarding or rebound. Musculoskeletal:         General: Normal range of motion. Cervical back: Normal range of motion and neck supple. Right lower leg: No edema. Left lower leg: No edema. Skin:     General: Skin is warm and dry. Capillary Refill: Capillary refill takes less than 2 seconds. Coloration: Skin is not jaundiced or pale. Findings: No rash. Neurological:      General: No focal deficit present. Mental Status: She is alert and oriented to person, place, and time.    Psychiatric:         Mood and Affect: Mood normal. normal strength and coordination. Gait is normal and she does not appear unsteady or tremulous. Patient CIWA remains 0. Patient is neurologically intact. She has no history of withdrawal seizures. I have low concern for acute alcohol withdrawal at this time. Patient does not have any suicidal or homicidal ideation. I have low concern for ACS, acute myocardial infarction, PE, pancreatitis, pneumothorax, intra-abdominal abnormality that would require acute further management. FINAL IMPRESSION      1. Alcohol abuse          DISPOSITION/PLAN   DISPOSITION Decision To Discharge 06/10/2021 09:31:58 PM      PATIENT REFERRED TO:  [facility] Emergency Department  [address] [facility]  [address] [facility] [address] [facility]    If symptoms worsen    Jacqueline Barrios, [facility]  [phone]    Schedule an appointment as soon as possible for a visit in 1 day  for re-evaluation      DISCHARGE MEDICATIONS:  Discharge Medication List as of 6/10/2021  9:21 PM      START taking these medications    Details   chlordiazePOXIDE (LIBRIUM) 25 MG capsule Take 2 capsules four times daily for one day. Then take one capsule four times daily for four days. , Disp-24 capsule, R-0Print             DISCONTINUED MEDICATIONS:  Discharge Medication List as of 6/10/2021  9:21 PM                 (Please note that portions of this note were completed with a voice recognition program.  Efforts were made to edit the dictations but occasionally words are mis-transcribed.)    Katie Wills PA-C (electronically signed)            Katie Wills PA-C  06/10/21 [id number]

## 2021-06-11 LAB
EKG ATRIAL RATE: 102 BPM
EKG DIAGNOSIS: NORMAL
EKG P AXIS: 75 DEGREES
EKG P-R INTERVAL: 176 MS
EKG Q-T INTERVAL: 350 MS
EKG QRS DURATION: 88 MS
EKG QTC CALCULATION (BAZETT): 456 MS
EKG R AXIS: 75 DEGREES
EKG T AXIS: 63 DEGREES
EKG VENTRICULAR RATE: 102 BPM

## 2021-06-11 PROCEDURE — 93010 ELECTROCARDIOGRAM REPORT: CPT | Performed by: INTERNAL MEDICINE

## 2021-06-11 NOTE — ED NOTES
Patient discharged at this time in no acute distress after verbalizing understanding of discharge instructions and need for follow up with PCP .   Pt left ambulatory to discharge area after reviewing and receiving copy of AVS.   Patient education  Learner- Patient   Motivation and readiness to learn- Medium to High  Barriers to learning- None  Learning preference/provided instructions- Both written and verbal discharge instructions     Pham Washington RN  06/10/21 1394

## 2021-06-16 ENCOUNTER — HOSPITAL ENCOUNTER (INPATIENT)
Age: 66
LOS: 1 days | Discharge: LEFT BEFORE DISCHARGE | DRG: 894 | End: 2021-06-17
Attending: EMERGENCY MEDICINE | Admitting: INTERNAL MEDICINE
Payer: MEDICARE

## 2021-06-16 DIAGNOSIS — F10.939 ALCOHOL WITHDRAWAL SYNDROME WITH COMPLICATION (HCC): Primary | ICD-10-CM

## 2021-06-16 DIAGNOSIS — K92.0 HEMATEMESIS, PRESENCE OF NAUSEA NOT SPECIFIED: ICD-10-CM

## 2021-06-16 PROBLEM — F10.229 ALCOHOL INTOXICATION IN ACTIVE ALCOHOLIC, WITH UNSPECIFIED COMPLICATION (HCC): Status: ACTIVE | Noted: 2021-06-16

## 2021-06-16 LAB
ALBUMIN SERPL-MCNC: 4.3 G/DL (ref 3.4–5)
ALP BLD-CCNC: 65 U/L (ref 40–129)
ALT SERPL-CCNC: 18 U/L (ref 10–40)
ANION GAP SERPL CALCULATED.3IONS-SCNC: 18 MMOL/L (ref 3–16)
APTT: 29.8 SEC (ref 24.2–36.2)
AST SERPL-CCNC: 34 U/L (ref 15–37)
BASE EXCESS VENOUS: 0.6 MMOL/L (ref -2–3)
BASOPHILS ABSOLUTE: 0 K/UL (ref 0–0.2)
BASOPHILS RELATIVE PERCENT: 0.3 %
BILIRUB SERPL-MCNC: 0.5 MG/DL (ref 0–1)
BILIRUBIN DIRECT: <0.2 MG/DL (ref 0–0.3)
BILIRUBIN, INDIRECT: NORMAL MG/DL (ref 0–1)
BUN BLDV-MCNC: 11 MG/DL (ref 7–20)
CALCIUM SERPL-MCNC: 8.9 MG/DL (ref 8.3–10.6)
CARBOXYHEMOGLOBIN: 1.1 % (ref 0–1.5)
CHLORIDE BLD-SCNC: 97 MMOL/L (ref 99–110)
CO2: 20 MMOL/L (ref 21–32)
CREAT SERPL-MCNC: 0.6 MG/DL (ref 0.6–1.2)
EOSINOPHILS ABSOLUTE: 0.2 K/UL (ref 0–0.6)
EOSINOPHILS RELATIVE PERCENT: 1.5 %
ETHANOL: NORMAL MG/DL (ref 0–0.08)
GFR AFRICAN AMERICAN: >60
GFR NON-AFRICAN AMERICAN: >60
GLUCOSE BLD-MCNC: 121 MG/DL (ref 70–99)
HCO3 VENOUS: 24.3 MMOL/L (ref 24–28)
HCT VFR BLD CALC: 40.4 % (ref 36–48)
HEMOGLOBIN, VEN, REDUCED: 23.8 %
HEMOGLOBIN: 13.7 G/DL (ref 12–16)
INR BLD: 0.89 (ref 0.86–1.14)
LACTIC ACID: 2.8 MMOL/L (ref 0.4–2)
LIPASE: 37 U/L (ref 13–60)
LYMPHOCYTES ABSOLUTE: 2.5 K/UL (ref 1–5.1)
LYMPHOCYTES RELATIVE PERCENT: 22.4 %
MAGNESIUM: 1.7 MG/DL (ref 1.8–2.4)
MCH RBC QN AUTO: 32.5 PG (ref 26–34)
MCHC RBC AUTO-ENTMCNC: 33.8 G/DL (ref 31–36)
MCV RBC AUTO: 96 FL (ref 80–100)
METHEMOGLOBIN VENOUS: 0 % (ref 0–1.5)
MONOCYTES ABSOLUTE: 0.5 K/UL (ref 0–1.3)
MONOCYTES RELATIVE PERCENT: 4.6 %
NEUTROPHILS ABSOLUTE: 7.9 K/UL (ref 1.7–7.7)
NEUTROPHILS RELATIVE PERCENT: 71.2 %
O2 SAT, VEN: 76 %
PCO2, VEN: 35.4 MMHG (ref 41–51)
PDW BLD-RTO: 15.1 % (ref 12.4–15.4)
PH VENOUS: 7.44 (ref 7.35–7.45)
PHOSPHORUS: 3.3 MG/DL (ref 2.5–4.9)
PLATELET # BLD: 191 K/UL (ref 135–450)
PMV BLD AUTO: 8 FL (ref 5–10.5)
PO2, VEN: 41 MMHG (ref 25–40)
POC OCCULT BLOOD STOOL: NEGATIVE
POTASSIUM SERPL-SCNC: 4.1 MMOL/L (ref 3.5–5.1)
PROTHROMBIN TIME: 10.3 SEC (ref 10–13.2)
RBC # BLD: 4.21 M/UL (ref 4–5.2)
SODIUM BLD-SCNC: 135 MMOL/L (ref 136–145)
TCO2 CALC VENOUS: 25 MMOL/L
TOTAL PROTEIN: 7.9 G/DL (ref 6.4–8.2)
TROPONIN: <0.01 NG/ML
WBC # BLD: 11 K/UL (ref 4–11)

## 2021-06-16 PROCEDURE — 85610 PROTHROMBIN TIME: CPT

## 2021-06-16 PROCEDURE — 82272 OCCULT BLD FECES 1-3 TESTS: CPT

## 2021-06-16 PROCEDURE — 6370000000 HC RX 637 (ALT 250 FOR IP): Performed by: EMERGENCY MEDICINE

## 2021-06-16 PROCEDURE — 6360000002 HC RX W HCPCS: Performed by: INTERNAL MEDICINE

## 2021-06-16 PROCEDURE — 99283 EMERGENCY DEPT VISIT LOW MDM: CPT

## 2021-06-16 PROCEDURE — 93005 ELECTROCARDIOGRAM TRACING: CPT | Performed by: INTERNAL MEDICINE

## 2021-06-16 PROCEDURE — HZ2ZZZZ DETOXIFICATION SERVICES FOR SUBSTANCE ABUSE TREATMENT: ICD-10-PCS | Performed by: INTERNAL MEDICINE

## 2021-06-16 PROCEDURE — 85730 THROMBOPLASTIN TIME PARTIAL: CPT

## 2021-06-16 PROCEDURE — 82077 ASSAY SPEC XCP UR&BREATH IA: CPT

## 2021-06-16 PROCEDURE — 84100 ASSAY OF PHOSPHORUS: CPT

## 2021-06-16 PROCEDURE — 80048 BASIC METABOLIC PNL TOTAL CA: CPT

## 2021-06-16 PROCEDURE — C9113 INJ PANTOPRAZOLE SODIUM, VIA: HCPCS | Performed by: EMERGENCY MEDICINE

## 2021-06-16 PROCEDURE — 2060000000 HC ICU INTERMEDIATE R&B

## 2021-06-16 PROCEDURE — 6360000002 HC RX W HCPCS: Performed by: EMERGENCY MEDICINE

## 2021-06-16 PROCEDURE — 2500000003 HC RX 250 WO HCPCS: Performed by: EMERGENCY MEDICINE

## 2021-06-16 PROCEDURE — 83605 ASSAY OF LACTIC ACID: CPT

## 2021-06-16 PROCEDURE — 85025 COMPLETE CBC W/AUTO DIFF WBC: CPT

## 2021-06-16 PROCEDURE — 83690 ASSAY OF LIPASE: CPT

## 2021-06-16 PROCEDURE — 82803 BLOOD GASES ANY COMBINATION: CPT

## 2021-06-16 PROCEDURE — 6370000000 HC RX 637 (ALT 250 FOR IP): Performed by: INTERNAL MEDICINE

## 2021-06-16 PROCEDURE — 83735 ASSAY OF MAGNESIUM: CPT

## 2021-06-16 PROCEDURE — 2580000003 HC RX 258: Performed by: INTERNAL MEDICINE

## 2021-06-16 PROCEDURE — 80076 HEPATIC FUNCTION PANEL: CPT

## 2021-06-16 PROCEDURE — 2580000003 HC RX 258: Performed by: EMERGENCY MEDICINE

## 2021-06-16 PROCEDURE — 84484 ASSAY OF TROPONIN QUANT: CPT

## 2021-06-16 RX ORDER — ONDANSETRON 2 MG/ML
4 INJECTION INTRAMUSCULAR; INTRAVENOUS EVERY 6 HOURS PRN
Status: DISCONTINUED | OUTPATIENT
Start: 2021-06-16 | End: 2021-06-17 | Stop reason: HOSPADM

## 2021-06-16 RX ORDER — CLONIDINE HYDROCHLORIDE 0.1 MG/1
0.1 TABLET ORAL 2 TIMES DAILY
Status: DISCONTINUED | OUTPATIENT
Start: 2021-06-16 | End: 2021-06-17 | Stop reason: HOSPADM

## 2021-06-16 RX ORDER — LORAZEPAM 2 MG/ML
2 INJECTION INTRAMUSCULAR
Status: DISCONTINUED | OUTPATIENT
Start: 2021-06-16 | End: 2021-06-17 | Stop reason: HOSPADM

## 2021-06-16 RX ORDER — MULTIVITAMIN WITH IRON
1 TABLET ORAL DAILY
Status: DISCONTINUED | OUTPATIENT
Start: 2021-06-16 | End: 2021-06-17 | Stop reason: HOSPADM

## 2021-06-16 RX ORDER — PANTOPRAZOLE SODIUM 40 MG/1
40 TABLET, DELAYED RELEASE ORAL
Status: DISCONTINUED | OUTPATIENT
Start: 2021-06-17 | End: 2021-06-17 | Stop reason: HOSPADM

## 2021-06-16 RX ORDER — LORAZEPAM 2 MG/ML
1 INJECTION INTRAMUSCULAR ONCE
Status: COMPLETED | OUTPATIENT
Start: 2021-06-16 | End: 2021-06-16

## 2021-06-16 RX ORDER — ACETAMINOPHEN 325 MG/1
650 TABLET ORAL EVERY 6 HOURS PRN
Status: DISCONTINUED | OUTPATIENT
Start: 2021-06-16 | End: 2021-06-17 | Stop reason: HOSPADM

## 2021-06-16 RX ORDER — ONDANSETRON 4 MG/1
4 TABLET, ORALLY DISINTEGRATING ORAL EVERY 8 HOURS PRN
Status: DISCONTINUED | OUTPATIENT
Start: 2021-06-16 | End: 2021-06-17 | Stop reason: HOSPADM

## 2021-06-16 RX ORDER — HYDROXYZINE PAMOATE 25 MG/1
25 CAPSULE ORAL 3 TIMES DAILY PRN
Status: DISCONTINUED | OUTPATIENT
Start: 2021-06-16 | End: 2021-06-17 | Stop reason: HOSPADM

## 2021-06-16 RX ORDER — LORAZEPAM 2 MG/ML
3 INJECTION INTRAMUSCULAR
Status: DISCONTINUED | OUTPATIENT
Start: 2021-06-16 | End: 2021-06-17 | Stop reason: HOSPADM

## 2021-06-16 RX ORDER — SODIUM CHLORIDE 0.9 % (FLUSH) 0.9 %
5-40 SYRINGE (ML) INJECTION EVERY 12 HOURS SCHEDULED
Status: DISCONTINUED | OUTPATIENT
Start: 2021-06-16 | End: 2021-06-17 | Stop reason: HOSPADM

## 2021-06-16 RX ORDER — DIAZEPAM 10 MG/1
20 TABLET ORAL ONCE
Status: COMPLETED | OUTPATIENT
Start: 2021-06-16 | End: 2021-06-16

## 2021-06-16 RX ORDER — FUROSEMIDE 20 MG/1
20 TABLET ORAL DAILY
Status: DISCONTINUED | OUTPATIENT
Start: 2021-06-17 | End: 2021-06-17 | Stop reason: HOSPADM

## 2021-06-16 RX ORDER — LORAZEPAM 1 MG/1
2 TABLET ORAL
Status: DISCONTINUED | OUTPATIENT
Start: 2021-06-16 | End: 2021-06-17 | Stop reason: HOSPADM

## 2021-06-16 RX ORDER — SODIUM CHLORIDE 0.9 % (FLUSH) 0.9 %
5-40 SYRINGE (ML) INJECTION PRN
Status: DISCONTINUED | OUTPATIENT
Start: 2021-06-16 | End: 2021-06-17 | Stop reason: HOSPADM

## 2021-06-16 RX ORDER — ACETAMINOPHEN 650 MG/1
650 SUPPOSITORY RECTAL EVERY 6 HOURS PRN
Status: DISCONTINUED | OUTPATIENT
Start: 2021-06-16 | End: 2021-06-17 | Stop reason: HOSPADM

## 2021-06-16 RX ORDER — POLYETHYLENE GLYCOL 3350 17 G/17G
17 POWDER, FOR SOLUTION ORAL DAILY PRN
Status: DISCONTINUED | OUTPATIENT
Start: 2021-06-16 | End: 2021-06-17 | Stop reason: HOSPADM

## 2021-06-16 RX ORDER — PANTOPRAZOLE SODIUM 40 MG/10ML
40 INJECTION, POWDER, LYOPHILIZED, FOR SOLUTION INTRAVENOUS ONCE
Status: COMPLETED | OUTPATIENT
Start: 2021-06-16 | End: 2021-06-16

## 2021-06-16 RX ORDER — LORAZEPAM 2 MG/ML
4 INJECTION INTRAMUSCULAR
Status: DISCONTINUED | OUTPATIENT
Start: 2021-06-16 | End: 2021-06-17 | Stop reason: HOSPADM

## 2021-06-16 RX ORDER — LORAZEPAM 1 MG/1
4 TABLET ORAL
Status: DISCONTINUED | OUTPATIENT
Start: 2021-06-16 | End: 2021-06-17 | Stop reason: HOSPADM

## 2021-06-16 RX ORDER — SODIUM CHLORIDE 9 MG/ML
INJECTION, SOLUTION INTRAVENOUS CONTINUOUS
Status: DISCONTINUED | OUTPATIENT
Start: 2021-06-16 | End: 2021-06-17

## 2021-06-16 RX ORDER — THIAMINE HYDROCHLORIDE 100 MG/ML
100 INJECTION, SOLUTION INTRAMUSCULAR; INTRAVENOUS DAILY
Status: DISCONTINUED | OUTPATIENT
Start: 2021-06-16 | End: 2021-06-16 | Stop reason: CLARIF

## 2021-06-16 RX ORDER — LORAZEPAM 1 MG/1
1 TABLET ORAL
Status: DISCONTINUED | OUTPATIENT
Start: 2021-06-16 | End: 2021-06-17 | Stop reason: HOSPADM

## 2021-06-16 RX ORDER — SODIUM CHLORIDE 9 MG/ML
25 INJECTION, SOLUTION INTRAVENOUS PRN
Status: DISCONTINUED | OUTPATIENT
Start: 2021-06-16 | End: 2021-06-17 | Stop reason: HOSPADM

## 2021-06-16 RX ORDER — BUSPIRONE HYDROCHLORIDE 10 MG/1
10 TABLET ORAL 2 TIMES DAILY
Status: DISCONTINUED | OUTPATIENT
Start: 2021-06-16 | End: 2021-06-17 | Stop reason: HOSPADM

## 2021-06-16 RX ORDER — LORAZEPAM 2 MG/ML
1 INJECTION INTRAMUSCULAR
Status: DISCONTINUED | OUTPATIENT
Start: 2021-06-16 | End: 2021-06-17 | Stop reason: HOSPADM

## 2021-06-16 RX ORDER — LORAZEPAM 1 MG/1
3 TABLET ORAL
Status: DISCONTINUED | OUTPATIENT
Start: 2021-06-16 | End: 2021-06-17 | Stop reason: HOSPADM

## 2021-06-16 RX ADMIN — THIAMINE HYDROCHLORIDE 100 MG: 100 INJECTION, SOLUTION INTRAMUSCULAR; INTRAVENOUS at 16:05

## 2021-06-16 RX ADMIN — Medication 10 ML: at 21:25

## 2021-06-16 RX ADMIN — BUSPIRONE HYDROCHLORIDE 10 MG: 10 TABLET ORAL at 21:25

## 2021-06-16 RX ADMIN — SODIUM CHLORIDE: 9 INJECTION, SOLUTION INTRAVENOUS at 16:05

## 2021-06-16 RX ADMIN — THERA TABS 1 TABLET: TAB at 16:05

## 2021-06-16 RX ADMIN — LORAZEPAM 2 MG: 2 INJECTION INTRAMUSCULAR; INTRAVENOUS at 18:46

## 2021-06-16 RX ADMIN — LORAZEPAM 1 MG: 2 INJECTION INTRAMUSCULAR; INTRAVENOUS at 11:07

## 2021-06-16 RX ADMIN — CLONIDINE HYDROCHLORIDE 0.1 MG: 0.1 TABLET ORAL at 21:25

## 2021-06-16 RX ADMIN — PANTOPRAZOLE SODIUM 40 MG: 40 INJECTION, POWDER, FOR SOLUTION INTRAVENOUS at 11:08

## 2021-06-16 RX ADMIN — DIAZEPAM 20 MG: 10 TABLET ORAL at 09:45

## 2021-06-16 RX ADMIN — ONDANSETRON 4 MG: 4 TABLET, ORALLY DISINTEGRATING ORAL at 16:05

## 2021-06-16 RX ADMIN — FOLIC ACID: 5 INJECTION, SOLUTION INTRAMUSCULAR; INTRAVENOUS; SUBCUTANEOUS at 11:15

## 2021-06-16 RX ADMIN — LORAZEPAM 3 MG: 2 INJECTION INTRAMUSCULAR; INTRAVENOUS at 16:04

## 2021-06-16 ASSESSMENT — PAIN DESCRIPTION - LOCATION
LOCATION: HEAD
LOCATION: CHEST

## 2021-06-16 ASSESSMENT — PAIN - FUNCTIONAL ASSESSMENT: PAIN_FUNCTIONAL_ASSESSMENT: 0-10

## 2021-06-16 ASSESSMENT — PAIN SCALES - GENERAL
PAINLEVEL_OUTOF10: 7
PAINLEVEL_OUTOF10: 0
PAINLEVEL_OUTOF10: 7

## 2021-06-16 ASSESSMENT — PAIN SCALES - WONG BAKER
WONGBAKER_NUMERICALRESPONSE: 0
WONGBAKER_NUMERICALRESPONSE: 0

## 2021-06-16 NOTE — ED NOTES
Pt going to [phone] report called to [facility]. then pt to room via wheelchair per Lowell Aceves in stable condition     Say Rollins RN  06/16/21 2035

## 2021-06-16 NOTE — ED PROVIDER NOTES
Heart Disease in her brother, father, maternal grandfather, maternal grandmother, paternal aunt, and paternal uncle; Other in her paternal grandfather and paternal grandmother; Stroke in her mother; Substance Abuse in her brother, father, and mother. She reports that she quit smoking about 33 years ago. Her smoking use included cigarettes. She has a 5.00 pack-year smoking history. She has never used smokeless tobacco. She reports current alcohol use of about 56.0 standard drinks of alcohol per week. She reports that she does not use drugs. Medications     Previous Medications    BUSPIRONE (BUSPAR) 10 MG TABLET    TAKE ONE TABLET BY MOUTH TWICE A DAY    CHOLECALCIFEROL (VITAMIN D3) 25 MCG (1000 UT) CAPS    Take 1 capsule by mouth daily    CLONIDINE (CATAPRES) 0.1 MG TABLET    TAKE ONE TABLET BY MOUTH TWICE A DAY    FOLIC ACID (FOLVITE) 1 MG TABLET    Take 1 tablet by mouth daily    FUROSEMIDE (LASIX) 20 MG TABLET    TAKE ONE TABLET BY MOUTH DAILY    HYDROXYZINE (VISTARIL) 25 MG CAPSULE    Take 1 cap TID PRN for anxiety    LORAZEPAM (ATIVAN) 1 MG TABLET        MULTIPLE VITAMINS-MINERALS (THERAPEUTIC MULTIVITAMIN-MINERALS) TABLET    Take 1 tablet by mouth Daily with supper    ONDANSETRON (ZOFRAN) 4 MG TABLET    Take 1 tablet by mouth every 8 hours as needed for Nausea or Vomiting    POTASSIUM CHLORIDE (KLOR-CON M) 10 MEQ EXTENDED RELEASE TABLET    TAKE ONE TABLET BY MOUTH DAILY    PROGESTERONE (PROMETRIUM) 200 MG CAPS CAPSULE    Take 1 capsule by mouth nightly    VITAMIN B-1 (THIAMINE) 100 MG TABLET    Take 100 mg by mouth 2 times daily    VITAMIN C (ASCORBIC ACID) 500 MG TABLET    Take 500 mg by mouth daily    ZOLPIDEM (AMBIEN) 5 MG TABLET    Take 1 tablet by mouth nightly as needed for Sleep for up to 15 days. ZOLPIDEM (AMBIEN) 5 MG TABLET    Take 1 tablet by mouth nightly as needed for Sleep for up to 15 days.     ZOLPIDEM (AMBIEN) 5 MG TABLET    Take 1 tablet by mouth nightly as needed for Sleep for up to 15 days.       Allergies     She is allergic to penicillins and sulfa antibiotics. Physical Exam     INITIAL VITALS: BP: (!) 160/78, Temp: 98.5 °F (36.9 °C), Pulse: 126, Resp: 20, SpO2: 100 %   General: 70-year-old female sitting in bed appears tremulous and somewhat uncomfortable but not in any acute cardiorespiratory distress  HEENT:  head is atraumatic, pupils equal round and reactive to light, sclera are clear, oropharynx is nonerythematous  Neck: supple, no lymphadenopathy  Chest: nonlabored respirations, equal chest rise bilaterally, no accessory muscle use  Cardiovascular: Tachycardic rate with a regular rhythm, 2+ radial pulses bilaterally, capillary refill 2 seconds  Abdominal: Soft, nontender, nondistended, positive bowel sounds throughout, no rebound or guarding  Skin: Warm, dry well perfused, no rashes  Musculoskeletal: Mild peripheral edema noted in all 4 extremities, no bony deformities are noted  Neurologic:  alert and oriented x4, speech is clear and intact without dysarthria, gait is intact, mild tremulousness noted in all 4 extremities  Rectal: External hemorrhoids which are nonthrombosed the patient has formed dark brown stool in the rectal vault    Diagnostic Results     EKG   Sinus tachycardia with a ventricular rate of 126 the patient has no evidence of any ST or T wave changes that would be indicative of active ischemia there is a normal axis normal intervals patient does have some generally poor anterior R wave progression. In comparison to a prior EKG that was done 10 Odalis of this year there are no significant changes.     RADIOLOGY:  No orders to display       LABS:   Results for orders placed or performed during the hospital encounter of 06/16/21   CBC Auto Differential   Result Value Ref Range    WBC 11.0 4.0 - 11.0 K/uL    RBC 4.21 4.00 - 5.20 M/uL    Hemoglobin 13.7 12.0 - 16.0 g/dL    Hematocrit 40.4 36.0 - 48.0 %    MCV 96.0 80.0 - 100.0 fL    MCH 32.5 26.0 - 34.0 pg    MCHC 33.8 31.0 - 36.0 g/dL    RDW 15.1 12.4 - 15.4 %    Platelets 152 269 - 777 K/uL    MPV 8.0 5.0 - 10.5 fL    Neutrophils % 71.2 %    Lymphocytes % 22.4 %    Monocytes % 4.6 %    Eosinophils % 1.5 %    Basophils % 0.3 %    Neutrophils Absolute 7.9 (H) 1.7 - 7.7 K/uL    Lymphocytes Absolute 2.5 1.0 - 5.1 K/uL    Monocytes Absolute 0.5 0.0 - 1.3 K/uL    Eosinophils Absolute 0.2 0.0 - 0.6 K/uL    Basophils Absolute 0.0 0.0 - 0.2 K/uL   Basic Metabolic Panel   Result Value Ref Range    Sodium 135 (L) 136 - 145 mmol/L    Potassium 4.1 3.5 - 5.1 mmol/L    Chloride 97 (L) 99 - 110 mmol/L    CO2 20 (L) 21 - 32 mmol/L    Anion Gap 18 (H) 3 - 16    Glucose 121 (H) 70 - 99 mg/dL    BUN 11 7 - 20 mg/dL    CREATININE 0.6 0.6 - 1.2 mg/dL    GFR Non-African American >60 >60    GFR African American >60 >60    Calcium 8.9 8.3 - 10.6 mg/dL   Phosphorus   Result Value Ref Range    Phosphorus 3.3 2.5 - 4.9 mg/dL   Magnesium   Result Value Ref Range    Magnesium 1.70 (L) 1.80 - 2.40 mg/dL   Protime-INR   Result Value Ref Range    Protime 10.3 10.0 - 13.2 sec    INR 0.89 0.86 - 1.14   APTT   Result Value Ref Range    aPTT 29.8 24.2 - 36.2 sec   Hepatic Function Panel   Result Value Ref Range    Total Protein 7.9 6.4 - 8.2 g/dL    Albumin 4.3 3.4 - 5.0 g/dL    Alkaline Phosphatase 65 40 - 129 U/L    ALT 18 10 - 40 U/L    AST 34 15 - 37 U/L    Total Bilirubin 0.5 0.0 - 1.0 mg/dL    Bilirubin, Direct <0.2 0.0 - 0.3 mg/dL    Bilirubin, Indirect see below 0.0 - 1.0 mg/dL   Lipase   Result Value Ref Range    Lipase 37.0 13.0 - 60.0 U/L   Ethanol   Result Value Ref Range    Ethanol Lvl None Detected mg/dL   Blood Gas, Venous   Result Value Ref Range    pH, [place] 7.444 7.350 - 7.450    pCO2, [place] 35.4 (L) 41.0 - 51.0 mmHg    pO2, [place] 41.0 (H) 25 - 40 mmHg    HCO3, Venous 24.3 24.0 - 28.0 mmol/L    Base Excess, [place] 0.6 -2.0 - 3.0 mmol/L    O2 Sat, [place] 76 Not established %    Carboxyhemoglobin 1.1 0.0 - 1.5 %    MetHgb, [place] 0.0 0.0 - 1.5 %    TC02 (Calc), [place] 25 mmol/L    Hemoglobin, [place], Reduced 23.80 %   Lactic Acid, Plasma   Result Value Ref Range    Lactic Acid 2.8 (H) 0.4 - 2.0 mmol/L   Troponin   Result Value Ref Range    Troponin <0.01 <0.01 ng/mL   POCT Blood Occult   Result Value Ref Range    POC Occult Blood Stool Negative Negative       RECENT VITALS:  BP: (!) 143/70, Temp: 98.5 °F (36.9 °C), Pulse: 111, Resp: 14, SpO2: 97 %       ED Course     Nursing Notes, Past Medical Hx, Past Surgical Hx, Social Hx, Allergies, and Family Hx were reviewed. The patient was given the following medications:  Orders Placed This Encounter   Medications    diazePAM (VALIUM) tablet 20 mg    sodium chloride 0.9 % 50 mL with folic acid 1 mg, thiamine 100 mg    LORazepam (ATIVAN) injection 1 mg    pantoprazole (PROTONIX) injection 40 mg       CONSULTS:  IP CONSULT TO HOSPITALIST    [facility] / ASSESSMENT / Beena Jaziel is a 76 y.o. female who is a history of alcohol abuse and dependence who presents emergency department with a concern that she is undergoing alcohol withdrawal as well as concerns for a GI bleed. On presentation the patient was tachycardic mildly hypertensive and tremulous with significant concern for active alcohol withdrawal.  Was given a dose of 20 mg of Valium by mouth initially. She remained tachycardic and tremulous following this and needed an additional dose of 1 mg of Ativan IV. Patient's laboratory investigations were remarkable for a mildly elevated anion gap to 18 ethanol was undetectable her BUN was 11 creatinine was 0.6 occult blood was negative CBC shows a stable hemoglobin from prior values at 13.7 she had LFTs and lipase without marked abnormalities. INR was 0.89.   Overall this patient who has a prior history of alcohol dependence and multiple devious presentations for alcohol withdrawal but is never had complaints of upper GI bleed or GI bleed in the past.  Was concern for the possibility of alcoholic gastritis she could also have had some Oriana-Silver tear as she does not appear to have significant ongoing bleeding however she does remain tachycardic and is tremulous in the setting of significant doses of benzodiazepines I do feel as if she requires admission admission to the hospital for further care and management to include further treatment of alcohol withdrawal and observation of her hemoglobin to ensure no significant drops given the recency of onset of her GI bleed symptoms. Could also consider upper endoscopy to further evaluate. She was given Protonix here in the emergency department given the absence of known history of severe cirrhosis I did not feels of octreotide or antibiotics were warranted in the situation. Clinical Impression     1. Alcohol withdrawal syndrome with complication ([address]    2. Hematemesis, presence of nausea not specified        Disposition     PATIENT REFERRED TO:  No follow-up provider specified.     DISCHARGE MEDICATIONS:  New Prescriptions    No medications on file       DISPOSITION Decision To Admit 06/16/2021 10:50:25 AM         Danielle Vo MD  06/16/21 105

## 2021-06-16 NOTE — H&P
Hospital Medicine History & Physical      PCP: No primary care provider on file. Date of Admission: 6/16/2021    Date of Service: Pt seen/examined on 6/16/2021 and Admitted to Inpatient with expected LOS greater than two midnights due to medical therapy. Chief Complaint: Anxiety, hematemesis. History Of Present Illness:  Patient is 76-year-old female with history of anxiety,, sleep disorder, alcohol abuse. Has been to multiple inpatient rehab but every time. Came into ER with a complaint of alcohol withdrawal symptoms anxiety, nausea and vomiting dysthymic masses and dark stool. Lightheaded and dizziness. Patient reported that she is still drinking 3 L of wine daily. Recent note from sleep specialist and PCP reviewed there was concern for drug abuse she was recommended to follow-up with psychiatrist.  On arrival hemodynamically stable. Blood work within normal limit. Magnesium 1.70. EKG sinus tachycardia no acute ST changes. Fecal occult blood negative. Past Medical History:          Diagnosis Date    Alcohol abuse, continuous 11/19/2019    Alcohol dependence with withdrawal ([facility] 9/13/2019    Alcohol withdrawal delirium ([facility] 6/1/2019    Alcoholism ([facility]     Anxiety     Ataxia     Depression     Hyperlipidemia     Hypertension        Past Surgical History:          Procedure Laterality Date    BACK SURGERY      1996 L5 disectomy    COLONOSCOPY      2016    ENDOSCOPY, COLON, DIAGNOSTIC      2016       Medications Prior to Admission:      Prior to Admission medications    Medication Sig Start Date End Date Taking? Authorizing Provider   LORazepam (ATIVAN) 1 MG tablet  5/20/21   Historical Provider, MD   zolpidem (AMBIEN) 5 MG tablet Take 1 tablet by mouth nightly as needed for Sleep for up to 15 days. 6/2/21 6/17/21  Keya Silva MD   zolpidem (AMBIEN) 5 MG tablet Take 1 tablet by mouth nightly as needed for Sleep for up to 15 days.  6/18/21 7/3/21  Keya Silva MD zolpidem (AMBIEN) 5 MG tablet Take 1 tablet by mouth nightly as needed for Sleep for up to 15 days. 7/4/21 7/19/21  Blake Leiva MD   furosemide (LASIX) 20 MG tablet TAKE ONE TABLET BY MOUTH DAILY 5/24/21   NAZ Waller CNP   cloNIDine (CATAPRES) 0.1 MG tablet TAKE ONE TABLET BY MOUTH TWICE A DAY 5/18/21   NAZ Waller CNP   progesterone (PROMETRIUM) 200 MG CAPS capsule Take 1 capsule by mouth nightly 4/22/21   Historical Provider, MD   potassium chloride (KLOR-CON M) 10 MEQ extended release tablet TAKE ONE TABLET BY MOUTH DAILY 4/23/21   NAZ Waller CNP   busPIRone (BUSPAR) 10 MG tablet TAKE ONE TABLET BY MOUTH TWICE A DAY 3/10/21   NAZ Waller CNP   furosemide (LASIX) 20 MG tablet Take 1 tablet by mouth daily 1/26/21   NAZ Waller CNP   hydrOXYzine (VISTARIL) 25 MG capsule Take 1 cap TID PRN for anxiety 11/11/20   NAZ Waller CNP   Multiple Vitamins-Minerals (THERAPEUTIC MULTIVITAMIN-MINERALS) tablet Take 1 tablet by mouth Daily with supper 5/15/20   Branden Segovia MD   vitamin B-1 (THIAMINE) 100 MG tablet Take 100 mg by mouth 2 times daily    Historical Provider, MD   vitamin C (ASCORBIC ACID) 500 MG tablet Take 500 mg by mouth daily    Historical Provider, MD   Cholecalciferol (VITAMIN D3) 25 MCG (1000 UT) CAPS Take 1 capsule by mouth daily    Historical Provider, MD   ondansetron (ZOFRAN) 4 MG tablet Take 1 tablet by mouth every 8 hours as needed for Nausea or Vomiting 10/14/19   Gabino Carvajal MD   folic acid (FOLVITE) 1 MG tablet Take 1 tablet by mouth daily 10/14/19   Gabino Carvajal MD       Allergies:  Penicillins and Sulfa antibiotics    Social History:      The patient currently lives home    TOBACCO:   reports that she quit smoking about 33 years ago. Her smoking use included cigarettes. She has a 5.00 pack-year smoking history.  She has never used smokeless tobacco.  ETOH:   reports current alcohol use of about 56.0 standard drinks of alcohol per week. Family History:       Reviewed in detail and . Positive as follows:        Problem Relation Age of Onset    Stroke Mother     Substance Abuse Mother     Substance Abuse Father     Heart Disease Father     Heart Disease Brother     Substance Abuse Brother     Heart Disease Paternal Aunt     Heart Disease Paternal Uncle     Heart Disease Maternal Grandmother     Diabetes Maternal Grandmother     Diabetes Maternal Grandfather     Heart Disease Maternal Grandfather     Other Paternal Grandmother     Other Paternal Grandfather        REVIEW OF SYSTEMS:   Pertinent positives as noted in the HPI. All other systems reviewed and negative. PHYSICAL EXAM PERFORMED:    BP (!) 145/75   Pulse 108   Temp 97.9 °F (36.6 °C) (Oral)   Resp 16   SpO2 97%     General appearance:   appears stated age and cooperative. HEENT:  Normal cephalic, atraumatic without obvious deformity. Pupils equal, round, and reactive to light. Extra ocular muscles intact. Conjunctivae/corneas clear. Neck: Supple, with full range of motion. No jugular venous distention. Trachea midline. Respiratory:  Normal respiratory effort. Clear to auscultation, bilaterally without Rales/Wheezes/Rhonchi. Cardiovascular:  Regular rate and rhythm with normal S1/S2 without murmurs, rubs or gallops. Abdomen: Soft, non-tender, non-distended with normal bowel sounds. Musculoskeletal:  No clubbing, cyanosis or edema bilaterally. Full range of motion without deformity. Skin: Skin color, texture, turgor normal.  No rashes or lesions. Neurologic:  Neurovascularly intact without any focal sensory/motor deficits.  Cranial nerves: II-XII intact, grossly non-focal.  Psychiatric:  Alert and oriented, anxious  Capillary Refill: Brisk,< 3 seconds   Peripheral Pulses: +2 palpable, equal bilaterally       Labs:     Recent Labs     06/16/21  0936   WBC 11.0   HGB 13.7   HCT 40.4        Recent Labs     06/16/21  0936   *   K

## 2021-06-17 ENCOUNTER — APPOINTMENT (OUTPATIENT)
Dept: CT IMAGING | Age: 66
DRG: 894 | End: 2021-06-17
Payer: MEDICARE

## 2021-06-17 VITALS
SYSTOLIC BLOOD PRESSURE: 155 MMHG | HEIGHT: 64 IN | TEMPERATURE: 98.2 F | RESPIRATION RATE: 18 BRPM | BODY MASS INDEX: 28.91 KG/M2 | HEART RATE: 82 BPM | WEIGHT: 169.31 LBS | DIASTOLIC BLOOD PRESSURE: 88 MMHG | OXYGEN SATURATION: 98 %

## 2021-06-17 LAB
A/G RATIO: 1.2 (ref 1.1–2.2)
ALBUMIN SERPL-MCNC: 4.1 G/DL (ref 3.4–5)
ALP BLD-CCNC: 67 U/L (ref 40–129)
ALT SERPL-CCNC: 16 U/L (ref 10–40)
ANION GAP SERPL CALCULATED.3IONS-SCNC: 11 MMOL/L (ref 3–16)
AST SERPL-CCNC: 31 U/L (ref 15–37)
BASOPHILS ABSOLUTE: 0 K/UL (ref 0–0.2)
BASOPHILS RELATIVE PERCENT: 0.4 %
BILIRUB SERPL-MCNC: 0.8 MG/DL (ref 0–1)
BUN BLDV-MCNC: 10 MG/DL (ref 7–20)
CALCIUM SERPL-MCNC: 8.6 MG/DL (ref 8.3–10.6)
CHLORIDE BLD-SCNC: 105 MMOL/L (ref 99–110)
CO2: 21 MMOL/L (ref 21–32)
CREAT SERPL-MCNC: 0.7 MG/DL (ref 0.6–1.2)
EOSINOPHILS ABSOLUTE: 0.3 K/UL (ref 0–0.6)
EOSINOPHILS RELATIVE PERCENT: 4.2 %
GFR AFRICAN AMERICAN: >60
GFR NON-AFRICAN AMERICAN: >60
GLOBULIN: 3.4 G/DL
GLUCOSE BLD-MCNC: 101 MG/DL (ref 70–99)
HCT VFR BLD CALC: 40.5 % (ref 36–48)
HCT VFR BLD CALC: 42 % (ref 36–48)
HEMOGLOBIN: 14 G/DL (ref 12–16)
HEMOGLOBIN: 14.3 G/DL (ref 12–16)
LYMPHOCYTES ABSOLUTE: 2.2 K/UL (ref 1–5.1)
LYMPHOCYTES RELATIVE PERCENT: 29.7 %
MCH RBC QN AUTO: 33.2 PG (ref 26–34)
MCHC RBC AUTO-ENTMCNC: 34.6 G/DL (ref 31–36)
MCV RBC AUTO: 95.8 FL (ref 80–100)
MONOCYTES ABSOLUTE: 0.5 K/UL (ref 0–1.3)
MONOCYTES RELATIVE PERCENT: 7.3 %
NEUTROPHILS ABSOLUTE: 4.3 K/UL (ref 1.7–7.7)
NEUTROPHILS RELATIVE PERCENT: 58.4 %
PDW BLD-RTO: 15 % (ref 12.4–15.4)
PLATELET # BLD: 131 K/UL (ref 135–450)
PMV BLD AUTO: 7.6 FL (ref 5–10.5)
POTASSIUM REFLEX MAGNESIUM: 3.7 MMOL/L (ref 3.5–5.1)
RBC # BLD: 4.22 M/UL (ref 4–5.2)
SODIUM BLD-SCNC: 137 MMOL/L (ref 136–145)
TOTAL PROTEIN: 7.5 G/DL (ref 6.4–8.2)
WBC # BLD: 7.3 K/UL (ref 4–11)

## 2021-06-17 PROCEDURE — 2580000003 HC RX 258: Performed by: INTERNAL MEDICINE

## 2021-06-17 PROCEDURE — 36415 COLL VENOUS BLD VENIPUNCTURE: CPT

## 2021-06-17 PROCEDURE — 85014 HEMATOCRIT: CPT

## 2021-06-17 PROCEDURE — 85025 COMPLETE CBC W/AUTO DIFF WBC: CPT

## 2021-06-17 PROCEDURE — 6370000000 HC RX 637 (ALT 250 FOR IP): Performed by: INTERNAL MEDICINE

## 2021-06-17 PROCEDURE — 85018 HEMOGLOBIN: CPT

## 2021-06-17 PROCEDURE — 70450 CT HEAD/BRAIN W/O DYE: CPT

## 2021-06-17 PROCEDURE — 80053 COMPREHEN METABOLIC PANEL: CPT

## 2021-06-17 PROCEDURE — 6360000002 HC RX W HCPCS: Performed by: INTERNAL MEDICINE

## 2021-06-17 RX ORDER — FOLIC ACID 1 MG/1
1 TABLET ORAL DAILY
Status: DISCONTINUED | OUTPATIENT
Start: 2021-06-17 | End: 2021-06-17 | Stop reason: HOSPADM

## 2021-06-17 RX ORDER — DOCUSATE SODIUM 100 MG/1
100 CAPSULE, LIQUID FILLED ORAL 2 TIMES DAILY PRN
COMMUNITY

## 2021-06-17 RX ORDER — CHLORDIAZEPOXIDE HYDROCHLORIDE 25 MG/1
25 CAPSULE, GELATIN COATED ORAL 3 TIMES DAILY PRN
Status: ON HOLD | COMMUNITY
End: 2021-06-17 | Stop reason: CLARIF

## 2021-06-17 RX ORDER — NIFEDIPINE 60 MG/1
60 TABLET, EXTENDED RELEASE ORAL DAILY
COMMUNITY
End: 2021-06-21

## 2021-06-17 RX ORDER — CALCIUM POLYCARBOPHIL 625 MG 625 MG/1
625 TABLET ORAL DAILY
COMMUNITY
End: 2022-04-07

## 2021-06-17 RX ADMIN — ONDANSETRON 4 MG: 4 TABLET, ORALLY DISINTEGRATING ORAL at 10:16

## 2021-06-17 RX ADMIN — BUSPIRONE HYDROCHLORIDE 10 MG: 10 TABLET ORAL at 07:55

## 2021-06-17 RX ADMIN — HYDROXYZINE PAMOATE 25 MG: 25 CAPSULE ORAL at 10:16

## 2021-06-17 RX ADMIN — THIAMINE HYDROCHLORIDE 100 MG: 100 INJECTION, SOLUTION INTRAMUSCULAR; INTRAVENOUS at 07:55

## 2021-06-17 RX ADMIN — ENOXAPARIN SODIUM 40 MG: 40 INJECTION SUBCUTANEOUS at 07:55

## 2021-06-17 RX ADMIN — THERA TABS 1 TABLET: TAB at 07:55

## 2021-06-17 RX ADMIN — CLONIDINE HYDROCHLORIDE 0.1 MG: 0.1 TABLET ORAL at 07:55

## 2021-06-17 RX ADMIN — SODIUM CHLORIDE: 9 INJECTION, SOLUTION INTRAVENOUS at 00:30

## 2021-06-17 RX ADMIN — PANTOPRAZOLE SODIUM 40 MG: 40 TABLET, DELAYED RELEASE ORAL at 05:04

## 2021-06-17 RX ADMIN — HYDROXYZINE PAMOATE 25 MG: 25 CAPSULE ORAL at 17:23

## 2021-06-17 RX ADMIN — FUROSEMIDE 20 MG: 20 TABLET ORAL at 07:55

## 2021-06-17 RX ADMIN — FOLIC ACID 1 MG: 1 TABLET ORAL at 17:23

## 2021-06-17 RX ADMIN — LORAZEPAM 1 MG: 1 TABLET ORAL at 00:43

## 2021-06-17 ASSESSMENT — PAIN SCALES - GENERAL
PAINLEVEL_OUTOF10: 0

## 2021-06-17 ASSESSMENT — PAIN SCALES - WONG BAKER
WONGBAKER_NUMERICALRESPONSE: 0
WONGBAKER_NUMERICALRESPONSE: 0

## 2021-06-17 NOTE — DISCHARGE INSTR - COC
Continuity of Care Form    Patient Name: Brian Castillo   :  1955  MRN:  [id number]    Admit date:  2021  Discharge date:  ***    Code Status Order: Full Code   Advance Directives:   Advance Care Flowsheet Documentation     Date/Time Healthcare Directive Type of Healthcare Directive Copy in [address] Agent's Name Healthcare Agent's Phone Number    21 8305  No, patient does not have an advance directive for healthcare treatment -- -- -- -- --    21 1717  No, patient does not have an advance directive for healthcare treatment -- -- -- -- --          Admitting Physician:  Sully Hall MD  PCP: No primary care provider on file.     Discharging Nurse: [facility]/Room#: 8618/1793-08  Discharging Unit Phone Number: ***    Emergency Contact:   Extended Emergency Contact Information  Primary Emergency Contact: Terence Lujan  Address: [address] Phone: [phone]  Mobile Phone: [phone]  Relation: Spouse    Past Surgical History:  Past Surgical History:   Procedure Laterality Date    BACK SURGERY       L5 disectomy    COLONOSCOPY      2016    ENDOSCOPY, COLON, DIAGNOSTIC      2016       Immunization History:   Immunization History   Administered Date(s) Administered    Zoster Live (Zostavax) 2014       Active Problems:  Patient Active Problem List   Diagnosis Code    Essential hypertension I10    Alcohol withdrawal syndrome[facility] uncomplicated ([facility] U23.284    Depression F32.9    Alcoholism /alcohol abuse [id number]    Acute encephalopathy G93.40    Ataxia B84.6    Alcoholic hepatitis P79.63    Family history of heart disease Z82.49    Macrocytic anemia D53.9    Mixed hyperlipidemia E78.2    Cataract of both eyes H26.9    Recurrent major depressive disorder, in partial remission (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 ([facility] F13.182    Anxiety F41.9    Insomnia, unspecified G47.00    Alcohol intoxication in active alcoholic, with unspecified complication ([facility] P36.481       Isolation/Infection:   Isolation          No Isolation        Patient Infection Status     None to display          Nurse Assessment:  Last Vital Signs: /83   Pulse 85   Temp 97.6 °F (36.4 °C) (Oral)   Resp 18   Ht 5' 4.02\" (1.626 m)   Wt 169 lb 5 oz (76.8 kg)   SpO2 99%   BMI 29.05 kg/m²     Last documented pain score (0-10 scale): Pain Level: 0  Last Weight:   Wt Readings from Last 1 Encounters:   06/16/21 169 lb 5 oz (76.8 kg)     Mental Status:  {IP PT MENTAL STATUS:20030}    IV Access:  { VICKI IV ACCESS:700970809}    Nursing Mobility/ADLs:  Walking   {P DME LCQU:571575629}  Transfer  {[facility] DME HPRK:224790139}  Bathing  {[facility] DME PZIB:719243956}  Dressing  {P DME GLCR:213244842}  Toileting  {P DME NRYU:536377275}  Feeding  {[facility] DME LCDS:502231406}  Med Admin  {[facility] DME AZYR:931279772}  Med Delivery   { VICKI MED Delivery:414292639}    Wound Care Documentation and Therapy:        Elimination:  Continence:   · Bowel: {YES / KS:70814}  · Bladder: {YES / VS:05662}  Urinary Catheter: {Urinary Catheter:811109465}   Colostomy/Ileostomy/Ileal Conduit: {YES / VL:23912}       Date of Last BM: ***    Intake/Output Summary (Last 24 hours) at 6/17/2021 1006  Last data filed at 6/17/2021 0330  Gross per 24 hour   Intake 1947.08 ml   Output 0 ml   Net 1947.08 ml     I/O last 3 completed shifts: In: 1947.1 [P.O.:360; I.V.:1487.1;  IV Piggyback:100]  Out: 0     Safety Concerns:     [facility] Medical Safety Concerns:730268863}    Impairments/Disabilities:      [address] Impairments/Disabilities:613251193}    Nutrition Therapy:  Current Nutrition Therapy:   [address] Diet List:887755203}    Routes of Feeding: {CHP DME Other Feedings:423515717}  Liquids: {Slp liquid thickness:69208}  Daily Fluid Restriction: {ALEXANDRA DOUGLASS Yes amt FVHLUZS:182346762}  Last Modified Barium Swallow with Video (Video Swallowing Test): {[facility]515701118}    Treatments at the Time of Hospital Discharge:   Respiratory Treatments: ***  Oxygen Therapy:  {Therapy; copd oxygen:17188}  Ventilator:    {MH CC Vent LQWV:977905286}    Rehab Therapies: {THERAPEUTIC INTERVENTION:0578734575}  Weight Bearing Status/Restrictions: { CC Weight Bearin}  Other Medical Equipment (for information only, NOT a DME order):  {EQUIPMENT:936700492}  Other Treatments: ***    Patient's personal belongings (please select all that are sent with patient):  {[facility] DME Belongings:011661600}    RN SIGNATURE:  {Esignature:389105147}    CASE MANAGEMENT/SOCIAL WORK SECTION    Inpatient Status Date: ***    Readmission Risk Assessment Score:  Readmission Risk              Risk of Unplanned Readmission:  19           Discharging to Facility/ Agency   · Name:   · Address:  · Phone:  · Fax:    Dialysis Facility (if applicable)   · Name:  · Address:  · Dialysis Schedule:  · Phone:  · Fax:    / signature: {Esignature:126164411}    PHYSICIAN SECTION    Prognosis: {Prognosis:9933961463}    Condition at Discharge: [address] [place] [address] Patient Condition:914038083}    Rehab Potential (if transferring to Rehab): {Prognosis:9087895484}    Recommended Labs or Other Treatments After Discharge: ***    Physician Certification: I certify the above information and transfer of Anat Rojo  is necessary for the continuing treatment of the diagnosis listed and that she requires {Admit to Appropriate Level of Care:85974} for {GREATER/LESS:856305473} 30 days.      Update Admission H&P: {P DME Changes in YNDEQ:102562427}    PHYSICIAN SIGNATURE:  {Esignature:835217485}

## 2021-06-17 NOTE — FLOWSHEET NOTE
06/17/21 4298   Encounter Summary   Services provided to: Patient   Referral/Consult From:   Jolly Chavez RN)   Continue Visiting   (6/17, syd   F/U @ 2:30 or so)   Complexity of Encounter High   Length of Encounter 30 minutes   Routine   Type Initial   Assessment Calm; Approachable; Hopeful   Intervention Active listening;Explored feelings, thoughts, concerns;Nurtured hope   Outcome Comfort;Expressed gratitude;Engaged in conversation   Advance Directives (For Healthcare)   Healthcare Directive No, patient does not have an advance directive for healthcare treatment   [facility]   Patient Requests Assistance Yes, referral made to    Advance Directives Documents given;Documents explained   Staff Corinna Conner, [address] [place] [address] Avelina Mo

## 2021-06-17 NOTE — PLAN OF CARE
Problem: Coping:  Goal: Ability to cope will improve  Description: Ability to cope will improve  6/17/2021 0834 by Jo Pryor RN  Outcome: Ongoing  6/17/2021 0402 by Desi Payton RN  Outcome: Met This Shift  Goal: Ability to identify appropriate support needs will improve  Description: Ability to identify appropriate support needs will improve  6/17/2021 0834 by Jo Pryor RN  Outcome: Ongoing  6/17/2021 0402 by Desi Payton RN  Outcome: Met This Shift     Problem: Health Behavior:  Goal: Ability to manage health-related needs will improve  Description: Ability to manage health-related needs will improve  6/17/2021 0834 by Jo Pryor RN  Outcome: Ongoing  6/17/2021 0402 by Desi Payton RN  Outcome: Met This Shift     Problem: Physical Regulation:  Goal: Signs of adequate cerebral perfusion will increase  Description: Signs of adequate cerebral perfusion will increase  6/17/2021 0834 by Jo Pryor RN  Outcome: Ongoing  6/17/2021 0402 by Desi Payton RN  Outcome: Met This Shift  Goal: Ability to maintain a stable neurologic state will improve  Description: Ability to maintain a stable neurologic state will improve  6/17/2021 0834 by Jo Pryor RN  Outcome: Ongoing  6/17/2021 0402 by Desi Payton RN  Outcome: Met This Shift

## 2021-06-17 NOTE — CONSULTS
Clinical Pharmacy Progress Note  Pharmacy to assist with Home Medication information    Admit date: 6/16/2021     Subjective/Objective:  Patient is a 66yr old female from home admitted with alcohol withdrawal symptoms. Pharmacy has been asked by Dr. Joao Giles to assist with obtaining home medication information. Assessment/Plan:    1. Home Medication information:    · Please refer to PCP office visit note in [facility] dated 6/2/21. This note provides a good review of her medications. · Also also pharmacy fills and OARRS report. · Home Medication List in Epic is complete. Changes made to medication list:   Medications removed (no longer taking):  · Ondansetron  · Vit B1, C, D  · Folic acid  · MVI  · Lorazepam -- last filled 5/20 for a five day supply  · Chlordiazepoxide -- list filled 6/11 for a five day supply    Medications added:   · Nifedipine  · Colace PRN   · Fiber       A Perfect Serve message has been sent to Dr. Joao Giles to discuss the updated [address] List.      Thank you, please call with questions.    Elissa Venegas PharmD., BCPS   6/17/2021 12:17 PM  Wireless: [phone]

## 2021-06-17 NOTE — CARE COORDINATION
Case Management Assessment           Initial Evaluation                Date / Time of Evaluation: 6/17/2021 10:33 AM                 Assessment Completed by: SAJI Garcia LSW    Patient Name: Feroz Avalos     YOB: 1955  Diagnosis: Alcohol intoxication in active alcoholic, with unspecified complication [facility]) [G03.372]     Date / Time: 6/16/2021  9:06 AM    Patient Admission Status: Inpatient    If patient is discharged prior to next notation, then this note serves as note for discharge by case management. Current PCP: No primary care provider on file. Clinic Patient: No    Chart Reviewed: Yes  Patient/ Family Interviewed: Yes    Initial assessment completed at bedside with: Patient    Hospitalization in the last 30 days: No    Emergency Contacts:  Extended Emergency Contact Information  Primary Emergency Contact: Terence Lujan  Address: [address], [address] [place] of [address] Phone: [phone]  Mobile Phone: [phone]  Relation: Spouse    Advance Directives:   Code Status: Full [address]: No    Financial  Payor: MEDICARE / Plan: MEDICARE PART A AND B / Product Type: *No Product type* /     Pre-cert required for SNF: No    Pharmacy    [address] [phone]  [place] [address]  Phone: [phone] Fax: [phone]      Potential assistance Purchasing Medications: Potential Assistance Purchasing Medications: No  Does Patient want to participate in local refill/ meds to beds program?: No    Meds To Beds General Rules:  1. Can ONLY be done Monday- Friday between 8:30am-5pm  2. Prescription(s) must be in pharmacy by 3pm to be filled same day  3. Copy of patient's insurance/ prescription drug card and patient face sheet must be sent along with the prescription(s)  4.  Cost of Rx cannot be added to hospital bill. If financial assistance is needed, please contact unit  or ;  or  CANNOT provide pharmacy voucher for patients co-pays  5. Patients can then  the prescription on their way out of the hospital at discharge, or pharmacy can deliver to the bedside if staff is available. (payment due at time of pick-up or delivery - cash, check, or card accepted)     Able to afford home medications/ co-pay costs: Yes    ADLS  Support Systems: Spouse/Significant Other, Friends/Neighbors    PT AM-PAC:   /24  OT AM-PAC:   /24    [place]: Pt lives in an apartment w/SO  Steps: 13 steps to get to apartmetn    Plans to RETURN to current housing: Yes  Barriers to RETURNING to current housing: [facility]  Currently ACTIVE with [facility] [address]: No  Home Care Agency: Not Applicable    Currently ACTIVE with [place] on Aging: No      Durable Medical Equipment  DME Provider: None  Equipment:     Home Oxygen and [address] [place] prior to admission: No  [address]: Not Applicable  Other Respiratory Equipment: N/A    Informed of need to bring portable home O2 tank on day of DISCHARGE for nursing to connect prior to leaving: No  Verbalized agreement/Understanding: No  Person to bring portable tank at discharge: N/A    Dialysis  Active with HD/PD prior to admission: No  Nephrologist: [address]:  Not Applicable    DISCHARGE PLAN:  Disposition: Home- No Services Needed    Transportation PLAN for discharge: Pt will need a Lyft at [place]     Factors facilitating achievement of predicted outcomes: Knowledge about rehab    Barriers to discharge: Not medically ready    Additional Case Management Notes: Pt from home w/SO, Pt plans to return home and possibly enroll in a Rehab program in [place]. Pt will need a Lyft at [place].     The Plan for Transition of Care is related to the following treatment goals of Alcohol intoxication in active alcoholic, with unspecified complication [facility]) [U81.095]    The Patient and/or patient representative Lea Arredondo and her family were provided with a choice of provider and agrees with the discharge plan Yes    Freedom of choice list was provided with basic dialogue that supports the patient's individualized plan of care/goals and shares the quality data associated with the providers.  Not Indicated    Care Transition patient: No    SAJI Ramos, [facility]  Case Management Department  Ph: [phone]   Fax: [phone]

## 2021-06-17 NOTE — PROGRESS NOTES
Hospitalist Progress Note      PCP: No primary care provider on file. Date of Admission: 6/16/2021    Chief Complaint: alcohol withdrawals    Hospital Course: Patient is 65-year-old female with history of anxiety,, sleep disorder, alcohol abuse. Has been to multiple inpatient rehab but every time. Came into ER with a complaint of alcohol withdrawal symptoms anxiety, nausea and vomiting.,  Hematemesis, dark bowel movement. Recent note from sleep specialist and PCP reviewed there was concern for drug abuse she was recommended to follow-up with psychiatrist.  On arrival hemodynamically stable. Blood work within normal limit. Magnesium 1.70. EKG sinus tachycardia no acute ST changes. Fecal occult blood negative. Subjective: Patient is a still feeling anxious. Felt like she is dizzy. Feeling shaky. With some nausea denies any vomiting. No acute event reported overnight.       Medications:  Reviewed    Infusion Medications    sodium chloride      sodium chloride 125 mL/hr at 06/17/21 0030     Scheduled Medications    sodium chloride flush  5-40 mL Intravenous 2 times per day    enoxaparin  40 mg Subcutaneous Daily    multivitamin  1 tablet Oral Daily    thiamine (VITAMIN B1) IVPB  100 mg Intravenous Daily    busPIRone  10 mg Oral BID    cloNIDine  0.1 mg Oral BID    furosemide  20 mg Oral Daily    pantoprazole  40 mg Oral QAM AC     PRN Meds: sodium chloride flush, sodium chloride, ondansetron **OR** ondansetron, polyethylene glycol, acetaminophen **OR** acetaminophen, LORazepam **OR** LORazepam **OR** LORazepam **OR** LORazepam **OR** LORazepam **OR** LORazepam **OR** LORazepam **OR** LORazepam, hydrOXYzine      Intake/Output Summary (Last 24 hours) at 6/17/2021 1149  Last data filed at 6/17/2021 0330  Gross per 24 hour   Intake 1947.08 ml   Output 0 ml   Net 1947.08 ml       Physical Exam Performed:    BP (!) 153/80   Pulse 82   Temp 97.5 °F (36.4 °C) (Oral)   Resp 18   Ht 5' 4.02\" (1.626 m)   Wt 169 lb 5 oz (76.8 kg)   SpO2 98%   BMI 29.05 kg/m²     General appearance: Tremulous. HEENT: Pupils equal, round, and reactive to light. Conjunctivae/corneas clear. Neck: Supple, with full range of motion. No jugular venous distention. Trachea midline. Respiratory:  Normal respiratory effort. Clear to auscultation, bilaterally without Rales/Wheezes/Rhonchi. Cardiovascular: Regular rate and rhythm with normal S1/S2 without murmurs, rubs or gallops. Abdomen: Soft, non-tender, non-distended with normal bowel sounds. Musculoskeletal: No clubbing, cyanosis or edema bilaterally. Full range of motion without deformity. Skin: Skin color, texture, turgor normal.  No rashes or lesions. Neurologic:  Neurovascularly intact without any focal sensory/motor deficits.  Cranial nerves: II-XII intact, grossly non-focal.  Psychiatric: Alert and oriented, thought content appropriate, normal insight  Capillary Refill: Brisk,< 3 seconds   Peripheral Pulses: +2 palpable, equal bilaterally       Labs:   Recent Labs     06/16/21  0936 06/17/21  0321   WBC 11.0 7.3   HGB 13.7 14.0   HCT 40.4 40.5    131*     Recent Labs     06/16/21  0936 06/17/21  0321   * 137   K 4.1 3.7   CL 97* 105   CO2 20* 21   BUN 11 10   CREATININE 0.6 0.7   CALCIUM 8.9 8.6   PHOS 3.3  --      Recent Labs     06/16/21  0936 06/17/21  0321   AST 34 31   ALT 18 16   BILIDIR <0.2  --    BILITOT 0.5 0.8   ALKPHOS 65 67     Recent Labs     06/16/21  0936   INR 0.89     Recent Labs     06/16/21 0936   TROPONINI <0.01       Urinalysis:      Lab Results   Component Value Date    NITRU POSITIVE 10/18/2020    WBCUA 6 10/18/2020    BACTERIA 1+ 10/18/2020    RBCUA 7 10/18/2020    BLOODU TRACE 10/18/2020    SPECGRAV 1.005 10/18/2020    GLUCOSEU Negative 10/18/2020       Radiology:  No orders to display           Assessment/Plan:    Active Hospital Problems    Diagnosis Date Noted    Alcohol intoxication in active alcoholic, with unspecified complication [facility]) [W89.382] 06/16/2021     #Alcohol abuse with withdrawals  Continue monitor on CIWA protocol. Ativan as needed. Thiamine, multivitamin, folic acid  I suspect patient has alcohol use disorder. She was referred for psychiatry from PCP encouraged to follow-up with psychiatrist as an outpatient. #Hematemesis suspected secondary to alcoholic gastritis. Hemoglobin is stable  Continue Protonix. Trend hemoglobin every 12 hours. #Depression  #Anxiety  #Insomnia  Continue BuSpar. Pharmacy to verify home medications. #Dizzy spells. PCP was planning to get CT head we will order while she is in hospital.  Patient had a TSH, vitamin M70, folic acid levels done in February 2021 which was normal.    DVT Prophylaxis: Lovenox  Diet: ADULT DIET; Regular; Low Sodium (2 gm)  Code Status: Full Code    PT/OT Eval Status: Consulted    Dispo -inpatient. Monitor for alcohol withdrawal.  Discussed with  and she will need referral for alcohol rehab.     Jason West MD

## 2021-06-21 RX ORDER — NIFEDIPINE 60 MG/1
TABLET, EXTENDED RELEASE ORAL
Qty: 90 TABLET | Refills: 0 | Status: SHIPPED | OUTPATIENT
Start: 2021-06-21 | End: 2021-09-17

## 2021-06-21 NOTE — TELEPHONE ENCOUNTER
Medication:   Requested Prescriptions     Pending Prescriptions Disp Refills    NIFEdipine (PROCARDIA XL) 60 MG extended release tablet [Pharmacy Med Name: NIFEdipine ER 60 MG TAB, 24 HR] 90 tablet 0     Sig: TAKE ONE TABLET BY MOUTH DAILY        Last Filled:      Patient Phone Number: [phone] (home)     Last appt: 6/2/2021   Next appt: Visit date not found    Last OARRS: No flowsheet data found.     Preferred Pharmacy:   [facility] [place] [address]  Phone: [phone] Fax: [phone]

## 2021-06-22 NOTE — DISCHARGE SUMMARY
Hospital Medicine Discharge Summary    Patient ID: Petrona Diamond      Patient's PCP: Jeff An, APRN - CNP    Admit Date: 6/16/2021     Discharge Date: 6/17/2021      Admitting Physician: Madhuri Mckoy MD     Discharge Physician: Madhuri Mckoy MD     Discharge Diagnoses: Active Hospital Problems    Diagnosis Date Noted    Alcohol intoxication in active alcoholic, with unspecified complication [facility]) [Z93.579] 06/16/2021       The patient was seen and examined on day of discharge and this discharge summary is in conjunction with any daily progress note from day of discharge. Hospital Course: Patient is 58-year-old female with history of anxiety, sleep disorder, alcohol abuse admitted for alcohol withdrawals. She left AMA. Please refer to progress note done on same day along with nursing notes. .    Final diagnosis  #Alcohol abuse with withdrawals  #Hematemesis suspect secondary to alcohol gastritis  #Depression  #Anxiety  #Insomnia          Physical Exam Performed:     BP (!) 155/88   Pulse 82   Temp 98.2 °F (36.8 °C) (Oral)   Resp 18   Ht 5' 4.02\" (1.626 m)   Wt 169 lb 5 oz (76.8 kg)   SpO2 98%   BMI 29.05 kg/m²       Patient left AMA    Labs: For convenience and continuity at follow-up the following most recent labs are provided:      CBC:    Lab Results   Component Value Date    WBC 7.3 06/17/2021    HGB 14.3 06/17/2021    HCT 42.0 06/17/2021     06/17/2021       Renal:    Lab Results   Component Value Date     06/17/2021    K 3.7 06/17/2021     06/17/2021    CO2 21 06/17/2021    BUN 10 06/17/2021    CREATININE 0.7 06/17/2021    CALCIUM 8.6 06/17/2021    PHOS 3.3 06/16/2021         Significant Diagnostic Studies    Radiology:   CT HEAD WO CONTRAST   Final Result      No acute hemorrhage.              Consults:     IP CONSULT TO HOSPITALIST  IP CONSULT TO SOCIAL WORK  IP CONSULT TO SPIRITUAL SERVICES  IP CONSULT TO PHARMACY    Disposition:       Condition at Discharge: Left AMA    Discharge Instructions/Follow-up: Left AMA    Code Status:  Prior     Activity: activity as tolerated    Diet: regular diet      Discharge Medications:     Discharge Medication List as of 6/17/2021  7:16 PM           Details   docusate sodium (COLACE) 100 MG capsule Take 100 mg by mouth 2 times daily as needed for ConstipationHistorical Med      polycarbophil (FIBERCON) 625 MG tablet Take 625 mg by mouth dailyHistorical Med      NIFEdipine (PROCARDIA XL) 60 MG extended release tablet Take 60 mg by mouth dailyHistorical Med      !! zolpidem (AMBIEN) 5 MG tablet Take 1 tablet by mouth nightly as needed for Sleep for up to 15 days. , Disp-15 tablet, R-0Normal      !! zolpidem (AMBIEN) 5 MG tablet Take 1 tablet by mouth nightly as needed for Sleep for up to 15 days. , Disp-15 tablet, R-0Normal      !! zolpidem (AMBIEN) 5 MG tablet Take 1 tablet by mouth nightly as needed for Sleep for up to 15 days. , Disp-15 tablet, R-0Normal      furosemide (LASIX) 20 MG tablet TAKE ONE TABLET BY MOUTH DAILY, Disp-30 tablet, R-1Normal      cloNIDine (CATAPRES) 0.1 MG tablet TAKE ONE TABLET BY MOUTH TWICE A DAY, Disp-60 tablet, R-2Normal      progesterone (PROMETRIUM) 200 MG CAPS capsule Take 1 capsule by mouth nightlyHistorical Med      potassium chloride (KLOR-CON M) 10 MEQ extended release tablet TAKE ONE TABLET BY MOUTH DAILY, Disp-30 tablet, R-2Normal      busPIRone (BUSPAR) 10 MG tablet TAKE ONE TABLET BY MOUTH TWICE A DAY, Disp-60 tablet, R-2Normal      hydrOXYzine (VISTARIL) 25 MG capsule Take 1 cap TID PRN for anxiety, Disp-90 capsule,R-1Normal       !! - Potential duplicate medications found. Please discuss with provider. Time Spent on discharge is more than 15 minutes in the examination, evaluation, counseling and review of medications and discharge plan. Signed:    Marilyn Wilkes MD   6/22/2021      Thank you NAZ Church - ОЛЬГА for the opportunity to be involved in this patient's care.  If you

## 2021-07-02 LAB
EKG ATRIAL RATE: 126 BPM
EKG DIAGNOSIS: NORMAL
EKG P AXIS: 75 DEGREES
EKG P-R INTERVAL: 156 MS
EKG Q-T INTERVAL: 298 MS
EKG QRS DURATION: 76 MS
EKG QTC CALCULATION (BAZETT): 431 MS
EKG R AXIS: 68 DEGREES
EKG T AXIS: 73 DEGREES
EKG VENTRICULAR RATE: 126 BPM

## 2021-07-12 PROBLEM — F10.20 ALCOHOLISM (HCC): Status: ACTIVE | Noted: 2019-07-06

## 2021-07-12 ASSESSMENT — ENCOUNTER SYMPTOMS
WHEEZING: 0
ABDOMINAL DISTENTION: 0
NAUSEA: 0
CONSTIPATION: 0
BLOOD IN STOOL: 0
VOMITING: 0
SHORTNESS OF BREATH: 0
COUGH: 0
DIARRHEA: 0
COLOR CHANGE: 0
CHEST TIGHTNESS: 0
ABDOMINAL PAIN: 0
BACK PAIN: 0

## 2021-07-12 NOTE — PROGRESS NOTES
ENCOUNTER DATE: 7/13/2021     NAME: Laureano Josue   AGE: 76 y.o. GENDER: female   YOB: 1955    Patient Active Problem List   Diagnosis    Essential hypertension    [facility] withdrawal syndrome[facility]com[facility]    Depression    Alcoholism ([facility]    Acute encephalopathy    Ataxia    Alcoholic hepatitis    Family history of heart disease    Macrocytic anemia    Mixed hyperlipidemia    Cataract of both eyes    Recurrent major depressive disorder, in partial remission ([facility]    Edema    Sedative, hypnotic or anxiolytic abuse w sleep disorder (HCC)    Anxiety    Insomnia, unspecified    Alcohol intoxication in active alcoholic, with unspecified complication (HCC)    Hyperglycemia    Hematuria      Allergies   Allergen Reactions    Penicillins Anaphylaxis, Hives, Rash and Swelling    Sulfa Antibiotics      Current Outpatient Medications on File Prior to Visit   Medication Sig Dispense Refill    citalopram (CELEXA) 20 MG tablet Take 20 mg by mouth daily      omeprazole (PRILOSEC) 20 MG delayed release capsule Take 20 mg by mouth daily      zolpidem (AMBIEN) 10 MG tablet Take by mouth nightly as needed for Sleep.       NIFEdipine (PROCARDIA XL) 60 MG extended release tablet TAKE ONE TABLET BY MOUTH DAILY 90 tablet 0    docusate sodium (COLACE) 100 MG capsule Take 100 mg by mouth 2 times daily as needed for Constipation      polycarbophil (FIBERCON) 625 MG tablet Take 625 mg by mouth daily      furosemide (LASIX) 20 MG tablet TAKE ONE TABLET BY MOUTH DAILY 30 tablet 1    cloNIDine (CATAPRES) 0.1 MG tablet TAKE ONE TABLET BY MOUTH TWICE A DAY 60 tablet 2    progesterone (PROMETRIUM) 200 MG CAPS capsule Take 1 capsule by mouth nightly      potassium chloride (KLOR-CON M) 10 MEQ extended release tablet TAKE ONE TABLET BY MOUTH DAILY 30 tablet 2    hydrOXYzine (VISTARIL) 25 MG capsule Take 1 cap TID PRN for anxiety 90 capsule 1    [DISCONTINUED] furosemide (LASIX) 20 MG tablet Take 1 tablet by mouth daily 30 tablet 2     No current facility-administered medications on file prior to visit. Social History     Tobacco Use    Smoking status: Former Smoker     Packs/day: 0.50     Years: 10.00     Pack years: 5.00     Types: Cigarettes     Quit date: 1987     Years since quittin.0    Smokeless tobacco: Never Used   Substance Use Topics    Alcohol use: Yes     Alcohol/week: 56.0 standard drinks     Types: 56 Glasses of wine per week     Comment: 3 liters of wine [facility]      CARE TEAM  Patient Care Team:  NAZ Torres CNP as PCP - General (Family Nurse Practitioner)  NAZ Torres CNP as PCP - [facility] Empaneled Provider    Chief Complaint   Patient presents with    Depression     follow  up  from  [facility]    Hypertension      HPI:   Patient is here for a follow up visit. ED:  Has been to the ED multiple times for etoh use/withdrawl. She was admitted on  as an inpatient however she left AMA. CT/LABS  Had head CT completed during one of the ED visits. Had labs at [facility]. Has copy of UA, which shows hematuria  CMP shows glu 101. CBC unremarkable. Patient was told to she needed to see urology. Denies any flank pain or h/o renal stones. She is concerned for diabetes as well. Requesting follow up labs. PSYCH:  Has been admitted inpatient multiple times over the past few years for etoh. Most recently was inpatient at [facility] for 11 days for etoh use. She was discharged 21  They continued her on ambien 10mg while she was there. Was told to continue on ambien for her sleep problems. Has apt with outpatient psych  at [facility] to [facility]. She has continued to see counselor at [facility] once weekly. Next apt tomorrow. On clonidine 0.1mg bid. Started her on celexa 20mg while in patient. Doesn't feel like its helping. States she has not been drinking.  She had been on several binges prior to her inpatient admit. She is requesting a refill for ambien. States she was only given a rx for celexa upon discharge. She did see Dr Stephan Wells recently (sleep med) and was strongly advised to establish with psych and that her sleep problems were likely psychiatric related rather than physiologic. She was given 3 separate 15 day prescriptions for ambien 5mg per Dr Stephan Wells. Patient states she only picked up 1 rx and wasn't aware there were others sent. ROS:  Review of Systems   Constitutional: Negative for activity change, appetite change, chills, fatigue and unexpected weight change. Eyes: Negative for visual disturbance. Respiratory: Negative for cough, chest tightness, shortness of breath and wheezing. Cardiovascular: Negative for chest pain, palpitations and leg swelling. Gastrointestinal: Negative for abdominal distention, abdominal pain, blood in stool, constipation (takes daily colace and fiber), diarrhea, nausea and vomiting. Genitourinary: Negative for difficulty urinating, dysuria and pelvic pain. Musculoskeletal: Negative for arthralgias, back pain and neck pain. Skin: Negative for color change, pallor and rash. Neurological: Positive for dizziness, light-headedness and headaches. Negative for tremors, seizures, facial asymmetry, weakness and numbness. Hematological: Does not bruise/bleed easily. Psychiatric/Behavioral: Positive for decreased concentration and sleep disturbance (h/o ambien abuse). Negative for agitation, dysphoric mood, self-injury and suicidal ideas. The patient is nervous/anxious. +alcoholism with recent relapse      VITALS:  /82   Pulse 78   Temp 98.1 °F (36.7 °C) (Oral)   Ht 5' 4\" (1.626 m)   Wt 161 lb (73 kg)   SpO2 98%   BMI 27.64 kg/m²      PE:  Physical Exam  Vitals and nursing note reviewed. Constitutional:       General: She is not in acute distress. Appearance: Normal appearance. She is well-developed and normal weight.  She is not diaphoretic. HENT:      Head: Normocephalic and atraumatic. Cardiovascular:      Rate and Rhythm: Normal rate and regular rhythm. Heart sounds: Normal heart sounds. No murmur heard. No friction rub. Pulmonary:      Effort: Pulmonary effort is normal. No respiratory distress. Breath sounds: Normal breath sounds. No wheezing, rhonchi or rales. Abdominal:      General: Abdomen is flat. There is no distension. Palpations: Abdomen is soft. Musculoskeletal:      Right lower leg: No edema. Left lower leg: No edema. Skin:     General: Skin is warm and dry. Coloration: Skin is not pale. Findings: No erythema or rash. Neurological:      General: No focal deficit present. Mental Status: She is alert and oriented to person, place, and time. Motor: No weakness. Gait: Gait normal.      Comments: No tremors noted   Psychiatric:         Mood and Affect: Mood normal.         Thought Content: Thought content normal.         Judgment: Judgment normal.      Comments: Anxious        CT brain without contrast       HISTORY: Dizziness       Dose modulation, iterative reconstruction and/or weight based adjustment of the mA/kV were utilized to reduce radiation dose to as low as reasonably achievable.       Axial sagittal coronal images.       The ventricles and basilar cisterns are moderately dilated consistent with atrophy.       No acute intra-axial or extra axial hemorrhage       No midline shift mass effect or edema.       Mild atrophy involving the michael and corpus callosum.           Impression       No acute hemorrhage.      Lab Results   Component Value Date    WBC 7.3 06/17/2021    HGB 14.3 06/17/2021    HCT 42.0 06/17/2021    MCV 95.8 06/17/2021     (L) 06/17/2021     Lab Results   Component Value Date     06/17/2021    K 3.7 06/17/2021     06/17/2021    CO2 21 06/17/2021    BUN 10 06/17/2021    CREATININE 0.7 06/17/2021    GLUCOSE 101 (H) 06/17/2021 CALCIUM 8.6 06/17/2021    PROT 7.5 06/17/2021    LABALBU 4.1 06/17/2021    BILITOT 0.8 06/17/2021    ALKPHOS 67 06/17/2021    AST 31 06/17/2021    ALT 16 06/17/2021    LABGLOM >60 06/17/2021    GFRAA >60 06/17/2021    AGRATIO 1.2 06/17/2021    GLOB 3.4 06/17/2021       ASSESSMENT/PLAN:  1. Alcoholism [facility])  Discharge [facility] reviewed from [facility]. Scanned to chart. Following with [address] [facility] with counselor weekly. Will est with psychiatry NP this month    2. Alcohol-induced insomnia ([address]  Patient requesting refill of ambien. Explained, again, I will not prescribe this medication to her. She needs to establish with a psychiatrist and if they feel she needs this medication, they will need to prescribe it. With her etoh use and h/o taking ambien excessively, I do not feel comfortable prescribing this medication to her. She has also seen sleep med, who also thinks the origin is psychological as well and also recommended to see psych. He provided patient a short term, one time only rx (3 separate 15 day supply rx) until she could est with psyc. According to the [facility], she only filled one of these rx. She will call [facility] to see if she has any remaining unfilled rx. 3. 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 Hematuria, unspecified type  Will check UA. If hematuria present will need urology referral.   - MICROSCOPIC URINALYSIS; Future    5. Essential hypertension  Stable. - CBC; Future    6. Macrocytic anemia  Check levels today. Proceed pending results. - Comprehensive Metabolic Panel; Future    7. Hyperglycemia  Check a1c. Recent labs reviewed which show glu 101, 102, 127.   - Hemoglobin A1C; Future      Return in about 3 months (around 10/13/2021).      Electronically signed by NAZ Purcell CNP on 7/13/2021 at 2:37 PM

## 2021-07-13 ENCOUNTER — OFFICE VISIT (OUTPATIENT)
Dept: PRIMARY CARE CLINIC | Age: 66
End: 2021-07-13
Payer: MEDICARE

## 2021-07-13 VITALS
WEIGHT: 161 LBS | SYSTOLIC BLOOD PRESSURE: 120 MMHG | BODY MASS INDEX: 27.49 KG/M2 | HEIGHT: 64 IN | OXYGEN SATURATION: 98 % | TEMPERATURE: 98.1 F | DIASTOLIC BLOOD PRESSURE: 82 MMHG | HEART RATE: 78 BPM

## 2021-07-13 DIAGNOSIS — D53.9 MACROCYTIC ANEMIA: ICD-10-CM

## 2021-07-13 DIAGNOSIS — I10 ESSENTIAL HYPERTENSION: ICD-10-CM

## 2021-07-13 DIAGNOSIS — R31.9 HEMATURIA, UNSPECIFIED TYPE: ICD-10-CM

## 2021-07-13 DIAGNOSIS — R73.9 HYPERGLYCEMIA: ICD-10-CM

## 2021-07-13 DIAGNOSIS — F13.182: Chronic | ICD-10-CM

## 2021-07-13 DIAGNOSIS — F10.20 ALCOHOLISM (HCC): Primary | ICD-10-CM

## 2021-07-13 DIAGNOSIS — F10.982 ALCOHOL-INDUCED INSOMNIA (HCC): ICD-10-CM

## 2021-07-13 LAB
A/G RATIO: 1.7 (ref 1.1–2.2)
ALBUMIN SERPL-MCNC: 4.8 G/DL (ref 3.4–5)
ALP BLD-CCNC: 68 U/L (ref 40–129)
ALT SERPL-CCNC: 22 U/L (ref 10–40)
ANION GAP SERPL CALCULATED.3IONS-SCNC: 15 MMOL/L (ref 3–16)
AST SERPL-CCNC: 35 U/L (ref 15–37)
BACTERIA: ABNORMAL /HPF
BILIRUB SERPL-MCNC: 0.3 MG/DL (ref 0–1)
BUN BLDV-MCNC: 9 MG/DL (ref 7–20)
CALCIUM SERPL-MCNC: 9.9 MG/DL (ref 8.3–10.6)
CHLORIDE BLD-SCNC: 101 MMOL/L (ref 99–110)
CO2: 24 MMOL/L (ref 21–32)
CREAT SERPL-MCNC: 0.9 MG/DL (ref 0.6–1.2)
EPITHELIAL CELLS, UA: 3 /HPF (ref 0–5)
GFR AFRICAN AMERICAN: >60
GFR NON-AFRICAN AMERICAN: >60
GLOBULIN: 2.9 G/DL
GLUCOSE BLD-MCNC: 95 MG/DL (ref 70–99)
HCT VFR BLD CALC: 40.6 % (ref 36–48)
HEMOGLOBIN: 14.2 G/DL (ref 12–16)
HYALINE CASTS: 1 /LPF (ref 0–8)
MCH RBC QN AUTO: 33.3 PG (ref 26–34)
MCHC RBC AUTO-ENTMCNC: 35 G/DL (ref 31–36)
MCV RBC AUTO: 95.4 FL (ref 80–100)
PDW BLD-RTO: 14.2 % (ref 12.4–15.4)
PLATELET # BLD: 239 K/UL (ref 135–450)
PMV BLD AUTO: 9.2 FL (ref 5–10.5)
POTASSIUM SERPL-SCNC: 4.5 MMOL/L (ref 3.5–5.1)
RBC # BLD: 4.26 M/UL (ref 4–5.2)
RBC UA: 2 /HPF (ref 0–4)
SODIUM BLD-SCNC: 140 MMOL/L (ref 136–145)
TOTAL PROTEIN: 7.7 G/DL (ref 6.4–8.2)
URINE TYPE: ABNORMAL
WBC # BLD: 8 K/UL (ref 4–11)
WBC UA: 1 /HPF (ref 0–5)

## 2021-07-13 PROCEDURE — 1111F DSCHRG MED/CURRENT MED MERGE: CPT | Performed by: NURSE PRACTITIONER

## 2021-07-13 PROCEDURE — 99214 OFFICE O/P EST MOD 30 MIN: CPT | Performed by: NURSE PRACTITIONER

## 2021-07-13 PROCEDURE — G8417 CALC BMI ABV UP PARAM F/U: HCPCS | Performed by: NURSE PRACTITIONER

## 2021-07-13 PROCEDURE — 1090F PRES/ABSN URINE INCON ASSESS: CPT | Performed by: NURSE PRACTITIONER

## 2021-07-13 PROCEDURE — 1036F TOBACCO NON-USER: CPT | Performed by: NURSE PRACTITIONER

## 2021-07-13 PROCEDURE — G8399 PT W/DXA RESULTS DOCUMENT: HCPCS | Performed by: NURSE PRACTITIONER

## 2021-07-13 PROCEDURE — 4040F PNEUMOC VAC/ADMIN/RCVD: CPT | Performed by: NURSE PRACTITIONER

## 2021-07-13 PROCEDURE — 1123F ACP DISCUSS/DSCN MKR DOCD: CPT | Performed by: NURSE PRACTITIONER

## 2021-07-13 PROCEDURE — 3017F COLORECTAL CA SCREEN DOC REV: CPT | Performed by: NURSE PRACTITIONER

## 2021-07-13 PROCEDURE — G8427 DOCREV CUR MEDS BY ELIG CLIN: HCPCS | Performed by: NURSE PRACTITIONER

## 2021-07-13 RX ORDER — OMEPRAZOLE 20 MG/1
20 CAPSULE, DELAYED RELEASE ORAL DAILY
COMMUNITY
End: 2022-04-07

## 2021-07-13 RX ORDER — ZOLPIDEM TARTRATE 10 MG/1
TABLET ORAL NIGHTLY PRN
COMMUNITY
End: 2022-04-07

## 2021-07-13 RX ORDER — CITALOPRAM 20 MG/1
20 TABLET ORAL DAILY
COMMUNITY
End: 2022-04-07

## 2021-07-13 ASSESSMENT — PATIENT HEALTH QUESTIONNAIRE - PHQ9
1. LITTLE INTEREST OR PLEASURE IN DOING THINGS: 0
SUM OF ALL RESPONSES TO PHQ QUESTIONS 1-9: 0
SUM OF ALL RESPONSES TO PHQ QUESTIONS 1-9: 0
2. FEELING DOWN, DEPRESSED OR HOPELESS: 0
SUM OF ALL RESPONSES TO PHQ9 QUESTIONS 1 & 2: 0
SUM OF ALL RESPONSES TO PHQ QUESTIONS 1-9: 0

## 2021-07-14 LAB
ESTIMATED AVERAGE GLUCOSE: 108.3 MG/DL
HBA1C MFR BLD: 5.4 %

## 2021-08-04 ENCOUNTER — HOSPITAL ENCOUNTER (OUTPATIENT)
Dept: GENERAL RADIOLOGY | Age: 66
Discharge: HOME | End: 2021-08-04
Payer: MEDICARE

## 2021-08-04 DIAGNOSIS — Z78.0 POST-MENOPAUSAL: ICD-10-CM

## 2021-08-04 PROCEDURE — 77080 DXA BONE DENSITY AXIAL: CPT

## 2021-08-14 NOTE — TELEPHONE ENCOUNTER
Medication:   Requested Prescriptions     Pending Prescriptions Disp Refills    cloNIDine (CATAPRES) 0.1 MG tablet [Pharmacy Med Name: cloNIDine HCL 0.1MG TABLET] 60 tablet 2     Sig: TAKE ONE TABLET BY MOUTH TWICE A DAY       Last appt: 7/13/2021   Next appt: Visit date not found    Last OARRS: No flowsheet data found.

## 2021-08-16 RX ORDER — CLONIDINE HYDROCHLORIDE 0.1 MG/1
TABLET ORAL
Qty: 60 TABLET | Refills: 2 | Status: SHIPPED | OUTPATIENT
Start: 2021-08-16 | End: 2021-11-22

## 2021-09-17 RX ORDER — NIFEDIPINE 60 MG/1
TABLET, EXTENDED RELEASE ORAL
Qty: 90 TABLET | Refills: 0 | Status: SHIPPED | OUTPATIENT
Start: 2021-09-17 | End: 2022-04-07

## 2021-10-14 DIAGNOSIS — R60.9 EDEMA, UNSPECIFIED TYPE: ICD-10-CM

## 2021-10-14 RX ORDER — POTASSIUM CHLORIDE 750 MG/1
TABLET, EXTENDED RELEASE ORAL
Qty: 90 TABLET | Refills: 0 | Status: SHIPPED | OUTPATIENT
Start: 2021-10-14 | End: 2022-04-07

## 2021-10-14 NOTE — TELEPHONE ENCOUNTER
Medication:   Requested Prescriptions     Pending Prescriptions Disp Refills    potassium chloride (KLOR-CON M) 10 MEQ extended release tablet [Pharmacy Med Name: POTASSIUM CHLORIDE ER M-10 MEQ TAB] 90 tablet      Sig: TAKE ONE TABLET BY MOUTH DAILY       Last appt: 7/13/2021   Next appt: Visit date not found    Last OARRS: No flowsheet data found.

## 2021-11-22 RX ORDER — CLONIDINE HYDROCHLORIDE 0.1 MG/1
TABLET ORAL
Qty: 60 TABLET | Refills: 2 | Status: SHIPPED | OUTPATIENT
Start: 2021-11-22

## 2021-11-22 NOTE — TELEPHONE ENCOUNTER
Medication:   Requested Prescriptions     Pending Prescriptions Disp Refills    cloNIDine (CATAPRES) 0.1 MG tablet [Pharmacy Med Name: cloNIDine HCL 0.1MG TABLET] 60 tablet 2     Sig: TAKE ONE TABLET BY MOUTH TWICE A DAY     Last Filled:  8.16.21  Last appt: 7/13/2021   Next appt: Visit date not found    Last OARRS: No flowsheet data found.

## 2021-12-02 LAB — MAMMOGRAPHY, EXTERNAL: NORMAL

## 2021-12-08 ENCOUNTER — HOSPITAL ENCOUNTER (EMERGENCY)
Age: 66
Discharge: HOME | End: 2021-12-09
Attending: EMERGENCY MEDICINE
Payer: MEDICARE

## 2021-12-08 VITALS
WEIGHT: 151.3 LBS | SYSTOLIC BLOOD PRESSURE: 157 MMHG | DIASTOLIC BLOOD PRESSURE: 66 MMHG | TEMPERATURE: 98.1 F | HEIGHT: 65 IN | OXYGEN SATURATION: 100 % | BODY MASS INDEX: 25.21 KG/M2 | HEART RATE: 126 BPM | RESPIRATION RATE: 14 BRPM

## 2021-12-08 DIAGNOSIS — R11.2 NON-INTRACTABLE VOMITING WITH NAUSEA, UNSPECIFIED VOMITING TYPE: Primary | ICD-10-CM

## 2021-12-08 DIAGNOSIS — F10.930 ALCOHOL WITHDRAWAL SYNDROME WITHOUT COMPLICATION (HCC): ICD-10-CM

## 2021-12-08 LAB
A/G RATIO: 1.4 (ref 1.1–2.2)
ALBUMIN SERPL-MCNC: 4.8 G/DL (ref 3.4–5)
ALP BLD-CCNC: 69 U/L (ref 40–129)
ALT SERPL-CCNC: 78 U/L (ref 10–40)
ANION GAP SERPL CALCULATED.3IONS-SCNC: 23 MMOL/L (ref 3–16)
AST SERPL-CCNC: 98 U/L (ref 15–37)
BASOPHILS ABSOLUTE: 0 K/UL (ref 0–0.2)
BASOPHILS RELATIVE PERCENT: 0.3 %
BILIRUB SERPL-MCNC: 0.7 MG/DL (ref 0–1)
BUN BLDV-MCNC: 11 MG/DL (ref 7–20)
CALCIUM SERPL-MCNC: 9.9 MG/DL (ref 8.3–10.6)
CHLORIDE BLD-SCNC: 93 MMOL/L (ref 99–110)
CO2: 22 MMOL/L (ref 21–32)
CREAT SERPL-MCNC: 0.6 MG/DL (ref 0.6–1.2)
EOSINOPHILS ABSOLUTE: 0 K/UL (ref 0–0.6)
EOSINOPHILS RELATIVE PERCENT: 0.1 %
ETHANOL: 18 MG/DL (ref 0–0.08)
GFR AFRICAN AMERICAN: >60
GFR NON-AFRICAN AMERICAN: >60
GLUCOSE BLD-MCNC: 108 MG/DL (ref 70–99)
HCT VFR BLD CALC: 39.9 % (ref 36–48)
HEMOGLOBIN: 13.5 G/DL (ref 12–16)
LIPASE: 27 U/L (ref 13–60)
LYMPHOCYTES ABSOLUTE: 1.3 K/UL (ref 1–5.1)
LYMPHOCYTES RELATIVE PERCENT: 10.9 %
MCH RBC QN AUTO: 32.2 PG (ref 26–34)
MCHC RBC AUTO-ENTMCNC: 33.9 G/DL (ref 31–36)
MCV RBC AUTO: 95 FL (ref 80–100)
MONOCYTES ABSOLUTE: 0.6 K/UL (ref 0–1.3)
MONOCYTES RELATIVE PERCENT: 4.9 %
NEUTROPHILS ABSOLUTE: 10 K/UL (ref 1.7–7.7)
NEUTROPHILS RELATIVE PERCENT: 83.8 %
PDW BLD-RTO: 13.5 % (ref 12.4–15.4)
PLATELET # BLD: 250 K/UL (ref 135–450)
PMV BLD AUTO: 8 FL (ref 5–10.5)
POTASSIUM REFLEX MAGNESIUM: 4.4 MMOL/L (ref 3.5–5.1)
RBC # BLD: 4.2 M/UL (ref 4–5.2)
SODIUM BLD-SCNC: 138 MMOL/L (ref 136–145)
TOTAL PROTEIN: 8.3 G/DL (ref 6.4–8.2)
WBC # BLD: 11.9 K/UL (ref 4–11)

## 2021-12-08 PROCEDURE — 99283 EMERGENCY DEPT VISIT LOW MDM: CPT

## 2021-12-08 PROCEDURE — 96375 TX/PRO/DX INJ NEW DRUG ADDON: CPT

## 2021-12-08 PROCEDURE — 80053 COMPREHEN METABOLIC PANEL: CPT

## 2021-12-08 PROCEDURE — 96374 THER/PROPH/DIAG INJ IV PUSH: CPT

## 2021-12-08 PROCEDURE — 93005 ELECTROCARDIOGRAM TRACING: CPT | Performed by: EMERGENCY MEDICINE

## 2021-12-08 PROCEDURE — 83690 ASSAY OF LIPASE: CPT

## 2021-12-08 PROCEDURE — 85025 COMPLETE CBC W/AUTO DIFF WBC: CPT

## 2021-12-08 PROCEDURE — 2500000003 HC RX 250 WO HCPCS: Performed by: EMERGENCY MEDICINE

## 2021-12-08 PROCEDURE — 6360000002 HC RX W HCPCS: Performed by: EMERGENCY MEDICINE

## 2021-12-08 PROCEDURE — 82077 ASSAY SPEC XCP UR&BREATH IA: CPT

## 2021-12-08 PROCEDURE — 2580000003 HC RX 258: Performed by: EMERGENCY MEDICINE

## 2021-12-08 PROCEDURE — 36415 COLL VENOUS BLD VENIPUNCTURE: CPT

## 2021-12-08 RX ORDER — METOCLOPRAMIDE HYDROCHLORIDE 5 MG/ML
10 INJECTION INTRAMUSCULAR; INTRAVENOUS ONCE
Status: COMPLETED | OUTPATIENT
Start: 2021-12-08 | End: 2021-12-08

## 2021-12-08 RX ORDER — 0.9 % SODIUM CHLORIDE 0.9 %
1000 INTRAVENOUS SOLUTION INTRAVENOUS ONCE
Status: COMPLETED | OUTPATIENT
Start: 2021-12-08 | End: 2021-12-09

## 2021-12-08 RX ORDER — LORAZEPAM 2 MG/ML
1 INJECTION INTRAMUSCULAR ONCE
Status: COMPLETED | OUTPATIENT
Start: 2021-12-08 | End: 2021-12-08

## 2021-12-08 RX ADMIN — LORAZEPAM 1 MG: 2 INJECTION INTRAMUSCULAR; INTRAVENOUS at 23:41

## 2021-12-08 RX ADMIN — FAMOTIDINE 20 MG: 10 INJECTION, SOLUTION INTRAVENOUS at 23:42

## 2021-12-08 RX ADMIN — SODIUM CHLORIDE 1000 ML: 9 INJECTION, SOLUTION INTRAVENOUS at 23:49

## 2021-12-08 RX ADMIN — METOCLOPRAMIDE 10 MG: 5 INJECTION, SOLUTION INTRAMUSCULAR; INTRAVENOUS at 23:44

## 2021-12-09 PROCEDURE — 6370000000 HC RX 637 (ALT 250 FOR IP): Performed by: EMERGENCY MEDICINE

## 2021-12-09 RX ORDER — ONDANSETRON 4 MG/1
4 TABLET, FILM COATED ORAL EVERY 8 HOURS PRN
Qty: 10 TABLET | Refills: 0 | Status: SHIPPED | OUTPATIENT
Start: 2021-12-09 | End: 2023-02-06

## 2021-12-09 RX ORDER — LORAZEPAM 1 MG/1
1 TABLET ORAL EVERY 4 HOURS PRN
Status: DISCONTINUED | OUTPATIENT
Start: 2021-12-09 | End: 2021-12-09 | Stop reason: HOSPADM

## 2021-12-09 RX ORDER — LORAZEPAM 1 MG/1
1 TABLET ORAL EVERY 8 HOURS PRN
Qty: 10 TABLET | Refills: 0 | Status: SHIPPED | OUTPATIENT
Start: 2021-12-09 | End: 2021-12-12

## 2021-12-09 RX ADMIN — LORAZEPAM 1 MG: 1 TABLET ORAL at 01:25

## 2021-12-09 NOTE — ED PROVIDER NOTES
[address] [facility]  eMERGENCY dEPARTMENT eNCOUnter        Pt Name: Manuela Sanders  MRN: [id number]  [place] 1955  Date of evaluation: 12/8/2021  Provider: Svetlana Lugo MD  PCP: Rico Narayan, APRN - CNP      CHIEF COMPLAINT       Chief Complaint   Patient presents with    Alcohol Intoxication     Patient states she is withdrawing from alcohol. States she has drank everyday for the last 8 days. States she drank a liter an a half of wine about 4-5 hrs ago. States is nauseous and was throwing up blood. States she had seasonal affective disorder and got very depressed and \"hit a wall\" and states \"I can't feel anything so I drink. Drinking makes me feel. \" Also states her  had a stroke and it changed everything, so stress makes her drink. HISTORY OFPRESENT ILLNESS   (Location/Symptom, Timing/Onset, Context/Setting, Quality, Duration, Modifying Factors,Severity)  Note limiting factors. Manuela Sanders is a 78 y.o. female been drinking wine about 8 days started vomiting today streaks of blood with epigastric discomfort pain does not radiate patient feels dizzy with a little bit of ataxia which she has had before she has been able to keep anything down off and on for the past 24 hours    Nursing Notes were all reviewed and agreed with or any disagreements were addressed  in the HPI. REVIEW OF SYSTEMS    (2-9 systems for level 4, 10 or more for level 5)       REVIEW OF SYSTEMS    Constitutional:  Denies fever, chills, or weakness   Eyes:  Denies vision changes  HENT:  Denies sore throat or neck pain   Respiratory:  Denies cough or shortness of breath   Cardiovascular:  Denies chest pain  GI:   abdominal pain, nausea, vomiting, no diarrhea   Musculoskeletal:  Denies back pain   Skin: no rash or vesicles   Neurologic:  no headache weakness focal    Lymphatic:  no swollen  nodes   Psychiatric: no si or hs thoughts     All systems negative except as marked. Positives and Pertinent negatives as per HPI. Except as noted above in the ROS, all other systems were reviewed andnegative. PASTMEDICAL HISTORY     Past Medical History:   Diagnosis Date    Alcohol abuse, continuous 11/19/2019    Alcohol dependence with withdrawal ([facility] 9/13/2019    Alcohol withdrawal delirium ([facility] 6/1/2019    [facility]     Anxiety     Ataxia     Depression     Hyperlipidemia     Hypertension          SURGICAL HISTORY       Past Surgical History:   Procedure Laterality Date    BACK SURGERY      1996 L5 disectomy    COLONOSCOPY      2016    ENDOSCOPY, COLON, DIAGNOSTIC      2016         CURRENT MEDICATIONS       Previous Medications    CITALOPRAM (CELEXA) 20 MG TABLET    Take 20 mg by mouth daily    CLONIDINE (CATAPRES) 0.1 MG TABLET    TAKE ONE TABLET BY MOUTH TWICE A DAY    DOCUSATE SODIUM (COLACE) 100 MG CAPSULE    Take 100 mg by mouth 2 times daily as needed for Constipation    FUROSEMIDE (LASIX) 20 MG TABLET    TAKE ONE TABLET BY MOUTH DAILY    HYDROXYZINE (VISTARIL) 25 MG CAPSULE    Take 1 cap TID PRN for anxiety    NIFEDIPINE (PROCARDIA XL) 60 MG EXTENDED RELEASE TABLET    TAKE ONE TABLET BY MOUTH DAILY    OMEPRAZOLE (PRILOSEC) 20 MG DELAYED RELEASE CAPSULE    Take 20 mg by mouth daily    POLYCARBOPHIL (FIBERCON) 625 MG TABLET    Take 625 mg by mouth daily    POTASSIUM CHLORIDE (KLOR-CON M) 10 MEQ EXTENDED RELEASE TABLET    TAKE ONE TABLET BY MOUTH DAILY    PROGESTERONE (PROMETRIUM) 200 MG CAPS CAPSULE    Take 1 capsule by mouth nightly    ZOLPIDEM (AMBIEN) 10 MG TABLET    Take by mouth nightly as needed for Sleep.        ALLERGIES     Penicillins and Sulfa antibiotics    FAMILY HISTORY       Family History   Problem Relation Age of Onset    Stroke Mother     Substance Abuse Mother     Substance Abuse Father     Heart Disease Father     Heart Disease Brother     Substance Abuse Brother     Heart Disease Paternal Aunt     Heart Disease 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    Highest education level: None   Occupational History    None   Tobacco Use    Smoking status: Former Smoker     Packs/day: 0.50     Years: 10.00     Pack years: 5.00     Types: Cigarettes     Quit date: 1987     Years since quittin.4    Smokeless tobacco: Never Used   Vaping Use    Vaping Use: Never used   Substance and Sexual Activity    Alcohol use: Yes     Alcohol/week: 56.0 standard drinks     Types: 56 Glasses of wine per week     Comment: 3 liters of wine daliy    Drug use: No    Sexual activity: Yes     Partners: Male   Other Topics Concern    None   Social History Narrative    None     Social Determinants of Health     Financial Resource Strain:     Difficulty of Paying Living Expenses: Not on file   Food Insecurity:     Worried About Running Out of Food in the Last Year: Not on file    Jayro of Food in the Last Year: Not on file   Transportation Needs:     Lack of Transportation (Medical): Not on file    Lack of Transportation (Non-Medical):  Not on file   Physical Activity:     Days of Exercise per Week: Not on file    Minutes of Exercise per Session: Not on file   Stress:     Feeling of Stress : Not on file   Social Connections:     Frequency of Communication with Friends and Family: Not on file    Frequency of Social Gatherings with Friends and Family: Not on file    Attends [employer] Services: Not on file    Active Member of Clubs or Organizations: Not on file    Attends Club or Organization Meetings: Not on file    Marital Status: Not on file Intimate Partner Violence:     Fear of Current or Ex-Partner: Not on file    Emotionally Abused: Not on file    Physically Abused: Not on file    Sexually Abused: Not on file   Housing Stability:     Unable to Pay for Housing in the Last Year: Not on file    Number of [place] in the Last Year: Not on file    Unstable Housing in the Last Year: Not on file       SCREENINGS             PHYSICAL EXAM    (up to 7 for level 4, 8 or more for level 5)     ED Triage Vitals [12/08/21 2122]   BP Temp Temp src Pulse Resp SpO2 Height Weight   (!) 157/66 98.1 °F (36.7 °C) -- 126 14 100 % 5' 5\" (1.651 m) 151 lb 4.8 oz (68.6 kg)           General Appearance:  Alert, cooperative, no distress, appears stated age. Head:  Normocephalic, without obvious abnormality, atraumatic. Eyes:  conjunctiva/corneas clear, EOM's intact. Sclera anicteric. ENT: Mucous membranes moist.   Neck: Supple, symmetrical, trachea midline, no adenopathy. No jugular venous distention. Lungs:   No Respiratory Distress. no rales  rhonchi rub   Chest Wall:  Nontender  no deformity   Heart:  Tachy  no murmer gallop    Abdomen:   Soft epigastric tenderness no organomegally    Extremities:  Full range of motion. no deformity   Pulses: Equal  upper and lower    Skin:  No rashes or lesions to exposed skin. Neurologic: Alert and oriented X 3. Motor grossly normal.  Speech clear.  Cr n 2-12 intact       DIAGNOSTIC RESULTS   LABS:    Labs Reviewed   CBC WITH AUTO DIFFERENTIAL - Abnormal; Notable for the following components:       Result Value    WBC 11.9 (*)     Neutrophils Absolute 10.0 (*)     All other components within normal limits    Narrative:     Performed at:  [facility] [address], [place], [facility][note 3] [address]   Phone [phone]   COMPREHENSIVE METABOLIC PANEL W/ REFLEX TO MG FOR LOW K - Abnormal; Notable for the following components:    Chloride 93 (*)     Anion Gap 23 (*)     Glucose 108 (*) Total Protein 8.3 (*)     ALT 78 (*)     AST 98 (*)     All other components within normal limits    Narrative:     Performed at:  [facility]  [address], [address]   Phone [phone]   LIPASE    Narrative:     Performed at:  [facility]  [address] [facility],  [place], [address]   Phone [phone]   ETHANOL    Narrative:     Performed at:  [facility] [address] [facility],  [place], [address]   Phone [phone]       All other labs were within normal range or not returned as of thisdictation. EKG:  All EKG's are interpreted by the Emergency Department Physician who either signs or Co-signs this chart in the absence of a cardiologist.    EKG Interpretation    Interpreted by emergency department physician    Rhythm: sinus tachycardia  Rate: tachycardia  Axis: normal  Ectopy: none  Conduction: normal  ST Segments: no acute change  T Waves: no acute change  Q Waves: none    Clinical Impression: Sinus tachycardia    Mini Matias MD      RADIOLOGY:   Non-plain film images such as CT, Ultrasound and MRI are read by the radiologist. Plainradiographic images are visualized and preliminarily interpreted by the  ED Provider with the belowfindings:    Interpretation per the Radiologist below, if available at the time of this note:    No orders to display         PROCEDURES   Unless otherwise noted below, none     Procedures    CRITICAL CARE TIME   N/A      CONSULTS:  None    EMERGENCY DEPARTMENT COURSE and DIFFERENTIAL DIAGNOSIS/MDM:   Vitals:    Vitals:    12/08/21 2122   BP: (!) 157/66   Pulse: 126   Resp: 14   Temp: 98.1 °F (36.7 °C)   SpO2: 100%   Weight: 151 lb 4.8 oz (68.6 kg)   Height: 5' 5\" (1.651 m)       Patient was given the following medications:  Medications   LORazepam (ATIVAN) tablet 1 mg (has no administration in time range)   0.9 % sodium chloride bolus (1,000 mLs IntraVENous New Bag 12/8/21 2349)   metoclopramide (REGLAN) injection 10 mg (10 mg IntraVENous Given 12/8/21 2344)   famotidine (PEPCID) injection 20 mg (20 mg IntraVENous Given 12/8/21 2342)   LORazepam (ATIVAN) injection 1 mg (1 mg IntraVENous Given 12/8/21 2341)     She received IV fluids Ativan Reglan Pepcid with improvement no further vomiting was given a limited supply of Ativan for the next 3 days for withdrawal Zofran and will continue Prilosec was stable at time of discharge      The patient tolerated their visit well. Thepatient and / or the family were informed of the results of any tests, a time was given to answer questions. FINAL IMPRESSION      1. Non-intractable vomiting with nausea, unspecified vomiting type    2. Alcohol withdrawal syndrome without complication [facility])        DISPOSITION/PLAN   DISPOSITION Decision To Discharge 12/09/2021 12:53:39 AM      PATIENT REFERRED TO:  NAZ Rhoades [facility]  [address] [facility]  [phone]            DISCHARGE MEDICATIONS:  New Prescriptions    LORAZEPAM (ATIVAN) 1 MG TABLET    Take 1 tablet by mouth every 8 hours as needed for Anxiety for up to 3 days.     ONDANSETRON (ZOFRAN) 4 MG TABLET    Take 1 tablet by mouth every 8 hours as needed for Nausea or Vomiting       DISCONTINUED MEDICATIONS:  Discontinued Medications    No medications on file              (Please note that portions of this note were completed with a voice recognition program.  Efforts were made to edit the dictations but occasionally words aremis-transcribed.)    Denny Cisneros MD (electronically signed)          Denny Cisneros MD  12/09/21 5640

## 2021-12-09 NOTE — ED NOTES
Bed: 18  Expected date:   Expected time:   Means of arrival:   Comments:  Yecenia Torres RN  12/08/21 2199

## 2021-12-10 LAB
EKG ATRIAL RATE: 118 BPM
EKG DIAGNOSIS: NORMAL
EKG P AXIS: 77 DEGREES
EKG P-R INTERVAL: 148 MS
EKG Q-T INTERVAL: 314 MS
EKG QRS DURATION: 80 MS
EKG QTC CALCULATION (BAZETT): 440 MS
EKG R AXIS: 84 DEGREES
EKG T AXIS: 60 DEGREES
EKG VENTRICULAR RATE: 118 BPM

## 2021-12-10 PROCEDURE — 93010 ELECTROCARDIOGRAM REPORT: CPT | Performed by: INTERNAL MEDICINE

## 2022-02-06 ENCOUNTER — HOSPITAL ENCOUNTER (EMERGENCY)
Age: 67
Discharge: HOME | End: 2022-02-06
Attending: EMERGENCY MEDICINE
Payer: MEDICARE

## 2022-02-06 ENCOUNTER — APPOINTMENT (OUTPATIENT)
Dept: GENERAL RADIOLOGY | Age: 67
End: 2022-02-06
Payer: MEDICARE

## 2022-02-06 VITALS
OXYGEN SATURATION: 100 % | SYSTOLIC BLOOD PRESSURE: 171 MMHG | HEART RATE: 89 BPM | TEMPERATURE: 98.1 F | BODY MASS INDEX: 25.49 KG/M2 | HEIGHT: 65 IN | RESPIRATION RATE: 16 BRPM | WEIGHT: 153 LBS | DIASTOLIC BLOOD PRESSURE: 81 MMHG

## 2022-02-06 DIAGNOSIS — K29.20 ACUTE ALCOHOLIC GASTRITIS WITHOUT HEMORRHAGE: Primary | ICD-10-CM

## 2022-02-06 DIAGNOSIS — F10.10 ALCOHOL ABUSE: ICD-10-CM

## 2022-02-06 LAB
A/G RATIO: 1.1 (ref 1.1–2.2)
ALBUMIN SERPL-MCNC: 4.3 G/DL (ref 3.4–5)
ALP BLD-CCNC: 72 U/L (ref 40–129)
ALT SERPL-CCNC: 79 U/L (ref 10–40)
ANION GAP SERPL CALCULATED.3IONS-SCNC: 13 MMOL/L (ref 3–16)
AST SERPL-CCNC: 132 U/L (ref 15–37)
BACTERIA: ABNORMAL /HPF
BASOPHILS ABSOLUTE: 0 K/UL (ref 0–0.2)
BASOPHILS RELATIVE PERCENT: 0.3 %
BILIRUB SERPL-MCNC: 0.7 MG/DL (ref 0–1)
BILIRUBIN URINE: NEGATIVE
BLOOD, URINE: NEGATIVE
BUN BLDV-MCNC: 12 MG/DL (ref 7–20)
CALCIUM SERPL-MCNC: 10.6 MG/DL (ref 8.3–10.6)
CHLORIDE BLD-SCNC: 96 MMOL/L (ref 99–110)
CLARITY: CLEAR
CO2: 27 MMOL/L (ref 21–32)
COLOR: YELLOW
CREAT SERPL-MCNC: 0.6 MG/DL (ref 0.6–1.2)
EOSINOPHILS ABSOLUTE: 0.1 K/UL (ref 0–0.6)
EOSINOPHILS RELATIVE PERCENT: 0.9 %
EPITHELIAL CELLS, UA: ABNORMAL /HPF (ref 0–5)
GFR AFRICAN AMERICAN: >60
GFR NON-AFRICAN AMERICAN: >60
GLUCOSE BLD-MCNC: 94 MG/DL (ref 70–99)
GLUCOSE URINE: NEGATIVE MG/DL
HCT VFR BLD CALC: 38.3 % (ref 36–48)
HEMOGLOBIN: 13.2 G/DL (ref 12–16)
KETONES, URINE: 15 MG/DL
LEUKOCYTE ESTERASE, URINE: ABNORMAL
LIPASE: 49 U/L (ref 13–60)
LYMPHOCYTES ABSOLUTE: 1.2 K/UL (ref 1–5.1)
LYMPHOCYTES RELATIVE PERCENT: 15.9 %
MAGNESIUM: 1.9 MG/DL (ref 1.8–2.4)
MCH RBC QN AUTO: 34.4 PG (ref 26–34)
MCHC RBC AUTO-ENTMCNC: 34.5 G/DL (ref 31–36)
MCV RBC AUTO: 99.6 FL (ref 80–100)
MICROSCOPIC EXAMINATION: YES
MONOCYTES ABSOLUTE: 0.4 K/UL (ref 0–1.3)
MONOCYTES RELATIVE PERCENT: 5.3 %
NEUTROPHILS ABSOLUTE: 5.9 K/UL (ref 1.7–7.7)
NEUTROPHILS RELATIVE PERCENT: 77.6 %
NITRITE, URINE: NEGATIVE
PDW BLD-RTO: 15.4 % (ref 12.4–15.4)
PH UA: 6.5 (ref 5–8)
PLATELET # BLD: 193 K/UL (ref 135–450)
PMV BLD AUTO: 8 FL (ref 5–10.5)
POTASSIUM SERPL-SCNC: 5 MMOL/L (ref 3.5–5.1)
PROTEIN UA: 30 MG/DL
RBC # BLD: 3.84 M/UL (ref 4–5.2)
RBC UA: ABNORMAL /HPF (ref 0–4)
SODIUM BLD-SCNC: 136 MMOL/L (ref 136–145)
SPECIFIC GRAVITY UA: 1.01 (ref 1–1.03)
TOTAL PROTEIN: 8.1 G/DL (ref 6.4–8.2)
URINE TYPE: ABNORMAL
UROBILINOGEN, URINE: 0.2 E.U./DL
WBC # BLD: 7.6 K/UL (ref 4–11)
WBC UA: ABNORMAL /HPF (ref 0–5)

## 2022-02-06 PROCEDURE — 81001 URINALYSIS AUTO W/SCOPE: CPT

## 2022-02-06 PROCEDURE — 96375 TX/PRO/DX INJ NEW DRUG ADDON: CPT

## 2022-02-06 PROCEDURE — 83690 ASSAY OF LIPASE: CPT

## 2022-02-06 PROCEDURE — 99283 EMERGENCY DEPT VISIT LOW MDM: CPT

## 2022-02-06 PROCEDURE — 83735 ASSAY OF MAGNESIUM: CPT

## 2022-02-06 PROCEDURE — 85025 COMPLETE CBC W/AUTO DIFF WBC: CPT

## 2022-02-06 PROCEDURE — 96374 THER/PROPH/DIAG INJ IV PUSH: CPT

## 2022-02-06 PROCEDURE — 2580000003 HC RX 258: Performed by: EMERGENCY MEDICINE

## 2022-02-06 PROCEDURE — 2500000003 HC RX 250 WO HCPCS: Performed by: EMERGENCY MEDICINE

## 2022-02-06 PROCEDURE — 80053 COMPREHEN METABOLIC PANEL: CPT

## 2022-02-06 PROCEDURE — 36415 COLL VENOUS BLD VENIPUNCTURE: CPT

## 2022-02-06 PROCEDURE — 96361 HYDRATE IV INFUSION ADD-ON: CPT

## 2022-02-06 PROCEDURE — 71046 X-RAY EXAM CHEST 2 VIEWS: CPT

## 2022-02-06 PROCEDURE — 6360000002 HC RX W HCPCS: Performed by: EMERGENCY MEDICINE

## 2022-02-06 RX ORDER — AMLODIPINE BESYLATE 10 MG/1
10 TABLET ORAL DAILY
COMMUNITY

## 2022-02-06 RX ORDER — DESVENLAFAXINE 50 MG/1
50 TABLET, EXTENDED RELEASE ORAL DAILY
COMMUNITY
End: 2022-04-07

## 2022-02-06 RX ORDER — LORAZEPAM 2 MG/ML
2 INJECTION INTRAMUSCULAR ONCE
Status: COMPLETED | OUTPATIENT
Start: 2022-02-06 | End: 2022-02-06

## 2022-02-06 RX ORDER — FAMOTIDINE 20 MG/1
20 TABLET, FILM COATED ORAL 2 TIMES DAILY
Qty: 60 TABLET | Refills: 0 | Status: SHIPPED | OUTPATIENT
Start: 2022-02-06

## 2022-02-06 RX ORDER — OLANZAPINE 5 MG/1
5 TABLET ORAL 2 TIMES DAILY
COMMUNITY
End: 2022-04-07

## 2022-02-06 RX ORDER — SODIUM CHLORIDE, SODIUM LACTATE, POTASSIUM CHLORIDE, AND CALCIUM CHLORIDE .6; .31; .03; .02 G/100ML; G/100ML; G/100ML; G/100ML
500 INJECTION, SOLUTION INTRAVENOUS ONCE
Status: COMPLETED | OUTPATIENT
Start: 2022-02-06 | End: 2022-02-06

## 2022-02-06 RX ORDER — ONDANSETRON 4 MG/1
4 TABLET, FILM COATED ORAL EVERY 8 HOURS PRN
Qty: 20 TABLET | Refills: 0 | Status: ON HOLD | OUTPATIENT
Start: 2022-02-06 | End: 2022-10-11 | Stop reason: SDUPTHER

## 2022-02-06 RX ORDER — ONDANSETRON 2 MG/ML
4 INJECTION INTRAMUSCULAR; INTRAVENOUS ONCE
Status: COMPLETED | OUTPATIENT
Start: 2022-02-06 | End: 2022-02-06

## 2022-02-06 RX ORDER — CHLORDIAZEPOXIDE HYDROCHLORIDE 25 MG/1
25 CAPSULE, GELATIN COATED ORAL 4 TIMES DAILY PRN
COMMUNITY
End: 2022-04-07

## 2022-02-06 RX ADMIN — FAMOTIDINE 20 MG: 10 INJECTION, SOLUTION INTRAVENOUS at 15:20

## 2022-02-06 RX ADMIN — ONDANSETRON 4 MG: 2 INJECTION INTRAMUSCULAR; INTRAVENOUS at 15:20

## 2022-02-06 RX ADMIN — LORAZEPAM 2 MG: 2 INJECTION INTRAMUSCULAR; INTRAVENOUS at 15:40

## 2022-02-06 RX ADMIN — SODIUM CHLORIDE, POTASSIUM CHLORIDE, SODIUM LACTATE AND CALCIUM CHLORIDE 500 ML: 600; 310; 30; 20 INJECTION, SOLUTION INTRAVENOUS at 15:19

## 2022-02-06 ASSESSMENT — PAIN SCALES - GENERAL: PAINLEVEL_OUTOF10: 8

## 2022-02-06 ASSESSMENT — PAIN DESCRIPTION - DESCRIPTORS: DESCRIPTORS: ACHING

## 2022-02-06 ASSESSMENT — PAIN DESCRIPTION - PAIN TYPE: TYPE: ACUTE PAIN

## 2022-02-06 ASSESSMENT — PAIN DESCRIPTION - FREQUENCY: FREQUENCY: CONTINUOUS

## 2022-02-06 NOTE — ED NOTES
Bed: B15-15  Expected date:   Expected time:   Means of arrival:   Comments:  Timbo Og RN  02/06/22 2029

## 2022-02-06 NOTE — ED NOTES
Another RN attempted to start PIV with no success. MD Rolf aware and will start an ultrasound guided IV.      Suzette Syed RN  02/06/22 0056

## 2022-02-07 ASSESSMENT — ENCOUNTER SYMPTOMS
EYE DISCHARGE: 0
VOMITING: 1
NAUSEA: 1
STRIDOR: 0
COUGH: 0
ABDOMINAL PAIN: 1
SHORTNESS OF BREATH: 0

## 2022-02-07 NOTE — ED PROVIDER NOTES
1 [facility]  EMERGENCY DEPARTMENT ENCOUNTER          ATTENDING PHYSICIAN NOTE       Date of evaluation: 2/6/2022    Chief Complaint     Alcohol Problem      History of Present Illness     Jazlyn Anthony is a 78 y.o. female who presents with 1 day of nausea, vomiting and epigastric abdominal pain. HPI: 79 yo F who reports a history of ETOH abuse, possible dependency, and depression who presents for 1 day of nausea, vomiting and epigastric pain that is non-radiating. Patient states she is currently taking Librium to assist with reduction of her ETOH use, however, her last ETOH drink was 1 day prior to presentation. She denies other drug use. States she has a burning pain in her epigastrum, and has had mutliple bouts of NBNB emesis, denies coffee ground emesis. Reports no fevers, diaphoresis, tachycardia, rash, head trauma, chest pain, or sob. Also denies dysuria, diarrhea; states normal BM today. Denies SI/HI/psychotic symptoms      PMHX: ETOH abuse  Allergies: Reviewed per chart      Please see the work up tab for updates regarding this patient's evaluation and care in the Emergency Department    ED Course as of 02/07/22 1318   Sun Feb 06, 2022   1551 AST(!): 132  2:1 ratio of ETOH hepatitis [NM]   1655 Patient tolerating p.o. at this time. Pain is well controlled and repeat abdominal exam is unchanged from prior without significant findings. Viewed her lab results at the bedside, patient is comfortable continuing with her Librium that she has at home and plans to seek therapy tomorrow. At this time she denies any thoughts of self-harm or harming others and does not have any psychotic symptoms. Patient is comfortable discharge at this time. [NM]      ED Course User Index  [NM] Goran Boles MD         Review of Systems     Review of Systems   Constitutional: Negative for chills and fever. HENT: Negative for congestion. Eyes: Negative for discharge.    Respiratory: Negative for cough, shortness of breath and stridor. Cardiovascular: Negative for chest pain and leg swelling. Gastrointestinal: Positive for abdominal pain, nausea and vomiting. Endocrine: Negative for polyuria. Genitourinary: Negative for dysuria and frequency. Musculoskeletal: Negative for joint swelling. Skin: Negative for rash. Neurological: Negative for light-headedness and headaches. Psychiatric/Behavioral: Negative for behavioral problems. Past Medical, Surgical, Family, and Social History     She has a past medical history of Alcohol abuse, continuous, Alcohol dependence with withdrawal ([facility] [address] Alcohol withdrawal delirium ([facility] [address] Alcoholism ([facility] [address] Anxiety, Ataxia, Depression, Hyperlipidemia, and Hypertension. She has a past surgical history that includes back surgery; Colonoscopy; and Endoscopy, colon, diagnostic. Her family history includes Diabetes in her maternal grandfather and maternal grandmother; Heart Disease in her brother, father, maternal grandfather, maternal grandmother, paternal aunt, and paternal uncle; Other in her paternal grandfather and paternal grandmother; Stroke in her mother; Substance Abuse in her brother, father, and mother. She reports that she quit smoking about 34 years ago. Her smoking use included cigarettes. She has a 5.00 pack-year smoking history. She has never used smokeless tobacco. She reports current alcohol use of about 56.0 standard drinks of alcohol per week. She reports that she does not use drugs. Medications     Discharge Medication List as of 2/6/2022  4:59 PM      CONTINUE these medications which have NOT CHANGED    Details   chlordiazePOXIDE (LIBRIUM) 25 MG capsule Take 25 mg by mouth 4 times daily as needed for Anxiety. Historical Med      amLODIPine (NORVASC) 10 MG tablet Take 10 mg by mouth dailyHistorical Med      desvenlafaxine succinate (PRISTIQ) 50 MG TB24 extended release tablet Take 50 mg by mouth dailyHistorical Med      OLANZapine (ZYPREXA) 5 MG tablet Take 5 mg by mouth 2 times dailyHistorical Med      MAGNESIUM PO Take by mouth dailyHistorical Med      potassium chloride (KLOR-CON M) 10 MEQ extended release tablet TAKE ONE TABLET BY MOUTH DAILY, Disp-90 tablet, R-0Normal      zolpidem (AMBIEN) 10 MG tablet Take by mouth nightly as needed for Sleep. Historical Med      docusate sodium (COLACE) 100 MG capsule Take 100 mg by mouth 2 times daily as needed for ConstipationHistorical Med      progesterone (PROMETRIUM) 200 MG CAPS capsule Take 1 capsule by mouth nightlyHistorical Med      hydrOXYzine (VISTARIL) 25 MG capsule Take 1 cap TID PRN for anxiety, Disp-90 capsule,R-1Normal      cloNIDine (CATAPRES) 0.1 MG tablet TAKE ONE TABLET BY MOUTH TWICE A DAY, Disp-60 tablet, R-2Normal      NIFEdipine (PROCARDIA XL) 60 MG extended release tablet TAKE ONE TABLET BY MOUTH DAILY, Disp-90 tablet, R-0Normal      furosemide (LASIX) 20 MG tablet TAKE ONE TABLET BY MOUTH DAILY, Disp-30 tablet, R-2Normal      citalopram (CELEXA) 20 MG tablet Take 20 mg by mouth dailyHistorical Med      omeprazole (PRILOSEC) 20 MG delayed release capsule Take 20 mg by mouth dailyHistorical Med      polycarbophil (FIBERCON) 625 MG tablet Take 625 mg by mouth dailyHistorical Med             Allergies     She is allergic to penicillins and sulfa antibiotics. Physical Exam     INITIAL VITALS: BP: (!) 158/73, Temp: 98.1 °F (36.7 °C), Pulse: 89, Resp: 18, SpO2: 100 %   Physical Exam  Vitals reviewed. Constitutional:       General: She is not in acute distress. Appearance: Normal appearance. HENT:      Head: Normocephalic and atraumatic. Mouth/Throat:      Mouth: Mucous membranes are dry. Pharynx: No oropharyngeal exudate or posterior oropharyngeal erythema. Eyes:      General: No scleral icterus. Extraocular Movements: Extraocular movements intact. Conjunctiva/sclera: Conjunctivae normal.      Pupils: Pupils are equal, round, and reactive to light.    Neck: Comments: No subcutaneous emphysema palpable  Cardiovascular:      Rate and Rhythm: Normal rate and regular rhythm. Pulses: Normal pulses. Pulmonary:      Effort: Pulmonary effort is normal. No respiratory distress. Breath sounds: Normal breath sounds. No stridor. No wheezing. Abdominal:      General: Bowel sounds are normal. There is no distension. Palpations: Abdomen is soft. Tenderness: There is no right CVA tenderness, left CVA tenderness or guarding. Comments: Epigastric ttp   Musculoskeletal:      Cervical back: Normal range of motion and neck supple. No rigidity or tenderness. Right lower leg: No edema. Left lower leg: No edema. Lymphadenopathy:      Cervical: No cervical adenopathy. Skin:     General: Skin is warm and dry. Capillary Refill: Capillary refill takes less than 2 seconds. Neurological:      General: No focal deficit present. Mental Status: She is alert and oriented to person, place, and time. Psychiatric:         Mood and Affect: Mood normal.         Diagnostic Results     EKG       RADIOLOGY:  XR CHEST (2 VW)   Final Result      Focal opacity at the right lung base, presumably a nipple shadow. No evidence of pneumomediastinum.           LABS:   Results for orders placed or performed during the hospital encounter of 02/06/22   CBC auto differential   Result Value Ref Range    WBC 7.6 4.0 - 11.0 K/uL    RBC 3.84 (L) 4.00 - 5.20 M/uL    Hemoglobin 13.2 12.0 - 16.0 g/dL    Hematocrit 38.3 36.0 - 48.0 %    MCV 99.6 80.0 - 100.0 fL    MCH 34.4 (H) 26.0 - 34.0 pg    MCHC 34.5 31.0 - 36.0 g/dL    RDW 15.4 12.4 - 15.4 %    Platelets 286 171 - 246 K/uL    MPV 8.0 5.0 - 10.5 fL    Neutrophils % 77.6 %    Lymphocytes % 15.9 %    Monocytes % 5.3 %    Eosinophils % 0.9 %    Basophils % 0.3 %    Neutrophils Absolute 5.9 1.7 - 7.7 K/uL    Lymphocytes Absolute 1.2 1.0 - 5.1 K/uL    Monocytes Absolute 0.4 0.0 - 1.3 K/uL    Eosinophils Absolute 0.1 0.0 - 0.6 K/uL    Basophils Absolute 0.0 0.0 - 0.2 K/uL   Comprehensive Metabolic Panel   Result Value Ref Range    Sodium 136 136 - 145 mmol/L    Potassium 5.0 3.5 - 5.1 mmol/L    Chloride 96 (L) 99 - 110 mmol/L    CO2 27 21 - 32 mmol/L    Anion Gap 13 3 - 16    Glucose 94 70 - 99 mg/dL    BUN 12 7 - 20 mg/dL    CREATININE 0.6 0.6 - 1.2 mg/dL    GFR Non-African American >60 >60    GFR African American >60 >60    Calcium 10.6 8.3 - 10.6 mg/dL    Total Protein 8.1 6.4 - 8.2 g/dL    Albumin 4.3 3.4 - 5.0 g/dL    Albumin/Globulin Ratio 1.1 1.1 - 2.2    Total Bilirubin 0.7 0.0 - 1.0 mg/dL    Alkaline Phosphatase 72 40 - 129 U/L    ALT 79 (H) 10 - 40 U/L     (H) 15 - 37 U/L   Lipase   Result Value Ref Range    Lipase 49.0 13.0 - 60.0 U/L   Magnesium   Result Value Ref Range    Magnesium 1.90 1.80 - 2.40 mg/dL   Urinalysis, reflex to microscopic (Lab)   Result Value Ref Range    Color, UA Yellow Straw/Yellow    Clarity, UA Clear Clear    Glucose, Ur Negative Negative mg/dL    Bilirubin Urine Negative Negative    Ketones, Urine 15 (A) Negative mg/dL    Specific Gravity, UA 1.010 1.005 - 1.030    Blood, Urine Negative Negative    pH, UA 6.5 5.0 - 8.0    Protein, UA 30 (A) Negative mg/dL    Urobilinogen, Urine 0.2 <2.0 E.U./dL    Nitrite, Urine Negative Negative    Leukocyte Esterase, Urine TRACE (A) Negative    Microscopic Examination YES     Urine Type Voided    Microscopic Urinalysis   Result Value Ref Range    WBC, UA 3-5 0 - 5 /HPF    RBC, UA 0-2 0 - 4 /HPF    Epithelial Cells, UA 2-5 0 - 5 /HPF    Bacteria, UA 2+ (A) None Seen /HPF       ED BEDSIDE ULTRASOUND:      RECENT VITALS:  BP: (!) 171/81,Temp: 98.1 °F (36.7 °C), Pulse: 89, Resp: 16, SpO2: 100 %     Procedures         ED Course     Nursing Notes, Past Medical Hx, Past Surgical Hx, Social Hx,Allergies, and Family Hx were reviewed.     patient was given the following medications:  Orders Placed This Encounter   Medications    famotidine (PEPCID) injection 20 mg  lactated ringers bolus    ondansetron (ZOFRAN) injection 4 mg    LORazepam (ATIVAN) injection 2 mg    famotidine (PEPCID) 20 MG tablet     Sig: Take 1 tablet by mouth 2 times daily     Dispense:  60 tablet     Refill:  0    ondansetron (ZOFRAN) 4 MG tablet     Sig: Take 1 tablet by mouth every 8 hours as needed for Nausea     Dispense:  20 tablet     Refill:  0       CONSULTS:  None    MEDICAL DECISIONMAKING / ASSESSMENT / Kelli Artist is a 78 y.o. female with 1 day of nausea, vomiting and epigastric pain. High clinical concern for ETOH gastritis, possible pancreatitis, possible esophagitis. Low c/f Boorehavee's. Less c/f acute hepatitis, acute liver injury, cholecystitis, or ACS. No clinical c/f acute ETOH withdrawal; pt has no diaphoresis, significant tremors, confusion, tachycardia or diaphoresis. However will monitor closely while in ED, and provide benzodiazepine as needed. Given reassuring repeat abdominal exams, no acute need for CT scan of abdomen. Plan for symptoms controls with IV zofran, famotidine and IVF. Will check CMP, lipase, CBC and CXR. Ideally, if patient can PO and symptoms improved, will plan for d/c. Patient plans to establish with PCP and a therapist; we discussed strict return precautions which pt stated complete understanding of. Clinical Impression     1. Acute alcoholic gastritis without hemorrhage    2.  Alcohol abuse        Disposition     PATIENT REFERRED TO:  NAZ Vasquez CNP  [address]  [address] [place] [address]  [phone]    Schedule an appointment as soon as possible for a visit in 1 day      [facility] Emergency [address]  [phone]    If symptoms worsen      DISCHARGE MEDICATIONS:  Discharge Medication List as of 2/6/2022  4:59 PM      START taking these medications    Details   famotidine (PEPCID) 20 MG tablet Take 1 tablet by mouth 2 times daily, Disp-60 tablet, R-0Print      !! ondansetron (ZOFRAN) 4 MG tablet Take 1 tablet by mouth every 8 hours as needed for Nausea, Disp-20 tablet, R-0Print      !! ondansetron (ZOFRAN) 4 MG tablet Take 1 tablet by mouth every 8 hours as needed for Nausea or Vomiting, Disp-10 tablet, R-0Normal       !! - Potential duplicate medications found. Please discuss with provider.           DISPOSITION Decision To Discharge 02/06/2022 04:50:43 PM       Matthias Jasso MD  02/07/22 3797

## 2022-04-07 ENCOUNTER — APPOINTMENT (OUTPATIENT)
Dept: CT IMAGING | Age: 67
DRG: 897 | End: 2022-04-07
Payer: MEDICARE

## 2022-04-07 ENCOUNTER — HOSPITAL ENCOUNTER (INPATIENT)
Age: 67
LOS: 1 days | Discharge: HOME | DRG: 897 | End: 2022-04-08
Attending: STUDENT IN AN ORGANIZED HEALTH CARE EDUCATION/TRAINING PROGRAM | Admitting: FAMILY MEDICINE
Payer: MEDICARE

## 2022-04-07 DIAGNOSIS — F10.931 ALCOHOL WITHDRAWAL DELIRIUM (HCC): Primary | ICD-10-CM

## 2022-04-07 DIAGNOSIS — F10.930 ALCOHOL WITHDRAWAL SYNDROME WITHOUT COMPLICATION (HCC): ICD-10-CM

## 2022-04-07 DIAGNOSIS — I48.92 ATRIAL FLUTTER, UNSPECIFIED TYPE (HCC): ICD-10-CM

## 2022-04-07 LAB
A/G RATIO: 1.4 (ref 1.1–2.2)
ALBUMIN SERPL-MCNC: 4.8 G/DL (ref 3.4–5)
ALP BLD-CCNC: 91 U/L (ref 40–129)
ALT SERPL-CCNC: 42 U/L (ref 10–40)
AMPHETAMINE SCREEN, URINE: ABNORMAL
ANION GAP SERPL CALCULATED.3IONS-SCNC: 17 MMOL/L (ref 3–16)
AST SERPL-CCNC: 67 U/L (ref 15–37)
BACTERIA: ABNORMAL /HPF
BARBITURATE SCREEN URINE: ABNORMAL
BASOPHILS ABSOLUTE: 0.1 K/UL (ref 0–0.2)
BASOPHILS RELATIVE PERCENT: 0.9 %
BENZODIAZEPINE SCREEN, URINE: POSITIVE
BILIRUB SERPL-MCNC: 0.3 MG/DL (ref 0–1)
BILIRUBIN URINE: NEGATIVE
BLOOD, URINE: ABNORMAL
BUN BLDV-MCNC: 10 MG/DL (ref 7–20)
CALCIUM SERPL-MCNC: 9.7 MG/DL (ref 8.3–10.6)
CANNABINOID SCREEN URINE: ABNORMAL
CHLORIDE BLD-SCNC: 98 MMOL/L (ref 99–110)
CLARITY: CLEAR
CO2: 20 MMOL/L (ref 21–32)
COCAINE METABOLITE SCREEN URINE: ABNORMAL
COLOR: ABNORMAL
CREAT SERPL-MCNC: <0.5 MG/DL (ref 0.6–1.2)
EOSINOPHILS ABSOLUTE: 0.2 K/UL (ref 0–0.6)
EOSINOPHILS RELATIVE PERCENT: 2.8 %
EPITHELIAL CELLS, UA: 2 /HPF (ref 0–5)
ETHANOL: 47 MG/DL (ref 0–0.08)
GFR AFRICAN AMERICAN: >60
GFR NON-AFRICAN AMERICAN: >60
GLUCOSE BLD-MCNC: 103 MG/DL (ref 70–99)
GLUCOSE URINE: NEGATIVE MG/DL
HCT VFR BLD CALC: 39.4 % (ref 36–48)
HEMOGLOBIN: 13.5 G/DL (ref 12–16)
HYALINE CASTS: 2 /LPF (ref 0–8)
KETONES, URINE: NEGATIVE MG/DL
LACTIC ACID: 1.7 MMOL/L (ref 0.4–2)
LEUKOCYTE ESTERASE, URINE: ABNORMAL
LIPASE: 63 U/L (ref 13–60)
LYMPHOCYTES ABSOLUTE: 2.1 K/UL (ref 1–5.1)
LYMPHOCYTES RELATIVE PERCENT: 34 %
Lab: ABNORMAL
MCH RBC QN AUTO: 33 PG (ref 26–34)
MCHC RBC AUTO-ENTMCNC: 34.2 G/DL (ref 31–36)
MCV RBC AUTO: 96.5 FL (ref 80–100)
METHADONE SCREEN, URINE: ABNORMAL
MICROSCOPIC EXAMINATION: YES
MONOCYTES ABSOLUTE: 0.6 K/UL (ref 0–1.3)
MONOCYTES RELATIVE PERCENT: 9.5 %
NEUTROPHILS ABSOLUTE: 3.3 K/UL (ref 1.7–7.7)
NEUTROPHILS RELATIVE PERCENT: 52.8 %
NITRITE, URINE: NEGATIVE
OPIATE SCREEN URINE: ABNORMAL
OXYCODONE URINE: ABNORMAL
PDW BLD-RTO: 15.1 % (ref 12.4–15.4)
PH UA: 6
PH UA: 6 (ref 5–8)
PHENCYCLIDINE SCREEN URINE: ABNORMAL
PLATELET # BLD: 220 K/UL (ref 135–450)
PMV BLD AUTO: 7.8 FL (ref 5–10.5)
POTASSIUM REFLEX MAGNESIUM: 4.1 MMOL/L (ref 3.5–5.1)
PROPOXYPHENE SCREEN: ABNORMAL
PROTEIN UA: 100 MG/DL
RBC # BLD: 4.08 M/UL (ref 4–5.2)
RBC UA: 1 /HPF (ref 0–4)
SODIUM BLD-SCNC: 135 MMOL/L (ref 136–145)
SPECIFIC GRAVITY UA: 1.01 (ref 1–1.03)
TOTAL PROTEIN: 8.3 G/DL (ref 6.4–8.2)
URINE REFLEX TO CULTURE: ABNORMAL
URINE TYPE: ABNORMAL
UROBILINOGEN, URINE: 0.2 E.U./DL
WBC # BLD: 6.2 K/UL (ref 4–11)
WBC UA: 6 /HPF (ref 0–5)

## 2022-04-07 PROCEDURE — 85025 COMPLETE CBC W/AUTO DIFF WBC: CPT

## 2022-04-07 PROCEDURE — 83690 ASSAY OF LIPASE: CPT

## 2022-04-07 PROCEDURE — 84443 ASSAY THYROID STIM HORMONE: CPT

## 2022-04-07 PROCEDURE — 6370000000 HC RX 637 (ALT 250 FOR IP): Performed by: FAMILY MEDICINE

## 2022-04-07 PROCEDURE — 80053 COMPREHEN METABOLIC PANEL: CPT

## 2022-04-07 PROCEDURE — 93005 ELECTROCARDIOGRAM TRACING: CPT | Performed by: STUDENT IN AN ORGANIZED HEALTH CARE EDUCATION/TRAINING PROGRAM

## 2022-04-07 PROCEDURE — 2060000000 HC ICU INTERMEDIATE R&B

## 2022-04-07 PROCEDURE — 6360000002 HC RX W HCPCS: Performed by: PHYSICIAN ASSISTANT

## 2022-04-07 PROCEDURE — 6360000002 HC RX W HCPCS: Performed by: STUDENT IN AN ORGANIZED HEALTH CARE EDUCATION/TRAINING PROGRAM

## 2022-04-07 PROCEDURE — 83605 ASSAY OF LACTIC ACID: CPT

## 2022-04-07 PROCEDURE — 99283 EMERGENCY DEPT VISIT LOW MDM: CPT

## 2022-04-07 PROCEDURE — 2580000003 HC RX 258: Performed by: STUDENT IN AN ORGANIZED HEALTH CARE EDUCATION/TRAINING PROGRAM

## 2022-04-07 PROCEDURE — 70450 CT HEAD/BRAIN W/O DYE: CPT

## 2022-04-07 PROCEDURE — 6360000002 HC RX W HCPCS: Performed by: FAMILY MEDICINE

## 2022-04-07 PROCEDURE — 36415 COLL VENOUS BLD VENIPUNCTURE: CPT

## 2022-04-07 PROCEDURE — 2580000003 HC RX 258: Performed by: FAMILY MEDICINE

## 2022-04-07 PROCEDURE — 80307 DRUG TEST PRSMV CHEM ANLYZR: CPT

## 2022-04-07 PROCEDURE — 2500000003 HC RX 250 WO HCPCS: Performed by: STUDENT IN AN ORGANIZED HEALTH CARE EDUCATION/TRAINING PROGRAM

## 2022-04-07 PROCEDURE — 82077 ASSAY SPEC XCP UR&BREATH IA: CPT

## 2022-04-07 PROCEDURE — 81001 URINALYSIS AUTO W/SCOPE: CPT

## 2022-04-07 RX ORDER — LORAZEPAM 1 MG/1
4 TABLET ORAL
Status: DISCONTINUED | OUTPATIENT
Start: 2022-04-07 | End: 2022-04-08 | Stop reason: HOSPADM

## 2022-04-07 RX ORDER — LORAZEPAM 2 MG/ML
2 INJECTION INTRAMUSCULAR
Status: DISCONTINUED | OUTPATIENT
Start: 2022-04-07 | End: 2022-04-08 | Stop reason: HOSPADM

## 2022-04-07 RX ORDER — SODIUM CHLORIDE 0.9 % (FLUSH) 0.9 %
5-40 SYRINGE (ML) INJECTION EVERY 12 HOURS SCHEDULED
Status: DISCONTINUED | OUTPATIENT
Start: 2022-04-07 | End: 2022-04-08 | Stop reason: HOSPADM

## 2022-04-07 RX ORDER — SODIUM CHLORIDE 0.9 % (FLUSH) 0.9 %
5-40 SYRINGE (ML) INJECTION EVERY 12 HOURS SCHEDULED
Status: DISCONTINUED | OUTPATIENT
Start: 2022-04-07 | End: 2022-04-07 | Stop reason: SDUPTHER

## 2022-04-07 RX ORDER — LORAZEPAM 1 MG/1
2 TABLET ORAL
Status: DISCONTINUED | OUTPATIENT
Start: 2022-04-07 | End: 2022-04-08 | Stop reason: HOSPADM

## 2022-04-07 RX ORDER — METOPROLOL SUCCINATE 25 MG/1
25 TABLET, EXTENDED RELEASE ORAL DAILY
Status: DISCONTINUED | OUTPATIENT
Start: 2022-04-07 | End: 2022-04-08 | Stop reason: HOSPADM

## 2022-04-07 RX ORDER — LORAZEPAM 2 MG/ML
2 INJECTION INTRAMUSCULAR
Status: DISCONTINUED | OUTPATIENT
Start: 2022-04-07 | End: 2022-04-07 | Stop reason: SDUPTHER

## 2022-04-07 RX ORDER — LORAZEPAM 2 MG/ML
4 INJECTION INTRAMUSCULAR
Status: DISCONTINUED | OUTPATIENT
Start: 2022-04-07 | End: 2022-04-07 | Stop reason: SDUPTHER

## 2022-04-07 RX ORDER — LORAZEPAM 2 MG/ML
1 INJECTION INTRAMUSCULAR
Status: DISCONTINUED | OUTPATIENT
Start: 2022-04-07 | End: 2022-04-07 | Stop reason: SDUPTHER

## 2022-04-07 RX ORDER — SODIUM CHLORIDE 0.9 % (FLUSH) 0.9 %
5-40 SYRINGE (ML) INJECTION PRN
Status: DISCONTINUED | OUTPATIENT
Start: 2022-04-07 | End: 2022-04-07 | Stop reason: SDUPTHER

## 2022-04-07 RX ORDER — LORAZEPAM 1 MG/1
1 TABLET ORAL
Status: DISCONTINUED | OUTPATIENT
Start: 2022-04-07 | End: 2022-04-07 | Stop reason: SDUPTHER

## 2022-04-07 RX ORDER — MULTIVITAMIN WITH IRON
1 TABLET ORAL DAILY
Status: DISCONTINUED | OUTPATIENT
Start: 2022-04-08 | End: 2022-04-08 | Stop reason: HOSPADM

## 2022-04-07 RX ORDER — 0.9 % SODIUM CHLORIDE 0.9 %
1000 INTRAVENOUS SOLUTION INTRAVENOUS ONCE
Status: COMPLETED | OUTPATIENT
Start: 2022-04-07 | End: 2022-04-08

## 2022-04-07 RX ORDER — ACETAMINOPHEN 325 MG/1
650 TABLET ORAL EVERY 6 HOURS PRN
Status: DISCONTINUED | OUTPATIENT
Start: 2022-04-07 | End: 2022-04-08 | Stop reason: HOSPADM

## 2022-04-07 RX ORDER — LORAZEPAM 2 MG/ML
1 INJECTION INTRAMUSCULAR
Status: DISCONTINUED | OUTPATIENT
Start: 2022-04-07 | End: 2022-04-08 | Stop reason: HOSPADM

## 2022-04-07 RX ORDER — LORAZEPAM 2 MG/ML
3 INJECTION INTRAMUSCULAR
Status: DISCONTINUED | OUTPATIENT
Start: 2022-04-07 | End: 2022-04-08 | Stop reason: HOSPADM

## 2022-04-07 RX ORDER — CLONIDINE HYDROCHLORIDE 0.1 MG/1
0.1 TABLET ORAL 2 TIMES DAILY
Status: DISCONTINUED | OUTPATIENT
Start: 2022-04-07 | End: 2022-04-08 | Stop reason: HOSPADM

## 2022-04-07 RX ORDER — LORAZEPAM 1 MG/1
3 TABLET ORAL
Status: DISCONTINUED | OUTPATIENT
Start: 2022-04-07 | End: 2022-04-08 | Stop reason: HOSPADM

## 2022-04-07 RX ORDER — ACETAMINOPHEN 650 MG/1
650 SUPPOSITORY RECTAL EVERY 6 HOURS PRN
Status: DISCONTINUED | OUTPATIENT
Start: 2022-04-07 | End: 2022-04-08 | Stop reason: HOSPADM

## 2022-04-07 RX ORDER — POLYETHYLENE GLYCOL 3350 17 G/17G
17 POWDER, FOR SOLUTION ORAL DAILY PRN
Status: DISCONTINUED | OUTPATIENT
Start: 2022-04-07 | End: 2022-04-08 | Stop reason: HOSPADM

## 2022-04-07 RX ORDER — LORAZEPAM 2 MG/ML
4 INJECTION INTRAMUSCULAR
Status: DISCONTINUED | OUTPATIENT
Start: 2022-04-07 | End: 2022-04-08 | Stop reason: HOSPADM

## 2022-04-07 RX ORDER — LORAZEPAM 1 MG/1
4 TABLET ORAL
Status: DISCONTINUED | OUTPATIENT
Start: 2022-04-07 | End: 2022-04-07 | Stop reason: SDUPTHER

## 2022-04-07 RX ORDER — SODIUM CHLORIDE 9 MG/ML
INJECTION, SOLUTION INTRAVENOUS PRN
Status: DISCONTINUED | OUTPATIENT
Start: 2022-04-07 | End: 2022-04-07 | Stop reason: SDUPTHER

## 2022-04-07 RX ORDER — LORAZEPAM 1 MG/1
3 TABLET ORAL
Status: DISCONTINUED | OUTPATIENT
Start: 2022-04-07 | End: 2022-04-07 | Stop reason: SDUPTHER

## 2022-04-07 RX ORDER — LORAZEPAM 1 MG/1
1 TABLET ORAL
Status: DISCONTINUED | OUTPATIENT
Start: 2022-04-07 | End: 2022-04-08 | Stop reason: HOSPADM

## 2022-04-07 RX ORDER — SODIUM CHLORIDE 9 MG/ML
INJECTION, SOLUTION INTRAVENOUS CONTINUOUS
Status: DISCONTINUED | OUTPATIENT
Start: 2022-04-07 | End: 2022-04-08

## 2022-04-07 RX ORDER — LORAZEPAM 2 MG/ML
3 INJECTION INTRAMUSCULAR
Status: DISCONTINUED | OUTPATIENT
Start: 2022-04-07 | End: 2022-04-07 | Stop reason: SDUPTHER

## 2022-04-07 RX ORDER — CIPROFLOXACIN 2 MG/ML
400 INJECTION, SOLUTION INTRAVENOUS EVERY 12 HOURS
Status: DISCONTINUED | OUTPATIENT
Start: 2022-04-07 | End: 2022-04-08 | Stop reason: HOSPADM

## 2022-04-07 RX ORDER — ONDANSETRON 2 MG/ML
4 INJECTION INTRAMUSCULAR; INTRAVENOUS EVERY 6 HOURS PRN
Status: DISCONTINUED | OUTPATIENT
Start: 2022-04-07 | End: 2022-04-08 | Stop reason: HOSPADM

## 2022-04-07 RX ORDER — ONDANSETRON 4 MG/1
4 TABLET, ORALLY DISINTEGRATING ORAL EVERY 8 HOURS PRN
Status: DISCONTINUED | OUTPATIENT
Start: 2022-04-07 | End: 2022-04-08 | Stop reason: HOSPADM

## 2022-04-07 RX ORDER — SODIUM CHLORIDE 9 MG/ML
INJECTION, SOLUTION INTRAVENOUS PRN
Status: DISCONTINUED | OUTPATIENT
Start: 2022-04-07 | End: 2022-04-08 | Stop reason: HOSPADM

## 2022-04-07 RX ORDER — SODIUM CHLORIDE 0.9 % (FLUSH) 0.9 %
5-40 SYRINGE (ML) INJECTION PRN
Status: DISCONTINUED | OUTPATIENT
Start: 2022-04-07 | End: 2022-04-08 | Stop reason: HOSPADM

## 2022-04-07 RX ORDER — AMLODIPINE BESYLATE 5 MG/1
10 TABLET ORAL DAILY
Status: DISCONTINUED | OUTPATIENT
Start: 2022-04-08 | End: 2022-04-08 | Stop reason: HOSPADM

## 2022-04-07 RX ORDER — LORAZEPAM 1 MG/1
2 TABLET ORAL
Status: DISCONTINUED | OUTPATIENT
Start: 2022-04-07 | End: 2022-04-07 | Stop reason: SDUPTHER

## 2022-04-07 RX ADMIN — LORAZEPAM 2 MG: 2 INJECTION INTRAMUSCULAR; INTRAVENOUS at 19:05

## 2022-04-07 RX ADMIN — CLONIDINE HYDROCHLORIDE 0.1 MG: 0.1 TABLET ORAL at 23:35

## 2022-04-07 RX ADMIN — THIAMINE HYDROCHLORIDE: 100 INJECTION, SOLUTION INTRAMUSCULAR; INTRAVENOUS at 18:22

## 2022-04-07 RX ADMIN — METOPROLOL SUCCINATE 25 MG: 25 TABLET, EXTENDED RELEASE ORAL at 23:29

## 2022-04-07 RX ADMIN — LORAZEPAM 2 MG: 2 INJECTION INTRAMUSCULAR; INTRAVENOUS at 23:27

## 2022-04-07 RX ADMIN — SODIUM CHLORIDE, PRESERVATIVE FREE 10 ML: 5 INJECTION INTRAVENOUS at 23:29

## 2022-04-07 RX ADMIN — SODIUM CHLORIDE 1000 ML: 9 INJECTION, SOLUTION INTRAVENOUS at 18:23

## 2022-04-07 ASSESSMENT — ENCOUNTER SYMPTOMS
RHINORRHEA: 0
ABDOMINAL PAIN: 0
DIARRHEA: 0
COUGH: 0
NAUSEA: 0
VOMITING: 0
SHORTNESS OF BREATH: 0
WHEEZING: 0

## 2022-04-07 ASSESSMENT — PAIN SCALES - GENERAL
PAINLEVEL_OUTOF10: 0
PAINLEVEL_OUTOF10: 8
PAINLEVEL_OUTOF10: 0
PAINLEVEL_OUTOF10: 0

## 2022-04-07 ASSESSMENT — PATIENT HEALTH QUESTIONNAIRE - PHQ9: SUM OF ALL RESPONSES TO PHQ QUESTIONS 1-9: 16

## 2022-04-07 NOTE — ED PROVIDER NOTES
MidLevel Attestation   I havepersonally performed and/or participated in the history, exam and medical decision making and agree with all pertinent clinical information. I have also reviewed and agree with the past medical, family and social historyunless otherwise noted. I have personally performed a face to face diagnostic evaluation onthis patient. I have reviewed the mid-levels findings and agree. In brief, Earnestine Silver is a 78 y.o. female that presented to the emergency department with   Chief Complaint   Patient presents with    Other     states is withdrawing from alcohol last drink 4 hours ago   . CONSTITUTIONAL: Well appearing, in no acute distress   EYES: PERRL, No injection, discharge or scleral icterus. NECK: Normal ROM, NO LAD and Moist mucous membranes. CARDIOVASCULAR: Regular rate and rhythm. No murmurs or gallop. PULMONARY/CHEST: Airway patent. No retractions. Breath sounds clear with good air entry bilaterally. ABDOMEN: Soft, Non-distended and non-tender, without guarding or rebound. SKIN: Acyanotic, warm, dry   MUSCULOSKELETAL: No swelling, tenderness or deformity   NEUROLOGICAL: Awake. Pulses intact. Grossly nonfocal     78year-old presents to be withdrawing from alcohol. Patient was tachycardic when she presented. Labs showed some abnormalities.   Patient was admitted for alcohol withdrawal.    I have reviewed and interpreted all of the currently available lab results and diagnostics from this visit:  Results for orders placed or performed during the hospital encounter of 04/07/22   CBC with Auto Differential   Result Value Ref Range    WBC 6.2 4.0 - 11.0 K/uL    RBC 4.08 4.00 - 5.20 M/uL    Hemoglobin 13.5 12.0 - 16.0 g/dL    Hematocrit 39.4 36.0 - 48.0 %    MCV 96.5 80.0 - 100.0 fL    MCH 33.0 26.0 - 34.0 pg    MCHC 34.2 31.0 - 36.0 g/dL    RDW 15.1 12.4 - 15.4 %    Platelets 453 116 - 977 K/uL    MPV 7.8 5.0 - 10.5 fL    Neutrophils % 52.8 %    Lymphocytes % 34.0 % Monocytes % 9.5 %    Eosinophils % 2.8 %    Basophils % 0.9 %    Neutrophils Absolute 3.3 1.7 - 7.7 K/uL    Lymphocytes Absolute 2.1 1.0 - 5.1 K/uL    Monocytes Absolute 0.6 0.0 - 1.3 K/uL    Eosinophils Absolute 0.2 0.0 - 0.6 K/uL    Basophils Absolute 0.1 0.0 - 0.2 K/uL   Comprehensive Metabolic Panel w/ Reflex to MG   Result Value Ref Range    Sodium 135 (L) 136 - 145 mmol/L    Potassium reflex Magnesium 4.1 3.5 - 5.1 mmol/L    Chloride 98 (L) 99 - 110 mmol/L    CO2 20 (L) 21 - 32 mmol/L    Anion Gap 17 (H) 3 - 16    Glucose 103 (H) 70 - 99 mg/dL    BUN 10 7 - 20 mg/dL    CREATININE <0.5 (L) 0.6 - 1.2 mg/dL    GFR Non-African American >60 >60    GFR African American >60 >60    Calcium 9.7 8.3 - 10.6 mg/dL    Total Protein 8.3 (H) 6.4 - 8.2 g/dL    Albumin 4.8 3.4 - 5.0 g/dL    Albumin/Globulin Ratio 1.4 1.1 - 2.2    Total Bilirubin 0.3 0.0 - 1.0 mg/dL    Alkaline Phosphatase 91 40 - 129 U/L    ALT 42 (H) 10 - 40 U/L    AST 67 (H) 15 - 37 U/L   Urinalysis with Reflex to Culture    Specimen: Urine   Result Value Ref Range    Color, UA Light yellow Straw/Yellow    Clarity, UA Clear Clear    Glucose, Ur Negative Negative mg/dL    Bilirubin Urine Negative Negative    Ketones, Urine Negative Negative mg/dL    Specific Gravity, UA 1.010 1.005 - 1.030    Blood, Urine TRACE-LYSED (A) Negative    pH, UA 6.0 5.0 - 8.0    Protein,  (A) Negative mg/dL    Urobilinogen, Urine 0.2 <2.0 E.U./dL    Nitrite, Urine Negative Negative    Leukocyte Esterase, Urine TRACE (A) Negative    Microscopic Examination YES     Urine Type NotGiven     Urine Reflex to Culture Not Indicated    Lactic Acid   Result Value Ref Range    Lactic Acid 1.7 0.4 - 2.0 mmol/L   Lipase   Result Value Ref Range    Lipase 63.0 (H) 13.0 - 60.0 U/L   Drug screen multi urine   Result Value Ref Range    Amphetamine Screen, Urine Neg Negative <1000ng/mL    Barbiturate Screen, Ur Neg Negative <200 ng/mL    Benzodiazepine Screen, Urine POSITIVE (A) Negative <200 ng/mL    Cannabinoid Scrn, Ur Neg Negative <50 ng/mL    Cocaine Metabolite Screen, Urine Neg Negative <300 ng/mL    Opiate Scrn, Ur Neg Negative <300 ng/mL    PCP Screen, Urine Neg Negative <25 ng/mL    Methadone Screen, Urine Neg Negative <300 ng/mL    Propoxyphene Scrn, Ur Neg Negative <300 ng/mL    Oxycodone Urine Neg Negative <100 ng/ml    pH, UA 6.0     Drug Screen Comment: see below    Ethanol   Result Value Ref Range    Ethanol Lvl 47 mg/dL   Microscopic Urinalysis   Result Value Ref Range    Bacteria, UA 1+ (A) None Seen /HPF    Hyaline Casts, UA 2 0 - 8 /LPF    WBC, UA 6 (H) 0 - 5 /HPF    RBC, UA 1 0 - 4 /HPF    Epithelial Cells, UA 2 0 - 5 /HPF   TSH with Reflex   Result Value Ref Range    TSH 1.62 0.27 - 4.20 uIU/mL   CBC   Result Value Ref Range    WBC 5.8 4.0 - 11.0 K/uL    RBC 3.57 (L) 4.00 - 5.20 M/uL    Hemoglobin 12.0 12.0 - 16.0 g/dL    Hematocrit 35.2 (L) 36.0 - 48.0 %    MCV 98.5 80.0 - 100.0 fL    MCH 33.6 26.0 - 34.0 pg    MCHC 34.1 31.0 - 36.0 g/dL    RDW 14.7 12.4 - 15.4 %    Platelets 939 803 - 039 K/uL    MPV 7.7 5.0 - 10.5 fL   Basic Metabolic Panel   Result Value Ref Range    Sodium 136 136 - 145 mmol/L    Potassium 3.7 3.5 - 5.1 mmol/L    Chloride 103 99 - 110 mmol/L    CO2 19 (L) 21 - 32 mmol/L    Anion Gap 14 3 - 16    Glucose 108 (H) 70 - 99 mg/dL    BUN 10 7 - 20 mg/dL    CREATININE 0.5 (L) 0.6 - 1.2 mg/dL    GFR Non-African American >60 >60    GFR African American >60 >60    Calcium 8.2 (L) 8.3 - 10.6 mg/dL   Protime-INR   Result Value Ref Range    Protime 11.1 9.9 - 12.7 sec    INR 0.98 0.88 - 1.12   EKG 12 Lead   Result Value Ref Range    Ventricular Rate 108 BPM    Atrial Rate 300 BPM    QRS Duration 68 ms    Q-T Interval 336 ms    QTc Calculation (Bazett) 450 ms    P Axis 84 degrees    R Axis 66 degrees    T Axis 68 degrees    Diagnosis       Atrial flutterSeptal infarct , age undeterminedConfirmed by Bishop Moreno ([id number]) on 4/8/2022 2:14:27 PM     Echo Complete    Result Date: 4/8/2022  Transthoracic Echocardiography Report (TTE)  Demographics   Patient Name       Smith Chaudhary   Date of Study      04/08/2022         Gender              Female   Patient Number     [id number]         Date of Birth       1955   Visit Number       [id number]          Age                 78 year(s)   Accession Number   [id number]         Room Number         1346   Corporate ID       [id number]           Sonographer         Ofe Clements RVT   Ordering Physician Shannon Sullivan.,  Interpreting        Maisha Rodriguez MD,                     MD                 Physician           Tracy Velez  Procedure Type of Study   TTE procedure:ECHOCARDIOGRAM COMPLETE 2D W DOPPLER W COLOR. Procedure Date Date: 04/08/2022 Start: 02:57 PM Study Location: [facility] - Echo Lab Technical Quality: Adequate visualization Indications:Atrial flutter. Patient Status: Routine Height: 65 inches Weight: 164 pounds BSA: 1.82 m2 BMI: 27.29 kg/m2 BP: 150/88 mmHg  Conclusions   Summary  -Normal left ventricle size, wall thickness, and systolic function with an  estimated ejection fraction of 55-60%. -No regional wall motion abnormalities are seen.  -Normal diastolic function. Avg. E/e'=9.7   Signature   ------------------------------------------------------------------  Electronically signed by Tracy Richardson MD  (Interpreting physician) on 04/08/2022 at 03:53 PM  ------------------------------------------------------------------   Findings   Left Ventricle  Normal left ventricle size, wall thickness, and systolic function with an  estimated ejection fraction of 55-60%. No regional wall motion abnormalities  are seen. Normal diastolic function. Avg. E/e'=9.7   Mitral Valve  Thickened mitral valve without evidence of stenosis. There is trivial mitral  regurgitation. Left Atrium  The left atrium is normal in size.    Aortic Valve  The aortic valve is structurally normal. There is no significant aortic  valve regurgitation or stenosis. Aorta  The aortic root is normal in size. Right Ventricle  Normal right ventricular size and function. TAPSE is estimated at 2.00 cm. Tricuspid Valve  Tricuspid valve is structurally normal.  There is trivial tricuspid regurgitation with a RVSP estimation of 25 mmHg. Right Atrium  The right atrial size is normal.   Pulmonic Valve  The pulmonic valve is structurally normal.  No evidence of pulmonic valve stenosis. Trivial pulmonic regurgitation present. Pericardial Effusion  No pericardial effusion noted. Pleural Effusion  No pleural effusion. Miscellaneous  The inferior vena cava appears normal in size with normal respiratory  variation.   M-Mode/2D Measurements (cm)   LV Diastolic Dimension: 1.86 cm LV Systolic Dimension: 5.75 cm  LV Septum Diastolic: 1.12 cm  LV PW Diastolic: 5.71 cm        AO Root Dimension: 2.5 cm                                  LA Dimension: 2.9 cm                                  LA Area: 15 cm2  LVOT: 1.7 cm                    LA volume/Index: 43.1 ml /24 ml/m2  Doppler Measurements   AV Peak Velocity: 125 cm/s     MV Peak E-Wave: 93 cm/s  AV Peak Gradient: 6.25 mmHg    MV Peak A-Wave: 121 cm/s  AV Mean Gradient: 4 mmHg       MV E/A Ratio: 0.77  LVOT Peak Velocity: 104 cm/s  AV Area (Continuity):1.71 cm2   TR Velocity:236 cm/s  TR Gradient:22.28 mmHg  Estimated RAP:3 mmHg  Estimated RVSP: 25 mmHg  E' Septal Velocity: 9.36 cm/s  E' Lateral Velocity: 9.79 cm/s   Aortic Valve   Peak Velocity: 125 cm/s     Mean Velocity: 88.6 cm/s  Peak Gradient: 6.25 mmHg    Mean Gradient: 4 mmHg  Area (continuity): 1.71 cm2  AV VTI: 30.1 cm  Aorta   Aortic Root: 2.5 cm  Ascending Aorta: 2.8 cm  LVOT Diameter: 1.7 cm        ED Medication Orders (From admission, onward)    Start Ordered     Status Ordering Provider    04/08/22 1230 04/08/22 1203  0.9 % sodium chloride bolus  ONCE         Last MAR action: Stopped - by Tayler Moreland on 04/08/22 at [address]    04/08/22 1230 04/08/22 1204  diazePAM (VALIUM) tablet 2 mg  ONCE         Last MAR action: Given - by Taylerjackie Moreland on 04/08/22 at [address]    04/07/22 1700 04/07/22 1633  sodium chloride 0.9 % 2,832 mL with folic acid 1 mg, adult multi-vitamin with vitamin k 10 mL, thiamine 300 mg  ONCE         Last MAR action: New Bag - by Venkat Al on 04/07/22 at [facility]    04/07/22 1645 04/07/22 1633  0.9 % sodium chloride bolus  ONCE         Last MAR action: Stopped - by Minerva Sorenson on 04/08/22 at [facility]          Final Impression      1. Alcohol withdrawal delirium ([facility] [address]    2. Alcohol withdrawal syndrome without complication ([facility] [address]    3. Atrial flutter, unspecified type [facility])        DISPOSITION Admitted 04/07/2022 06:35:23 PM       This record is transcribed utilizing voice recognition technology. There are inherent limitations in this technology. In addition, there may be limitations in editing of this report. If there are any discrepancies, please contact me directly.     Marcela Wesley MD   4/8/2022         Sujata Ventura Mc, MD  04/08/22 8442

## 2022-04-07 NOTE — ED NOTES
Patient is connected in services at [facility] for outpatient treatment services for alcohol and drug.      Dmitriy Edmnods  04/07/22 0512

## 2022-04-07 NOTE — ED NOTES
Per pt last drink at 10am today.   Pt drinks 1.5 liters of wine a day     Mireille Weaver RN  04/07/22 9118

## 2022-04-07 NOTE — ED PROVIDER NOTES
[address] [facility]        Pt Name: Camelia Dumas  MRN: [id number]  [place] 1955  Date of evaluation: 4/7/2022  Provider: Abhijeet Avitia PA-C  PCP: No primary care provider on file. Note Started: 5:23 PM EDT        I have seen and evaluated this patient with my supervising physician Rebecca Hall MD.    CHIEF COMPLAINT       Chief Complaint   Patient presents with    Other     states is withdrawing from alcohol last drink 4 hours ago       HISTORY OF PRESENT ILLNESS   (Location, Timing/Onset, Context/Setting, Quality, Duration, Modifying Factors, Severity, Associated Signs and Symptoms)  Note limiting factors. Chief Complaint: EtOH withdrawal     Camelia Dumas is a 78 y.o. female who presents for evaluation of alcohol withdrawal.  Patient is wanting to get into [facility], but, was sent here for admission for detox and medical clearance prior to rehabilitation. Patient admits to drinking 1.5 L of wine per day. Most recent drink was approximately 4 hours prior to arrival in the ED. Patient states that she is tremulous. 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 States that this is why she is still drinking. States that drinking started back in 2019 when she moved out. Developed seasonal affective disorder. States that she is not on antidepressants. She denies any other illicit drug use. She denies chest pain or shortness of breath. Abdominal pain nausea vomiting or diarrhea. She has no other complaints or concerns at this time    Nursing Notes were all reviewed and agreed with or any disagreements were addressed in the HPI. REVIEW OF SYSTEMS    (2-9 systems for level 4, 10 or more for level 5)     Review of Systems   Constitutional: Negative for appetite change, chills and fever. HENT: Negative for congestion and rhinorrhea.     Respiratory: Negative for cough, Father     Heart Disease Brother     Substance Abuse Brother     Heart Disease Paternal Aunt     Heart Disease Paternal Uncle     Heart Disease Maternal Grandmother     Diabetes Maternal Grandmother     Diabetes Maternal Grandfather     Heart Disease Maternal Grandfather     Other Paternal Grandmother     Other Paternal Grandfather           SOCIAL HISTORY       Social History     Tobacco Use    Smoking status: Former Smoker     Packs/day: 0.50     Years: 10.00     Pack years: 5.00     Types: Cigarettes     Quit date: 1987     Years since quittin.7    Smokeless tobacco: Never Used   Vaping Use    Vaping Use: Never used   Substance Use Topics    Alcohol use: Yes     Alcohol/week: 56.0 standard drinks     Types: 56 Glasses of wine per week     Comment: 3 liters of wine daliy    Drug use: No       SCREENINGS    Erick Coma Scale  Eye Opening: Spontaneous  Best Verbal Response: Oriented  Best Motor Response: Obeys commands  [place] Coma Scale Score: 15        PHYSICAL EXAM    (up to 7 for level 4, 8 or more for level 5)     ED Triage Vitals [22 1630]   BP Temp Temp src Pulse Resp SpO2 Height Weight   (!) 173/81 98.3 °F (36.8 °C) -- 111 16 96 % -- --       Physical Exam  Vitals and nursing note reviewed. Constitutional:       General: She is in acute distress. Appearance: She is well-developed. She is not ill-appearing, toxic-appearing or diaphoretic. HENT:      Head: Normocephalic and atraumatic. Right Ear: External ear normal.      Left Ear: External ear normal.      Nose: Nose normal.   Eyes:      General:         Right eye: No discharge. Left eye: No discharge. Extraocular Movements: Extraocular movements intact. Conjunctiva/sclera: Conjunctivae normal.      Pupils: Pupils are equal, round, and reactive to light. Cardiovascular:      Rate and Rhythm: Regular rhythm. Tachycardia present. Heart sounds: Normal heart sounds.    Pulmonary:      Effort: Pulmonary effort is normal. No respiratory distress. Breath sounds: Normal breath sounds. Chest:      Chest wall: No tenderness. Abdominal:      General: There is no distension. Palpations: Abdomen is soft. Tenderness: There is no abdominal tenderness. Musculoskeletal:         General: Normal range of motion. Cervical back: Normal range of motion and neck supple. Skin:     General: Skin is warm and dry. Neurological:      Mental Status: She is alert and oriented to person, place, and time. Mental status is at baseline. GCS: GCS eye subscore is 4. GCS verbal subscore is 5. GCS motor subscore is 6. Cranial Nerves: Cranial nerves are intact. Motor: Tremor present. No seizure activity. Psychiatric:         Behavior: Behavior normal.         DIAGNOSTIC RESULTS   LABS:    Labs Reviewed   COMPREHENSIVE METABOLIC PANEL W/ REFLEX TO MG FOR LOW K - Abnormal; Notable for the following components:       Result Value    Sodium 135 (*)     Chloride 98 (*)     CO2 20 (*)     Anion Gap 17 (*)     Glucose 103 (*)     CREATININE <0.5 (*)     Total Protein 8.3 (*)     ALT 42 (*)     AST 67 (*)     All other components within normal limits   LIPASE - Abnormal; Notable for the following components:    Lipase 63.0 (*)     All other components within normal limits   CBC WITH AUTO DIFFERENTIAL   LACTIC ACID   ETHANOL   URINALYSIS WITH REFLEX TO CULTURE   URINE DRUG SCREEN       When ordered only abnormal lab results are displayed. All other labs were within normal range or not returned as of this dictation. EKG: When ordered, EKG's are interpreted by the Emergency Department Physician in the absence of a cardiologist.  Please see their note for interpretation of EKG.     RADIOLOGY:   Non-plain film images such as CT, Ultrasound and MRI are read by the radiologist. Plain radiographic images are visualized and preliminarily interpreted by the ED Provider with the below findings:        Interpretation per the Radiologist below, if available at the time of this note:    CT Head WO Contrast   Final Result   No acute intracranial abnormality. No results found. PROCEDURES   Unless otherwise noted below, none     Procedures    CRITICAL CARE TIME   There was a high probability of life-threatening clinical deterioration in the patient's condition requiring my urgent intervention. I personally saw the patient and independently provided 35 minutes of non-concurrent critical care out of the total shared critical care time provided, excluding separately reportable procedures.        CONSULTS:  IP CONSULT TO SOCIAL WORK      EMERGENCY DEPARTMENT COURSE and DIFFERENTIAL DIAGNOSIS/MDM:   Vitals:    Vitals:    04/07/22 1630   BP: (!) 173/81   Pulse: 111   Resp: 16   Temp: 98.3 °F (36.8 °C)   SpO2: 96%       Patient was given the following medications:  Medications   0.9 % sodium chloride bolus (1,000 mLs IntraVENous New Bag 4/7/22 4432)   sodium chloride flush 0.9 % injection 5-40 mL (has no administration in time range)   sodium chloride flush 0.9 % injection 5-40 mL (has no administration in time range)   0.9 % sodium chloride infusion (has no administration in time range)   LORazepam (ATIVAN) tablet 1 mg (has no administration in time range)     Or   LORazepam (ATIVAN) injection 1 mg (has no administration in time range)     Or   LORazepam (ATIVAN) tablet 2 mg (has no administration in time range)     Or   LORazepam (ATIVAN) injection 2 mg (has no administration in time range)     Or   LORazepam (ATIVAN) tablet 3 mg (has no administration in time range)     Or   LORazepam (ATIVAN) injection 3 mg (has no administration in time range)     Or   LORazepam (ATIVAN) tablet 4 mg (has no administration in time range)     Or   LORazepam (ATIVAN) injection 4 mg (has no administration in time range)   sodium chloride 0.9 % 2,886 mL with folic acid 1 mg, adult multi-vitamin with vitamin k 10 mL, thiamine 300 mg ( IntraVENous New Bag 4/7/22 9934)           Patient presents for evaluation of concern for alcohol withdrawal.  On exam, she appears anxious, tachycardic, hypertensive and tremulous but in no acute distress and nontoxic. Vitals otherwise stable and she is afebrile. Alert and oriented x3. GCS 15. Cranial nerves II through XII are intact. Lungs are clear to auscultation bilaterally, chest is nontender and abdomen is benign. Patient was given banana bag and will be reevaluated. Please see attending note for EKG interpretation, however, this does show new onset atrial flutter. CBC and CMP are unremarkable. Lactic acid 1.7. Lipase 63.  Ethanol levels 47. CT of the head shows no acute intracranial normality. Urinalysis and drug screen pending. CIWA protocol was initiated as well as seizure precautions. I do ezra patient warrants admission for complicated alcohol withdrawal for inpatient detox as well as cardiology consultation due to new onset atrial flutter. Hospitalist will resume care the patient at this time. Patient was informed and agreeable. She is stable for admission      FINAL IMPRESSION      1. Alcohol withdrawal syndrome without complication ([facility] [address]    2. Atrial flutter, unspecified type [facility])          DISPOSITION/PLAN   DISPOSITION Decision To Admit 04/07/2022 06:23:55 PM      PATIENT REFERRED TO:  No follow-up provider specified. DISCHARGE MEDICATIONS:  New Prescriptions    No medications on file       DISCONTINUED MEDICATIONS:  Discontinued Medications    CHLORDIAZEPOXIDE (LIBRIUM) 25 MG CAPSULE    Take 25 mg by mouth 4 times daily as needed for Anxiety.     CITALOPRAM (CELEXA) 20 MG TABLET    Take 20 mg by mouth daily    DESVENLAFAXINE SUCCINATE (PRISTIQ) 50 MG TB24 EXTENDED RELEASE TABLET    Take 50 mg by mouth daily    FUROSEMIDE (LASIX) 20 MG TABLET    TAKE ONE TABLET BY MOUTH DAILY    HYDROXYZINE (VISTARIL) 25 MG CAPSULE    Take 1 cap TID PRN for anxiety NIFEDIPINE (PROCARDIA XL) 60 MG EXTENDED RELEASE TABLET    TAKE ONE TABLET BY MOUTH DAILY    OLANZAPINE (ZYPREXA) 5 MG TABLET    Take 5 mg by mouth 2 times daily    OMEPRAZOLE (PRILOSEC) 20 MG DELAYED RELEASE CAPSULE    Take 20 mg by mouth daily    POLYCARBOPHIL (FIBERCON) 625 MG TABLET    Take 625 mg by mouth daily    POTASSIUM CHLORIDE (KLOR-CON M) 10 MEQ EXTENDED RELEASE TABLET    TAKE ONE TABLET BY MOUTH DAILY    ZOLPIDEM (AMBIEN) 10 MG TABLET    Take by mouth nightly as needed for Sleep.               (Please note that portions of this note were completed with a voice recognition program.  Efforts were made to edit the dictations but occasionally words are mis-transcribed.)    Dylan Crowe PA-C (electronically signed)           [place]as [place], [place]  04/07/22 2158

## 2022-04-08 VITALS
DIASTOLIC BLOOD PRESSURE: 67 MMHG | OXYGEN SATURATION: 99 % | HEART RATE: 93 BPM | HEIGHT: 65 IN | SYSTOLIC BLOOD PRESSURE: 132 MMHG | WEIGHT: 164.9 LBS | RESPIRATION RATE: 17 BRPM | BODY MASS INDEX: 27.47 KG/M2 | TEMPERATURE: 97.6 F

## 2022-04-08 LAB
ANION GAP SERPL CALCULATED.3IONS-SCNC: 14 MMOL/L (ref 3–16)
BUN BLDV-MCNC: 10 MG/DL (ref 7–20)
CALCIUM SERPL-MCNC: 8.2 MG/DL (ref 8.3–10.6)
CHLORIDE BLD-SCNC: 103 MMOL/L (ref 99–110)
CO2: 19 MMOL/L (ref 21–32)
CREAT SERPL-MCNC: 0.5 MG/DL (ref 0.6–1.2)
EKG ATRIAL RATE: 300 BPM
EKG DIAGNOSIS: NORMAL
EKG P AXIS: 84 DEGREES
EKG Q-T INTERVAL: 336 MS
EKG QRS DURATION: 68 MS
EKG QTC CALCULATION (BAZETT): 450 MS
EKG R AXIS: 66 DEGREES
EKG T AXIS: 68 DEGREES
EKG VENTRICULAR RATE: 108 BPM
GFR AFRICAN AMERICAN: >60
GFR NON-AFRICAN AMERICAN: >60
GLUCOSE BLD-MCNC: 108 MG/DL (ref 70–99)
HCT VFR BLD CALC: 35.2 % (ref 36–48)
HEMOGLOBIN: 12 G/DL (ref 12–16)
INR BLD: 0.98 (ref 0.88–1.12)
LV EF: 58 %
LVEF MODALITY: NORMAL
MCH RBC QN AUTO: 33.6 PG (ref 26–34)
MCHC RBC AUTO-ENTMCNC: 34.1 G/DL (ref 31–36)
MCV RBC AUTO: 98.5 FL (ref 80–100)
PDW BLD-RTO: 14.7 % (ref 12.4–15.4)
PLATELET # BLD: 178 K/UL (ref 135–450)
PMV BLD AUTO: 7.7 FL (ref 5–10.5)
POTASSIUM SERPL-SCNC: 3.7 MMOL/L (ref 3.5–5.1)
PROTHROMBIN TIME: 11.1 SEC (ref 9.9–12.7)
RBC # BLD: 3.57 M/UL (ref 4–5.2)
SODIUM BLD-SCNC: 136 MMOL/L (ref 136–145)
TSH REFLEX: 1.62 UIU/ML (ref 0.27–4.2)
WBC # BLD: 5.8 K/UL (ref 4–11)

## 2022-04-08 PROCEDURE — 6370000000 HC RX 637 (ALT 250 FOR IP): Performed by: INTERNAL MEDICINE

## 2022-04-08 PROCEDURE — 97530 THERAPEUTIC ACTIVITIES: CPT

## 2022-04-08 PROCEDURE — 6360000002 HC RX W HCPCS: Performed by: FAMILY MEDICINE

## 2022-04-08 PROCEDURE — 2580000003 HC RX 258: Performed by: FAMILY MEDICINE

## 2022-04-08 PROCEDURE — 97165 OT EVAL LOW COMPLEX 30 MIN: CPT

## 2022-04-08 PROCEDURE — 85610 PROTHROMBIN TIME: CPT

## 2022-04-08 PROCEDURE — 93010 ELECTROCARDIOGRAM REPORT: CPT | Performed by: INTERNAL MEDICINE

## 2022-04-08 PROCEDURE — 6360000002 HC RX W HCPCS: Performed by: INTERNAL MEDICINE

## 2022-04-08 PROCEDURE — 36415 COLL VENOUS BLD VENIPUNCTURE: CPT

## 2022-04-08 PROCEDURE — 97161 PT EVAL LOW COMPLEX 20 MIN: CPT

## 2022-04-08 PROCEDURE — 80048 BASIC METABOLIC PNL TOTAL CA: CPT

## 2022-04-08 PROCEDURE — 93306 TTE W/DOPPLER COMPLETE: CPT

## 2022-04-08 PROCEDURE — 85027 COMPLETE CBC AUTOMATED: CPT

## 2022-04-08 PROCEDURE — 6370000000 HC RX 637 (ALT 250 FOR IP): Performed by: NURSE PRACTITIONER

## 2022-04-08 PROCEDURE — 2500000003 HC RX 250 WO HCPCS: Performed by: INTERNAL MEDICINE

## 2022-04-08 PROCEDURE — 2580000003 HC RX 258: Performed by: INTERNAL MEDICINE

## 2022-04-08 PROCEDURE — 6370000000 HC RX 637 (ALT 250 FOR IP): Performed by: FAMILY MEDICINE

## 2022-04-08 RX ORDER — DIAZEPAM 5 MG/1
TABLET ORAL
Qty: 6 TABLET | Refills: 0 | Status: SHIPPED | OUTPATIENT
Start: 2022-04-08 | End: 2022-04-11

## 2022-04-08 RX ORDER — DIAZEPAM 2 MG/1
2 TABLET ORAL ONCE
Status: COMPLETED | OUTPATIENT
Start: 2022-04-08 | End: 2022-04-08

## 2022-04-08 RX ORDER — GAUZE BANDAGE 2" X 2"
100 BANDAGE TOPICAL DAILY
Status: DISCONTINUED | OUTPATIENT
Start: 2022-04-11 | End: 2022-04-08 | Stop reason: HOSPADM

## 2022-04-08 RX ORDER — FOLIC ACID 1 MG/1
1 TABLET ORAL DAILY
Status: DISCONTINUED | OUTPATIENT
Start: 2022-04-11 | End: 2022-04-08 | Stop reason: HOSPADM

## 2022-04-08 RX ORDER — 0.9 % SODIUM CHLORIDE 0.9 %
1000 INTRAVENOUS SOLUTION INTRAVENOUS ONCE
Status: COMPLETED | OUTPATIENT
Start: 2022-04-08 | End: 2022-04-08

## 2022-04-08 RX ORDER — LANOLIN ALCOHOL/MO/W.PET/CERES
6 CREAM (GRAM) TOPICAL NIGHTLY PRN
Status: DISCONTINUED | OUTPATIENT
Start: 2022-04-08 | End: 2022-04-08 | Stop reason: HOSPADM

## 2022-04-08 RX ORDER — PROGESTERONE 100 MG/1
200 CAPSULE ORAL NIGHTLY
Status: DISCONTINUED | OUTPATIENT
Start: 2022-04-08 | End: 2022-04-08 | Stop reason: HOSPADM

## 2022-04-08 RX ORDER — THIAMINE MONONITRATE (VIT B1) 100 MG
100 TABLET ORAL DAILY
Qty: 30 TABLET | Refills: 0 | Status: SHIPPED | OUTPATIENT
Start: 2022-04-11

## 2022-04-08 RX ORDER — FOLIC ACID 1 MG/1
1 TABLET ORAL DAILY
Qty: 30 TABLET | Refills: 3 | Status: SHIPPED | OUTPATIENT
Start: 2022-04-11

## 2022-04-08 RX ORDER — MULTIVITAMIN WITH IRON
1 TABLET ORAL DAILY
Qty: 30 TABLET | Refills: 0 | Status: SHIPPED | OUTPATIENT
Start: 2022-04-09

## 2022-04-08 RX ADMIN — SODIUM CHLORIDE 1000 ML: 9 INJECTION, SOLUTION INTRAVENOUS at 13:45

## 2022-04-08 RX ADMIN — DIAZEPAM 2 MG: 2 TABLET ORAL at 12:10

## 2022-04-08 RX ADMIN — MELATONIN TAB 3 MG 6 MG: 3 TAB at 00:59

## 2022-04-08 RX ADMIN — ENOXAPARIN SODIUM 40 MG: 40 INJECTION SUBCUTANEOUS at 10:35

## 2022-04-08 RX ADMIN — THIAMINE HYDROCHLORIDE: 100 INJECTION, SOLUTION INTRAMUSCULAR; INTRAVENOUS at 12:05

## 2022-04-08 RX ADMIN — METOPROLOL SUCCINATE 25 MG: 25 TABLET, EXTENDED RELEASE ORAL at 10:35

## 2022-04-08 RX ADMIN — CIPROFLOXACIN 400 MG: 2 INJECTION, SOLUTION INTRAVENOUS at 10:35

## 2022-04-08 RX ADMIN — PROGESTERONE 200 MG: 100 CAPSULE ORAL at 00:59

## 2022-04-08 RX ADMIN — LORAZEPAM 2 MG: 2 INJECTION INTRAMUSCULAR; INTRAVENOUS at 03:48

## 2022-04-08 RX ADMIN — CLONIDINE HYDROCHLORIDE 0.1 MG: 0.1 TABLET ORAL at 10:35

## 2022-04-08 RX ADMIN — SODIUM CHLORIDE: 9 INJECTION, SOLUTION INTRAVENOUS at 01:02

## 2022-04-08 RX ADMIN — AMLODIPINE BESYLATE 10 MG: 5 TABLET ORAL at 10:35

## 2022-04-08 RX ADMIN — THERA TABS 1 TABLET: TAB at 10:35

## 2022-04-08 RX ADMIN — ONDANSETRON 4 MG: 4 TABLET, ORALLY DISINTEGRATING ORAL at 10:35

## 2022-04-08 RX ADMIN — CIPROFLOXACIN 400 MG: 2 INJECTION, SOLUTION INTRAVENOUS at 00:18

## 2022-04-08 ASSESSMENT — PAIN SCALES - GENERAL
PAINLEVEL_OUTOF10: 0

## 2022-04-08 NOTE — PROGRESS NOTES
Occupational Therapy   Occupational Therapy Initial Assessment/Discharge Summary  Date: 2022   Patient Name: Ozzy Hanks  MRN: [id number]     : 1955    Date of Service: 2022    Discharge Recommendations:  Ozzy Hanks scored a 24/24 on the AM-[facility] ADL Inpatient form. At this time, no further OT is recommended upon discharge due to independent in ADLs and functional mobility. Recommend patient returns to prior setting with prior services. OT Equipment Recommendations  Equipment Needed: No    Assessment   Assessment: Patient presents at baseline function. No further OT services are indicated at this time. Patient is safe to return to prior living environment due to independence with ADLs and functional mobility. Prognosis: Good  Decision Making: Low Complexity  OT Education: OT Role;Plan of Care  Patient Education: eval/dc. patient verbalized understanding  REQUIRES OT FOLLOW UP: No  Activity Tolerance  Activity Tolerance: Patient Tolerated treatment well  Safety Devices  Safety Devices in place: Yes  Type of devices: All fall risk precautions in place; Left in chair;Call light within reach;Nurse notified; Chair alarm in place           Patient Diagnosis(es): The primary encounter diagnosis was Alcohol withdrawal delirium ([facility] [address] Diagnoses of Alcohol withdrawal syndrome without complication (HCC) and Atrial flutter, unspecified type [facility]) were also pertinent to this visit. has a past medical history of Alcohol abuse, continuous, Alcohol dependence with withdrawal ([facility] [address] Alcohol withdrawal delirium ([facility] [address] Alcoholism ([facility] [address] Anxiety, Ataxia, Depression, Hyperlipidemia, and Hypertension. has a past surgical history that includes back surgery; Colonoscopy; and Endoscopy, colon, diagnostic.            Restrictions  Restrictions/Precautions  Restrictions/Precautions: Fall Risk (high fall risk)  Required Braces or Orthoses?: No  Position Activity Restriction  Other position/activity restrictions: Akash Herrera is a 78 y.o. female who presents for evaluation of alcohol withdrawal.  Patient is wanting to get into [facility], but, was sent here for admission for detox and medical clearance prior to rehabilitation. Patient admits to drinking 1.5 L of wine per day. Most recent drink was approximately 4 hours prior to arrival in the ED. Patient states that she is tremulous. 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 States that this is why she is still drinking. States that drinking started back in 2019 when she moved out. Developed seasonal affective disorder. States that she is not on antidepressants. She denies any other illicit drug use. She denies chest pain or shortness of breath. Abdominal pain nausea vomiting or diarrhea. She has no other complaints or concerns at this time    Subjective   General  Chart Reviewed: Yes  Patient assessed for rehabilitation services?: Yes  Family / Caregiver Present: No  Subjective  Subjective: Patient in bed upon arrival, agreeable to eval. Patient reporting no pain and eager to return home. General Comment  Comments: Patient left in recliner with all needs met and nursing notified. Patient Currently in Pain: Denies    Social/Functional History  Social/Functional History  Lives With: Spouse  Type of Home: Apartment ([facility])  Home Layout: One level  Home Access: Stairs to enter with rails  Entrance Stairs - Number of Steps: 13  Entrance Stairs - Rails: Right  Bathroom Shower/Tub: Walk-in shower  Bathroom Toilet: Standard  Bathroom Equipment: Grab bars in [place] [facility] chair  ADL Assistance: Independent  Homemaking Assistance: Independent  Ambulation Assistance: Independent  Transfer Assistance: Independent  Active : Yes (States she doesn't drive often.)  Occupation: Retired  Additional Comments: Patient denies any falls.        Objective   Vision: Within Functional Limits  Hearing: Within functional limits    Orientation  Overall Orientation Status: Within Normal Limits  Observation/Palpation  Posture: Good  Balance  Sitting Balance: Independent  Standing Balance: Independent  Standing Balance  Time: ~10 minutes  Activity: grooming, transfers, functional mobility  Comment: Ind. no AD and no safety concerns  Functional Mobility  Functional - Mobility Device: No device  Activity: To/from bathroom; Other (~300 ft)  Assist Level: Independent  ADL  Grooming: Independent  Additional Comments: Patient completed oral hygiene and grooming standing at sink with no AD. Patietn declining other ADLs at this time. Discussed current home setup and patient reported having shower chair and grab bars if needed. Tone RUE  RUE Tone: Normotonic  Tone LUE  LUE Tone: Normotonic  Coordination  Movements Are Fluid And Coordinated: Yes     Bed mobility  Supine to Sit: Independent  Scooting: Independent  Transfers  Sit to stand: Independent  Stand to sit:  Independent     Cognition  Overall Cognitive Status: WFL                 LUE AROM (degrees)  LUE AROM : WFL  RUE AROM (degrees)  RUE AROM : WFL              Plan   Plan  Times per week: DC             AM-PAC Score        AM-PAC Inpatient Daily Activity Raw Score: 24 (04/08/22 1617)  AM-PAC Inpatient ADL T-Scale Score : 57.54 (04/08/22 1617)  ADL Inpatient CMS 0-100% Score: 0 (04/08/22 1617)  ADL Inpatient CMS G-Code Modifier : CH (04/08/22 1617)    Goals  Short term goals  Time Frame for Short term goals: No acute OT goals       Therapy Time   Individual Concurrent Group Co-treatment   Time In 1537         Time Out 1603         Minutes 26              Timed Code Treatment Minutes:  11 Minutes    Total Treatment Minutes:  26 minutes    MARIA DE JESUS Jackson OTR/L [id number]    Salome Swan OT

## 2022-04-08 NOTE — DISCHARGE SUMMARY
Patient: Julissa Willis     Gender: female  : 1955   Age: 78 y.o. MRN: [id number]    Admitting Physician: Keyana Parker MD  Discharge Physician: Armida Tang MD     Code Status: Full Code     Admit Date: 2022   Discharge Date:22       Disposition:  Home    Discharge Diagnoses:  Sinus tachycardia secondary to alcohol intoxication and alcohol dependence improved    Active Hospital Problems    Diagnosis Date Noted    [facility] [F10.231] 2022       Follow-up appointments:  one week    Outpatient to do list: none     Condition at Discharge:  [address] Course:   78year-old admitted to the hospital with generalized tremors. She has been heavily drinking alcohol level was elevated  On admission her EKG was read as atrial flutter. I reviewed the EKG and it was not atrial flutter it was merely sinus tachycardia  With IV fluids over the next 24 hours she felt better  She reports that she is serious about quitting absent her out on a Valium taper. She received multivitamins.   She has been given resources to assist with that  She had an echocardiogram which was normal ejection fraction again she does not have atrial flutter it was sinus tachycardia which improved  She has hypertension and all home medications were continued      Discharge Medications:   Current Discharge Medication List      START taking these medications    Details   folic acid (FOLVITE) 1 MG tablet Take 1 tablet by mouth daily  Qty: 30 tablet, Refills: 3      Multiple Vitamin (MULTIVITAMIN) TABS tablet Take 1 tablet by mouth daily  Qty: 30 tablet, Refills: 0      thiamine mononitrate (THIAMINE) 100 MG tablet Take 1 tablet by mouth daily  Qty: 30 tablet, Refills: 0      diazePAM (VALIUM) 5 MG tablet Take 1 tablet 3 times a day for 1 day then half tablet 3 times a day for 1 day then half tablet twice a day for 1 day and half tablet daily for 1 day then stop  Qty: 6 tablet, Refills: 0    Associated Diagnoses: Alcohol withdrawal delirium ([facility] [address]           Current Discharge Medication List        Current Discharge Medication List      CONTINUE these medications which have NOT CHANGED    Details   amLODIPine (NORVASC) 10 MG tablet Take 10 mg by mouth daily      MAGNESIUM PO Take by mouth daily      famotidine (PEPCID) 20 MG tablet Take 1 tablet by mouth 2 times daily  Qty: 60 tablet, Refills: 0      !! ondansetron (ZOFRAN) 4 MG tablet Take 1 tablet by mouth every 8 hours as needed for Nausea  Qty: 20 tablet, Refills: 0      !! ondansetron (ZOFRAN) 4 MG tablet Take 1 tablet by mouth every 8 hours as needed for Nausea or Vomiting  Qty: 10 tablet, Refills: 0      cloNIDine (CATAPRES) 0.1 MG tablet TAKE ONE TABLET BY MOUTH TWICE A DAY  Qty: 60 tablet, Refills: 2      docusate sodium (COLACE) 100 MG capsule Take 100 mg by mouth 2 times daily as needed for Constipation      progesterone (PROMETRIUM) 200 MG CAPS capsule Take 1 capsule by mouth nightly       !! - Potential duplicate medications found. Please discuss with provider.         Current Discharge Medication List      STOP taking these medications       chlordiazePOXIDE (LIBRIUM) 25 MG capsule Comments:   Reason for Stopping:         desvenlafaxine succinate (PRISTIQ) 50 MG TB24 extended release tablet Comments:   Reason for Stopping:         OLANZapine (ZYPREXA) 5 MG tablet Comments:   Reason for Stopping:         potassium chloride (KLOR-CON M) 10 MEQ extended release tablet Comments:   Reason for Stopping:         NIFEdipine (PROCARDIA XL) 60 MG extended release tablet Comments:   Reason for Stopping:         furosemide (LASIX) 20 MG tablet Comments:   Reason for Stopping:         citalopram (CELEXA) 20 MG tablet Comments:   Reason for Stopping:         omeprazole (PRILOSEC) 20 MG delayed release capsule Comments:   Reason for Stopping:         zolpidem (AMBIEN) 10 MG tablet Comments:   Reason for Stopping:         polycarbophil (FIBERCON) 625 MG tablet Comments:   Reason for Stopping:         hydrOXYzine (VISTARIL) 25 MG capsule Comments:   Reason for Stopping:               Discharge ROS:  A complete review of systems was asked and negative     Discharge Exam:    /67   Pulse 93   Temp 97.6 °F (36.4 °C) (Oral)   Resp 17   Ht 5' 5\" (1.651 m)   Wt 164 lb 14.5 oz (74.8 kg)   SpO2 99%   BMI 27.44 kg/m²   General appearance:  NAD  HEENT:   Normal cephalic, atraumatic, moist mucous membranes, no oropharyngeal erythema or exudate  Heart[de-identified] Normal s1/s2, RRR, no murmurs, gallops, or rubs. no leg edema  Lungs:  Normal respiratory effort. Clear to auscultation, bilaterally without Rales/Wheezes/Rhonchi. Abdomen: Soft, non-tender, non-distended, bowel sounds present, no masses  Musculoskeletal:  No clubbing, no cyanosis, *  Neurologic:  Neurovascularly intact without any focal sensory/motor deficits. Cranial nerves: II-XII intact, grossly non-focal.    Labs: For convenience and continuity at follow-up the following most recent labs are provided:    Lab Results   Component Value Date    WBC 5.8 04/08/2022    HGB 12.0 04/08/2022    HCT 35.2 04/08/2022    MCV 98.5 04/08/2022     04/08/2022     04/08/2022    K 3.7 04/08/2022    K 4.1 04/07/2022     04/08/2022    CO2 19 04/08/2022    BUN 10 04/08/2022    CREATININE 0.5 04/08/2022    CALCIUM 8.2 04/08/2022    PHOS 3.3 06/16/2021    ALKPHOS 91 04/07/2022    ALT 42 04/07/2022    AST 67 04/07/2022    BILITOT 0.3 04/07/2022    BILIDIR <0.2 06/16/2021    LABALBU 4.8 04/07/2022    LDLCALC 105 02/08/2021    TRIG 80 02/08/2021     Lab Results   Component Value Date    INR 0.98 04/08/2022    INR 0.89 06/16/2021    INR 0.98 09/27/2019           The patient was seen and examined on day of discharge and this discharge summary is in conjunction with any daily progress note from day of discharge. Time Spent on discharge is 45 minutes  in the examination, evaluation, counseling and review of medications and discharge plan. Note that greater  than 30 minutes was spent in preparing discharge papers, discussing discharge with patient, medication review, etc.       Signed:    Cici Medina MD   4/8/2022      Thank you No primary care provider on file. for the opportunity to be involved in this patient's care.  If you have any questions or concerns please feel free to contact me

## 2022-04-08 NOTE — H&P
HOSPITALISTS HISTORY AND PHYSICAL    4/7/2022 9:47 PM    Patient Information:  Minerva Machado is a 78 y.o. female [id number]  PCP:  No primary care provider on file. (Tel: None )    Chief complaint:    Chief Complaint   Patient presents with   [place] Other     states is withdrawing from alcohol last drink 4 hours ago        History of Present Illness:  Stefania Mccall is a 78 y.o. female with h/o alcohol abuse presented with c/o tremors, tachycardia and anxiety. She contacted [facility] for rehab but they sent her to ED for detox. She drinks 1.5 L of alcohol daily. Blood etoh level is 47. Urine tox screen is positive for benzos   She is tachycardic in the eD. EKG showed Aflutter with elevated HR . She has metabolic acidosis with wide anion gap . UA showed Pyuria   Denies fever          REVIEW OF SYSTEMS:   Constitutional: Negative for fever,chills or night sweats  ENT: Negative for rhinorrhea, epistaxis, hoarseness, sore throat. Respiratory: Negative for shortness of breath,wheezing  Cardiovascular: Negative for chest pain, palpitations   Gastrointestinal: Negative for nausea, vomiting, diarrhea  Genitourinary: Negative for polyuria, dysuria   Hematologic/Lymphatic: Negative for bleeding tendency, easy bruising  Musculoskeletal: Negative for myalgias and arthralgias  Neurologic: Negative for confusion,dysarthria. Skin: Negative for itching,rash  Psychiatric: Negative for depression,anxiety, agitation. Endocrine: Negative for polydipsia,polyuria,heat /cold intolerance. Past Medical History:   has a past medical history of Alcohol abuse, continuous, Alcohol dependence with withdrawal ([facility] [address] Alcohol withdrawal delirium ([facility] [address] Alcoholism ([facility] [address] Anxiety, Ataxia, Depression, Hyperlipidemia, and Hypertension. Past Surgical History:   has a past surgical history that includes back surgery;  Colonoscopy; and Endoscopy, colon, diagnostic. Medications:  No current facility-administered medications on file prior to encounter.      Current Outpatient Medications on File Prior to Encounter   Medication Sig Dispense Refill    amLODIPine (NORVASC) 10 MG tablet Take 10 mg by mouth daily      MAGNESIUM PO Take by mouth daily      famotidine (PEPCID) 20 MG tablet Take 1 tablet by mouth 2 times daily 60 tablet 0    ondansetron (ZOFRAN) 4 MG tablet Take 1 tablet by mouth every 8 hours as needed for Nausea 20 tablet 0    ondansetron (ZOFRAN) 4 MG tablet Take 1 tablet by mouth every 8 hours as needed for Nausea or Vomiting 10 tablet 0    cloNIDine (CATAPRES) 0.1 MG tablet TAKE ONE TABLET BY MOUTH TWICE A DAY 60 tablet 2    docusate sodium (COLACE) 100 MG capsule Take 100 mg by mouth 2 times daily as needed for Constipation      progesterone (PROMETRIUM) 200 MG CAPS capsule Take 1 capsule by mouth nightly      [DISCONTINUED] furosemide (LASIX) 20 MG tablet Take 1 tablet by mouth daily 30 tablet 2     Current Facility-Administered Medications   Medication Dose Route Frequency Provider Last Rate Last Admin    sodium chloride flush 0.9 % injection 5-40 mL  5-40 mL IntraVENous 2 times per day [facility], [facility]-        sodium chloride flush 0.9 % injection 5-40 mL  5-40 mL IntraVENous PRN [facility], [facility]        0.9 % sodium chloride infusion   IntraVENous PRN [facility]        LORazepam (ATIVAN) tablet 1 mg  1 mg Oral Q1H PRN [place], [place]        Or    LORazepam (ATIVAN) injection 1 mg  1 mg IntraVENous Q1H PRN [facility]        Or    LORazepam (ATIVAN) tablet 2 mg  2 mg Oral Q1H PRN [place], [place]        Or    LORazepam (ATIVAN) injection 2 mg  2 mg IntraVENous Q1H PRN [facility], [facility]-   2 mg at 04/07/22 1905    Or    LORazepam (ATIVAN) tablet 3 mg  3 mg Oral Q1H PRN [facility]        Or    LORazepam (ATIVAN) injection 3 mg  3 mg IntraVENous Q1H PRN Demetrius Walsh PA-C        Or    LORazepam (ATIVAN) tablet 4 mg  4 mg Oral Q1H PRN Amna Venegas        Or    LORazepam (ATIVAN) injection 4 mg  4 mg IntraVENous Q1H PRN [address] [place]        0.9 % sodium chloride infusion   IntraVENous Continuous Pretty Mckeon MD        metoprolol succinate (TOPROL XL) extended release tablet 25 mg  25 mg Oral Daily Pretty Mckeon MD           Allergies: Allergies   Allergen Reactions    Penicillins Anaphylaxis, Hives, Rash and Swelling    Sulfa Antibiotics         Social History:  Patient Lives    reports that she quit smoking about 34 years ago. Her smoking use included cigarettes. She has a 5.00 pack-year smoking history. She has never used smokeless tobacco. She reports current alcohol use of about 56.0 standard drinks of alcohol per week. She reports that she does not use drugs. Family History:  family history includes Diabetes in her maternal grandfather and maternal grandmother; Heart Disease in her brother, father, maternal grandfather, maternal grandmother, paternal aunt, and paternal uncle; Other in her paternal grandfather and paternal grandmother; Stroke in her mother; Substance Abuse in her brother, father, and mother. , *    Physical Exam:  BP (!) 144/93   Pulse 124   Temp 98.3 °F (36.8 °C)   Resp 16   SpO2 96%     General appearance:  Appears comfortable. Well nourished  Eyes: Sclera clear, pupils equal  ENT: Moist mucus membranes, no thrush. Trachea midline. Cardiovascular: Regular rhythm, normal S1, S2. No murmur, gallop, rub. No edema in lower extremities  Respiratory: Clear to auscultation bilaterally, no wheeze, good inspiratory effort  Gastrointestinal: Abdomen soft, non-tender, not distended, normal bowel sounds  Musculoskeletal: No cyanosis in digits, neck supple  Neurology: Cranial nerves grossly intact. Alert and oriented in time, place and person.  No hesitate to contact me through [facility].        Andrew Santiago MD    4/7/2022 9:47 PM

## 2022-04-08 NOTE — PROGRESS NOTES
Physical Therapy    Facility/Department: [address]  Initial Assessment & Discharge Summary    NAME: Laureano Josue  : 1955  MRN: [id number]    Date of Service: 2022    Discharge Recommendations:  Laureano Josue scored a 24/24 on the AM-PAC short mobility form. At this time, no further PT is recommended upon discharge due to patient being independent and at functional baseline. Recommend patient returns to prior setting with prior services. Home independently   PT Equipment Recommendations  Equipment Needed: No    Assessment   Assessment: Patient demonstrates no functional deficits which would warrant physical therapy intervention. Discussed with patient effects of drinking on balance and ambulation and how it can lead to mobility defictis. Patient verbalized understanding. Prognosis: Good  Decision Making: Low Complexity  History: ETOH abuse  Exam: functional mobility assessment  Clinical Presentation: Stable. PT Education: [facility]  Patient Education: ID recommendations - patient verbalized understanding  Barriers to Learning: None evident. REQUIRES PT FOLLOW UP: No  Activity Tolerance  Activity Tolerance: Patient Tolerated treatment well       Patient Diagnosis(es): The primary encounter diagnosis was Alcohol withdrawal delirium ([facility] [address] Diagnoses of Alcohol withdrawal syndrome without complication (HCC) and Atrial flutter, unspecified type [facility]) were also pertinent to this visit. has a past medical history of Alcohol abuse, continuous, Alcohol dependence with withdrawal ([facility] [address] Alcohol withdrawal delirium ([facility] [address] Alcoholism ([facility] [address] Anxiety, Ataxia, Depression, Hyperlipidemia, and Hypertension. has a past surgical history that includes back surgery; Colonoscopy; and Endoscopy, colon, diagnostic.     Restrictions  Restrictions/Precautions  Restrictions/Precautions: Fall Risk (high fall risk)  Required Braces or Orthoses?: No  Position Activity Restriction  Other position/activity restrictions: Tania Saenz is a 78 y.o. female who presents for evaluation of alcohol withdrawal.  Patient is wanting to get into [facility], but, was sent here for admission for detox and medical clearance prior to rehabilitation. Patient admits to drinking 1.5 L of wine per day. Most recent drink was approximately 4 hours prior to arrival in the ED. Patient states that she is tremulous. 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 States that this is why she is still drinking. States that drinking started back in 2019 when she moved out. Developed seasonal affective disorder. States that she is not on antidepressants. She denies any other illicit drug use. She denies chest pain or shortness of breath. Abdominal pain nausea vomiting or diarrhea. She has no other complaints or concerns at this time     Vision/Hearing  Vision: Within Functional Limits  Hearing: Within functional limits       Subjective  General  Chart Reviewed: Yes  Patient assessed for rehabilitation services?: Yes  Response To Previous Treatment: Not applicable  Family / Caregiver Present: No  Diagnosis: ETOH withdrawal, atrial flutter  Follows Commands: Within Functional Limits  General Comment  Comments: Patient supine in bed. Subjective  Subjective: Patient reports she was independent prior to admission, did not require any physical assistance.   Pain Screening  Patient Currently in Pain: Denies  Vital Signs  Patient Currently in Pain: Denies       Orientation  Orientation  Overall Orientation Status: Within Normal Limits     Social/Functional History  Social/Functional History  Lives With: Spouse  Type of Home: Apartment  Home Layout: One level  Home Access: Stairs to enter with rails  Entrance Stairs - Number of Steps: 13  Entrance Stairs - Rails: Right  Bathroom Toilet: Standard  ADL Assistance: Independent  Homemaking Assistance: Independent  Ambulation Assistance: Independent  Transfer Assistance: Independent  Active : Yes (States she doesn't drive often.)  Occupation: Retired  Additional Comments: Patient denies any falls. Objective  AROM RLE (degrees)  RLE AROM: WNL  AROM LLE (degrees)  LLE AROM : WNL  Strength RLE  Strength RLE: WFL  Strength LLE  Strength LLE: WFL        Bed mobility  Supine to Sit: Independent  Sit to Supine: Independent  Scooting: Independent  Transfers  Sit to Stand: Independent  Stand to sit: Independent  Bed to Chair: Independent  Stand Pivot Transfers: Independent  Ambulation  Ambulation?: Yes  More Ambulation?: No  Ambulation 1  Surface: level tile  Device: No Device  Assistance: Independent  Gait Deviations: None  Distance: 200'  Comments: Gait is reciprocal, steady, no LOB. Patient denies any impairments. Stairs/Curb  Stairs?: Yes  Stairs  # Steps : 6  Stairs Height: 6\"  Rails: Left ascending  Assistance: Modified independent   Comment: Reciprocal, well-balanced, no evidence of balance or safety concerns.      Balance  Posture: Good  Sitting - Static: Good  Sitting - Dynamic: Good  Standing - Static: Good  Standing - Dynamic: Good        Plan   Safety Devices  Type of devices: Bed alarm in place,All fall risk precautions in place,Call light within reach,Gait belt,Patient at risk for falls,Left in chair,Nurse notified  Restraints  Initially in place: No    AM-PAC Score  AM-PAC Inpatient Mobility Raw Score : 24 (04/08/22 1453)  AM-PAC Inpatient T-Scale Score : 61.14 (04/08/22 1453)  Mobility Inpatient CMS 0-100% Score: 0 (04/08/22 1453)  Mobility Inpatient CMS G-Code Modifier : [facility] (04/08/22 1453)       Therapy Time   Individual Concurrent Group Co-treatment   Time In 1408         Time Out 1432         Minutes 24         Timed Code Treatment Minutes: [address] [place], [place], DPT, ATC-R [id number]

## 2022-04-08 NOTE — PROGRESS NOTES
CLINICAL PHARMACY NOTE: MEDS TO BEDS    Total # of Prescriptions Filled: 2   The following medications were delivered to the patient:  · Diazepam 5mg  · Folic acid 1mg    Additional Documentation:    Patient did not want thiamine 100mg & daily-leonarda    Medications were delivered to patient's room and signed for by Stanley Brand

## 2022-04-11 ENCOUNTER — CARE COORDINATION (OUTPATIENT)
Dept: CASE MANAGEMENT | Age: 67
End: 2022-04-11

## 2022-04-11 NOTE — CARE COORDINATION
[facility] Transitions Initial Follow Up Call    Call within 2 business days of discharge: Yes    Patient: Luis Ramos Patient : 1955   MRN: <[id number]>  Reason for Admission: ETOH withdrawal, sinus tachycardia  Discharge Date: 22 RARS: Readmission Risk Score: 11.3 ( )      Last Discharge [facility]       Complaint Diagnosis Description Type Department Provider    22 Other Alcohol withdrawal delirium ([facility] [address] . .. ED to Hosp-Admission (Discharged) (ADMITTED) Suraj Fernandez MD; Sujata Ferguson Spoke with: [id number] : [facility]     Non-face-to-face services provided:  Obtained and reviewed discharge summary and/or continuity of care documents    Transitions of Care Initial Call    Was this an external facility discharge? No Discharge Facility: NA    Challenges to be reviewed by the provider   Additional needs identified to be addressed with provider: No  none             Method of communication with provider : phone      Advance Care Planning:   Does patient have an Advance Directive: decision maker updated. Advance Care Planning   Healthcare Decision Maker:    Primary Decision Maker (Active): Terence Lujan - [facility] - [phone]    Was this a readmission? Yes  Patient stated reason for admission: tremors  Patients top risk factors for readmission: medical condition-ETOH withdrawal    Care Transition Nurse (CTN) contacted the patient by telephone to perform post hospital discharge assessment. Verified name and  with patient as identifiers. Provided introduction to self, and explanation of the CTN role. CTN reviewed discharge instructions, medical action plan and red flags with patient who verbalized understanding. Patient given an opportunity to ask questions and does not have any further questions or concerns at this time. Were discharge instructions available to patient? Yes.  Reviewed appropriate site of care based on symptoms and resources available to patient including: PCP  Specialist. The patient agrees to contact the PCP office for questions related to their healthcare. Medication reconciliation was performed with patient, who verbalizes understanding of administration of home medications. Advised obtaining a 90-day supply of all daily and as-needed medications. Covid Risk Education     Educated patient about risk for severe COVID-19 due to risk factors according to CDC guidelines. LPN CC reviewed discharge instructions, medical action plan and red flag symptoms with the patient who verbalized understanding. Discussed COVID vaccination status: Yes. Education provided on COVID-19 vaccination as appropriate. Discussed exposure protocols and quarantine with CDC Guidelines. Patient was given an opportunity to verbalize any questions and concerns and agrees to contact LPN CC or health care provider for questions related to their healthcare. Reviewed and educated patient on any new and changed medications related to discharge diagnosis. Was patient discharged with a pulse oximeter? No Discussed and confirmed pulse oximeter discharge instructions and when to notify provider or seek emergency care. Patient verified  and was pleasant and agreeable to transition calls. States she is doing well. Is out and about at [facility] at time of call. Denies SOB, Cp, palpitations. Reports some ongoing issues with ataxia that is improving, some sl dizziness, some brain fog that is also improved. Appetite good. Denies problems with bowels or bladder. Unable to perform full medication reconciliation, but did review new medications. Has completed Valium this morning. Has left  for [facility], is waiting on return call. Patient is working on establishing with PCP.  States she wishes to establish with [place] as it is within walking distance of her home, but was agreeable to getting [facility] as an extra resource. LPN CC gave patient the number. Denies any acute needs at present time. Educated on the use of urgent care or physicians 24 hr access line if assistance is needed after hours. LPN CC provided contact information. No further follow-up call indicated based on severity of symptoms and risk factors. Romana Nguyen LPN [address] [place] [address]  [phone]    Care Transitions 24 Hour Call    Do you have any ongoing symptoms?: No  Do you have a copy of your discharge instructions?: Yes  Do you have all of your prescriptions and are they filled?: Yes  Have you been contacted by a [address]?: No  Have you scheduled your follow up appointment?: Yes  How are you going to get to your appointment?: Car - drive self  Were you discharged with any Home Care or Post Acute Services: No  Do you feel like you have everything you need to keep you well at home?: Yes  Care Transitions Interventions         Follow Up  No future appointments.     Laura Gutierrez LPN

## 2022-09-09 ENCOUNTER — APPOINTMENT (OUTPATIENT)
Dept: CT IMAGING | Age: 67
End: 2022-09-09
Payer: MEDICARE

## 2022-09-09 ENCOUNTER — HOSPITAL ENCOUNTER (EMERGENCY)
Age: 67
Discharge: HOME | End: 2022-09-09
Attending: STUDENT IN AN ORGANIZED HEALTH CARE EDUCATION/TRAINING PROGRAM
Payer: MEDICARE

## 2022-09-09 VITALS
HEIGHT: 65 IN | BODY MASS INDEX: 27.39 KG/M2 | SYSTOLIC BLOOD PRESSURE: 160 MMHG | DIASTOLIC BLOOD PRESSURE: 70 MMHG | WEIGHT: 164.4 LBS | HEART RATE: 85 BPM | OXYGEN SATURATION: 98 % | RESPIRATION RATE: 20 BRPM | TEMPERATURE: 98.1 F

## 2022-09-09 DIAGNOSIS — F10.920 ACUTE ALCOHOLIC INTOXICATION WITHOUT COMPLICATION (HCC): ICD-10-CM

## 2022-09-09 DIAGNOSIS — S09.90XA CLOSED HEAD INJURY, INITIAL ENCOUNTER: Primary | ICD-10-CM

## 2022-09-09 PROCEDURE — 6370000000 HC RX 637 (ALT 250 FOR IP)

## 2022-09-09 PROCEDURE — 99284 EMERGENCY DEPT VISIT MOD MDM: CPT | Performed by: STUDENT IN AN ORGANIZED HEALTH CARE EDUCATION/TRAINING PROGRAM

## 2022-09-09 PROCEDURE — 70450 CT HEAD/BRAIN W/O DYE: CPT

## 2022-09-09 PROCEDURE — 2580000003 HC RX 258: Performed by: STUDENT IN AN ORGANIZED HEALTH CARE EDUCATION/TRAINING PROGRAM

## 2022-09-09 PROCEDURE — 72125 CT NECK SPINE W/O DYE: CPT

## 2022-09-09 RX ORDER — DIAZEPAM 5 MG/1
5 TABLET ORAL ONCE
Status: COMPLETED | OUTPATIENT
Start: 2022-09-09 | End: 2022-09-09

## 2022-09-09 RX ORDER — DIAZEPAM 5 MG/1
TABLET ORAL
Status: COMPLETED
Start: 2022-09-09 | End: 2022-09-09

## 2022-09-09 RX ORDER — SODIUM CHLORIDE, SODIUM LACTATE, POTASSIUM CHLORIDE, CALCIUM CHLORIDE 600; 310; 30; 20 MG/100ML; MG/100ML; MG/100ML; MG/100ML
1000 INJECTION, SOLUTION INTRAVENOUS CONTINUOUS
Status: DISCONTINUED | OUTPATIENT
Start: 2022-09-09 | End: 2022-09-09 | Stop reason: HOSPADM

## 2022-09-09 RX ADMIN — SODIUM CHLORIDE, SODIUM LACTATE, POTASSIUM CHLORIDE, AND CALCIUM CHLORIDE 1000 ML: .6; .31; .03; .02 INJECTION, SOLUTION INTRAVENOUS at 10:05

## 2022-09-09 RX ADMIN — DIAZEPAM 5 MG: 5 TABLET ORAL at 13:56

## 2022-09-09 ASSESSMENT — ENCOUNTER SYMPTOMS
SORE THROAT: 0
DIARRHEA: 0
COUGH: 0
SHORTNESS OF BREATH: 0
CHEST TIGHTNESS: 0
NAUSEA: 0
ABDOMINAL PAIN: 0
CONSTIPATION: 0
ABDOMINAL DISTENTION: 0
VOMITING: 0
BACK PAIN: 0

## 2022-09-09 NOTE — ED NOTES
Pt being discharged, went over discharge instructions and all questions answered. Pt ambulated out of department with all belongings at bedside in care of significant other.       Margaret Escobar RN  09/09/22 9936
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
## 2022-09-09 NOTE — ED NOTES
Pt states she hasn't drank any alcohol in a while and she usually drinks all the time. States she is detoxing and is asking for some Valium to help her.  MD sotero Jacobo RN  09/09/22 9991

## 2022-09-09 NOTE — ED PROVIDER NOTES
[address] [facility] RESIDENT NOTE       Date of evaluation: 9/9/2022    Chief Complaint     Fall (No LOC) and Alcohol Intoxication      of Present Illness     Jesus Ferrer is a 78 y.o. female, history of alcohol abuse, hyperlipidemia and hypertension who presents to the emergency department for evaluation of a fall and alcohol intoxication. Patient reports that this morning she was getting out of bed when she \"tripped\", falling and striking her head on her nightstand. She denies any loss of consciousness, however states that she has had a headache since then. Patient notes that she was drinking alcohol heavily last night, and appears to be clinically intoxicated on presentation. Currently, injury to discomfort is 5/10 in severity, describes it as \"diffuse tenderness\" throughout her head. Patient denies any vision changes, focal numbness or weakness, nausea or vomiting, or any other acute concerns    Review of Systems   A complete review of systems was conducted on this patient and was negative except as noted in the HPI. Review of Systems   Constitutional:  Negative for chills and fever. HENT:  Negative for sore throat. Respiratory:  Negative for cough, chest tightness and shortness of breath. Cardiovascular:  Negative for chest pain, palpitations and leg swelling. Gastrointestinal:  Negative for abdominal distention, abdominal pain, constipation, diarrhea, nausea and vomiting. Genitourinary:  Negative for dysuria, frequency, hematuria and urgency. Musculoskeletal:  Negative for back pain and myalgias. Skin:  Negative for rash. Neurological:  Positive for headaches. Negative for weakness and light-headedness. Hematological:  Negative for adenopathy. Psychiatric/Behavioral:  The patient is not nervous/anxious.       Past Medical, Surgical, Family, and Social History     She has a past medical history of Alcohol abuse, continuous, Alcohol dependence with withdrawal ([facility] [address] Alcohol withdrawal delirium ([facility] [address] Alcoholism ([facility] [address] Anxiety, Ataxia, Depression, Hyperlipidemia, and Hypertension. She has a past surgical history that includes back surgery; Colonoscopy; and Endoscopy, colon, diagnostic. Her family history includes Diabetes in her maternal grandfather and maternal grandmother; Heart Disease in her brother, father, maternal grandfather, maternal grandmother, paternal aunt, and paternal uncle; Other in her paternal grandfather and paternal grandmother; Stroke in her mother; Substance Abuse in her brother, father, and mother. She reports that she quit smoking about 35 years ago. Her smoking use included cigarettes. She has a 5.00 pack-year smoking history. She has never used smokeless tobacco. She reports current alcohol use of about 56.0 standard drinks per week. She reports that she does not use drugs.     Medications     Discharge Medication List as of 9/9/2022  3:12 PM        CONTINUE these medications which have NOT CHANGED    Details   folic acid (FOLVITE) 1 MG tablet Take 1 tablet by mouth daily, Disp-30 tablet, R-3Normal      Multiple Vitamin (MULTIVITAMIN) TABS tablet Take 1 tablet by mouth daily, Disp-30 tablet, R-0Normal      thiamine mononitrate (THIAMINE) 100 MG tablet Take 1 tablet by mouth daily, Disp-30 tablet, R-0Normal      amLODIPine (NORVASC) 10 MG tablet Take 10 mg by mouth dailyHistorical Med      MAGNESIUM PO Take by mouth dailyHistorical Med      famotidine (PEPCID) 20 MG tablet Take 1 tablet by mouth 2 times daily, Disp-60 tablet, R-0Print      !! ondansetron (ZOFRAN) 4 MG tablet Take 1 tablet by mouth every 8 hours as needed for Nausea, Disp-20 tablet, R-0Print      !! ondansetron (ZOFRAN) 4 MG tablet Take 1 tablet by mouth every 8 hours as needed for Nausea or Vomiting, Disp-10 tablet, R-0Normal      cloNIDine (CATAPRES) 0.1 MG tablet TAKE ONE TABLET BY MOUTH TWICE A DAY, Disp-60 tablet, R-2Normal      docusate sodium (COLACE) 100 MG capsule Take 100 mg by mouth 2 times daily as needed for ConstipationHistorical Med      progesterone (PROMETRIUM) 200 MG CAPS capsule Take 1 capsule by mouth nightlyHistorical Med       !! - Potential duplicate medications found. Please discuss with provider. Allergies     She is allergic to penicillins and sulfa antibiotics. Physical Exam     INITIAL VITALS: BP: (!) 154/73, Temp: 98.1 °F (36.7 °C), Heart Rate: (!) 103, Resp: 16, SpO2: 98 %   Physical Exam  Vitals and nursing note reviewed. Constitutional:       General: She is not in acute distress. Appearance: Normal appearance. She is normal weight. She is not ill-appearing or toxic-appearing. HENT:      Head: Normocephalic and atraumatic. Mouth/Throat:      Mouth: Mucous membranes are moist.   Eyes:      Extraocular Movements: Extraocular movements intact. Conjunctiva/sclera: Conjunctivae normal.   Cardiovascular:      Rate and Rhythm: Normal rate and regular rhythm. Pulses: Normal pulses. Heart sounds: Normal heart sounds. Pulmonary:      Effort: Pulmonary effort is normal.      Breath sounds: Normal breath sounds. Abdominal:      General: There is no distension. Palpations: Abdomen is soft. Tenderness: There is no abdominal tenderness. There is no guarding or rebound. Musculoskeletal:         General: Normal range of motion. Cervical back: Normal range of motion and neck supple. No rigidity. Skin:     General: Skin is warm and dry. Capillary Refill: Capillary refill takes less than 2 seconds. Findings: No rash. Neurological:      General: No focal deficit present. Mental Status: She is alert. Comments: Clinically intoxicated   Psychiatric:         Thought Content: Thought content normal.         Judgment: Judgment normal.     DiagnosticResults   RADIOLOGY:  CT CSpine W/O Contrast   Final Result   1. No acute traumatic abnormality of the cervical spine.     2. Multilevel degenerative disc disease and facet arthropathy with mild or moderate bilateral foraminal narrowing lower cervical spine. Consider further evaluation with MRI if clinically warranted. CT Head W/O Contrast   Final Result      No acute intracranial hemorrhage or mass effect. LABS:   No results found for this visit on 09/09/22. ED BEDSIDE ULTRASOUND:  None    RECENT VITALS:  BP: (!) 160/70, Temp: 98.1 °F (36.7 °C), Heart Rate: 85,Resp: 20, SpO2: 98 %     Procedures     None    ED Course     Nursing Notes, Past Medical Hx, Past Surgical Hx, Social Hx, Allergies, and Family Hx were reviewed. The patient was given the followingmedications:  Orders Placed This Encounter   Medications    DISCONTD: lactated ringers infusion 1,000 mL    diazePAM (VALIUM) tablet 5 mg    diazePAM (VALIUM) 5 MG tablet     Katarzyna Mcmillan override       CONSULTS:  None    MEDICAL DECISION MAKING / ASSESSMENT / Madison Luis Fernando is a 78 y.o. female history of alcohol abuse, hyperlipidemia and hypertension who presents to the emergency department for evaluation of a fall and alcohol intoxication. Patient presents overall well-appearing, nontoxic, afebrile and hemodynamically stable. Patient is clinically intoxicated. She was observed in the emergency department for several hours, however began to have what appeared to be worsening mental status with repetitive questions and confusion. Therefore, the decision was made to perform cross-sectional imaging of the patient's head and neck, which was unremarkable. Upon returning from the [place] [address], the patient stated that she was concerned that she was going through alcohol withdrawal.  The patient was treated with grams of oral Valium, with resolution of her symptoms. Treatment options were discussed with the patient including inpatient alcohol detoxification versus outpatient detoxification.   The patient ultimately elected to self detox at home.     Patient was tolerating p.o. intake, ambulatory around the emergency department without any assistance or difficulty, and was clinically sober with an unremarkable tertiary exam at time of discharge. This patient was also evaluated by the attending physician. All care plans were discussed and agreed upon. Clinical Impression     1. Closed head injury, initial encounter    2. Acute alcoholic intoxication without complication (HCC)        Disposition     PATIENT REFERRED TO:  No follow-up provider specified.     DISCHARGE MEDICATIONS:  Discharge Medication List as of 9/9/2022  3:12 PM          DISPOSITION Decision To Discharge 09/09/2022 02:07:17 PM       Srini Swenson MD  Resident  09/09/22 5589

## 2022-09-09 NOTE — ED NOTES
Pt found in bathroom and she was unable to get out. Assited pt out of bathroom and redirected her back to bed. Asking for food.       Evon Cantu RN  09/09/22 9857

## 2022-09-09 NOTE — DISCHARGE INSTRUCTIONS
Imaging obtained today and showed no abnormalities. Recommend that you decrease the amount of alcohol intake. Please see attached documents for help with alcohol detoxification. Return to the emergency department for any headaches, vomiting, vision changes, numbness or weakness, or any other acute concerns.

## 2022-09-09 NOTE — ED NOTES
Pt continually getting out of bed and coming to hallway. Will not remain in room after multiple attempts at requesting her to do so. Sitter placed at bedside to help pt remain in room.       Feli Lau RN  09/09/22 5757

## 2022-10-10 ENCOUNTER — HOSPITAL ENCOUNTER (INPATIENT)
Age: 67
LOS: 4 days | Discharge: HOME | DRG: 897 | End: 2022-10-14
Attending: INTERNAL MEDICINE | Admitting: INTERNAL MEDICINE
Payer: MEDICARE

## 2022-10-10 DIAGNOSIS — F10.939 ALCOHOL WITHDRAWAL SYNDROME WITH COMPLICATION (HCC): Primary | ICD-10-CM

## 2022-10-10 DIAGNOSIS — E87.1 HYPONATREMIA: ICD-10-CM

## 2022-10-10 LAB
A/G RATIO: 1.5 (ref 1.1–2.2)
ACETAMINOPHEN LEVEL: <5 UG/ML (ref 10–30)
ALBUMIN SERPL-MCNC: 4.6 G/DL (ref 3.4–5)
ALP BLD-CCNC: 58 U/L (ref 40–129)
ALT SERPL-CCNC: 88 U/L (ref 10–40)
AMPHETAMINE SCREEN, URINE: NORMAL
ANION GAP SERPL CALCULATED.3IONS-SCNC: 14 MMOL/L (ref 3–16)
AST SERPL-CCNC: 95 U/L (ref 15–37)
BARBITURATE SCREEN URINE: NORMAL
BASOPHILS ABSOLUTE: 0 K/UL (ref 0–0.2)
BASOPHILS RELATIVE PERCENT: 0.5 %
BENZODIAZEPINE SCREEN, URINE: NORMAL
BILIRUB SERPL-MCNC: 1.5 MG/DL (ref 0–1)
BILIRUBIN URINE: NEGATIVE
BLOOD, URINE: NEGATIVE
BUN BLDV-MCNC: 13 MG/DL (ref 7–20)
CALCIUM SERPL-MCNC: 10 MG/DL (ref 8.3–10.6)
CANNABINOID SCREEN URINE: NORMAL
CHLORIDE BLD-SCNC: 83 MMOL/L (ref 99–110)
CLARITY: CLEAR
CO2: 25 MMOL/L (ref 21–32)
COCAINE METABOLITE SCREEN URINE: NORMAL
COLOR: YELLOW
CREAT SERPL-MCNC: 0.7 MG/DL (ref 0.6–1.2)
EKG ATRIAL RATE: 83 BPM
EKG DIAGNOSIS: NORMAL
EKG P AXIS: 78 DEGREES
EKG P-R INTERVAL: 166 MS
EKG Q-T INTERVAL: 352 MS
EKG QRS DURATION: 82 MS
EKG QTC CALCULATION (BAZETT): 413 MS
EKG R AXIS: 69 DEGREES
EKG T AXIS: 68 DEGREES
EKG VENTRICULAR RATE: 83 BPM
EOSINOPHILS ABSOLUTE: 0.1 K/UL (ref 0–0.6)
EOSINOPHILS RELATIVE PERCENT: 0.6 %
ETHANOL: NORMAL MG/DL (ref 0–0.08)
FENTANYL SCREEN, URINE: NORMAL
GFR AFRICAN AMERICAN: >60
GFR NON-AFRICAN AMERICAN: >60
GLUCOSE BLD-MCNC: 124 MG/DL (ref 70–99)
GLUCOSE URINE: NEGATIVE MG/DL
HCT VFR BLD CALC: 39.5 % (ref 36–48)
HEMOGLOBIN: 13.2 G/DL (ref 12–16)
KETONES, URINE: 15 MG/DL
LEUKOCYTE ESTERASE, URINE: NEGATIVE
LYMPHOCYTES ABSOLUTE: 2 K/UL (ref 1–5.1)
LYMPHOCYTES RELATIVE PERCENT: 20.3 %
Lab: NORMAL
MCH RBC QN AUTO: 32.7 PG (ref 26–34)
MCHC RBC AUTO-ENTMCNC: 33.5 G/DL (ref 31–36)
MCV RBC AUTO: 97.5 FL (ref 80–100)
METHADONE SCREEN, URINE: NORMAL
MICROSCOPIC EXAMINATION: ABNORMAL
MONOCYTES ABSOLUTE: 0.7 K/UL (ref 0–1.3)
MONOCYTES RELATIVE PERCENT: 7 %
NEUTROPHILS ABSOLUTE: 6.9 K/UL (ref 1.7–7.7)
NEUTROPHILS RELATIVE PERCENT: 71.6 %
NITRITE, URINE: NEGATIVE
OPIATE SCREEN URINE: NORMAL
OXYCODONE URINE: NORMAL
PDW BLD-RTO: 14.1 % (ref 12.4–15.4)
PH UA: 7
PH UA: 7 (ref 5–8)
PHENCYCLIDINE SCREEN URINE: NORMAL
PLATELET # BLD: 194 K/UL (ref 135–450)
PMV BLD AUTO: 7.9 FL (ref 5–10.5)
POTASSIUM SERPL-SCNC: 3.9 MMOL/L (ref 3.5–5.1)
PROTEIN UA: NEGATIVE MG/DL
RBC # BLD: 4.05 M/UL (ref 4–5.2)
SALICYLATE, SERUM: <0.3 MG/DL (ref 15–30)
SODIUM BLD-SCNC: 122 MMOL/L (ref 136–145)
SPECIFIC GRAVITY UA: <=1.005 (ref 1–1.03)
TOTAL PROTEIN: 7.7 G/DL (ref 6.4–8.2)
URINE REFLEX TO CULTURE: ABNORMAL
URINE TYPE: ABNORMAL
UROBILINOGEN, URINE: 0.2 E.U./DL
WBC # BLD: 9.6 K/UL (ref 4–11)

## 2022-10-10 PROCEDURE — 80307 DRUG TEST PRSMV CHEM ANLYZR: CPT

## 2022-10-10 PROCEDURE — 93010 ELECTROCARDIOGRAM REPORT: CPT | Performed by: INTERNAL MEDICINE

## 2022-10-10 PROCEDURE — 80179 DRUG ASSAY SALICYLATE: CPT

## 2022-10-10 PROCEDURE — 6360000002 HC RX W HCPCS: Performed by: PHYSICIAN ASSISTANT

## 2022-10-10 PROCEDURE — 82077 ASSAY SPEC XCP UR&BREATH IA: CPT

## 2022-10-10 PROCEDURE — 81003 URINALYSIS AUTO W/O SCOPE: CPT

## 2022-10-10 PROCEDURE — 84300 ASSAY OF URINE SODIUM: CPT

## 2022-10-10 PROCEDURE — 6370000000 HC RX 637 (ALT 250 FOR IP): Performed by: INTERNAL MEDICINE

## 2022-10-10 PROCEDURE — 83935 ASSAY OF URINE OSMOLALITY: CPT

## 2022-10-10 PROCEDURE — 85025 COMPLETE CBC W/AUTO DIFF WBC: CPT

## 2022-10-10 PROCEDURE — 80053 COMPREHEN METABOLIC PANEL: CPT

## 2022-10-10 PROCEDURE — 99285 EMERGENCY DEPT VISIT HI MDM: CPT

## 2022-10-10 PROCEDURE — 93005 ELECTROCARDIOGRAM TRACING: CPT | Performed by: PHYSICIAN ASSISTANT

## 2022-10-10 PROCEDURE — 2580000003 HC RX 258: Performed by: INTERNAL MEDICINE

## 2022-10-10 PROCEDURE — 6360000002 HC RX W HCPCS: Performed by: INTERNAL MEDICINE

## 2022-10-10 PROCEDURE — 80143 DRUG ASSAY ACETAMINOPHEN: CPT

## 2022-10-10 PROCEDURE — 6370000000 HC RX 637 (ALT 250 FOR IP): Performed by: PHYSICIAN ASSISTANT

## 2022-10-10 PROCEDURE — 2060000000 HC ICU INTERMEDIATE R&B

## 2022-10-10 RX ORDER — LORAZEPAM 1 MG/1
1 TABLET ORAL
Status: DISCONTINUED | OUTPATIENT
Start: 2022-10-10 | End: 2022-10-10 | Stop reason: RX

## 2022-10-10 RX ORDER — ZOLPIDEM TARTRATE 10 MG/1
10 TABLET ORAL NIGHTLY PRN
COMMUNITY

## 2022-10-10 RX ORDER — SODIUM CHLORIDE 0.9 % (FLUSH) 0.9 %
5-40 SYRINGE (ML) INJECTION EVERY 12 HOURS SCHEDULED
Status: DISCONTINUED | OUTPATIENT
Start: 2022-10-10 | End: 2022-10-14 | Stop reason: HOSPADM

## 2022-10-10 RX ORDER — DOCUSATE SODIUM 100 MG/1
100 CAPSULE, LIQUID FILLED ORAL 2 TIMES DAILY PRN
Status: DISCONTINUED | OUTPATIENT
Start: 2022-10-10 | End: 2022-10-14 | Stop reason: HOSPADM

## 2022-10-10 RX ORDER — CLONIDINE HYDROCHLORIDE 0.1 MG/1
1 TABLET ORAL 2 TIMES DAILY
Status: DISCONTINUED | OUTPATIENT
Start: 2022-10-10 | End: 2022-10-10

## 2022-10-10 RX ORDER — LORAZEPAM 1 MG/1
3 TABLET ORAL
Status: DISCONTINUED | OUTPATIENT
Start: 2022-10-10 | End: 2022-10-10 | Stop reason: RX

## 2022-10-10 RX ORDER — DIAZEPAM 5 MG/1
10 TABLET ORAL
Status: DISCONTINUED | OUTPATIENT
Start: 2022-10-10 | End: 2022-10-10

## 2022-10-10 RX ORDER — ACETAMINOPHEN 325 MG/1
650 TABLET ORAL EVERY 6 HOURS PRN
Status: DISCONTINUED | OUTPATIENT
Start: 2022-10-10 | End: 2022-10-14 | Stop reason: HOSPADM

## 2022-10-10 RX ORDER — LORAZEPAM 2 MG/ML
1 INJECTION INTRAMUSCULAR
Status: DISCONTINUED | OUTPATIENT
Start: 2022-10-10 | End: 2022-10-10 | Stop reason: RX

## 2022-10-10 RX ORDER — ACETAMINOPHEN 325 MG/1
325 TABLET ORAL ONCE
Status: COMPLETED | OUTPATIENT
Start: 2022-10-10 | End: 2022-10-10

## 2022-10-10 RX ORDER — SODIUM CHLORIDE 9 MG/ML
INJECTION, SOLUTION INTRAVENOUS CONTINUOUS
Status: DISCONTINUED | OUTPATIENT
Start: 2022-10-10 | End: 2022-10-11

## 2022-10-10 RX ORDER — POLYETHYLENE GLYCOL 3350 17 G/17G
17 POWDER, FOR SOLUTION ORAL DAILY PRN
Status: DISCONTINUED | OUTPATIENT
Start: 2022-10-10 | End: 2022-10-14 | Stop reason: HOSPADM

## 2022-10-10 RX ORDER — DIAZEPAM 5 MG/ML
20 INJECTION, SOLUTION INTRAMUSCULAR; INTRAVENOUS
Status: DISCONTINUED | OUTPATIENT
Start: 2022-10-10 | End: 2022-10-14 | Stop reason: HOSPADM

## 2022-10-10 RX ORDER — DIAZEPAM 5 MG/ML
10 INJECTION, SOLUTION INTRAMUSCULAR; INTRAVENOUS
Status: DISCONTINUED | OUTPATIENT
Start: 2022-10-10 | End: 2022-10-14 | Stop reason: HOSPADM

## 2022-10-10 RX ORDER — DIAZEPAM 5 MG/1
15 TABLET ORAL
Status: DISCONTINUED | OUTPATIENT
Start: 2022-10-10 | End: 2022-10-10

## 2022-10-10 RX ORDER — LORAZEPAM 1 MG/1
4 TABLET ORAL
Status: DISCONTINUED | OUTPATIENT
Start: 2022-10-10 | End: 2022-10-10 | Stop reason: RX

## 2022-10-10 RX ORDER — GAUZE BANDAGE 2" X 2"
100 BANDAGE TOPICAL DAILY
Status: DISCONTINUED | OUTPATIENT
Start: 2022-10-11 | End: 2022-10-14 | Stop reason: HOSPADM

## 2022-10-10 RX ORDER — DIAZEPAM 5 MG/ML
20 INJECTION, SOLUTION INTRAMUSCULAR; INTRAVENOUS
Status: DISCONTINUED | OUTPATIENT
Start: 2022-10-10 | End: 2022-10-10

## 2022-10-10 RX ORDER — SODIUM CHLORIDE 9 MG/ML
INJECTION, SOLUTION INTRAVENOUS PRN
Status: DISCONTINUED | OUTPATIENT
Start: 2022-10-10 | End: 2022-10-14 | Stop reason: HOSPADM

## 2022-10-10 RX ORDER — DIAZEPAM 5 MG/ML
15 INJECTION, SOLUTION INTRAMUSCULAR; INTRAVENOUS
Status: DISCONTINUED | OUTPATIENT
Start: 2022-10-10 | End: 2022-10-10

## 2022-10-10 RX ORDER — DIAZEPAM 5 MG/ML
15 INJECTION, SOLUTION INTRAMUSCULAR; INTRAVENOUS
Status: DISCONTINUED | OUTPATIENT
Start: 2022-10-10 | End: 2022-10-14 | Stop reason: HOSPADM

## 2022-10-10 RX ORDER — SODIUM CHLORIDE 0.9 % (FLUSH) 0.9 %
5-40 SYRINGE (ML) INJECTION PRN
Status: DISCONTINUED | OUTPATIENT
Start: 2022-10-10 | End: 2022-10-10

## 2022-10-10 RX ORDER — ONDANSETRON 4 MG/1
4 TABLET, ORALLY DISINTEGRATING ORAL EVERY 8 HOURS PRN
Status: DISCONTINUED | OUTPATIENT
Start: 2022-10-10 | End: 2022-10-14 | Stop reason: HOSPADM

## 2022-10-10 RX ORDER — DIAZEPAM 5 MG/1
20 TABLET ORAL
Status: DISCONTINUED | OUTPATIENT
Start: 2022-10-10 | End: 2022-10-10

## 2022-10-10 RX ORDER — LORAZEPAM 1 MG/1
2 TABLET ORAL
Status: DISCONTINUED | OUTPATIENT
Start: 2022-10-10 | End: 2022-10-10 | Stop reason: RX

## 2022-10-10 RX ORDER — SODIUM CHLORIDE 9 MG/ML
INJECTION, SOLUTION INTRAVENOUS PRN
Status: DISCONTINUED | OUTPATIENT
Start: 2022-10-10 | End: 2022-10-10

## 2022-10-10 RX ORDER — ACETAMINOPHEN 650 MG/1
650 SUPPOSITORY RECTAL EVERY 6 HOURS PRN
Status: DISCONTINUED | OUTPATIENT
Start: 2022-10-10 | End: 2022-10-14 | Stop reason: HOSPADM

## 2022-10-10 RX ORDER — SODIUM CHLORIDE 0.9 % (FLUSH) 0.9 %
5-40 SYRINGE (ML) INJECTION PRN
Status: DISCONTINUED | OUTPATIENT
Start: 2022-10-10 | End: 2022-10-14 | Stop reason: HOSPADM

## 2022-10-10 RX ORDER — DIAZEPAM 5 MG/1
10 TABLET ORAL
Status: DISCONTINUED | OUTPATIENT
Start: 2022-10-10 | End: 2022-10-14 | Stop reason: HOSPADM

## 2022-10-10 RX ORDER — GAUZE BANDAGE 2" X 2"
100 BANDAGE TOPICAL DAILY
Status: DISCONTINUED | OUTPATIENT
Start: 2022-10-10 | End: 2022-10-10

## 2022-10-10 RX ORDER — LORAZEPAM 2 MG/ML
2 INJECTION INTRAMUSCULAR
Status: DISCONTINUED | OUTPATIENT
Start: 2022-10-10 | End: 2022-10-10 | Stop reason: RX

## 2022-10-10 RX ORDER — LORAZEPAM 2 MG/ML
4 INJECTION INTRAMUSCULAR
Status: DISCONTINUED | OUTPATIENT
Start: 2022-10-10 | End: 2022-10-10 | Stop reason: RX

## 2022-10-10 RX ORDER — DIAZEPAM 5 MG/1
20 TABLET ORAL
Status: DISCONTINUED | OUTPATIENT
Start: 2022-10-10 | End: 2022-10-14 | Stop reason: HOSPADM

## 2022-10-10 RX ORDER — LORAZEPAM 2 MG/ML
3 INJECTION INTRAMUSCULAR
Status: DISCONTINUED | OUTPATIENT
Start: 2022-10-10 | End: 2022-10-10 | Stop reason: RX

## 2022-10-10 RX ORDER — DIAZEPAM 5 MG/1
5 TABLET ORAL
Status: DISCONTINUED | OUTPATIENT
Start: 2022-10-10 | End: 2022-10-14 | Stop reason: HOSPADM

## 2022-10-10 RX ORDER — DIAZEPAM 5 MG/ML
10 INJECTION, SOLUTION INTRAMUSCULAR; INTRAVENOUS
Status: DISCONTINUED | OUTPATIENT
Start: 2022-10-10 | End: 2022-10-10

## 2022-10-10 RX ORDER — ONDANSETRON 2 MG/ML
4 INJECTION INTRAMUSCULAR; INTRAVENOUS ONCE
Status: COMPLETED | OUTPATIENT
Start: 2022-10-10 | End: 2022-10-10

## 2022-10-10 RX ORDER — AMLODIPINE BESYLATE 5 MG/1
10 TABLET ORAL DAILY
Status: DISCONTINUED | OUTPATIENT
Start: 2022-10-11 | End: 2022-10-14 | Stop reason: HOSPADM

## 2022-10-10 RX ORDER — SODIUM CHLORIDE 0.9 % (FLUSH) 0.9 %
5-40 SYRINGE (ML) INJECTION EVERY 12 HOURS SCHEDULED
Status: DISCONTINUED | OUTPATIENT
Start: 2022-10-10 | End: 2022-10-10

## 2022-10-10 RX ORDER — DIAZEPAM 5 MG/ML
5 INJECTION, SOLUTION INTRAMUSCULAR; INTRAVENOUS
Status: DISCONTINUED | OUTPATIENT
Start: 2022-10-10 | End: 2022-10-14 | Stop reason: HOSPADM

## 2022-10-10 RX ORDER — ONDANSETRON 2 MG/ML
4 INJECTION INTRAMUSCULAR; INTRAVENOUS EVERY 6 HOURS PRN
Status: DISCONTINUED | OUTPATIENT
Start: 2022-10-10 | End: 2022-10-14 | Stop reason: HOSPADM

## 2022-10-10 RX ORDER — DIAZEPAM 5 MG/ML
5 INJECTION, SOLUTION INTRAMUSCULAR; INTRAVENOUS
Status: DISCONTINUED | OUTPATIENT
Start: 2022-10-10 | End: 2022-10-10

## 2022-10-10 RX ORDER — DIAZEPAM 5 MG/1
5 TABLET ORAL
Status: DISCONTINUED | OUTPATIENT
Start: 2022-10-10 | End: 2022-10-10

## 2022-10-10 RX ORDER — DIAZEPAM 5 MG/1
15 TABLET ORAL
Status: DISCONTINUED | OUTPATIENT
Start: 2022-10-10 | End: 2022-10-14 | Stop reason: HOSPADM

## 2022-10-10 RX ORDER — PROGESTERONE 100 MG/1
200 CAPSULE ORAL NIGHTLY
Status: DISCONTINUED | OUTPATIENT
Start: 2022-10-10 | End: 2022-10-14 | Stop reason: HOSPADM

## 2022-10-10 RX ORDER — ENOXAPARIN SODIUM 100 MG/ML
40 INJECTION SUBCUTANEOUS NIGHTLY
Status: DISCONTINUED | OUTPATIENT
Start: 2022-10-10 | End: 2022-10-14 | Stop reason: HOSPADM

## 2022-10-10 RX ADMIN — THIAMINE HCL TAB 100 MG 100 MG: 100 TAB at 15:40

## 2022-10-10 RX ADMIN — SODIUM CHLORIDE: 9 INJECTION, SOLUTION INTRAVENOUS at 16:47

## 2022-10-10 RX ADMIN — DIAZEPAM 10 MG: 5 TABLET ORAL at 16:49

## 2022-10-10 RX ADMIN — ENOXAPARIN SODIUM 40 MG: 100 INJECTION SUBCUTANEOUS at 21:23

## 2022-10-10 RX ADMIN — ONDANSETRON 4 MG: 2 INJECTION INTRAMUSCULAR; INTRAVENOUS at 16:44

## 2022-10-10 RX ADMIN — Medication 10 ML: at 21:23

## 2022-10-10 RX ADMIN — ACETAMINOPHEN 325 MG: 325 TABLET ORAL at 15:42

## 2022-10-10 RX ADMIN — DIAZEPAM 10 MG: 5 TABLET ORAL at 19:22

## 2022-10-10 RX ADMIN — PROGESTERONE 200 MG: 100 CAPSULE ORAL at 21:23

## 2022-10-10 ASSESSMENT — LIFESTYLE VARIABLES
HOW OFTEN DO YOU HAVE A DRINK CONTAINING ALCOHOL: 2-4 TIMES A MONTH
HOW MANY STANDARD DRINKS CONTAINING ALCOHOL DO YOU HAVE ON A TYPICAL DAY: 10 OR MORE

## 2022-10-10 ASSESSMENT — ENCOUNTER SYMPTOMS
EYE PAIN: 0
ABDOMINAL PAIN: 0
EYE DISCHARGE: 0
CONSTIPATION: 0
EYE ITCHING: 0
DIARRHEA: 0
WHEEZING: 0
STRIDOR: 0
PHOTOPHOBIA: 0
COLOR CHANGE: 0
EYE REDNESS: 0
COUGH: 0
BACK PAIN: 0
VOMITING: 0
SHORTNESS OF BREATH: 0
NAUSEA: 1

## 2022-10-10 ASSESSMENT — PAIN SCALES - GENERAL
PAINLEVEL_OUTOF10: 0
PAINLEVEL_OUTOF10: 7
PAINLEVEL_OUTOF10: 0

## 2022-10-10 ASSESSMENT — PAIN DESCRIPTION - LOCATION: LOCATION: HEAD

## 2022-10-10 ASSESSMENT — PAIN - FUNCTIONAL ASSESSMENT: PAIN_FUNCTIONAL_ASSESSMENT: 0-10

## 2022-10-10 NOTE — ED PROVIDER NOTES
The Ekg interpreted by me in the absence of a cardiologist shows. Normal sinus rhythm with a ventricular rate of 83. Axis normal.  QTc appropriate. No specific ST or T wave abnormality. when compared to prior 4-7-22, normal sinus rhythm has replaced a flutter with RVR. I only performed EKG interpretation and was not otherwise involved in the care of this patient.        Nikki Tang MD  10/10/22 9010

## 2022-10-10 NOTE — ED PROVIDER NOTES
[address] [facility]        Pt Name: Marce Valladares  MRN: [id number]  [place] 1955  Date of evaluation: 10/10/2022  Provider: Kwadwo Monreal PA-C  PCP: No primary care provider on file. Note Started: 2:23 PM EDT       NIELS. I have evaluated this patient. My supervising physician was available for consultation. CHIEF COMPLAINT       Chief Complaint   Patient presents with    Alcohol Problem     Pt states binge drinker, doesn't drink every day but when she does drinks 1.5 L of vodka, been drinking since Friday last drink 0745, sent by [employer]       HISTORY OF PRESENT ILLNESS   (Location, Timing/Onset, Context/Setting, Quality, Duration, Modifying Factors, Severity, Associated Signs and Symptoms)  Note limiting factors. Chief Complaint: Alcohol withdrawal    Marce Valladares is a 75 y.o. female who presents to the emergency department stating that she is here for alcohol withdrawal.  She was sent here by rehabilitation facility[facility] for admission for detox. Patient states that the last time she drank alcohol was this morning at 7:45 AM.  She typically drinks 1.5 L of wine daily. She has had 1 L of wine today. She typically sips on wine throughout the entire day. She feels that she is already going through withdrawal symptoms. She states that she has never had a seizure before but has had confusion, tremors and anxiety and she does feel nauseated with a generalized headache. She feels very anxious and restless. She reports pins and needle sensation in her feet. She has visible tremors to her hands. She took Zofran prior to arrival and the nausea has subsided some. Denies any chest pain or abdominal pain. Denies other illicit drug use or smoking. Nursing Notes were all reviewed and agreed with or any disagreements were addressed in the HPI.     REVIEW OF SYSTEMS    (2-9 systems for level 4, 10 or more for level 5) Review of Systems   Constitutional:  Negative for chills and fever. HENT: Negative. Eyes:  Negative for photophobia, pain, discharge, redness, itching and visual disturbance. Respiratory:  Negative for cough, shortness of breath, wheezing and stridor. Cardiovascular:  Negative for chest pain, palpitations and leg swelling. Gastrointestinal:  Positive for nausea. Negative for abdominal pain, constipation, diarrhea and vomiting. Endocrine: Negative. Genitourinary: Negative. Musculoskeletal:  Negative for back pain, neck pain and neck stiffness. Skin:  Negative for color change, pallor, rash and wound. Neurological:  Positive for tremors and headaches. Negative for dizziness, seizures, syncope, facial asymmetry, speech difficulty, weakness and light-headedness. Numbness: pins and needles sensation in bilateral feet. Psychiatric/Behavioral:  Positive for agitation and confusion. Negative for behavioral problems, hallucinations, self-injury, sleep disturbance and suicidal ideas. The patient is nervous/anxious. The patient is not hyperactive. All other systems reviewed and are negative. Positives and Pertinent negatives as per HPI. Except as noted above in the ROS, all other systems were reviewed and negative.        PAST MEDICAL HISTORY     Past Medical History:   Diagnosis Date    Alcohol abuse, continuous 11/19/2019    Alcohol dependence with withdrawal ([facility] [address] 9/13/2019    Alcohol withdrawal delirium ([facility] [address] 6/1/2019    Alcoholism ([facility] [address]     Anxiety     Ataxia     Depression     Hyperlipidemia     Hypertension          SURGICAL HISTORY     Past Surgical History:   Procedure Laterality Date    BACK SURGERY      1996 L5 disectomy    COLONOSCOPY      2016    ENDOSCOPY, COLON, DIAGNOSTIC      2016         CURRENTMEDICATIONS       Previous Medications    AMLODIPINE (NORVASC) 10 MG TABLET    Take 10 mg by mouth daily    CLONIDINE (CATAPRES) 0.1 MG TABLET    TAKE ONE TABLET BY MOUTH TWICE A DAY for level 4, 8 or more for level 5)     ED Triage Vitals   BP Temp Temp Source Heart Rate Resp SpO2 Height Weight   10/10/22 1417 10/10/22 1414 10/10/22 1414 10/10/22 1414 10/10/22 1414 10/10/22 1414 10/10/22 1414 10/10/22 1414   (!) 148/71 98.1 °F (36.7 °C) Oral 77 18 100 % 5' 5\" (1.651 m) 151 lb 3.2 oz (68.6 kg)       Physical Exam  Vitals and nursing note reviewed. Constitutional:       Appearance: Normal appearance. She is well-developed. She is not toxic-appearing or diaphoretic. HENT:      Head: Normocephalic and atraumatic. Right Ear: External ear normal.      Left Ear: External ear normal.      Nose: Nose normal.      Mouth/Throat:      Mouth: Mucous membranes are moist.      Pharynx: Oropharynx is clear. Eyes:      General: No scleral icterus. Right eye: No discharge. Left eye: No discharge. Extraocular Movements: Extraocular movements intact. Conjunctiva/sclera: Conjunctivae normal.      Pupils: Pupils are equal, round, and reactive to light. Neck:      Trachea: Trachea and phonation normal.      Meningeal: Brudzinski's sign and Kernig's sign absent. Cardiovascular:      Rate and Rhythm: Normal rate. Pulmonary:      Effort: Pulmonary effort is normal.      Breath sounds: Normal breath sounds. Abdominal:      General: Bowel sounds are normal. There is no distension. Palpations: Abdomen is soft. Tenderness: no abdominal tenderness There is no right CVA tenderness or left CVA tenderness. Musculoskeletal:         General: Normal range of motion. Cervical back: Full passive range of motion without pain, normal range of motion and neck supple. Lymphadenopathy:      Cervical: No cervical adenopathy. Skin:     General: Skin is warm and dry. Capillary Refill: Capillary refill takes less than 2 seconds. Coloration: Skin is not jaundiced or pale. Findings: No bruising, erythema, lesion or rash.    Neurological:      General: No focal deficit present. Mental Status: She is alert and oriented to person, place, and time. GCS: GCS eye subscore is 4. GCS verbal subscore is 5. GCS motor subscore is 6. Cranial Nerves: No cranial nerve deficit (II-XII intact). Sensory: Sensation is intact. No sensory deficit. Motor: Tremor present. No weakness. Coordination: Coordination is intact. Comments: No pronator drift, facial droop or slurred speech. Normal finger to nose coordination. Normal rapid alternating hand movement. Normal heel to shin coordination  [employer] Hallpike negative without nystagmus. 5 out of 5 strength in all 4 extremities without focal weakness, paresthesia or radiculopathy. Psychiatric:         Attention and Perception: Attention and perception normal.         Mood and Affect: Mood is anxious. Speech: Speech normal.         Behavior: Behavior normal. Behavior is cooperative. Thought Content: Thought content normal.         Cognition and Memory: Cognition and memory normal.         Judgment: Judgment normal.       DIAGNOSTIC RESULTS   LABS:    Labs Reviewed   COMPREHENSIVE METABOLIC PANEL - Abnormal; Notable for the following components:       Result Value    Sodium 122 (*)     Chloride 83 (*)     Glucose 124 (*)     Total Bilirubin 1.5 (*)     ALT 88 (*)     AST 95 (*)     All other components within normal limits   ACETAMINOPHEN LEVEL - Abnormal; Notable for the following components:    Acetaminophen Level <5 (*)     All other components within normal limits   SALICYLATE LEVEL - Abnormal; Notable for the following components:    Salicylate, Serum <4.2 (*)     All other components within normal limits   CBC WITH AUTO DIFFERENTIAL   ETHANOL   URINALYSIS WITH REFLEX TO CULTURE   URINE DRUG SCREEN       When ordered only abnormal lab results are displayed. All other labs were within normal range or not returned as of this dictation. EKG:  When ordered, EKG's are interpreted by the Emergency Department Physician in the absence of a cardiologist.  Please see their note for interpretation of EKG. RADIOLOGY:   Non-plain film images such as CT, Ultrasound and MRI are read by the radiologist. Plain radiographic images are visualized and preliminarily interpreted by the ED Provider with the below findings:        Interpretation per the Radiologist below, if available at the time of this note:    No orders to display     No results found. PROCEDURES   Unless otherwise noted below, none     Procedures    CRITICAL CARE TIME   There was a high probability of life-threatening clinical deterioration in the patient's condition requiring my urgent intervention. I personally saw the patient and independently provided 45 minutes of non-concurrent critical care out of the total shared critical care time provided, excluding separately reportable procedures.          CONSULTS:  IP CONSULT TO SOCIAL WORK  IP CONSULT TO HOSPITALIST      EMERGENCY DEPARTMENT COURSE and DIFFERENTIAL DIAGNOSIS/MDM:   Vitals:    Vitals:    10/10/22 1414 10/10/22 1417 10/10/22 1418   BP:  (!) 148/71 (!) 148/71   Pulse: 77  77   Resp: 18     Temp: 98.1 °F (36.7 °C)     TempSrc: Oral     SpO2: 100%     Weight: 151 lb 3.2 oz (68.6 kg)     Height: 5' 5\" (1.651 m)         Patient was given the following medications:  Medications   sodium chloride flush 0.9 % injection 5-40 mL (has no administration in time range)   sodium chloride flush 0.9 % injection 5-40 mL (has no administration in time range)   0.9 % sodium chloride infusion (has no administration in time range)   thiamine mononitrate tablet 100 mg (has no administration in time range)   LORazepam (ATIVAN) tablet 1 mg (has no administration in time range)     Or   LORazepam (ATIVAN) injection 1 mg (has no administration in time range)     Or   LORazepam (ATIVAN) tablet 2 mg (has no administration in time range)     Or   LORazepam (ATIVAN) injection 2 mg (has no administration in time range)     Or   LORazepam (ATIVAN) tablet 3 mg (has no administration in time range)     Or   LORazepam (ATIVAN) injection 3 mg (has no administration in time range)     Or   LORazepam (ATIVAN) tablet 4 mg (has no administration in time range)     Or   LORazepam (ATIVAN) injection 4 mg (has no administration in time range)   ondansetron (ZOFRAN) injection 4 mg (has no administration in time range)   acetaminophen (TYLENOL) tablet 325 mg (has no administration in time range)         Is this patient to be included in the SEP-1 Core Measure due to severe sepsis or septic shock? No   Exclusion criteria - the patient is NOT to be included for SEP-1 Core Measure due to: Infection is not suspected    This patient presents to the emergency department for alcohol withdrawal.  She does have symptoms including headache, anxiety, restlessness, pins and needle sensation, nausea and visible tremor. Although she reports some intermittent confusion, she is alert and oriented x4 at this time. I do feel admission is warranted given her CIWA score of 15. Patient understands and agrees with plan for admission for safe alcohol detox. Her CN are intact. Meningeal signs negative. Denies recent head injury. I suspect her headache is secondary to her withdrawal. Iv fluids ordered. Patient is sodium deficient. My suspicion is low for carotid dissection, sinus abscess, acute fracture, acute CVA, ICH, SAH, TIA, meningitis, encephalitis, pseudotumor cerebri, temporal arteritis, sentinel bleed from ruptured aneurysm, hypertensive urgency or emergency, subdural hematoma, epidural hematoma, cerebellar compromise, posterior stroke, Chiari malformation, hydrocephalus, skull or facial fracture, postconcussive syndrome, or other concerning pathology      FINAL IMPRESSION      1. Alcohol withdrawal syndrome with complication ([facility] [address]    2.  Hyponatremia          DISPOSITION/PLAN   DISPOSITION Decision To Admit 10/10/2022 03:18:31 PM      PATIENT REFERRED TO:  No follow-up provider specified.     DISCHARGE MEDICATIONS:  New Prescriptions    No medications on file       DISCONTINUED MEDICATIONS:  Discontinued Medications    No medications on file              (Please note that portions of this note were completed with a voice recognition program.  Efforts were made to edit the dictations but occasionally words are mis-transcribed.)    Yoel Sebastian PA-C (electronically signed)            Yoel Sebastian PA-C  10/10/22 1529

## 2022-10-10 NOTE — ED NOTES
Per pt \"I want to be on a respirator until there is no quality of life, then keep me alive long enough for my organs and tissue can be donated. \"     Jeb Stack RN  10/10/22 9204

## 2022-10-10 NOTE — ED NOTES
Bedside handoff given to [facility], pt transported to  via wheelchair.        Arlene Montoya RN  10/10/22 2005

## 2022-10-10 NOTE — H&P
HOSPITALISTS HISTORY AND PHYSICAL    10/10/2022 4:22 PM    Patient Information:  Tavo Myles is a 75 y.o. female [id number]  PCP:  No primary care provider on file. (Tel: None )    Chief complaint:    Chief Complaint   Patient presents with    Alcohol Problem     Pt states binge drinker, doesn't drink every day but when she does drinks 1.5 L of vodka, been drinking since Friday last drink 0745, [facility]     History of Present Illness:  Rosa Coulter is a 75 y.o. female who was drinking 1.5 L of wine every day for the last week. Patient last drink was this morning patient was sent by [address] [place] [address] for detox. Patient wants to stop drinking and has been having nausea vomiting and tremors not having much p.o. intake denies any fevers chills sick contacts cough or dysuria. The patient does not use any other drugs does not smoke            REVIEW OF SYSTEMS:   Constitutional: Negative for fever,chills or night sweats  ENT: Negative for rhinorrhea, epistaxis, hoarseness, sore throat. Respiratory: Negative for shortness of breath,wheezing  Cardiovascular: Negative for chest pain, palpitations   Gastrointestinal: see above  Genitourinary: Negative for polyuria, dysuria   Hematologic/Lymphatic: Negative for bleeding tendency, easy bruising  Musculoskeletal: Negative for myalgias and arthralgias  Neurologic: Negative for confusion,dysarthria. Skin: Negative for itching,rash  Psychiatric: Negative for depression,anxiety, agitation. Endocrine: Negative for polydipsia,polyuria,heat /cold intolerance. Past Medical History:   has a past medical history of Alcohol abuse, continuous, Alcohol dependence with withdrawal ([facility] Alcohol withdrawal delirium ([facility] Alcoholism ([facility] Anxiety, Ataxia, Depression, Hyperlipidemia, and Hypertension.      Past Surgical History:   has a past surgical history that includes back surgery; Colonoscopy; and Endoscopy, colon, diagnostic. Medications:  No current facility-administered medications on file prior to encounter. Current Outpatient Medications on File Prior to Encounter   Medication Sig Dispense Refill    folic acid (FOLVITE) 1 MG tablet Take 1 tablet by mouth daily 30 tablet 3    Multiple Vitamin (MULTIVITAMIN) TABS tablet Take 1 tablet by mouth daily 30 tablet 0    thiamine mononitrate (THIAMINE) 100 MG tablet Take 1 tablet by mouth daily 30 tablet 0    amLODIPine (NORVASC) 10 MG tablet Take 10 mg by mouth daily      MAGNESIUM PO Take by mouth daily      famotidine (PEPCID) 20 MG tablet Take 1 tablet by mouth 2 times daily 60 tablet 0    ondansetron (ZOFRAN) 4 MG tablet Take 1 tablet by mouth every 8 hours as needed for Nausea 20 tablet 0    ondansetron (ZOFRAN) 4 MG tablet Take 1 tablet by mouth every 8 hours as needed for Nausea or Vomiting 10 tablet 0    cloNIDine (CATAPRES) 0.1 MG tablet TAKE ONE TABLET BY MOUTH TWICE A DAY 60 tablet 2    docusate sodium (COLACE) 100 MG capsule Take 100 mg by mouth 2 times daily as needed for Constipation      progesterone (PROMETRIUM) 200 MG CAPS capsule Take 1 capsule by mouth nightly      [DISCONTINUED] furosemide (LASIX) 20 MG tablet Take 1 tablet by mouth daily 30 tablet 2       Allergies: Allergies   Allergen Reactions    Penicillins Anaphylaxis, Hives, Rash and Swelling    Sulfa Antibiotics         Social History:  Patient Lives at home   reports that she quit smoking about 35 years ago. Her smoking use included cigarettes. She has a 5.00 pack-year smoking history. She has never used smokeless tobacco. She reports current alcohol use of about 56.0 standard drinks per week. She reports that she does not use drugs.      Family History:  family history includes Diabetes in her maternal grandfather and maternal grandmother; Heart Disease in her brother, father, maternal grandfather, maternal grandmother, paternal aunt, and paternal uncle; Other in her paternal grandfather and paternal grandmother; Stroke in her mother; Substance Abuse in her brother, father, and mother. ,     Physical Exam:  BP (!) 169/81   Pulse 81   Temp 98.1 °F (36.7 °C) (Oral)   Resp 12   Ht 5' 5\" (1.651 m)   Wt 151 lb 3.2 oz (68.6 kg)   SpO2 100%   BMI 25.16 kg/m²     General appearance:  Appears comfortable. AAOx3  HEENT: atraumatic, Pupils equal, muscous membranes moist, no masses appreciated  Cardiovascular: Regular rate and rhythm no murmurs appreciated  Respiratory: CTAB no wheezing  Gastrointestinal: Abdomen soft, non-tender, BS+  EXT: no edema  Neurology: no gross focal deficts + tremors  Psychiatry: Appropriate affect. Not agitated  Skin: Warm, dry, no rashes appreciated    Labs:  CBC:   Lab Results   Component Value Date/Time    WBC 9.6 10/10/2022 02:34 PM    RBC 4.05 10/10/2022 02:34 PM    HGB 13.2 10/10/2022 02:34 PM    HCT 39.5 10/10/2022 02:34 PM    MCV 97.5 10/10/2022 02:34 PM    MCH 32.7 10/10/2022 02:34 PM    MCHC 33.5 10/10/2022 02:34 PM    RDW 14.1 10/10/2022 02:34 PM     10/10/2022 02:34 PM    MPV 7.9 10/10/2022 02:34 PM     BMP:    Lab Results   Component Value Date/Time     10/10/2022 02:34 PM    K 3.9 10/10/2022 02:34 PM    K 4.1 04/07/2022 04:58 PM    CL 83 10/10/2022 02:34 PM    CO2 25 10/10/2022 02:34 PM    BUN 13 10/10/2022 02:34 PM    CREATININE 0.7 10/10/2022 02:34 PM    CALCIUM 10.0 10/10/2022 02:34 PM    GFRAA >60 10/10/2022 02:34 PM    LABGLOM >60 10/10/2022 02:34 PM    GLUCOSE 124 10/10/2022 02:34 PM     No orders to display       Recent imaging reviewed    Problem List  Principal Problem:    Hyponatremia  Resolved Problems:    * No resolved hospital problems.  *        Assessment/Plan:   Alcohol withdrawal  - ciwa protocol iv ativan  - seizure precautions    Elevated lfts: seconday to above repeat labs in am    Hyponatremia: lvf, repeat labs soidum studies and nephro consult    Htn: home meds      DVT prophylaxis lovenox  Code status full code        Admit as inpatient I anticipate hospitalization spanning more than two midnights for investigation and treatment of the above medically necessary diagnoses. Please note that some part of this chart was generated using Dragon dictation software. Although every effort was made to ensure the accuracy of this automated transcription, some errors in transcription may have occurred inadvertently. If you may need any clarification, please do not hesitate to contact me through [facility].        Dolly Lees MD    10/10/2022 4:22 PM

## 2022-10-10 NOTE — ACP (ADVANCE CARE PLANNING)
Advanced Care Planning Note. Purpose of Encounter: Advanced care planning in light of hospitalization  Parties In Attendance: Patient,    Decisional Capacity: Yes  Subjective: Patient  understand that this conversation is to address long term care goal  Objective: Admitted to hospital with hyponatremia and alcohol withdrawal  Goals of Care Determination: Patient would pursue CPR and Intubation if required. No tracheostomy or long term ventilation if required.      Code Status: full code  Time spent on Advanced care Plannin minutes  Advanced Care Planning Documents: documented patient's wishes, would like  Terence Lujan to make medical decisions if unable to make decisions    Kimberly Osorio MD  10/10/2022 4:25 PM
EATING

## 2022-10-11 LAB
ALBUMIN SERPL-MCNC: 4 G/DL (ref 3.4–5)
ALP BLD-CCNC: 53 U/L (ref 40–129)
ALT SERPL-CCNC: 81 U/L (ref 10–40)
ANION GAP SERPL CALCULATED.3IONS-SCNC: 10 MMOL/L (ref 3–16)
AST SERPL-CCNC: 86 U/L (ref 15–37)
BASOPHILS ABSOLUTE: 0 K/UL (ref 0–0.2)
BASOPHILS RELATIVE PERCENT: 0.6 %
BILIRUB SERPL-MCNC: 1 MG/DL (ref 0–1)
BILIRUBIN DIRECT: 0.3 MG/DL (ref 0–0.3)
BILIRUBIN, INDIRECT: 0.7 MG/DL (ref 0–1)
BUN BLDV-MCNC: 11 MG/DL (ref 7–20)
CALCIUM SERPL-MCNC: 9.4 MG/DL (ref 8.3–10.6)
CHLORIDE BLD-SCNC: 97 MMOL/L (ref 99–110)
CO2: 26 MMOL/L (ref 21–32)
CREAT SERPL-MCNC: 0.7 MG/DL (ref 0.6–1.2)
EOSINOPHILS ABSOLUTE: 0.1 K/UL (ref 0–0.6)
EOSINOPHILS RELATIVE PERCENT: 2.1 %
GFR AFRICAN AMERICAN: >60
GFR NON-AFRICAN AMERICAN: >60
GLUCOSE BLD-MCNC: 110 MG/DL (ref 70–99)
HCT VFR BLD CALC: 37.3 % (ref 36–48)
HEMOGLOBIN: 12.5 G/DL (ref 12–16)
LYMPHOCYTES ABSOLUTE: 1.5 K/UL (ref 1–5.1)
LYMPHOCYTES RELATIVE PERCENT: 21.8 %
MCH RBC QN AUTO: 32.6 PG (ref 26–34)
MCHC RBC AUTO-ENTMCNC: 33.5 G/DL (ref 31–36)
MCV RBC AUTO: 97.5 FL (ref 80–100)
MONOCYTES ABSOLUTE: 0.5 K/UL (ref 0–1.3)
MONOCYTES RELATIVE PERCENT: 7.5 %
NEUTROPHILS ABSOLUTE: 4.7 K/UL (ref 1.7–7.7)
NEUTROPHILS RELATIVE PERCENT: 68 %
OSMOLALITY URINE: 131 MOSM/KG (ref 390–1070)
OSMOLALITY: 290 MOSM/KG (ref 280–301)
PDW BLD-RTO: 13.7 % (ref 12.4–15.4)
PLATELET # BLD: 173 K/UL (ref 135–450)
PMV BLD AUTO: 8 FL (ref 5–10.5)
POTASSIUM SERPL-SCNC: 4 MMOL/L (ref 3.5–5.1)
RBC # BLD: 3.83 M/UL (ref 4–5.2)
SODIUM BLD-SCNC: 133 MMOL/L (ref 136–145)
SODIUM URINE: <20 MMOL/L
TOTAL PROTEIN: 6.8 G/DL (ref 6.4–8.2)
WBC # BLD: 7 K/UL (ref 4–11)

## 2022-10-11 PROCEDURE — 94760 N-INVAS EAR/PLS OXIMETRY 1: CPT

## 2022-10-11 PROCEDURE — 83930 ASSAY OF BLOOD OSMOLALITY: CPT

## 2022-10-11 PROCEDURE — 80076 HEPATIC FUNCTION PANEL: CPT

## 2022-10-11 PROCEDURE — 6370000000 HC RX 637 (ALT 250 FOR IP): Performed by: INTERNAL MEDICINE

## 2022-10-11 PROCEDURE — 2060000000 HC ICU INTERMEDIATE R&B

## 2022-10-11 PROCEDURE — 36415 COLL VENOUS BLD VENIPUNCTURE: CPT

## 2022-10-11 PROCEDURE — 80048 BASIC METABOLIC PNL TOTAL CA: CPT

## 2022-10-11 PROCEDURE — 6360000002 HC RX W HCPCS: Performed by: INTERNAL MEDICINE

## 2022-10-11 PROCEDURE — 2580000003 HC RX 258: Performed by: INTERNAL MEDICINE

## 2022-10-11 PROCEDURE — 85025 COMPLETE CBC W/AUTO DIFF WBC: CPT

## 2022-10-11 RX ORDER — CHLORDIAZEPOXIDE HYDROCHLORIDE 25 MG/1
50 CAPSULE, GELATIN COATED ORAL 3 TIMES DAILY
Status: DISCONTINUED | OUTPATIENT
Start: 2022-10-11 | End: 2022-10-13

## 2022-10-11 RX ADMIN — ENOXAPARIN SODIUM 40 MG: 100 INJECTION SUBCUTANEOUS at 20:59

## 2022-10-11 RX ADMIN — Medication 10 ML: at 03:38

## 2022-10-11 RX ADMIN — AMLODIPINE BESYLATE 10 MG: 5 TABLET ORAL at 10:52

## 2022-10-11 RX ADMIN — CHLORDIAZEPOXIDE HYDROCHLORIDE 50 MG: 25 CAPSULE ORAL at 10:52

## 2022-10-11 RX ADMIN — DIAZEPAM 10 MG: 5 TABLET ORAL at 10:53

## 2022-10-11 RX ADMIN — DIAZEPAM 10 MG: 5 TABLET ORAL at 00:02

## 2022-10-11 RX ADMIN — Medication 10 ML: at 21:00

## 2022-10-11 RX ADMIN — Medication 100 MG: at 10:52

## 2022-10-11 RX ADMIN — DIAZEPAM 15 MG: 5 INJECTION, SOLUTION INTRAMUSCULAR; INTRAVENOUS at 21:00

## 2022-10-11 RX ADMIN — CHLORDIAZEPOXIDE HYDROCHLORIDE 50 MG: 25 CAPSULE ORAL at 20:59

## 2022-10-11 RX ADMIN — DIAZEPAM 10 MG: 5 TABLET ORAL at 03:37

## 2022-10-11 RX ADMIN — Medication 10 ML: at 10:52

## 2022-10-11 RX ADMIN — PROGESTERONE 200 MG: 100 CAPSULE ORAL at 20:59

## 2022-10-11 RX ADMIN — SODIUM CHLORIDE: 9 INJECTION, SOLUTION INTRAVENOUS at 03:32

## 2022-10-11 NOTE — PLAN OF CARE
Problem: Pain  Goal: Verbalizes/displays adequate comfort level or baseline comfort level  Outcome: Progressing     Problem: Safety - Adult  Goal: Free from fall injury  Outcome: Progressing     Problem: ABCDS Injury Assessment  Goal: Absence of physical injury  Outcome: Progressing     Problem: Hematologic - Adult  Goal: Maintains hematologic stability  Outcome: Progressing     Vitals:    10/10/22 2330   BP: 135/77   Pulse: 84   Resp: 14   Temp: 98.5 °F (36.9 °C)   SpO2: 98%     VSS overnight. Pt denies pain. Valium 10mg PO given at [address] for CIWA 11 at 2359; see MAR & all flowsheets. Pt is a high fall risk. Pt remains free from falls, throughout night. Bed alarm remains in place, door open. Pt encouraged to use call light for needs throughout night; call light is within reach. Bed lock is in lowest position. Will continue to monitor throughout night.

## 2022-10-11 NOTE — CARE COORDINATION
Met with patient to provide a list of Addiction Treatment resources, [facility] [phone] and Sadaf Cummings @ [address], [phone]. Patient should call him/herself for a pre-screen. Social Service will follow up, depending on his insurance, and bed availability. Patient may prefer an Intensive outpatient program, such as [address] [place] [address], [address] [place] [address].   Outpatient - [place] [facility])   Call Angelique Wilkerson @ [phone]  P [phone]  Fax   [phone]   [facility] -Call [place]- [phone]

## 2022-10-11 NOTE — CONSULTS
Office : [phone]     Fax :[phone]       Nephrology Consult Note      Patient's Name: Nathaniel Martinez  9:21 AM  10/11/2022    Reason for Consult:  hyponatremia       Requesting Physician:  Johnson Cotto      Chief Complaint:    Chief Complaint   Patient presents with    Alcohol Problem     Pt states binge drinker, doesn't drink every day but when she does drinks 1.5 L of vodka, been drinking since Friday last drink 0745, sent by [facility]           History of Present Ilness:    Nathaniel Martinez is a 75 y.o. female with prior history of HTN , alcohol abuse who was admitted after been send by [facility]. She is heavy alcoholic. Initial lab work showed   Sodium of 122. Awake and alert at this time. Sodium level today 133. No headaches       I/O last 3 completed shifts:   In: 645.2 [I.V.:645.2]  Out: 200 [Urine:1850]    Past Medical History:   Diagnosis Date    Alcohol abuse, continuous 11/19/2019    Alcohol dependence with withdrawal ([facility] [facility] 9/13/2019    Alcohol withdrawal delirium ([facility] [address] [facility] 6/1/2019    Alcoholism ([facility] [address] [facility]     Anxiety     Ataxia     Depression     Hyperlipidemia     Hypertension        Past Surgical History:   Procedure Laterality Date    BACK SURGERY      1996 L5 disectomy    COLONOSCOPY      2016    ENDOSCOPY, COLON, DIAGNOSTIC      2016       Family History   Problem Relation Age of Onset    Stroke Mother     Substance Abuse Mother     Substance Abuse Father     Heart Disease Father     Heart Disease Brother     Substance Abuse Brother     Heart Disease Paternal Aunt     Heart Disease Paternal Uncle     Heart Disease Maternal Grandmother     Diabetes Maternal Grandmother     Diabetes Maternal Grandfather     Heart Disease Maternal Grandfather     Other Paternal Grandmother     Other Paternal Grandfather         reports that she quit smoking about 35 years ago. Her smoking use included cigarettes. She has a 5.00 pack-year smoking history. She has never used smokeless tobacco. She reports current alcohol use of about 56.0 standard drinks per week. She reports that she does not use drugs.         Allergies:  Penicillins and Sulfa antibiotics    Current Medications:    amLODIPine (NORVASC) tablet 10 mg, Daily  docusate sodium (COLACE) capsule 100 mg, BID PRN  progesterone (PROMETRIUM) capsule 200 mg, Nightly  sodium chloride flush 0.9 % injection 5-40 mL, 2 times per day  sodium chloride flush 0.9 % injection 5-40 mL, PRN  0.9 % sodium chloride infusion, PRN  thiamine mononitrate tablet 100 mg, Daily  enoxaparin (LOVENOX) injection 40 mg, Nightly  ondansetron (ZOFRAN-ODT) disintegrating tablet 4 mg, Q8H PRN   Or  ondansetron (ZOFRAN) injection 4 mg, Q6H PRN  polyethylene glycol (GLYCOLAX) packet 17 g, Daily PRN  acetaminophen (TYLENOL) tablet 650 mg, Q6H PRN   Or  acetaminophen (TYLENOL) suppository 650 mg, Q6H PRN  0.9 % sodium chloride infusion, Continuous  diazePAM (VALIUM) tablet 5 mg, Q1H PRN   Or  diazePAM (VALIUM) injection 5 mg, Q1H PRN   Or  diazePAM (VALIUM) tablet 10 mg, Q1H PRN   Or  diazePAM (VALIUM) injection 10 mg, Q1H PRN   Or  diazePAM (VALIUM) tablet 15 mg, Q1H PRN   Or  diazePAM (VALIUM) injection 15 mg, Q1H PRN   Or  diazePAM (VALIUM) tablet 20 mg, Q1H PRN   Or  diazePAM (VALIUM) injection 20 mg, Q1H PRN        Review of Systems:   14 point ROS obtained but were negative except mentioned in HPI      Physical exam:     Vitals:  /64   Pulse 83   Temp 98.4 °F (36.9 °C) (Oral)   Resp 16   Ht 5' 5\" (1.651 m)   Wt 149 lb 1.6 oz (67.6 kg)   SpO2 99%   BMI 24.81 kg/m²   Constitutional:  OAA X3 NAD  Skin: no rash, turgor wnl  Heent:  eomi, mmm  Neck: no bruits or jvd noted  Cardiovascular:  S1, S2 without m/r/g  Respiratory: CTA B without w/r/r  Abdomen: +bs, soft, nt, nd  Ext: no  lower extremity edema  Psychiatric: mood and affect appropriate  Musculoskeletal:  Rom, muscular strength intact    Labs:  CBC:   Recent Labs     10/10/22  1434 10/11/22  0510   WBC 9.6 7.0   HGB 13.2 12.5    173     BMP:    Recent Labs     10/10/22  1434 10/11/22  0510   * 133*   K 3.9 4.0   CL 83* 97*   CO2 25 26   BUN 13 11   CREATININE 0.7 0.7   GLUCOSE 124* 110*     Ca/Mg/Phos:   Recent Labs     10/10/22  1434 10/11/22  0510   CALCIUM 10.0 9.4     Hepatic:   Recent Labs     10/10/22  1434 10/11/22  0510   AST 95* 86*   ALT 88* 81*   BILITOT 1.5* 1.0   ALKPHOS 58 53     Troponin: No results for input(s): TROPONINI in the last 72 hours. BNP: No results for input(s): BNP in the last 72 hours. Lipids: No results for input(s): CHOL, TRIG, HDL, LDLCALC, LABVLDL in the last 72 hours. ABGs: No results for input(s): PHART, PO2ART, NFR4UXH in the last 72 hours. INR: No results for input(s): INR in the last 72 hours. UA:  Recent Labs     10/10/22  1950   COLORU Yellow   CLARITYU Clear   GLUCOSEU Negative   BILIRUBINUR Negative   KETUA 15*   SPECGRAV <=1.005   BLOODU Negative   PHUR 7.0  7.0   PROTEINU Negative   UROBILINOGEN 0.2   NITRU Negative   LEUKOCYTESUR Negative   LABMICR Not Indicated   URINETYPE NotGiven      Urine Microscopic: No results for input(s): LABCAST, BACTERIA, COMU, HYALCAST, WBCUA, RBCUA, EPIU in the last 72 hours. Urine Culture: No results for input(s): LABURIN in the last 72 hours. Urine Chemistry:   Recent Labs     10/10/22  1950   NAUR <20         Assessment/Plan :      1. Hyponatremia   2/2 excess fluid intake   Sodium 122 yesterday   Today 133  Keep fluid restriction     2. HTN. BP controlled. 3. ETOH abuse.   Monitor for withdrawal.        Monitor electrolytes           D/w primary team      Thank you for allowing us to participate in care of Benjy Perla         Electronically signed by: Linnea García MD, 10/11/2022, 9:21 AM      [facility] [address]  Office : [phone]  Fax :[phone]

## 2022-10-11 NOTE — PROGRESS NOTES
[facility] PROGRESS NOTE    10/11/2022 10:37 AM        Name: Miguel Angel Xavier .               Admitted: 10/10/2022  Primary Care Provider: Coni Lozano (Tel: [phone])      Subjective:    Patient still having anxiety slight tremors nausea no chest pain no fevers no chills    Reviewed interval ancillary notes    Current Medications  amLODIPine (NORVASC) tablet 10 mg, Daily  docusate sodium (COLACE) capsule 100 mg, BID PRN  progesterone (PROMETRIUM) capsule 200 mg, Nightly  sodium chloride flush 0.9 % injection 5-40 mL, 2 times per day  sodium chloride flush 0.9 % injection 5-40 mL, PRN  0.9 % sodium chloride infusion, PRN  thiamine mononitrate tablet 100 mg, Daily  enoxaparin (LOVENOX) injection 40 mg, Nightly  ondansetron (ZOFRAN-ODT) disintegrating tablet 4 mg, Q8H PRN   Or  ondansetron (ZOFRAN) injection 4 mg, Q6H PRN  polyethylene glycol (GLYCOLAX) packet 17 g, Daily PRN  acetaminophen (TYLENOL) tablet 650 mg, Q6H PRN   Or  acetaminophen (TYLENOL) suppository 650 mg, Q6H PRN  0.9 % sodium chloride infusion, Continuous  diazePAM (VALIUM) tablet 5 mg, Q1H PRN   Or  diazePAM (VALIUM) injection 5 mg, Q1H PRN   Or  diazePAM (VALIUM) tablet 10 mg, Q1H PRN   Or  diazePAM (VALIUM) injection 10 mg, Q1H PRN   Or  diazePAM (VALIUM) tablet 15 mg, Q1H PRN   Or  diazePAM (VALIUM) injection 15 mg, Q1H PRN   Or  diazePAM (VALIUM) tablet 20 mg, Q1H PRN   Or  diazePAM (VALIUM) injection 20 mg, Q1H PRN        Objective:  /64   Pulse 85   Temp 98.4 °F (36.9 °C) (Oral)   Resp 16   Ht 5' 5\" (1.651 m)   Wt 149 lb 1.6 oz (67.6 kg)   SpO2 96%   BMI 24.81 kg/m²     Intake/Output Summary (Last 24 hours) at 10/11/2022 1037  Last data filed at 10/11/2022 0338  Gross per 24 hour   Intake 645.21 ml   Output 1850 ml   Net -1204.79 ml      Wt Readings from Last 3 Encounters:   10/11/22 149 lb 1.6 oz (67.6 kg)   09/09/22 164 lb 6.4 oz (74.6 kg) 04/07/22 164 lb 14.5 oz (74.8 kg)     General appearance:  Appears comfortable. AAOx3  HEENT: atraumatic, Pupils equal, muscous membranes moist, no masses appreciated  Cardiovascular: Regular rate and rhythm no murmurs appreciated  Respiratory: CTAB no wheezing  Gastrointestinal: Abdomen soft, non-tender, BS+  EXT: no edema  Neurology: no gross focal deficts + tremors  Psychiatry: Appropriate affect. Not agitated  Skin: Warm, dry, no rashes appreciated    Labs and Tests:  CBC:   Recent Labs     10/10/22  1434 10/11/22  0510   WBC 9.6 7.0   HGB 13.2 12.5    173     BMP:    Recent Labs     10/10/22  1434 10/11/22  0510   * 133*   K 3.9 4.0   CL 83* 97*   CO2 25 26   BUN 13 11   CREATININE 0.7 0.7   GLUCOSE 124* 110*     Hepatic:   Recent Labs     10/10/22  1434 10/11/22  0510   AST 95* 86*   ALT 88* 81*   BILITOT 1.5* 1.0   ALKPHOS 58 53     No orders to display       Recent imaging reviewed    Problem List  Principal Problem:    Hyponatremia  Resolved Problems:    * No resolved hospital problems.  *       Assessment/Plan:   Alcohol withdrawal  - ciwa protocol iv ativan  - seizure precautions     Elevated lfts: seconday to above improving repeat labs     Hyponatremia: improving hold sodium, recheck labs in am     Htn: home meds        DVT prophylaxis lovenox  Code status full code      Dolly Lees MD   10/11/2022 10:37 AM

## 2022-10-11 NOTE — PLAN OF CARE
Fluid restrictions d/t low Na+. Level improving 133 from 122. CIWA score 15, gave Valium 10 mg po @ 1053. CIWA score 7, no Valium needed this evening.    Problem: Discharge Planning  Goal: Discharge to home or other facility with appropriate resources  Outcome: Progressing     Problem: Pain  Goal: Verbalizes/displays adequate comfort level or baseline comfort level  Outcome: Progressing     Problem: Safety - Adult  Goal: Free from fall injury  Outcome: Progressing     Problem: ABCDS Injury Assessment  Goal: Absence of physical injury  Outcome: Progressing     Problem: Hematologic - Adult  Goal: Maintains hematologic stability  Outcome: Progressing

## 2022-10-11 NOTE — PROGRESS NOTES
Up to unit via wheelchair. Pt a/o x4. Ambulatory. From home with . Here for alcohol withdrawal. Pt states last drink was 0745. Vitals/assessments within normal limits. No c/o pain/discomfort. Pt calm and cooperative but a little anxious. Pt settled in room. Oriented to room and call light. Pt stand by assist to bathroom. Obtained urine specimen and sent to lab. Educated pt on alcohol withdrawal symptoms and precautions, pt accepting of information. CIWA score 11, gave Valium 10 mg po @ 1922. Pt quiet, sitting in chair watching tv with call light in reach.

## 2022-10-11 NOTE — PLAN OF CARE
Problem: Pain  Goal: Verbalizes/displays adequate comfort level or baseline comfort level  Outcome: Progressing     Problem: Safety - Adult  Goal: Free from fall injury  Outcome: Progressing     Problem: ABCDS Injury Assessment  Goal: Absence of physical injury  Outcome: Progressing     Problem: Hematologic - Adult  Goal: Maintains hematologic stability  Outcome: Progressing     Vitals:    10/11/22 0326   BP: (!) 161/63   Pulse: 88   Resp: 14   Temp: 98.3 °F (36.8 °C)   SpO2: 97%     Pt denies pain, up to bathroom and back to bed with RN as CGA. VS obtained and Valium 10 mg PO given at 0337 for CIWA 14 at 0326. See [facility] & all flowsheets. Pt is a high fall risk. Pt remains free from falls, throughout night. Bed alarm remains in place, door open. Pt encouraged to use call light for needs throughout night; call light is within reach. Bed lock is in lowest position. Will continue to monitor throughout night.

## 2022-10-12 LAB
ALBUMIN SERPL-MCNC: 4.4 G/DL (ref 3.4–5)
ALP BLD-CCNC: 63 U/L (ref 40–129)
ALT SERPL-CCNC: 108 U/L (ref 10–40)
ANION GAP SERPL CALCULATED.3IONS-SCNC: 14 MMOL/L (ref 3–16)
AST SERPL-CCNC: 115 U/L (ref 15–37)
BASOPHILS ABSOLUTE: 0 K/UL (ref 0–0.2)
BASOPHILS RELATIVE PERCENT: 0.4 %
BILIRUB SERPL-MCNC: 0.6 MG/DL (ref 0–1)
BILIRUBIN DIRECT: <0.2 MG/DL (ref 0–0.3)
BILIRUBIN, INDIRECT: ABNORMAL MG/DL (ref 0–1)
BUN BLDV-MCNC: 9 MG/DL (ref 7–20)
CALCIUM SERPL-MCNC: 10 MG/DL (ref 8.3–10.6)
CHLORIDE BLD-SCNC: 101 MMOL/L (ref 99–110)
CO2: 22 MMOL/L (ref 21–32)
CREAT SERPL-MCNC: 0.7 MG/DL (ref 0.6–1.2)
EOSINOPHILS ABSOLUTE: 0.2 K/UL (ref 0–0.6)
EOSINOPHILS RELATIVE PERCENT: 3 %
GFR AFRICAN AMERICAN: >60
GFR NON-AFRICAN AMERICAN: >60
GLUCOSE BLD-MCNC: 107 MG/DL (ref 70–99)
HCT VFR BLD CALC: 40.1 % (ref 36–48)
HEMOGLOBIN: 13.8 G/DL (ref 12–16)
LYMPHOCYTES ABSOLUTE: 2.1 K/UL (ref 1–5.1)
LYMPHOCYTES RELATIVE PERCENT: 29.3 %
MCH RBC QN AUTO: 33.5 PG (ref 26–34)
MCHC RBC AUTO-ENTMCNC: 34.4 G/DL (ref 31–36)
MCV RBC AUTO: 97.4 FL (ref 80–100)
MONOCYTES ABSOLUTE: 0.4 K/UL (ref 0–1.3)
MONOCYTES RELATIVE PERCENT: 5.8 %
NEUTROPHILS ABSOLUTE: 4.5 K/UL (ref 1.7–7.7)
NEUTROPHILS RELATIVE PERCENT: 61.5 %
PDW BLD-RTO: 14 % (ref 12.4–15.4)
PLATELET # BLD: 150 K/UL (ref 135–450)
PMV BLD AUTO: 8.7 FL (ref 5–10.5)
POTASSIUM SERPL-SCNC: 3.7 MMOL/L (ref 3.5–5.1)
RBC # BLD: 4.11 M/UL (ref 4–5.2)
SODIUM BLD-SCNC: 137 MMOL/L (ref 136–145)
TOTAL PROTEIN: 5.9 G/DL (ref 6.4–8.2)
WBC # BLD: 7.3 K/UL (ref 4–11)

## 2022-10-12 PROCEDURE — 6360000002 HC RX W HCPCS: Performed by: INTERNAL MEDICINE

## 2022-10-12 PROCEDURE — 80048 BASIC METABOLIC PNL TOTAL CA: CPT

## 2022-10-12 PROCEDURE — 36415 COLL VENOUS BLD VENIPUNCTURE: CPT

## 2022-10-12 PROCEDURE — 94760 N-INVAS EAR/PLS OXIMETRY 1: CPT

## 2022-10-12 PROCEDURE — 2060000000 HC ICU INTERMEDIATE R&B

## 2022-10-12 PROCEDURE — 2580000003 HC RX 258: Performed by: INTERNAL MEDICINE

## 2022-10-12 PROCEDURE — 6370000000 HC RX 637 (ALT 250 FOR IP): Performed by: INTERNAL MEDICINE

## 2022-10-12 PROCEDURE — 80076 HEPATIC FUNCTION PANEL: CPT

## 2022-10-12 PROCEDURE — 85025 COMPLETE CBC W/AUTO DIFF WBC: CPT

## 2022-10-12 RX ADMIN — Medication 10 ML: at 09:57

## 2022-10-12 RX ADMIN — CHLORDIAZEPOXIDE HYDROCHLORIDE 50 MG: 25 CAPSULE ORAL at 14:33

## 2022-10-12 RX ADMIN — DIAZEPAM 15 MG: 5 TABLET ORAL at 08:33

## 2022-10-12 RX ADMIN — CHLORDIAZEPOXIDE HYDROCHLORIDE 50 MG: 25 CAPSULE ORAL at 21:38

## 2022-10-12 RX ADMIN — AMLODIPINE BESYLATE 10 MG: 5 TABLET ORAL at 08:24

## 2022-10-12 RX ADMIN — DOCUSATE SODIUM 100 MG: 100 CAPSULE, LIQUID FILLED ORAL at 08:33

## 2022-10-12 RX ADMIN — ENOXAPARIN SODIUM 40 MG: 100 INJECTION SUBCUTANEOUS at 21:38

## 2022-10-12 RX ADMIN — Medication 10 ML: at 21:39

## 2022-10-12 RX ADMIN — Medication 100 MG: at 08:24

## 2022-10-12 RX ADMIN — CHLORDIAZEPOXIDE HYDROCHLORIDE 50 MG: 25 CAPSULE ORAL at 08:23

## 2022-10-12 RX ADMIN — PROGESTERONE 200 MG: 100 CAPSULE ORAL at 21:41

## 2022-10-12 RX ADMIN — DOCUSATE SODIUM 100 MG: 100 CAPSULE, LIQUID FILLED ORAL at 18:35

## 2022-10-12 RX ADMIN — DIAZEPAM 10 MG: 5 TABLET ORAL at 21:40

## 2022-10-12 ASSESSMENT — PAIN DESCRIPTION - LOCATION: LOCATION: ABDOMEN

## 2022-10-12 ASSESSMENT — PAIN DESCRIPTION - DESCRIPTORS: DESCRIPTORS: CRAMPING

## 2022-10-12 ASSESSMENT — PAIN SCALES - GENERAL: PAINLEVEL_OUTOF10: 7

## 2022-10-12 NOTE — PROGRESS NOTES
Office : [phone]     Fax :[phone]       Nephrology progress  Note      Patient's Name: Mitchell Clark  9:53 AM  10/12/2022    Reason for Consult:  hyponatremia       Requesting Physician:  Heather Stephenson      Chief Complaint:    Chief Complaint   Patient presents with    Alcohol Problem     Pt states binge drinker, doesn't drink every day but when she does drinks 1.5 L of vodka, been drinking since Friday last drink 0745, sent by [place]           History of Present Ilness:    Mitchell Clark is a 75 y.o. female with prior history of HTN , alcohol abuse who was admitted after been send by [facility]. She is heavy alcoholic. Initial lab work showed   Sodium of 122. Awake and alert at this time. Sodium level today 133. No headaches     Interval hx         I/O last 3 completed shifts:   In: 1125.2 [P.O.:480; I.V.:645.2]  Out: 200 [Urine:1850]    Past Medical History:   Diagnosis Date    Alcohol abuse, continuous 11/19/2019    Alcohol dependence with withdrawal ([facility] [address] 9/13/2019    Alcohol withdrawal delirium ([facility] [address] 6/1/2019    Alcoholism ([facility] [address]     Anxiety     Ataxia     Depression     Hyperlipidemia     Hypertension        Past Surgical History:   Procedure Laterality Date    BACK SURGERY      1996 L5 disectomy    COLONOSCOPY      2016    ENDOSCOPY, COLON, DIAGNOSTIC      2016       Family History   Problem Relation Age of Onset    Stroke Mother     Substance Abuse Mother     Substance Abuse Father     Heart Disease Father     Heart Disease Brother     Substance Abuse Brother     Heart Disease Paternal Aunt     Heart Disease Paternal Uncle     Heart Disease Maternal Grandmother     Diabetes Maternal Grandmother     Diabetes Maternal Grandfather     Heart Disease Maternal Grandfather     Other Paternal Grandmother     Other Paternal Grandfather         reports that she quit smoking about 35 years ago. Her smoking use included cigarettes. She has a 5.00 pack-year smoking history. She has never used smokeless tobacco. She reports current alcohol use of about 56.0 standard drinks per week. She reports that she does not use drugs.         Allergies:  Penicillins and Sulfa antibiotics    Current Medications:    chlordiazePOXIDE (LIBRIUM) capsule 50 mg, TID  amLODIPine (NORVASC) tablet 10 mg, Daily  docusate sodium (COLACE) capsule 100 mg, BID PRN  progesterone (PROMETRIUM) capsule 200 mg, Nightly  sodium chloride flush 0.9 % injection 5-40 mL, 2 times per day  sodium chloride flush 0.9 % injection 5-40 mL, PRN  0.9 % sodium chloride infusion, PRN  thiamine mononitrate tablet 100 mg, Daily  enoxaparin (LOVENOX) injection 40 mg, Nightly  ondansetron (ZOFRAN-ODT) disintegrating tablet 4 mg, Q8H PRN   Or  ondansetron (ZOFRAN) injection 4 mg, Q6H PRN  polyethylene glycol (GLYCOLAX) packet 17 g, Daily PRN  acetaminophen (TYLENOL) tablet 650 mg, Q6H PRN   Or  acetaminophen (TYLENOL) suppository 650 mg, Q6H PRN  diazePAM (VALIUM) tablet 5 mg, Q1H PRN   Or  diazePAM (VALIUM) injection 5 mg, Q1H PRN   Or  diazePAM (VALIUM) tablet 10 mg, Q1H PRN   Or  diazePAM (VALIUM) injection 10 mg, Q1H PRN   Or  diazePAM (VALIUM) tablet 15 mg, Q1H PRN   Or  diazePAM (VALIUM) injection 15 mg, Q1H PRN   Or  diazePAM (VALIUM) tablet 20 mg, Q1H PRN   Or  diazePAM (VALIUM) injection 20 mg, Q1H PRN      Physical exam:     Vitals:  BP (!) 140/72   Pulse 85   Temp 98.4 °F (36.9 °C) (Oral)   Resp 18   Ht 5' 5\" (1.651 m)   Wt 190 lb (86.2 kg)   SpO2 99%   BMI 31.62 kg/m²   Constitutional:  OAA X3 NAD  Skin: no rash, turgor wnl  Heent:  eomi, mmm  Neck: no bruits or jvd noted  Cardiovascular:  S1, S2 without m/r/g  Respiratory: CTA B without w/r/r  Abdomen:  +bs, soft, nt, nd  Ext: no  lower extremity edema      Labs:  CBC:   Recent Labs     10/10/22  1434 10/11/22  0510 10/12/22  0513   WBC 9.6 7.0 7.3   HGB 13.2 12.5 13.8    173 150     BMP:    Recent Labs     10/10/22  1434 10/11/22  0510 10/12/22  0513   * 133* 137   K 3.9 4.0 3.7   CL 83* 97* 101   CO2 25 26 22   BUN 13 11 9   CREATININE 0.7 0.7 0.7   GLUCOSE 124* 110* 107*     Ca/Mg/Phos:   Recent Labs     10/10/22  1434 10/11/22  0510 10/12/22  0513   CALCIUM 10.0 9.4 10.0     Hepatic:   Recent Labs     10/10/22  1434 10/11/22  0510 10/12/22  0513   AST 95* 86* 115*   ALT 88* 81* 108*   BILITOT 1.5* 1.0 0.6   ALKPHOS 58 53 63     Troponin: No results for input(s): TROPONINI in the last 72 hours. BNP: No results for input(s): BNP in the last 72 hours. Lipids: No results for input(s): CHOL, TRIG, HDL, LDLCALC, LABVLDL in the last 72 hours. ABGs: No results for input(s): PHART, PO2ART, PBU4ENK in the last 72 hours. INR: No results for input(s): INR in the last 72 hours. UA:  Recent Labs     10/10/22  1950   COLORU Yellow   CLARITYU Clear   GLUCOSEU Negative   BILIRUBINUR Negative   KETUA 15*   SPECGRAV <=1.005   BLOODU Negative   PHUR 7.0  7.0   PROTEINU Negative   UROBILINOGEN 0.2   NITRU Negative   LEUKOCYTESUR Negative   LABMICR Not Indicated   URINETYPE NotGiven      Urine Microscopic: No results for input(s): LABCAST, BACTERIA, COMU, HYALCAST, WBCUA, RBCUA, EPIU in the last 72 hours. Urine Culture: No results for input(s): LABURIN in the last 72 hours. Urine Chemistry:   Recent Labs     10/10/22  1950   NAUR <20         Assessment/Plan :      1. Hyponatremia   2/2 excess fluid intake   2/2 sodium 137   Keep fluid restriction     2. HTN. BP controlled. 3. ETOH abuse.   Monitor for withdrawal.        Monitor electrolytes       Stable from nephrology standpoint     D/w primary team      Thank you for allowing us to participate in care of Lay Sherman         Electronically signed by: Tamika Kelsey MD, 10/12/2022, 9:53 AM      [facility] [address]  Office : [phone]  Fax :[phone]

## 2022-10-12 NOTE — PROGRESS NOTES
Pt is high fall risk and cannot be left unattended in bathroom. Pt states she refuses to have BM in front of RN. Pt is holding stool which is causing her stomach to cramp \"7/10 pain\".

## 2022-10-12 NOTE — PROGRESS NOTES
[facility] PROGRESS NOTE    10/12/2022 10:37 AM        Name: Jus Jones .               Admitted: 10/10/2022  Primary Care Provider: Timur Miles (Tel: [phone])      Subjective:    Lying in bed still having some tremor no nausea vomiting no chest pain    Reviewed interval ancillary notes    Current Medications  chlordiazePOXIDE (LIBRIUM) capsule 50 mg, TID  amLODIPine (NORVASC) tablet 10 mg, Daily  docusate sodium (COLACE) capsule 100 mg, BID PRN  progesterone (PROMETRIUM) capsule 200 mg, Nightly  sodium chloride flush 0.9 % injection 5-40 mL, 2 times per day  sodium chloride flush 0.9 % injection 5-40 mL, PRN  0.9 % sodium chloride infusion, PRN  thiamine mononitrate tablet 100 mg, Daily  enoxaparin (LOVENOX) injection 40 mg, Nightly  ondansetron (ZOFRAN-ODT) disintegrating tablet 4 mg, Q8H PRN   Or  ondansetron (ZOFRAN) injection 4 mg, Q6H PRN  polyethylene glycol (GLYCOLAX) packet 17 g, Daily PRN  acetaminophen (TYLENOL) tablet 650 mg, Q6H PRN   Or  acetaminophen (TYLENOL) suppository 650 mg, Q6H PRN  diazePAM (VALIUM) tablet 5 mg, Q1H PRN   Or  diazePAM (VALIUM) injection 5 mg, Q1H PRN   Or  diazePAM (VALIUM) tablet 10 mg, Q1H PRN   Or  diazePAM (VALIUM) injection 10 mg, Q1H PRN   Or  diazePAM (VALIUM) tablet 15 mg, Q1H PRN   Or  diazePAM (VALIUM) injection 15 mg, Q1H PRN   Or  diazePAM (VALIUM) tablet 20 mg, Q1H PRN   Or  diazePAM (VALIUM) injection 20 mg, Q1H PRN      Objective:  BP (!) 140/72   Pulse 85   Temp 98.4 °F (36.9 °C) (Oral)   Resp 18   Ht 5' 5\" (1.651 m)   Wt 190 lb (86.2 kg)   SpO2 99%   BMI 31.62 kg/m²     Intake/Output Summary (Last 24 hours) at 10/12/2022 1037  Last data filed at 10/11/2022 1200  Gross per 24 hour   Intake 240 ml   Output --   Net 240 ml        Wt Readings from Last 3 Encounters:   10/12/22 190 lb (86.2 kg)   09/09/22 164 lb 6.4 oz (74.6 kg)   04/07/22 164 lb 14.5 oz (74.8 kg)     General appearance:  Appears comfortable. AAOx3  HEENT: atraumatic, Pupils equal, muscous membranes moist, no masses appreciated  Cardiovascular: Regular rate and rhythm no murmurs appreciated  Respiratory: CTAB no wheezing  Gastrointestinal: Abdomen soft, non-tender, BS+  EXT: no edema  Neurology: no gross focal deficts + tremors  Psychiatry: Appropriate affect. Not agitated  Skin: Warm, dry, no rashes appreciated    Labs and Tests:  CBC:   Recent Labs     10/10/22  1434 10/11/22  0510 10/12/22  0513   WBC 9.6 7.0 7.3   HGB 13.2 12.5 13.8    173 150       BMP:    Recent Labs     10/10/22  1434 10/11/22  0510 10/12/22  0513   * 133* 137   K 3.9 4.0 3.7   CL 83* 97* 101   CO2 25 26 22   BUN 13 11 9   CREATININE 0.7 0.7 0.7   GLUCOSE 124* 110* 107*       Hepatic:   Recent Labs     10/10/22  1434 10/11/22  0510 10/12/22  0513   AST 95* 86* 115*   ALT 88* 81* 108*   BILITOT 1.5* 1.0 0.6   ALKPHOS 58 53 63       No orders to display       Recent imaging reviewed    Problem List  Principal Problem:    Hyponatremia  Resolved Problems:    * No resolved hospital problems.  *       Assessment/Plan:   Alcohol withdrawal  - ciwa protocol iv ativan  - seizure precautions     Elevated lfts: seconday to above i elevated today we will continue to monitor     Hyponatremia: Improved     Htn: home meds        DVT prophylaxis lovenox  Code status full code      Laxmi Recinos MD   10/12/2022 10:37 AM

## 2022-10-12 NOTE — PLAN OF CARE
Problem: Discharge Planning  Goal: Discharge to home or other facility with appropriate resources  10/11/2022 2334 by Vivek Waite RN  Outcome: Progressing  Flowsheets (Taken 10/11/2022 2017)  Discharge to home or other facility with appropriate resources: Identify barriers to discharge with patient and caregiver  10/11/2022 1853 by Debbi Miles RN  Outcome: Progressing     Problem: Pain  Goal: Verbalizes/displays adequate comfort level or baseline comfort level  10/11/2022 2334 by Vivek Waite RN  Outcome: Progressing  10/11/2022 1853 by Debbi Miles RN  Outcome: Progressing     Problem: Safety - Adult  Goal: Free from fall injury  10/11/2022 2334 by Vivek Waite RN  Outcome: Progressing  10/11/2022 1853 by Debbi Miles RN  Outcome: Progressing     Problem: ABCDS Injury Assessment  Goal: Absence of physical injury  10/11/2022 2334 by Vivek Waite RN  Outcome: Progressing  10/11/2022 1853 by Debbi Miles RN  Outcome: Progressing     Problem: Hematologic - Adult  Goal: Maintains hematologic stability  10/11/2022 2334 by Vivek Waite RN  Outcome: Progressing  Flowsheets (Taken 10/11/2022 2017)  Maintains hematologic stability:   Assess for signs and symptoms of bleeding or hemorrhage   Monitor labs for bleeding or clotting disorders  10/11/2022 1853 by Debbi Miles RN  Outcome: Progressing

## 2022-10-12 NOTE — PLAN OF CARE
Problem: Discharge Planning  Goal: Discharge to home or other facility with appropriate resources  10/12/2022 0957 by Yudi Aguirre RN  Outcome: Progressing  Flowsheets (Taken 10/12/2022 0586)  Discharge to home or other facility with appropriate resources: Identify barriers to discharge with patient and caregiver     Problem: Pain  Goal: Verbalizes/displays adequate comfort level or baseline comfort level  10/12/2022 0957 by Yudi Aguirre RN  Outcome: Progressing     Problem: Safety - Adult  Goal: Free from fall injury  10/12/2022 0957 by Yudi Aguirre RN  Outcome: Progressing     Problem: ABCDS Injury Assessment  Goal: Absence of physical injury  10/12/2022 0957 by Yudi Aguirre RN  Outcome: Progressing     Problem: Hematologic - Adult  Goal: Maintains hematologic stability  10/12/2022 0957 by Yudi Aguirre RN  Outcome: Progressing  Flowsheets (Taken 10/12/2022 0864)  Maintains hematologic stability: Assess for signs and symptoms of bleeding or hemorrhage

## 2022-10-12 NOTE — PLAN OF CARE
Problem: Discharge Planning  Goal: Discharge to home or other facility with appropriate resources  10/12/2022 0526 by Christina Iniguez RN  Flowsheets (Taken 10/12/2022 0430)  Discharge to home or other facility with appropriate resources:   Identify barriers to discharge with patient and caregiver   Arrange for needed discharge resources and transportation as appropriate   Identify discharge learning needs (meds, wound care, etc)     Problem: Pain  Goal: Verbalizes/displays adequate comfort level or baseline comfort level  10/12/2022 0526 by Christina Iniguez RN  Outcome: Progressing  Flowsheets (Taken 10/12/2022 0526)  Verbalizes/displays adequate comfort level or baseline comfort level:   Encourage patient to monitor pain and request assistance   Assess pain using appropriate pain scale   Administer analgesics based on type and severity of pain and evaluate response     Problem: Safety - Adult  Goal: Free from fall injury  10/12/2022 0526 by Christina Iniguez RN  Outcome: Progressing  Flowsheets (Taken 10/12/2022 0526)  Free From Fall Injury: Instruct family/caregiver on patient safety  Note: Pt has called out appropriately       Problem: ABCDS Injury Assessment  Goal: Absence of physical injury  10/12/2022 0526 by Christina Iniguez RN  Outcome: Progressing  Flowsheets (Taken 10/12/2022 0526)  Absence of Physical Injury: Implement safety measures based on patient assessment     Problem: Hematologic - Adult  Goal: Maintains hematologic stability  10/12/2022 0526 by Christina Iniguez RN  Outcome: Progressing  Flowsheets (Taken 10/12/2022 0430)  Maintains hematologic stability: Assess for signs and symptoms of bleeding or hemorrhage

## 2022-10-13 LAB
ALBUMIN SERPL-MCNC: 4 G/DL (ref 3.4–5)
ALP BLD-CCNC: 66 U/L (ref 40–129)
ALT SERPL-CCNC: 192 U/L (ref 10–40)
AST SERPL-CCNC: 202 U/L (ref 15–37)
BASOPHILS ABSOLUTE: 0 K/UL (ref 0–0.2)
BASOPHILS RELATIVE PERCENT: 0.5 %
BILIRUB SERPL-MCNC: 0.5 MG/DL (ref 0–1)
BILIRUBIN DIRECT: <0.2 MG/DL (ref 0–0.3)
BILIRUBIN, INDIRECT: ABNORMAL MG/DL (ref 0–1)
EOSINOPHILS ABSOLUTE: 0.3 K/UL (ref 0–0.6)
EOSINOPHILS RELATIVE PERCENT: 4.3 %
FOLATE: 19.03 NG/ML (ref 4.78–24.2)
HCT VFR BLD CALC: 42.1 % (ref 36–48)
HEMOGLOBIN: 14 G/DL (ref 12–16)
LYMPHOCYTES ABSOLUTE: 2.2 K/UL (ref 1–5.1)
LYMPHOCYTES RELATIVE PERCENT: 33.7 %
MCH RBC QN AUTO: 32.8 PG (ref 26–34)
MCHC RBC AUTO-ENTMCNC: 33.4 G/DL (ref 31–36)
MCV RBC AUTO: 98.2 FL (ref 80–100)
MONOCYTES ABSOLUTE: 0.5 K/UL (ref 0–1.3)
MONOCYTES RELATIVE PERCENT: 7.1 %
NEUTROPHILS ABSOLUTE: 3.6 K/UL (ref 1.7–7.7)
NEUTROPHILS RELATIVE PERCENT: 54.4 %
PDW BLD-RTO: 14.2 % (ref 12.4–15.4)
PLATELET # BLD: 126 K/UL (ref 135–450)
PMV BLD AUTO: 8 FL (ref 5–10.5)
RBC # BLD: 4.29 M/UL (ref 4–5.2)
TOTAL PROTEIN: 7.9 G/DL (ref 6.4–8.2)
VITAMIN B-12: 1016 PG/ML (ref 211–911)
WBC # BLD: 6.5 K/UL (ref 4–11)

## 2022-10-13 PROCEDURE — 6370000000 HC RX 637 (ALT 250 FOR IP): Performed by: INTERNAL MEDICINE

## 2022-10-13 PROCEDURE — 85025 COMPLETE CBC W/AUTO DIFF WBC: CPT

## 2022-10-13 PROCEDURE — 2580000003 HC RX 258: Performed by: INTERNAL MEDICINE

## 2022-10-13 PROCEDURE — 94760 N-INVAS EAR/PLS OXIMETRY 1: CPT

## 2022-10-13 PROCEDURE — 82607 VITAMIN B-12: CPT

## 2022-10-13 PROCEDURE — 80076 HEPATIC FUNCTION PANEL: CPT

## 2022-10-13 PROCEDURE — 2060000000 HC ICU INTERMEDIATE R&B

## 2022-10-13 PROCEDURE — 36415 COLL VENOUS BLD VENIPUNCTURE: CPT

## 2022-10-13 PROCEDURE — 6360000002 HC RX W HCPCS: Performed by: INTERNAL MEDICINE

## 2022-10-13 PROCEDURE — 82746 ASSAY OF FOLIC ACID SERUM: CPT

## 2022-10-13 RX ORDER — CALCIUM CARBONATE 200(500)MG
500 TABLET,CHEWABLE ORAL 3 TIMES DAILY PRN
Status: DISCONTINUED | OUTPATIENT
Start: 2022-10-13 | End: 2022-10-14 | Stop reason: HOSPADM

## 2022-10-13 RX ORDER — CHLORDIAZEPOXIDE HYDROCHLORIDE 10 MG/1
10 CAPSULE, GELATIN COATED ORAL 3 TIMES DAILY
Status: DISCONTINUED | OUTPATIENT
Start: 2022-10-13 | End: 2022-10-14 | Stop reason: HOSPADM

## 2022-10-13 RX ADMIN — ANTACID TABLETS 500 MG: 500 TABLET, CHEWABLE ORAL at 18:18

## 2022-10-13 RX ADMIN — Medication 10 ML: at 21:04

## 2022-10-13 RX ADMIN — DOCUSATE SODIUM 100 MG: 100 CAPSULE, LIQUID FILLED ORAL at 15:06

## 2022-10-13 RX ADMIN — AMLODIPINE BESYLATE 10 MG: 5 TABLET ORAL at 08:19

## 2022-10-13 RX ADMIN — CHLORDIAZEPOXIDE HYDROCHLORIDE 50 MG: 25 CAPSULE ORAL at 08:18

## 2022-10-13 RX ADMIN — CHLORDIAZEPOXIDE HYDROCHLORIDE 10 MG: 10 CAPSULE ORAL at 15:05

## 2022-10-13 RX ADMIN — Medication 5 ML: at 08:20

## 2022-10-13 RX ADMIN — Medication 100 MG: at 08:19

## 2022-10-13 RX ADMIN — POLYETHYLENE GLYCOL 3350 17 G: 17 POWDER, FOR SOLUTION ORAL at 08:19

## 2022-10-13 RX ADMIN — DOCUSATE SODIUM 100 MG: 100 CAPSULE, LIQUID FILLED ORAL at 05:14

## 2022-10-13 RX ADMIN — PROGESTERONE 200 MG: 100 CAPSULE ORAL at 21:04

## 2022-10-13 RX ADMIN — CHLORDIAZEPOXIDE HYDROCHLORIDE 10 MG: 10 CAPSULE ORAL at 21:04

## 2022-10-13 RX ADMIN — ENOXAPARIN SODIUM 40 MG: 100 INJECTION SUBCUTANEOUS at 21:04

## 2022-10-13 NOTE — CARE COORDINATION
Spoke with Hazel Jacinto [phone] at [facility] [place] who requested to be called when patient is discharged.

## 2022-10-13 NOTE — PROGRESS NOTES
[facility] PROGRESS NOTE    10/13/2022 10:41 AM        Name: Estefanía Berg . Admitted: 10/10/2022  Primary Care Provider: Titi Anthony (Tel: [phone])      Subjective:       In bed still having tremors no nausea vomiting shortness of breath  Reviewed interval ancillary notes    Current Medications  chlordiazePOXIDE (LIBRIUM) capsule 10 mg, TID  amLODIPine (NORVASC) tablet 10 mg, Daily  docusate sodium (COLACE) capsule 100 mg, BID PRN  progesterone (PROMETRIUM) capsule 200 mg, Nightly  sodium chloride flush 0.9 % injection 5-40 mL, 2 times per day  sodium chloride flush 0.9 % injection 5-40 mL, PRN  0.9 % sodium chloride infusion, PRN  thiamine mononitrate tablet 100 mg, Daily  enoxaparin (LOVENOX) injection 40 mg, Nightly  ondansetron (ZOFRAN-ODT) disintegrating tablet 4 mg, Q8H PRN   Or  ondansetron (ZOFRAN) injection 4 mg, Q6H PRN  polyethylene glycol (GLYCOLAX) packet 17 g, Daily PRN  acetaminophen (TYLENOL) tablet 650 mg, Q6H PRN   Or  acetaminophen (TYLENOL) suppository 650 mg, Q6H PRN  diazePAM (VALIUM) tablet 5 mg, Q1H PRN   Or  diazePAM (VALIUM) injection 5 mg, Q1H PRN   Or  diazePAM (VALIUM) tablet 10 mg, Q1H PRN   Or  diazePAM (VALIUM) injection 10 mg, Q1H PRN   Or  diazePAM (VALIUM) tablet 15 mg, Q1H PRN   Or  diazePAM (VALIUM) injection 15 mg, Q1H PRN   Or  diazePAM (VALIUM) tablet 20 mg, Q1H PRN   Or  diazePAM (VALIUM) injection 20 mg, Q1H PRN      Objective:  /71   Pulse 81   Temp 98.2 °F (36.8 °C) (Oral)   Resp 15   Ht 5' 5\" (1.651 m)   Wt 151 lb (68.5 kg)   SpO2 99%   BMI 25.13 kg/m²     Intake/Output Summary (Last 24 hours) at 10/13/2022 1041  Last data filed at 10/13/2022 0934  Gross per 24 hour   Intake 245 ml   Output --   Net 245 ml        Wt Readings from Last 3 Encounters:   10/13/22 151 lb (68.5 kg)   09/09/22 164 lb 6.4 oz (74.6 kg)   04/07/22 164 lb 14.5 oz (74.8 kg) General appearance:  Appears comfortable. AAOx3  HEENT: atraumatic, Pupils equal, muscous membranes moist, no masses appreciated  Cardiovascular: Regular rate and rhythm no murmurs appreciated  Respiratory: CTAB no wheezing  Gastrointestinal: Abdomen soft, non-tender, BS+  EXT: no edema  Neurology: no gross focal deficts + tremors  Psychiatry: Appropriate affect. Not agitated  Skin: Warm, dry, no rashes appreciated    Labs and Tests:  CBC:   Recent Labs     10/11/22  0510 10/12/22  0513 10/13/22  0545   WBC 7.0 7.3 6.5   HGB 12.5 13.8 14.0    150 126*       BMP:    Recent Labs     10/10/22  1434 10/11/22  0510 10/12/22  0513   * 133* 137   K 3.9 4.0 3.7   CL 83* 97* 101   CO2 25 26 22   BUN 13 11 9   CREATININE 0.7 0.7 0.7   GLUCOSE 124* 110* 107*       Hepatic:   Recent Labs     10/11/22  0510 10/12/22  0513 10/13/22  0545   AST 86* 115* 202*   ALT 81* 108* 192*   BILITOT 1.0 0.6 0.5   ALKPHOS 53 63 66       No orders to display       Recent imaging reviewed    Problem List  Principal Problem:    Hyponatremia  Resolved Problems:    * No resolved hospital problems.  *       Assessment/Plan:   Alcohol withdrawal  - ciwa protocol iv ativan  - seizure precautions     Elevated lfts: seconday to above irepeat labs in am     Hyponatremia: improved     Htn: home meds     Thrombocytopenia: likely secondary to etoh abuse check iron b12 folate   DVT prophylaxis lovenox  Code status full code      Tom Vaughn MD   10/13/2022 10:41 AM

## 2022-10-13 NOTE — PLAN OF CARE
Problem: Discharge Planning  Goal: Discharge to home or other facility with appropriate resources  10/12/2022 2343 by Raven Wills RN  Outcome: Progressing  Flowsheets (Taken 10/12/2022 2138)  Discharge to home or other facility with appropriate resources: Identify barriers to discharge with patient and caregiver  10/12/2022 0957 by Jus Watson RN  Outcome: Progressing  Flowsheets (Taken 10/12/2022 9569)  Discharge to home or other facility with appropriate resources: Identify barriers to discharge with patient and caregiver     Problem: Pain  Goal: Verbalizes/displays adequate comfort level or baseline comfort level  10/12/2022 2343 by Raven Wills RN  Outcome: Progressing  10/12/2022 0957 by Jus Watson RN  Outcome: Progressing     Problem: Safety - Adult  Goal: Free from fall injury  10/12/2022 2343 by Raven Wills RN  Outcome: Progressing  10/12/2022 0957 by Jus Watson RN  Outcome: Progressing     Problem: ABCDS Injury Assessment  Goal: Absence of physical injury  10/12/2022 2343 by Raven Wills RN  Outcome: Progressing  10/12/2022 0957 by Jus Watson RN  Outcome: Progressing     Problem: Hematologic - Adult  Goal: Maintains hematologic stability  10/12/2022 2343 by Raven Wills RN  Outcome: Progressing  Flowsheets (Taken 10/12/2022 2138)  Maintains hematologic stability:   Assess for signs and symptoms of bleeding or hemorrhage   Monitor labs for bleeding or clotting disorders  10/12/2022 0957 by Jus Watson RN  Outcome: Progressing  Flowsheets (Taken 10/12/2022 0824)  Maintains hematologic stability: Assess for signs and symptoms of bleeding or hemorrhage     Problem: Neurosensory - Adult  Goal: Achieves stable or improved neurological status  Outcome: Progressing  Flowsheets (Taken 10/12/2022 2138)  Achieves stable or improved neurological status:   Assess for and report changes in neurological status   Initiate measures to prevent increased intracranial pressure Progressing  Flowsheets (Taken 10/12/2022 2138)  Patient/family able to verbalize anxieties, fears, and concerns, and demonstrate effective coping:   Assist patient/family to identify coping skills, available support systems and cultural and spiritual values   Provide emotional support, including active listening and acknowledgement of concerns of patient and caregivers   Reduce environmental stimuli, as able     Problem: Decision Making  Goal: Pt/Family able to effectively weigh alternatives and participate in decision making related to treatment and care  Description: INTERVENTIONS:  1. Determine when there are differences between patient's view, family's view, and healthcare provider's view of condition  2. Facilitate patient and family articulation of goals for care  3. Help patient and family identify pros/cons of alternative solutions  4. Provide information as requested by patient/family  5. Respect patient/family right to receive or not to receive information  6. Serve as a liaison between patient and family and health care team  7.  Initiate Consults from Ethics, Palliative Care or initiate [facility] as is appropriate  Outcome: Progressing  Flowsheets (Taken 10/12/2022 2138)  Patient/family able to effectively weigh alternatives and participate in decision making related to treatment and care:   Determine when there are differences between patient's view, family's view, and healthcare provider's view of condition   Facilitate patient and family articulation of goals for care

## 2022-10-13 NOTE — PROGRESS NOTES
Patient states that she does not want her bed alarm/ or the \"snitch alarm\" (as she referred to it) activated on her bed. It was explained to patient that she is a high fall risk d/t being unstable on her feet  and at risk for seizures d/t detox. Patient states her understanding and agreed to sign  refusal  stating that she has never fallen or had a seizure. Patient A&O x4, VSS.

## 2022-10-13 NOTE — PROGRESS NOTES
Fall and seizure precautions in place. Pt alert and oriented x3. Pt states she \"doesn't need to be treated like she can't walk\".

## 2022-10-13 NOTE — PROGRESS NOTES
Provider notified that pt is refusing fall and seizure precautions. Pt educated on risk of injury and death. Pt verbalizes understanding.

## 2022-10-14 VITALS
DIASTOLIC BLOOD PRESSURE: 61 MMHG | BODY MASS INDEX: 23.97 KG/M2 | RESPIRATION RATE: 18 BRPM | HEIGHT: 65 IN | SYSTOLIC BLOOD PRESSURE: 107 MMHG | TEMPERATURE: 97.6 F | WEIGHT: 143.9 LBS | HEART RATE: 98 BPM | OXYGEN SATURATION: 98 %

## 2022-10-14 LAB
ALBUMIN SERPL-MCNC: 4.2 G/DL (ref 3.4–5)
ALP BLD-CCNC: 61 U/L (ref 40–129)
ALT SERPL-CCNC: 255 U/L (ref 10–40)
ANION GAP SERPL CALCULATED.3IONS-SCNC: 14 MMOL/L (ref 3–16)
AST SERPL-CCNC: 238 U/L (ref 15–37)
BASOPHILS ABSOLUTE: 0 K/UL (ref 0–0.2)
BASOPHILS RELATIVE PERCENT: 0.7 %
BILIRUB SERPL-MCNC: 0.4 MG/DL (ref 0–1)
BILIRUBIN DIRECT: <0.2 MG/DL (ref 0–0.3)
BILIRUBIN, INDIRECT: ABNORMAL MG/DL (ref 0–1)
BUN BLDV-MCNC: 9 MG/DL (ref 7–20)
CALCIUM SERPL-MCNC: 10.3 MG/DL (ref 8.3–10.6)
CHLORIDE BLD-SCNC: 97 MMOL/L (ref 99–110)
CO2: 21 MMOL/L (ref 21–32)
CREAT SERPL-MCNC: 0.7 MG/DL (ref 0.6–1.2)
EOSINOPHILS ABSOLUTE: 0.3 K/UL (ref 0–0.6)
EOSINOPHILS RELATIVE PERCENT: 3.9 %
GFR AFRICAN AMERICAN: >60
GFR NON-AFRICAN AMERICAN: >60
GLUCOSE BLD-MCNC: 95 MG/DL (ref 70–99)
HCT VFR BLD CALC: 38.4 % (ref 36–48)
HEMOGLOBIN: 13.1 G/DL (ref 12–16)
IRON SATURATION: 32 % (ref 15–50)
IRON: 80 UG/DL (ref 37–145)
LYMPHOCYTES ABSOLUTE: 2.6 K/UL (ref 1–5.1)
LYMPHOCYTES RELATIVE PERCENT: 37.5 %
MCH RBC QN AUTO: 33.2 PG (ref 26–34)
MCHC RBC AUTO-ENTMCNC: 34.1 G/DL (ref 31–36)
MCV RBC AUTO: 97.5 FL (ref 80–100)
MONOCYTES ABSOLUTE: 0.7 K/UL (ref 0–1.3)
MONOCYTES RELATIVE PERCENT: 9.8 %
NEUTROPHILS ABSOLUTE: 3.3 K/UL (ref 1.7–7.7)
NEUTROPHILS RELATIVE PERCENT: 48.1 %
PDW BLD-RTO: 14.4 % (ref 12.4–15.4)
PLATELET # BLD: 137 K/UL (ref 135–450)
PMV BLD AUTO: 8.4 FL (ref 5–10.5)
POTASSIUM SERPL-SCNC: 3.7 MMOL/L (ref 3.5–5.1)
RBC # BLD: 3.94 M/UL (ref 4–5.2)
SODIUM BLD-SCNC: 132 MMOL/L (ref 136–145)
TOTAL IRON BINDING CAPACITY: 253 UG/DL (ref 260–445)
TOTAL PROTEIN: 7.3 G/DL (ref 6.4–8.2)
WBC # BLD: 6.9 K/UL (ref 4–11)

## 2022-10-14 PROCEDURE — 85025 COMPLETE CBC W/AUTO DIFF WBC: CPT

## 2022-10-14 PROCEDURE — 83540 ASSAY OF IRON: CPT

## 2022-10-14 PROCEDURE — 80048 BASIC METABOLIC PNL TOTAL CA: CPT

## 2022-10-14 PROCEDURE — 83550 IRON BINDING TEST: CPT

## 2022-10-14 PROCEDURE — 6370000000 HC RX 637 (ALT 250 FOR IP): Performed by: INTERNAL MEDICINE

## 2022-10-14 PROCEDURE — 36415 COLL VENOUS BLD VENIPUNCTURE: CPT

## 2022-10-14 PROCEDURE — 6360000002 HC RX W HCPCS: Performed by: INTERNAL MEDICINE

## 2022-10-14 PROCEDURE — 80076 HEPATIC FUNCTION PANEL: CPT

## 2022-10-14 RX ADMIN — Medication 100 MG: at 10:19

## 2022-10-14 RX ADMIN — ONDANSETRON 4 MG: 2 INJECTION INTRAMUSCULAR; INTRAVENOUS at 01:31

## 2022-10-14 RX ADMIN — ANTACID TABLETS 500 MG: 500 TABLET, CHEWABLE ORAL at 10:12

## 2022-10-14 RX ADMIN — CHLORDIAZEPOXIDE HYDROCHLORIDE 10 MG: 10 CAPSULE ORAL at 10:17

## 2022-10-14 RX ADMIN — DIAZEPAM 10 MG: 5 TABLET ORAL at 01:28

## 2022-10-14 RX ADMIN — AMLODIPINE BESYLATE 10 MG: 5 TABLET ORAL at 10:17

## 2022-10-14 NOTE — PLAN OF CARE
Problem: Discharge Planning  Goal: Discharge to home or other facility with appropriate resources  Outcome: Completed  Flowsheets (Taken 10/14/2022 1001)  Discharge to home or other facility with appropriate resources: Identify barriers to discharge with patient and caregiver     Problem: Pain  Goal: Verbalizes/displays adequate comfort level or baseline comfort level  Outcome: Completed     Problem: Safety - Adult  Goal: Free from fall injury  Outcome: Completed     Problem: ABCDS Injury Assessment  Goal: Absence of physical injury  Outcome: Completed     Problem: Hematologic - Adult  Goal: Maintains hematologic stability  Outcome: Completed  Flowsheets (Taken 10/14/2022 1001)  Maintains hematologic stability: Assess for signs and symptoms of bleeding or hemorrhage     Problem: Coping  Goal: Pt/Family able to verbalize concerns and demonstrate effective coping strategies  Description: INTERVENTIONS:  1. Assist patient/family to identify coping skills, available support systems and cultural and spiritual values  2. Provide emotional support, including active listening and acknowledgement of concerns of patient and caregivers  3. Reduce environmental stimuli, as able  4. Instruct patient/family in relaxation techniques, as appropriate  5. Assess for spiritual pain/suffering and initiate Spiritual Care, Psychosocial Clinical Specialist consults as needed  Outcome: Completed  Flowsheets (Taken 10/14/2022 1001)  Patient/family able to verbalize anxieties, fears, and concerns, and demonstrate effective coping: Assist patient/family to identify coping skills, available support systems and cultural and spiritual values     Problem: Decision Making  Goal: Pt/Family able to effectively weigh alternatives and participate in decision making related to treatment and care  Description: INTERVENTIONS:  1. Determine when there are differences between patient's view, family's view, and healthcare provider's view of condition  2. Facilitate patient and family articulation of goals for care  3. Help patient and family identify pros/cons of alternative solutions  4. Provide information as requested by patient/family  5. Respect patient/family right to receive or not to receive information  6. Serve as a liaison between patient and family and health care team  7. Initiate Consults from Ethics, Palliative Care or initiate [address] as is appropriate  Outcome: Completed  Flowsheets (Taken 10/14/2022 1001)  Patient/family able to effectively weigh alternatives and participate in decision making related to treatment and care: Determine when there are differences between patient's view, family's view, and healthcare provider's view of condition     Problem: Neurosensory - Adult  Goal: Achieves stable or improved neurological status  Outcome: Completed  Flowsheets (Taken 10/14/2022 1001)  Achieves stable or improved neurological status: Assess for and report changes in neurological status  Goal: Absence of seizures  Outcome: Completed  Flowsheets (Taken 10/14/2022 1001)  Absence of seizures: Monitor for seizure activity.   If seizure occurs, document type and location of movements and any associated apnea     Problem: Musculoskeletal - Adult  Goal: Return mobility to safest level of function  Outcome: Completed  Flowsheets (Taken 10/14/2022 1001)  Return Mobility to Safest Level of Function: Assess patient stability and activity tolerance for standing, transferring and ambulating with or without assistive devices  Goal: Return ADL status to a safe level of function  Outcome: Completed  Flowsheets (Taken 10/14/2022 1001)  Return ADL Status to a Safe Level of Function: Administer medication as ordered

## 2022-10-14 NOTE — PROGRESS NOTES
Discharge instructions and medications reviewed with patient. Patient voices understanding and denies further questions/ concerns at this time. All belongings with patient. Patient to make follow up appointment with PCP. Patient discharged at [phone] per car.

## 2022-10-14 NOTE — DISCHARGE SUMMARY
Hospital Medicine Discharge Summary    Patient: Deidra Martines     Gender: female  : 1955   Age: 75 y.o. MRN: [id number]    Admitting Physician: Lorie Birch MD  Discharge Physician: Lorie Birch MD     Code Status: Full Code     Admit Date: 10/10/2022   Discharge Date:   10/14/2022    Disposition:  Home  Time spent arranging discharge: 35 minutes    Discharge Diagnoses: Active Hospital Problems    Diagnosis Date Noted    Hyponatremia [E87.1] 10/10/2022     Priority: Medium   Etoh withdrawal  Thrombocytopenia  Elevated lfts      Condition at Discharge:  Stable    Hospital Course:   Patient admitted to hospital with alcohol withdrawal started on CIWA protocol and this resolved patient elevated LFTs secondary to alcohol abuse would recommend repeat LFTs in 1 week at PCP patient had hyponatremia which resolved did have thrombocytopenia which has resolved likely secondary to EtOH abuse  Discharge Exam:    /75   Pulse 77   Temp 98.1 °F (36.7 °C) (Oral)   Resp 14   Ht 5' 5\" (1.651 m)   Wt 143 lb 14.4 oz (65.3 kg)   SpO2 98%   BMI 23.95 kg/m²   General appearance:  Appears comfortable. AAOx3  HEENT: atraumatic, Pupils equal, muscous membranes moist, no masses appreciated  Cardiovascular: Regular rate and rhythm no murmurs appreciated  Respiratory: CTAB no wheezing  Gastrointestinal: Abdomen soft, non-tender, BS+  EXT: no edema  Neurology: no gross focal deficts  Psychiatry: Appropriate affect.  Not agitated  Skin: Warm, dry, no rashes appreciated    Discharge Medications:   Current Discharge Medication List        Current Discharge Medication List        Current Discharge Medication List        CONTINUE these medications which have NOT CHANGED    Details   Milk Thistle 250 MG CAPS Take by mouth daily      zolpidem (AMBIEN) 10 MG tablet Take 10 mg by mouth nightly as needed for Sleep.      folic acid (FOLVITE) 1 MG tablet Take 1 tablet by mouth daily  Qty: 30 tablet, Refills: 3 Multiple Vitamin (MULTIVITAMIN) TABS tablet Take 1 tablet by mouth daily  Qty: 30 tablet, Refills: 0      thiamine mononitrate (THIAMINE) 100 MG tablet Take 1 tablet by mouth daily  Qty: 30 tablet, Refills: 0      amLODIPine (NORVASC) 10 MG tablet Take 10 mg by mouth daily      MAGNESIUM PO Take by mouth daily as needed      famotidine (PEPCID) 20 MG tablet Take 1 tablet by mouth 2 times daily  Qty: 60 tablet, Refills: 0      ondansetron (ZOFRAN) 4 MG tablet Take 1 tablet by mouth every 8 hours as needed for Nausea or Vomiting  Qty: 10 tablet, Refills: 0      cloNIDine (CATAPRES) 0.1 MG tablet TAKE ONE TABLET BY MOUTH TWICE A DAY  Qty: 60 tablet, Refills: 2      docusate sodium (COLACE) 100 MG capsule Take 100 mg by mouth 2 times daily as needed for Constipation      progesterone (PROMETRIUM) 200 MG CAPS capsule Take 1 capsule by mouth nightly           Current Discharge Medication List          Labs: For convenience and continuity at follow-up the following most recent labs are provided:    Lab Results   Component Value Date/Time    WBC 6.9 10/14/2022 04:54 AM    HGB 13.1 10/14/2022 04:54 AM    HCT 38.4 10/14/2022 04:54 AM    MCV 97.5 10/14/2022 04:54 AM     10/14/2022 04:54 AM     10/12/2022 05:13 AM    K 3.7 10/12/2022 05:13 AM    K 4.1 04/07/2022 04:58 PM     10/12/2022 05:13 AM    CO2 22 10/12/2022 05:13 AM    BUN 9 10/12/2022 05:13 AM    CREATININE 0.7 10/12/2022 05:13 AM    CALCIUM 10.0 10/12/2022 05:13 AM    PHOS 3.3 06/16/2021 09:36 AM    ALKPHOS 61 10/14/2022 04:54 AM     10/14/2022 04:54 AM     10/14/2022 04:54 AM    BILITOT 0.4 10/14/2022 04:54 AM    BILIDIR <0.2 10/14/2022 04:54 AM    LABALBU 4.2 10/14/2022 04:54 AM    LDLCALC 105 02/08/2021 09:49 AM    TRIG 80 02/08/2021 09:49 AM     Lab Results   Component Value Date    INR 0.98 04/08/2022    INR 0.89 06/16/2021    INR 0.98 09/27/2019       Radiology:  No results found.       Signed:    Rajesh Yoonr,

## 2022-10-14 NOTE — CARE COORDINATION
Discharging Planning. The SW Spoke with Margaret from [facility] who stated the patient will be retuning home with Family and begin Services with [facility] on 10/17/2022. All CM/SW needs met, will sign off.

## 2022-10-14 NOTE — DISCHARGE INSTR - COC
Continuity of Care Form    Patient Name: Teresa Low   :  1955  MRN:  [id number]    Admit date:  10/10/2022  Discharge date:  ***    Code Status Order: Full Code   Advance Directives:     Admitting Physician:  Rajesh Foote MD  PCP: Francia Lanes    Discharging Nurse: [facility] Unit/Room#: 6BA-5229/6376-31  Discharging Unit Phone Number: ***    Emergency Contact:   Extended Emergency Contact Information  Primary Emergency Contact: Terence Lujan  [facility] [address] Phone: [phone]  Mobile Phone: [phone]  Relation: Spouse    Past Surgical History:  Past Surgical History:   Procedure Laterality Date    BACK SURGERY       L5 disectomy    COLONOSCOPY      2016    ENDOSCOPY, COLON, DIAGNOSTIC             Immunization History:   Immunization History   Administered Date(s) Administered    Zoster Live (Zostavax) 2014    Zoster Recombinant (Shingrix) 2020       Active Problems:  Patient Active Problem List   Diagnosis Code    Essential hypertension I10    Alcohol withdrawal syndrome, uncomplicated ([address] K52.325    Depression F32. 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    Anxiety F41.9    Insomnia, unspecified G47.00    Alcohol intoxication in active alcoholic, with unspecified complication ([address] Z33.877    Hyperglycemia R73.9    Hematuria R31.9    Alcohol withdrawal delirium ([address] F10.931    Hyponatremia E87.1       Isolation/Infection:   Isolation            No Isolation          Patient Infection Status       None to display            Nurse Assessment:  Last Vital Signs: /61   Pulse 98   Temp 97.6 °F (36.4 °C) (Oral)   Resp 18   Ht 5' 5\" (1.651 m)   Wt 143 lb 14.4 oz (65.3 kg)   SpO2 98%   BMI 23.95 kg/m²     Last documented pain score (0-10 scale): Pain Level: 7  Last Weight:   Wt Readings from Last 1 Encounters:   10/14/22 143 lb 14.4 oz (65.3 kg)     Mental Status:  {IP PT MENTAL STATUS:}    IV Access:  { VICKI IV ACCESS:552337404}    Nursing Mobility/ADLs:  Walking   {CHP DME ZAPE:678987240}  Transfer  {CHP DME KFA}  Bathing  {CHP DME UWHW:705165932}  Dressing  {CHP DME JLCE:934149657}  Toileting  {CHP DME VRXW:758093336}  Feeding  {CHP DME VWDT:666385020}  Med Admin  {CHP DME GSXE:302078115}  Med Delivery   { VICKI MED Delivery:719611647}    Wound Care Documentation and Therapy:        Elimination:  Continence: Bowel: {YES / Q}  Bladder: {YES / GJ:62539}  Urinary Catheter: {Urinary Catheter:438204350}   Colostomy/Ileostomy/Ileal Conduit: {YES / KL:82653}       Date of Last BM: ***  No intake or output data in the 24 hours ending 10/14/22 1154  I/O last 3 completed shifts: In: 245 [P.O.:240;  I.V.:5]  Out: -     Safety Concerns:     [address] [employer] Safety Concerns:421379743}    Impairments/Disabilities:      [address] [employer] Impairments/Disabilities:323266947}    Nutrition Therapy:  Current Nutrition Therapy:   [address] [employer] Diet List:494453513}    Routes of Feeding: {CHP DME Other Feedings:571781456}  Liquids: {Slp liquid thickness:23057}  Daily Fluid Restriction: {CHP DME Yes amt example:534739287}  Last Modified Barium Swallow with Video (Video Swallowing Test): {Done [facility] Done [facility]075490853}    Treatments at the Time of Hospital Discharge:   Respiratory Treatments: ***  Oxygen Therapy:  {Therapy; copd oxygen:19045}  Ventilator:    {MARANDA GUILLEN Vent YWVZ:829693387}    Rehab Therapies: {THERAPEUTIC INTERVENTION:9573822199}  Weight Bearing Status/Restrictions: {MARANDA GUILLEN Weight Bearin}  Other Medical Equipment (for information only, NOT a DME order):  {EQUIPMENT:970207107}  Other Treatments: ***    Patient's personal belongings (please select all that are sent with patient):  {CHP DME Belongings:479256606}    RN SIGNATURE:  {Esignature:585262321}    CASE MANAGEMENT/SOCIAL WORK SECTION    Inpatient Status Date: ***    Readmission Risk Assessment Score:  Readmission Risk              Risk of Unplanned Readmission:  21           Discharging to Facility/ Agency   Name:   Address:  Phone:  Fax:    Dialysis Facility (if applicable)   Name:  Address:  Dialysis Schedule:  Phone:  Fax:    / signature: {Esignature:610338405}    PHYSICIAN SECTION    Prognosis: {Prognosis:3462865011}    Condition at Discharge: [address] [place] [address] Patient Condition:995305274}    Rehab Potential (if transferring to Rehab): {Prognosis:3610492954}    Recommended Labs or Other Treatments After Discharge: ***    Physician Certification: I certify the above information and transfer of Ana Forrest  is necessary for the continuing treatment of the diagnosis listed and that she requires {Admit to Appropriate Level of Care:11067} for {GREATER/LESS:281408189} 30 days.      Update Admission H&P: {CHP DME Changes in WSGEA:013832140}    PHYSICIAN SIGNATURE:  {Esignature:632615854}

## 2022-10-14 NOTE — PLAN OF CARE
Problem: Discharge Planning  Goal: Discharge to home or other facility with appropriate resources  Outcome: Progressing  Flowsheets  Taken 10/13/2022 2007 by Graciela Muhammad RN  Discharge to home or other facility with appropriate resources: Identify barriers to discharge with patient and caregiver  Taken 10/13/2022 0819 by Lindsay Jarquin RN  Discharge to home or other facility with appropriate resources: Identify barriers to discharge with patient and caregiver     Problem: Pain  Goal: Verbalizes/displays adequate comfort level or baseline comfort level  Outcome: Progressing     Problem: Hematologic - Adult  Goal: Maintains hematologic stability  Outcome: Progressing  Flowsheets  Taken 10/13/2022 2007 by Graciela Muhammad RN  Maintains hematologic stability:   Assess for signs and symptoms of bleeding or hemorrhage   Monitor labs for bleeding or clotting disorders  Taken 10/13/2022 0819 by Lindsay Jarquin RN  Maintains hematologic stability: Assess for signs and symptoms of bleeding or hemorrhage     Problem: ABCDS Injury Assessment  Goal: Absence of physical injury  Outcome: Progressing     Problem: Neurosensory - Adult  Goal: Achieves stable or improved neurological status  Outcome: Progressing  Flowsheets (Taken 10/13/2022 2007)  Achieves stable or improved neurological status:   Assess for and report changes in neurological status   Initiate measures to prevent increased intracranial pressure   Maintain blood pressure and fluid volume within ordered parameters to optimize cerebral perfusion and minimize risk of hemorrhage   Monitor temperature, glucose, and sodium. Initiate appropriate interventions as ordered  Goal: Absence of seizures  Outcome: Progressing  Flowsheets (Taken 10/13/2022 2007)  Absence of seizures:   Monitor for seizure activity.   If seizure occurs, document type and location of movements and any associated apnea   If seizure occurs, turn head to side and suction secretions as needed Problem: Musculoskeletal - Adult  Goal: Return mobility to safest level of function  Outcome: Progressing  Flowsheets  Taken 10/13/2022 2007 by Roscoe Purvis RN  Return Mobility to Safest Level of Function:   Assess patient stability and activity tolerance for standing, transferring and ambulating with or without assistive devices   Assist with transfers and ambulation using safe patient handling equipment as needed   Obtain physical therapy/occupational therapy consults as needed   Instruct patient/family in ordered activity level  Taken 10/13/2022 0819 by Bing Sam RN  Return Mobility to Safest Level of Function: Assess patient stability and activity tolerance for standing, transferring and ambulating with or without assistive devices  Goal: Return ADL status to a safe level of function  Outcome: Progressing  Flowsheets  Taken 10/13/2022 2007 by Roscoe Purvis RN  Return ADL Status to a Safe Level of Function:   Administer medication as ordered   Assess activities of daily living deficits and provide assistive devices as needed   Assist and instruct patient to increase activity and self care as tolerated  Taken 10/13/2022 0819 by Bing Sam RN  Return ADL Status to a Safe Level of Function: Administer medication as ordered     Problem: Coping  Goal: Pt/Family able to verbalize concerns and demonstrate effective coping strategies  Description: INTERVENTIONS:  1. Assist patient/family to identify coping skills, available support systems and cultural and spiritual values  2. Provide emotional support, including active listening and acknowledgement of concerns of patient and caregivers  3. Reduce environmental stimuli, as able  4. Instruct patient/family in relaxation techniques, as appropriate  5.  Assess for spiritual pain/suffering and initiate Spiritual Care, Psychosocial Clinical Specialist consults as needed  Outcome: Progressing  Flowsheets (Taken 10/13/2022 2007)  Patient/family able to verbalize anxieties, fears, and concerns, and demonstrate effective coping:   Assist patient/family to identify coping skills, available support systems and cultural and spiritual values   Provide emotional support, including active listening and acknowledgement of concerns of patient and caregivers   Reduce environmental stimuli, as able   Instruct patient/family in relaxation techniques, as appropriate     Problem: Decision Making  Goal: Pt/Family able to effectively weigh alternatives and participate in decision making related to treatment and care  Description: INTERVENTIONS:  1. Determine when there are differences between patient's view, family's view, and healthcare provider's view of condition  2. Facilitate patient and family articulation of goals for care  3. Help patient and family identify pros/cons of alternative solutions  4. Provide information as requested by patient/family  5. Respect patient/family right to receive or not to receive information  6. Serve as a liaison between patient and family and health care team  7.  Initiate Consults from Ethics, Palliative Care or initiate [address] as is appropriate  Outcome: Progressing  Flowsheets (Taken 10/13/2022 2007)  Patient/family able to effectively weigh alternatives and participate in decision making related to treatment and care:   Determine when there are differences between patient's view, family's view, and healthcare provider's view of condition   Facilitate patient and family articulation of goals for care     Problem: Safety - Adult  Goal: Free from fall injury  Outcome: Progressing

## 2022-10-17 ENCOUNTER — CARE COORDINATION (OUTPATIENT)
Dept: CASE MANAGEMENT | Age: 67
End: 2022-10-17

## 2022-10-18 ENCOUNTER — CARE COORDINATION (OUTPATIENT)
Dept: CASE MANAGEMENT | Age: 67
End: 2022-10-18

## 2022-10-18 NOTE — CARE COORDINATION
[facility] Care Transitions Follow Up Call      Second and final attempt at 24 hour discharge call, no answer, CTN left VM with contact information and request for return call. CTN unable to notify PCP, non-[facility] PCP. CTN will resolve episode and remain available.     Cherry Calderon RN

## 2022-10-31 NOTE — PROGRESS NOTES
Old RFA IV infiltrated. Pt refused new PIV insertion. Education attempts ineffective. Electronically signed by Leroy Vega RN on 10/14/2022 at 2:51 AM Alert and oriented to person, place and time

## 2022-11-15 ENCOUNTER — TELEPHONE (OUTPATIENT)
Dept: PRIMARY CARE CLINIC | Age: 67
End: 2022-11-15

## 2023-10-30 NOTE — TELEPHONE ENCOUNTER
"MATERNAL-FETAL MEDICINE   CONSULT NOTE    SUBJECTIVE:     Ms. Brianna Aguirre is a 37 y.o.  female with IUP at 33w2d who is seen in consultation by MFM for evaluation and management of:  Problem   Fetal Growth Restriction Antepartum   [place] (Advanced Maternal Age) Multigravida 35+            Medication List with Changes/Refills   Current Medications    ASPIRIN 81 MG CHEW    Take 1 tablet (81 mg total) by mouth once daily.    BUPROPION (WELLBUTRIN XL) 150 MG TB24 TABLET    Take 150 mg by mouth every morning.    CLOTRIMAZOLE (LOTRIMIN) 1 % CREAM    Apply topically 2 (two) times daily. for 7 days    FERROUS SULFATE (FEOSOL) 325 MG (65 MG IRON) TAB TABLET    Take 1 tablet (325 mg total) by mouth 2 (two) times daily.    FLUOXETINE 40 MG CAPSULE    Take 40 mg by mouth every morning.    VYVANSE 50 MG CAPSULE    Take 50 mg by mouth every morning.       Review of patient's allergies indicates:  No Known Allergies    PMH:  Past Medical History:   Diagnosis Date    Mental disorder     ADHD    Mild anemia 2023       PObHx:  OB History    Para Term  AB Living   4 3 3     3   SAB IAB Ectopic Multiple Live Births           3      # Outcome Date GA Lbr Ed/2nd Weight Sex Delivery Anes PTL Lv   4 Current            3 Term 09 39w0d   M Vag-Spont EPI  JAIRO   2 Term 07 38w0d   M Vag-Spont EPI  JAIRO   1 Term 10/23/06 38w0d   F Vag-Spont EPI  JAIRO       PSH:History reviewed. No pertinent surgical history.    Family history:family history is not on file.    Social history: reports that she has never smoked. She has never been exposed to tobacco smoke. She has never used smokeless tobacco. She reports that she does not drink alcohol and does not use drugs.    Genetic history:  The patient denies any inherited genetic diseases or birth defects in herself or her partner's personal history or family.    Objective:   /61   Ht 5' 4" (1.626 m)   Wt 109.8 kg (242 lb 1 oz)   LMP 03/15/2023 (Exact Date)   BMI " Medication:   Requested Prescriptions     Pending Prescriptions Disp Refills    NIFEdipine (PROCARDIA XL) 60 MG extended release tablet [Pharmacy Med Name: NIFEdipine ER 60 MG TAB, 24 HR] 90 tablet 0     Sig: TAKE ONE TABLET BY MOUTH DAILY       Last appt: 7/13/2021   Next appt: Visit date not found    Last OARRS: No flowsheet data found. 41.55 kg/m²     Ultrasound performed. See viewpoint for full ultrasound report.  FGR is identified on today's study. The EFW plots at the 11%, and the AC plots at the 9%.   The EFW is 1830 g.  The fetal anatomic survey was completed today, and no fetal structural abnormalities were identified of the structures imaged today.    Placental location is posterior, fundal.   AFV is normal, and the BPP score is reassuring at 8/8.   Umbilical artery Doppler S/D ratios are normal.         ASSESSMENT/PLAN:     37 y.o.  female with IUP at 33w2d     AMA (advanced maternal age) multigravida 35+  No problems in pregnancy thus far.  Mat21 low risk    EFW 11%, AC 9% today (See FGR)  /61    Recommendations:  Detailed anatomic survey at 19-20 weeks --reassuring, anatomy completed today  Continue Low dose aspirin for preeclampsia risk reduction    Fetal growth restriction antepartum  The patients fetus has been diagnosed with FGR based on EFW < 10th percentile/AC < 10th percentile. Potential etiologies of FGR include but are not limited to normal variation of stature, placental insufficiency, chromosomal abnormalities, genetic disorders, infections, medical conditions, teratogen exposure and other etiologies. Pregnancies complicated by FGR are at increased risk of stillbirth. We discussed delivery timing and recommendations for antepartum testing. FGR, by itself, is not an indication for  delivery, although there is an increased risk of needing a  due to heart rate abnormalities. Mode of delivery will be dictated by overall clinical status and doppler abnormalities (if any).     FGR is identified on today's study. The EFW plots at the 11%, and the AC plots at the 9%.   The EFW is 1830 g.  The fetal anatomic survey was completed today, and no fetal structural abnormalities were identified of the structures imaged today.    Placental location is posterior, fundal.   AFV is normal, and the BPP score is  reassuring at 8/8.   Umbilical artery Doppler S/D ratios are normal.     Recommendations:  Start twice weekly  fetal surveillance with NST and BPP until delivery.   Start weekly UA dopplers.   Repeat fetal growth ultrasound in 3 weeks (does not need to be with MFM)  Patient counseled re: fetal movement monitoring and decreased fetal movement  Monitor closely for any signs of evolving preeclampsia.  If  testing is non-reassuring, delivery may be warranted regardless of GA.  For all pregnancies with FGR, the placenta should be sent to pathology for examination.  Mode of delivery will be dictated by overall clinical status and doppler abnormalities (if any).     General delivery timing (EFW as opposed to AC percentile should be used to determine delivery timing):  Exceptions to these recommendations may occur (e.g. gestational age <26 weeks). However, in general, delivery should be considered when:    FGR (EFW 3rd-9th or EFW > 10th with AC < 10th percentile)  Normal testing, No co-morbid conditions: 380/7- 390/7  UA Doppler S/D ratio > 95th percentile: 370/7- 376/7  Co-morbid conditions: 370/7- 376/7 (see below)    FGR (< 3rd percentile)  Normal testing, no co-morbid conditions: 370/7- 376/7  Co-morbid conditions: 360/7- 376/7 weeks (see below)    Maternal co-morbid conditions - CHTN, pregestational DM, renal disease, autoimmune disease, AMA ? 40    Earlier delivery can be considered in conjunction with MFM in the setting of FGR with preeclampsia/gestational hypertension (360/7-370/7 weeks), multifetal pregnancy, or UA Doppler abnormalities (ie EFW < 3rd percentile with elevated UA Dopplers)     FGR + Oligohydramnios: At diagnosis, if GA ? 34 weeks (if less than 34 weeks, management needs to be individualized based on other testing and entire clinical scenario)  FGR + AEDF: By 34 weeks (33 0/7-34 6/7 weeks) if there is absent diastolic flow in the umbilical artery and there has not been a previous  indication for delivery  FGR + REDF: By 32 weeks (30 0/7-32 6/7 weeks), if there is reversed diastolic flow in the umbilical artery and there has not been a previous indication for delivery        FOLLOW UP:   No further ultrasounds or visits were scheduled        34 minutes of total time spent on the encounter, which includes face to face time and non-face to face time preparing to see the patient (eg, review of tests), obtaining and/or reviewing separately obtained history, documenting clinical information in the electronic or other health record, independently interpreting results (not separately reported) and communicating results to the patient/family/caregiver, or care coordination (not separately reported).      Refugio Tate  Maternal-Fetal Medicine    Electronically Signed by Refugio Tate October 30, 2023

## 2023-12-12 ENCOUNTER — HOSPITAL ENCOUNTER (EMERGENCY)
Age: 68
Discharge: LEFT BEFORE DISCHARGE | End: 2023-12-12
Payer: MEDICARE

## 2023-12-12 VITALS
OXYGEN SATURATION: 100 % | RESPIRATION RATE: 16 BRPM | TEMPERATURE: 98.6 F | SYSTOLIC BLOOD PRESSURE: 195 MMHG | BODY MASS INDEX: 23.32 KG/M2 | HEART RATE: 76 BPM | HEIGHT: 65 IN | WEIGHT: 140 LBS | DIASTOLIC BLOOD PRESSURE: 96 MMHG

## 2023-12-12 DIAGNOSIS — Z53.21 ELOPED FROM EMERGENCY DEPARTMENT: Primary | ICD-10-CM

## 2023-12-12 PROCEDURE — 99281 EMR DPT VST MAYX REQ PHY/QHP: CPT

## 2023-12-12 ASSESSMENT — PAIN - FUNCTIONAL ASSESSMENT: PAIN_FUNCTIONAL_ASSESSMENT: 0-10

## 2023-12-12 ASSESSMENT — PAIN DESCRIPTION - ORIENTATION: ORIENTATION: LOWER

## 2023-12-12 ASSESSMENT — PAIN SCALES - GENERAL: PAINLEVEL_OUTOF10: 3

## 2023-12-12 ASSESSMENT — PAIN DESCRIPTION - LOCATION: LOCATION: ABDOMEN

## 2023-12-12 NOTE — CARE COORDINATION
SW met with the patient in [facility], discussed local addictions recovery resources to include inpatient detox, intensive outpatient, recovery support/advocacy ([facility]), and inpatient dual dx. Patient states that she is not interested in inpatient resources but would consider IOP. SW ensured that the patient understood that [facility] and [facility] offered IOP programs and she must call to schedule an appointment for assessment.      Maria De Jesus Sorenson, JOE    [address] [facility]

## 2023-12-12 NOTE — ED PROVIDER NOTES
Emergency Department Provider Note       PCP: Maximo Erickson   Age: 70 y.o. Sex: female     [address] [facility]    1. Eloped from emergency department  Z53.21           Medical Decision Making     Complexity of Problems Addressed:  1 or more acute illnesses that pose a threat to life or bodily function. 1 or more chronic illnesses that pose a threat to life or bodily function. Data Reviewed and Analyzed:  I independently ordered and reviewed each unique test.  I reviewed external records: provider visit note from PCP. The patients assessment required an independent historian: . The reason they were needed is important historical information not provided by the patient. Discussion of management or test interpretation. As in HPI. Patient's pleasant and well-appearing, creatinine distress but denies abdominal pain or pain complaint. Tolerating oral intake. States that she is a daily drinker and would like assistance and alcohol cessation. She is declining my recommended medical workup and denies earlier stated abdominal pain. Abdomen soft nontender, determination signs, she not tachycardic, skin is warm and dry, she has no tremor, she is lucid, conversant shows no sign of acute intoxication nor alcohol withdrawal or other emergent process. I have offered patient medical management which she has declined. Have offered her resources  for inpatient as well as outpatient rehab facilities and she states \"I have been there and done that. \"  She is declining all assistance besides requested prescription for Ativan. States that Yoni Guidry has been helpful for me in the past\" \"so has Valium. \"  Advised that I do not feel that writing her prescription for Ativan or Valium from the ED are appropriate but did offer additional therapeutic measures and medical management in the apartment this time. She is declining these.   She was agreeable to speaking to our

## 2023-12-12 NOTE — ED TRIAGE NOTES
Patient advises she is seeking help with alcohol abuse states she has been drinking the past 8 days straight and last drink about 1 hour ago. Patient advises that she drinks wine and is a \"light weight\". Patient also with lower abdominal pain present. Also advises some anxiety.

## 2023-12-25 ENCOUNTER — APPOINTMENT (OUTPATIENT)
Dept: CT IMAGING | Age: 68
DRG: 641 | End: 2023-12-25
Payer: MEDICARE

## 2023-12-25 ENCOUNTER — HOSPITAL ENCOUNTER (INPATIENT)
Age: 68
LOS: 3 days | Discharge: HOME | DRG: 641 | End: 2023-12-28
Attending: FAMILY MEDICINE | Admitting: FAMILY MEDICINE
Payer: MEDICARE

## 2023-12-25 DIAGNOSIS — E87.1 HYPONATREMIA: Primary | ICD-10-CM

## 2023-12-25 DIAGNOSIS — F10.931 ALCOHOL WITHDRAWAL SYNDROME, WITH DELIRIUM (HCC): ICD-10-CM

## 2023-12-25 LAB
ABO + RH BLD: NORMAL
ALBUMIN SERPL-MCNC: 4.3 G/DL (ref 3.2–4.6)
ALBUMIN/GLOB SERPL: 1.1 (ref 0.4–1.6)
ALP SERPL-CCNC: 66 U/L (ref 50–136)
ALT SERPL-CCNC: 197 U/L (ref 12–65)
ANION GAP SERPL CALC-SCNC: 10 MMOL/L (ref 2–11)
ANION GAP SERPL CALC-SCNC: 8 MMOL/L (ref 2–11)
APPEARANCE UR: CLEAR
AST SERPL-CCNC: 233 U/L (ref 15–37)
BACTERIA URNS QL MICRO: ABNORMAL /HPF
BASOPHILS # BLD: 0 K/UL (ref 0–0.2)
BASOPHILS NFR BLD: 0 % (ref 0–2)
BILIRUB SERPL-MCNC: 1.4 MG/DL (ref 0.2–1.1)
BILIRUB UR QL: NEGATIVE
BLOOD GROUP ANTIBODIES SERPL: NORMAL
BUN SERPL-MCNC: 10 MG/DL (ref 8–23)
BUN SERPL-MCNC: 8 MG/DL (ref 8–23)
CALCIUM SERPL-MCNC: 9 MG/DL (ref 8.3–10.4)
CALCIUM SERPL-MCNC: 9.5 MG/DL (ref 8.3–10.4)
CASTS URNS QL MICRO: ABNORMAL /LPF
CHLORIDE SERPL-SCNC: 84 MMOL/L (ref 103–113)
CHLORIDE SERPL-SCNC: 94 MMOL/L (ref 103–113)
CO2 SERPL-SCNC: 28 MMOL/L (ref 21–32)
CO2 SERPL-SCNC: 28 MMOL/L (ref 21–32)
COLOR UR: ABNORMAL
CREAT SERPL-MCNC: 0.88 MG/DL (ref 0.6–1)
CREAT SERPL-MCNC: 1.05 MG/DL (ref 0.6–1)
DIFFERENTIAL METHOD BLD: ABNORMAL
EOSINOPHIL # BLD: 0 K/UL (ref 0–0.8)
EOSINOPHIL NFR BLD: 0 % (ref 0.5–7.8)
EPI CELLS #/AREA URNS HPF: ABNORMAL /HPF
ERYTHROCYTE [DISTWIDTH] IN BLOOD BY AUTOMATED COUNT: 14 % (ref 11.9–14.6)
GLOBULIN SER CALC-MCNC: 3.8 G/DL (ref 2.8–4.5)
GLUCOSE SERPL-MCNC: 121 MG/DL (ref 65–100)
GLUCOSE SERPL-MCNC: 125 MG/DL (ref 65–100)
GLUCOSE UR STRIP.AUTO-MCNC: NEGATIVE MG/DL
HCT VFR BLD AUTO: 35.5 % (ref 35.8–46.3)
HGB BLD-MCNC: 12.6 G/DL (ref 11.7–15.4)
HGB UR QL STRIP: NEGATIVE
IMM GRANULOCYTES # BLD AUTO: 0.1 K/UL (ref 0–0.5)
IMM GRANULOCYTES NFR BLD AUTO: 1 % (ref 0–5)
INR PPP: 1
KETONES UR QL STRIP.AUTO: 15 MG/DL
LACTATE SERPL-SCNC: 1.5 MMOL/L (ref 0.4–2)
LEUKOCYTE ESTERASE UR QL STRIP.AUTO: NEGATIVE
LIPASE SERPL-CCNC: 152 U/L (ref 73–393)
LYMPHOCYTES # BLD: 0.9 K/UL (ref 0.5–4.6)
LYMPHOCYTES NFR BLD: 11 % (ref 13–44)
MCH RBC QN AUTO: 34 PG (ref 26.1–32.9)
MCHC RBC AUTO-ENTMCNC: 35.5 G/DL (ref 31.4–35)
MCV RBC AUTO: 95.7 FL (ref 82–102)
MONOCYTES # BLD: 1 K/UL (ref 0.1–1.3)
MONOCYTES NFR BLD: 11 % (ref 4–12)
NEUTS SEG # BLD: 6.5 K/UL (ref 1.7–8.2)
NEUTS SEG NFR BLD: 77 % (ref 43–78)
NITRITE UR QL STRIP.AUTO: NEGATIVE
NRBC # BLD: 0 K/UL (ref 0–0.2)
OSMOLALITY UR: 156 MOSM/KG H2O (ref 50–1400)
PH UR STRIP: 7.5 (ref 5–9)
PLATELET # BLD AUTO: 156 K/UL (ref 150–450)
PMV BLD AUTO: 9.2 FL (ref 9.4–12.3)
POTASSIUM SERPL-SCNC: 4.3 MMOL/L (ref 3.5–5.1)
POTASSIUM SERPL-SCNC: 4.4 MMOL/L (ref 3.5–5.1)
PROT SERPL-MCNC: 8.1 G/DL (ref 6.3–8.2)
PROT UR STRIP-MCNC: 100 MG/DL
PROTHROMBIN TIME: 12.9 SEC (ref 11.3–14.9)
RBC # BLD AUTO: 3.71 M/UL (ref 4.05–5.2)
RBC #/AREA URNS HPF: ABNORMAL /HPF
SODIUM SERPL-SCNC: 122 MMOL/L (ref 136–146)
SODIUM SERPL-SCNC: 130 MMOL/L (ref 136–146)
SODIUM UR-SCNC: 23 MMOL/L
SP GR UR REFRACTOMETRY: 1.01 (ref 1–1.02)
SPECIMEN EXP DATE BLD: NORMAL
UROBILINOGEN UR QL STRIP.AUTO: 1 EU/DL (ref 0.2–1)
WBC # BLD AUTO: 8.4 K/UL (ref 4.3–11.1)
WBC URNS QL MICRO: ABNORMAL /HPF

## 2023-12-25 PROCEDURE — 83935 ASSAY OF URINE OSMOLALITY: CPT

## 2023-12-25 PROCEDURE — 86900 BLOOD TYPING SEROLOGIC ABO: CPT

## 2023-12-25 PROCEDURE — 2000000000 HC ICU R&B

## 2023-12-25 PROCEDURE — 6370000000 HC RX 637 (ALT 250 FOR IP): Performed by: FAMILY MEDICINE

## 2023-12-25 PROCEDURE — 36415 COLL VENOUS BLD VENIPUNCTURE: CPT

## 2023-12-25 PROCEDURE — 84300 ASSAY OF URINE SODIUM: CPT

## 2023-12-25 PROCEDURE — 83690 ASSAY OF LIPASE: CPT

## 2023-12-25 PROCEDURE — 2580000003 HC RX 258: Performed by: FAMILY MEDICINE

## 2023-12-25 PROCEDURE — A4216 STERILE WATER/SALINE, 10 ML: HCPCS | Performed by: STUDENT IN AN ORGANIZED HEALTH CARE EDUCATION/TRAINING PROGRAM

## 2023-12-25 PROCEDURE — 83605 ASSAY OF LACTIC ACID: CPT

## 2023-12-25 PROCEDURE — 86901 BLOOD TYPING SEROLOGIC RH(D): CPT

## 2023-12-25 PROCEDURE — 2500000003 HC RX 250 WO HCPCS: Performed by: STUDENT IN AN ORGANIZED HEALTH CARE EDUCATION/TRAINING PROGRAM

## 2023-12-25 PROCEDURE — C9113 INJ PANTOPRAZOLE SODIUM, VIA: HCPCS | Performed by: STUDENT IN AN ORGANIZED HEALTH CARE EDUCATION/TRAINING PROGRAM

## 2023-12-25 PROCEDURE — 6360000002 HC RX W HCPCS: Performed by: STUDENT IN AN ORGANIZED HEALTH CARE EDUCATION/TRAINING PROGRAM

## 2023-12-25 PROCEDURE — 6360000002 HC RX W HCPCS: Performed by: FAMILY MEDICINE

## 2023-12-25 PROCEDURE — 85610 PROTHROMBIN TIME: CPT

## 2023-12-25 PROCEDURE — A4216 STERILE WATER/SALINE, 10 ML: HCPCS | Performed by: FAMILY MEDICINE

## 2023-12-25 PROCEDURE — 80053 COMPREHEN METABOLIC PANEL: CPT

## 2023-12-25 PROCEDURE — 86850 RBC ANTIBODY SCREEN: CPT

## 2023-12-25 PROCEDURE — 2580000003 HC RX 258: Performed by: STUDENT IN AN ORGANIZED HEALTH CARE EDUCATION/TRAINING PROGRAM

## 2023-12-25 PROCEDURE — 96374 THER/PROPH/DIAG INJ IV PUSH: CPT

## 2023-12-25 PROCEDURE — 85025 COMPLETE CBC W/AUTO DIFF WBC: CPT

## 2023-12-25 PROCEDURE — 6360000004 HC RX CONTRAST MEDICATION: Performed by: STUDENT IN AN ORGANIZED HEALTH CARE EDUCATION/TRAINING PROGRAM

## 2023-12-25 PROCEDURE — 81001 URINALYSIS AUTO W/SCOPE: CPT

## 2023-12-25 PROCEDURE — 74177 CT ABD & PELVIS W/CONTRAST: CPT

## 2023-12-25 PROCEDURE — 96361 HYDRATE IV INFUSION ADD-ON: CPT

## 2023-12-25 PROCEDURE — 96375 TX/PRO/DX INJ NEW DRUG ADDON: CPT

## 2023-12-25 PROCEDURE — 99285 EMERGENCY DEPT VISIT HI MDM: CPT

## 2023-12-25 PROCEDURE — 83930 ASSAY OF BLOOD OSMOLALITY: CPT

## 2023-12-25 RX ORDER — 0.9 % SODIUM CHLORIDE 0.9 %
1000 INTRAVENOUS SOLUTION INTRAVENOUS
Status: COMPLETED | OUTPATIENT
Start: 2023-12-25 | End: 2023-12-25

## 2023-12-25 RX ORDER — ESZOPICLONE 3 MG/1
3 TABLET, FILM COATED ORAL NIGHTLY
COMMUNITY

## 2023-12-25 RX ORDER — ONDANSETRON 2 MG/ML
4 INJECTION INTRAMUSCULAR; INTRAVENOUS EVERY 6 HOURS PRN
Status: DISCONTINUED | OUTPATIENT
Start: 2023-12-25 | End: 2023-12-28 | Stop reason: HOSPADM

## 2023-12-25 RX ORDER — CHLORDIAZEPOXIDE HYDROCHLORIDE 25 MG/1
25 CAPSULE, GELATIN COATED ORAL 4 TIMES DAILY
Status: DISCONTINUED | OUTPATIENT
Start: 2023-12-25 | End: 2023-12-25 | Stop reason: SDUPTHER

## 2023-12-25 RX ORDER — ACETAMINOPHEN 325 MG/1
650 TABLET ORAL EVERY 6 HOURS PRN
Status: DISCONTINUED | OUTPATIENT
Start: 2023-12-25 | End: 2023-12-28 | Stop reason: HOSPADM

## 2023-12-25 RX ORDER — LABETALOL HYDROCHLORIDE 5 MG/ML
10 INJECTION, SOLUTION INTRAVENOUS EVERY 4 HOURS PRN
Status: DISCONTINUED | OUTPATIENT
Start: 2023-12-25 | End: 2023-12-28

## 2023-12-25 RX ORDER — SODIUM CHLORIDE 9 MG/ML
INJECTION, SOLUTION INTRAVENOUS CONTINUOUS
Status: DISCONTINUED | OUTPATIENT
Start: 2023-12-25 | End: 2023-12-26

## 2023-12-25 RX ORDER — CLONIDINE HYDROCHLORIDE 0.1 MG/1
0.1 TABLET ORAL 2 TIMES DAILY PRN
Status: DISCONTINUED | OUTPATIENT
Start: 2023-12-25 | End: 2023-12-28

## 2023-12-25 RX ORDER — PAROXETINE HYDROCHLORIDE 20 MG/1
20 TABLET, FILM COATED ORAL EVERY MORNING
COMMUNITY

## 2023-12-25 RX ORDER — SODIUM CHLORIDE 9 MG/ML
INJECTION, SOLUTION INTRAVENOUS PRN
Status: DISCONTINUED | OUTPATIENT
Start: 2023-12-25 | End: 2023-12-28 | Stop reason: HOSPADM

## 2023-12-25 RX ORDER — PROGESTERONE 200 MG/1
200 CAPSULE ORAL NIGHTLY
Status: DISCONTINUED | OUTPATIENT
Start: 2023-12-25 | End: 2023-12-28 | Stop reason: HOSPADM

## 2023-12-25 RX ORDER — ACETAMINOPHEN 650 MG/1
650 SUPPOSITORY RECTAL EVERY 6 HOURS PRN
Status: DISCONTINUED | OUTPATIENT
Start: 2023-12-25 | End: 2023-12-28 | Stop reason: HOSPADM

## 2023-12-25 RX ORDER — CHLORDIAZEPOXIDE HYDROCHLORIDE 25 MG/1
25 CAPSULE, GELATIN COATED ORAL 4 TIMES DAILY
Status: DISCONTINUED | OUTPATIENT
Start: 2023-12-25 | End: 2023-12-28

## 2023-12-25 RX ORDER — ONDANSETRON 4 MG/1
4 TABLET, ORALLY DISINTEGRATING ORAL EVERY 8 HOURS PRN
Status: DISCONTINUED | OUTPATIENT
Start: 2023-12-25 | End: 2023-12-28 | Stop reason: HOSPADM

## 2023-12-25 RX ORDER — SODIUM CHLORIDE 0.9 % (FLUSH) 0.9 %
5-40 SYRINGE (ML) INJECTION EVERY 12 HOURS SCHEDULED
Status: DISCONTINUED | OUTPATIENT
Start: 2023-12-25 | End: 2023-12-28 | Stop reason: HOSPADM

## 2023-12-25 RX ORDER — POLYETHYLENE GLYCOL 3350 17 G/17G
17 POWDER, FOR SOLUTION ORAL DAILY PRN
Status: DISCONTINUED | OUTPATIENT
Start: 2023-12-25 | End: 2023-12-28 | Stop reason: HOSPADM

## 2023-12-25 RX ORDER — FOLIC ACID 1 MG/1
1 TABLET ORAL DAILY
Status: DISCONTINUED | OUTPATIENT
Start: 2023-12-26 | End: 2023-12-28 | Stop reason: HOSPADM

## 2023-12-25 RX ORDER — LANOLIN ALCOHOL/MO/W.PET/CERES
100 CREAM (GRAM) TOPICAL DAILY
Status: DISCONTINUED | OUTPATIENT
Start: 2023-12-26 | End: 2023-12-28 | Stop reason: HOSPADM

## 2023-12-25 RX ORDER — SODIUM CHLORIDE 0.9 % (FLUSH) 0.9 %
5-40 SYRINGE (ML) INJECTION PRN
Status: DISCONTINUED | OUTPATIENT
Start: 2023-12-25 | End: 2023-12-28 | Stop reason: HOSPADM

## 2023-12-25 RX ORDER — PAROXETINE HYDROCHLORIDE 20 MG/1
20 TABLET, FILM COATED ORAL DAILY
Status: DISCONTINUED | OUTPATIENT
Start: 2023-12-26 | End: 2023-12-28 | Stop reason: HOSPADM

## 2023-12-25 RX ADMIN — CHLORDIAZEPOXIDE HYDROCHLORIDE 25 MG: 25 CAPSULE ORAL at 21:23

## 2023-12-25 RX ADMIN — SODIUM CHLORIDE: 9 INJECTION, SOLUTION INTRAVENOUS at 19:34

## 2023-12-25 RX ADMIN — SODIUM CHLORIDE 1000 ML: 9 INJECTION, SOLUTION INTRAVENOUS at 14:59

## 2023-12-25 RX ADMIN — FAMOTIDINE 20 MG: 10 INJECTION, SOLUTION INTRAVENOUS at 14:59

## 2023-12-25 RX ADMIN — SODIUM CHLORIDE, PRESERVATIVE FREE 10 ML: 5 INJECTION INTRAVENOUS at 19:32

## 2023-12-25 RX ADMIN — SODIUM CHLORIDE 1 MG: 9 INJECTION INTRAMUSCULAR; INTRAVENOUS; SUBCUTANEOUS at 20:04

## 2023-12-25 RX ADMIN — IOPAMIDOL 100 ML: 755 INJECTION, SOLUTION INTRAVENOUS at 15:31

## 2023-12-25 RX ADMIN — PANTOPRAZOLE SODIUM 40 MG: 40 INJECTION, POWDER, FOR SOLUTION INTRAVENOUS at 15:00

## 2023-12-25 RX ADMIN — PROGESTERONE 200 MG: 200 CAPSULE ORAL at 21:31

## 2023-12-25 NOTE — ED TRIAGE NOTES
Patient presents with RLQ abdominal pain since yesterday with nausea/vomiting and \"foggy brain\". Patient stated she feels unsteady on her feet with weakness and fatigue \"for awhile\". Denies any dizziness.

## 2023-12-25 NOTE — ED PROVIDER NOTES
Emergency Department Provider Signout / Continuation of Care Note         DISPOSITION Decision To Admit 12/25/2023 05:25:54 PM       ICD-10-CM    1. Hyponatremia  E87.1           The patient's care was signed out to me at shift change. Final Plan      I received this patient in signout. Her CT returned without any acute findings. Will proceed with admission to hospitalist service for hyponatremia, melanotic stools. Patient understanding and agreement with plan.      Zach Devine, [place]  12/25/23 [id number]

## 2023-12-25 NOTE — ED PROVIDER NOTES
Emergency Department Provider Note       PCP: Aaron Ch MD   Age: 70 y.o. Sex: female     [address]    1. Hyponatremia  E87.1           Medical Decision Making     Complexity of Problems Addressed:  1 or more acute illnesses that pose a threat to life or bodily function. Data Reviewed and Analyzed:  I independently ordered and reviewed each unique test.  I reviewed external records: ED visit note from an outside group. I reviewed external records: previous lab results from outside ED. The patients assessment required an independent historian: Patient . The reason they were needed is important historical information not provided by the patient. Discussion of management or test interpretation. 4:28 PM  This is a 69-year-old female patient who presented for evaluation of vomiting and abdominal pain as well as some reported weakness and foggy headedness. History of alcohol abuse and GI bleeding. She was concerned she was having another GI bleed today. Her hemoglobin is stable. Her guaiac result was negative. She was found to have hyponatremia of 122. She does have a CT scan of her abdomen pending at this point. Advised her that with the sodium level alone, she will need to be admitted for management. 4:28 PM EST The patient's care will be transitioned to [facility]. At this time, the plan is await CT scan and admit. Please see that provider's documentation for further information. Patient history, ROS, physical exam, labs, radiological workup and all pertinent findings were discussed with attending Dr. Claudio Caicedo.       Risk of Complications and/or Morbidity of Patient Management:  Discussion with external consultants. Chronic medical problems impacting care include alcohol abuse, prior GI bleed. Shared medical decision making was utilized in creating the patients health plan today.     ED Course as of 12/25/23 1628   Mon Dec 25, 2023   1454 2:54

## 2023-12-25 NOTE — H&P
Hospitalist History and Physical   Admit Date:  2023  2:00 PM   Name:  Michelle Grimm   Age:  70 y.o. Sex:  female  :  1955   MRN:  [id number]   Room:  ER07/    Presenting/Chief Complaint: Abdominal Pain and Emesis     Reason(s) for Admission: Hyponatremia [E87.1]     History of Present Illness:   Michelle Grimm is a 70 y.o. female with medical history of Anxiety, HTN, H/o bleeding ulcer, Alcoholism and ? Thryoid disorder and Insomnia. History from pt and also  with whom I spoke on phone    Pt was seen on  - pt was requesting help for detox- note says pt and family eloped-left without informing the staff. Pt says that she told ER physician that she had black tarry stool on - had a rectal examination- was told that she had blood in her stool.  says she dint have anything like that. Pt says she does binge drinking - drinks for 7-10 days and none for a month- does go into withdrawal.  Pt says - they recently movement from [place] to [place] about 3 months ago. Says that she always has black stools when she drinks but noticed black tarry stool on  and again yesterday - alos with multiple episodes of vomiting. Noticed abdominal pain- mild, headache mild, felt ? Confused - felt all the symptoms were similar to her previous bleeding ulcer and decided to come to ER of further evaluation. Abdominal pain , dull , upper abdomen, no aggravating or relieving factors. Pt awake ,alert in the ER , unable to says why she hadnt seen a physician when she always had black stools-and also  saying that there was no issue about rectal bleeding on - and no rectal examination was done on .  did says pt has some memory problems when she drinks and also at times some gait problems.  does say that he does believe pt when she says she had black stools but he hasn't gone to restroom and look at it.  also said that pt at times tries to take

## 2023-12-26 LAB
ANION GAP SERPL CALC-SCNC: 7 MMOL/L (ref 2–11)
ANION GAP SERPL CALC-SCNC: 8 MMOL/L (ref 2–11)
BASOPHILS # BLD: 0 K/UL (ref 0–0.2)
BASOPHILS NFR BLD: 1 % (ref 0–2)
BUN SERPL-MCNC: 9 MG/DL (ref 8–23)
BUN SERPL-MCNC: 9 MG/DL (ref 8–23)
CALCIUM SERPL-MCNC: 8.8 MG/DL (ref 8.3–10.4)
CALCIUM SERPL-MCNC: 8.8 MG/DL (ref 8.3–10.4)
CHLORIDE SERPL-SCNC: 100 MMOL/L (ref 103–113)
CHLORIDE SERPL-SCNC: 98 MMOL/L (ref 103–113)
CO2 SERPL-SCNC: 25 MMOL/L (ref 21–32)
CO2 SERPL-SCNC: 28 MMOL/L (ref 21–32)
CREAT SERPL-MCNC: 0.72 MG/DL (ref 0.6–1)
CREAT SERPL-MCNC: 0.77 MG/DL (ref 0.6–1)
DIFFERENTIAL METHOD BLD: ABNORMAL
EOSINOPHIL # BLD: 0.1 K/UL (ref 0–0.8)
EOSINOPHIL NFR BLD: 1 % (ref 0.5–7.8)
ERYTHROCYTE [DISTWIDTH] IN BLOOD BY AUTOMATED COUNT: 14.4 % (ref 11.9–14.6)
GLUCOSE SERPL-MCNC: 104 MG/DL (ref 65–100)
GLUCOSE SERPL-MCNC: 106 MG/DL (ref 65–100)
HCT VFR BLD AUTO: 36.1 % (ref 35.8–46.3)
HGB BLD-MCNC: 12.6 G/DL (ref 11.7–15.4)
IMM GRANULOCYTES # BLD AUTO: 0 K/UL (ref 0–0.5)
IMM GRANULOCYTES NFR BLD AUTO: 0 % (ref 0–5)
LYMPHOCYTES # BLD: 1.2 K/UL (ref 0.5–4.6)
LYMPHOCYTES NFR BLD: 21 % (ref 13–44)
MCH RBC QN AUTO: 34.1 PG (ref 26.1–32.9)
MCHC RBC AUTO-ENTMCNC: 34.9 G/DL (ref 31.4–35)
MCV RBC AUTO: 97.6 FL (ref 82–102)
MONOCYTES # BLD: 0.7 K/UL (ref 0.1–1.3)
MONOCYTES NFR BLD: 12 % (ref 4–12)
NEUTS SEG # BLD: 3.8 K/UL (ref 1.7–8.2)
NEUTS SEG NFR BLD: 65 % (ref 43–78)
NRBC # BLD: 0 K/UL (ref 0–0.2)
OSMOLALITY SERPL: 272 MOSM/KG H2O (ref 280–301)
PLATELET # BLD AUTO: 145 K/UL (ref 150–450)
PMV BLD AUTO: 9.5 FL (ref 9.4–12.3)
POTASSIUM SERPL-SCNC: 3.9 MMOL/L (ref 3.5–5.1)
POTASSIUM SERPL-SCNC: 4 MMOL/L (ref 3.5–5.1)
RBC # BLD AUTO: 3.7 M/UL (ref 4.05–5.2)
SODIUM SERPL-SCNC: 133 MMOL/L (ref 136–146)
SODIUM SERPL-SCNC: 133 MMOL/L (ref 136–146)
TSH W FREE THYROID IF ABNORMAL: 0.83 UIU/ML (ref 0.36–3.74)
WBC # BLD AUTO: 5.9 K/UL (ref 4.3–11.1)

## 2023-12-26 PROCEDURE — 2580000003 HC RX 258: Performed by: FAMILY MEDICINE

## 2023-12-26 PROCEDURE — 80048 BASIC METABOLIC PNL TOTAL CA: CPT

## 2023-12-26 PROCEDURE — A4216 STERILE WATER/SALINE, 10 ML: HCPCS | Performed by: FAMILY MEDICINE

## 2023-12-26 PROCEDURE — 85025 COMPLETE CBC W/AUTO DIFF WBC: CPT

## 2023-12-26 PROCEDURE — 6360000002 HC RX W HCPCS: Performed by: FAMILY MEDICINE

## 2023-12-26 PROCEDURE — 6370000000 HC RX 637 (ALT 250 FOR IP): Performed by: FAMILY MEDICINE

## 2023-12-26 PROCEDURE — C9113 INJ PANTOPRAZOLE SODIUM, VIA: HCPCS | Performed by: FAMILY MEDICINE

## 2023-12-26 PROCEDURE — 1100000000 HC RM PRIVATE

## 2023-12-26 PROCEDURE — 36415 COLL VENOUS BLD VENIPUNCTURE: CPT

## 2023-12-26 PROCEDURE — 84443 ASSAY THYROID STIM HORMONE: CPT

## 2023-12-26 RX ORDER — LEVOTHYROXINE SODIUM 0.05 MG/1
25 TABLET ORAL DAILY
Status: DISCONTINUED | OUTPATIENT
Start: 2023-12-26 | End: 2023-12-28 | Stop reason: HOSPADM

## 2023-12-26 RX ADMIN — CHLORDIAZEPOXIDE HYDROCHLORIDE 25 MG: 25 CAPSULE ORAL at 20:44

## 2023-12-26 RX ADMIN — SODIUM CHLORIDE 1 MG: 9 INJECTION INTRAMUSCULAR; INTRAVENOUS; SUBCUTANEOUS at 00:25

## 2023-12-26 RX ADMIN — Medication 100 MG: at 09:11

## 2023-12-26 RX ADMIN — FOLIC ACID 1 MG: 1 TABLET ORAL at 09:13

## 2023-12-26 RX ADMIN — CHLORDIAZEPOXIDE HYDROCHLORIDE 25 MG: 25 CAPSULE ORAL at 17:53

## 2023-12-26 RX ADMIN — PAROXETINE HYDROCHLORIDE 20 MG: 20 TABLET, FILM COATED ORAL at 09:11

## 2023-12-26 RX ADMIN — SODIUM CHLORIDE 1 MG: 9 INJECTION INTRAMUSCULAR; INTRAVENOUS; SUBCUTANEOUS at 05:53

## 2023-12-26 RX ADMIN — PANTOPRAZOLE SODIUM 40 MG: 40 INJECTION, POWDER, FOR SOLUTION INTRAVENOUS at 02:22

## 2023-12-26 RX ADMIN — PANTOPRAZOLE SODIUM 40 MG: 40 INJECTION, POWDER, FOR SOLUTION INTRAVENOUS at 17:53

## 2023-12-26 RX ADMIN — CHLORDIAZEPOXIDE HYDROCHLORIDE 25 MG: 25 CAPSULE ORAL at 14:02

## 2023-12-26 RX ADMIN — LEVOTHYROXINE SODIUM 25 MCG: 0.05 TABLET ORAL at 17:53

## 2023-12-26 RX ADMIN — SODIUM CHLORIDE 2 MG: 9 INJECTION INTRAMUSCULAR; INTRAVENOUS; SUBCUTANEOUS at 21:06

## 2023-12-26 RX ADMIN — SODIUM CHLORIDE, PRESERVATIVE FREE 10 ML: 5 INJECTION INTRAVENOUS at 21:07

## 2023-12-26 RX ADMIN — SODIUM CHLORIDE: 9 INJECTION, SOLUTION INTRAVENOUS at 06:02

## 2023-12-26 RX ADMIN — SODIUM CHLORIDE 1 MG: 9 INJECTION INTRAMUSCULAR; INTRAVENOUS; SUBCUTANEOUS at 11:31

## 2023-12-26 RX ADMIN — CHLORDIAZEPOXIDE HYDROCHLORIDE 25 MG: 25 CAPSULE ORAL at 09:11

## 2023-12-26 RX ADMIN — SODIUM CHLORIDE, PRESERVATIVE FREE 10 ML: 5 INJECTION INTRAVENOUS at 09:12

## 2023-12-26 NOTE — PLAN OF CARE
Problem: Discharge Planning  Goal: Discharge to home or other facility with appropriate resources  12/26/2023 1117 by Lois Gupta RN  Outcome: Progressing  Flowsheets (Taken 12/26/2023 0800)  Discharge to home or other facility with appropriate resources: Identify barriers to discharge with patient and caregiver  12/25/2023 2233 by Cinthia Finley RN  Outcome: Progressing  Flowsheets (Taken 12/25/2023 1930)  Discharge to home or other facility with appropriate resources:   Identify barriers to discharge with patient and caregiver   Identify discharge learning needs (meds, wound care, etc)     Problem: Pain  Goal: Verbalizes/displays adequate comfort level or baseline comfort level  12/26/2023 1117 by Lois Gupta RN  Outcome: Progressing  12/25/2023 2233 by Cinthia Finley RN  Outcome: Progressing  Flowsheets (Taken 12/25/2023 1920)  Verbalizes/displays adequate comfort level or baseline comfort level:   Encourage patient to monitor pain and request assistance   Assess pain using appropriate pain scale   Administer analgesics based on type and severity of pain and evaluate response   Implement non-pharmacological measures as appropriate and evaluate response   Consider cultural and social influences on pain and pain management   Notify Licensed Independent Practitioner if interventions unsuccessful or patient reports new pain     Problem: Safety - Adult  Goal: Free from fall injury  Outcome: Progressing  Flowsheets (Taken 12/26/2023 0800)  Free From Fall Injury: Instruct family/caregiver on patient safety

## 2023-12-26 NOTE — PLAN OF CARE
Problem: Discharge Planning  Goal: Discharge to home or other facility with appropriate resources  Outcome: Progressing  Flowsheets (Taken 12/25/2023 1930)  Discharge to home or other facility with appropriate resources:   Identify barriers to discharge with patient and caregiver   Identify discharge learning needs (meds, wound care, etc)     Problem: Pain  Goal: Verbalizes/displays adequate comfort level or baseline comfort level  Outcome: Progressing  Flowsheets (Taken 12/25/2023 1920)  Verbalizes/displays adequate comfort level or baseline comfort level:   Encourage patient to monitor pain and request assistance   Assess pain using appropriate pain scale   Administer analgesics based on type and severity of pain and evaluate response   Implement non-pharmacological measures as appropriate and evaluate response   Consider cultural and social influences on pain and pain management   Notify Licensed Independent Practitioner if interventions unsuccessful or patient reports new pain

## 2023-12-27 LAB
ANION GAP SERPL CALC-SCNC: 6 MMOL/L (ref 2–11)
BASOPHILS # BLD: 0.1 K/UL (ref 0–0.2)
BASOPHILS NFR BLD: 1 % (ref 0–2)
BUN SERPL-MCNC: 7 MG/DL (ref 8–23)
CALCIUM SERPL-MCNC: 9.6 MG/DL (ref 8.3–10.4)
CHLORIDE SERPL-SCNC: 103 MMOL/L (ref 103–113)
CO2 SERPL-SCNC: 28 MMOL/L (ref 21–32)
CREAT SERPL-MCNC: 0.68 MG/DL (ref 0.6–1)
DIFFERENTIAL METHOD BLD: ABNORMAL
EOSINOPHIL # BLD: 0.1 K/UL (ref 0–0.8)
EOSINOPHIL NFR BLD: 2 % (ref 0.5–7.8)
ERYTHROCYTE [DISTWIDTH] IN BLOOD BY AUTOMATED COUNT: 14.5 % (ref 11.9–14.6)
GLUCOSE SERPL-MCNC: 104 MG/DL (ref 65–100)
HCT VFR BLD AUTO: 35.8 % (ref 35.8–46.3)
HGB BLD-MCNC: 12.3 G/DL (ref 11.7–15.4)
IMM GRANULOCYTES # BLD AUTO: 0 K/UL (ref 0–0.5)
IMM GRANULOCYTES NFR BLD AUTO: 1 % (ref 0–5)
LYMPHOCYTES # BLD: 1.4 K/UL (ref 0.5–4.6)
LYMPHOCYTES NFR BLD: 24 % (ref 13–44)
MCH RBC QN AUTO: 34.1 PG (ref 26.1–32.9)
MCHC RBC AUTO-ENTMCNC: 34.4 G/DL (ref 31.4–35)
MCV RBC AUTO: 99.2 FL (ref 82–102)
MONOCYTES # BLD: 0.8 K/UL (ref 0.1–1.3)
MONOCYTES NFR BLD: 14 % (ref 4–12)
NEUTS SEG # BLD: 3.5 K/UL (ref 1.7–8.2)
NEUTS SEG NFR BLD: 59 % (ref 43–78)
NRBC # BLD: 0 K/UL (ref 0–0.2)
PLATELET # BLD AUTO: 153 K/UL (ref 150–450)
PMV BLD AUTO: 9.6 FL (ref 9.4–12.3)
POTASSIUM SERPL-SCNC: 4 MMOL/L (ref 3.5–5.1)
RBC # BLD AUTO: 3.61 M/UL (ref 4.05–5.2)
SODIUM SERPL-SCNC: 137 MMOL/L (ref 136–146)
WBC # BLD AUTO: 5.9 K/UL (ref 4.3–11.1)

## 2023-12-27 PROCEDURE — 1100000000 HC RM PRIVATE

## 2023-12-27 PROCEDURE — 6370000000 HC RX 637 (ALT 250 FOR IP): Performed by: FAMILY MEDICINE

## 2023-12-27 PROCEDURE — C9113 INJ PANTOPRAZOLE SODIUM, VIA: HCPCS | Performed by: FAMILY MEDICINE

## 2023-12-27 PROCEDURE — 6360000002 HC RX W HCPCS: Performed by: FAMILY MEDICINE

## 2023-12-27 PROCEDURE — 2580000003 HC RX 258: Performed by: FAMILY MEDICINE

## 2023-12-27 PROCEDURE — A4216 STERILE WATER/SALINE, 10 ML: HCPCS | Performed by: FAMILY MEDICINE

## 2023-12-27 PROCEDURE — 80048 BASIC METABOLIC PNL TOTAL CA: CPT

## 2023-12-27 PROCEDURE — 36415 COLL VENOUS BLD VENIPUNCTURE: CPT

## 2023-12-27 PROCEDURE — 85025 COMPLETE CBC W/AUTO DIFF WBC: CPT

## 2023-12-27 RX ORDER — HYDROCODONE BITARTRATE AND HOMATROPINE METHYLBROMIDE ORAL SOLUTION 5; 1.5 MG/5ML; MG/5ML
5 LIQUID ORAL EVERY 4 HOURS PRN
Status: DISCONTINUED | OUTPATIENT
Start: 2023-12-27 | End: 2023-12-28

## 2023-12-27 RX ORDER — LEVALBUTEROL INHALATION SOLUTION 0.63 MG/3ML
0.63 SOLUTION RESPIRATORY (INHALATION)
Status: DISCONTINUED | OUTPATIENT
Start: 2023-12-27 | End: 2023-12-27

## 2023-12-27 RX ADMIN — CHLORDIAZEPOXIDE HYDROCHLORIDE 25 MG: 25 CAPSULE ORAL at 17:00

## 2023-12-27 RX ADMIN — SODIUM CHLORIDE, PRESERVATIVE FREE 10 ML: 5 INJECTION INTRAVENOUS at 08:54

## 2023-12-27 RX ADMIN — Medication 100 MG: at 08:53

## 2023-12-27 RX ADMIN — PANTOPRAZOLE SODIUM 40 MG: 40 INJECTION, POWDER, FOR SOLUTION INTRAVENOUS at 02:57

## 2023-12-27 RX ADMIN — ONDANSETRON 4 MG: 2 INJECTION INTRAMUSCULAR; INTRAVENOUS at 20:37

## 2023-12-27 RX ADMIN — CHLORDIAZEPOXIDE HYDROCHLORIDE 25 MG: 25 CAPSULE ORAL at 21:27

## 2023-12-27 RX ADMIN — SODIUM CHLORIDE 1 MG: 9 INJECTION INTRAMUSCULAR; INTRAVENOUS; SUBCUTANEOUS at 20:34

## 2023-12-27 RX ADMIN — PAROXETINE HYDROCHLORIDE 20 MG: 20 TABLET, FILM COATED ORAL at 08:53

## 2023-12-27 RX ADMIN — LEVOTHYROXINE SODIUM 25 MCG: 0.05 TABLET ORAL at 05:49

## 2023-12-27 RX ADMIN — SODIUM CHLORIDE, PRESERVATIVE FREE 10 ML: 5 INJECTION INTRAVENOUS at 20:36

## 2023-12-27 RX ADMIN — CHLORDIAZEPOXIDE HYDROCHLORIDE 25 MG: 25 CAPSULE ORAL at 12:56

## 2023-12-27 RX ADMIN — CHLORDIAZEPOXIDE HYDROCHLORIDE 25 MG: 25 CAPSULE ORAL at 08:53

## 2023-12-27 RX ADMIN — PANTOPRAZOLE SODIUM 40 MG: 40 INJECTION, POWDER, FOR SOLUTION INTRAVENOUS at 15:54

## 2023-12-27 RX ADMIN — FOLIC ACID 1 MG: 1 TABLET ORAL at 08:53

## 2023-12-27 NOTE — PLAN OF CARE
Problem: Discharge Planning  Goal: Discharge to home or other facility with appropriate resources  12/26/2023 2258 by Mike Redmond RN  Outcome: Progressing  12/26/2023 1117 by Bertin Diaz RN  Outcome: Progressing  Flowsheets (Taken 12/26/2023 0800)  Discharge to home or other facility with appropriate resources: Identify barriers to discharge with patient and caregiver     Problem: Pain  Goal: Verbalizes/displays adequate comfort level or baseline comfort level  12/26/2023 2258 by Mike Redmond RN  Outcome: Progressing  12/26/2023 1117 by Bertin Diaz RN  Outcome: Progressing     Problem: Safety - Adult  Goal: Free from fall injury  12/26/2023 2258 by Mike Redmond RN  Outcome: Progressing  12/26/2023 1117 by Bertin Diaz RN  Outcome: Progressing  Flowsheets (Taken 12/26/2023 0800)  Free From Fall Injury: Instruct family/caregiver on patient safety

## 2023-12-27 NOTE — CARE COORDINATION
12/27/23 8739   Service Assessment   Patient Orientation Alert and Oriented   Cognition Alert   History Provided By Patient   Primary Caregiver Self   Support Systems Spouse/Significant Other   Patient's Healthcare Decision Maker is: Legal Next of Kin   PCP Verified by CM Yes   Last Visit to PCP Within last year   Prior Functional Level Independent in ADLs/IADLs   Current Functional Level Independent in ADLs/IADLs   Can patient return to prior living arrangement Yes   Ability to make needs known: Good   Family able to assist with home care needs: Yes   Would you like for me to discuss the discharge plan with any other family members/significant others, and if so, who? Yes  (spouse)   Financial Resources Medicare   Community Resources None   Condition of Participation: Discharge Planning   The Plan for Transition of Care is related to the following treatment goals: return home   The Patient and/or Patient Representative was provided with a Choice of Provider? Patient   The Patient and/Or Patient Representative agree with the Discharge Plan? Yes   Freedom of Choice list was provided with basic dialogue that supports the patient's individualized plan of care/goals, treatment preferences, and shares the quality data associated with the providers? Yes     Assessment completed with patient. Patient lives with spouse. She is IND with all adls at baseline. Referral sent to [facility] for alcoholism. Demographics and PCP confirmed.   Discharge plan is to return home with spouse.,     Rob Guo, [facility]

## 2023-12-28 VITALS
DIASTOLIC BLOOD PRESSURE: 69 MMHG | BODY MASS INDEX: 22.76 KG/M2 | TEMPERATURE: 97.7 F | SYSTOLIC BLOOD PRESSURE: 145 MMHG | OXYGEN SATURATION: 100 % | WEIGHT: 136.6 LBS | HEIGHT: 65 IN | RESPIRATION RATE: 18 BRPM | HEART RATE: 86 BPM

## 2023-12-28 PROBLEM — F10.931 ALCOHOL WITHDRAWAL SYNDROME, WITH DELIRIUM (HCC): Status: ACTIVE | Noted: 2019-06-28

## 2023-12-28 PROBLEM — F10.931 ALCOHOL WITHDRAWAL SYNDROME, WITH DELIRIUM (HCC): Status: RESOLVED | Noted: 2019-06-28 | Resolved: 2023-12-28

## 2023-12-28 PROBLEM — F10.229 ALCOHOL INTOXICATION IN ACTIVE ALCOHOLIC, WITH UNSPECIFIED COMPLICATION (HCC): Status: RESOLVED | Noted: 2021-06-16 | Resolved: 2023-12-28

## 2023-12-28 PROBLEM — F10.931 ALCOHOL WITHDRAWAL DELIRIUM (HCC): Status: RESOLVED | Noted: 2022-04-07 | Resolved: 2023-12-28

## 2023-12-28 PROBLEM — E87.1 HYPONATREMIA: Status: RESOLVED | Noted: 2022-10-10 | Resolved: 2023-12-28

## 2023-12-28 PROCEDURE — 2580000003 HC RX 258: Performed by: FAMILY MEDICINE

## 2023-12-28 PROCEDURE — 6360000002 HC RX W HCPCS: Performed by: FAMILY MEDICINE

## 2023-12-28 PROCEDURE — 6370000000 HC RX 637 (ALT 250 FOR IP): Performed by: FAMILY MEDICINE

## 2023-12-28 PROCEDURE — C9113 INJ PANTOPRAZOLE SODIUM, VIA: HCPCS | Performed by: FAMILY MEDICINE

## 2023-12-28 PROCEDURE — A4216 STERILE WATER/SALINE, 10 ML: HCPCS | Performed by: FAMILY MEDICINE

## 2023-12-28 RX ORDER — CHLORDIAZEPOXIDE HYDROCHLORIDE 10 MG/1
10 CAPSULE, GELATIN COATED ORAL 4 TIMES DAILY
Qty: 12 CAPSULE | Refills: 0 | Status: SHIPPED | OUTPATIENT
Start: 2023-12-28 | End: 2023-12-31

## 2023-12-28 RX ORDER — LEVOTHYROXINE SODIUM 0.03 MG/1
25 TABLET ORAL DAILY
Qty: 30 TABLET | Refills: 3 | Status: SHIPPED | OUTPATIENT
Start: 2023-12-29

## 2023-12-28 RX ORDER — CHLORDIAZEPOXIDE HYDROCHLORIDE 10 MG/1
10 CAPSULE, GELATIN COATED ORAL 4 TIMES DAILY
Status: DISCONTINUED | OUTPATIENT
Start: 2023-12-28 | End: 2023-12-28 | Stop reason: HOSPADM

## 2023-12-28 RX ORDER — AMLODIPINE BESYLATE 10 MG/1
10 TABLET ORAL DAILY
Status: DISCONTINUED | OUTPATIENT
Start: 2023-12-28 | End: 2023-12-28 | Stop reason: HOSPADM

## 2023-12-28 RX ADMIN — PANTOPRAZOLE SODIUM 40 MG: 40 INJECTION, POWDER, FOR SOLUTION INTRAVENOUS at 03:08

## 2023-12-28 RX ADMIN — PAROXETINE HYDROCHLORIDE 20 MG: 20 TABLET, FILM COATED ORAL at 09:24

## 2023-12-28 RX ADMIN — Medication 100 MG: at 09:23

## 2023-12-28 RX ADMIN — LEVOTHYROXINE SODIUM 25 MCG: 0.05 TABLET ORAL at 05:42

## 2023-12-28 RX ADMIN — FOLIC ACID 1 MG: 1 TABLET ORAL at 09:23

## 2023-12-28 RX ADMIN — CHLORDIAZEPOXIDE HYDROCHLORIDE 10 MG: 10 CAPSULE ORAL at 09:23

## 2023-12-28 RX ADMIN — SODIUM CHLORIDE, PRESERVATIVE FREE 10 ML: 5 INJECTION INTRAVENOUS at 09:24

## 2023-12-28 RX ADMIN — AMLODIPINE BESYLATE 10 MG: 10 TABLET ORAL at 09:24

## 2023-12-28 NOTE — DISCHARGE SUMMARY
PROTIME 10.3 06/16/2021 09:36 AM    INR 1.0 12/25/2023 02:34 PM    INR 0.98 04/08/2022 10:31 AM    INR 0.89 06/16/2021 09:36 AM    APTT 29.8 06/16/2021 09:36 AM      A1c Lab Results   Component Value Date/Time    LABA1C 5.4 07/13/2021 12:00 PM    .3 07/13/2021 12:00 PM      Lipids Lab Results   Component Value Date/Time    CHOL 181 02/08/2021 09:49 AM    LDLCALC 105 02/08/2021 09:49 AM    LABVLDL 16 02/08/2021 09:49 AM    HDL 60 02/08/2021 09:49 AM    TRIG 80 02/08/2021 09:49 AM      Thyroid  Lab Results   Component Value Date/Time    TSHELE 0.83 12/26/2023 06:22 AM        Most Recent UA Lab Results   Component Value Date/Time    COLORU YELLOW/STRAW 12/25/2023 04:00 PM    APPEARANCE CLEAR 12/25/2023 04:00 PM    SPECGRAV 1.014 12/25/2023 04:00 PM    LABPH 7.5 12/25/2023 04:00 PM    PROTEINU 100 12/25/2023 04:00 PM    GLUCOSEU Negative 12/25/2023 04:00 PM    KETUA 15 12/25/2023 04:00 PM    BILIRUBINUR Negative 12/25/2023 04:00 PM    BLOODU Negative 12/25/2023 04:00 PM    UROBILINOGEN 1.0 12/25/2023 04:00 PM    NITRU Negative 12/25/2023 04:00 PM    LEUKOCYTESUR Negative 12/25/2023 04:00 PM    WBCUA 0-4 12/25/2023 04:00 PM    RBCUA 0-5 12/25/2023 04:00 PM    EPITHUA 5-10 12/25/2023 04:00 PM    BACTERIA 3+ 12/25/2023 04:00 PM    BACTERIA 1+ 04/07/2022 07:10 PM    LABCAST 2-5 12/25/2023 04:00 PM        Microbiology:  Results       Procedure Component Value Units Date/Time    Culture, Urine [[id number]]     Order Status: Canceled Specimen: Urine, clean catch             All Labs from Last 24 Hrs:  No results found for this or any previous visit (from the past 24 hour(s)).     Allergies   Allergen Reactions    Penicillins Anaphylaxis, Hives, Rash and Swelling    Sulfa Antibiotics      Immunization History   Administered Date(s) Administered    Zoster Live (Zostavax) 09/27/2014    Zoster Recombinant (Shingrix) 11/30/2020       Recent Vital Data:  Patient Vitals for the past 24 hrs:   Temp Pulse Resp BP SpO2   12/28/23

## 2023-12-28 NOTE — PLAN OF CARE
Problem: Discharge Planning  Goal: Discharge to home or other facility with appropriate resources  12/28/2023 1036 by Erica Gongora RN  Outcome: Adequate for Discharge  12/27/2023 2257 by Martina Zavala RN  Outcome: Progressing     Problem: Pain  Goal: Verbalizes/displays adequate comfort level or baseline comfort level  12/28/2023 1036 by Erica Gongora RN  Outcome: Adequate for Discharge  12/27/2023 2257 by Martina Zavala RN  Outcome: Progressing     Problem: Safety - Adult  Goal: Free from fall injury  12/28/2023 1036 by Erica Gongora RN  Outcome: Adequate for Discharge  12/27/2023 2257 by Martina Zavala, RN  Outcome: Progressing

## 2023-12-28 NOTE — CARE COORDINATION
[place] with [facility] came and spoke with patient. CM sent referral for [facility] inpatient treatment. [facility] offered patient a inpatient treat bed, patent declined. Patient agreed to outpatient treatment. [facility] informed admission at [facility]. And put follow up resources on AVS. Patient discharging home today with spouse. No further discharge needs.     Trudy WHITE, ACM  [facility]

## 2023-12-30 ENCOUNTER — HOSPITAL ENCOUNTER (EMERGENCY)
Age: 68
Discharge: HOME | End: 2023-12-30
Attending: STUDENT IN AN ORGANIZED HEALTH CARE EDUCATION/TRAINING PROGRAM
Payer: MEDICARE

## 2023-12-30 VITALS
BODY MASS INDEX: 22.66 KG/M2 | RESPIRATION RATE: 18 BRPM | WEIGHT: 136 LBS | HEART RATE: 80 BPM | HEIGHT: 65 IN | SYSTOLIC BLOOD PRESSURE: 173 MMHG | DIASTOLIC BLOOD PRESSURE: 84 MMHG | TEMPERATURE: 97.8 F | OXYGEN SATURATION: 100 %

## 2023-12-30 DIAGNOSIS — G47.00 INSOMNIA, UNSPECIFIED TYPE: ICD-10-CM

## 2023-12-30 DIAGNOSIS — R11.0 NAUSEA: ICD-10-CM

## 2023-12-30 DIAGNOSIS — R51.9 ACUTE NONINTRACTABLE HEADACHE, UNSPECIFIED HEADACHE TYPE: Primary | ICD-10-CM

## 2023-12-30 DIAGNOSIS — Z76.0 ENCOUNTER FOR MEDICATION REFILL: ICD-10-CM

## 2023-12-30 LAB
ALBUMIN SERPL-MCNC: 3.7 G/DL (ref 3.2–4.6)
ALBUMIN/GLOB SERPL: 1 (ref 0.4–1.6)
ALP SERPL-CCNC: 64 U/L (ref 50–136)
ALT SERPL-CCNC: 182 U/L (ref 12–65)
ANION GAP SERPL CALC-SCNC: 2 MMOL/L (ref 2–11)
APPEARANCE UR: CLEAR
AST SERPL-CCNC: 158 U/L (ref 15–37)
BACTERIA URNS QL MICRO: ABNORMAL /HPF
BASOPHILS # BLD: 0 K/UL (ref 0–0.2)
BASOPHILS NFR BLD: 1 % (ref 0–2)
BILIRUB SERPL-MCNC: 0.4 MG/DL (ref 0.2–1.1)
BILIRUB UR QL: NEGATIVE
BUN SERPL-MCNC: 14 MG/DL (ref 8–23)
CALCIUM SERPL-MCNC: 9.4 MG/DL (ref 8.3–10.4)
CASTS URNS QL MICRO: ABNORMAL /LPF
CHLORIDE SERPL-SCNC: 107 MMOL/L (ref 103–113)
CO2 SERPL-SCNC: 28 MMOL/L (ref 21–32)
COLOR UR: ABNORMAL
CREAT SERPL-MCNC: 0.75 MG/DL (ref 0.6–1)
DIFFERENTIAL METHOD BLD: ABNORMAL
EOSINOPHIL # BLD: 0.1 K/UL (ref 0–0.8)
EOSINOPHIL NFR BLD: 2 % (ref 0.5–7.8)
EPI CELLS #/AREA URNS HPF: ABNORMAL /HPF
ERYTHROCYTE [DISTWIDTH] IN BLOOD BY AUTOMATED COUNT: 14.5 % (ref 11.9–14.6)
FLUAV RNA SPEC QL NAA+PROBE: NOT DETECTED
FLUBV RNA SPEC QL NAA+PROBE: NOT DETECTED
GLOBULIN SER CALC-MCNC: 3.7 G/DL (ref 2.8–4.5)
GLUCOSE SERPL-MCNC: 107 MG/DL (ref 65–100)
GLUCOSE UR STRIP.AUTO-MCNC: NEGATIVE MG/DL
HCT VFR BLD AUTO: 35.8 % (ref 35.8–46.3)
HGB BLD-MCNC: 11.9 G/DL (ref 11.7–15.4)
HGB UR QL STRIP: NEGATIVE
IMM GRANULOCYTES # BLD AUTO: 0 K/UL (ref 0–0.5)
IMM GRANULOCYTES NFR BLD AUTO: 0 % (ref 0–5)
KETONES UR QL STRIP.AUTO: NEGATIVE MG/DL
LEUKOCYTE ESTERASE UR QL STRIP.AUTO: NEGATIVE
LIPASE SERPL-CCNC: 258 U/L (ref 73–393)
LYMPHOCYTES # BLD: 2.1 K/UL (ref 0.5–4.6)
LYMPHOCYTES NFR BLD: 37 % (ref 13–44)
MCH RBC QN AUTO: 33.9 PG (ref 26.1–32.9)
MCHC RBC AUTO-ENTMCNC: 33.2 G/DL (ref 31.4–35)
MCV RBC AUTO: 102 FL (ref 82–102)
MONOCYTES # BLD: 1.2 K/UL (ref 0.1–1.3)
MONOCYTES NFR BLD: 21 % (ref 4–12)
NEUTS SEG # BLD: 2.2 K/UL (ref 1.7–8.2)
NEUTS SEG NFR BLD: 39 % (ref 43–78)
NITRITE UR QL STRIP.AUTO: NEGATIVE
NRBC # BLD: 0 K/UL (ref 0–0.2)
PH UR STRIP: 7 (ref 5–9)
PLATELET # BLD AUTO: 246 K/UL (ref 150–450)
PMV BLD AUTO: 9.2 FL (ref 9.4–12.3)
POTASSIUM SERPL-SCNC: 5.5 MMOL/L (ref 3.5–5.1)
PROT SERPL-MCNC: 7.4 G/DL (ref 6.3–8.2)
PROT UR STRIP-MCNC: NEGATIVE MG/DL
RBC # BLD AUTO: 3.51 M/UL (ref 4.05–5.2)
RBC #/AREA URNS HPF: ABNORMAL /HPF
SARS-COV-2 RDRP RESP QL NAA+PROBE: NOT DETECTED
SODIUM SERPL-SCNC: 137 MMOL/L (ref 136–146)
SOURCE: NORMAL
SP GR UR REFRACTOMETRY: 1.01 (ref 1–1.02)
URINE CULTURE IF INDICATED: ABNORMAL
UROBILINOGEN UR QL STRIP.AUTO: 1 EU/DL (ref 0.2–1)
WBC # BLD AUTO: 5.7 K/UL (ref 4.3–11.1)
WBC URNS QL MICRO: ABNORMAL /HPF

## 2023-12-30 PROCEDURE — 87502 INFLUENZA DNA AMP PROBE: CPT

## 2023-12-30 PROCEDURE — 99284 EMERGENCY DEPT VISIT MOD MDM: CPT

## 2023-12-30 PROCEDURE — 96360 HYDRATION IV INFUSION INIT: CPT

## 2023-12-30 PROCEDURE — 87635 SARS-COV-2 COVID-19 AMP PRB: CPT

## 2023-12-30 PROCEDURE — 80053 COMPREHEN METABOLIC PANEL: CPT

## 2023-12-30 PROCEDURE — 2580000003 HC RX 258: Performed by: STUDENT IN AN ORGANIZED HEALTH CARE EDUCATION/TRAINING PROGRAM

## 2023-12-30 PROCEDURE — 96361 HYDRATE IV INFUSION ADD-ON: CPT

## 2023-12-30 PROCEDURE — 81001 URINALYSIS AUTO W/SCOPE: CPT

## 2023-12-30 PROCEDURE — 83690 ASSAY OF LIPASE: CPT

## 2023-12-30 PROCEDURE — 85025 COMPLETE CBC W/AUTO DIFF WBC: CPT

## 2023-12-30 RX ORDER — ESZOPICLONE 2 MG/1
2 TABLET, FILM COATED ORAL NIGHTLY
Qty: 30 TABLET | Refills: 0 | Status: SHIPPED | OUTPATIENT
Start: 2023-12-30 | End: 2024-01-29

## 2023-12-30 RX ORDER — AMLODIPINE BESYLATE 10 MG/1
10 TABLET ORAL DAILY
Qty: 30 TABLET | Refills: 1 | Status: SHIPPED | OUTPATIENT
Start: 2023-12-30

## 2023-12-30 RX ORDER — 0.9 % SODIUM CHLORIDE 0.9 %
1000 INTRAVENOUS SOLUTION INTRAVENOUS
Status: COMPLETED | OUTPATIENT
Start: 2023-12-30 | End: 2023-12-30

## 2023-12-30 RX ORDER — PAROXETINE HYDROCHLORIDE 20 MG/1
20 TABLET, FILM COATED ORAL EVERY MORNING
Qty: 30 TABLET | Refills: 1 | Status: SHIPPED | OUTPATIENT
Start: 2023-12-30

## 2023-12-30 RX ADMIN — SODIUM CHLORIDE 1000 ML: 9 INJECTION, SOLUTION INTRAVENOUS at 18:59

## 2023-12-30 ASSESSMENT — LIFESTYLE VARIABLES
HOW MANY STANDARD DRINKS CONTAINING ALCOHOL DO YOU HAVE ON A TYPICAL DAY: 10 OR MORE
HOW OFTEN DO YOU HAVE A DRINK CONTAINING ALCOHOL: 4 OR MORE TIMES A WEEK

## 2023-12-30 ASSESSMENT — PAIN DESCRIPTION - ORIENTATION: ORIENTATION: LOWER

## 2023-12-30 ASSESSMENT — PAIN SCALES - GENERAL: PAINLEVEL_OUTOF10: 8

## 2023-12-30 ASSESSMENT — PAIN DESCRIPTION - LOCATION: LOCATION: ABDOMEN;GENERALIZED

## 2023-12-30 ASSESSMENT — PAIN - FUNCTIONAL ASSESSMENT: PAIN_FUNCTIONAL_ASSESSMENT: 0-10

## 2023-12-30 NOTE — ED TRIAGE NOTES
Pt states that she was just released from the hospital for c/o Hyponatremia. Returns to the ED for increased pain, fatigue and headache.  Requesting refills for paxil, norvasc, pepcide, folic acid, premarin

## 2023-12-30 NOTE — ED PROVIDER NOTES
Emergency Department Provider Note       PCP: William Sanchez MD   Age: 70 y.o. Sex: female     DISPOSITION Decision To Discharge 12/30/2023 08:50:23 PM       ICD-10-CM    1. Acute nonintractable headache, unspecified headache type  R51.9       2. Nausea  R11.0       3. Encounter for medication refill  Z76.0       4. Insomnia, unspecified type  G47.00 eszopiclone (LUNESTA) 2 MG TABS          Medical Decision Making     Complexity of Problems Addressed:  Acute on chronic complaint. Data Reviewed and Analyzed:  I independently ordered and reviewed each unique test.  I reviewed external records: provider visit note from PCP. The patients assessment required an independent historian: . The reason they were needed is important historical information not provided by the patient. Discussion of management or test interpretation. 70-year-old female presents back to Emergency department after being discharged in the hospital 2 days ago. Patient was admitted for hyponatremia and alcohol withdrawal.  Patient was placed on Librium in the hospital and completed Librium taper outpatient. States she believes she is out of this medication. Patient states she has intermittent headaches, abdominal cramping and nausea with a few episodes of vomiting. States she is concerned that she is withdrawing. States she has not had any alcohol since before she was admitted to the hospital recently. Also requesting refills on her medications. Will obtain a broad-based workup. She is not tachycardic nor hypertensive, does not appear to be actively withdrawing from alcohol. Lab work shows normal white Count, stable H&H, normal urinalysis, normal sodium, potassium is 5.5, normal kidney function, elevated LFT, patient given 1 L bolus IV fluids. COVID and flu are negative. Patient with improved symptoms after IV fluids. No active vomiting here in the ER.   Patient requesting multiple medication refills which was 137 136 - 146 mmol/L    Potassium 5.5 (H) 3.5 - 5.1 mmol/L    Chloride 107 103 - 113 mmol/L    CO2 28 21 - 32 mmol/L    Anion Gap 2 2 - 11 mmol/L    Glucose 107 (H) 65 - 100 mg/dL    BUN 14 8 - 23 MG/DL    Creatinine 0.75 0.6 - 1.0 MG/DL    Est, Glom Filt Rate >60 >60 ml/min/1.73m2    Calcium 9.4 8.3 - 10.4 MG/DL    Total Bilirubin 0.4 0.2 - 1.1 MG/DL     (H) 12 - 65 U/L     (H) 15 - 37 U/L    Alk Phosphatase 64 50 - 136 U/L    Total Protein 7.4 6.3 - 8.2 g/dL    Albumin 3.7 3.2 - 4.6 g/dL    Globulin 3.7 2.8 - 4.5 g/dL    Albumin/Globulin Ratio 1.0 0.4 - 1.6     Lipase   Result Value Ref Range    Lipase 258 73 - 393 U/L   Urinalysis with Reflex to Culture    Specimen: Urine   Result Value Ref Range    Color, UA DARK YELLOW      Appearance CLEAR      Specific Gravity, UA 1.009 1.001 - 1.023      pH, Urine 7.0 5.0 - 9.0      Protein, UA Negative NEG mg/dL    Glucose, UA Negative mg/dL    Ketones, Urine Negative NEG mg/dL    Bilirubin Urine Negative NEG      Blood, Urine Negative NEG      Urobilinogen, Urine 1.0 0.2 - 1.0 EU/dL    Nitrite, Urine Negative NEG      Leukocyte Esterase, Urine Negative NEG      Urine Culture if Indicated CULTURE NOT INDICATED BY UA RESULT      WBC, UA 0-4 U4 /hpf    RBC, UA 0-5 U5 /hpf    BACTERIA, URINE 2+ (A) NEG /hpf    Epithelial Cells UA 0-5 U5 /hpf    Casts 0-2 U2 /lpf   CBC with Auto Differential   Result Value Ref Range    WBC 5.7 4.3 - 11.1 K/uL    RBC 3.51 (L) 4.05 - 5.2 M/uL    Hemoglobin 11.9 11.7 - 15.4 g/dL    Hematocrit 35.8 35.8 - 46.3 %    .0 82.0 - 102.0 FL    MCH 33.9 (H) 26.1 - 32.9 PG    MCHC 33.2 31.4 - 35.0 g/dL    RDW 14.5 11.9 - 14.6 %    Platelets 246 150 - 450 K/uL    MPV 9.2 (L) 9.4 - 12.3 FL    nRBC 0.00 0.0 - 0.2 K/uL    Differential Type AUTOMATED      Neutrophils % 39 (L) 43 - 78 %    Lymphocytes % 37 13 - 44 %    Monocytes % 21 (H) 4.0 - 12.0 %    Eosinophils % 2 0.5 - 7.8 %    Basophils % 1 0.0 - 2.0 %    Immature Granulocytes 0 0.0 -

## 2023-12-31 NOTE — ED NOTES
I have reviewed discharge instructions with the patient.  The patient verbalized understanding.    Patient left ED via Discharge Method: ambulatory to Home with self.    Opportunity for questions and clarification provided.       Patient given 4 scripts.         To continue your aftercare when you leave the hospital, you may receive an automated call from our care team to check in on how you are doing.  This is a free service and part of our promise to provide the best care and service to meet your aftercare needs.” If you have questions, or wish to unsubscribe from this service please call [phone].  Thank you for Choosing our [facility] Emergency Department.

## 2023-12-31 NOTE — DISCHARGE INSTRUCTIONS
Take your medication daily as prescribed.  Continue to avoid alcohol.  Get established with a local AA group.  Follow-up with primary care physician to get reestablished.  Return to the ER for worsening or worrisome symptoms.      We would love to help you get a primary care doctor for follow-up after your emergency department visit.    Please call [phone] between 7AM - 6PM Monday to Friday.  A care navigator will be able to assist you with setting up a doctor close to your home.

## 2024-02-26 ENCOUNTER — OFFICE VISIT (OUTPATIENT)
Dept: FAMILY MEDICINE CLINIC | Facility: CLINIC | Age: 69
End: 2024-02-26
Payer: MEDICARE

## 2024-02-26 VITALS
SYSTOLIC BLOOD PRESSURE: 156 MMHG | DIASTOLIC BLOOD PRESSURE: 77 MMHG | HEART RATE: 71 BPM | BODY MASS INDEX: 24.32 KG/M2 | WEIGHT: 146 LBS | HEIGHT: 65 IN | TEMPERATURE: 97.3 F | RESPIRATION RATE: 16 BRPM | OXYGEN SATURATION: 99 %

## 2024-02-26 DIAGNOSIS — I10 PRIMARY HYPERTENSION: Primary | ICD-10-CM

## 2024-02-26 DIAGNOSIS — K70.10 ALCOHOLIC HEPATITIS, UNSPECIFIED WHETHER ASCITES PRESENT: ICD-10-CM

## 2024-02-26 DIAGNOSIS — E03.9 ACQUIRED HYPOTHYROIDISM: ICD-10-CM

## 2024-02-26 DIAGNOSIS — F33.41 RECURRENT MAJOR DEPRESSIVE DISORDER, IN PARTIAL REMISSION (HCC): ICD-10-CM

## 2024-02-26 PROBLEM — H26.9 CATARACT OF BOTH EYES: Status: RESOLVED | Noted: 2020-11-11 | Resolved: 2024-02-26

## 2024-02-26 PROBLEM — R73.9 HYPERGLYCEMIA: Status: RESOLVED | Noted: 2021-07-13 | Resolved: 2024-02-26

## 2024-02-26 PROBLEM — Z00.00 MEDICARE ANNUAL WELLNESS VISIT, SUBSEQUENT: Status: ACTIVE | Noted: 2024-02-26

## 2024-02-26 PROBLEM — D53.9 MACROCYTIC ANEMIA: Status: RESOLVED | Noted: 2019-04-19 | Resolved: 2024-02-26

## 2024-02-26 PROBLEM — R60.9 EDEMA: Status: RESOLVED | Noted: 2021-01-26 | Resolved: 2024-02-26

## 2024-02-26 PROBLEM — F41.9 ANXIETY: Status: RESOLVED | Noted: 2021-05-06 | Resolved: 2024-02-26

## 2024-02-26 PROBLEM — Z00.00 MEDICARE ANNUAL WELLNESS VISIT, SUBSEQUENT: Chronic | Status: ACTIVE | Noted: 2024-02-26

## 2024-02-26 LAB
ANION GAP SERPL CALC-SCNC: 7 MMOL/L (ref 2–11)
BUN SERPL-MCNC: 10 MG/DL (ref 8–23)
CALCIUM SERPL-MCNC: 9.4 MG/DL (ref 8.3–10.4)
CHLORIDE SERPL-SCNC: 100 MMOL/L (ref 103–113)
CO2 SERPL-SCNC: 30 MMOL/L (ref 21–32)
CREAT SERPL-MCNC: 0.7 MG/DL (ref 0.6–1)
GLUCOSE SERPL-MCNC: 110 MG/DL (ref 65–100)
POTASSIUM SERPL-SCNC: 4.6 MMOL/L (ref 3.5–5.1)
SODIUM SERPL-SCNC: 137 MMOL/L (ref 136–146)
TSH, 3RD GENERATION: 0.51 UIU/ML (ref 0.36–3.74)

## 2024-02-26 PROCEDURE — 3078F DIAST BP <80 MM HG: CPT | Performed by: FAMILY MEDICINE

## 2024-02-26 PROCEDURE — 99204 OFFICE O/P NEW MOD 45 MIN: CPT | Performed by: FAMILY MEDICINE

## 2024-02-26 PROCEDURE — 1123F ACP DISCUSS/DSCN MKR DOCD: CPT | Performed by: FAMILY MEDICINE

## 2024-02-26 PROCEDURE — 3077F SYST BP >= 140 MM HG: CPT | Performed by: FAMILY MEDICINE

## 2024-02-26 RX ORDER — PAROXETINE HYDROCHLORIDE 20 MG/1
20 TABLET, FILM COATED ORAL EVERY MORNING
Qty: 90 TABLET | Refills: 1 | Status: SHIPPED | OUTPATIENT
Start: 2024-02-26

## 2024-02-26 RX ORDER — PANTOPRAZOLE SODIUM 40 MG/1
40 FOR SUSPENSION ORAL
Qty: 30 EACH | Status: CANCELLED | OUTPATIENT
Start: 2024-02-26

## 2024-02-26 RX ORDER — PANTOPRAZOLE SODIUM 40 MG/1
40 TABLET, DELAYED RELEASE ORAL
Qty: 90 TABLET | Refills: 1 | Status: SHIPPED | OUTPATIENT
Start: 2024-02-26

## 2024-02-26 RX ORDER — LEVOTHYROXINE SODIUM 0.03 MG/1
25 TABLET ORAL DAILY
Qty: 90 TABLET | Refills: 1 | Status: SHIPPED | OUTPATIENT
Start: 2024-02-26

## 2024-02-26 RX ORDER — PROGESTERONE 200 MG/1
200 CAPSULE ORAL NIGHTLY
Qty: 90 CAPSULE | Refills: 1 | Status: SHIPPED | OUTPATIENT
Start: 2024-02-26

## 2024-02-26 RX ORDER — VALSARTAN 160 MG/1
160 TABLET ORAL DAILY
COMMUNITY
End: 2024-02-26 | Stop reason: ALTCHOICE

## 2024-02-26 RX ORDER — AMLODIPINE BESYLATE 10 MG/1
10 TABLET ORAL DAILY
Qty: 90 TABLET | Refills: 1 | Status: SHIPPED | OUTPATIENT
Start: 2024-02-26

## 2024-02-26 RX ORDER — FUROSEMIDE 20 MG/1
20 TABLET ORAL 2 TIMES DAILY
COMMUNITY

## 2024-02-26 RX ORDER — VALSARTAN AND HYDROCHLOROTHIAZIDE 320; 12.5 MG/1; MG/1
1 TABLET, FILM COATED ORAL DAILY
Qty: 30 TABLET | Refills: 5 | Status: SHIPPED | OUTPATIENT
Start: 2024-02-26

## 2024-02-26 RX ORDER — FUROSEMIDE 20 MG/1
20 TABLET ORAL 2 TIMES DAILY
Qty: 180 TABLET | Refills: 1 | Status: CANCELLED | OUTPATIENT
Start: 2024-02-26

## 2024-02-26 RX ORDER — VALSARTAN 160 MG/1
160 TABLET ORAL DAILY
Qty: 90 TABLET | Refills: 1 | Status: CANCELLED | OUTPATIENT
Start: 2024-02-26

## 2024-02-26 RX ORDER — CLONIDINE HYDROCHLORIDE 0.1 MG/1
0.1 TABLET ORAL 2 TIMES DAILY PRN
Qty: 180 TABLET | Refills: 1 | Status: SHIPPED | OUTPATIENT
Start: 2024-02-26

## 2024-02-26 RX ORDER — PANTOPRAZOLE SODIUM 40 MG/1
40 FOR SUSPENSION ORAL
COMMUNITY

## 2024-02-26 SDOH — ECONOMIC STABILITY: FOOD INSECURITY: WITHIN THE PAST 12 MONTHS, THE FOOD YOU BOUGHT JUST DIDN'T LAST AND YOU DIDN'T HAVE MONEY TO GET MORE.: NEVER TRUE

## 2024-02-26 SDOH — ECONOMIC STABILITY: HOUSING INSECURITY
IN THE LAST 12 MONTHS, WAS THERE A TIME WHEN YOU DID NOT HAVE A STEADY PLACE TO SLEEP OR SLEPT IN A SHELTER (INCLUDING NOW)?: NO

## 2024-02-26 SDOH — ECONOMIC STABILITY: INCOME INSECURITY: HOW HARD IS IT FOR YOU TO PAY FOR THE VERY BASICS LIKE FOOD, HOUSING, MEDICAL CARE, AND HEATING?: NOT VERY HARD

## 2024-02-26 SDOH — ECONOMIC STABILITY: FOOD INSECURITY: WITHIN THE PAST 12 MONTHS, YOU WORRIED THAT YOUR FOOD WOULD RUN OUT BEFORE YOU GOT MONEY TO BUY MORE.: NEVER TRUE

## 2024-02-26 ASSESSMENT — PATIENT HEALTH QUESTIONNAIRE - PHQ9
SUM OF ALL RESPONSES TO PHQ QUESTIONS 1-9: 9
1. LITTLE INTEREST OR PLEASURE IN DOING THINGS: 1
4. FEELING TIRED OR HAVING LITTLE ENERGY: 3
10. IF YOU CHECKED OFF ANY PROBLEMS, HOW DIFFICULT HAVE THESE PROBLEMS MADE IT FOR YOU TO DO YOUR WORK, TAKE CARE OF THINGS AT HOME, OR GET ALONG WITH OTHER PEOPLE: 2
6. FEELING BAD ABOUT YOURSELF - OR THAT YOU ARE A FAILURE OR HAVE LET YOURSELF OR YOUR FAMILY DOWN: 0
SUM OF ALL RESPONSES TO PHQ QUESTIONS 1-9: 9
SUM OF ALL RESPONSES TO PHQ9 QUESTIONS 1 & 2: 2
SUM OF ALL RESPONSES TO PHQ QUESTIONS 1-9: 9
SUM OF ALL RESPONSES TO PHQ QUESTIONS 1-9: 9
2. FEELING DOWN, DEPRESSED OR HOPELESS: 1
8. MOVING OR SPEAKING SO SLOWLY THAT OTHER PEOPLE COULD HAVE NOTICED. OR THE OPPOSITE, BEING SO FIGETY OR RESTLESS THAT YOU HAVE BEEN MOVING AROUND A LOT MORE THAN USUAL: 0
7. TROUBLE CONCENTRATING ON THINGS, SUCH AS READING THE NEWSPAPER OR WATCHING TELEVISION: 1
9. THOUGHTS THAT YOU WOULD BE BETTER OFF DEAD, OR OF HURTING YOURSELF: 0
5. POOR APPETITE OR OVEREATING: 0
3. TROUBLE FALLING OR STAYING ASLEEP: 3

## 2024-02-26 NOTE — PROGRESS NOTES
Subjective:  April Lujan is a 68 y.o. female presents today with medical problems and to obtain refills if necessary, and they are also meeting me as their new physician for the first time as their initial visit today for their semi-annual htn visit. They are having no side effects and are doing well.  Systems review of cardiovascular and pulmonary systems reveal no complaints or pertinent positives.  Patient denies any pounding heart beats or rapid heart beat intervals, flushing, panic like attacks, headaches, dizzyness or flushing.  They have drug reistant hypertension, on 3 or more different medicaitons including one diuretic- Yes  Has not quit drinking.  Continues to have drinks at home.  Would like to see a therapist.  If you don't have a home bp machine, you should purchase one at home and begin to check your pressure at home on a regular basis.  Your blood pressure goal is listed under the \"plan section\" below    Allergies   Allergen Reactions    Penicillins Anaphylaxis, Hives, Rash and Swelling    Sulfa Antibiotics       reports that she quit smoking about 36 years ago. Her smoking use included cigarettes. She started smoking about 46 years ago. She has a 5.0 pack-year smoking history. She has never used smokeless tobacco.  Current Outpatient Medications   Medication Sig Dispense Refill    pantoprazole sodium (PROTONIX) 40 MG PACK packet Take 1 packet by mouth every morning (before breakfast)      furosemide (LASIX) 20 MG tablet Take 1 tablet by mouth 2 times daily      B Complex-C-Folic Acid (SUPER B COMPLEX/FA/VIT C PO) Take by mouth      NONFORMULARY Tomas  Hormone Advantage   DIM with pomegranate and broccoli extract      cloNIDine (CATAPRES) 0.1 MG tablet Take 1 tablet by mouth 2 times daily as needed for High Blood Pressure Pt states that she checks her BP often at home. IF BP is 150/70 or higher, pt takes dose 180 tablet 1    levothyroxine (SYNTHROID) 25 MCG tablet Take 1 tablet by mouth

## 2024-02-26 NOTE — PATIENT INSTRUCTIONS
Regarding elevated blood pressure, discontinue the valsartan 160 mg and change to valsartan/HCTZ 320 mg / 12.5 mg 1 a day.  Recheck blood pressure here in about 3 months.  Continue all other medicatio

## 2024-03-04 DIAGNOSIS — G47.09 OTHER INSOMNIA: Primary | ICD-10-CM

## 2024-03-04 RX ORDER — DOXEPIN HYDROCHLORIDE 10 MG/1
10 CAPSULE ORAL NIGHTLY
Qty: 30 CAPSULE | Refills: 5 | Status: SHIPPED | OUTPATIENT
Start: 2024-03-04

## 2024-05-26 ENCOUNTER — HOSPITAL ENCOUNTER (EMERGENCY)
Age: 69
Discharge: HOME | End: 2024-05-26

## 2024-05-26 VITALS
WEIGHT: 160 LBS | SYSTOLIC BLOOD PRESSURE: 109 MMHG | OXYGEN SATURATION: 98 % | BODY MASS INDEX: 26.66 KG/M2 | TEMPERATURE: 97.4 F | RESPIRATION RATE: 18 BRPM | HEART RATE: 90 BPM | DIASTOLIC BLOOD PRESSURE: 61 MMHG | HEIGHT: 65 IN

## 2024-05-26 ASSESSMENT — PAIN DESCRIPTION - LOCATION: LOCATION: ABDOMEN;BACK

## 2024-05-26 ASSESSMENT — PAIN SCALES - GENERAL: PAINLEVEL_OUTOF10: 6

## 2024-05-26 ASSESSMENT — PAIN - FUNCTIONAL ASSESSMENT: PAIN_FUNCTIONAL_ASSESSMENT: 0-10

## 2024-05-26 NOTE — ED TRIAGE NOTES
Patient presents with \"alcohol problem\", stated she is here for help. Patient stated last drink was today at 9:30 this morning, she normally drinks 3l a day (wine).    Denies SI/HI     Admits to nausea, dizziness and difficulty walking

## 2024-07-22 ENCOUNTER — APPOINTMENT (OUTPATIENT)
Dept: GENERAL RADIOLOGY | Age: 69
End: 2024-07-22
Payer: MEDICARE

## 2024-07-22 ENCOUNTER — HOSPITAL ENCOUNTER (INPATIENT)
Age: 69
LOS: 3 days | Discharge: HOME | End: 2024-07-26
Attending: EMERGENCY MEDICINE | Admitting: INTERNAL MEDICINE
Payer: MEDICARE

## 2024-07-22 DIAGNOSIS — F10.930 ALCOHOL WITHDRAWAL SYNDROME WITHOUT COMPLICATION (HCC): Primary | ICD-10-CM

## 2024-07-22 DIAGNOSIS — R11.10 RECURRENT VOMITING: ICD-10-CM

## 2024-07-22 DIAGNOSIS — E87.1 HYPONATREMIA: ICD-10-CM

## 2024-07-22 DIAGNOSIS — R74.8 ELEVATED LIVER ENZYMES: ICD-10-CM

## 2024-07-22 DIAGNOSIS — R07.89 CHEST DISCOMFORT: ICD-10-CM

## 2024-07-22 DIAGNOSIS — Z78.9 FAILURE OF OUTPATIENT TREATMENT: ICD-10-CM

## 2024-07-22 LAB
ALBUMIN SERPL-MCNC: 4.6 G/DL (ref 3.2–4.6)
ALBUMIN/GLOB SERPL: 1.2 (ref 1–1.9)
ALP SERPL-CCNC: 94 U/L (ref 35–104)
ALT SERPL-CCNC: 283 U/L (ref 12–65)
ANION GAP SERPL CALC-SCNC: 21 MMOL/L (ref 9–18)
AST SERPL-CCNC: 351 U/L (ref 15–37)
BASOPHILS # BLD: 0.1 K/UL (ref 0–0.2)
BASOPHILS NFR BLD: 1 % (ref 0–2)
BILIRUB SERPL-MCNC: 0.6 MG/DL (ref 0–1.2)
BUN SERPL-MCNC: 7 MG/DL (ref 8–23)
CALCIUM SERPL-MCNC: 9.7 MG/DL (ref 8.8–10.2)
CHLORIDE SERPL-SCNC: 88 MMOL/L (ref 98–107)
CO2 SERPL-SCNC: 19 MMOL/L (ref 20–28)
CREAT SERPL-MCNC: 0.85 MG/DL (ref 0.6–1.1)
DIFFERENTIAL METHOD BLD: ABNORMAL
EOSINOPHIL # BLD: 0 K/UL (ref 0–0.8)
EOSINOPHIL NFR BLD: 0 % (ref 0.5–7.8)
ERYTHROCYTE [DISTWIDTH] IN BLOOD BY AUTOMATED COUNT: 13.2 % (ref 11.9–14.6)
ETHANOL SERPL-MCNC: <11 MG/DL (ref 0–0.08)
GLOBULIN SER CALC-MCNC: 4 G/DL (ref 2.3–3.5)
GLUCOSE SERPL-MCNC: 134 MG/DL (ref 70–99)
HCT VFR BLD AUTO: 39.3 % (ref 35.8–46.3)
HGB BLD-MCNC: 13.9 G/DL (ref 11.7–15.4)
IMM GRANULOCYTES # BLD AUTO: 0 K/UL (ref 0–0.5)
IMM GRANULOCYTES NFR BLD AUTO: 0 % (ref 0–5)
LACTATE SERPL-SCNC: 3.5 MMOL/L (ref 0.5–2)
LIPASE SERPL-CCNC: 42 U/L (ref 13–60)
LYMPHOCYTES # BLD: 1.6 K/UL (ref 0.5–4.6)
LYMPHOCYTES NFR BLD: 22 % (ref 13–44)
MAGNESIUM SERPL-MCNC: 1.8 MG/DL (ref 1.8–2.4)
MCH RBC QN AUTO: 35.5 PG (ref 26.1–32.9)
MCHC RBC AUTO-ENTMCNC: 35.4 G/DL (ref 31.4–35)
MCV RBC AUTO: 100.3 FL (ref 82–102)
MONOCYTES # BLD: 0.7 K/UL (ref 0.1–1.3)
MONOCYTES NFR BLD: 9 % (ref 4–12)
NEUTS SEG # BLD: 5.1 K/UL (ref 1.7–8.2)
NEUTS SEG NFR BLD: 68 % (ref 43–78)
NRBC # BLD: 0 K/UL (ref 0–0.2)
PLATELET # BLD AUTO: 353 K/UL (ref 150–450)
PMV BLD AUTO: 10.1 FL (ref 9.4–12.3)
POTASSIUM SERPL-SCNC: 4.6 MMOL/L (ref 3.5–5.1)
PROT SERPL-MCNC: 8.6 G/DL (ref 6.3–8.2)
RBC # BLD AUTO: 3.92 M/UL (ref 4.05–5.2)
SARS-COV-2 RDRP RESP QL NAA+PROBE: NOT DETECTED
SODIUM SERPL-SCNC: 128 MMOL/L (ref 136–145)
SOURCE: NORMAL
TROPONIN T SERPL HS-MCNC: 13 NG/L (ref 0–14)
WBC # BLD AUTO: 7.5 K/UL (ref 4.3–11.1)

## 2024-07-22 PROCEDURE — 84484 ASSAY OF TROPONIN QUANT: CPT

## 2024-07-22 PROCEDURE — 85025 COMPLETE CBC W/AUTO DIFF WBC: CPT

## 2024-07-22 PROCEDURE — 83735 ASSAY OF MAGNESIUM: CPT

## 2024-07-22 PROCEDURE — 96361 HYDRATE IV INFUSION ADD-ON: CPT

## 2024-07-22 PROCEDURE — 2580000003 HC RX 258: Performed by: EMERGENCY MEDICINE

## 2024-07-22 PROCEDURE — 6360000002 HC RX W HCPCS: Performed by: EMERGENCY MEDICINE

## 2024-07-22 PROCEDURE — 96375 TX/PRO/DX INJ NEW DRUG ADDON: CPT

## 2024-07-22 PROCEDURE — 99285 EMERGENCY DEPT VISIT HI MDM: CPT

## 2024-07-22 PROCEDURE — 83605 ASSAY OF LACTIC ACID: CPT

## 2024-07-22 PROCEDURE — 6370000000 HC RX 637 (ALT 250 FOR IP): Performed by: EMERGENCY MEDICINE

## 2024-07-22 PROCEDURE — 71046 X-RAY EXAM CHEST 2 VIEWS: CPT

## 2024-07-22 PROCEDURE — 83690 ASSAY OF LIPASE: CPT

## 2024-07-22 PROCEDURE — 80053 COMPREHEN METABOLIC PANEL: CPT

## 2024-07-22 PROCEDURE — 87635 SARS-COV-2 COVID-19 AMP PRB: CPT

## 2024-07-22 PROCEDURE — 83930 ASSAY OF BLOOD OSMOLALITY: CPT

## 2024-07-22 PROCEDURE — 93005 ELECTROCARDIOGRAM TRACING: CPT | Performed by: EMERGENCY MEDICINE

## 2024-07-22 PROCEDURE — 82077 ASSAY SPEC XCP UR&BREATH IA: CPT

## 2024-07-22 PROCEDURE — 96374 THER/PROPH/DIAG INJ IV PUSH: CPT

## 2024-07-22 RX ORDER — THIAMINE HYDROCHLORIDE 100 MG/ML
100 INJECTION, SOLUTION INTRAMUSCULAR; INTRAVENOUS ONCE
Status: COMPLETED | OUTPATIENT
Start: 2024-07-22 | End: 2024-07-22

## 2024-07-22 RX ORDER — ONDANSETRON 2 MG/ML
8 INJECTION INTRAMUSCULAR; INTRAVENOUS ONCE
Status: COMPLETED | OUTPATIENT
Start: 2024-07-22 | End: 2024-07-22

## 2024-07-22 RX ORDER — LORAZEPAM 2 MG/ML
1 INJECTION INTRAMUSCULAR ONCE
Status: COMPLETED | OUTPATIENT
Start: 2024-07-22 | End: 2024-07-22

## 2024-07-22 RX ORDER — 0.9 % SODIUM CHLORIDE 0.9 %
1000 INTRAVENOUS SOLUTION INTRAVENOUS
Status: COMPLETED | OUTPATIENT
Start: 2024-07-22 | End: 2024-07-22

## 2024-07-22 RX ORDER — LORAZEPAM 1 MG/1
1 TABLET ORAL ONCE
Status: COMPLETED | OUTPATIENT
Start: 2024-07-22 | End: 2024-07-22

## 2024-07-22 RX ADMIN — LORAZEPAM 1 MG: 2 INJECTION INTRAMUSCULAR; INTRAVENOUS at 22:05

## 2024-07-22 RX ADMIN — ONDANSETRON 8 MG: 2 INJECTION INTRAMUSCULAR; INTRAVENOUS at 22:05

## 2024-07-22 RX ADMIN — SODIUM CHLORIDE 1000 ML: 9 INJECTION, SOLUTION INTRAVENOUS at 22:04

## 2024-07-22 RX ADMIN — LORAZEPAM 1 MG: 1 TABLET ORAL at 22:05

## 2024-07-22 RX ADMIN — THIAMINE HYDROCHLORIDE 100 MG: 100 INJECTION, SOLUTION INTRAMUSCULAR; INTRAVENOUS at 22:05

## 2024-07-22 RX ADMIN — PANTOPRAZOLE SODIUM 40 MG: 40 INJECTION, POWDER, FOR SOLUTION INTRAVENOUS at 22:05

## 2024-07-22 ASSESSMENT — PAIN SCALES - GENERAL: PAINLEVEL_OUTOF10: 6

## 2024-07-22 ASSESSMENT — PAIN DESCRIPTION - LOCATION: LOCATION: GENERALIZED

## 2024-07-22 ASSESSMENT — LIFESTYLE VARIABLES
HOW MANY STANDARD DRINKS CONTAINING ALCOHOL DO YOU HAVE ON A TYPICAL DAY: 5 OR 6
HOW OFTEN DO YOU HAVE A DRINK CONTAINING ALCOHOL: NEVER

## 2024-07-22 ASSESSMENT — PAIN - FUNCTIONAL ASSESSMENT: PAIN_FUNCTIONAL_ASSESSMENT: 0-10

## 2024-07-23 PROBLEM — F10.10 ALCOHOL ABUSE: Status: ACTIVE | Noted: 2024-07-23

## 2024-07-23 LAB
AMPHET UR QL SCN: NEGATIVE
ANION GAP SERPL CALC-SCNC: 16 MMOL/L (ref 9–18)
APPEARANCE UR: ABNORMAL
BACTERIA URNS QL MICRO: NEGATIVE /HPF
BARBITURATES UR QL SCN: NEGATIVE
BENZODIAZ UR QL: POSITIVE
BILIRUB UR QL: NEGATIVE
BUN SERPL-MCNC: 10 MG/DL (ref 8–23)
CALCIUM SERPL-MCNC: 9.3 MG/DL (ref 8.8–10.2)
CANNABINOIDS UR QL SCN: NEGATIVE
CHLORIDE SERPL-SCNC: 95 MMOL/L (ref 98–107)
CO2 SERPL-SCNC: 22 MMOL/L (ref 20–28)
COCAINE UR QL SCN: NEGATIVE
COLOR UR: ABNORMAL
CREAT SERPL-MCNC: 0.95 MG/DL (ref 0.6–1.1)
EKG ATRIAL RATE: 108 BPM
EKG DIAGNOSIS: NORMAL
EKG P AXIS: 82 DEGREES
EKG P-R INTERVAL: 154 MS
EKG Q-T INTERVAL: 348 MS
EKG QRS DURATION: 84 MS
EKG QTC CALCULATION (BAZETT): 466 MS
EKG R AXIS: 80 DEGREES
EKG T AXIS: 75 DEGREES
EKG VENTRICULAR RATE: 108 BPM
EPI CELLS #/AREA URNS HPF: ABNORMAL /HPF
GLUCOSE SERPL-MCNC: 132 MG/DL (ref 70–99)
GLUCOSE UR STRIP.AUTO-MCNC: NEGATIVE MG/DL
HGB UR QL STRIP: ABNORMAL
HYALINE CASTS URNS QL MICRO: ABNORMAL /LPF
KETONES UR QL STRIP.AUTO: 15 MG/DL
LACTATE SERPL-SCNC: 1.3 MMOL/L (ref 0.5–2)
LEUKOCYTE ESTERASE UR QL STRIP.AUTO: NEGATIVE
METHADONE UR QL: NEGATIVE
NITRITE UR QL STRIP.AUTO: NEGATIVE
OPIATES UR QL: NEGATIVE
PCP UR QL: NEGATIVE
PH UR STRIP: 6 (ref 5–9)
POTASSIUM SERPL-SCNC: 4.4 MMOL/L (ref 3.5–5.1)
PROT UR STRIP-MCNC: 300 MG/DL
RBC #/AREA URNS HPF: ABNORMAL /HPF
SODIUM SERPL-SCNC: 133 MMOL/L (ref 136–145)
SODIUM UR-SCNC: <20 MMOL/L
SP GR UR REFRACTOMETRY: 1.01 (ref 1–1.02)
UROBILINOGEN UR QL STRIP.AUTO: 0.2 EU/DL (ref 0.2–1)
WBC URNS QL MICRO: ABNORMAL /HPF

## 2024-07-23 PROCEDURE — 97165 OT EVAL LOW COMPLEX 30 MIN: CPT

## 2024-07-23 PROCEDURE — 80048 BASIC METABOLIC PNL TOTAL CA: CPT

## 2024-07-23 PROCEDURE — 2580000003 HC RX 258: Performed by: INTERNAL MEDICINE

## 2024-07-23 PROCEDURE — 6360000002 HC RX W HCPCS: Performed by: INTERNAL MEDICINE

## 2024-07-23 PROCEDURE — 6360000002 HC RX W HCPCS: Performed by: FAMILY MEDICINE

## 2024-07-23 PROCEDURE — 97530 THERAPEUTIC ACTIVITIES: CPT

## 2024-07-23 PROCEDURE — 81001 URINALYSIS AUTO W/SCOPE: CPT

## 2024-07-23 PROCEDURE — 83935 ASSAY OF URINE OSMOLALITY: CPT

## 2024-07-23 PROCEDURE — 93010 ELECTROCARDIOGRAM REPORT: CPT | Performed by: INTERNAL MEDICINE

## 2024-07-23 PROCEDURE — 80307 DRUG TEST PRSMV CHEM ANLYZR: CPT

## 2024-07-23 PROCEDURE — 83605 ASSAY OF LACTIC ACID: CPT

## 2024-07-23 PROCEDURE — 84300 ASSAY OF URINE SODIUM: CPT

## 2024-07-23 PROCEDURE — 97161 PT EVAL LOW COMPLEX 20 MIN: CPT

## 2024-07-23 PROCEDURE — 6370000000 HC RX 637 (ALT 250 FOR IP): Performed by: INTERNAL MEDICINE

## 2024-07-23 PROCEDURE — 36415 COLL VENOUS BLD VENIPUNCTURE: CPT

## 2024-07-23 PROCEDURE — 97535 SELF CARE MNGMENT TRAINING: CPT

## 2024-07-23 PROCEDURE — 1100000000 HC RM PRIVATE

## 2024-07-23 RX ORDER — NICOTINE 21 MG/24HR
1 PATCH, TRANSDERMAL 24 HOURS TRANSDERMAL DAILY PRN
Status: DISCONTINUED | OUTPATIENT
Start: 2024-07-23 | End: 2024-07-26 | Stop reason: HOSPADM

## 2024-07-23 RX ORDER — ENOXAPARIN SODIUM 100 MG/ML
40 INJECTION SUBCUTANEOUS EVERY 24 HOURS
Status: DISCONTINUED | OUTPATIENT
Start: 2024-07-23 | End: 2024-07-26 | Stop reason: HOSPADM

## 2024-07-23 RX ORDER — LORAZEPAM 1 MG/1
4 TABLET ORAL
Status: DISCONTINUED | OUTPATIENT
Start: 2024-07-23 | End: 2024-07-26 | Stop reason: HOSPADM

## 2024-07-23 RX ORDER — MULTIVITAMIN WITH IRON
1 TABLET ORAL DAILY
Status: DISCONTINUED | OUTPATIENT
Start: 2024-07-23 | End: 2024-07-26 | Stop reason: HOSPADM

## 2024-07-23 RX ORDER — FUROSEMIDE 40 MG/1
20 TABLET ORAL 2 TIMES DAILY
Status: DISCONTINUED | OUTPATIENT
Start: 2024-07-23 | End: 2024-07-23

## 2024-07-23 RX ORDER — LANOLIN ALCOHOL/MO/W.PET/CERES
100 CREAM (GRAM) TOPICAL DAILY
Status: DISCONTINUED | OUTPATIENT
Start: 2024-07-23 | End: 2024-07-26 | Stop reason: HOSPADM

## 2024-07-23 RX ORDER — DOCUSATE SODIUM 100 MG/1
100 CAPSULE, LIQUID FILLED ORAL 2 TIMES DAILY PRN
Status: DISCONTINUED | OUTPATIENT
Start: 2024-07-23 | End: 2024-07-26 | Stop reason: HOSPADM

## 2024-07-23 RX ORDER — LORAZEPAM 1 MG/1
2 TABLET ORAL
Status: DISCONTINUED | OUTPATIENT
Start: 2024-07-23 | End: 2024-07-26 | Stop reason: HOSPADM

## 2024-07-23 RX ORDER — FUROSEMIDE 40 MG/1
20 TABLET ORAL DAILY PRN
Status: DISCONTINUED | OUTPATIENT
Start: 2024-07-23 | End: 2024-07-24

## 2024-07-23 RX ORDER — PANTOPRAZOLE SODIUM 40 MG/1
40 TABLET, DELAYED RELEASE ORAL
Status: DISCONTINUED | OUTPATIENT
Start: 2024-07-23 | End: 2024-07-26 | Stop reason: HOSPADM

## 2024-07-23 RX ORDER — CLONIDINE HYDROCHLORIDE 0.1 MG/1
0.1 TABLET ORAL 2 TIMES DAILY PRN
Status: DISCONTINUED | OUTPATIENT
Start: 2024-07-23 | End: 2024-07-24

## 2024-07-23 RX ORDER — LORAZEPAM 1 MG/1
3 TABLET ORAL
Status: DISCONTINUED | OUTPATIENT
Start: 2024-07-23 | End: 2024-07-26 | Stop reason: HOSPADM

## 2024-07-23 RX ORDER — LORAZEPAM 1 MG/1
1 TABLET ORAL
Status: DISCONTINUED | OUTPATIENT
Start: 2024-07-23 | End: 2024-07-26 | Stop reason: HOSPADM

## 2024-07-23 RX ORDER — ZOLPIDEM TARTRATE 5 MG/1
5 TABLET ORAL NIGHTLY PRN
Status: DISCONTINUED | OUTPATIENT
Start: 2024-07-23 | End: 2024-07-26 | Stop reason: HOSPADM

## 2024-07-23 RX ORDER — PROGESTERONE 200 MG/1
200 CAPSULE ORAL NIGHTLY
Status: DISCONTINUED | OUTPATIENT
Start: 2024-07-23 | End: 2024-07-26 | Stop reason: HOSPADM

## 2024-07-23 RX ORDER — VALSARTAN 40 MG/1
40 TABLET ORAL DAILY
Status: DISCONTINUED | OUTPATIENT
Start: 2024-07-23 | End: 2024-07-24

## 2024-07-23 RX ORDER — AMLODIPINE BESYLATE 10 MG/1
10 TABLET ORAL DAILY
Status: DISCONTINUED | OUTPATIENT
Start: 2024-07-23 | End: 2024-07-26 | Stop reason: HOSPADM

## 2024-07-23 RX ORDER — LEVOTHYROXINE SODIUM 0.05 MG/1
25 TABLET ORAL DAILY
Status: DISCONTINUED | OUTPATIENT
Start: 2024-07-23 | End: 2024-07-23

## 2024-07-23 RX ORDER — PAROXETINE HYDROCHLORIDE 20 MG/1
20 TABLET, FILM COATED ORAL EVERY MORNING
Status: DISCONTINUED | OUTPATIENT
Start: 2024-07-23 | End: 2024-07-26 | Stop reason: HOSPADM

## 2024-07-23 RX ORDER — DOXEPIN HYDROCHLORIDE 10 MG/1
10 CAPSULE ORAL NIGHTLY
Status: DISCONTINUED | OUTPATIENT
Start: 2024-07-23 | End: 2024-07-24

## 2024-07-23 RX ORDER — VALSARTAN 40 MG/1
40 TABLET ORAL DAILY
Status: ON HOLD | COMMUNITY
End: 2024-07-26 | Stop reason: HOSPADM

## 2024-07-23 RX ADMIN — CLONIDINE HYDROCHLORIDE 0.1 MG: 0.1 TABLET ORAL at 02:17

## 2024-07-23 RX ADMIN — SODIUM CHLORIDE 4 MG: 9 INJECTION INTRAMUSCULAR; INTRAVENOUS; SUBCUTANEOUS at 13:25

## 2024-07-23 RX ADMIN — DOXEPIN HYDROCHLORIDE 10 MG: 10 CAPSULE ORAL at 21:21

## 2024-07-23 RX ADMIN — VALSARTAN 40 MG: 40 TABLET, FILM COATED ORAL at 09:12

## 2024-07-23 RX ADMIN — SODIUM CHLORIDE 2 MG: 9 INJECTION INTRAMUSCULAR; INTRAVENOUS; SUBCUTANEOUS at 19:12

## 2024-07-23 RX ADMIN — LORAZEPAM 1 MG: 1 TABLET ORAL at 06:18

## 2024-07-23 RX ADMIN — AMLODIPINE BESYLATE 10 MG: 10 TABLET ORAL at 09:13

## 2024-07-23 RX ADMIN — B-COMPLEX W/ C & FOLIC ACID TAB 1 TABLET: TAB at 09:13

## 2024-07-23 RX ADMIN — LORAZEPAM 3 MG: 1 TABLET ORAL at 09:12

## 2024-07-23 RX ADMIN — ENOXAPARIN SODIUM 40 MG: 100 INJECTION SUBCUTANEOUS at 21:21

## 2024-07-23 RX ADMIN — PANTOPRAZOLE SODIUM 40 MG: 40 TABLET, DELAYED RELEASE ORAL at 06:16

## 2024-07-23 RX ADMIN — ZOLPIDEM TARTRATE 5 MG: 5 TABLET ORAL at 21:21

## 2024-07-23 RX ADMIN — PAROXETINE HYDROCHLORIDE 20 MG: 20 TABLET, FILM COATED ORAL at 09:13

## 2024-07-23 RX ADMIN — DOXEPIN HYDROCHLORIDE 10 MG: 10 CAPSULE ORAL at 02:19

## 2024-07-23 RX ADMIN — Medication 100 MG: at 09:13

## 2024-07-23 RX ADMIN — LORAZEPAM 1 MG: 1 TABLET ORAL at 22:04

## 2024-07-23 RX ADMIN — ZOLPIDEM TARTRATE 5 MG: 5 TABLET ORAL at 02:18

## 2024-07-23 ASSESSMENT — PAIN SCALES - GENERAL
PAINLEVEL_OUTOF10: 0

## 2024-07-23 NOTE — CARE COORDINATION
SW CM completed chart review for initial CM assessment. SW attempted to complete assessment with pt x 3 and pt sedated and on CIWA protocol. Pt lives in an apartment with spouse with level entry. Last visit with PCP Dr. Geronimo Marks 2/26/2024. Per PT/OT notes, pt independent with ADLs and no PT/OT needs recommended. Pt with noted history of ETOH and inpatient treatment facilities for cessation.     SW to meet with patient when more alert to discuss potential cessation programs.     DC/POC to return home with spouse when medically stable.    CM team to continue to follow for any needs that may arise.        07/23/24 [id number]   Service Assessment   Patient Orientation Sedated   Cognition Other (see comment)  (sedated)   Primary Caregiver Spouse   Accompanied By/Relationship N/A   Support Systems Spouse/Significant Other   Patient's Healthcare Decision Maker is: Legal Next of Kin   PCP Verified by CM Yes  (Dr. Geronimo Marks)   Last Visit to PCP Within last 6 months  (2/26/2024)   Prior Functional Level Independent in ADLs/IADLs   Current Functional Level Independent in ADLs/IADLs   Can patient return to prior living arrangement Yes   Ability to make needs known: Other (see comment)  (sedated)   Family able to assist with home care needs: Yes   Would you like for me to discuss the discharge plan with any other family members/significant others, and if so, who? Yes  (spouse Terence)   Financial Resources Medicare  ([facility])   Community Resources None   CM/SW Referral Other (see comment)  (discharge planning)   Social/Functional History   Lives With Spouse   Type of Home Apartment   Home Layout One level   Home Access Level entry   Bathroom Shower/Tub Tub/Shower unit   ADL Assistance Independent   Homemaking Assistance Independent   Homemaking Responsibilities Yes   Ambulation Assistance Independent   Transfer Assistance Independent   Occupation Retired   Discharge Planning   Type of Residence Apartment   Living Arrangements  Spouse/Significant Other   Current Services Prior To Admission None   Potential Assistance Needed N/A   DME Ordered? No   Potential Assistance Purchasing Medications No   Type of Home Care Services None   Patient expects to be discharged to: Apartment   One/Two Story Residence One story   History of falls? 0   Condition of Participation: Discharge Planning   The Plan for Transition of Care is related to the following treatment goals: Patient to return home with spouse when medically stable.     Rachna Hanna, MSW, LBSW    [facility]

## 2024-07-23 NOTE — CARE COORDINATION
Pt chart reviewed and discussed in Critical Care Interdisciplinary Rounds. Pt admitted today with alcohol abuse. Per MD, pt improving; plan to transfer to [facility] pending bed availability. Pt not medically stable for discharge.     CM team will continue to follow for potential discharge needs that may arise.

## 2024-07-23 NOTE — PROGRESS NOTES
Critical Care Interdisciplinary Rounds with staff.     Lorie Israel M.Div, [facility] / / Bereavement Coordinator  [facility] Care Department   c: [phone]/ [phone] / [email or web address]  [email or web address]

## 2024-07-23 NOTE — PROGRESS NOTES
Advance Care Planning     Advance Care Planning Inpatient Note  Spiritual Care Department    Today's Date: 7/23/2024  Unit: SFE 3 ICU    Received request from IDT Member.  Upon review of chart and communication with care team, Spiritual Care will defer advance care planning with patient at this time.. Patient was/were present in the room during visit.    Goals of ACP Conversation:  No discussion at this time due to patient just receiving medication per RN    Health Care Decision Makers:       Primary Decision Maker (Active): Terence Lujan - [facility] - [phone]  Summary:    Advance Care Planning Documents (Patient Wishes):  None     Assessment:  Visit attempted but patient had just received medication which would not allow patient to complete at this time.    Interventions:  N:one    Care Preferences Communicated:   No    Outcomes/Plan:  ACP Discussion: Refused    Electronically signed by Chaplain Mi on 7/23/2024 at 2:32 PM

## 2024-07-23 NOTE — PLAN OF CARE
Problem: Discharge Planning  Goal: Discharge to home or other facility with appropriate resources  Outcome: Progressing  Flowsheets (Taken 7/23/2024 0145)  Discharge to home or other facility with appropriate resources:   Identify barriers to discharge with patient and caregiver   Arrange for needed discharge resources and transportation as appropriate   Identify discharge learning needs (meds, wound care, etc)   Refer to discharge planning if patient needs post-hospital services based on physician order or complex needs related to functional status, cognitive ability or social support system     Problem: Pain  Goal: Verbalizes/displays adequate comfort level or baseline comfort level  Outcome: Progressing     Problem: Safety - Adult  Goal: Free from fall injury  Outcome: Progressing     Problem: Neurosensory - Adult  Goal: Achieves stable or improved neurological status  Outcome: Progressing  Goal: Absence of seizures  Outcome: Progressing  Goal: Achieves maximal functionality and self care  Outcome: Progressing     Problem: Gastrointestinal - Adult  Goal: Minimal or absence of nausea and vomiting  Outcome: Progressing     Problem: Metabolic/Fluid and Electrolytes - Adult  Goal: Electrolytes maintained within normal limits  Outcome: Progressing

## 2024-07-23 NOTE — ED TRIAGE NOTES
Pt reports issue with alcohol use drinks 1-2 liters a day. Pt treated 7/18/24 for withdrawal. Pt reports last drank 1400today. Pt states told by [facility] and told to come to ER for evaluation, stabilization, and possible transfer.

## 2024-07-23 NOTE — PROGRESS NOTES
TRANSFER - IN REPORT:    Verbal report received from YANCY Patton on April Lujan  being received from ED for routine progression of patient care      Report consisted of patient's Situation, Background, Assessment and   Recommendations(SBAR).     Information from the following report(s) Nurse Handoff Report, ED SBAR, MAR, Recent Results, and Cardiac Rhythm ST  was reviewed with the receiving nurse.    Opportunity for questions and clarification was provided. Awaiting arrival to unit.

## 2024-07-23 NOTE — ED NOTES
TRANSFER - OUT REPORT:    Verbal report given to ICU RN on April Lujan  being transferred to [facility] for routine progression of patient care       Report consisted of patient's Situation, Background, Assessment and   Recommendations(SBAR).     Information from the following report(s) Nurse Handoff Report was reviewed with the receiving nurse.    Lines:   Peripheral IV 07/22/24 Left;Posterior Hand (Active)   Site Assessment Clean, dry & intact 07/22/24 2037   Line Status Blood return noted;Flushed;Specimen collected;Normal saline locked 07/22/24 2037   Phlebitis Assessment No symptoms 07/22/24 2037   Infiltration Assessment 0 07/22/24 2037   Dressing Status New dressing applied 07/22/24 2037   Dressing Type Transparent 07/22/24 2037        Opportunity for questions and clarification was provided.      Patient transported with:  Registered Nurse

## 2024-07-23 NOTE — PLAN OF CARE
Problem: Discharge Planning  Goal: Discharge to home or other facility with appropriate resources  7/23/2024 1142 by Ying Purvis RN  Outcome: Progressing     Problem: Pain  Goal: Verbalizes/displays adequate comfort level or baseline comfort level  7/23/2024 1142 by Ying Purvis RN  Outcome: Progressing     Problem: Safety - Adult  Goal: Free from fall injury  7/23/2024 1142 by Ying Purvis RN  Outcome: Progressing     Problem: Neurosensory - Adult  Goal: Achieves stable or improved neurological status  7/23/2024 1142 by Ying Purvis RN  Outcome: Progressing     Problem: Neurosensory - Adult  Goal: Absence of seizures  7/23/2024 1142 by Ying Purvis RN  Outcome: Progressing     Problem: Neurosensory - Adult  Goal: Achieves maximal functionality and self care  7/23/2024 1142 by Ying Purvis RN  Outcome: Progressing     Problem: Gastrointestinal - Adult  Goal: Minimal or absence of nausea and vomiting  7/23/2024 1142 by Ying Purvis RN  Outcome: Progressing     Problem: Metabolic/Fluid and Electrolytes - Adult  Goal: Electrolytes maintained within normal limits  7/23/2024 1142 by Ying Purvis RN  Outcome: Progressing

## 2024-07-23 NOTE — PROGRESS NOTES
ACUTE PHYSICAL THERAPY GOALS:   (Developed with and agreed upon by patient and/or caregiver.)   Patient will demonstrate independence with sit <> stand transitions within 1 treatment day.  -GOAL MET 7/23/24   Patient will ambulate 150' independently without a device within 1 treatment day.  -GOAL MET 7/23/24       PHYSICAL THERAPY Initial Assessment and Discharge  (Link to Caseload Tracking: PT Visit Days : 1  Acknowledge Orders  Time In/Out  PT Charge Capture  Rehab Caseload Tracker    April Lujan is a 68 y.o. female   PRIMARY DIAGNOSIS: Alcohol abuse  Alcohol abuse [F10.10]  Hyponatremia [E87.1]  Chest discomfort [R07.89]  Elevated liver enzymes [R74.8]  Recurrent vomiting [R11.10]  Alcohol withdrawal syndrome without complication (HCC) [F10.930]  Failure of outpatient treatment [Z78.9]       Reason for Referral: Unspecified Lack of Coordination (R27.9)  Inpatient: Payor: [facility] MEDICARE / Plan: [facility] MEDICARE ADVANTAGE / Product Type: *No Product type* /     ASSESSMENT:     REHAB RECOMMENDATIONS:   Recommendation to date pending progress:  Setting:  No further skilled physical therapy after discharge from hospital    Equipment:    None     ASSESSMENT:  Ms. Lujan admitted with above diagnoses.  Per chart review, patient with multiple ER visits/hospitalization related to her alcohol use.  She has d/c'd home each time but has reported treatment in rehab facilities in the past.  Patient is independent at base and is currently demonstrating continued independence with transfers and mobility without need for assistive device.  Patient is not demonstrating any acute or d/c PT needs at this time.       [facility] AM-PAC™ “6 Clicks” Basic Mobility Inpatient Short Form  AM-PAC Basic Mobility - Inpatient   How much help is needed turning from your back to your side while in a flat bed without using bedrails?: None  How much help is needed moving from lying on your back to sitting on the side of a flat bed  without using bedrails?: None  How much help is needed moving to and from a bed to a chair?: None  How much help is needed standing up from a chair using your arms?: None  How much help is needed walking in hospital room?: None  How much help is needed climbing 3-5 steps with a railing?: None  AM-PAC Inpatient Mobility Raw Score : 24  AM-PAC Inpatient T-Scale Score : 61.14  Mobility Inpatient CMS 0-100% Score: 0  Mobility Inpatient CMS G-Code Modifier : CH    SUBJECTIVE:   Ms. Lujan agreeable.    Social/Functional Lives With: Spouse  Type of Home: Apartment  Home Layout: One level  Home Access: Level entry  Bathroom Shower/Tub: Tub/Shower unit    OBJECTIVE:     PAIN: VITALS / O2: PRECAUTION / LINES / DRAINS:   Pre Treatment: no complaints         Post Treatment: no complaints Vitals        Oxygen  O2 Therapy: Room air   Continuous Pulse Oximetry and Telemetry     RESTRICTIONS/PRECAUTIONS:                    GROSS EVALUATION:  Intact Impaired (Comments):   AROM [x]     PROM []    Strength [x]     Balance [x]     Posture [] WFL   Sensation [x]     Coordination [x]      Tone [x]     Edema []    Activity Tolerance []  Generally decreased, functional     []      COGNITION/  PERCEPTION: Intact Impaired (Comments):   Orientation [x]     Vision [x]     Hearing [x]     Cognition  [x]       MOBILITY: I Mod I S SBA CGA Min Mod Max Total  NT x2 Comments:   Bed Mobility    Rolling [] [] [] [] [] [] [] [] [] [] []    Supine to Sit [] [x] [] [] [] [] [] [] [] [] [] Bed rail   Scooting [x] [] [] [] [] [] [] [] [] [] []    Sit to Supine [] [] [] [] [] [] [] [] [] [] []    Transfers    Sit to Stand [x] [] [] [] [] [] [] [] [] [] []    Bed to Chair [x] [] [] [] [] [] [] [] [] [] []    Stand to Sit [x] [] [] [] [] [] [] [] [] [] []    Toilet  [x] [] [] [] [] [] [] [] [] [] []    I=Independent, Mod I=Modified Independent, S=Supervision, SBA=Standby Assistance, CGA=Contact Guard Assistance,   Min=Minimal Assistance, Mod=Moderate

## 2024-07-23 NOTE — PROGRESS NOTES
April Lujan is a 68 y.o. female with medical history of hypertension, alcoholic hepatitis, continue to use alcohol at this time, depression, hypothyroidism admitted earlier this morning with alcoholic lactic acidosis and concern for alcohol withdrawals.    Patient is seen and examined at the bedside.  No active complaint.  Alert and oriented.  Discussed alcohol cessation and patient willing to quit.  Will ask  to provide resources.    Lactic acid 3.5, follow-up repeat after IV fluid resuscitation.  Patient started on CIWA protocol.  Will continue current management at this time.

## 2024-07-23 NOTE — ED PROVIDER NOTES
Emergency Department Provider Note                   PCP:                Geronimo Marks MD               Age: 68 y.o.      Sex: female   Final diagnosis/impression:  1. Alcohol withdrawal syndrome without complication (HCC)    2. Recurrent vomiting    3. Elevated liver enzymes    4. Failure of outpatient treatment    5. Hyponatremia    6. Chest discomfort       Disposition: Admit to Hospitalist    MDM/Clinical Course:  Patient seen by myself at the [facility] emergency department. Patient had signs symptoms and clinical history most consistent with []. My independent analysis/interpretation of laboratory work-up here shows []. Radiology shows []. While under my care, patient received [].    Complexity of Problems Addressed:  1 or more acute illnesses that pose a threat to life or bodily function.     Data Reviewed and Analyzed:    Category 1:   I reviewed external records: Reviewed 7/18/2024 outside ER visit for alcohol withdrawal syndrome  I ordered each unique test.  I reviewed the results of each unique test.  The patients assessment required an independent historian: Patient  .  The reason they were needed is important historical information not provided by the patient.    Category 2:   My Independent EKG Interpretation:   Sinus tachycardia, rate of 108, no ST elevation MI, QTc is 466 and is not prolonged, QRS is 84 and is not widened    Radiology:  My limited/focused independent chest x-ray review shows no pneumothorax, no pneumonia, no cardiomegaly, no aortic widening, no noted focal consolidation. [].   Please see official radiology read for additional information, further findings and official interpretation.       Category 3: Discussion of management or test interpretation.  See MDM / clinical course section above for details    Risk of Complications and/or Morbidity of Patient Management:  Prescription drug management performed.  Parental controlled substances given in the

## 2024-07-23 NOTE — H&P
2338  Gross per 24 hour   Intake 1000 ml   Output --   Net 1000 ml         Physical Exam:  Vital signs 155/50 84 12 98 7  General:    Well nourished.    Head:  Normocephalic, atraumatic  Eyes:  Sclerae appear normal.  Pupils equally round.  ENT:  Nares appear normal.  Moist oral mucosa  Neck:  No restricted ROM.  Trachea midline   CV:   RRR.  No m/r/g.  No jugular venous distension.  Lungs:   CTAB.  No wheezing, rhonchi, or rales.  Symmetric expansion.  Abdomen:   Soft, nontender, nondistended.  Extremities: No cyanosis or clubbing.  No edema  Skin:     No rashes.  Normal coloration.   Warm and dry.    Neuro:  CN II-XII grossly intact.  Sensation intact.   Psych:  Normal mood and affect.      Orders Placed This Encounter   Medications    ondansetron (ZOFRAN) injection 8 mg    sodium chloride 0.9 % bolus 1,000 mL    thiamine (B-1) injection 100 mg    pantoprazole (PROTONIX) 40 mg in sodium chloride (PF) 0.9 % 10 mL injection    LORazepam (ATIVAN) injection 1 mg    LORazepam (ATIVAN) tablet 1 mg    amLODIPine (NORVASC) tablet 10 mg    cloNIDine (CATAPRES) tablet 0.1 mg    docusate sodium (COLACE) capsule 100 mg    doxepin (SINEQUAN) capsule 10 mg    DISCONTD: furosemide (LASIX) tablet 20 mg    DISCONTD: levothyroxine (SYNTHROID) tablet 25 mcg    multivitamin 1 tablet    pantoprazole (PROTONIX) tablet 40 mg    PARoxetine (PAXIL) tablet 20 mg    progesterone (PROMETRIUM) capsule 200 mg- patient supplied (Patient Supplied)    valsartan (DIOVAN) tablet 40 mg    nicotine (NICODERM CQ) 14 MG/24HR 1 patch    OR Linked Order Group     LORazepam (ATIVAN) tablet 1 mg     LORazepam (ATIVAN) 1 mg in sodium chloride (PF) 0.9 % 10 mL injection     LORazepam (ATIVAN) tablet 2 mg     LORazepam (ATIVAN) 2 mg in sodium chloride (PF) 0.9 % 10 mL injection     LORazepam (ATIVAN) tablet 3 mg     LORazepam (ATIVAN) 3 mg in sodium chloride (PF) 0.9 % 10 mL injection     LORazepam (ATIVAN) tablet 4 mg     LORazepam (ATIVAN) 4 mg in sodium  chloride (PF) 0.9 % 10 mL injection    thiamine tablet 100 mg    zolpidem (AMBIEN) tablet 5 mg    furosemide (LASIX) tablet 20 mg       Prior to Admit Medications:  Current Outpatient Medications   Medication Instructions    amLODIPine (NORVASC) 10 mg, Oral, DAILY    B Complex-C-Folic Acid (SUPER B COMPLEX/FA/VIT C PO) Oral    cloNIDine (CATAPRES) 0.1 mg, Oral, 2 TIMES DAILY PRN, Pt states that she checks her BP often at home. IF BP is 150/70 or higher, pt takes dose    docusate sodium (COLACE) 100 mg, Oral, 2 TIMES DAILY PRN    doxepin (SINEQUAN) 10 mg, Oral, NIGHTLY    furosemide (LASIX) 20 mg, Oral, PRN    levothyroxine (SYNTHROID) 25 mcg, Oral, DAILY    MAGNESIUM PO Oral, DAILY PRN    Milk Thistle 250 MG CAPS Oral, DAILY    Multiple Vitamin (MULTIVITAMIN) TABS tablet 1 tablet, Oral, DAILY    NONFORMULARY Tomas<BR>Hormone Advantage <BR>DIM with pomegranate and broccoli extract     ondansetron (ZOFRAN) 4 mg, Oral, EVERY 8 HOURS PRN    pantoprazole (PROTONIX) 40 mg, Oral, DAILY BEFORE BREAKFAST    pantoprazole sodium (PROTONIX) 40 mg, Oral, DAILY BEFORE BREAKFAST    PARoxetine (PAXIL) 20 mg, Oral, EVERY MORNING    progesterone (PROMETRIUM) 200 mg, Oral, NIGHTLY    valsartan (DIOVAN) 40 mg, Oral, DAILY    vitamin D (CHOLECALCIFEROL) 1,000 Units, Oral, DAILY       I have personally reviewed labs and tests:  Recent Results (from the past 24 hour(s))   EKG 12 Lead    Collection Time: 07/22/24  8:39 PM   Result Value Ref Range    Ventricular Rate 108 BPM    Atrial Rate 108 BPM    P-R Interval 154 ms    QRS Duration 84 ms    Q-T Interval 348 ms    QTc Calculation (Bazett) 466 ms    P Axis 82 degrees    R Axis 80 degrees    T Axis 75 degrees    Diagnosis       Sinus tachycardia  Right atrial enlargement  Cannot rule out Anterior infarct , age undetermined  Abnormal ECG  No previous ECGs available     CBC with Auto Differential    Collection Time: 07/22/24  8:41 PM   Result Value Ref Range    WBC 7.5 4.3 - 11.1 K/uL     07/22/24  8:41 PM   Result Value Ref Range    Lactic Acid 3.5 (H) 0.5 - 2.0 mmol/L   COVID-19, Rapid    Collection Time: 07/22/24  8:44 PM    Specimen: Nasopharyngeal   Result Value Ref Range    Source NASAL SWAB      SARS-CoV-2, Rapid Not detected NOTD     Urinalysis w rflx microscopic    Collection Time: 07/23/24 12:58 AM   Result Value Ref Range    Color, UA YELLOW/STRAW      Appearance CLOUDY      Specific Gravity, UA 1.007 1.001 - 1.023      pH, Urine 6.0 5.0 - 9.0      Protein,  (A) NEG mg/dL    Glucose, Ur Negative mg/dL    Ketones, Urine 15 (A) NEG mg/dL    Bilirubin, Urine Negative NEG      Blood, Urine TRACE (A) NEG      Urobilinogen, Urine 0.2 0.2 - 1.0 EU/dL    Nitrite, Urine Negative NEG      Leukocyte Esterase, Urine Negative NEG      WBC, UA 0-4 U4 /hpf    RBC, UA 0-5 U5 /hpf    Epithelial Cells, UA 0-5 U5 /hpf    BACTERIA, URINE Negative NEG /hpf    Hyaline Casts, UA 2-5 /lpf   Urine Drug Screen    Collection Time: 07/23/24 12:58 AM   Result Value Ref Range    Phencyclidine, Urine Negative NEG      Benzodiazepines, Urine Positive (A) NEG      Cocaine, Urine Negative NEG      Amphetamine, Urine Negative NEG      Methadone, Urine Negative NEG      THC, TH-Cannabinol, Urine Negative NEG      Opiates, Urine Negative NEG      Barbiturates, Urine Negative NEG     Sodium, urine, random    Collection Time: 07/23/24 12:58 AM   Result Value Ref Range    SODIUM, RANDOM URINE <20 MMOL/L       Recent Labs     07/22/24 2044   COVID19 Not detected       XR CHEST (2 VW)    Result Date: 7/22/2024  Chest X-ray INDICATION: cough, chest congestion COMPARISON:  None TECHNIQUE: PA and lateral views of the chest were obtained. FINDINGS: The lungs are clear. There are no infiltrates or effusions.  The heart size is normal.  The bony thorax is intact.      No acute findings in the chest Electronically signed by Asaf Arauz        Signed:  Ian Grant MD    Part of this note may have been written

## 2024-07-23 NOTE — PROGRESS NOTES
4 Eyes Skin Assessment     NAME:  April Lujan  YOB: 1955  MEDICAL RECORD NUMBER:  [id number]    The patient is being assessed for  Admission    I agree that at least one RN has performed a thorough Head to Toe Skin Assessment on the patient. ALL assessment sites listed below have been assessed.      Areas assessed by both nurses:    Other full body        Does the Patient have a Wound? No noted wound(s), bruise to right arm, scattered small abrasions to BLE.       Ernie Prevention initiated by RN: Yes  Wound Care Orders initiated by RN: No    Pressure Injury (Stage 3,4, Unstageable, DTI, NWPT, and Complex wounds) if present, place Wound referral order by RN under : No    New Ostomies, if present place, Ostomy referral order under : No     Nurse 1 eSignature: Electronically signed by Louise Neff RN on 7/23/24 at 5:36 AM EDT    **SHARE this note so that the co-signing nurse can place an eSignature**    Nurse 2 eSignature: {Esignature:[id number]}

## 2024-07-23 NOTE — PROGRESS NOTES
ACUTE OCCUPATIONAL THERAPY GOALS:   (Developed with and agreed upon by patient and/or caregiver.)  1. Patient will perform toileting and toilet transfer with SBA.  2. Patient will perform ADL functional mobility and tranfers in room with SBA.    Goals to be achieved in 7 days.     OCCUPATIONAL THERAPY Initial Assessment, Discharge, and AM       OT Visit Days: 1  Acknowledge Orders  Time  OT Charge Capture  Rehab Caseload Tracker      April Lujan is a 68 y.o. female   PRIMARY DIAGNOSIS: Alcohol abuse  Alcohol abuse [F10.10]  Hyponatremia [E87.1]  Chest discomfort [R07.89]  Elevated liver enzymes [R74.8]  Recurrent vomiting [R11.10]  Alcohol withdrawal syndrome without complication (HCC) [F10.930]  Failure of outpatient treatment [Z78.9]       Reason for Referral: Generalized Muscle Weakness (M62.81)  Inpatient: Payor: [facility] MEDICARE / Plan: [facility] MEDICARE ADVANTAGE / Product Type: *No Product type* /     ASSESSMENT:     REHAB RECOMMENDATIONS:   Recommendation to date pending progress:  Setting:  No further skilled occupational therapy after discharge from hospital    Equipment:    None     ASSESSMENT:  Ms. Lujan admitted with above diagnoses. Pt with history of multiple ER visits and hospitalizations related to her alcohol use. Pt is independent at baseline and demonstrated continued independence this am with functional mobility, transfers and ADL tasks. No skilled OT is indicated at this time. Will discharge OT.      [facility] AM-PAC™ “6 Clicks” Daily Activity Inpatient Short Form:    AM-PAC Daily Activity - Inpatient   How much help is needed for putting on and taking off regular lower body clothing?: None  How much help is needed for bathing (which includes washing, rinsing, drying)?: None  How much help is needed for toileting (which includes using toilet, bedpan, or urinal)?: None  How much help is needed for putting on and taking off regular upper body clothing?: None  How much help is needed for  DAILY LIVING: I Mod I S SBA CGA Min Mod Max Total NT Comments   BASIC ADLs:              Upper Body Bathing  [x] [] [] [] [] [] [] [] [] []     Lower Body Bathing [x] [] [] [] [] [] [] [] [] []     Toileting [x] [] [] [] [] [] [] [] [] []    Upper Body Dressing [x] [] [] [] [] [] [] [] [] []    Lower Body Dressing [x] [] [] [] [] [] [] [] [] []    Feeding [x] [] [] [] [] [] [] [] [] []    Grooming [x] [] [] [] [] [] [] [] [] []    Personal Device Care [] [] [] [] [] [] [] [] [] []    Functional Mobility [x] [] [] [] [] [] [] [] [] []    I=Independent, Mod I=Modified Independent, S=Supervision/Setup, SBA=Standby Assistance, CGA=Contact Guard Assistance, Min=Minimal Assistance, Mod=Moderate Assistance, Max=Maximal Assistance, Total=Total Assistance, NT=Not Tested    PLAN:   FREQUENCY/DURATION     Evaluation and discharge    PROBLEM LIST:   (Skilled intervention is medically necessary to address:)     INTERVENTIONS PLANNED:  (Benefits and precautions of occupational therapy have been discussed with the patient.)           TREATMENT:     EVALUATION: LOW COMPLEXITY: (Untimed Charge)  The initial evaluation charge encompasses clinical chart review, objective assessment, interpretation of assessment, and skilled monitoring of the patient's response to treatment in order to develop a plan of care.     TREATMENT:   Self Care (10 minutes): Patient participated in toileting, lower body dressing, and grooming ADLs in unsupported sitting and standing with minimal verbal cueing to increase activity tolerance. Patient also participated in bed mobility, functional mobility, and functional transfer training to increase activity tolerance.     TREATMENT GRID:  N/A    AFTER TREATMENT PRECAUTIONS: Bed/Chair Locked, Call light within reach, Chair, Needs within reach, and RN notified    INTERDISCIPLINARY COLLABORATION:  RN/ PCT and PT/ PTA    EDUCATION:  Education Given To: Patient  Education Provided: Role of Therapy  Education Method:

## 2024-07-24 LAB
ANION GAP SERPL CALC-SCNC: 12 MMOL/L (ref 9–18)
BASOPHILS # BLD: 0.1 K/UL (ref 0–0.2)
BASOPHILS NFR BLD: 1 % (ref 0–2)
BUN SERPL-MCNC: 14 MG/DL (ref 8–23)
CALCIUM SERPL-MCNC: 9.4 MG/DL (ref 8.8–10.2)
CHLORIDE SERPL-SCNC: 95 MMOL/L (ref 98–107)
CO2 SERPL-SCNC: 25 MMOL/L (ref 20–28)
CREAT SERPL-MCNC: 1.07 MG/DL (ref 0.6–1.1)
DIFFERENTIAL METHOD BLD: ABNORMAL
EOSINOPHIL # BLD: 0.2 K/UL (ref 0–0.8)
EOSINOPHIL NFR BLD: 3 % (ref 0.5–7.8)
ERYTHROCYTE [DISTWIDTH] IN BLOOD BY AUTOMATED COUNT: 13 % (ref 11.9–14.6)
GLUCOSE SERPL-MCNC: 97 MG/DL (ref 70–99)
HCT VFR BLD AUTO: 37.9 % (ref 35.8–46.3)
HGB BLD-MCNC: 12.9 G/DL (ref 11.7–15.4)
IMM GRANULOCYTES # BLD AUTO: 0 K/UL (ref 0–0.5)
IMM GRANULOCYTES NFR BLD AUTO: 0 % (ref 0–5)
LYMPHOCYTES # BLD: 1.9 K/UL (ref 0.5–4.6)
LYMPHOCYTES NFR BLD: 34 % (ref 13–44)
MAGNESIUM SERPL-MCNC: 2.1 MG/DL (ref 1.8–2.4)
MCH RBC QN AUTO: 35.1 PG (ref 26.1–32.9)
MCHC RBC AUTO-ENTMCNC: 34 G/DL (ref 31.4–35)
MCV RBC AUTO: 103.3 FL (ref 82–102)
MONOCYTES # BLD: 0.6 K/UL (ref 0.1–1.3)
MONOCYTES NFR BLD: 11 % (ref 4–12)
NEUTS SEG # BLD: 2.8 K/UL (ref 1.7–8.2)
NEUTS SEG NFR BLD: 50 % (ref 43–78)
NRBC # BLD: 0 K/UL (ref 0–0.2)
PLATELET # BLD AUTO: 246 K/UL (ref 150–450)
PMV BLD AUTO: 9.4 FL (ref 9.4–12.3)
POTASSIUM SERPL-SCNC: 4.2 MMOL/L (ref 3.5–5.1)
RBC # BLD AUTO: 3.67 M/UL (ref 4.05–5.2)
SODIUM SERPL-SCNC: 132 MMOL/L (ref 136–145)
TSH W FREE THYROID IF ABNORMAL: 2.02 UIU/ML (ref 0.27–4.2)
WBC # BLD AUTO: 5.6 K/UL (ref 4.3–11.1)

## 2024-07-24 PROCEDURE — 36415 COLL VENOUS BLD VENIPUNCTURE: CPT

## 2024-07-24 PROCEDURE — 6370000000 HC RX 637 (ALT 250 FOR IP): Performed by: STUDENT IN AN ORGANIZED HEALTH CARE EDUCATION/TRAINING PROGRAM

## 2024-07-24 PROCEDURE — 84443 ASSAY THYROID STIM HORMONE: CPT

## 2024-07-24 PROCEDURE — 1100000000 HC RM PRIVATE

## 2024-07-24 PROCEDURE — 83735 ASSAY OF MAGNESIUM: CPT

## 2024-07-24 PROCEDURE — 2580000003 HC RX 258: Performed by: INTERNAL MEDICINE

## 2024-07-24 PROCEDURE — 6370000000 HC RX 637 (ALT 250 FOR IP): Performed by: INTERNAL MEDICINE

## 2024-07-24 PROCEDURE — 6360000002 HC RX W HCPCS: Performed by: FAMILY MEDICINE

## 2024-07-24 PROCEDURE — 85025 COMPLETE CBC W/AUTO DIFF WBC: CPT

## 2024-07-24 PROCEDURE — 6360000002 HC RX W HCPCS: Performed by: INTERNAL MEDICINE

## 2024-07-24 PROCEDURE — 80048 BASIC METABOLIC PNL TOTAL CA: CPT

## 2024-07-24 RX ORDER — MENTHOL/CAMPHOR/ALLANTOIN/PHE 0.6-0.5-1%
OINTMENT(EA) TOPICAL PRN
Status: DISCONTINUED | OUTPATIENT
Start: 2024-07-24 | End: 2024-07-26 | Stop reason: HOSPADM

## 2024-07-24 RX ORDER — DOXEPIN HYDROCHLORIDE 10 MG/1
10 CAPSULE ORAL NIGHTLY PRN
Status: DISCONTINUED | OUTPATIENT
Start: 2024-07-24 | End: 2024-07-26 | Stop reason: HOSPADM

## 2024-07-24 RX ADMIN — PANTOPRAZOLE SODIUM 40 MG: 40 TABLET, DELAYED RELEASE ORAL at 04:11

## 2024-07-24 RX ADMIN — SODIUM CHLORIDE 2 MG: 9 INJECTION INTRAMUSCULAR; INTRAVENOUS; SUBCUTANEOUS at 02:44

## 2024-07-24 RX ADMIN — PAROXETINE HYDROCHLORIDE 20 MG: 20 TABLET, FILM COATED ORAL at 08:25

## 2024-07-24 RX ADMIN — VALSARTAN 40 MG: 40 TABLET, FILM COATED ORAL at 08:25

## 2024-07-24 RX ADMIN — ENOXAPARIN SODIUM 40 MG: 100 INJECTION SUBCUTANEOUS at 20:58

## 2024-07-24 RX ADMIN — AMLODIPINE BESYLATE 10 MG: 10 TABLET ORAL at 08:23

## 2024-07-24 RX ADMIN — DOXEPIN HYDROCHLORIDE 10 MG: 10 CAPSULE ORAL at 20:58

## 2024-07-24 RX ADMIN — B-COMPLEX W/ C & FOLIC ACID TAB 1 TABLET: TAB at 08:25

## 2024-07-24 RX ADMIN — LORAZEPAM 3 MG: 1 TABLET ORAL at 10:34

## 2024-07-24 RX ADMIN — Medication 100 MG: at 08:25

## 2024-07-24 RX ADMIN — SODIUM CHLORIDE 4 MG: 9 INJECTION INTRAMUSCULAR; INTRAVENOUS; SUBCUTANEOUS at 05:38

## 2024-07-24 RX ADMIN — PROGESTERONE 200 MG: 200 CAPSULE ORAL at 20:58

## 2024-07-24 ASSESSMENT — PAIN SCALES - GENERAL
PAINLEVEL_OUTOF10: 0

## 2024-07-24 NOTE — PLAN OF CARE
Problem: Pain  Goal: Verbalizes/displays adequate comfort level or baseline comfort level  7/23/2024 2006 by Roshan Elam, RN  Outcome: Not Progressing  Flowsheets (Taken 7/23/2024 1905)  Verbalizes/displays adequate comfort level or baseline comfort level:   Encourage patient to monitor pain and request assistance   Assess pain using appropriate pain scale   Administer analgesics based on type and severity of pain and evaluate response   Implement non-pharmacological measures as appropriate and evaluate response   Consider cultural and social influences on pain and pain management   Notify Licensed Independent Practitioner if interventions unsuccessful or patient reports new pain  7/23/2024 1142 by Ying Purvis RN  Outcome: Progressing     Problem: Safety - Adult  Goal: Free from fall injury  7/23/2024 2006 by Roshan Elam RN  Outcome: Progressing  Flowsheets (Taken 7/23/2024 1905)  Free From Fall Injury:   Instruct family/caregiver on patient safety   Based on caregiver fall risk screen, instruct family/caregiver to ask for assistance with transferring infant if caregiver noted to have fall risk factors  7/23/2024 1142 by Ying Purvis RN  Outcome: Progressing     Problem: Neurosensory - Adult  Goal: Achieves stable or improved neurological status  7/23/2024 2006 by Roshan Elam, RN  Outcome: Progressing  Flowsheets (Taken 7/23/2024 1905)  Achieves stable or improved neurological status:   Assess for and report changes in neurological status   Initiate measures to prevent increased intracranial pressure   Monitor temperature, glucose, and sodium. Initiate appropriate interventions as ordered   Maintain blood pressure and fluid volume within ordered parameters to optimize cerebral perfusion and minimize risk of hemorrhage  7/23/2024 1142 by Ying Purvis RN  Outcome: Progressing  Goal: Absence of seizures  7/23/2024 2006 by Roshan Elam, RN  Outcome: Progressing  Flowsheets (Taken 7/23/2024

## 2024-07-24 NOTE — CARE COORDINATION
Pt chart reviewed and discussed in Critical Care Interdisciplinary Rounds. LOS 1 days. Pt admitted with alcohol abuse. Per MD, on [facility] protocol. Pt not medically stable for discharge.     PT/OT recommending no needs.    Pt requested referral to [facility] to establish new PCP with preference of female provider.    SW met with pt and spouse at bedside to discuss discharge planning. SW discussed pt's admitting diagnosis and asked if pt interested in cessation program. Pt and spouse agreeable to cessation program. Pt and spouse discussing outpatient vs inpatient program.     Pt and spouse report pt with history of inpatient programs in [place] and [place].     SW sent referral to [facility] liaison to meet with pt to discuss cessation programs.     SW sent PCP referral to [facility] with pt's request for female provider.    CM team will continue to follow for potential discharge needs that may arise.

## 2024-07-24 NOTE — PLAN OF CARE
Problem: Discharge Planning  Goal: Discharge to home or other facility with appropriate resources  7/24/2024 0932 by Ying Purvis RN  Outcome: Progressing     Problem: Pain  Goal: Verbalizes/displays adequate comfort level or baseline comfort level  7/24/2024 0932 by Ying Purvis RN  Outcome: Progressing     Problem: Safety - Adult  Goal: Free from fall injury  7/24/2024 0932 by Ying Purvis RN  Outcome: Progressing  Flowsheets (Taken 7/24/2024 0738)  Free From Fall Injury: Instruct family/caregiver on patient safety     Problem: Neurosensory - Adult  Goal: Achieves stable or improved neurological status  7/24/2024 0932 by Ying Purvis RN  Outcome: Progressing     Problem: Neurosensory - Adult  Goal: Absence of seizures  7/24/2024 0932 by Ying Purvis RN  Outcome: Progressing     Problem: Neurosensory - Adult  Goal: Achieves maximal functionality and self care  7/24/2024 0932 by Ying Purvis RN  Outcome: Progressing     Problem: Gastrointestinal - Adult  Goal: Minimal or absence of nausea and vomiting  7/24/2024 0932 by Ying Purvis RN  Outcome: Progressing     Problem: Metabolic/Fluid and Electrolytes - Adult  Goal: Electrolytes maintained within normal limits  7/24/2024 0932 by Ying Purvis RN  Outcome: Progressing

## 2024-07-24 NOTE — PROGRESS NOTES
Advance Care Planning     Advance Care Planning Inpatient Note  [facility] Department    Today's Date: 7/24/2024  Unit: SFE 3 ICU    Received request from IDT Member.  Upon review of chart and communication with care team, request Health Care Provider's clarification of patient's decision making capacity.. Patient was/were present in the room during visit.    Goals of ACP Conversation:  Discuss advance care planning documents    Health Care Decision Makers:       Primary Decision Maker (Active): LeTerence A - [facility] - [phone]  Summary:  No Decision Maker named by patient at this time  Advance Care Planning Documents (Patient Wishes):  Healthcare Power of /Advance Directive Appointment of Health Care Agent     Assessment:  Talked with physician regarding patient's ability to complete HCPOA documents at this time.  Physician said that her abilities were \"questionable\".  I went and talked with patient and she informed me that she did not want to complete HCPOA documents she just wanted to be made a DNR.  I communicated this desire to RN.    Interventions:  Patient DECLINED ACP conversation    Care Preferences Communicated:   No    Outcomes/Plan:  ACP Discussion: Refused    Electronically signed by Chaplain Mi on 7/24/2024 at 1:23 PM

## 2024-07-24 NOTE — PROGRESS NOTES
Hospitalist Progress Note   Admit Date:  2024  8:29 PM   Name:  April Lujan   Age:  68 y.o.  Sex:  female  :  1955   MRN:  [id number]   Room:  [facility]    Presenting/Chief Complaint: Withdrawal (ETOH, last use 1400today)     Reason(s) for Admission: Alcohol abuse [F10.10]  Hyponatremia [E87.1]  Chest discomfort [R07.89]  Elevated liver enzymes [R74.8]  Recurrent vomiting [R11.10]  Alcohol withdrawal syndrome without complication (HCC) [F10.930]  Failure of outpatient treatment [Z78.9]     Hospital Course:   April Lujan is a 68 y.o. female with medical history of alcohol abuse and hypothyroidism who presented to the ED on 2024 with cc of alcohol abuse.  Patient requesting alcohol detox.  She was started on CIWA protocol, Ativan as needed, and admitted for further care.    Subjective & 24hr Events:   No acute overnight events.  Received several doses of as needed Ativan overnight.  Patient sitting in bedside chair this morning with no complaints.    Assessment & Plan:     Alcohol abuse  Continue CIWA protocol and Ativan as needed  Continue thiamine  Her last drink was on the afternoon of 24  CM assisting with inpatient versus outpatient rehab at discharge    Hypertension  Continue amlodipine     GERD  Continue Protonix    Hypothyroidism  Unclear if she was taking synthroid at home  Check TSH    Hyponatremia  Improved with IV fluids    Insomnia  Doxepin as needed    Elevated LFTs  Repeat LFTs in the a.m.    PT/OT evals needed/ordered?  Yes  Diet:  ADULT DIET; Regular  VTE prophylaxis: Lovenox  Code status: Full Code  Dispo: DC in 1-2 days either home or inpatient alcohol cessation program.     Non-peripheral Lines and Tubes (if present):      none    Hospital Problems:  Principal Problem:    Alcohol abuse  Active Problems:    Primary hypertension    Alcoholism (HCC)    Alcoholic hepatitis    Recurrent major depressive disorder, in partial remission (HCC)    Insomnia, unspecified

## 2024-07-25 LAB
ALBUMIN SERPL-MCNC: 3.9 G/DL (ref 3.2–4.6)
ANION GAP SERPL CALC-SCNC: 14 MMOL/L (ref 9–18)
BUN SERPL-MCNC: 7 MG/DL (ref 8–23)
CALCIUM SERPL-MCNC: 9.5 MG/DL (ref 8.8–10.2)
CHLORIDE SERPL-SCNC: 99 MMOL/L (ref 98–107)
CO2 SERPL-SCNC: 24 MMOL/L (ref 20–28)
CREAT SERPL-MCNC: 0.77 MG/DL (ref 0.6–1.1)
GLUCOSE SERPL-MCNC: 100 MG/DL (ref 70–99)
Lab: NORMAL
Lab: NORMAL
MAGNESIUM SERPL-MCNC: 2.1 MG/DL (ref 1.8–2.4)
OSMOLALITY SERPL: 276 MOSM/KG H2O (ref 280–301)
OSMOLALITY UR: 185 MOSM/KG H2O (ref 50–1400)
OSMOLALITY UR: NORMAL MOSM/KG H2O (ref 50–1400)
PHOSPHATE SERPL-MCNC: 3.8 MG/DL (ref 2.5–4.5)
POTASSIUM SERPL-SCNC: 4.4 MMOL/L (ref 3.5–5.1)
REFERENCE LAB: NORMAL
SODIUM SERPL-SCNC: 137 MMOL/L (ref 136–145)

## 2024-07-25 PROCEDURE — 6360000002 HC RX W HCPCS: Performed by: STUDENT IN AN ORGANIZED HEALTH CARE EDUCATION/TRAINING PROGRAM

## 2024-07-25 PROCEDURE — 6370000000 HC RX 637 (ALT 250 FOR IP): Performed by: STUDENT IN AN ORGANIZED HEALTH CARE EDUCATION/TRAINING PROGRAM

## 2024-07-25 PROCEDURE — 6370000000 HC RX 637 (ALT 250 FOR IP): Performed by: INTERNAL MEDICINE

## 2024-07-25 PROCEDURE — 2580000003 HC RX 258: Performed by: INTERNAL MEDICINE

## 2024-07-25 PROCEDURE — 94761 N-INVAS EAR/PLS OXIMETRY MLT: CPT

## 2024-07-25 PROCEDURE — 83735 ASSAY OF MAGNESIUM: CPT

## 2024-07-25 PROCEDURE — 1100000000 HC RM PRIVATE

## 2024-07-25 PROCEDURE — 80069 RENAL FUNCTION PANEL: CPT

## 2024-07-25 PROCEDURE — 36415 COLL VENOUS BLD VENIPUNCTURE: CPT

## 2024-07-25 PROCEDURE — 6360000002 HC RX W HCPCS: Performed by: INTERNAL MEDICINE

## 2024-07-25 PROCEDURE — 6360000002 HC RX W HCPCS: Performed by: FAMILY MEDICINE

## 2024-07-25 RX ORDER — KETOROLAC TROMETHAMINE 15 MG/ML
15 INJECTION, SOLUTION INTRAMUSCULAR; INTRAVENOUS ONCE
Status: COMPLETED | OUTPATIENT
Start: 2024-07-25 | End: 2024-07-25

## 2024-07-25 RX ORDER — GABAPENTIN 100 MG/1
100 CAPSULE ORAL 2 TIMES DAILY
Status: DISCONTINUED | OUTPATIENT
Start: 2024-07-25 | End: 2024-07-26 | Stop reason: HOSPADM

## 2024-07-25 RX ADMIN — B-COMPLEX W/ C & FOLIC ACID TAB 1 TABLET: TAB at 08:57

## 2024-07-25 RX ADMIN — ENOXAPARIN SODIUM 40 MG: 100 INJECTION SUBCUTANEOUS at 21:34

## 2024-07-25 RX ADMIN — LORAZEPAM 1 MG: 1 TABLET ORAL at 16:44

## 2024-07-25 RX ADMIN — PAROXETINE HYDROCHLORIDE 20 MG: 20 TABLET, FILM COATED ORAL at 08:56

## 2024-07-25 RX ADMIN — GABAPENTIN 100 MG: 100 CAPSULE ORAL at 17:19

## 2024-07-25 RX ADMIN — DOXEPIN HYDROCHLORIDE 10 MG: 10 CAPSULE ORAL at 21:34

## 2024-07-25 RX ADMIN — LORAZEPAM 1 MG: 1 TABLET ORAL at 08:56

## 2024-07-25 RX ADMIN — Medication 100 MG: at 08:57

## 2024-07-25 RX ADMIN — SODIUM CHLORIDE 2 MG: 9 INJECTION INTRAMUSCULAR; INTRAVENOUS; SUBCUTANEOUS at 01:10

## 2024-07-25 RX ADMIN — KETOROLAC TROMETHAMINE 15 MG: 15 INJECTION, SOLUTION INTRAMUSCULAR; INTRAVENOUS at 17:19

## 2024-07-25 RX ADMIN — PANTOPRAZOLE SODIUM 40 MG: 40 TABLET, DELAYED RELEASE ORAL at 06:14

## 2024-07-25 ASSESSMENT — PAIN DESCRIPTION - LOCATION: LOCATION: GENERALIZED

## 2024-07-25 ASSESSMENT — PAIN SCALES - GENERAL
PAINLEVEL_OUTOF10: 0
PAINLEVEL_OUTOF10: 6

## 2024-07-25 NOTE — PROGRESS NOTES
Hospitalist Progress Note   Admit Date:  2024  8:29 PM   Name:  April Lujan   Age:  68 y.o.  Sex:  female  :  1955   MRN:  [id number]   Room:  [facility]    Presenting/Chief Complaint: Withdrawal (ETOH, last use 1400today)     Reason(s) for Admission: Alcohol abuse [F10.10]  Hyponatremia [E87.1]  Chest discomfort [R07.89]  Elevated liver enzymes [R74.8]  Recurrent vomiting [R11.10]  Alcohol withdrawal syndrome without complication (HCC) [F10.930]  Failure of outpatient treatment [Z78.9]     Hospital Course:   April Lujan is a 68 y.o. female with medical history of alcohol abuse and hypothyroidism who presented to the ED on 2024 with cc of alcohol abuse.  Patient requesting alcohol detox.  She was started on CIWA protocol, Ativan as needed, and admitted for further care.    Subjective & 24hr Events:   No acute overnight events.  Only required 2mg of PRN ativan overnight. This morning, patient is sitting in bedside chair eating breakfast. Still feeling poorly overall, but improving.     Assessment & Plan:     Alcohol abuse  Continue CIWA protocol and Ativan as needed  Continue thiamine  Her last drink was on the afternoon of 24  CM assisting with inpatient versus outpatient rehab at discharge    Hypertension  Holding amlodipine     GERD  Continue Protonix    Hypothyroidism  Unclear if she was taking synthroid at home  Check TSH    Hyponatremia  Resolved    Insomnia  Doxepin as needed    Elevated LFTs  Stable    PT/OT evals needed/ordered?  Yes  Diet:  ADULT DIET; Regular  VTE prophylaxis: Lovenox  Code status: Full Code  Dispo: Probable DC tomorrow either home or inpatient alcohol cessation program.     Non-peripheral Lines and Tubes (if present):      none    Hospital Problems:  Principal Problem:    Alcohol abuse  Active Problems:    Primary hypertension    Alcoholism (HCC)    Alcoholic hepatitis    Recurrent major depressive disorder, in partial remission (HCC)    Insomnia,  unspecified    Acquired hypothyroidism  Resolved Problems:    * No resolved hospital problems. *      Objective:   Patient Vitals for the past 24 hrs:   Temp Pulse Resp BP SpO2   07/25/24 1200 -- 92 14 (!) 124/59 --   07/25/24 1100 -- 92 17 136/60 --   07/25/24 0900 -- 90 14 133/67 --   07/25/24 0800 -- 79 15 (!) 107/52 95 %   07/25/24 0737 98.2 °F (36.8 °C) 78 14 (!) 109/52 97 %   07/25/24 0714 -- 79 -- -- 98 %   07/25/24 0700 -- 78 18 (!) 126/54 98 %   07/25/24 0500 -- 78 13 (!) 99/50 94 %   07/25/24 0400 99.1 °F (37.3 °C) 96 20 (!) 136/56 97 %   07/25/24 0300 -- 77 15 132/63 93 %   07/25/24 0200 -- 84 17 (!) 152/72 97 %   07/25/24 0000 -- 81 14 (!) 108/54 92 %   07/24/24 2300 99 °F (37.2 °C) 92 16 (!) 144/93 92 %   07/24/24 2200 -- 79 14 (!) 118/56 93 %   07/24/24 2100 -- -- -- (!) 114/42 --   07/24/24 1901 -- 87 -- -- 96 %   07/24/24 1900 98.2 °F (36.8 °C) 87 16 (!) 115/53 96 %   07/24/24 1804 -- 87 12 137/63 97 %   07/24/24 1700 -- 77 15 (!) 97/43 92 %   07/24/24 1600 -- 79 13 (!) 118/49 96 %   07/24/24 1508 98.7 °F (37.1 °C) 87 17 (!) 153/131 --       Physical Exam:   General: in no acute distress. Elderly   Head: no scleral icterus   CV: regular rate and rhythm   Lungs: clear breath sounds bilaterally   Abdomen: abdomen is soft, non-tender, non-distended   Extremities: no BLE edema   Skin: warm and dry    Neuro: no focal neurological deficits  Psych: normal affect     I have personally reviewed recent labs and imaging.    Signed:  Arsalan Lara, DO

## 2024-07-25 NOTE — PROGRESS NOTES
Critical Care Interdisciplinary Rounds with staff.     Lorie Israel M.Div, [facility] / / Bereavement Coordinator  [facility] Care Department   c: [phone]/ [phone] / [email or web address]  [email or web address]

## 2024-07-25 NOTE — CARE COORDINATION
Pt chart reviewed and discussed in Critical Care Interdisciplinary Rounds. LOS 2 days. Pt admitted with alcohol abuse. Per MD, potential discharge tomorrow. On CIWA protocol.    [facility] liaison met with pt today to discuss cessation program. Liaison reports inpatient cessation program would benefit pt.     DC/POC home with spouse when medically stable.    CM team will continue to follow for potential discharge needs that may arise.

## 2024-07-26 VITALS
HEART RATE: 89 BPM | OXYGEN SATURATION: 96 % | WEIGHT: 172.5 LBS | TEMPERATURE: 99.5 F | BODY MASS INDEX: 28.74 KG/M2 | HEIGHT: 65 IN | DIASTOLIC BLOOD PRESSURE: 83 MMHG | RESPIRATION RATE: 16 BRPM | SYSTOLIC BLOOD PRESSURE: 159 MMHG

## 2024-07-26 PROCEDURE — 6370000000 HC RX 637 (ALT 250 FOR IP): Performed by: STUDENT IN AN ORGANIZED HEALTH CARE EDUCATION/TRAINING PROGRAM

## 2024-07-26 PROCEDURE — 90792 PSYCH DIAG EVAL W/MED SRVCS: CPT

## 2024-07-26 PROCEDURE — 6370000000 HC RX 637 (ALT 250 FOR IP): Performed by: INTERNAL MEDICINE

## 2024-07-26 RX ADMIN — Medication 100 MG: at 07:57

## 2024-07-26 RX ADMIN — GABAPENTIN 100 MG: 100 CAPSULE ORAL at 07:57

## 2024-07-26 RX ADMIN — PANTOPRAZOLE SODIUM 40 MG: 40 TABLET, DELAYED RELEASE ORAL at 06:13

## 2024-07-26 RX ADMIN — B-COMPLEX W/ C & FOLIC ACID TAB 1 TABLET: TAB at 07:57

## 2024-07-26 RX ADMIN — PAROXETINE HYDROCHLORIDE 20 MG: 20 TABLET, FILM COATED ORAL at 07:57

## 2024-07-26 ASSESSMENT — PAIN SCALES - GENERAL: PAINLEVEL_OUTOF10: 0

## 2024-07-26 NOTE — PROGRESS NOTES
Critical Care Interdisciplinary Rounds with staff.     Lorie Israel M.Div, [facility] / / Bereavement Coordinator  [facility] Care Department   c: [phone]/ [phone] / [email or web address]  [email or web address]

## 2024-07-26 NOTE — CONSULTS
Psychiatric consult complete. Chart was reviewed and patient was seen. The patient denies suicidal ideation (denies plan denies intent ), denies homicidal ideation, denies AVH, and does not present as acutely psychotic or RTIS. April Lujan is able to contract for safety and is future oriented in conversation. At this time, patient does or does not meet criteria for an inpatient psychiatric admission. Patient does not have appropriate follow-up in the community with . Referrals were provided to patient and put in AVS. At this time, it is recommended to follow up with [facility] for IOP/PHP at 1pm. Met with patient and spouse who are in agreement with dcp.     Based on my evaluation of this patient, she does affirm she will follow any proposed treatment and recommendations, does have a factual understanding of her current siutation and is only limited by not having in depth medical knowledge as opposed to impaired cognition or active psychiatric symptoms, does appreciate risks and alternatives to treatment and is able to rationally discuss information to make a decision. Therefore, at this time this patient possesses the capacity to make medical decisions. In addition, given the relevant information in the HPI above, the patient does not present a threat to self or others and disposition can be safely secured by primary team.    Based on my evaluation of this patient, the patient does appear to have understanding of her current medical state or treatment plan and is able to logically discuss risks, benefits or alternatives to treatment. As such, the patient does have decisional capacity for medical decisions at this time.    Full psychiatric consult note to follow. Psychiatry will sign off at this time.      Thank you for consult. Please do not hesitate to contact provider if there are additional questions regarding patient.      Ashley Vega, NAZ - NP 7/26/2024  [facility]

## 2024-07-26 NOTE — PROGRESS NOTES
TRANSFER - IN REPORT:    Verbal report received from YANCY Haney on April Lujan  being received from ICU for routine progression of patient care      Report consisted of patient's Situation, Background, Assessment and   Recommendations(SBAR).     Information from the following report(s) Recent Results was reviewed with the receiving nurse.    Opportunity for questions and clarification was provided.      Assessment completed upon patient's arrival to unit and care assumed.

## 2024-07-26 NOTE — PLAN OF CARE
Problem: Discharge Planning  Goal: Discharge to home or other facility with appropriate resources  7/26/2024 0815 by Medina Ferreira, RN  Outcome: Progressing  7/25/2024 2247 by Rima Shay, RN  Outcome: Progressing  Flowsheets (Taken 7/25/2024 2130)  Discharge to home or other facility with appropriate resources: Identify barriers to discharge with patient and caregiver

## 2024-07-26 NOTE — PROGRESS NOTES
TRANSFER - OUT REPORT:    Verbal report given to SudhirRN on April Lujan  being transferred to [facility] for routine progression of patient care       Report consisted of patient's Situation, Background, Assessment and   Recommendations(SBAR).     Information from the following report(s) Nurse Handoff Report was reviewed with the receiving nurse.           Lines:   Peripheral IV 07/24/24 Right;Anterior Forearm (Active)   Site Assessment Clean, dry & intact 07/26/24 0800   Line Status Capped 07/26/24 0800   Line Care Connections checked and tightened 07/26/24 0800   Phlebitis Assessment No symptoms 07/26/24 0800   Infiltration Assessment 0 07/26/24 0800   Alcohol Cap Used Yes 07/26/24 0800   Dressing Status Clean, dry & intact 07/26/24 0800   Dressing Type Transparent 07/26/24 0800   Dressing Intervention New 07/25/24 0400        Opportunity for questions and clarification was provided.      Patient transported with:

## 2024-07-26 NOTE — PROGRESS NOTES
Hospitalist Progress Note   Admit Date:  2024  8:29 PM   Name:  April Lujan   Age:  68 y.o.  Sex:  female  :  1955   MRN:  [id number]   Room:  [facility]    Presenting/Chief Complaint: Withdrawal (ETOH, last use 1400today)     Reason(s) for Admission: Alcohol abuse [F10.10]  Hyponatremia [E87.1]  Chest discomfort [R07.89]  Elevated liver enzymes [R74.8]  Recurrent vomiting [R11.10]  Alcohol withdrawal syndrome without complication (HCC) [F10.930]  Failure of outpatient treatment [Z78.9]     Hospital Course:   April Lujan is a 68 y.o. female with medical history of alcohol abuse and hypothyroidism who presented to the ED on 2024 with cc of alcohol abuse.  Patient requesting alcohol detox.  She was started on CIWA protocol, Ativan as needed, and admitted for further care. Transferred to the medical floor on .     Subjective & 24hr Events:   No acute overnight events.  Pt resting in bed this morning with no complaints.     Assessment & Plan:     Alcohol abuse  Continue CIWA protocol and Ativan as needed  Continue thiamine  Her last drink was on the afternoon of 24  CM assisting with inpatient versus outpatient rehab at discharge  Patient would benefit from inpatient cessation program, but requests only outpatient. Given unclear decision making capacity, will ask psychiatry for recommendations.     Hypertension  Holding amlodipine     GERD  Continue Protonix    Hypothyroidism  Unclear if she was taking synthroid at home  Check TSH    Hyponatremia  Resolved    Insomnia  Doxepin as needed    Elevated LFTs  Stable    PT/OT evals needed/ordered?  Yes  Diet:  ADULT DIET; Regular  VTE prophylaxis: Lovenox  Code status: Full Code  Dispo: DC today or tomorrow pending psychiatry recommendations.     Non-peripheral Lines and Tubes (if present):      none    Hospital Problems:  Principal Problem:    Alcohol abuse  Active Problems:    Primary hypertension    Alcoholism (HCC)    Alcoholic hepatitis

## 2024-07-26 NOTE — BH NOTE
possesses the capacity to make medical decisions. In addition, given the relevant information in the HPI above, the patient does not present a threat to self or others and disposition can be safely secured by primary team.     Based on my evaluation of this patient, the patient does appear to have understanding of her current medical state or treatment plan and is able to logically discuss risks, benefits or alternatives to treatment. As such, the patient does have decisional capacity for medical decisions at this time.    Additional recommendations will follow the clinical course.                  Plan:    Patient is okay to be discharged from a psychiatric point of view when medically appropriate. Patient does not meet criteria for a psychiatric hold at this time.  Appropriate for IOP/PHP management, Appt for intake 7/27/24 1pm, [facility]   Encouraged patient to return to the ED for evaluation should he/she experience new or worsening psychiatric symptoms.   Recommended that the patient follow up with outpatient psychiatry and individual therapy. List of outpatient resources provided by ED. Resources for local outpatient mental health services/clinics and substance use treatment centers to be provided to patient. Please include with discharge instructions/AVS.    Medical records, labs, diagnostic tests reviewed  Medical co-morbidities: Management per medical providers.  Recommendations were Discussed plan with Hospitalist  Pt had an opportunity to ask questions and address concerns.  The patient verbalized understanding and agreed with the treatment plan as outlined above  Safety plan reviewed  Thank you for this consult. Please reach out via [employer] for any questions or concerns.      Pt interventions:    Discussed importance of medication adherence, Discussed risks, benefits, side effects of medication and need for follow up treatment, Discussed benefits of referral for specialty care, Discussed self-care  Discussed potential barriers to change      Ashley Vega, NAZ - NP 7/26/2024  [facility] Psych[facility] Behavioral [facility]

## 2024-07-26 NOTE — DISCHARGE SUMMARY
Hospitalist Discharge Summary   Admit Date:  2024  8:29 PM   DC Note date: 2024  Name:  April Lujan   Age:  68 y.o.  Sex:  female  :  1955   MRN:  [id number]   Room:  [facility]  PCP:  Geronimo Marks MD    Presenting Complaint: Withdrawal (ETOH, last use 1400today)     Initial Admission Diagnosis: Alcohol abuse [F10.10]  Hyponatremia [E87.1]  Chest discomfort [R07.89]  Elevated liver enzymes [R74.8]  Recurrent vomiting [R11.10]  Alcohol withdrawal syndrome without complication (HCC) [F10.930]  Failure of outpatient treatment [Z78.9]     Problem List for this Hospitalization (present on admission):    Principal Problem:    Alcohol abuse  Active Problems:    Primary hypertension    Alcoholism (HCC)    Alcoholic hepatitis    Recurrent major depressive disorder, in partial remission (HCC)    Insomnia, unspecified    Acquired hypothyroidism  Resolved Problems:    * No resolved hospital problems. *      Hospital Course:  April Lujan is a 68 y.o. female with medical history of alcohol abuse and hypothyroidism who presented to the ED on 2024 with cc of alcohol abuse.  Patient requesting alcohol detox.  She was started on CIWA protocol, Ativan as needed, and admitted for further care. Patient had some symptoms of alcohol withdrawal that resolved with IV ativan. Symptoms improved and she was transferred to the medical floor on . See by psychiatry who recommended that patient follow up at [facility] for IOP/PHP at 1PM on 24. Patient stable to CO home on 24 and will follow up with her PCP in 2 weeks.     Disposition: Home  Diet: ADULT DIET; Regular  Code Status: Full Code    Follow Ups:   Follow-up Information     Geronimo Marks MD Follow up in 2 week(s).    Specialty: Family Medicine  Contact information:  [address] [facility] [address] [phone]             Geronimo Marks MD .    Specialty: Family Medicine  Contact information:  [address]

## 2024-07-26 NOTE — PLAN OF CARE
Problem: Discharge Planning  Goal: Discharge to home or other facility with appropriate resources  7/25/2024 2247 by Rima Shay, RN  Outcome: Progressing  Flowsheets (Taken 7/25/2024 2130)  Discharge to home or other facility with appropriate resources: Identify barriers to discharge with patient and caregiver  7/25/2024 0917 by Medina Ferreira, RN  Outcome: Progressing     Problem: Pain  Goal: Verbalizes/displays adequate comfort level or baseline comfort level  Outcome: Progressing  Flowsheets (Taken 7/25/2024 1936)  Verbalizes/displays adequate comfort level or baseline comfort level: Assess pain using appropriate pain scale     Problem: Safety - Adult  Goal: Free from fall injury  Outcome: Progressing  Flowsheets (Taken 7/25/2024 2130)  Free From Fall Injury: Instruct family/caregiver on patient safety     Problem: Neurosensory - Adult  Goal: Achieves stable or improved neurological status  Outcome: Progressing  Flowsheets (Taken 7/25/2024 2130)  Achieves stable or improved neurological status: Assess for and report changes in neurological status  Goal: Absence of seizures  Outcome: Progressing  Goal: Achieves maximal functionality and self care  Outcome: Progressing     Problem: Gastrointestinal - Adult  Goal: Minimal or absence of nausea and vomiting  Outcome: Progressing     Problem: Metabolic/Fluid and Electrolytes - Adult  Goal: Electrolytes maintained within normal limits  Outcome: Progressing  Flowsheets (Taken 7/25/2024 2130)  Electrolytes maintained within normal limits: Monitor labs and assess patient for signs and symptoms of electrolyte imbalances

## 2024-07-26 NOTE — CARE COORDINATION
Pt chart reviewed and discussed in Critical Care Interdisciplinary Rounds. LOS 3 days. Pt admitted with alcohol abuse. Per MD, psych consult placed for decision making capabilities; out of withdrawal period; plan to transfer to floor. Pt not medically stable to discharge.     DC/POC pending clinical course.    CM team will continue to follow for potential discharge needs that may arise.

## 2024-07-29 ENCOUNTER — HOSPITAL ENCOUNTER (EMERGENCY)
Age: 69
Discharge: HOME | End: 2024-07-29
Attending: EMERGENCY MEDICINE
Payer: MEDICARE

## 2024-07-29 VITALS
SYSTOLIC BLOOD PRESSURE: 177 MMHG | BODY MASS INDEX: 28.66 KG/M2 | HEART RATE: 76 BPM | DIASTOLIC BLOOD PRESSURE: 87 MMHG | HEIGHT: 65 IN | WEIGHT: 172 LBS | TEMPERATURE: 97.6 F | OXYGEN SATURATION: 96 % | RESPIRATION RATE: 16 BRPM

## 2024-07-29 DIAGNOSIS — F10.930 ALCOHOL WITHDRAWAL SYNDROME WITHOUT COMPLICATION (HCC): Primary | ICD-10-CM

## 2024-07-29 LAB
ANION GAP SERPL CALC-SCNC: 14 MMOL/L (ref 9–18)
BUN SERPL-MCNC: 11 MG/DL (ref 8–23)
CALCIUM SERPL-MCNC: 9.9 MG/DL (ref 8.8–10.2)
CHLORIDE SERPL-SCNC: 95 MMOL/L (ref 98–107)
CO2 SERPL-SCNC: 24 MMOL/L (ref 20–28)
CREAT SERPL-MCNC: 0.97 MG/DL (ref 0.6–1.1)
ERYTHROCYTE [DISTWIDTH] IN BLOOD BY AUTOMATED COUNT: 12.4 % (ref 11.9–14.6)
ETHANOL SERPL-MCNC: <11 MG/DL (ref 0–0.08)
GLUCOSE SERPL-MCNC: 104 MG/DL (ref 70–99)
HCT VFR BLD AUTO: 38.2 % (ref 35.8–46.3)
HGB BLD-MCNC: 13.1 G/DL (ref 11.7–15.4)
MAGNESIUM SERPL-MCNC: 1.9 MG/DL (ref 1.8–2.4)
MCH RBC QN AUTO: 34.9 PG (ref 26.1–32.9)
MCHC RBC AUTO-ENTMCNC: 34.3 G/DL (ref 31.4–35)
MCV RBC AUTO: 101.9 FL (ref 82–102)
NRBC # BLD: 0 K/UL (ref 0–0.2)
PLATELET # BLD AUTO: 271 K/UL (ref 150–450)
PMV BLD AUTO: 9.8 FL (ref 9.4–12.3)
POTASSIUM SERPL-SCNC: 4.4 MMOL/L (ref 3.5–5.1)
RBC # BLD AUTO: 3.75 M/UL (ref 4.05–5.2)
SODIUM SERPL-SCNC: 133 MMOL/L (ref 136–145)
WBC # BLD AUTO: 6.6 K/UL (ref 4.3–11.1)

## 2024-07-29 PROCEDURE — 80048 BASIC METABOLIC PNL TOTAL CA: CPT

## 2024-07-29 PROCEDURE — 6360000002 HC RX W HCPCS: Performed by: EMERGENCY MEDICINE

## 2024-07-29 PROCEDURE — 96374 THER/PROPH/DIAG INJ IV PUSH: CPT

## 2024-07-29 PROCEDURE — 83735 ASSAY OF MAGNESIUM: CPT

## 2024-07-29 PROCEDURE — 99284 EMERGENCY DEPT VISIT MOD MDM: CPT

## 2024-07-29 PROCEDURE — 96372 THER/PROPH/DIAG INJ SC/IM: CPT

## 2024-07-29 PROCEDURE — 85027 COMPLETE CBC AUTOMATED: CPT

## 2024-07-29 PROCEDURE — 2580000003 HC RX 258: Performed by: EMERGENCY MEDICINE

## 2024-07-29 PROCEDURE — 82077 ASSAY SPEC XCP UR&BREATH IA: CPT

## 2024-07-29 RX ORDER — CHLORDIAZEPOXIDE HYDROCHLORIDE 25 MG/1
CAPSULE, GELATIN COATED ORAL
Qty: 20 CAPSULE | Refills: 0 | Status: SHIPPED | OUTPATIENT
Start: 2024-07-29 | End: 2024-08-09

## 2024-07-29 RX ORDER — CHLORDIAZEPOXIDE HYDROCHLORIDE 25 MG/1
CAPSULE, GELATIN COATED ORAL
Qty: 20 CAPSULE | Refills: 0 | Status: SHIPPED | OUTPATIENT
Start: 2024-07-29 | End: 2024-07-29

## 2024-07-29 RX ORDER — 0.9 % SODIUM CHLORIDE 0.9 %
1000 INTRAVENOUS SOLUTION INTRAVENOUS ONCE
Status: COMPLETED | OUTPATIENT
Start: 2024-07-29 | End: 2024-07-29

## 2024-07-29 RX ORDER — THIAMINE HYDROCHLORIDE 100 MG/ML
100 INJECTION, SOLUTION INTRAMUSCULAR; INTRAVENOUS ONCE
Status: COMPLETED | OUTPATIENT
Start: 2024-07-29 | End: 2024-07-29

## 2024-07-29 RX ADMIN — THIAMINE HYDROCHLORIDE 100 MG: 100 INJECTION, SOLUTION INTRAMUSCULAR; INTRAVENOUS at 16:42

## 2024-07-29 RX ADMIN — SODIUM CHLORIDE 1000 ML: 9 INJECTION, SOLUTION INTRAVENOUS at 16:33

## 2024-07-29 RX ADMIN — SODIUM CHLORIDE 2 MG: 9 INJECTION INTRAMUSCULAR; INTRAVENOUS; SUBCUTANEOUS at 16:41

## 2024-07-29 ASSESSMENT — ENCOUNTER SYMPTOMS
NAUSEA: 0
SHORTNESS OF BREATH: 0
VOMITING: 0
COUGH: 0

## 2024-07-29 ASSESSMENT — PAIN SCALES - GENERAL: PAINLEVEL_OUTOF10: 0

## 2024-07-29 ASSESSMENT — LIFESTYLE VARIABLES
HOW OFTEN DO YOU HAVE A DRINK CONTAINING ALCOHOL: NEVER
HOW MANY STANDARD DRINKS CONTAINING ALCOHOL DO YOU HAVE ON A TYPICAL DAY: PATIENT DOES NOT DRINK

## 2024-07-29 NOTE — DISCHARGE INSTRUCTIONS
Please return with any fevers, hallucinations, seizures, vomiting, worsening symptoms, or additional concerns.    Please follow-up with your primary care doctor and with the [facility] as planned.

## 2024-07-29 NOTE — ED PROVIDER NOTES
Emergency Department Provider Note       PCP: Geronimo Marks MD   Age: 68 y.o.   Sex: female     DISPOSITION       No diagnosis found.    Medical Decision Making     I will check her basic blood work.  I will treat with some IV Ativan and IV fluids.  I will also give her some IM thiamine.  ED Course as of 07/29/24 1801 Mon Jul 29, 2024 1801 Patient is feeling much better overall.  Her pressure is significantly improved.  I will discharge her home with a prescription for Librium. [AC]      ED Course User Index  [AC] Adelso Morejon MD     1 acute, uncomplicated illness or injury.  Prescription drug management performed.  Parental controlled substances given in the ED.  Shared medical decision making was utilized in creating the patients health plan today.    I independently ordered and reviewed each unique test.                     History     68-year-old he presents with concerns about elevated blood pressure.  She says that she also was worried because over the past 2 days she is taken 15 or 23 mg Lunesta to try to help her sleep.  Patient notes that she is a heavy drinker but has not had any alcohol since this past Friday as she is trying to quit.  She said she had an appointment today with [facility] to help with some rehab options for the pressure being so high she did not feel safe going there so she came here for further evaluation.  She said she has rescheduled that appointment till this coming Wednesday.    She denies any chest pain or shortness of breath.    No other associated symptoms.    Elements of this note were created using speech recognition software.  As such, errors of speech recognition may be present.        ROS     Review of Systems   Constitutional:  Negative for chills and fever.   Respiratory:  Negative for cough and shortness of breath.    Gastrointestinal:  Negative for nausea and vomiting.   Neurological:  Negative for dizziness and light-headedness.        Physical Exam

## 2024-07-29 NOTE — ED TRIAGE NOTES
Pt arrives to the ER with c/o hypertension x today. Pt states being on BP meds and is compliant with daily dose. Pt also states taking estimated 20x3mg tablets of eszopiclone tablets un intentionally. Pt denies any thoughts of wanting to harm themselves or others.

## 2024-08-21 NOTE — PROGRESS NOTES
Physician Progress Note      PATIENT:               KAVON LEYVA  CSN #:                  [id number]  :                       1955  ADMIT DATE:       2024 8:29 PM  DISCH DATE:        2024 4:30 PM  RESPONDING  PROVIDER #:        Harris Snyder MD          QUERY TEXT:    Patient admitted with Alcohol withdrawal. Noted documentation of Lactic   acidosis in IM PN on . In order to support the diagnosis of Lactic   acidosis, please include additional clinical indicators in your documentation.    Or please document if the diagnosis of Lactic acidosis has been ruled out   after further study.    The medical record reflects the following:  Risk Factors: Alcoholic dependence  Clinical Indicators:  IM PN on 2024 Admitted earlier this morning with   alcoholic lactic acidosis and concern for alcohol withdrawals.  Lactic acid-3.3,1.3  Anion Gap-21,16,12,14  Treatment: IVFs, Labs    Thank you,  Dione Bob, [facility], [facility].  Options provided:  -- Lactic acidosis present as evidenced by, Please document evidence.  -- Lactic acidosis was ruled out  -- Other - I will add my own diagnosis  -- Disagree - Not applicable / Not valid  -- Disagree - Clinically unable to determine / Unknown  -- Refer to Clinical Documentation Reviewer    PROVIDER RESPONSE TEXT:    Lactic acidosis is present as evidenced by Please see labs. LA on admission   was 3.5    Query created by: Dione Bob on 2024 7:20 AM      Electronically signed by:  Harris Snyder MD 2024 1:10 PM

## 2024-09-09 ENCOUNTER — HOSPITAL ENCOUNTER (INPATIENT)
Age: 69
LOS: 8 days | Discharge: HOME | DRG: 897 | End: 2024-09-17
Attending: EMERGENCY MEDICINE | Admitting: FAMILY MEDICINE
Payer: MEDICARE

## 2024-09-09 DIAGNOSIS — F10.939 ALCOHOL WITHDRAWAL SYNDROME WITH COMPLICATION (HCC): Primary | ICD-10-CM

## 2024-09-09 DIAGNOSIS — F33.41 RECURRENT MAJOR DEPRESSIVE DISORDER, IN PARTIAL REMISSION (HCC): ICD-10-CM

## 2024-09-09 DIAGNOSIS — G47.09 OTHER INSOMNIA: ICD-10-CM

## 2024-09-09 DIAGNOSIS — E87.20 LACTIC ACID ACIDOSIS: ICD-10-CM

## 2024-09-09 DIAGNOSIS — F10.931 DELIRIUM TREMENS (HCC): ICD-10-CM

## 2024-09-09 DIAGNOSIS — E83.42 HYPOMAGNESEMIA: ICD-10-CM

## 2024-09-09 PROBLEM — F10.930 ALCOHOL WITHDRAWAL SYNDROME, UNCOMPLICATED (HCC): Status: ACTIVE | Noted: 2024-09-09

## 2024-09-09 PROBLEM — E87.29 HIGH ANION GAP METABOLIC ACIDOSIS: Status: ACTIVE | Noted: 2024-09-09

## 2024-09-09 LAB
ALBUMIN SERPL-MCNC: 4.6 G/DL (ref 3.2–4.6)
ALBUMIN/GLOB SERPL: 1.2 (ref 1–1.9)
ALP SERPL-CCNC: 103 U/L (ref 35–104)
ALT SERPL-CCNC: 93 U/L (ref 12–65)
ANION GAP SERPL CALC-SCNC: 24 MMOL/L (ref 9–18)
APPEARANCE UR: CLEAR
AST SERPL-CCNC: 160 U/L (ref 15–37)
BACTERIA URNS QL MICRO: 0 /HPF
BASOPHILS # BLD: 0 K/UL (ref 0–0.2)
BASOPHILS NFR BLD: 0 % (ref 0–2)
BILIRUB SERPL-MCNC: 0.9 MG/DL (ref 0–1.2)
BILIRUB UR QL: NEGATIVE
BUN SERPL-MCNC: 9 MG/DL (ref 8–23)
CALCIUM SERPL-MCNC: 10.1 MG/DL (ref 8.8–10.2)
CASTS URNS QL MICRO: ABNORMAL /LPF
CHLORIDE SERPL-SCNC: 85 MMOL/L (ref 98–107)
CO2 SERPL-SCNC: 19 MMOL/L (ref 20–28)
COLOR UR: ABNORMAL
CREAT SERPL-MCNC: 0.95 MG/DL (ref 0.6–1.1)
DIFFERENTIAL METHOD BLD: ABNORMAL
EOSINOPHIL # BLD: 0 K/UL (ref 0–0.8)
EOSINOPHIL NFR BLD: 0 % (ref 0.5–7.8)
EPI CELLS #/AREA URNS HPF: ABNORMAL /HPF
ERYTHROCYTE [DISTWIDTH] IN BLOOD BY AUTOMATED COUNT: 13.2 % (ref 11.9–14.6)
GLOBULIN SER CALC-MCNC: 3.9 G/DL (ref 2.3–3.5)
GLUCOSE SERPL-MCNC: 224 MG/DL (ref 70–99)
GLUCOSE UR STRIP.AUTO-MCNC: NEGATIVE MG/DL
HCT VFR BLD AUTO: 39.1 % (ref 35.8–46.3)
HGB BLD-MCNC: 13.9 G/DL (ref 11.7–15.4)
HGB UR QL STRIP: ABNORMAL
IMM GRANULOCYTES # BLD AUTO: 0.1 K/UL (ref 0–0.5)
IMM GRANULOCYTES NFR BLD AUTO: 0 % (ref 0–5)
INR PPP: 1.1
KETONES UR QL STRIP.AUTO: 15 MG/DL
LACTATE SERPL-SCNC: 2.6 MMOL/L (ref 0.5–2)
LACTATE SERPL-SCNC: 5.3 MMOL/L (ref 0.5–2)
LEUKOCYTE ESTERASE UR QL STRIP.AUTO: NEGATIVE
LIPASE SERPL-CCNC: 43 U/L (ref 13–60)
LYMPHOCYTES # BLD: 1.2 K/UL (ref 0.5–4.6)
LYMPHOCYTES NFR BLD: 11 % (ref 13–44)
MAGNESIUM SERPL-MCNC: 1.5 MG/DL (ref 1.8–2.4)
MCH RBC QN AUTO: 34.3 PG (ref 26.1–32.9)
MCHC RBC AUTO-ENTMCNC: 35.5 G/DL (ref 31.4–35)
MCV RBC AUTO: 96.5 FL (ref 82–102)
MONOCYTES # BLD: 0.5 K/UL (ref 0.1–1.3)
MONOCYTES NFR BLD: 5 % (ref 4–12)
NEUTS SEG # BLD: 9.3 K/UL (ref 1.7–8.2)
NEUTS SEG NFR BLD: 84 % (ref 43–78)
NITRITE UR QL STRIP.AUTO: NEGATIVE
NRBC # BLD: 0 K/UL (ref 0–0.2)
PH UR STRIP: 5.5 (ref 5–9)
PLATELET # BLD AUTO: 292 K/UL (ref 150–450)
PMV BLD AUTO: 10 FL (ref 9.4–12.3)
POTASSIUM SERPL-SCNC: 4.3 MMOL/L (ref 3.5–5.1)
PROT SERPL-MCNC: 8.5 G/DL (ref 6.3–8.2)
PROT UR STRIP-MCNC: 100 MG/DL
PROTHROMBIN TIME: 14 SEC (ref 11.3–14.9)
RBC # BLD AUTO: 4.05 M/UL (ref 4.05–5.2)
RBC #/AREA URNS HPF: ABNORMAL /HPF
SODIUM SERPL-SCNC: 128 MMOL/L (ref 136–145)
SP GR UR REFRACTOMETRY: 1.01 (ref 1–1.02)
UROBILINOGEN UR QL STRIP.AUTO: 0.2 EU/DL (ref 0.2–1)
WBC # BLD AUTO: 11.1 K/UL (ref 4.3–11.1)
WBC URNS QL MICRO: ABNORMAL /HPF

## 2024-09-09 PROCEDURE — 96361 HYDRATE IV INFUSION ADD-ON: CPT

## 2024-09-09 PROCEDURE — 6360000002 HC RX W HCPCS: Performed by: EMERGENCY MEDICINE

## 2024-09-09 PROCEDURE — 1100000000 HC RM PRIVATE

## 2024-09-09 PROCEDURE — 99285 EMERGENCY DEPT VISIT HI MDM: CPT

## 2024-09-09 PROCEDURE — 96365 THER/PROPH/DIAG IV INF INIT: CPT

## 2024-09-09 PROCEDURE — 83735 ASSAY OF MAGNESIUM: CPT

## 2024-09-09 PROCEDURE — 93005 ELECTROCARDIOGRAM TRACING: CPT | Performed by: EMERGENCY MEDICINE

## 2024-09-09 PROCEDURE — 85610 PROTHROMBIN TIME: CPT

## 2024-09-09 PROCEDURE — 83690 ASSAY OF LIPASE: CPT

## 2024-09-09 PROCEDURE — 80053 COMPREHEN METABOLIC PANEL: CPT

## 2024-09-09 PROCEDURE — 96376 TX/PRO/DX INJ SAME DRUG ADON: CPT

## 2024-09-09 PROCEDURE — 2580000003 HC RX 258: Performed by: EMERGENCY MEDICINE

## 2024-09-09 PROCEDURE — 83605 ASSAY OF LACTIC ACID: CPT

## 2024-09-09 PROCEDURE — 85025 COMPLETE CBC W/AUTO DIFF WBC: CPT

## 2024-09-09 PROCEDURE — 96375 TX/PRO/DX INJ NEW DRUG ADDON: CPT

## 2024-09-09 PROCEDURE — 81001 URINALYSIS AUTO W/SCOPE: CPT

## 2024-09-09 RX ORDER — ONDANSETRON 2 MG/ML
4 INJECTION INTRAMUSCULAR; INTRAVENOUS
Status: COMPLETED | OUTPATIENT
Start: 2024-09-09 | End: 2024-09-09

## 2024-09-09 RX ORDER — THIAMINE HYDROCHLORIDE 100 MG/ML
100 INJECTION, SOLUTION INTRAMUSCULAR; INTRAVENOUS ONCE
Status: COMPLETED | OUTPATIENT
Start: 2024-09-09 | End: 2024-09-09

## 2024-09-09 RX ORDER — 0.9 % SODIUM CHLORIDE 0.9 %
1000 INTRAVENOUS SOLUTION INTRAVENOUS
Status: COMPLETED | OUTPATIENT
Start: 2024-09-09 | End: 2024-09-09

## 2024-09-09 RX ORDER — PHENOBARBITAL SODIUM 65 MG/ML
130 INJECTION, SOLUTION INTRAMUSCULAR; INTRAVENOUS
Status: COMPLETED | OUTPATIENT
Start: 2024-09-09 | End: 2024-09-09

## 2024-09-09 RX ORDER — PHENOBARBITAL SODIUM 65 MG/ML
260 INJECTION, SOLUTION INTRAMUSCULAR; INTRAVENOUS
Status: COMPLETED | OUTPATIENT
Start: 2024-09-09 | End: 2024-09-09

## 2024-09-09 RX ORDER — MAGNESIUM SULFATE IN WATER 40 MG/ML
2000 INJECTION, SOLUTION INTRAVENOUS ONCE
Status: COMPLETED | OUTPATIENT
Start: 2024-09-09 | End: 2024-09-09

## 2024-09-09 RX ADMIN — SODIUM CHLORIDE 1000 ML: 9 INJECTION, SOLUTION INTRAVENOUS at 20:26

## 2024-09-09 RX ADMIN — ONDANSETRON 4 MG: 2 INJECTION, SOLUTION INTRAMUSCULAR; INTRAVENOUS at 20:22

## 2024-09-09 RX ADMIN — SODIUM CHLORIDE 1000 ML: 9 INJECTION, SOLUTION INTRAVENOUS at 21:39

## 2024-09-09 RX ADMIN — PHENOBARBITAL SODIUM 130 MG: 65 INJECTION INTRAMUSCULAR; INTRAVENOUS at 21:40

## 2024-09-09 RX ADMIN — PHENOBARBITAL SODIUM 260 MG: 65 INJECTION INTRAMUSCULAR; INTRAVENOUS at 20:21

## 2024-09-09 RX ADMIN — THIAMINE HYDROCHLORIDE 100 MG: 100 INJECTION, SOLUTION INTRAMUSCULAR; INTRAVENOUS at 20:22

## 2024-09-09 RX ADMIN — MAGNESIUM SULFATE HEPTAHYDRATE 2000 MG: 40 INJECTION, SOLUTION INTRAVENOUS at 22:08

## 2024-09-09 ASSESSMENT — LIFESTYLE VARIABLES
HOW OFTEN DO YOU HAVE A DRINK CONTAINING ALCOHOL: 4 OR MORE TIMES A WEEK
HOW MANY STANDARD DRINKS CONTAINING ALCOHOL DO YOU HAVE ON A TYPICAL DAY: 7 TO 9

## 2024-09-09 ASSESSMENT — PAIN - FUNCTIONAL ASSESSMENT: PAIN_FUNCTIONAL_ASSESSMENT: 0-10

## 2024-09-09 ASSESSMENT — PAIN SCALES - GENERAL: PAINLEVEL_OUTOF10: 7

## 2024-09-10 LAB
ALBUMIN SERPL-MCNC: 3.6 G/DL (ref 3.2–4.6)
ALBUMIN/GLOB SERPL: 1.1 (ref 1–1.9)
ALP SERPL-CCNC: 80 U/L (ref 35–104)
ALT SERPL-CCNC: 69 U/L (ref 12–65)
ANION GAP SERPL CALC-SCNC: 12 MMOL/L (ref 9–18)
AST SERPL-CCNC: 116 U/L (ref 15–37)
BASOPHILS # BLD: 0 K/UL (ref 0–0.2)
BASOPHILS NFR BLD: 0 % (ref 0–2)
BILIRUB SERPL-MCNC: 0.7 MG/DL (ref 0–1.2)
BUN SERPL-MCNC: 8 MG/DL (ref 8–23)
CALCIUM SERPL-MCNC: 8.8 MG/DL (ref 8.8–10.2)
CHLORIDE SERPL-SCNC: 98 MMOL/L (ref 98–107)
CO2 SERPL-SCNC: 24 MMOL/L (ref 20–28)
CREAT SERPL-MCNC: 0.74 MG/DL (ref 0.6–1.1)
DIFFERENTIAL METHOD BLD: ABNORMAL
EKG ATRIAL RATE: 132 BPM
EKG DIAGNOSIS: NORMAL
EKG P AXIS: 85 DEGREES
EKG P-R INTERVAL: 170 MS
EKG Q-T INTERVAL: 293 MS
EKG QRS DURATION: 77 MS
EKG QTC CALCULATION (BAZETT): 435 MS
EKG R AXIS: 79 DEGREES
EKG T AXIS: 39 DEGREES
EKG VENTRICULAR RATE: 132 BPM
EOSINOPHIL # BLD: 0 K/UL (ref 0–0.8)
EOSINOPHIL NFR BLD: 0 % (ref 0.5–7.8)
ERYTHROCYTE [DISTWIDTH] IN BLOOD BY AUTOMATED COUNT: 13.2 % (ref 11.9–14.6)
GLOBULIN SER CALC-MCNC: 3.3 G/DL (ref 2.3–3.5)
GLUCOSE SERPL-MCNC: 132 MG/DL (ref 70–99)
HCT VFR BLD AUTO: 35 % (ref 35.8–46.3)
HGB BLD-MCNC: 12.3 G/DL (ref 11.7–15.4)
IMM GRANULOCYTES # BLD AUTO: 0 K/UL (ref 0–0.5)
IMM GRANULOCYTES NFR BLD AUTO: 0 % (ref 0–5)
LACTATE SERPL-SCNC: 1.7 MMOL/L (ref 0.5–2)
LYMPHOCYTES # BLD: 1.8 K/UL (ref 0.5–4.6)
LYMPHOCYTES NFR BLD: 18 % (ref 13–44)
MCH RBC QN AUTO: 34.3 PG (ref 26.1–32.9)
MCHC RBC AUTO-ENTMCNC: 35.1 G/DL (ref 31.4–35)
MCV RBC AUTO: 97.5 FL (ref 82–102)
MONOCYTES # BLD: 1.3 K/UL (ref 0.1–1.3)
MONOCYTES NFR BLD: 13 % (ref 4–12)
NEUTS SEG # BLD: 6.6 K/UL (ref 1.7–8.2)
NEUTS SEG NFR BLD: 68 % (ref 43–78)
NRBC # BLD: 0 K/UL (ref 0–0.2)
PLATELET # BLD AUTO: 229 K/UL (ref 150–450)
PMV BLD AUTO: 9.3 FL (ref 9.4–12.3)
POTASSIUM SERPL-SCNC: 4.5 MMOL/L (ref 3.5–5.1)
PROT SERPL-MCNC: 6.8 G/DL (ref 6.3–8.2)
RBC # BLD AUTO: 3.59 M/UL (ref 4.05–5.2)
SODIUM SERPL-SCNC: 134 MMOL/L (ref 136–145)
WBC # BLD AUTO: 9.6 K/UL (ref 4.3–11.1)

## 2024-09-10 PROCEDURE — 80053 COMPREHEN METABOLIC PANEL: CPT

## 2024-09-10 PROCEDURE — 2000000000 HC ICU R&B

## 2024-09-10 PROCEDURE — 83605 ASSAY OF LACTIC ACID: CPT

## 2024-09-10 PROCEDURE — 36415 COLL VENOUS BLD VENIPUNCTURE: CPT

## 2024-09-10 PROCEDURE — 6360000002 HC RX W HCPCS: Performed by: FAMILY MEDICINE

## 2024-09-10 PROCEDURE — 6370000000 HC RX 637 (ALT 250 FOR IP): Performed by: FAMILY MEDICINE

## 2024-09-10 PROCEDURE — 85025 COMPLETE CBC W/AUTO DIFF WBC: CPT

## 2024-09-10 PROCEDURE — 93010 ELECTROCARDIOGRAM REPORT: CPT | Performed by: INTERNAL MEDICINE

## 2024-09-10 PROCEDURE — 2580000003 HC RX 258: Performed by: FAMILY MEDICINE

## 2024-09-10 RX ORDER — PROGESTERONE 200 MG/1
200 CAPSULE ORAL NIGHTLY
Status: DISCONTINUED | OUTPATIENT
Start: 2024-09-10 | End: 2024-09-10

## 2024-09-10 RX ORDER — LORAZEPAM 1 MG/1
3 TABLET ORAL
Status: DISCONTINUED | OUTPATIENT
Start: 2024-09-10 | End: 2024-09-14

## 2024-09-10 RX ORDER — ENOXAPARIN SODIUM 100 MG/ML
40 INJECTION SUBCUTANEOUS DAILY
Status: DISCONTINUED | OUTPATIENT
Start: 2024-09-10 | End: 2024-09-17 | Stop reason: HOSPADM

## 2024-09-10 RX ORDER — PAROXETINE 20 MG/1
20 TABLET, FILM COATED ORAL EVERY MORNING
Status: DISCONTINUED | OUTPATIENT
Start: 2024-09-10 | End: 2024-09-17 | Stop reason: HOSPADM

## 2024-09-10 RX ORDER — SODIUM CHLORIDE 9 MG/ML
INJECTION, SOLUTION INTRAVENOUS CONTINUOUS
Status: ACTIVE | OUTPATIENT
Start: 2024-09-10 | End: 2024-09-12

## 2024-09-10 RX ORDER — POLYETHYLENE GLYCOL 3350 17 G/17G
17 POWDER, FOR SOLUTION ORAL DAILY PRN
Status: DISCONTINUED | OUTPATIENT
Start: 2024-09-10 | End: 2024-09-17 | Stop reason: HOSPADM

## 2024-09-10 RX ORDER — POTASSIUM CHLORIDE 1500 MG/1
40 TABLET, EXTENDED RELEASE ORAL PRN
Status: DISCONTINUED | OUTPATIENT
Start: 2024-09-10 | End: 2024-09-17 | Stop reason: HOSPADM

## 2024-09-10 RX ORDER — DOCUSATE SODIUM 100 MG/1
100 CAPSULE, LIQUID FILLED ORAL 2 TIMES DAILY PRN
Status: DISCONTINUED | OUTPATIENT
Start: 2024-09-10 | End: 2024-09-17 | Stop reason: HOSPADM

## 2024-09-10 RX ORDER — ACETAMINOPHEN 650 MG/1
650 SUPPOSITORY RECTAL EVERY 6 HOURS PRN
Status: DISCONTINUED | OUTPATIENT
Start: 2024-09-10 | End: 2024-09-17 | Stop reason: HOSPADM

## 2024-09-10 RX ORDER — LANOLIN ALCOHOL/MO/W.PET/CERES
100 CREAM (GRAM) TOPICAL DAILY
Status: DISCONTINUED | OUTPATIENT
Start: 2024-09-10 | End: 2024-09-17 | Stop reason: HOSPADM

## 2024-09-10 RX ORDER — LORAZEPAM 1 MG/1
2 TABLET ORAL
Status: DISCONTINUED | OUTPATIENT
Start: 2024-09-10 | End: 2024-09-14

## 2024-09-10 RX ORDER — DOXEPIN HYDROCHLORIDE 10 MG/1
10 CAPSULE ORAL NIGHTLY
Status: DISCONTINUED | OUTPATIENT
Start: 2024-09-10 | End: 2024-09-17 | Stop reason: HOSPADM

## 2024-09-10 RX ORDER — ONDANSETRON 2 MG/ML
4 INJECTION INTRAMUSCULAR; INTRAVENOUS EVERY 6 HOURS PRN
Status: DISCONTINUED | OUTPATIENT
Start: 2024-09-10 | End: 2024-09-17 | Stop reason: HOSPADM

## 2024-09-10 RX ORDER — MENTHOL/CAMPHOR/ALLANTOIN/PHE 0.6-0.5-1%
OINTMENT(EA) TOPICAL PRN
Status: DISCONTINUED | OUTPATIENT
Start: 2024-09-10 | End: 2024-09-17 | Stop reason: HOSPADM

## 2024-09-10 RX ORDER — ACETAMINOPHEN 325 MG/1
650 TABLET ORAL EVERY 6 HOURS PRN
Status: DISCONTINUED | OUTPATIENT
Start: 2024-09-10 | End: 2024-09-17 | Stop reason: HOSPADM

## 2024-09-10 RX ORDER — LORAZEPAM 1 MG/1
4 TABLET ORAL
Status: DISCONTINUED | OUTPATIENT
Start: 2024-09-10 | End: 2024-09-14

## 2024-09-10 RX ORDER — PANTOPRAZOLE SODIUM 40 MG/1
40 TABLET, DELAYED RELEASE ORAL
Status: DISCONTINUED | OUTPATIENT
Start: 2024-09-10 | End: 2024-09-17 | Stop reason: HOSPADM

## 2024-09-10 RX ORDER — MAGNESIUM SULFATE IN WATER 40 MG/ML
2000 INJECTION, SOLUTION INTRAVENOUS PRN
Status: DISCONTINUED | OUTPATIENT
Start: 2024-09-10 | End: 2024-09-17 | Stop reason: HOSPADM

## 2024-09-10 RX ORDER — POTASSIUM CHLORIDE 7.45 MG/ML
10 INJECTION INTRAVENOUS PRN
Status: DISCONTINUED | OUTPATIENT
Start: 2024-09-10 | End: 2024-09-17 | Stop reason: HOSPADM

## 2024-09-10 RX ORDER — ONDANSETRON 4 MG/1
4 TABLET, ORALLY DISINTEGRATING ORAL EVERY 8 HOURS PRN
Status: DISCONTINUED | OUTPATIENT
Start: 2024-09-10 | End: 2024-09-17 | Stop reason: HOSPADM

## 2024-09-10 RX ORDER — LORAZEPAM 1 MG/1
1 TABLET ORAL
Status: DISCONTINUED | OUTPATIENT
Start: 2024-09-10 | End: 2024-09-14

## 2024-09-10 RX ORDER — PROGESTERONE 200 MG/1
200 CAPSULE ORAL NIGHTLY
Status: DISCONTINUED | OUTPATIENT
Start: 2024-09-10 | End: 2024-09-17 | Stop reason: HOSPADM

## 2024-09-10 RX ADMIN — DOXEPIN HYDROCHLORIDE 10 MG: 10 CAPSULE ORAL at 19:37

## 2024-09-10 RX ADMIN — Medication 100 MG: at 09:18

## 2024-09-10 RX ADMIN — SODIUM CHLORIDE: 9 INJECTION, SOLUTION INTRAVENOUS at 00:17

## 2024-09-10 RX ADMIN — SODIUM CHLORIDE: 9 INJECTION, SOLUTION INTRAVENOUS at 17:30

## 2024-09-10 RX ADMIN — LORAZEPAM 2 MG: 1 TABLET ORAL at 11:34

## 2024-09-10 RX ADMIN — Medication 7 G: at 01:00

## 2024-09-10 RX ADMIN — ACETAMINOPHEN 650 MG: 325 TABLET, FILM COATED ORAL at 21:47

## 2024-09-10 RX ADMIN — LORAZEPAM 1 MG: 1 TABLET ORAL at 14:53

## 2024-09-10 RX ADMIN — PROGESTERONE 200 MG: 200 CAPSULE ORAL at 00:30

## 2024-09-10 RX ADMIN — LORAZEPAM 4 MG: 1 TABLET ORAL at 05:49

## 2024-09-10 RX ADMIN — ACETAMINOPHEN 650 MG: 325 TABLET, FILM COATED ORAL at 14:52

## 2024-09-10 RX ADMIN — DOXEPIN HYDROCHLORIDE 10 MG: 10 CAPSULE ORAL at 00:56

## 2024-09-10 RX ADMIN — LORAZEPAM 2 MG: 1 TABLET ORAL at 21:47

## 2024-09-10 RX ADMIN — PANTOPRAZOLE SODIUM 40 MG: 40 TABLET, DELAYED RELEASE ORAL at 05:49

## 2024-09-10 RX ADMIN — LORAZEPAM 2 MG: 1 TABLET ORAL at 19:37

## 2024-09-10 RX ADMIN — PAROXETINE HYDROCHLORIDE 20 MG: 20 TABLET, FILM COATED ORAL at 09:18

## 2024-09-10 RX ADMIN — SODIUM CHLORIDE: 9 INJECTION, SOLUTION INTRAVENOUS at 09:16

## 2024-09-10 RX ADMIN — LORAZEPAM 3 MG: 1 TABLET ORAL at 00:54

## 2024-09-10 RX ADMIN — PROGESTERONE 200 MG: 200 CAPSULE ORAL at 19:38

## 2024-09-10 RX ADMIN — ENOXAPARIN SODIUM 40 MG: 100 INJECTION SUBCUTANEOUS at 09:18

## 2024-09-10 ASSESSMENT — PAIN DESCRIPTION - ORIENTATION
ORIENTATION: ANTERIOR;POSTERIOR
ORIENTATION: ANTERIOR
ORIENTATION: POSTERIOR;LOWER

## 2024-09-10 ASSESSMENT — PAIN SCALES - GENERAL
PAINLEVEL_OUTOF10: 5
PAINLEVEL_OUTOF10: 3
PAINLEVEL_OUTOF10: 3
PAINLEVEL_OUTOF10: 0
PAINLEVEL_OUTOF10: 7
PAINLEVEL_OUTOF10: 0
PAINLEVEL_OUTOF10: 5
PAINLEVEL_OUTOF10: 0

## 2024-09-10 ASSESSMENT — PAIN DESCRIPTION - LOCATION
LOCATION: HEAD
LOCATION: BACK

## 2024-09-10 ASSESSMENT — PAIN - FUNCTIONAL ASSESSMENT
PAIN_FUNCTIONAL_ASSESSMENT: ACTIVITIES ARE NOT PREVENTED

## 2024-09-10 ASSESSMENT — PAIN DESCRIPTION - DESCRIPTORS
DESCRIPTORS: ACHING

## 2024-09-11 LAB
ALBUMIN SERPL-MCNC: 3.3 G/DL (ref 3.2–4.6)
ALBUMIN/GLOB SERPL: 1 (ref 1–1.9)
ALP SERPL-CCNC: 77 U/L (ref 35–104)
ALT SERPL-CCNC: 66 U/L (ref 12–65)
ANION GAP SERPL CALC-SCNC: 12 MMOL/L (ref 9–18)
AST SERPL-CCNC: 124 U/L (ref 15–37)
BASOPHILS # BLD: 0 K/UL (ref 0–0.2)
BASOPHILS NFR BLD: 0 % (ref 0–2)
BILIRUB SERPL-MCNC: 0.7 MG/DL (ref 0–1.2)
BUN SERPL-MCNC: 6 MG/DL (ref 8–23)
CALCIUM SERPL-MCNC: 8.1 MG/DL (ref 8.8–10.2)
CHLORIDE SERPL-SCNC: 101 MMOL/L (ref 98–107)
CO2 SERPL-SCNC: 21 MMOL/L (ref 20–28)
CREAT SERPL-MCNC: 0.7 MG/DL (ref 0.6–1.1)
DIFFERENTIAL METHOD BLD: ABNORMAL
EOSINOPHIL # BLD: 0.1 K/UL (ref 0–0.8)
EOSINOPHIL NFR BLD: 1 % (ref 0.5–7.8)
ERYTHROCYTE [DISTWIDTH] IN BLOOD BY AUTOMATED COUNT: 13.5 % (ref 11.9–14.6)
GLOBULIN SER CALC-MCNC: 3.2 G/DL (ref 2.3–3.5)
GLUCOSE SERPL-MCNC: 98 MG/DL (ref 70–99)
HCT VFR BLD AUTO: 34 % (ref 35.8–46.3)
HGB BLD-MCNC: 11.5 G/DL (ref 11.7–15.4)
IMM GRANULOCYTES # BLD AUTO: 0 K/UL (ref 0–0.5)
IMM GRANULOCYTES NFR BLD AUTO: 0 % (ref 0–5)
LYMPHOCYTES # BLD: 1.1 K/UL (ref 0.5–4.6)
LYMPHOCYTES NFR BLD: 16 % (ref 13–44)
MCH RBC QN AUTO: 34.3 PG (ref 26.1–32.9)
MCHC RBC AUTO-ENTMCNC: 33.8 G/DL (ref 31.4–35)
MCV RBC AUTO: 101.5 FL (ref 82–102)
MONOCYTES # BLD: 0.7 K/UL (ref 0.1–1.3)
MONOCYTES NFR BLD: 11 % (ref 4–12)
NEUTS SEG # BLD: 4.6 K/UL (ref 1.7–8.2)
NEUTS SEG NFR BLD: 71 % (ref 43–78)
NRBC # BLD: 0 K/UL (ref 0–0.2)
PLATELET # BLD AUTO: 168 K/UL (ref 150–450)
PMV BLD AUTO: 9.5 FL (ref 9.4–12.3)
POTASSIUM SERPL-SCNC: 4 MMOL/L (ref 3.5–5.1)
PROT SERPL-MCNC: 6.4 G/DL (ref 6.3–8.2)
RBC # BLD AUTO: 3.35 M/UL (ref 4.05–5.2)
SODIUM SERPL-SCNC: 134 MMOL/L (ref 136–145)
WBC # BLD AUTO: 6.5 K/UL (ref 4.3–11.1)

## 2024-09-11 PROCEDURE — 2000000000 HC ICU R&B

## 2024-09-11 PROCEDURE — 80053 COMPREHEN METABOLIC PANEL: CPT

## 2024-09-11 PROCEDURE — 2580000003 HC RX 258: Performed by: FAMILY MEDICINE

## 2024-09-11 PROCEDURE — 2580000003 HC RX 258: Performed by: HOSPITALIST

## 2024-09-11 PROCEDURE — 97535 SELF CARE MNGMENT TRAINING: CPT

## 2024-09-11 PROCEDURE — 97165 OT EVAL LOW COMPLEX 30 MIN: CPT

## 2024-09-11 PROCEDURE — 97161 PT EVAL LOW COMPLEX 20 MIN: CPT

## 2024-09-11 PROCEDURE — 6370000000 HC RX 637 (ALT 250 FOR IP): Performed by: FAMILY MEDICINE

## 2024-09-11 PROCEDURE — 85025 COMPLETE CBC W/AUTO DIFF WBC: CPT

## 2024-09-11 PROCEDURE — 6370000000 HC RX 637 (ALT 250 FOR IP): Performed by: HOSPITALIST

## 2024-09-11 PROCEDURE — 94761 N-INVAS EAR/PLS OXIMETRY MLT: CPT

## 2024-09-11 PROCEDURE — 97530 THERAPEUTIC ACTIVITIES: CPT

## 2024-09-11 PROCEDURE — 6360000002 HC RX W HCPCS: Performed by: FAMILY MEDICINE

## 2024-09-11 PROCEDURE — 2500000003 HC RX 250 WO HCPCS: Performed by: HOSPITALIST

## 2024-09-11 RX ORDER — METOPROLOL TARTRATE 25 MG/1
25 TABLET, FILM COATED ORAL 2 TIMES DAILY
Status: DISCONTINUED | OUTPATIENT
Start: 2024-09-11 | End: 2024-09-13

## 2024-09-11 RX ORDER — SODIUM CHLORIDE 0.9 % (FLUSH) 0.9 %
5-40 SYRINGE (ML) INJECTION EVERY 12 HOURS SCHEDULED
Status: DISCONTINUED | OUTPATIENT
Start: 2024-09-11 | End: 2024-09-17 | Stop reason: HOSPADM

## 2024-09-11 RX ORDER — SODIUM CHLORIDE 0.9 % (FLUSH) 0.9 %
5-40 SYRINGE (ML) INJECTION PRN
Status: DISCONTINUED | OUTPATIENT
Start: 2024-09-11 | End: 2024-09-17 | Stop reason: HOSPADM

## 2024-09-11 RX ORDER — SODIUM CHLORIDE 9 MG/ML
INJECTION, SOLUTION INTRAVENOUS PRN
Status: DISCONTINUED | OUTPATIENT
Start: 2024-09-11 | End: 2024-09-17 | Stop reason: HOSPADM

## 2024-09-11 RX ADMIN — SODIUM CHLORIDE: 9 INJECTION, SOLUTION INTRAVENOUS at 01:44

## 2024-09-11 RX ADMIN — LORAZEPAM 1 MG: 1 TABLET ORAL at 14:15

## 2024-09-11 RX ADMIN — PAROXETINE HYDROCHLORIDE 20 MG: 20 TABLET, FILM COATED ORAL at 08:57

## 2024-09-11 RX ADMIN — METOPROLOL TARTRATE 25 MG: 25 TABLET, FILM COATED ORAL at 14:15

## 2024-09-11 RX ADMIN — DEXMEDETOMIDINE 0.4 MCG/KG/HR: 100 INJECTION, SOLUTION INTRAVENOUS at 19:15

## 2024-09-11 RX ADMIN — SODIUM CHLORIDE 3 MG: 9 INJECTION INTRAMUSCULAR; INTRAVENOUS; SUBCUTANEOUS at 16:39

## 2024-09-11 RX ADMIN — LORAZEPAM 2 MG: 1 TABLET ORAL at 05:51

## 2024-09-11 RX ADMIN — Medication 100 MG: at 08:57

## 2024-09-11 RX ADMIN — ONDANSETRON 4 MG: 2 INJECTION, SOLUTION INTRAMUSCULAR; INTRAVENOUS at 01:39

## 2024-09-11 RX ADMIN — LORAZEPAM 3 MG: 1 TABLET ORAL at 08:57

## 2024-09-11 RX ADMIN — SODIUM CHLORIDE: 9 INJECTION, SOLUTION INTRAVENOUS at 10:21

## 2024-09-11 RX ADMIN — ACETAMINOPHEN 650 MG: 325 TABLET, FILM COATED ORAL at 05:50

## 2024-09-11 RX ADMIN — ENOXAPARIN SODIUM 40 MG: 100 INJECTION SUBCUTANEOUS at 08:57

## 2024-09-11 RX ADMIN — PANTOPRAZOLE SODIUM 40 MG: 40 TABLET, DELAYED RELEASE ORAL at 05:50

## 2024-09-11 RX ADMIN — LORAZEPAM 3 MG: 1 TABLET ORAL at 01:40

## 2024-09-11 RX ADMIN — SODIUM CHLORIDE, PRESERVATIVE FREE 10 ML: 5 INJECTION INTRAVENOUS at 20:15

## 2024-09-11 ASSESSMENT — PAIN SCALES - GENERAL
PAINLEVEL_OUTOF10: 0
PAINLEVEL_OUTOF10: 0
PAINLEVEL_OUTOF10: 5
PAINLEVEL_OUTOF10: 0
PAINLEVEL_OUTOF10: 0

## 2024-09-11 ASSESSMENT — PAIN DESCRIPTION - LOCATION: LOCATION: HEAD

## 2024-09-11 ASSESSMENT — PAIN DESCRIPTION - DESCRIPTORS: DESCRIPTORS: ACHING

## 2024-09-11 ASSESSMENT — PAIN - FUNCTIONAL ASSESSMENT: PAIN_FUNCTIONAL_ASSESSMENT: ACTIVITIES ARE NOT PREVENTED

## 2024-09-12 LAB
ALBUMIN SERPL-MCNC: 3 G/DL (ref 3.2–4.6)
ALBUMIN/GLOB SERPL: 0.9 (ref 1–1.9)
ALP SERPL-CCNC: 80 U/L (ref 35–104)
ALT SERPL-CCNC: 58 U/L (ref 12–65)
ANION GAP SERPL CALC-SCNC: 12 MMOL/L (ref 9–18)
AST SERPL-CCNC: 100 U/L (ref 15–37)
BASOPHILS # BLD: 0 K/UL (ref 0–0.2)
BASOPHILS NFR BLD: 0 % (ref 0–2)
BILIRUB SERPL-MCNC: 0.6 MG/DL (ref 0–1.2)
BUN SERPL-MCNC: 6 MG/DL (ref 8–23)
CALCIUM SERPL-MCNC: 7.9 MG/DL (ref 8.8–10.2)
CHLORIDE SERPL-SCNC: 103 MMOL/L (ref 98–107)
CO2 SERPL-SCNC: 20 MMOL/L (ref 20–28)
CREAT SERPL-MCNC: 0.65 MG/DL (ref 0.6–1.1)
DIFFERENTIAL METHOD BLD: ABNORMAL
EOSINOPHIL # BLD: 0.2 K/UL (ref 0–0.8)
EOSINOPHIL NFR BLD: 2 % (ref 0.5–7.8)
ERYTHROCYTE [DISTWIDTH] IN BLOOD BY AUTOMATED COUNT: 13.2 % (ref 11.9–14.6)
GLOBULIN SER CALC-MCNC: 3.2 G/DL (ref 2.3–3.5)
GLUCOSE SERPL-MCNC: 78 MG/DL (ref 70–99)
HCT VFR BLD AUTO: 33 % (ref 35.8–46.3)
HGB BLD-MCNC: 11.2 G/DL (ref 11.7–15.4)
IMM GRANULOCYTES # BLD AUTO: 0 K/UL (ref 0–0.5)
IMM GRANULOCYTES NFR BLD AUTO: 0 % (ref 0–5)
LYMPHOCYTES # BLD: 1.1 K/UL (ref 0.5–4.6)
LYMPHOCYTES NFR BLD: 16 % (ref 13–44)
MAGNESIUM SERPL-MCNC: 1.9 MG/DL (ref 1.8–2.4)
MCH RBC QN AUTO: 34.4 PG (ref 26.1–32.9)
MCHC RBC AUTO-ENTMCNC: 33.9 G/DL (ref 31.4–35)
MCV RBC AUTO: 101.2 FL (ref 82–102)
MONOCYTES # BLD: 0.7 K/UL (ref 0.1–1.3)
MONOCYTES NFR BLD: 10 % (ref 4–12)
NEUTS SEG # BLD: 5 K/UL (ref 1.7–8.2)
NEUTS SEG NFR BLD: 72 % (ref 43–78)
NRBC # BLD: 0 K/UL (ref 0–0.2)
PLATELET # BLD AUTO: 146 K/UL (ref 150–450)
PMV BLD AUTO: 9.5 FL (ref 9.4–12.3)
POTASSIUM SERPL-SCNC: 4 MMOL/L (ref 3.5–5.1)
PROT SERPL-MCNC: 6.2 G/DL (ref 6.3–8.2)
RBC # BLD AUTO: 3.26 M/UL (ref 4.05–5.2)
SODIUM SERPL-SCNC: 135 MMOL/L (ref 136–145)
WBC # BLD AUTO: 7 K/UL (ref 4.3–11.1)

## 2024-09-12 PROCEDURE — 80053 COMPREHEN METABOLIC PANEL: CPT

## 2024-09-12 PROCEDURE — 36415 COLL VENOUS BLD VENIPUNCTURE: CPT

## 2024-09-12 PROCEDURE — 2700000000 HC OXYGEN THERAPY PER DAY

## 2024-09-12 PROCEDURE — 2000000000 HC ICU R&B

## 2024-09-12 PROCEDURE — 83735 ASSAY OF MAGNESIUM: CPT

## 2024-09-12 PROCEDURE — 85025 COMPLETE CBC W/AUTO DIFF WBC: CPT

## 2024-09-12 PROCEDURE — 6370000000 HC RX 637 (ALT 250 FOR IP): Performed by: FAMILY MEDICINE

## 2024-09-12 PROCEDURE — 2580000003 HC RX 258: Performed by: HOSPITALIST

## 2024-09-12 PROCEDURE — 94761 N-INVAS EAR/PLS OXIMETRY MLT: CPT

## 2024-09-12 PROCEDURE — 6370000000 HC RX 637 (ALT 250 FOR IP): Performed by: HOSPITALIST

## 2024-09-12 PROCEDURE — 6360000002 HC RX W HCPCS: Performed by: FAMILY MEDICINE

## 2024-09-12 PROCEDURE — 2500000003 HC RX 250 WO HCPCS: Performed by: HOSPITALIST

## 2024-09-12 RX ADMIN — ENOXAPARIN SODIUM 40 MG: 100 INJECTION SUBCUTANEOUS at 09:23

## 2024-09-12 RX ADMIN — DEXMEDETOMIDINE 0.6 MCG/KG/HR: 100 INJECTION, SOLUTION INTRAVENOUS at 05:50

## 2024-09-12 RX ADMIN — DEXMEDETOMIDINE 0.3 MCG/KG/HR: 100 INJECTION, SOLUTION INTRAVENOUS at 20:59

## 2024-09-12 RX ADMIN — SODIUM CHLORIDE, PRESERVATIVE FREE 10 ML: 5 INJECTION INTRAVENOUS at 10:13

## 2024-09-12 RX ADMIN — PROGESTERONE 200 MG: 200 CAPSULE ORAL at 20:55

## 2024-09-12 RX ADMIN — DOXEPIN HYDROCHLORIDE 10 MG: 10 CAPSULE ORAL at 20:54

## 2024-09-12 RX ADMIN — SODIUM CHLORIDE, PRESERVATIVE FREE 10 ML: 5 INJECTION INTRAVENOUS at 21:06

## 2024-09-12 RX ADMIN — PAROXETINE HYDROCHLORIDE 20 MG: 20 TABLET, FILM COATED ORAL at 10:12

## 2024-09-12 RX ADMIN — METOPROLOL TARTRATE 25 MG: 25 TABLET, FILM COATED ORAL at 10:13

## 2024-09-12 RX ADMIN — METOPROLOL TARTRATE 25 MG: 25 TABLET, FILM COATED ORAL at 20:54

## 2024-09-12 RX ADMIN — ACETAMINOPHEN 650 MG: 325 TABLET, FILM COATED ORAL at 23:48

## 2024-09-12 RX ADMIN — Medication 100 MG: at 10:13

## 2024-09-12 ASSESSMENT — PAIN DESCRIPTION - LOCATION: LOCATION: GENERALIZED

## 2024-09-12 ASSESSMENT — PAIN SCALES - GENERAL
PAINLEVEL_OUTOF10: 0
PAINLEVEL_OUTOF10: 0
PAINLEVEL_OUTOF10: 3

## 2024-09-13 LAB
ANION GAP SERPL CALC-SCNC: 14 MMOL/L (ref 9–18)
BASOPHILS # BLD: 0 K/UL (ref 0–0.2)
BASOPHILS NFR BLD: 1 % (ref 0–2)
BUN SERPL-MCNC: 9 MG/DL (ref 8–23)
CALCIUM SERPL-MCNC: 8.4 MG/DL (ref 8.8–10.2)
CHLORIDE SERPL-SCNC: 99 MMOL/L (ref 98–107)
CO2 SERPL-SCNC: 20 MMOL/L (ref 20–28)
CREAT SERPL-MCNC: 0.66 MG/DL (ref 0.6–1.1)
DIFFERENTIAL METHOD BLD: ABNORMAL
EOSINOPHIL # BLD: 0.2 K/UL (ref 0–0.8)
EOSINOPHIL NFR BLD: 3 % (ref 0.5–7.8)
ERYTHROCYTE [DISTWIDTH] IN BLOOD BY AUTOMATED COUNT: 12.9 % (ref 11.9–14.6)
GLUCOSE SERPL-MCNC: 73 MG/DL (ref 70–99)
HCT VFR BLD AUTO: 33.2 % (ref 35.8–46.3)
HGB BLD-MCNC: 11.5 G/DL (ref 11.7–15.4)
IMM GRANULOCYTES # BLD AUTO: 0 K/UL (ref 0–0.5)
IMM GRANULOCYTES NFR BLD AUTO: 0 % (ref 0–5)
LYMPHOCYTES # BLD: 1.4 K/UL (ref 0.5–4.6)
LYMPHOCYTES NFR BLD: 19 % (ref 13–44)
MAGNESIUM SERPL-MCNC: 1.9 MG/DL (ref 1.8–2.4)
MCH RBC QN AUTO: 34.1 PG (ref 26.1–32.9)
MCHC RBC AUTO-ENTMCNC: 34.6 G/DL (ref 31.4–35)
MCV RBC AUTO: 98.5 FL (ref 82–102)
MONOCYTES # BLD: 0.8 K/UL (ref 0.1–1.3)
MONOCYTES NFR BLD: 11 % (ref 4–12)
NEUTS SEG # BLD: 4.9 K/UL (ref 1.7–8.2)
NEUTS SEG NFR BLD: 66 % (ref 43–78)
NRBC # BLD: 0 K/UL (ref 0–0.2)
PLATELET # BLD AUTO: 203 K/UL (ref 150–450)
PMV BLD AUTO: 9.4 FL (ref 9.4–12.3)
POTASSIUM SERPL-SCNC: 4.4 MMOL/L (ref 3.5–5.1)
RBC # BLD AUTO: 3.37 M/UL (ref 4.05–5.2)
SODIUM SERPL-SCNC: 133 MMOL/L (ref 136–145)
WBC # BLD AUTO: 7.3 K/UL (ref 4.3–11.1)

## 2024-09-13 PROCEDURE — 6370000000 HC RX 637 (ALT 250 FOR IP): Performed by: INTERNAL MEDICINE

## 2024-09-13 PROCEDURE — 97535 SELF CARE MNGMENT TRAINING: CPT

## 2024-09-13 PROCEDURE — 85025 COMPLETE CBC W/AUTO DIFF WBC: CPT

## 2024-09-13 PROCEDURE — 83735 ASSAY OF MAGNESIUM: CPT

## 2024-09-13 PROCEDURE — 6360000002 HC RX W HCPCS: Performed by: INTERNAL MEDICINE

## 2024-09-13 PROCEDURE — 94761 N-INVAS EAR/PLS OXIMETRY MLT: CPT

## 2024-09-13 PROCEDURE — 2580000003 HC RX 258: Performed by: HOSPITALIST

## 2024-09-13 PROCEDURE — 6370000000 HC RX 637 (ALT 250 FOR IP): Performed by: FAMILY MEDICINE

## 2024-09-13 PROCEDURE — 6360000002 HC RX W HCPCS: Performed by: FAMILY MEDICINE

## 2024-09-13 PROCEDURE — 80048 BASIC METABOLIC PNL TOTAL CA: CPT

## 2024-09-13 PROCEDURE — 2000000000 HC ICU R&B

## 2024-09-13 PROCEDURE — 97530 THERAPEUTIC ACTIVITIES: CPT

## 2024-09-13 PROCEDURE — 2700000000 HC OXYGEN THERAPY PER DAY

## 2024-09-13 PROCEDURE — 6370000000 HC RX 637 (ALT 250 FOR IP): Performed by: HOSPITALIST

## 2024-09-13 PROCEDURE — 2500000003 HC RX 250 WO HCPCS: Performed by: HOSPITALIST

## 2024-09-13 RX ORDER — METOPROLOL TARTRATE 50 MG
50 TABLET ORAL 2 TIMES DAILY
Status: DISCONTINUED | OUTPATIENT
Start: 2024-09-13 | End: 2024-09-15

## 2024-09-13 RX ORDER — MORPHINE SULFATE 4 MG/ML
4 INJECTION INTRAVENOUS ONCE
Status: COMPLETED | OUTPATIENT
Start: 2024-09-13 | End: 2024-09-13

## 2024-09-13 RX ORDER — AMLODIPINE BESYLATE 5 MG/1
2.5 TABLET ORAL DAILY
Status: DISCONTINUED | OUTPATIENT
Start: 2024-09-13 | End: 2024-09-14

## 2024-09-13 RX ORDER — GABAPENTIN 100 MG/1
100 CAPSULE ORAL 2 TIMES DAILY
Status: DISCONTINUED | OUTPATIENT
Start: 2024-09-13 | End: 2024-09-17 | Stop reason: HOSPADM

## 2024-09-13 RX ORDER — GABAPENTIN 100 MG/1
100 CAPSULE ORAL 2 TIMES DAILY
Status: ON HOLD | COMMUNITY
Start: 2024-06-10 | End: 2024-09-17

## 2024-09-13 RX ORDER — HYDRALAZINE HYDROCHLORIDE 20 MG/ML
10 INJECTION INTRAMUSCULAR; INTRAVENOUS EVERY 6 HOURS PRN
Status: DISCONTINUED | OUTPATIENT
Start: 2024-09-13 | End: 2024-09-14

## 2024-09-13 RX ADMIN — Medication 100 MG: at 08:59

## 2024-09-13 RX ADMIN — DOXEPIN HYDROCHLORIDE 10 MG: 10 CAPSULE ORAL at 20:53

## 2024-09-13 RX ADMIN — PROGESTERONE 200 MG: 200 CAPSULE ORAL at 20:53

## 2024-09-13 RX ADMIN — AMLODIPINE BESYLATE 2.5 MG: 5 TABLET ORAL at 15:03

## 2024-09-13 RX ADMIN — DEXMEDETOMIDINE 0.4 MCG/KG/HR: 100 INJECTION, SOLUTION INTRAVENOUS at 10:04

## 2024-09-13 RX ADMIN — SODIUM CHLORIDE, PRESERVATIVE FREE 10 ML: 5 INJECTION INTRAVENOUS at 20:54

## 2024-09-13 RX ADMIN — METOPROLOL TARTRATE 50 MG: 50 TABLET, FILM COATED ORAL at 20:52

## 2024-09-13 RX ADMIN — PANTOPRAZOLE SODIUM 40 MG: 40 TABLET, DELAYED RELEASE ORAL at 05:55

## 2024-09-13 RX ADMIN — GABAPENTIN 100 MG: 100 CAPSULE ORAL at 17:22

## 2024-09-13 RX ADMIN — PAROXETINE HYDROCHLORIDE 20 MG: 20 TABLET, FILM COATED ORAL at 08:59

## 2024-09-13 RX ADMIN — LORAZEPAM 2 MG: 1 TABLET ORAL at 20:51

## 2024-09-13 RX ADMIN — METOPROLOL TARTRATE 25 MG: 25 TABLET, FILM COATED ORAL at 08:59

## 2024-09-13 RX ADMIN — ENOXAPARIN SODIUM 40 MG: 100 INJECTION SUBCUTANEOUS at 09:02

## 2024-09-13 RX ADMIN — LORAZEPAM 1 MG: 1 TABLET ORAL at 15:03

## 2024-09-13 RX ADMIN — SODIUM CHLORIDE, PRESERVATIVE FREE 10 ML: 5 INJECTION INTRAVENOUS at 09:03

## 2024-09-13 RX ADMIN — MORPHINE SULFATE 4 MG: 4 INJECTION INTRAVENOUS at 23:08

## 2024-09-13 ASSESSMENT — PAIN - FUNCTIONAL ASSESSMENT
PAIN_FUNCTIONAL_ASSESSMENT: PREVENTS OR INTERFERES SOME ACTIVE ACTIVITIES AND ADLS
PAIN_FUNCTIONAL_ASSESSMENT: PREVENTS OR INTERFERES SOME ACTIVE ACTIVITIES AND ADLS

## 2024-09-13 ASSESSMENT — PAIN DESCRIPTION - LOCATION
LOCATION: GENERALIZED
LOCATION: GENERALIZED

## 2024-09-13 ASSESSMENT — PAIN SCALES - GENERAL
PAINLEVEL_OUTOF10: 0
PAINLEVEL_OUTOF10: 10
PAINLEVEL_OUTOF10: 10
PAINLEVEL_OUTOF10: 0
PAINLEVEL_OUTOF10: 0

## 2024-09-13 ASSESSMENT — PAIN DESCRIPTION - DESCRIPTORS
DESCRIPTORS: ACHING
DESCRIPTORS: ACHING

## 2024-09-14 PROCEDURE — 6370000000 HC RX 637 (ALT 250 FOR IP): Performed by: INTERNAL MEDICINE

## 2024-09-14 PROCEDURE — 6370000000 HC RX 637 (ALT 250 FOR IP): Performed by: FAMILY MEDICINE

## 2024-09-14 PROCEDURE — 1100000000 HC RM PRIVATE

## 2024-09-14 PROCEDURE — 94761 N-INVAS EAR/PLS OXIMETRY MLT: CPT

## 2024-09-14 PROCEDURE — 97530 THERAPEUTIC ACTIVITIES: CPT

## 2024-09-14 PROCEDURE — 6360000002 HC RX W HCPCS: Performed by: FAMILY MEDICINE

## 2024-09-14 PROCEDURE — 2580000003 HC RX 258: Performed by: HOSPITALIST

## 2024-09-14 RX ORDER — LORAZEPAM 1 MG/1
1 TABLET ORAL 2 TIMES DAILY
Status: DISCONTINUED | OUTPATIENT
Start: 2024-09-14 | End: 2024-09-17 | Stop reason: HOSPADM

## 2024-09-14 RX ORDER — LORAZEPAM 1 MG/1
1 TABLET ORAL EVERY 4 HOURS PRN
Status: DISCONTINUED | OUTPATIENT
Start: 2024-09-14 | End: 2024-09-15

## 2024-09-14 RX ORDER — HYDRALAZINE HYDROCHLORIDE 20 MG/ML
20 INJECTION INTRAMUSCULAR; INTRAVENOUS EVERY 6 HOURS PRN
Status: DISCONTINUED | OUTPATIENT
Start: 2024-09-14 | End: 2024-09-17 | Stop reason: HOSPADM

## 2024-09-14 RX ORDER — AMLODIPINE BESYLATE 5 MG/1
5 TABLET ORAL DAILY
Status: DISCONTINUED | OUTPATIENT
Start: 2024-09-14 | End: 2024-09-17 | Stop reason: HOSPADM

## 2024-09-14 RX ADMIN — PAROXETINE HYDROCHLORIDE 20 MG: 20 TABLET, FILM COATED ORAL at 08:55

## 2024-09-14 RX ADMIN — PANTOPRAZOLE SODIUM 40 MG: 40 TABLET, DELAYED RELEASE ORAL at 05:47

## 2024-09-14 RX ADMIN — SODIUM CHLORIDE, PRESERVATIVE FREE 10 ML: 5 INJECTION INTRAVENOUS at 08:58

## 2024-09-14 RX ADMIN — METOPROLOL TARTRATE 50 MG: 50 TABLET, FILM COATED ORAL at 20:53

## 2024-09-14 RX ADMIN — GABAPENTIN 100 MG: 100 CAPSULE ORAL at 20:52

## 2024-09-14 RX ADMIN — AMLODIPINE BESYLATE 5 MG: 5 TABLET ORAL at 08:56

## 2024-09-14 RX ADMIN — LORAZEPAM 1 MG: 1 TABLET ORAL at 15:04

## 2024-09-14 RX ADMIN — LORAZEPAM 2 MG: 1 TABLET ORAL at 02:03

## 2024-09-14 RX ADMIN — DOXEPIN HYDROCHLORIDE 10 MG: 10 CAPSULE ORAL at 20:52

## 2024-09-14 RX ADMIN — METOPROLOL TARTRATE 50 MG: 50 TABLET, FILM COATED ORAL at 08:55

## 2024-09-14 RX ADMIN — PROGESTERONE 200 MG: 200 CAPSULE ORAL at 20:55

## 2024-09-14 RX ADMIN — POLYETHYLENE GLYCOL 3350 17 G: 17 POWDER, FOR SOLUTION ORAL at 10:22

## 2024-09-14 RX ADMIN — ENOXAPARIN SODIUM 40 MG: 100 INJECTION SUBCUTANEOUS at 08:56

## 2024-09-14 RX ADMIN — LORAZEPAM 1 MG: 1 TABLET ORAL at 22:25

## 2024-09-14 RX ADMIN — Medication 100 MG: at 08:55

## 2024-09-14 RX ADMIN — LORAZEPAM 1 MG: 1 TABLET ORAL at 20:52

## 2024-09-14 RX ADMIN — GABAPENTIN 100 MG: 100 CAPSULE ORAL at 08:55

## 2024-09-14 RX ADMIN — SODIUM CHLORIDE, PRESERVATIVE FREE 10 ML: 5 INJECTION INTRAVENOUS at 20:56

## 2024-09-14 RX ADMIN — LORAZEPAM 1 MG: 1 TABLET ORAL at 10:23

## 2024-09-14 ASSESSMENT — PAIN SCALES - GENERAL
PAINLEVEL_OUTOF10: 0

## 2024-09-14 ASSESSMENT — PAIN DESCRIPTION - DESCRIPTORS: DESCRIPTORS: ACHING

## 2024-09-14 ASSESSMENT — PAIN DESCRIPTION - LOCATION: LOCATION: GENERALIZED

## 2024-09-15 LAB
ALBUMIN SERPL-MCNC: 3.1 G/DL (ref 3.2–4.6)
ALBUMIN/GLOB SERPL: 0.9 (ref 1–1.9)
ALP SERPL-CCNC: 102 U/L (ref 35–104)
ALT SERPL-CCNC: 61 U/L (ref 12–65)
ANION GAP SERPL CALC-SCNC: 11 MMOL/L (ref 9–18)
AST SERPL-CCNC: 76 U/L (ref 15–37)
BASOPHILS # BLD: 0.1 K/UL (ref 0–0.2)
BASOPHILS NFR BLD: 1 % (ref 0–2)
BILIRUB SERPL-MCNC: 0.5 MG/DL (ref 0–1.2)
BUN SERPL-MCNC: 5 MG/DL (ref 8–23)
CALCIUM SERPL-MCNC: 9 MG/DL (ref 8.8–10.2)
CHLORIDE SERPL-SCNC: 101 MMOL/L (ref 98–107)
CO2 SERPL-SCNC: 27 MMOL/L (ref 20–28)
CREAT SERPL-MCNC: 0.66 MG/DL (ref 0.6–1.1)
DIFFERENTIAL METHOD BLD: ABNORMAL
EOSINOPHIL # BLD: 0.2 K/UL (ref 0–0.8)
EOSINOPHIL NFR BLD: 3 % (ref 0.5–7.8)
ERYTHROCYTE [DISTWIDTH] IN BLOOD BY AUTOMATED COUNT: 13.3 % (ref 11.9–14.6)
GLOBULIN SER CALC-MCNC: 3.5 G/DL (ref 2.3–3.5)
GLUCOSE SERPL-MCNC: 122 MG/DL (ref 70–99)
HCT VFR BLD AUTO: 36.7 % (ref 35.8–46.3)
HGB BLD-MCNC: 12.8 G/DL (ref 11.7–15.4)
IMM GRANULOCYTES # BLD AUTO: 0 K/UL (ref 0–0.5)
IMM GRANULOCYTES NFR BLD AUTO: 1 % (ref 0–5)
LYMPHOCYTES # BLD: 2.1 K/UL (ref 0.5–4.6)
LYMPHOCYTES NFR BLD: 35 % (ref 13–44)
MCH RBC QN AUTO: 33.9 PG (ref 26.1–32.9)
MCHC RBC AUTO-ENTMCNC: 34.9 G/DL (ref 31.4–35)
MCV RBC AUTO: 97.1 FL (ref 82–102)
MONOCYTES # BLD: 1.2 K/UL (ref 0.1–1.3)
MONOCYTES NFR BLD: 20 % (ref 4–12)
NEUTS SEG # BLD: 2.4 K/UL (ref 1.7–8.2)
NEUTS SEG NFR BLD: 40 % (ref 43–78)
NRBC # BLD: 0 K/UL (ref 0–0.2)
PLATELET # BLD AUTO: 271 K/UL (ref 150–450)
PMV BLD AUTO: 9.3 FL (ref 9.4–12.3)
POTASSIUM SERPL-SCNC: 3.6 MMOL/L (ref 3.5–5.1)
PROT SERPL-MCNC: 6.7 G/DL (ref 6.3–8.2)
RBC # BLD AUTO: 3.78 M/UL (ref 4.05–5.2)
SODIUM SERPL-SCNC: 139 MMOL/L (ref 136–145)
WBC # BLD AUTO: 5.8 K/UL (ref 4.3–11.1)

## 2024-09-15 PROCEDURE — 6370000000 HC RX 637 (ALT 250 FOR IP): Performed by: INTERNAL MEDICINE

## 2024-09-15 PROCEDURE — 6370000000 HC RX 637 (ALT 250 FOR IP): Performed by: FAMILY MEDICINE

## 2024-09-15 PROCEDURE — 36415 COLL VENOUS BLD VENIPUNCTURE: CPT

## 2024-09-15 PROCEDURE — 2580000003 HC RX 258: Performed by: HOSPITALIST

## 2024-09-15 PROCEDURE — 1100000000 HC RM PRIVATE

## 2024-09-15 PROCEDURE — 6360000002 HC RX W HCPCS: Performed by: INTERNAL MEDICINE

## 2024-09-15 PROCEDURE — 85025 COMPLETE CBC W/AUTO DIFF WBC: CPT

## 2024-09-15 PROCEDURE — 6360000002 HC RX W HCPCS: Performed by: FAMILY MEDICINE

## 2024-09-15 PROCEDURE — 97530 THERAPEUTIC ACTIVITIES: CPT

## 2024-09-15 PROCEDURE — 80053 COMPREHEN METABOLIC PANEL: CPT

## 2024-09-15 RX ORDER — LORAZEPAM 0.5 MG/1
0.5 TABLET ORAL EVERY 4 HOURS PRN
Status: DISCONTINUED | OUTPATIENT
Start: 2024-09-15 | End: 2024-09-15

## 2024-09-15 RX ORDER — LORAZEPAM 0.5 MG/1
0.5 TABLET ORAL EVERY 6 HOURS PRN
Status: DISCONTINUED | OUTPATIENT
Start: 2024-09-15 | End: 2024-09-16

## 2024-09-15 RX ADMIN — SODIUM CHLORIDE, PRESERVATIVE FREE 10 ML: 5 INJECTION INTRAVENOUS at 08:56

## 2024-09-15 RX ADMIN — PROGESTERONE 200 MG: 200 CAPSULE ORAL at 20:08

## 2024-09-15 RX ADMIN — LORAZEPAM 1 MG: 1 TABLET ORAL at 20:03

## 2024-09-15 RX ADMIN — ENOXAPARIN SODIUM 40 MG: 100 INJECTION SUBCUTANEOUS at 08:56

## 2024-09-15 RX ADMIN — SODIUM CHLORIDE, PRESERVATIVE FREE 10 ML: 5 INJECTION INTRAVENOUS at 20:08

## 2024-09-15 RX ADMIN — PAROXETINE HYDROCHLORIDE 20 MG: 20 TABLET, FILM COATED ORAL at 08:52

## 2024-09-15 RX ADMIN — METOPROLOL TARTRATE 50 MG: 50 TABLET, FILM COATED ORAL at 08:52

## 2024-09-15 RX ADMIN — HYDRALAZINE HYDROCHLORIDE 20 MG: 20 INJECTION INTRAMUSCULAR; INTRAVENOUS at 01:42

## 2024-09-15 RX ADMIN — METOPROLOL TARTRATE 75 MG: 50 TABLET, FILM COATED ORAL at 20:03

## 2024-09-15 RX ADMIN — DOXEPIN HYDROCHLORIDE 10 MG: 10 CAPSULE ORAL at 20:03

## 2024-09-15 RX ADMIN — Medication 100 MG: at 08:52

## 2024-09-15 RX ADMIN — LORAZEPAM 1 MG: 1 TABLET ORAL at 12:18

## 2024-09-15 RX ADMIN — GABAPENTIN 100 MG: 100 CAPSULE ORAL at 08:52

## 2024-09-15 RX ADMIN — PANTOPRAZOLE SODIUM 40 MG: 40 TABLET, DELAYED RELEASE ORAL at 05:01

## 2024-09-15 RX ADMIN — AMLODIPINE BESYLATE 5 MG: 5 TABLET ORAL at 08:52

## 2024-09-15 RX ADMIN — LORAZEPAM 1 MG: 1 TABLET ORAL at 04:45

## 2024-09-15 RX ADMIN — LORAZEPAM 1 MG: 1 TABLET ORAL at 08:52

## 2024-09-15 RX ADMIN — GABAPENTIN 100 MG: 100 CAPSULE ORAL at 20:03

## 2024-09-15 ASSESSMENT — PAIN SCALES - GENERAL
PAINLEVEL_OUTOF10: 0
PAINLEVEL_OUTOF10: 0

## 2024-09-16 PROCEDURE — 2580000003 HC RX 258: Performed by: HOSPITALIST

## 2024-09-16 PROCEDURE — 6360000002 HC RX W HCPCS: Performed by: FAMILY MEDICINE

## 2024-09-16 PROCEDURE — 6370000000 HC RX 637 (ALT 250 FOR IP): Performed by: FAMILY MEDICINE

## 2024-09-16 PROCEDURE — 97530 THERAPEUTIC ACTIVITIES: CPT

## 2024-09-16 PROCEDURE — 6370000000 HC RX 637 (ALT 250 FOR IP): Performed by: INTERNAL MEDICINE

## 2024-09-16 PROCEDURE — 1100000000 HC RM PRIVATE

## 2024-09-16 RX ORDER — LORAZEPAM 0.5 MG/1
TABLET ORAL
Qty: 8 TABLET | Refills: 0 | Status: SHIPPED | OUTPATIENT
Start: 2024-09-16 | End: 2024-09-16

## 2024-09-16 RX ORDER — AMLODIPINE BESYLATE 5 MG/1
5 TABLET ORAL DAILY
Qty: 30 TABLET | Refills: 1 | Status: SHIPPED | OUTPATIENT
Start: 2024-09-17

## 2024-09-16 RX ORDER — LORAZEPAM 0.5 MG/1
TABLET ORAL
Qty: 14 TABLET | Refills: 0 | Status: SHIPPED | OUTPATIENT
Start: 2024-09-16 | End: 2024-09-17

## 2024-09-16 RX ORDER — METOPROLOL TARTRATE 75 MG/1
50 TABLET, FILM COATED ORAL 2 TIMES DAILY
Qty: 60 TABLET | Refills: 1 | Status: SHIPPED | OUTPATIENT
Start: 2024-09-16 | End: 2024-09-16 | Stop reason: HOSPADM

## 2024-09-16 RX ORDER — LANOLIN ALCOHOL/MO/W.PET/CERES
100 CREAM (GRAM) TOPICAL DAILY
Qty: 30 TABLET | Refills: 3 | Status: SHIPPED | OUTPATIENT
Start: 2024-09-17

## 2024-09-16 RX ORDER — METOPROLOL TARTRATE 100 MG
100 TABLET ORAL 2 TIMES DAILY
Status: DISCONTINUED | OUTPATIENT
Start: 2024-09-16 | End: 2024-09-17 | Stop reason: HOSPADM

## 2024-09-16 RX ORDER — METOPROLOL TARTRATE 50 MG
50 TABLET ORAL 2 TIMES DAILY
Qty: 60 TABLET | Refills: 0 | Status: SHIPPED | OUTPATIENT
Start: 2024-09-16

## 2024-09-16 RX ADMIN — METOPROLOL TARTRATE 75 MG: 50 TABLET, FILM COATED ORAL at 08:19

## 2024-09-16 RX ADMIN — SODIUM CHLORIDE, PRESERVATIVE FREE 10 ML: 5 INJECTION INTRAVENOUS at 20:01

## 2024-09-16 RX ADMIN — Medication 100 MG: at 08:19

## 2024-09-16 RX ADMIN — DOXEPIN HYDROCHLORIDE 10 MG: 10 CAPSULE ORAL at 20:01

## 2024-09-16 RX ADMIN — PANTOPRAZOLE SODIUM 40 MG: 40 TABLET, DELAYED RELEASE ORAL at 05:32

## 2024-09-16 RX ADMIN — PAROXETINE HYDROCHLORIDE 20 MG: 20 TABLET, FILM COATED ORAL at 08:20

## 2024-09-16 RX ADMIN — AMLODIPINE BESYLATE 5 MG: 5 TABLET ORAL at 08:19

## 2024-09-16 RX ADMIN — LORAZEPAM 1 MG: 1 TABLET ORAL at 08:20

## 2024-09-16 RX ADMIN — GABAPENTIN 100 MG: 100 CAPSULE ORAL at 20:01

## 2024-09-16 RX ADMIN — SODIUM CHLORIDE, PRESERVATIVE FREE 10 ML: 5 INJECTION INTRAVENOUS at 09:04

## 2024-09-16 RX ADMIN — ENOXAPARIN SODIUM 40 MG: 100 INJECTION SUBCUTANEOUS at 08:20

## 2024-09-16 RX ADMIN — LORAZEPAM 0.5 MG: 0.5 TABLET ORAL at 04:10

## 2024-09-16 RX ADMIN — METOPROLOL TARTRATE 100 MG: 100 TABLET, FILM COATED ORAL at 20:01

## 2024-09-16 RX ADMIN — LORAZEPAM 1 MG: 1 TABLET ORAL at 20:01

## 2024-09-16 RX ADMIN — ONDANSETRON 4 MG: 4 TABLET, ORALLY DISINTEGRATING ORAL at 22:56

## 2024-09-16 RX ADMIN — GABAPENTIN 100 MG: 100 CAPSULE ORAL at 08:19

## 2024-09-16 ASSESSMENT — PAIN SCALES - GENERAL: PAINLEVEL_OUTOF10: 0

## 2024-09-17 VITALS
HEIGHT: 65 IN | WEIGHT: 173.2 LBS | HEART RATE: 65 BPM | BODY MASS INDEX: 28.86 KG/M2 | RESPIRATION RATE: 18 BRPM | OXYGEN SATURATION: 95 % | SYSTOLIC BLOOD PRESSURE: 159 MMHG | DIASTOLIC BLOOD PRESSURE: 79 MMHG | TEMPERATURE: 98.7 F

## 2024-09-17 PROCEDURE — 6360000002 HC RX W HCPCS: Performed by: FAMILY MEDICINE

## 2024-09-17 PROCEDURE — 6370000000 HC RX 637 (ALT 250 FOR IP): Performed by: FAMILY MEDICINE

## 2024-09-17 PROCEDURE — 2580000003 HC RX 258: Performed by: HOSPITALIST

## 2024-09-17 PROCEDURE — 6370000000 HC RX 637 (ALT 250 FOR IP): Performed by: INTERNAL MEDICINE

## 2024-09-17 RX ORDER — PAROXETINE 20 MG/1
20 TABLET, FILM COATED ORAL EVERY MORNING
Qty: 30 TABLET | Refills: 0 | Status: SHIPPED | OUTPATIENT
Start: 2024-09-17

## 2024-09-17 RX ORDER — GABAPENTIN 100 MG/1
100 CAPSULE ORAL 2 TIMES DAILY
Qty: 60 CAPSULE | Refills: 0 | Status: SHIPPED | OUTPATIENT
Start: 2024-09-17 | End: 2024-10-17

## 2024-09-17 RX ORDER — DOXEPIN HYDROCHLORIDE 10 MG/1
10 CAPSULE ORAL NIGHTLY
Qty: 30 CAPSULE | Refills: 0 | Status: SHIPPED | OUTPATIENT
Start: 2024-09-17

## 2024-09-17 RX ORDER — PANTOPRAZOLE SODIUM 40 MG/1
40 TABLET, DELAYED RELEASE ORAL
Qty: 30 TABLET | Refills: 0 | Status: SHIPPED | OUTPATIENT
Start: 2024-09-17

## 2024-09-17 RX ORDER — LORAZEPAM 0.5 MG/1
TABLET ORAL
Qty: 14 TABLET | Refills: 0 | Status: SHIPPED | OUTPATIENT
Start: 2024-09-17 | End: 2024-09-23

## 2024-09-17 RX ADMIN — DOCUSATE SODIUM 100 MG: 100 CAPSULE, LIQUID FILLED ORAL at 08:14

## 2024-09-17 RX ADMIN — AMLODIPINE BESYLATE 5 MG: 5 TABLET ORAL at 10:00

## 2024-09-17 RX ADMIN — Medication 100 MG: at 10:01

## 2024-09-17 RX ADMIN — METOPROLOL TARTRATE 100 MG: 100 TABLET, FILM COATED ORAL at 10:00

## 2024-09-17 RX ADMIN — GABAPENTIN 100 MG: 100 CAPSULE ORAL at 08:05

## 2024-09-17 RX ADMIN — LORAZEPAM 1 MG: 1 TABLET ORAL at 08:05

## 2024-09-17 RX ADMIN — ENOXAPARIN SODIUM 40 MG: 100 INJECTION SUBCUTANEOUS at 10:02

## 2024-09-17 RX ADMIN — SODIUM CHLORIDE, PRESERVATIVE FREE 5 ML: 5 INJECTION INTRAVENOUS at 10:05

## 2024-09-17 RX ADMIN — PAROXETINE HYDROCHLORIDE 20 MG: 20 TABLET, FILM COATED ORAL at 10:01

## 2024-09-17 RX ADMIN — PANTOPRAZOLE SODIUM 40 MG: 40 TABLET, DELAYED RELEASE ORAL at 05:42

## 2024-09-17 ASSESSMENT — PAIN SCALES - GENERAL: PAINLEVEL_OUTOF10: 0

## 2024-10-06 ENCOUNTER — APPOINTMENT (OUTPATIENT)
Dept: CT IMAGING | Age: 69
DRG: 897 | End: 2024-10-06
Payer: MEDICARE

## 2024-10-06 ENCOUNTER — HOSPITAL ENCOUNTER (INPATIENT)
Age: 69
LOS: 1 days | Discharge: NURSING, REHAB OR LONG-TERM CARE | DRG: 897 | End: 2024-10-07
Attending: EMERGENCY MEDICINE | Admitting: FAMILY MEDICINE
Payer: MEDICARE

## 2024-10-06 ENCOUNTER — APPOINTMENT (OUTPATIENT)
Dept: GENERAL RADIOLOGY | Age: 69
DRG: 897 | End: 2024-10-06
Payer: MEDICARE

## 2024-10-06 DIAGNOSIS — M54.50 CHRONIC BILATERAL LOW BACK PAIN WITHOUT SCIATICA: ICD-10-CM

## 2024-10-06 DIAGNOSIS — R79.89 ELEVATED LFTS: ICD-10-CM

## 2024-10-06 DIAGNOSIS — G89.29 CHRONIC BILATERAL LOW BACK PAIN WITHOUT SCIATICA: ICD-10-CM

## 2024-10-06 DIAGNOSIS — F33.41 RECURRENT MAJOR DEPRESSIVE DISORDER, IN PARTIAL REMISSION (HCC): ICD-10-CM

## 2024-10-06 DIAGNOSIS — R07.9 CHEST PAIN, UNSPECIFIED TYPE: ICD-10-CM

## 2024-10-06 DIAGNOSIS — R00.0 TACHYCARDIA: ICD-10-CM

## 2024-10-06 DIAGNOSIS — F10.929 ACUTE ALCOHOLIC INTOXICATION WITH COMPLICATION (HCC): Primary | ICD-10-CM

## 2024-10-06 DIAGNOSIS — G47.09 OTHER INSOMNIA: ICD-10-CM

## 2024-10-06 DIAGNOSIS — F10.930 ALCOHOL WITHDRAWAL SYNDROME, UNCOMPLICATED (HCC): ICD-10-CM

## 2024-10-06 DIAGNOSIS — R10.84 GENERALIZED ABDOMINAL PAIN: ICD-10-CM

## 2024-10-06 LAB
ALBUMIN SERPL-MCNC: 4.3 G/DL (ref 3.2–4.6)
ALBUMIN/GLOB SERPL: 1 (ref 1–1.9)
ALP SERPL-CCNC: 154 U/L (ref 35–104)
ALT SERPL-CCNC: 177 U/L (ref 8–45)
AMMONIA PLAS-SCNC: 11 UMOL/L (ref 11–51)
ANION GAP SERPL CALC-SCNC: 20 MMOL/L (ref 9–18)
APAP SERPL-MCNC: <5 UG/ML (ref 10–30)
AST SERPL-CCNC: 329 U/L (ref 15–37)
BASOPHILS # BLD: 0.1 K/UL (ref 0–0.2)
BASOPHILS NFR BLD: 1 % (ref 0–2)
BILIRUB SERPL-MCNC: 0.6 MG/DL (ref 0–1.2)
BILIRUB UR QL: NEGATIVE
BUN SERPL-MCNC: 7 MG/DL (ref 8–23)
CALCIUM SERPL-MCNC: 10.1 MG/DL (ref 8.8–10.2)
CHLORIDE SERPL-SCNC: 92 MMOL/L (ref 98–107)
CK SERPL-CCNC: 84 U/L (ref 21–215)
CO2 SERPL-SCNC: 23 MMOL/L (ref 20–28)
CREAT SERPL-MCNC: 0.85 MG/DL (ref 0.6–1.1)
D DIMER PPP FEU-MCNC: 1.53 UG/ML(FEU)
DIFFERENTIAL METHOD BLD: ABNORMAL
EOSINOPHIL # BLD: 0.1 K/UL (ref 0–0.8)
EOSINOPHIL NFR BLD: 1 % (ref 0.5–7.8)
ERYTHROCYTE [DISTWIDTH] IN BLOOD BY AUTOMATED COUNT: 12.6 % (ref 11.9–14.6)
ETHANOL SERPL-MCNC: 82 MG/DL (ref 0–0.08)
GLOBULIN SER CALC-MCNC: 4.3 G/DL (ref 2.3–3.5)
GLUCOSE SERPL-MCNC: 127 MG/DL (ref 70–99)
GLUCOSE UR QL STRIP.AUTO: NEGATIVE MG/DL
HCT VFR BLD AUTO: 42.5 % (ref 35.8–46.3)
HEMOCCULT STL QL: NEGATIVE
HGB BLD-MCNC: 14.7 G/DL (ref 11.7–15.4)
IMM GRANULOCYTES # BLD AUTO: 0 K/UL (ref 0–0.5)
IMM GRANULOCYTES NFR BLD AUTO: 0 % (ref 0–5)
INR PPP: 1
KETONES UR-MCNC: NEGATIVE MG/DL
LACTATE SERPL-SCNC: 2.5 MMOL/L (ref 0.5–2)
LACTATE SERPL-SCNC: 3.3 MMOL/L (ref 0.5–2)
LEUKOCYTE ESTERASE UR QL STRIP: NEGATIVE
LIPASE SERPL-CCNC: 47 U/L (ref 13–60)
LYMPHOCYTES # BLD: 3.3 K/UL (ref 0.5–4.6)
LYMPHOCYTES NFR BLD: 34 % (ref 13–44)
MAGNESIUM SERPL-MCNC: 2 MG/DL (ref 1.8–2.4)
MCH RBC QN AUTO: 33.3 PG (ref 26.1–32.9)
MCHC RBC AUTO-ENTMCNC: 34.6 G/DL (ref 31.4–35)
MCV RBC AUTO: 96.4 FL (ref 82–102)
MONOCYTES # BLD: 0.8 K/UL (ref 0.1–1.3)
MONOCYTES NFR BLD: 9 % (ref 4–12)
NEUTS SEG # BLD: 5.4 K/UL (ref 1.7–8.2)
NEUTS SEG NFR BLD: 56 % (ref 43–78)
NITRITE UR QL: NEGATIVE
NRBC # BLD: 0 K/UL (ref 0–0.2)
PH UR: 5 (ref 5–9)
PLATELET # BLD AUTO: 262 K/UL (ref 150–450)
PMV BLD AUTO: 9.1 FL (ref 9.4–12.3)
POTASSIUM SERPL-SCNC: 3.6 MMOL/L (ref 3.5–5.1)
PROCALCITONIN SERPL-MCNC: 0.16 NG/ML (ref 0–0.1)
PROT SERPL-MCNC: 8.6 G/DL (ref 6.3–8.2)
PROT UR QL: 100 MG/DL
PROTHROMBIN TIME: 13 SEC (ref 11.3–14.9)
RBC # BLD AUTO: 4.41 M/UL (ref 4.05–5.2)
RBC # UR STRIP: ABNORMAL
SALICYLATES SERPL-MCNC: <0.5 MG/DL
SERVICE CMNT-IMP: ABNORMAL
SODIUM SERPL-SCNC: 135 MMOL/L (ref 136–145)
SP GR UR: <1.005 (ref 1–1.02)
TROPONIN T SERPL HS-MCNC: 18 NG/L (ref 0–14)
TROPONIN T SERPL HS-MCNC: 18 NG/L (ref 0–14)
UROBILINOGEN UR QL: 0.2 EU/DL (ref 0.2–1)
WBC # BLD AUTO: 9.7 K/UL (ref 4.3–11.1)

## 2024-10-06 PROCEDURE — 96376 TX/PRO/DX INJ SAME DRUG ADON: CPT

## 2024-10-06 PROCEDURE — 82077 ASSAY SPEC XCP UR&BREATH IA: CPT

## 2024-10-06 PROCEDURE — 84145 PROCALCITONIN (PCT): CPT

## 2024-10-06 PROCEDURE — 71260 CT THORAX DX C+: CPT

## 2024-10-06 PROCEDURE — 96375 TX/PRO/DX INJ NEW DRUG ADDON: CPT

## 2024-10-06 PROCEDURE — G0378 HOSPITAL OBSERVATION PER HR: HCPCS

## 2024-10-06 PROCEDURE — 83735 ASSAY OF MAGNESIUM: CPT

## 2024-10-06 PROCEDURE — 80143 DRUG ASSAY ACETAMINOPHEN: CPT

## 2024-10-06 PROCEDURE — 96361 HYDRATE IV INFUSION ADD-ON: CPT

## 2024-10-06 PROCEDURE — 80179 DRUG ASSAY SALICYLATE: CPT

## 2024-10-06 PROCEDURE — 93005 ELECTROCARDIOGRAM TRACING: CPT | Performed by: STUDENT IN AN ORGANIZED HEALTH CARE EDUCATION/TRAINING PROGRAM

## 2024-10-06 PROCEDURE — 82140 ASSAY OF AMMONIA: CPT

## 2024-10-06 PROCEDURE — 81003 URINALYSIS AUTO W/O SCOPE: CPT

## 2024-10-06 PROCEDURE — 6360000002 HC RX W HCPCS: Performed by: STUDENT IN AN ORGANIZED HEALTH CARE EDUCATION/TRAINING PROGRAM

## 2024-10-06 PROCEDURE — 82270 OCCULT BLOOD FECES: CPT

## 2024-10-06 PROCEDURE — 83605 ASSAY OF LACTIC ACID: CPT

## 2024-10-06 PROCEDURE — 1100000003 HC PRIVATE W/ TELEMETRY

## 2024-10-06 PROCEDURE — 84484 ASSAY OF TROPONIN QUANT: CPT

## 2024-10-06 PROCEDURE — 85610 PROTHROMBIN TIME: CPT

## 2024-10-06 PROCEDURE — 6360000004 HC RX CONTRAST MEDICATION: Performed by: STUDENT IN AN ORGANIZED HEALTH CARE EDUCATION/TRAINING PROGRAM

## 2024-10-06 PROCEDURE — 80053 COMPREHEN METABOLIC PANEL: CPT

## 2024-10-06 PROCEDURE — 2580000003 HC RX 258: Performed by: STUDENT IN AN ORGANIZED HEALTH CARE EDUCATION/TRAINING PROGRAM

## 2024-10-06 PROCEDURE — 83690 ASSAY OF LIPASE: CPT

## 2024-10-06 PROCEDURE — 94761 N-INVAS EAR/PLS OXIMETRY MLT: CPT

## 2024-10-06 PROCEDURE — 70450 CT HEAD/BRAIN W/O DYE: CPT

## 2024-10-06 PROCEDURE — 6360000002 HC RX W HCPCS: Performed by: FAMILY MEDICINE

## 2024-10-06 PROCEDURE — 99285 EMERGENCY DEPT VISIT HI MDM: CPT

## 2024-10-06 PROCEDURE — 74177 CT ABD & PELVIS W/CONTRAST: CPT

## 2024-10-06 PROCEDURE — 85025 COMPLETE CBC W/AUTO DIFF WBC: CPT

## 2024-10-06 PROCEDURE — 85379 FIBRIN DEGRADATION QUANT: CPT

## 2024-10-06 PROCEDURE — 82550 ASSAY OF CK (CPK): CPT

## 2024-10-06 PROCEDURE — 2580000003 HC RX 258: Performed by: FAMILY MEDICINE

## 2024-10-06 PROCEDURE — 96374 THER/PROPH/DIAG INJ IV PUSH: CPT

## 2024-10-06 PROCEDURE — 6370000000 HC RX 637 (ALT 250 FOR IP): Performed by: STUDENT IN AN ORGANIZED HEALTH CARE EDUCATION/TRAINING PROGRAM

## 2024-10-06 PROCEDURE — 71046 X-RAY EXAM CHEST 2 VIEWS: CPT

## 2024-10-06 RX ORDER — SODIUM CHLORIDE 0.9 % (FLUSH) 0.9 %
5-40 SYRINGE (ML) INJECTION PRN
Status: DISCONTINUED | OUTPATIENT
Start: 2024-10-06 | End: 2024-10-07 | Stop reason: HOSPADM

## 2024-10-06 RX ORDER — LORAZEPAM 2 MG/ML
0.5 INJECTION INTRAMUSCULAR ONCE
Status: COMPLETED | OUTPATIENT
Start: 2024-10-06 | End: 2024-10-06

## 2024-10-06 RX ORDER — IOPAMIDOL 755 MG/ML
100 INJECTION, SOLUTION INTRAVASCULAR
Status: COMPLETED | OUTPATIENT
Start: 2024-10-06 | End: 2024-10-06

## 2024-10-06 RX ORDER — LORAZEPAM 1 MG/1
3 TABLET ORAL
Status: DISCONTINUED | OUTPATIENT
Start: 2024-10-06 | End: 2024-10-07 | Stop reason: HOSPADM

## 2024-10-06 RX ORDER — LORAZEPAM 1 MG/1
1 TABLET ORAL
Status: DISCONTINUED | OUTPATIENT
Start: 2024-10-06 | End: 2024-10-07 | Stop reason: HOSPADM

## 2024-10-06 RX ORDER — SODIUM CHLORIDE 0.9 % (FLUSH) 0.9 %
5-40 SYRINGE (ML) INJECTION EVERY 12 HOURS SCHEDULED
Status: DISCONTINUED | OUTPATIENT
Start: 2024-10-06 | End: 2024-10-07 | Stop reason: HOSPADM

## 2024-10-06 RX ORDER — LORAZEPAM 1 MG/1
2 TABLET ORAL
Status: DISCONTINUED | OUTPATIENT
Start: 2024-10-06 | End: 2024-10-07 | Stop reason: HOSPADM

## 2024-10-06 RX ORDER — 0.9 % SODIUM CHLORIDE 0.9 %
1000 INTRAVENOUS SOLUTION INTRAVENOUS
Status: COMPLETED | OUTPATIENT
Start: 2024-10-06 | End: 2024-10-06

## 2024-10-06 RX ORDER — ESZOPICLONE 3 MG/1
3 TABLET, FILM COATED ORAL NIGHTLY
Status: ON HOLD | COMMUNITY
End: 2024-10-07

## 2024-10-06 RX ORDER — ONDANSETRON 2 MG/ML
4 INJECTION INTRAMUSCULAR; INTRAVENOUS
Status: COMPLETED | OUTPATIENT
Start: 2024-10-06 | End: 2024-10-06

## 2024-10-06 RX ORDER — LANOLIN ALCOHOL/MO/W.PET/CERES
100 CREAM (GRAM) TOPICAL DAILY
Status: DISCONTINUED | OUTPATIENT
Start: 2024-10-07 | End: 2024-10-07 | Stop reason: HOSPADM

## 2024-10-06 RX ORDER — LORAZEPAM 1 MG/1
4 TABLET ORAL
Status: DISCONTINUED | OUTPATIENT
Start: 2024-10-06 | End: 2024-10-07 | Stop reason: HOSPADM

## 2024-10-06 RX ORDER — ASPIRIN 81 MG/1
324 TABLET, CHEWABLE ORAL ONCE
Status: COMPLETED | OUTPATIENT
Start: 2024-10-06 | End: 2024-10-06

## 2024-10-06 RX ORDER — SODIUM CHLORIDE 9 MG/ML
INJECTION, SOLUTION INTRAVENOUS PRN
Status: DISCONTINUED | OUTPATIENT
Start: 2024-10-06 | End: 2024-10-07 | Stop reason: HOSPADM

## 2024-10-06 RX ORDER — THIAMINE HYDROCHLORIDE 100 MG/ML
100 INJECTION, SOLUTION INTRAMUSCULAR; INTRAVENOUS ONCE
Status: COMPLETED | OUTPATIENT
Start: 2024-10-06 | End: 2024-10-06

## 2024-10-06 RX ADMIN — THIAMINE HYDROCHLORIDE 100 MG: 100 INJECTION, SOLUTION INTRAMUSCULAR; INTRAVENOUS at 18:37

## 2024-10-06 RX ADMIN — SODIUM CHLORIDE, PRESERVATIVE FREE 10 ML: 5 INJECTION INTRAVENOUS at 23:00

## 2024-10-06 RX ADMIN — IOPAMIDOL 100 ML: 755 INJECTION, SOLUTION INTRAVENOUS at 20:09

## 2024-10-06 RX ADMIN — ASPIRIN 81 MG 324 MG: 81 TABLET ORAL at 18:36

## 2024-10-06 RX ADMIN — ONDANSETRON 4 MG: 2 INJECTION, SOLUTION INTRAMUSCULAR; INTRAVENOUS at 20:44

## 2024-10-06 RX ADMIN — LORAZEPAM 1 MG: 2 INJECTION INTRAMUSCULAR; INTRAVENOUS at 18:36

## 2024-10-06 RX ADMIN — SODIUM CHLORIDE 1000 ML: 9 INJECTION, SOLUTION INTRAVENOUS at 18:36

## 2024-10-06 RX ADMIN — LORAZEPAM 0.5 MG: 2 INJECTION INTRAMUSCULAR; INTRAVENOUS at 21:11

## 2024-10-06 RX ADMIN — LORAZEPAM 0.5 MG: 2 INJECTION INTRAMUSCULAR; INTRAVENOUS at 20:24

## 2024-10-06 RX ADMIN — SODIUM CHLORIDE, PRESERVATIVE FREE 2 MG: 5 INJECTION INTRAVENOUS at 22:56

## 2024-10-06 ASSESSMENT — LIFESTYLE VARIABLES
HOW OFTEN DO YOU HAVE A DRINK CONTAINING ALCOHOL: 4 OR MORE TIMES A WEEK
HOW MANY STANDARD DRINKS CONTAINING ALCOHOL DO YOU HAVE ON A TYPICAL DAY: 10 OR MORE

## 2024-10-06 ASSESSMENT — PAIN - FUNCTIONAL ASSESSMENT: PAIN_FUNCTIONAL_ASSESSMENT: 0-10

## 2024-10-06 ASSESSMENT — PAIN SCALES - GENERAL: PAINLEVEL_OUTOF10: 7

## 2024-10-06 ASSESSMENT — PAIN DESCRIPTION - LOCATION: LOCATION: CHEST;ABDOMEN

## 2024-10-06 NOTE — ED PROVIDER NOTES
Emergency Department Provider Note       PCP: Geronimo Marks MD   Age: 69 y.o.   Sex: female     DISPOSITION Decision To Admit 10/06/2024 09:10:48 PM  Condition at Disposition: Data Unavailable       ICD-10-CM    1. Acute alcoholic intoxication with complication (HCC)  F10.929       2. Chest pain, unspecified type  R07.9       3. Generalized abdominal pain  R10.84       4. Elevated LFTs  R79.89       5. Tachycardia  R00.0           Medical Decision Making     In summary this is a 69-year-old female patient well-known to this department who came in with concerns for alcohol withdrawals.  She had relapsed recently and started having abdominal pain and chest pain and shortness of breath and generalized fatigue and vomiting over the past day.  She has been tachycardic here which I suspect is secondary to dehydration as well as withdrawals.  Her workup today was remarkable for an elevated lactic acid which did improve after a liter of fluids.  Her D-dimer was elevated so CT of her chest was utilized which was grossly negative for acute process.  She has some abdominal pain reported and transaminitis but her CT of her abdomen did not show any acute process except for fatty infiltration of the liver.  Given her ongoing symptoms and workup, will admit for further treatment.  Consulted hospitalist for admission.  Patient stable at time of admission.  Patient history, ROS, physical exam, labs, radiological workup and all pertinent findings were discussed with attending Dr. Ohara. He also evaluated patient.   ED Course as of 10/06/24 2119   Sun Oct 06, 2024   1835 6:35 PM  Independent interpretation of EKG at 6:35 PM shows a normal sinus rhythm at a ventricular rate of 115 bpm.  No acute ST changes. [NR]   2031 8:31 PM  Patient's lactic acid has improved.  Her heart rate has improved.  In normal circumstances, I would probably give this patient another bag of fluids however there is a critical nationwide shortage at this

## 2024-10-06 NOTE — ED TRIAGE NOTES
Pt CO abd pain, chest pain, and SOB since 0800 this morning. Pt denies cardiac hx. Pt states she has had about a quart of wine today. Pt states she was sober for approx x2 months but relapsed approx x10 days ago. Pt wants help to stop drinking alcohol.

## 2024-10-07 VITALS
HEART RATE: 85 BPM | SYSTOLIC BLOOD PRESSURE: 157 MMHG | WEIGHT: 160 LBS | RESPIRATION RATE: 18 BRPM | HEIGHT: 65 IN | OXYGEN SATURATION: 97 % | TEMPERATURE: 98.2 F | DIASTOLIC BLOOD PRESSURE: 86 MMHG | BODY MASS INDEX: 26.66 KG/M2

## 2024-10-07 PROBLEM — E87.1 HYPONATREMIA: Status: RESOLVED | Noted: 2022-10-10 | Resolved: 2024-10-07

## 2024-10-07 LAB
ALBUMIN SERPL-MCNC: 3.7 G/DL (ref 3.2–4.6)
ALBUMIN/GLOB SERPL: 0.9 (ref 1–1.9)
ALP SERPL-CCNC: 134 U/L (ref 35–104)
ALT SERPL-CCNC: 152 U/L (ref 8–45)
ANION GAP SERPL CALC-SCNC: 15 MMOL/L (ref 9–18)
AST SERPL-CCNC: 278 U/L (ref 15–37)
BASOPHILS # BLD: 0 K/UL (ref 0–0.2)
BASOPHILS NFR BLD: 1 % (ref 0–2)
BILIRUB SERPL-MCNC: 1 MG/DL (ref 0–1.2)
BUN SERPL-MCNC: 8 MG/DL (ref 8–23)
CALCIUM SERPL-MCNC: 9.5 MG/DL (ref 8.8–10.2)
CHLORIDE SERPL-SCNC: 99 MMOL/L (ref 98–107)
CO2 SERPL-SCNC: 24 MMOL/L (ref 20–28)
CREAT SERPL-MCNC: 0.87 MG/DL (ref 0.6–1.1)
DIFFERENTIAL METHOD BLD: ABNORMAL
EKG ATRIAL RATE: 115 BPM
EKG DIAGNOSIS: NORMAL
EKG P AXIS: 87 DEGREES
EKG P-R INTERVAL: 154 MS
EKG Q-T INTERVAL: 325 MS
EKG QRS DURATION: 79 MS
EKG QTC CALCULATION (BAZETT): 450 MS
EKG R AXIS: 85 DEGREES
EKG T AXIS: 59 DEGREES
EKG VENTRICULAR RATE: 115 BPM
EOSINOPHIL # BLD: 0.1 K/UL (ref 0–0.8)
EOSINOPHIL NFR BLD: 2 % (ref 0.5–7.8)
ERYTHROCYTE [DISTWIDTH] IN BLOOD BY AUTOMATED COUNT: 12.4 % (ref 11.9–14.6)
GLOBULIN SER CALC-MCNC: 3.9 G/DL (ref 2.3–3.5)
GLUCOSE SERPL-MCNC: 127 MG/DL (ref 70–99)
HCT VFR BLD AUTO: 37.8 % (ref 35.8–46.3)
HGB BLD-MCNC: 12.8 G/DL (ref 11.7–15.4)
IMM GRANULOCYTES # BLD AUTO: 0 K/UL (ref 0–0.5)
IMM GRANULOCYTES NFR BLD AUTO: 0 % (ref 0–5)
LACTATE SERPL-SCNC: 1 MMOL/L (ref 0.5–2)
LYMPHOCYTES # BLD: 2 K/UL (ref 0.5–4.6)
LYMPHOCYTES NFR BLD: 32 % (ref 13–44)
MCH RBC QN AUTO: 32.7 PG (ref 26.1–32.9)
MCHC RBC AUTO-ENTMCNC: 33.9 G/DL (ref 31.4–35)
MCV RBC AUTO: 96.4 FL (ref 82–102)
MONOCYTES # BLD: 0.7 K/UL (ref 0.1–1.3)
MONOCYTES NFR BLD: 11 % (ref 4–12)
NEUTS SEG # BLD: 3.5 K/UL (ref 1.7–8.2)
NEUTS SEG NFR BLD: 55 % (ref 43–78)
NRBC # BLD: 0 K/UL (ref 0–0.2)
PLATELET # BLD AUTO: 202 K/UL (ref 150–450)
PMV BLD AUTO: 9.1 FL (ref 9.4–12.3)
POTASSIUM SERPL-SCNC: 3.7 MMOL/L (ref 3.5–5.1)
PROCALCITONIN SERPL-MCNC: 0.17 NG/ML (ref 0–0.1)
PROT SERPL-MCNC: 7.6 G/DL (ref 6.3–8.2)
RBC # BLD AUTO: 3.92 M/UL (ref 4.05–5.2)
SODIUM SERPL-SCNC: 138 MMOL/L (ref 136–145)
WBC # BLD AUTO: 6.5 K/UL (ref 4.3–11.1)

## 2024-10-07 PROCEDURE — 83605 ASSAY OF LACTIC ACID: CPT

## 2024-10-07 PROCEDURE — 94760 N-INVAS EAR/PLS OXIMETRY 1: CPT

## 2024-10-07 PROCEDURE — 84145 PROCALCITONIN (PCT): CPT

## 2024-10-07 PROCEDURE — 6370000000 HC RX 637 (ALT 250 FOR IP): Performed by: FAMILY MEDICINE

## 2024-10-07 PROCEDURE — 6360000002 HC RX W HCPCS: Performed by: NURSE PRACTITIONER

## 2024-10-07 PROCEDURE — 6370000000 HC RX 637 (ALT 250 FOR IP): Performed by: NURSE PRACTITIONER

## 2024-10-07 PROCEDURE — 2580000003 HC RX 258: Performed by: FAMILY MEDICINE

## 2024-10-07 PROCEDURE — 85025 COMPLETE CBC W/AUTO DIFF WBC: CPT

## 2024-10-07 PROCEDURE — G0378 HOSPITAL OBSERVATION PER HR: HCPCS

## 2024-10-07 PROCEDURE — 36415 COLL VENOUS BLD VENIPUNCTURE: CPT

## 2024-10-07 PROCEDURE — 80053 COMPREHEN METABOLIC PANEL: CPT

## 2024-10-07 PROCEDURE — 96376 TX/PRO/DX INJ SAME DRUG ADON: CPT

## 2024-10-07 PROCEDURE — 6360000002 HC RX W HCPCS: Performed by: FAMILY MEDICINE

## 2024-10-07 PROCEDURE — 93010 ELECTROCARDIOGRAM REPORT: CPT | Performed by: INTERNAL MEDICINE

## 2024-10-07 RX ORDER — GABAPENTIN 100 MG/1
100 CAPSULE ORAL 2 TIMES DAILY
Qty: 60 CAPSULE | Refills: 1 | Status: SHIPPED | OUTPATIENT
Start: 2024-10-07 | End: 2024-12-06

## 2024-10-07 RX ORDER — DOXEPIN HYDROCHLORIDE 10 MG/1
10 CAPSULE ORAL NIGHTLY
Qty: 30 CAPSULE | Refills: 1 | Status: SHIPPED | OUTPATIENT
Start: 2024-10-07 | End: 2024-12-06

## 2024-10-07 RX ORDER — DOXEPIN HYDROCHLORIDE 10 MG/1
10 CAPSULE ORAL NIGHTLY
Status: DISCONTINUED | OUTPATIENT
Start: 2024-10-07 | End: 2024-10-07 | Stop reason: HOSPADM

## 2024-10-07 RX ORDER — ONDANSETRON 2 MG/ML
4 INJECTION INTRAMUSCULAR; INTRAVENOUS EVERY 6 HOURS PRN
Status: DISCONTINUED | OUTPATIENT
Start: 2024-10-07 | End: 2024-10-07 | Stop reason: HOSPADM

## 2024-10-07 RX ORDER — OXYCODONE HYDROCHLORIDE 5 MG/1
5 TABLET ORAL EVERY 4 HOURS PRN
Status: DISCONTINUED | OUTPATIENT
Start: 2024-10-07 | End: 2024-10-07 | Stop reason: HOSPADM

## 2024-10-07 RX ORDER — ESZOPICLONE 3 MG/1
3 TABLET, FILM COATED ORAL NIGHTLY
Qty: 30 TABLET | Refills: 1 | Status: SHIPPED | OUTPATIENT
Start: 2024-10-07 | End: 2024-12-06

## 2024-10-07 RX ORDER — CLONIDINE HYDROCHLORIDE 0.1 MG/1
0.1 TABLET ORAL EVERY 4 HOURS PRN
Status: DISCONTINUED | OUTPATIENT
Start: 2024-10-07 | End: 2024-10-07 | Stop reason: HOSPADM

## 2024-10-07 RX ORDER — PANTOPRAZOLE SODIUM 40 MG/1
40 TABLET, DELAYED RELEASE ORAL
Status: DISCONTINUED | OUTPATIENT
Start: 2024-10-07 | End: 2024-10-07 | Stop reason: HOSPADM

## 2024-10-07 RX ORDER — GUAIFENESIN/DEXTROMETHORPHAN 100-10MG/5
10 SYRUP ORAL EVERY 4 HOURS PRN
Status: DISCONTINUED | OUTPATIENT
Start: 2024-10-07 | End: 2024-10-07 | Stop reason: HOSPADM

## 2024-10-07 RX ORDER — CLONIDINE HYDROCHLORIDE 0.1 MG/1
0.1 TABLET ORAL EVERY 12 HOURS PRN
Qty: 15 TABLET | Refills: 0 | Status: SHIPPED | OUTPATIENT
Start: 2024-10-07 | End: 2024-11-06

## 2024-10-07 RX ORDER — LANOLIN ALCOHOL/MO/W.PET/CERES
100 CREAM (GRAM) TOPICAL DAILY
Qty: 30 TABLET | Refills: 0 | Status: SHIPPED | OUTPATIENT
Start: 2024-10-07 | End: 2024-11-06

## 2024-10-07 RX ORDER — LANOLIN ALCOHOL/MO/W.PET/CERES
3 CREAM (GRAM) TOPICAL NIGHTLY PRN
Status: DISCONTINUED | OUTPATIENT
Start: 2024-10-07 | End: 2024-10-07 | Stop reason: HOSPADM

## 2024-10-07 RX ORDER — ONDANSETRON 2 MG/ML
4 INJECTION INTRAMUSCULAR; INTRAVENOUS ONCE
Status: COMPLETED | OUTPATIENT
Start: 2024-10-07 | End: 2024-10-07

## 2024-10-07 RX ORDER — METOPROLOL TARTRATE 50 MG
50 TABLET ORAL 2 TIMES DAILY
Status: DISCONTINUED | OUTPATIENT
Start: 2024-10-07 | End: 2024-10-07 | Stop reason: HOSPADM

## 2024-10-07 RX ORDER — PANTOPRAZOLE SODIUM 40 MG/1
40 TABLET, DELAYED RELEASE ORAL
Qty: 30 TABLET | Refills: 0 | Status: SHIPPED | OUTPATIENT
Start: 2024-10-07 | End: 2024-11-06

## 2024-10-07 RX ORDER — PAROXETINE 20 MG/1
20 TABLET, FILM COATED ORAL EVERY MORNING
Qty: 30 TABLET | Refills: 1 | Status: SHIPPED | OUTPATIENT
Start: 2024-10-07 | End: 2024-12-06

## 2024-10-07 RX ORDER — AMLODIPINE BESYLATE 5 MG/1
5 TABLET ORAL DAILY
Qty: 30 TABLET | Refills: 1 | Status: SHIPPED | OUTPATIENT
Start: 2024-10-07 | End: 2024-12-06

## 2024-10-07 RX ORDER — METOPROLOL TARTRATE 50 MG
50 TABLET ORAL 2 TIMES DAILY
Qty: 60 TABLET | Refills: 1 | Status: SHIPPED | OUTPATIENT
Start: 2024-10-07 | End: 2024-12-06

## 2024-10-07 RX ORDER — GABAPENTIN 100 MG/1
100 CAPSULE ORAL 2 TIMES DAILY
Status: DISCONTINUED | OUTPATIENT
Start: 2024-10-07 | End: 2024-10-07 | Stop reason: HOSPADM

## 2024-10-07 RX ORDER — CHLORDIAZEPOXIDE HYDROCHLORIDE 10 MG/1
10 CAPSULE, GELATIN COATED ORAL 3 TIMES DAILY
Status: DISCONTINUED | OUTPATIENT
Start: 2024-10-07 | End: 2024-10-07 | Stop reason: HOSPADM

## 2024-10-07 RX ORDER — MULTIVITAMIN WITH IRON
1 TABLET ORAL DAILY
Qty: 30 TABLET | Refills: 0 | Status: SHIPPED | OUTPATIENT
Start: 2024-10-07

## 2024-10-07 RX ORDER — LORAZEPAM 1 MG/1
1 TABLET ORAL
Qty: 30 TABLET | Refills: 0 | Status: SHIPPED | OUTPATIENT
Start: 2024-10-07 | End: 2024-10-12

## 2024-10-07 RX ORDER — AMLODIPINE BESYLATE 5 MG/1
5 TABLET ORAL DAILY
Status: DISCONTINUED | OUTPATIENT
Start: 2024-10-07 | End: 2024-10-07 | Stop reason: HOSPADM

## 2024-10-07 RX ORDER — CHLORDIAZEPOXIDE HYDROCHLORIDE 10 MG/1
10 CAPSULE, GELATIN COATED ORAL 3 TIMES DAILY
Qty: 30 CAPSULE | Refills: 0 | Status: SHIPPED | OUTPATIENT
Start: 2024-10-07 | End: 2024-10-17

## 2024-10-07 RX ORDER — OXYCODONE HYDROCHLORIDE 5 MG/1
5 TABLET ORAL EVERY 4 HOURS PRN
Qty: 18 TABLET | Refills: 0 | Status: SHIPPED | OUTPATIENT
Start: 2024-10-07 | End: 2024-10-10

## 2024-10-07 RX ORDER — HYDRALAZINE HYDROCHLORIDE 20 MG/ML
20 INJECTION INTRAMUSCULAR; INTRAVENOUS EVERY 4 HOURS PRN
Status: DISCONTINUED | OUTPATIENT
Start: 2024-10-07 | End: 2024-10-07 | Stop reason: HOSPADM

## 2024-10-07 RX ORDER — PAROXETINE 20 MG/1
20 TABLET, FILM COATED ORAL EVERY MORNING
Status: DISCONTINUED | OUTPATIENT
Start: 2024-10-07 | End: 2024-10-07 | Stop reason: HOSPADM

## 2024-10-07 RX ORDER — LANOLIN ALCOHOL/MO/W.PET/CERES
100 CREAM (GRAM) TOPICAL DAILY
Status: DISCONTINUED | OUTPATIENT
Start: 2024-10-07 | End: 2024-10-07

## 2024-10-07 RX ADMIN — PAROXETINE HYDROCHLORIDE 20 MG: 20 TABLET, FILM COATED ORAL at 08:11

## 2024-10-07 RX ADMIN — LORAZEPAM 1 MG: 1 TABLET ORAL at 12:28

## 2024-10-07 RX ADMIN — LORAZEPAM 1 MG: 1 TABLET ORAL at 08:27

## 2024-10-07 RX ADMIN — CHLORDIAZEPOXIDE HYDROCHLORIDE 10 MG: 10 CAPSULE ORAL at 10:12

## 2024-10-07 RX ADMIN — AMLODIPINE BESYLATE 5 MG: 5 TABLET ORAL at 08:11

## 2024-10-07 RX ADMIN — PANTOPRAZOLE SODIUM 40 MG: 40 TABLET, DELAYED RELEASE ORAL at 08:19

## 2024-10-07 RX ADMIN — SODIUM CHLORIDE, PRESERVATIVE FREE 2 MG: 5 INJECTION INTRAVENOUS at 03:24

## 2024-10-07 RX ADMIN — ONDANSETRON 4 MG: 2 INJECTION, SOLUTION INTRAMUSCULAR; INTRAVENOUS at 08:20

## 2024-10-07 RX ADMIN — SODIUM CHLORIDE, PRESERVATIVE FREE 10 ML: 5 INJECTION INTRAVENOUS at 08:21

## 2024-10-07 RX ADMIN — OXYCODONE HYDROCHLORIDE 5 MG: 5 TABLET ORAL at 08:19

## 2024-10-07 RX ADMIN — METOPROLOL TARTRATE 50 MG: 50 TABLET, FILM COATED ORAL at 08:11

## 2024-10-07 RX ADMIN — Medication 100 MG: at 08:11

## 2024-10-07 RX ADMIN — ONDANSETRON 4 MG: 2 INJECTION, SOLUTION INTRAMUSCULAR; INTRAVENOUS at 03:24

## 2024-10-07 RX ADMIN — GABAPENTIN 100 MG: 100 CAPSULE ORAL at 08:11

## 2024-10-07 RX ADMIN — OXYCODONE HYDROCHLORIDE 5 MG: 5 TABLET ORAL at 12:25

## 2024-10-07 ASSESSMENT — PAIN DESCRIPTION - ORIENTATION: ORIENTATION: MID

## 2024-10-07 ASSESSMENT — PAIN SCALES - GENERAL
PAINLEVEL_OUTOF10: 6
PAINLEVEL_OUTOF10: 3
PAINLEVEL_OUTOF10: 7
PAINLEVEL_OUTOF10: 4

## 2024-10-07 ASSESSMENT — PAIN DESCRIPTION - LOCATION
LOCATION: ABDOMEN

## 2024-10-07 ASSESSMENT — PAIN SCALES - WONG BAKER: WONGBAKER_NUMERICALRESPONSE: NO HURT

## 2024-10-07 ASSESSMENT — PAIN DESCRIPTION - DESCRIPTORS
DESCRIPTORS: ACHING
DESCRIPTORS: CRAMPING

## 2024-10-07 NOTE — PROGRESS NOTES
Pt has been A&O x4 and cooperative this shift. Report called to nurse at [facility]. Transport is set up for 1pm.     Discharge paperwork provided to and reviewed with patient. All questions answered. Ready for discharge/ transport to [facility].

## 2024-10-07 NOTE — H&P
[facility] Hospitalist Initial History and Physical Note    Patient: April Lujan Date: 10/7/2024  female, 69 y.o.  Admit Date: 10/6/2024  Attending: Evelio Finn,*     ASSESSMENT AND PLAN:     Principal Problem:    Alcohol withdrawal syndrome, uncomplicated (HCC)  Plan: Tachycardia and tremors. CIWA protocol. IVF. Follow clinically  Active Problems:    Hyponatremia  Plan: IV NS, follow BMP    Primary hypertension  Plan: Stable. Continue home meds.    Alcoholism (HCC)  Plan: Per above    Acquired hypothyroidism  Plan: Stable. Continue home meds.       DVT Prophylaxis: Lovenox      Code Status: FULL CODE      Disposition: Admit to med/surg for evaluation and treatment as per above.      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 female, with a history of  has a past medical history of Acute encephalopathy, Alcohol abuse, continuous, Alcohol dependence with withdrawal (HCC), Alcohol intoxication in active alcoholic, with unspecified complication (HCC), Alcohol withdrawal delirium (HCC), Alcohol withdrawal delirium (HCC), Alcohol withdrawal syndrome, with delirium (HCC), Alcoholism (HCC), Anxiety, Ataxia, Depression, Hyperlipidemia, Hypertension, and Hyponatremia.,  has a past surgical history that includes back surgery; Colonoscopy; and Endoscopy, colon, diagnostic.
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
## 2024-10-07 NOTE — DISCHARGE SUMMARY
ABDOMEN PELVIS W IV CONTRAST Additional Contrast? None    Result Date: 10/6/2024  1. Fatty infiltration of the liver, otherwise unremarkable CT examination of the abdomen and pelvis. If providers have any questions about this report, I can be reached on PerfectServe. Electronically signed by Asaf Arauz    CT CHEST PULMONARY EMBOLISM W CONTRAST    Result Date: 10/6/2024  1.  No pulmonary embolus. 2.  No acute findings within the chest. Electronically signed by Asaf Arauz    CT HEAD WO CONTRAST    Result Date: 10/6/2024  1.  No acute intracranial hemorrhage or extra axial fluid collections. There is generalized cerebral atrophy. Electronically signed by CARLOS ROMERO    XR CHEST (2 VW)    Result Date: 10/6/2024  No acute findings in the chest Electronically signed by Asaf Arauz       Labs: Results:       BMP, Mg, Phos Recent Labs     10/06/24  1830 10/07/24  0851   * 138   K 3.6 3.7   CL 92* 99   CO2 23 24   ANIONGAP 20* 15   BUN 7* 8   CREATININE 0.85 0.87   LABGLOM 74 72   CALCIUM 10.1 9.5   GLUCOSE 127* 127*   MG 2.0  --       CBC Recent Labs     10/06/24  1830 10/07/24  0851   WBC 9.7 6.5   RBC 4.41 3.92*   HGB 14.7 12.8   HCT 42.5 37.8   MCV 96.4 96.4   MCH 33.3* 32.7   MCHC 34.6 33.9   RDW 12.6 12.4    202   MPV 9.1* 9.1*   NRBC 0.00 0.00   LYMPHOPCT 34 32   MONOPCT 9 11   BASOPCT 1 1   IMMGRAN 0 0   LYMPHSABS 3.3 2.0   EOSABS 0.1 0.1   MONOSABS 0.8 0.7   BASOSABS 0.1 0.0   ABSIMMGRAN 0.0 0.0      LFT Recent Labs     10/06/24  1830 10/07/24  0851   BILITOT 0.6 1.0   ALKPHOS 154* 134*   * 278*   * 152*   GLOB 4.3* 3.9*      Cardiac  No results found for: \"NTPROBNP\", \"TROPHS\"   Coags Lab Results   Component Value Date/Time    PROTIME 13.0 10/06/2024 06:30 PM    PROTIME 14.0 09/09/2024 08:18 PM    PROTIME 12.9 12/25/2023 02:34 PM    INR 1.0 10/06/2024 06:30 PM    INR 1.1 09/09/2024 08:18 PM    INR 1.0 12/25/2023 02:34 PM    APTT 29.8 06/16/2021 09:36 AM      A1c Lab

## 2024-10-07 NOTE — ED NOTES
TRANSFER - OUT REPORT:    Verbal report given to Ameena  on April Lujan  being transferred to [facility] for routine progression of patient care       Report consisted of patient's Situation, Background, Assessment and   Recommendations(SBAR).     Information from the following report(s) Nurse Handoff Report was reviewed with the receiving nurse.    Lines:   Peripheral IV 10/06/24 Left Antecubital (Active)   Site Assessment Clean, dry & intact 10/06/24 1827   Line Status Blood return noted;Specimen collected;Flushed;Normal saline locked 10/06/24 1827   Phlebitis Assessment No symptoms 10/06/24 1827   Infiltration Assessment 0 10/06/24 1827        Opportunity for questions and clarification was provided.      Patient transported with:  Registered Nurse

## 2024-10-07 NOTE — ACP (ADVANCE CARE PLANNING)
Advance Care Planning   General Advance Care Planning (ACP) Conversation    Date of Conversation: 10/6/2024  Conducted with: Patient with Decision Making Capacity  Other persons present: None    Healthcare Decision Maker:    Primary Decision Maker (Active): Terence Lujan - Spouse - [phone]    Today we documented Decision Maker(s) consistent with Legal Next of Kin hierarchy.  Content/Action Overview:  DECLINED ACP Conversation - will revisit periodically      Length of Voluntary ACP Conversation in minutes:  <16 minutes (Non-Billable)    Rahul Gamez RN

## 2024-11-04 ENCOUNTER — HOSPITAL ENCOUNTER (EMERGENCY)
Age: 69
Discharge: HOME | End: 2024-11-04
Attending: EMERGENCY MEDICINE
Payer: MEDICARE

## 2024-11-04 VITALS
SYSTOLIC BLOOD PRESSURE: 155 MMHG | DIASTOLIC BLOOD PRESSURE: 70 MMHG | TEMPERATURE: 97.9 F | HEART RATE: 99 BPM | WEIGHT: 165 LBS | OXYGEN SATURATION: 98 % | HEIGHT: 64 IN | BODY MASS INDEX: 28.17 KG/M2 | RESPIRATION RATE: 16 BRPM

## 2024-11-04 DIAGNOSIS — F10.10 ALCOHOL ABUSE: Primary | ICD-10-CM

## 2024-11-04 DIAGNOSIS — R74.01 TRANSAMINITIS: ICD-10-CM

## 2024-11-04 LAB
ALBUMIN SERPL-MCNC: 4.1 G/DL (ref 3.2–4.6)
ALBUMIN/GLOB SERPL: 1 (ref 1–1.9)
ALP SERPL-CCNC: 110 U/L (ref 35–104)
ALT SERPL-CCNC: 137 U/L (ref 8–45)
ANION GAP SERPL CALC-SCNC: 14 MMOL/L (ref 7–16)
AST SERPL-CCNC: 198 U/L (ref 15–37)
BASOPHILS # BLD: 0 K/UL (ref 0–0.2)
BASOPHILS NFR BLD: 1 % (ref 0–2)
BILIRUB SERPL-MCNC: 0.6 MG/DL (ref 0–1.2)
BUN SERPL-MCNC: 4 MG/DL (ref 8–23)
CALCIUM SERPL-MCNC: 9.9 MG/DL (ref 8.8–10.2)
CHLORIDE SERPL-SCNC: 92 MMOL/L (ref 98–107)
CO2 SERPL-SCNC: 25 MMOL/L (ref 20–29)
CREAT SERPL-MCNC: 0.7 MG/DL (ref 0.6–1.1)
DIFFERENTIAL METHOD BLD: NORMAL
EKG ATRIAL RATE: 97 BPM
EKG DIAGNOSIS: NORMAL
EKG P AXIS: 82 DEGREES
EKG P-R INTERVAL: 168 MS
EKG Q-T INTERVAL: 346 MS
EKG QRS DURATION: 78 MS
EKG QTC CALCULATION (BAZETT): 438 MS
EKG R AXIS: 84 DEGREES
EKG T AXIS: 63 DEGREES
EKG VENTRICULAR RATE: 96 BPM
EOSINOPHIL # BLD: 0.1 K/UL (ref 0–0.8)
EOSINOPHIL NFR BLD: 2 % (ref 0.5–7.8)
ERYTHROCYTE [DISTWIDTH] IN BLOOD BY AUTOMATED COUNT: 12.5 % (ref 11.9–14.6)
ETHANOL SERPL-MCNC: <11 MG/DL (ref 0–0.08)
GLOBULIN SER CALC-MCNC: 4 G/DL (ref 2.3–3.5)
GLUCOSE SERPL-MCNC: 117 MG/DL (ref 70–99)
HCT VFR BLD AUTO: 39.7 % (ref 35.8–46.3)
HGB BLD-MCNC: 13.9 G/DL (ref 11.7–15.4)
IMM GRANULOCYTES # BLD AUTO: 0 K/UL (ref 0–0.5)
IMM GRANULOCYTES NFR BLD AUTO: 0 % (ref 0–5)
LIPASE SERPL-CCNC: 114 U/L (ref 13–60)
LYMPHOCYTES # BLD: 2 K/UL (ref 0.5–4.6)
LYMPHOCYTES NFR BLD: 29 % (ref 13–44)
MCH RBC QN AUTO: 32.4 PG (ref 26.1–32.9)
MCHC RBC AUTO-ENTMCNC: 35 G/DL (ref 31.4–35)
MCV RBC AUTO: 92.5 FL (ref 82–102)
MONOCYTES # BLD: 0.8 K/UL (ref 0.1–1.3)
MONOCYTES NFR BLD: 12 % (ref 4–12)
NEUTS SEG # BLD: 3.8 K/UL (ref 1.7–8.2)
NEUTS SEG NFR BLD: 56 % (ref 43–78)
NRBC # BLD: 0 K/UL (ref 0–0.2)
PLATELET # BLD AUTO: 253 K/UL (ref 150–450)
PMV BLD AUTO: 9.6 FL (ref 9.4–12.3)
POTASSIUM SERPL-SCNC: 4.3 MMOL/L (ref 3.5–5.1)
PROT SERPL-MCNC: 8.1 G/DL (ref 6.3–8.2)
RBC # BLD AUTO: 4.29 M/UL (ref 4.05–5.2)
SODIUM SERPL-SCNC: 131 MMOL/L (ref 136–145)
WBC # BLD AUTO: 6.7 K/UL (ref 4.3–11.1)

## 2024-11-04 PROCEDURE — 82077 ASSAY SPEC XCP UR&BREATH IA: CPT

## 2024-11-04 PROCEDURE — 93005 ELECTROCARDIOGRAM TRACING: CPT | Performed by: EMERGENCY MEDICINE

## 2024-11-04 PROCEDURE — 85025 COMPLETE CBC W/AUTO DIFF WBC: CPT

## 2024-11-04 PROCEDURE — 96374 THER/PROPH/DIAG INJ IV PUSH: CPT

## 2024-11-04 PROCEDURE — 6360000002 HC RX W HCPCS: Performed by: EMERGENCY MEDICINE

## 2024-11-04 PROCEDURE — 99284 EMERGENCY DEPT VISIT MOD MDM: CPT

## 2024-11-04 PROCEDURE — 93010 ELECTROCARDIOGRAM REPORT: CPT | Performed by: INTERNAL MEDICINE

## 2024-11-04 PROCEDURE — 6370000000 HC RX 637 (ALT 250 FOR IP): Performed by: EMERGENCY MEDICINE

## 2024-11-04 PROCEDURE — 80053 COMPREHEN METABOLIC PANEL: CPT

## 2024-11-04 PROCEDURE — 83690 ASSAY OF LIPASE: CPT

## 2024-11-04 RX ORDER — ONDANSETRON 2 MG/ML
4 INJECTION INTRAMUSCULAR; INTRAVENOUS ONCE
Status: COMPLETED | OUTPATIENT
Start: 2024-11-04 | End: 2024-11-04

## 2024-11-04 RX ORDER — ONDANSETRON 4 MG/1
4 TABLET, FILM COATED ORAL EVERY 8 HOURS PRN
Qty: 12 TABLET | Refills: 0 | Status: SHIPPED | OUTPATIENT
Start: 2024-11-04

## 2024-11-04 RX ORDER — ONDANSETRON 4 MG/1
4 TABLET, ORALLY DISINTEGRATING ORAL
Status: COMPLETED | OUTPATIENT
Start: 2024-11-04 | End: 2024-11-04

## 2024-11-04 RX ORDER — LORAZEPAM 1 MG/1
1 TABLET ORAL
Status: COMPLETED | OUTPATIENT
Start: 2024-11-04 | End: 2024-11-04

## 2024-11-04 RX ORDER — CHLORDIAZEPOXIDE HYDROCHLORIDE 25 MG/1
CAPSULE, GELATIN COATED ORAL
Qty: 13 CAPSULE | Refills: 0 | Status: SHIPPED | OUTPATIENT
Start: 2024-11-04 | End: 2024-11-07

## 2024-11-04 RX ADMIN — LORAZEPAM 1 MG: 1 TABLET ORAL at 08:34

## 2024-11-04 RX ADMIN — ONDANSETRON 4 MG: 4 TABLET, ORALLY DISINTEGRATING ORAL at 10:38

## 2024-11-04 RX ADMIN — ONDANSETRON 4 MG: 2 INJECTION, SOLUTION INTRAMUSCULAR; INTRAVENOUS at 06:39

## 2024-11-04 ASSESSMENT — PAIN - FUNCTIONAL ASSESSMENT: PAIN_FUNCTIONAL_ASSESSMENT: NONE - DENIES PAIN

## 2024-11-04 ASSESSMENT — LIFESTYLE VARIABLES
HOW MANY STANDARD DRINKS CONTAINING ALCOHOL DO YOU HAVE ON A TYPICAL DAY: 7 TO 9
HOW OFTEN DO YOU HAVE A DRINK CONTAINING ALCOHOL: 4 OR MORE TIMES A WEEK

## 2024-11-04 NOTE — ED PROVIDER NOTES
Emergency Department Provider Signout / Continuation of Care Note         DISPOSITION Decision To Discharge 11/04/2024 10:17:22 AM       ICD-10-CM    1. Alcohol abuse  F10.10 chlordiazePOXIDE (LIBRIUM) 25 MG capsule      2. Transaminitis  R74.01           The patient's care was signed out to me at shift change.      Final Plan      69-year-old female history of depression, alcohol abuse presents with complaint of increased alcohol intake over the past several days.  States that she started drinking heavily back in 2019.  States she has had difficulty sleeping.    Patient denies any SI, HI, AVH, paranoid delusions.  Patient with transaminitis.  Lipase 116.  No epigastric tenderness.  Patient tolerating p.o.  Patient given 1 dose of Ativan 1 mg p.o.   to pick patient up at this time.  Favor and case management consulted.    On reassessment patient in no acute distress.  Patient not tremulous at this time.  Vital signs stable.  Will discharge home with Librium taper.  Patient given outpatient follow-up information.  Patient instructed to return to ED if symptoms worsen or progress in any way.    ED Course as of 11/04/24 1025   Mon Nov 04, 2024   0640 Patient is currently being evaluated for her acute alcohol intoxication.  Workup still pending at this time.  Care of the patient is being signed out to the oncoming physician. [JARON]      ED Course User Index  [JARON] Puja Olmedo,        1 or more chronic illnesses with a severe exacerbation or progression.             The management of this patient was discussed with an external consultant.                Brett Maxwell Jr., MD  11/04/24 102    
times daily    MULTIPLE VITAMIN (MULTIVITAMIN) TABS TABLET    Take 1 tablet by mouth daily    PANTOPRAZOLE (PROTONIX) 40 MG TABLET    Take 1 tablet by mouth every morning (before breakfast)    PAROXETINE (PAXIL) 20 MG TABLET    Take 1 tablet by mouth every morning    THIAMINE 100 MG TABLET    Take 1 tablet by mouth daily        Results for orders placed or performed during the hospital encounter of 11/04/24   EKG 12 Lead (Abn HR)   Result Value Ref Range    Ventricular Rate 96 BPM    Atrial Rate 97 BPM    P-R Interval 168 ms    QRS Duration 78 ms    Q-T Interval 346 ms    QTc Calculation (Bazett) 438 ms    P Axis 82 degrees    R Axis 84 degrees    T Axis 63 degrees    Diagnosis       Sinus rhythm  Consider right atrial enlargement  Non-specific ST-t wave changes      Confirmed by ODALYS SALVADOR (), SHANT LANIER ([id number]) on 11/4/2024 6:35:30 AM           No orders to display                No results for input(s): \"COVID19\" in the last 72 hours.     Voice dictation software was used during the making of this note.  This software is not perfect and grammatical and other typographical errors may be present.  This note has not been completely proofread for errors.     Puja Olmedo, DO  11/04/24 0628

## 2024-11-04 NOTE — CARE COORDINATION
Patient came to the ER for ETOH intoxication seeking resources for addictions recovery. Russell with [facility] consulted.     Lucia Frederick, [facility]  Medical Social Worker  [facility]

## 2024-11-04 NOTE — ED TRIAGE NOTES
Pt ambulated to triage    Pt states she is de-toxing off of alcohol and is going through withdrawals.  Pt is experiencing n/v, shaking and hypertension.  Last time pt had a drink was 0500 and had a gallon of wine today.

## 2024-11-04 NOTE — DISCHARGE INSTRUCTIONS
Follow-up with PCP, local detox/rehab facility.  Take Librium taper as directed.  Schedule follow-up with primary care physician, gastroenterologist.  Please return to ED if symptoms worsen or progress in any way.

## 2025-01-07 ENCOUNTER — TELEPHONE (OUTPATIENT)
Dept: FAMILY MEDICINE CLINIC | Facility: CLINIC | Age: 70
End: 2025-01-07

## 2025-01-28 NOTE — PROGRESS NOTES
DISPLAY PLAN FREE TEXT Spoke with Dr. Sukhwinder Goodwin, he states to apply cold compresses to arm and follow up with PCP.  Informed pt of what she needs to do DISPLAY PLAN FREE TEXT DISPLAY PLAN FREE TEXT DISPLAY PLAN FREE TEXT DISPLAY PLAN FREE TEXT DISPLAY PLAN FREE TEXT DISPLAY PLAN FREE TEXT DISPLAY PLAN FREE TEXT DISPLAY PLAN FREE TEXT DISPLAY PLAN FREE TEXT DISPLAY PLAN FREE TEXT DISPLAY PLAN FREE TEXT DISPLAY PLAN FREE TEXT

## 2025-03-13 ENCOUNTER — APPOINTMENT (OUTPATIENT)
Dept: ULTRASOUND IMAGING | Age: 70
End: 2025-03-13
Payer: MEDICARE

## 2025-03-13 ENCOUNTER — HOSPITAL ENCOUNTER (OUTPATIENT)
Age: 70
Setting detail: OBSERVATION
Discharge: HOME | End: 2025-03-13
Attending: EMERGENCY MEDICINE | Admitting: HOSPITALIST
Payer: MEDICARE

## 2025-03-13 VITALS
HEART RATE: 88 BPM | HEIGHT: 64 IN | WEIGHT: 160 LBS | OXYGEN SATURATION: 97 % | DIASTOLIC BLOOD PRESSURE: 91 MMHG | TEMPERATURE: 98.4 F | RESPIRATION RATE: 16 BRPM | SYSTOLIC BLOOD PRESSURE: 172 MMHG | BODY MASS INDEX: 27.31 KG/M2

## 2025-03-13 DIAGNOSIS — R11.2 NAUSEA AND VOMITING, UNSPECIFIED VOMITING TYPE: ICD-10-CM

## 2025-03-13 DIAGNOSIS — K92.0 COFFEE GROUND EMESIS: Primary | ICD-10-CM

## 2025-03-13 DIAGNOSIS — F10.10 ALCOHOL ABUSE: ICD-10-CM

## 2025-03-13 PROBLEM — K92.2 ACUTE GI BLEEDING: Status: ACTIVE | Noted: 2025-03-13

## 2025-03-13 LAB
ABO + RH BLD: NORMAL
ALBUMIN SERPL-MCNC: 3.7 G/DL (ref 3.2–4.6)
ALBUMIN/GLOB SERPL: 1 (ref 1–1.9)
ALP SERPL-CCNC: 90 U/L (ref 35–104)
ALT SERPL-CCNC: 102 U/L (ref 8–45)
ANION GAP SERPL CALC-SCNC: 12 MMOL/L (ref 7–16)
AST SERPL-CCNC: 155 U/L (ref 15–37)
BASOPHILS # BLD: 0.03 K/UL (ref 0–0.2)
BASOPHILS NFR BLD: 0.5 % (ref 0–2)
BILIRUB SERPL-MCNC: 0.3 MG/DL (ref 0–1.2)
BLOOD GROUP ANTIBODIES SERPL: NORMAL
BUN SERPL-MCNC: 8 MG/DL (ref 8–23)
CALCIUM SERPL-MCNC: 9.6 MG/DL (ref 8.8–10.2)
CHLORIDE SERPL-SCNC: 98 MMOL/L (ref 98–107)
CO2 SERPL-SCNC: 26 MMOL/L (ref 20–29)
CREAT SERPL-MCNC: 0.79 MG/DL (ref 0.6–1.1)
DIFFERENTIAL METHOD BLD: ABNORMAL
EKG ATRIAL RATE: 110 BPM
EKG DIAGNOSIS: NORMAL
EKG P AXIS: 83 DEGREES
EKG P-R INTERVAL: 175 MS
EKG Q-T INTERVAL: 330 MS
EKG QRS DURATION: 83 MS
EKG QTC CALCULATION (BAZETT): 445 MS
EKG R AXIS: 86 DEGREES
EKG T AXIS: 55 DEGREES
EKG VENTRICULAR RATE: 109 BPM
EOSINOPHIL # BLD: 0.13 K/UL (ref 0–0.8)
EOSINOPHIL NFR BLD: 2 % (ref 0.5–7.8)
ERYTHROCYTE [DISTWIDTH] IN BLOOD BY AUTOMATED COUNT: 13.8 % (ref 11.9–14.6)
ETHANOL SERPL-MCNC: <11 MG/DL (ref 0–0.08)
GLOBULIN SER CALC-MCNC: 3.6 G/DL (ref 2.3–3.5)
GLUCOSE SERPL-MCNC: 123 MG/DL (ref 70–99)
HCT VFR BLD AUTO: 38 % (ref 35.8–46.3)
HCT VFR BLD AUTO: 38.6 % (ref 35.8–46.3)
HGB BLD-MCNC: 13 G/DL (ref 11.7–15.4)
HGB BLD-MCNC: 13.2 G/DL (ref 11.7–15.4)
IMM GRANULOCYTES # BLD AUTO: 0.02 K/UL (ref 0–0.5)
IMM GRANULOCYTES NFR BLD AUTO: 0.3 % (ref 0–5)
INR PPP: 1
IRON SERPL-MCNC: 138 UG/DL (ref 35–100)
LIPASE SERPL-CCNC: 70 U/L (ref 13–60)
LYMPHOCYTES # BLD: 1.75 K/UL (ref 0.5–4.6)
LYMPHOCYTES NFR BLD: 26.8 % (ref 13–44)
MCH RBC QN AUTO: 33.4 PG (ref 26.1–32.9)
MCHC RBC AUTO-ENTMCNC: 34.2 G/DL (ref 31.4–35)
MCV RBC AUTO: 97.7 FL (ref 82–102)
MONOCYTES # BLD: 0.76 K/UL (ref 0.1–1.3)
MONOCYTES NFR BLD: 11.7 % (ref 4–12)
NEUTS SEG # BLD: 3.83 K/UL (ref 1.7–8.2)
NEUTS SEG NFR BLD: 58.7 % (ref 43–78)
NRBC # BLD: 0 K/UL (ref 0–0.2)
PLATELET # BLD AUTO: 228 K/UL (ref 150–450)
PMV BLD AUTO: 9.6 FL (ref 9.4–12.3)
POTASSIUM SERPL-SCNC: 4.6 MMOL/L (ref 3.5–5.1)
PROT SERPL-MCNC: 7.3 G/DL (ref 6.3–8.2)
PROTHROMBIN TIME: 13.2 SEC (ref 11.3–14.9)
RBC # BLD AUTO: 3.95 M/UL (ref 4.05–5.2)
SODIUM SERPL-SCNC: 136 MMOL/L (ref 136–145)
SPECIMEN EXP DATE BLD: NORMAL
TRANSFERRIN SERPL-MCNC: 289 MG/DL (ref 200–360)
WBC # BLD AUTO: 6.5 K/UL (ref 4.3–11.1)

## 2025-03-13 PROCEDURE — 93005 ELECTROCARDIOGRAM TRACING: CPT | Performed by: EMERGENCY MEDICINE

## 2025-03-13 PROCEDURE — 86900 BLOOD TYPING SEROLOGIC ABO: CPT

## 2025-03-13 PROCEDURE — 85018 HEMOGLOBIN: CPT

## 2025-03-13 PROCEDURE — G0378 HOSPITAL OBSERVATION PER HR: HCPCS

## 2025-03-13 PROCEDURE — 96374 THER/PROPH/DIAG INJ IV PUSH: CPT

## 2025-03-13 PROCEDURE — 83690 ASSAY OF LIPASE: CPT

## 2025-03-13 PROCEDURE — 6370000000 HC RX 637 (ALT 250 FOR IP): Performed by: HOSPITALIST

## 2025-03-13 PROCEDURE — 86850 RBC ANTIBODY SCREEN: CPT

## 2025-03-13 PROCEDURE — 83540 ASSAY OF IRON: CPT

## 2025-03-13 PROCEDURE — 85014 HEMATOCRIT: CPT

## 2025-03-13 PROCEDURE — 85025 COMPLETE CBC W/AUTO DIFF WBC: CPT

## 2025-03-13 PROCEDURE — 99222 1ST HOSP IP/OBS MODERATE 55: CPT | Performed by: INTERNAL MEDICINE

## 2025-03-13 PROCEDURE — 96361 HYDRATE IV INFUSION ADD-ON: CPT

## 2025-03-13 PROCEDURE — 82077 ASSAY SPEC XCP UR&BREATH IA: CPT

## 2025-03-13 PROCEDURE — 93010 ELECTROCARDIOGRAM REPORT: CPT | Performed by: INTERNAL MEDICINE

## 2025-03-13 PROCEDURE — 85610 PROTHROMBIN TIME: CPT

## 2025-03-13 PROCEDURE — 84466 ASSAY OF TRANSFERRIN: CPT

## 2025-03-13 PROCEDURE — 6360000002 HC RX W HCPCS: Performed by: EMERGENCY MEDICINE

## 2025-03-13 PROCEDURE — 99285 EMERGENCY DEPT VISIT HI MDM: CPT

## 2025-03-13 PROCEDURE — 76700 US EXAM ABDOM COMPLETE: CPT

## 2025-03-13 PROCEDURE — 86901 BLOOD TYPING SEROLOGIC RH(D): CPT

## 2025-03-13 PROCEDURE — 36415 COLL VENOUS BLD VENIPUNCTURE: CPT

## 2025-03-13 PROCEDURE — 80053 COMPREHEN METABOLIC PANEL: CPT

## 2025-03-13 PROCEDURE — 96375 TX/PRO/DX INJ NEW DRUG ADDON: CPT

## 2025-03-13 PROCEDURE — 2580000003 HC RX 258: Performed by: HOSPITALIST

## 2025-03-13 PROCEDURE — 2580000003 HC RX 258: Performed by: EMERGENCY MEDICINE

## 2025-03-13 RX ORDER — ACETAMINOPHEN 325 MG/1
650 TABLET ORAL EVERY 6 HOURS PRN
Status: DISCONTINUED | OUTPATIENT
Start: 2025-03-13 | End: 2025-03-13 | Stop reason: HOSPADM

## 2025-03-13 RX ORDER — SUCRALFATE 1 G/1
1 TABLET ORAL 4 TIMES DAILY
Qty: 120 TABLET | Refills: 0 | Status: SHIPPED | OUTPATIENT
Start: 2025-03-13

## 2025-03-13 RX ORDER — PANTOPRAZOLE SODIUM 40 MG/1
40 TABLET, DELAYED RELEASE ORAL 2 TIMES DAILY
Qty: 60 TABLET | Refills: 0 | Status: SHIPPED | OUTPATIENT
Start: 2025-03-13

## 2025-03-13 RX ORDER — PAROXETINE 20 MG/1
20 TABLET, FILM COATED ORAL EVERY MORNING
COMMUNITY

## 2025-03-13 RX ORDER — GABAPENTIN 100 MG/1
100 CAPSULE ORAL 2 TIMES DAILY
Status: DISCONTINUED | OUTPATIENT
Start: 2025-03-13 | End: 2025-03-13

## 2025-03-13 RX ORDER — LORAZEPAM 1 MG/1
4 TABLET ORAL
Status: DISCONTINUED | OUTPATIENT
Start: 2025-03-13 | End: 2025-03-13 | Stop reason: HOSPADM

## 2025-03-13 RX ORDER — METOPROLOL TARTRATE 50 MG
50 TABLET ORAL 2 TIMES DAILY
COMMUNITY

## 2025-03-13 RX ORDER — SODIUM CHLORIDE 0.9 % (FLUSH) 0.9 %
5-40 SYRINGE (ML) INJECTION PRN
Status: DISCONTINUED | OUTPATIENT
Start: 2025-03-13 | End: 2025-03-13 | Stop reason: HOSPADM

## 2025-03-13 RX ORDER — DOCUSATE SODIUM 100 MG/1
100 CAPSULE, LIQUID FILLED ORAL 2 TIMES DAILY PRN
COMMUNITY

## 2025-03-13 RX ORDER — LORAZEPAM 1 MG/1
3 TABLET ORAL
Status: DISCONTINUED | OUTPATIENT
Start: 2025-03-13 | End: 2025-03-13 | Stop reason: HOSPADM

## 2025-03-13 RX ORDER — ONDANSETRON 2 MG/ML
4 INJECTION INTRAMUSCULAR; INTRAVENOUS EVERY 6 HOURS PRN
Status: DISCONTINUED | OUTPATIENT
Start: 2025-03-13 | End: 2025-03-13 | Stop reason: HOSPADM

## 2025-03-13 RX ORDER — LORAZEPAM 2 MG/ML
INJECTION INTRAMUSCULAR
Status: DISCONTINUED
Start: 2025-03-13 | End: 2025-03-13 | Stop reason: HOSPADM

## 2025-03-13 RX ORDER — CHLORDIAZEPOXIDE HYDROCHLORIDE 25 MG/1
25 CAPSULE, GELATIN COATED ORAL 2 TIMES DAILY PRN
COMMUNITY

## 2025-03-13 RX ORDER — SUCRALFATE 1 G/1
1 TABLET ORAL EVERY 8 HOURS SCHEDULED
Status: DISCONTINUED | OUTPATIENT
Start: 2025-03-13 | End: 2025-03-13 | Stop reason: HOSPADM

## 2025-03-13 RX ORDER — METOPROLOL TARTRATE 50 MG
50 TABLET ORAL 2 TIMES DAILY
Status: DISCONTINUED | OUTPATIENT
Start: 2025-03-13 | End: 2025-03-13 | Stop reason: HOSPADM

## 2025-03-13 RX ORDER — DOXEPIN HYDROCHLORIDE 10 MG/1
10 CAPSULE ORAL NIGHTLY
Status: DISCONTINUED | OUTPATIENT
Start: 2025-03-13 | End: 2025-03-13

## 2025-03-13 RX ORDER — PANTOPRAZOLE SODIUM 40 MG/1
40 TABLET, DELAYED RELEASE ORAL
Status: DISCONTINUED | OUTPATIENT
Start: 2025-03-13 | End: 2025-03-13 | Stop reason: HOSPADM

## 2025-03-13 RX ORDER — PAROXETINE 20 MG/1
20 TABLET, FILM COATED ORAL EVERY MORNING
Status: DISCONTINUED | OUTPATIENT
Start: 2025-03-13 | End: 2025-03-13 | Stop reason: HOSPADM

## 2025-03-13 RX ORDER — SODIUM CHLORIDE 9 MG/ML
INJECTION, SOLUTION INTRAVENOUS PRN
Status: DISCONTINUED | OUTPATIENT
Start: 2025-03-13 | End: 2025-03-13 | Stop reason: HOSPADM

## 2025-03-13 RX ORDER — PANTOPRAZOLE SODIUM 40 MG/1
40 TABLET, DELAYED RELEASE ORAL DAILY
Status: ON HOLD | COMMUNITY
End: 2025-03-13

## 2025-03-13 RX ORDER — LORAZEPAM 1 MG/1
1 TABLET ORAL ONCE
Status: DISCONTINUED | OUTPATIENT
Start: 2025-03-13 | End: 2025-03-13 | Stop reason: HOSPADM

## 2025-03-13 RX ORDER — DEXTROSE MONOHYDRATE AND SODIUM CHLORIDE 5; .45 G/100ML; G/100ML
INJECTION, SOLUTION INTRAVENOUS CONTINUOUS
Status: DISCONTINUED | OUTPATIENT
Start: 2025-03-13 | End: 2025-03-13 | Stop reason: HOSPADM

## 2025-03-13 RX ORDER — SODIUM CHLORIDE 0.9 % (FLUSH) 0.9 %
5-40 SYRINGE (ML) INJECTION EVERY 12 HOURS SCHEDULED
Status: DISCONTINUED | OUTPATIENT
Start: 2025-03-13 | End: 2025-03-13 | Stop reason: HOSPADM

## 2025-03-13 RX ORDER — LORAZEPAM 1 MG/1
2 TABLET ORAL
Status: DISCONTINUED | OUTPATIENT
Start: 2025-03-13 | End: 2025-03-13 | Stop reason: HOSPADM

## 2025-03-13 RX ORDER — ONDANSETRON 4 MG/1
4 TABLET, ORALLY DISINTEGRATING ORAL EVERY 8 HOURS PRN
Status: DISCONTINUED | OUTPATIENT
Start: 2025-03-13 | End: 2025-03-13 | Stop reason: HOSPADM

## 2025-03-13 RX ORDER — ONDANSETRON 2 MG/ML
4 INJECTION INTRAMUSCULAR; INTRAVENOUS
Status: COMPLETED | OUTPATIENT
Start: 2025-03-13 | End: 2025-03-13

## 2025-03-13 RX ORDER — VITAMIN B COMPLEX
1000 TABLET ORAL DAILY
Status: DISCONTINUED | OUTPATIENT
Start: 2025-03-13 | End: 2025-03-13 | Stop reason: HOSPADM

## 2025-03-13 RX ORDER — AMLODIPINE BESYLATE 5 MG/1
5 TABLET ORAL DAILY
Status: DISCONTINUED | OUTPATIENT
Start: 2025-03-13 | End: 2025-03-13 | Stop reason: HOSPADM

## 2025-03-13 RX ORDER — LANOLIN ALCOHOL/MO/W.PET/CERES
100 CREAM (GRAM) TOPICAL DAILY
Status: DISCONTINUED | OUTPATIENT
Start: 2025-03-13 | End: 2025-03-13 | Stop reason: HOSPADM

## 2025-03-13 RX ORDER — CLONIDINE HYDROCHLORIDE 0.1 MG/1
0.1 TABLET ORAL EVERY 12 HOURS PRN
Status: DISCONTINUED | OUTPATIENT
Start: 2025-03-13 | End: 2025-03-13 | Stop reason: HOSPADM

## 2025-03-13 RX ORDER — AMLODIPINE BESYLATE 5 MG/1
5 TABLET ORAL DAILY
COMMUNITY

## 2025-03-13 RX ORDER — ACETAMINOPHEN 650 MG/1
650 SUPPOSITORY RECTAL EVERY 6 HOURS PRN
Status: DISCONTINUED | OUTPATIENT
Start: 2025-03-13 | End: 2025-03-13 | Stop reason: HOSPADM

## 2025-03-13 RX ORDER — 0.9 % SODIUM CHLORIDE 0.9 %
1000 INTRAVENOUS SOLUTION INTRAVENOUS ONCE
Status: COMPLETED | OUTPATIENT
Start: 2025-03-13 | End: 2025-03-13

## 2025-03-13 RX ORDER — LORAZEPAM 1 MG/1
1 TABLET ORAL
Status: DISCONTINUED | OUTPATIENT
Start: 2025-03-13 | End: 2025-03-13 | Stop reason: HOSPADM

## 2025-03-13 RX ORDER — SUCRALFATE 1 G/1
1 TABLET ORAL EVERY 8 HOURS SCHEDULED
Qty: 120 TABLET | Refills: 0 | Status: SHIPPED | OUTPATIENT
Start: 2025-03-13 | End: 2025-03-13

## 2025-03-13 RX ORDER — LANOLIN ALCOHOL/MO/W.PET/CERES
100 CREAM (GRAM) TOPICAL DAILY
Qty: 30 TABLET | Refills: 0 | Status: SHIPPED | OUTPATIENT
Start: 2025-03-13 | End: 2025-04-12

## 2025-03-13 RX ADMIN — LORAZEPAM 1 MG: 1 TABLET ORAL at 05:08

## 2025-03-13 RX ADMIN — Medication 100 MG: at 08:51

## 2025-03-13 RX ADMIN — LORAZEPAM 1 MG: 2 INJECTION INTRAMUSCULAR; INTRAVENOUS at 03:03

## 2025-03-13 RX ADMIN — SODIUM CHLORIDE 40 MG: 9 INJECTION, SOLUTION INTRAMUSCULAR; INTRAVENOUS; SUBCUTANEOUS at 03:04

## 2025-03-13 RX ADMIN — METOPROLOL TARTRATE 50 MG: 50 TABLET, FILM COATED ORAL at 08:51

## 2025-03-13 RX ADMIN — AMLODIPINE BESYLATE 5 MG: 5 TABLET ORAL at 08:51

## 2025-03-13 RX ADMIN — ONDANSETRON 4 MG: 2 INJECTION, SOLUTION INTRAMUSCULAR; INTRAVENOUS at 03:04

## 2025-03-13 RX ADMIN — DEXTROSE AND SODIUM CHLORIDE: 5; .45 INJECTION, SOLUTION INTRAVENOUS at 04:57

## 2025-03-13 RX ADMIN — VITAMIN D, TAB 1000IU (100/BT) 1000 UNITS: 25 TAB at 08:51

## 2025-03-13 RX ADMIN — PAROXETINE HYDROCHLORIDE 20 MG: 20 TABLET, FILM COATED ORAL at 08:51

## 2025-03-13 RX ADMIN — SODIUM CHLORIDE 1000 ML: 9 INJECTION, SOLUTION INTRAVENOUS at 03:05

## 2025-03-13 ASSESSMENT — ENCOUNTER SYMPTOMS
DIARRHEA: 0
ABDOMINAL PAIN: 0
CONSTIPATION: 0
VOMITING: 1
NAUSEA: 1
ANAL BLEEDING: 0
BLOOD IN STOOL: 1

## 2025-03-13 ASSESSMENT — PAIN SCALES - GENERAL: PAINLEVEL_OUTOF10: 8

## 2025-03-13 NOTE — H&P
(H) 15 - 37 U/L    Alk Phosphatase 90 35 - 104 U/L    Total Protein 7.3 6.3 - 8.2 g/dL    Albumin 3.7 3.2 - 4.6 g/dL    Globulin 3.6 (H) 2.3 - 3.5 g/dL    Albumin/Globulin Ratio 1.0 1.0 - 1.9     Lipase    Collection Time: 03/13/25  1:55 AM   Result Value Ref Range    Lipase 70 (H) 13 - 60 U/L   Protime-INR    Collection Time: 03/13/25  1:55 AM   Result Value Ref Range    Protime 13.2 11.3 - 14.9 sec    INR 1.0     TYPE AND SCREEN    Collection Time: 03/13/25  1:55 AM   Result Value Ref Range    Crossmatch expiration date 03/16/2025,0887     ABO/Rh B POSITIVE     Antibody Screen NEG        No results for input(s): \"COVID19\" in the last 72 hours.    No results found.      Signed:  Eladio Angeles MD    Part of this note may have been written by using a voice dictation software.  The note has been proof read but may still contain some grammatical/other typographical errors.

## 2025-03-13 NOTE — CONSULTS
Consult Note            Date:3/13/2025        Patient Name:April Lujan     YOB: 1955     Age:69 y.o.    Consult to Gastroenterology  Consult performed by: Rosibel Patterson PA  Consult ordered by: Eladio Angeles MD          Chief Complaint     Chief Complaint   Patient presents with    Hematemesis       History Obtained From   patient    History of Present Illness   Patient is a 69 year old female, with a hx of chronic ETOH use, HTN, hypothyroidism, who was admitted for coffee ground emesis and melena. Patient states that she started vomited coffee ground emesis about 2x days ago; 4-5x episodes a day but nothing since yesterday. Also with melena, but no BM since yesterday. No hx of GIB. She does drink several small bottles of wine daily for several years; uses ETOH to help her sleep as she does not have a PCP to prescribe sleep medications. No NSAIDS/blood thinners. No recent endoscopy. Has mild epigastric discomfort, but no weight loss, MARTA, GERD, dysphagia. Prior imaging in October showing FLD. Non smoker. No pepto or iron use.     Labs: Hgb 13.2 13.9 in November), Plt 228, BUN 8, Cr 0.79, INR 1    Imaging: None; pending US to assess for cirrhosis.     Medications: IV PPI BID.     Past Medical History     Past Medical History:   Diagnosis Date    Acute encephalopathy     Alcohol abuse, continuous 11/19/2019    Alcohol dependence with withdrawal (HCC) 09/13/2019    Alcohol intoxication in active alcoholic, with unspecified complication (HCC) 06/16/2021    Alcohol withdrawal delirium (HCC) 06/01/2019    Alcohol withdrawal delirium (HCC) 04/07/2022    Alcohol withdrawal syndrome, with delirium (HCC) 06/28/2019    Alcoholism (HCC)     Anxiety     Ataxia     Depression     Hyperlipidemia     Hypertension     Hyponatremia 10/10/2022        Past Surgical History     Past Surgical History:   Procedure Laterality Date    BACK SURGERY      1996 L5 disectomy    COLONOSCOPY      2016    ENDOSCOPY, COLON,

## 2025-03-13 NOTE — DISCHARGE SUMMARY
Hospitalist Discharge Summary   Admit Date:  3/13/2025 12:27 AM   DC Note date: 3/13/2025  Name:  April Lujan   Age:  69 y.o.  Sex:  female  :  1955   MRN:  [id number]   Room:  [facility]  PCP:  None, None    Presenting Complaint: Hematemesis     Initial Admission Diagnosis: Alcohol abuse [F10.10]  Acute GI bleeding [K92.2]  Coffee ground emesis [K92.0]  Nausea and vomiting, unspecified vomiting type [R11.2]     Problem List for this Hospitalization (present on admission):    Principal Problem:    Acute GI bleeding  Active Problems:    Primary hypertension    Alcoholism (HCC)    Alcoholic hepatitis (HCC)    Coffee ground emesis  Resolved Problems:    * No resolved hospital problems. *      Hospital Course:  69 years old female with past medical history of alcohol abuse, alcoholic hepatitis, hypertension presented to emergency room with chief complaint of coffee-ground emesis, nausea melanotic stool going on for past 24 hours. Patient drinks multiple small wine bottles a day, last drink was around noon. Denies any fever, chills, chest pain, shortness of breath. Evaluated in emergency room, patient was tachycardic and hypertensive in ER. Lab work shows hemoglobin of 13.2, AST ALT elevated, and lipase is in 70s. CT scan of abdomen shows fatty infiltration of liver and otherwise unremarkable. Emergency room physician requested admission of this patient for GI evaluation. Patient has history of multiple admissions in the past for alcohol withdrawal syndrome.     Patient seen in consultation by Dr. Macario and given stable h/h and abd US revealing hepatic steatosis, deemed stable from GI standpoint for discharge home today.  He was made aware that patient did have another BM this AM that remains tarry in color but with stable hemodynamics and stable H/H, from GI standpoint can continue with plans for discharge today with continued protonix bid and carafate qid.  Patient was counseled importance of abstaining from ETOH  3.6 (H) 2.3 - 3.5 g/dL    Albumin/Globulin Ratio 1.0 1.0 - 1.9     Lipase    Collection Time: 03/13/25  1:55 AM   Result Value Ref Range    Lipase 70 (H) 13 - 60 U/L   Protime-INR    Collection Time: 03/13/25  1:55 AM   Result Value Ref Range    Protime 13.2 11.3 - 14.9 sec    INR 1.0     TYPE AND SCREEN    Collection Time: 03/13/25  1:55 AM   Result Value Ref Range    Crossmatch expiration date 03/16/2025,2359     ABO/Rh B POSITIVE     Antibody Screen NEG    Ethanol    Collection Time: 03/13/25  3:12 AM   Result Value Ref Range    Ethanol Lvl <11 MG/DL   Hemoglobin and Hematocrit    Collection Time: 03/13/25 10:19 AM   Result Value Ref Range    Hemoglobin 13.0 11.7 - 15.4 g/dL    Hematocrit 38.0 35.8 - 46.3 %       No results for input(s): \"COVID19\" in the last 72 hours.    Recent Vital Data:  Patient Vitals for the past 24 hrs:   Temp Pulse Resp BP SpO2   03/13/25 0714 98.4 °F (36.9 °C) 88 16 (!) 172/91 97 %   03/13/25 0442 97.9 °F (36.6 °C) 94 14 (!) 170/83 98 %   03/13/25 0017 98.8 °F (37.1 °C) (!) 130 20 (!) 169/90 96 %       Oxygen Therapy  SpO2: 97 %    Estimated body mass index is 27.46 kg/m² as calculated from the following:    Height as of this encounter: 1.626 m (5' 4\").    Weight as of this encounter: 72.6 kg (160 lb).  No intake or output data in the 24 hours ending 03/13/25 1106      Physical Exam:    General:    Well nourished.  No overt distress  Head:  Normocephalic, atraumatic  Eyes:  Sclerae appear normal.  Pupils equally round.    HENT:  Nares appear normal, no drainage.  Moist mucous membranes  Neck:  No restricted ROM.  Trachea midline  CV:   RRR.  No m/r/g.  No JVD  Lungs:   CTAB.  No wheezing, rhonchi, or rales.  Respirations even, unlabored  Abdomen:   Soft, nontender, nondistended.  +BSx4  Extremities: Warm and dry.   No edema.    Skin:     No rashes.  Normal coloration  Neuro:  A&Ox3  Psych:  Normal mood and affect.        Time spent in patient discharge and coordination 35

## 2025-03-13 NOTE — PROGRESS NOTES
TRANSFER - IN REPORT:    Verbal report received from Sandy HAINES on April Lujan  being received from [facility] ED for routine progression of patient care      Report consisted of patient's Situation, Background, Assessment and   Recommendations(SBAR).     Information from the following report(s) Nurse Handoff Report was reviewed with the receiving nurse.    Opportunity for questions and clarification was provided.      Assessment completed upon patient's arrival to unit and care assumed.

## 2025-03-13 NOTE — ED NOTES
TRANSFER - OUT REPORT:    Verbal report given to YANCY Marcum on April Lujan  being transferred to 349 for routine progression of patient care       Report consisted of patient's Situation, Background, Assessment and   Recommendations(SBAR).     Information from the following report(s) ED SBAR was reviewed with the receiving nurse.    Lines:   Peripheral IV 03/13/25 Right Antecubital (Active)        Opportunity for questions and clarification was provided.      Patient transported with:  Registered Nurse

## 2025-03-13 NOTE — CARE COORDINATION
Case Management Assessment  Initial Evaluation    If patient is discharged prior to next notation, then this note serves as note for discharge by case management.      Pt chart reviewed for discharge planning. Pt with acute GI bleeding. CM familiar with pt from previous admission. Pt insured with [facility] Medicare. Pt reports no change in demographics. Pt does not have PCP and requested referral to a different [facility] provider.    SW CM observed pt's upper bridge of nose with bluish purple color surrounding with bump. CM asked if pt felt safe at home. Pt states \"yes\". CM asked if pt open to speaking with [facility] liaison. Pt states \"yes, he can call me\". CM sent secure message via P[employer] to [facility] liaison to contact pt as pt is for discharge home shortly. Pt verbalized appreciation of CM time.     CM sent referral to [facility] for hospital follow-up and new pt establishment.     CM to follow pt plan of care and assist with resumption of supportive care services and/or coordinate care for any additional needs.  Please consult CM for any specific needs that arise.    Rachna Hanna, MSW, LBSW    [facility]     03/13/25 1230   Service Assessment   Patient Orientation Alert and Oriented;Place;Situation;Person;Self   Cognition Alert   History Provided By Patient;Medical Record   Primary Caregiver Self   Accompanied By/Relationship None   Support Systems Spouse/Significant Other   Patient's Healthcare Decision Maker is: Legal Next of Kin   PCP Verified by CM No   Prior Functional Level Independent in ADLs/IADLs   Current Functional Level Independent in ADLs/IADLs   Can patient return to prior living arrangement Yes   Ability to make needs known: Good   Family able to assist with home care needs: Yes   Would you like for me to discuss the discharge plan with any other family members/significant others, and if so, who? No   Financial Resources Medicare  ([facility])   Community Resources

## 2025-03-13 NOTE — ED PROVIDER NOTES
Hemoccult positive.  Musculoskeletal:         General: No swelling. Normal range of motion.      Cervical back: Normal range of motion. No rigidity.      Right lower leg: No edema.      Left lower leg: No edema.   Skin:     General: Skin is warm.      Findings: No erythema or rash.   Neurological:      General: No focal deficit present.      Mental Status: She is alert and oriented to person, place, and time.      Cranial Nerves: No cranial nerve deficit.      Sensory: No sensory deficit.      Motor: No weakness.      Comments: No focal deficits         MDM     69-year-old female with a past medical history significant for alcohol abuse, alcohlolic hepatitis, and hypertension presents to ED with complaint of nausea, vomiting, coffee-ground emesis, rectal bleeding.  States abdominal discomfort earlier today is since resolved.  Patient tachycardic on arrival as well as hypertensive.    H&H 13.2/38.6.  Platelet count 228K.  No leukocytosis.  CMP unremarkable.  Glucose 123.  , .  Total bilirubin 0.3.  Lipase 70.  Alcohol level pending.  IV fluids, Zofran, Protonix, Ativan ordered.    Abdomen soft, nontender with no rebound or guarding.  No peritoneal signs.  No indication for emergent CT abdomen pelvis with IV contrast at this time.    Will consult hospitalist for admission.  Case discussed with Dr. Angeles via Perfect Serve.         ED Course as of 03/13/25 0255   u Mar 13, 2025   0216 Hemoglobin Quant: 13.2 [DF]   0216 Hematocrit: 38.6 [DF]   0239 ALT(!): 102 [DF]   0239 AST(!): 155 [DF]   0239 Glucose(!): 123 [DF]   0239 Lipase(!): 70 [DF]      ED Course User Index  [DF] Brett Maxwell Jr., MD     1 or more acute illnesses that pose a threat to life or bodily function.   1 or more chronic illnesses with a severe exacerbation or progression.  Parental controlled substances given in the ED.  Chronic medical problems impacting care include HTN. Alcohol abuse.  Shared medical decision making was

## 2025-05-31 ENCOUNTER — HOSPITAL ENCOUNTER (INPATIENT)
Age: 70
LOS: 4 days | Discharge: HOME | DRG: 897 | End: 2025-06-05
Attending: EMERGENCY MEDICINE | Admitting: FAMILY MEDICINE
Payer: MEDICARE

## 2025-05-31 ENCOUNTER — APPOINTMENT (OUTPATIENT)
Dept: GENERAL RADIOLOGY | Age: 70
DRG: 897 | End: 2025-05-31
Payer: MEDICARE

## 2025-05-31 DIAGNOSIS — F10.930 ALCOHOL WITHDRAWAL SYNDROME WITHOUT COMPLICATION (HCC): ICD-10-CM

## 2025-05-31 DIAGNOSIS — F10.10 ALCOHOL ABUSE: ICD-10-CM

## 2025-05-31 DIAGNOSIS — R74.8 ELEVATED LIVER ENZYMES: ICD-10-CM

## 2025-05-31 DIAGNOSIS — Z00.00 MEDICARE ANNUAL WELLNESS VISIT, SUBSEQUENT: Primary | Chronic | ICD-10-CM

## 2025-05-31 DIAGNOSIS — R07.9 ACUTE CHEST PAIN: ICD-10-CM

## 2025-05-31 LAB
ALBUMIN SERPL-MCNC: 4.1 G/DL (ref 3.2–4.6)
ALBUMIN/GLOB SERPL: 0.9 (ref 1–1.9)
ALP SERPL-CCNC: 123 U/L (ref 35–104)
ALT SERPL-CCNC: 155 U/L (ref 8–45)
ANION GAP SERPL CALC-SCNC: 20 MMOL/L (ref 7–16)
AST SERPL-CCNC: 251 U/L (ref 15–37)
BASOPHILS # BLD: 0.06 K/UL (ref 0–0.2)
BASOPHILS NFR BLD: 0.7 % (ref 0–2)
BILIRUB SERPL-MCNC: 0.3 MG/DL (ref 0–1.2)
BUN SERPL-MCNC: 11 MG/DL (ref 8–23)
CALCIUM SERPL-MCNC: 10 MG/DL (ref 8.8–10.2)
CHLORIDE SERPL-SCNC: 98 MMOL/L (ref 98–107)
CO2 SERPL-SCNC: 18 MMOL/L (ref 20–29)
CREAT SERPL-MCNC: 0.83 MG/DL (ref 0.6–1.1)
DIFFERENTIAL METHOD BLD: ABNORMAL
EOSINOPHIL # BLD: 0.15 K/UL (ref 0–0.8)
EOSINOPHIL NFR BLD: 1.9 % (ref 0.5–7.8)
ERYTHROCYTE [DISTWIDTH] IN BLOOD BY AUTOMATED COUNT: 12.4 % (ref 11.9–14.6)
ETHANOL SERPL-MCNC: 29 MG/DL (ref 0–0.08)
GLOBULIN SER CALC-MCNC: 4.8 G/DL (ref 2.3–3.5)
GLUCOSE SERPL-MCNC: 127 MG/DL (ref 70–99)
HCT VFR BLD AUTO: 43.2 % (ref 35.8–46.3)
HGB BLD-MCNC: 14.8 G/DL (ref 11.7–15.4)
IMM GRANULOCYTES # BLD AUTO: 0.02 K/UL (ref 0–0.5)
IMM GRANULOCYTES NFR BLD AUTO: 0.2 % (ref 0–5)
LACTATE SERPL-SCNC: 1.3 MMOL/L (ref 0.5–2)
LIPASE SERPL-CCNC: 59 U/L (ref 13–60)
LYMPHOCYTES # BLD: 2.93 K/UL (ref 0.5–4.6)
LYMPHOCYTES NFR BLD: 36.5 % (ref 13–44)
MCH RBC QN AUTO: 34.2 PG (ref 26.1–32.9)
MCHC RBC AUTO-ENTMCNC: 34.3 G/DL (ref 31.4–35)
MCV RBC AUTO: 99.8 FL (ref 82–102)
MONOCYTES # BLD: 0.63 K/UL (ref 0.1–1.3)
MONOCYTES NFR BLD: 7.9 % (ref 4–12)
NEUTS SEG # BLD: 4.23 K/UL (ref 1.7–8.2)
NEUTS SEG NFR BLD: 52.8 % (ref 43–78)
NRBC # BLD: 0 K/UL (ref 0–0.2)
PLATELET # BLD AUTO: 324 K/UL (ref 150–450)
PMV BLD AUTO: 10.6 FL (ref 9.4–12.3)
POTASSIUM SERPL-SCNC: 4.4 MMOL/L (ref 3.5–5.1)
PROT SERPL-MCNC: 8.9 G/DL (ref 6.3–8.2)
RBC # BLD AUTO: 4.33 M/UL (ref 4.05–5.2)
SODIUM SERPL-SCNC: 136 MMOL/L (ref 136–145)
TROPONIN T SERPL HS-MCNC: 16.3 NG/L (ref 0–14)
TROPONIN T SERPL HS-MCNC: 18 NG/L (ref 0–14)
WBC # BLD AUTO: 8 K/UL (ref 4.3–11.1)

## 2025-05-31 PROCEDURE — 84484 ASSAY OF TROPONIN QUANT: CPT

## 2025-05-31 PROCEDURE — 83605 ASSAY OF LACTIC ACID: CPT

## 2025-05-31 PROCEDURE — 80053 COMPREHEN METABOLIC PANEL: CPT

## 2025-05-31 PROCEDURE — 85025 COMPLETE CBC W/AUTO DIFF WBC: CPT

## 2025-05-31 PROCEDURE — 83690 ASSAY OF LIPASE: CPT

## 2025-05-31 PROCEDURE — 96374 THER/PROPH/DIAG INJ IV PUSH: CPT

## 2025-05-31 PROCEDURE — 93005 ELECTROCARDIOGRAM TRACING: CPT | Performed by: EMERGENCY MEDICINE

## 2025-05-31 PROCEDURE — 71045 X-RAY EXAM CHEST 1 VIEW: CPT

## 2025-05-31 PROCEDURE — 82077 ASSAY SPEC XCP UR&BREATH IA: CPT

## 2025-05-31 PROCEDURE — 2580000003 HC RX 258: Performed by: EMERGENCY MEDICINE

## 2025-05-31 PROCEDURE — 6360000002 HC RX W HCPCS: Performed by: EMERGENCY MEDICINE

## 2025-05-31 PROCEDURE — 96361 HYDRATE IV INFUSION ADD-ON: CPT

## 2025-05-31 PROCEDURE — 99285 EMERGENCY DEPT VISIT HI MDM: CPT

## 2025-05-31 PROCEDURE — 96375 TX/PRO/DX INJ NEW DRUG ADDON: CPT

## 2025-05-31 RX ORDER — LORAZEPAM 2 MG/ML
1 INJECTION INTRAMUSCULAR ONCE
Status: COMPLETED | OUTPATIENT
Start: 2025-06-01 | End: 2025-06-01

## 2025-05-31 RX ORDER — ONDANSETRON 2 MG/ML
4 INJECTION INTRAMUSCULAR; INTRAVENOUS
Status: COMPLETED | OUTPATIENT
Start: 2025-05-31 | End: 2025-05-31

## 2025-05-31 RX ORDER — 0.9 % SODIUM CHLORIDE 0.9 %
1000 INTRAVENOUS SOLUTION INTRAVENOUS
Status: COMPLETED | OUTPATIENT
Start: 2025-05-31 | End: 2025-05-31

## 2025-05-31 RX ADMIN — FAMOTIDINE 20 MG: 10 INJECTION, SOLUTION INTRAVENOUS at 21:15

## 2025-05-31 RX ADMIN — SODIUM CHLORIDE 1000 ML: 0.9 INJECTION, SOLUTION INTRAVENOUS at 21:14

## 2025-05-31 RX ADMIN — ONDANSETRON 4 MG: 2 INJECTION, SOLUTION INTRAMUSCULAR; INTRAVENOUS at 21:15

## 2025-05-31 ASSESSMENT — PAIN SCALES - GENERAL: PAINLEVEL_OUTOF10: 7

## 2025-06-01 PROBLEM — F10.930 ALCOHOL WITHDRAWAL SYNDROME WITHOUT COMPLICATION (HCC): Status: ACTIVE | Noted: 2025-06-01

## 2025-06-01 LAB
ALBUMIN SERPL-MCNC: 4 G/DL (ref 3.2–4.6)
ALBUMIN/GLOB SERPL: 1 (ref 1–1.9)
ALP SERPL-CCNC: 111 U/L (ref 35–104)
ALT SERPL-CCNC: 135 U/L (ref 8–45)
ANION GAP SERPL CALC-SCNC: 16 MMOL/L (ref 7–16)
AST SERPL-CCNC: 193 U/L (ref 15–37)
BASE EXCESS BLDV CALC-SCNC: 1.7 MMOL/L
BASOPHILS # BLD: 0.07 K/UL (ref 0–0.2)
BASOPHILS NFR BLD: 0.8 % (ref 0–2)
BILIRUB SERPL-MCNC: 0.5 MG/DL (ref 0–1.2)
BUN SERPL-MCNC: 11 MG/DL (ref 8–23)
CALCIUM SERPL-MCNC: 9.8 MG/DL (ref 8.8–10.2)
CHLORIDE SERPL-SCNC: 100 MMOL/L (ref 98–107)
CO2 SERPL-SCNC: 22 MMOL/L (ref 20–29)
CREAT SERPL-MCNC: 0.93 MG/DL (ref 0.6–1.1)
DIFFERENTIAL METHOD BLD: ABNORMAL
EOSINOPHIL # BLD: 0.15 K/UL (ref 0–0.8)
EOSINOPHIL NFR BLD: 1.8 % (ref 0.5–7.8)
ERYTHROCYTE [DISTWIDTH] IN BLOOD BY AUTOMATED COUNT: 12.4 % (ref 11.9–14.6)
GLOBULIN SER CALC-MCNC: 4 G/DL (ref 2.3–3.5)
GLUCOSE SERPL-MCNC: 134 MG/DL (ref 70–99)
HCO3 BLDV-SCNC: 26.8 MMOL/L (ref 22–29)
HCT VFR BLD AUTO: 44.1 % (ref 35.8–46.3)
HGB BLD-MCNC: 15 G/DL (ref 11.7–15.4)
IMM GRANULOCYTES # BLD AUTO: 0.02 K/UL (ref 0–0.5)
IMM GRANULOCYTES NFR BLD AUTO: 0.2 % (ref 0–5)
LYMPHOCYTES # BLD: 2.36 K/UL (ref 0.5–4.6)
LYMPHOCYTES NFR BLD: 28.4 % (ref 13–44)
MCH RBC QN AUTO: 34.6 PG (ref 26.1–32.9)
MCHC RBC AUTO-ENTMCNC: 34 G/DL (ref 31.4–35)
MCV RBC AUTO: 101.6 FL (ref 82–102)
MONOCYTES # BLD: 0.81 K/UL (ref 0.1–1.3)
MONOCYTES NFR BLD: 9.7 % (ref 4–12)
NEUTS SEG # BLD: 4.9 K/UL (ref 1.7–8.2)
NEUTS SEG NFR BLD: 59.1 % (ref 43–78)
NRBC # BLD: 0 K/UL (ref 0–0.2)
PCO2 BLDV: 42.7 MMHG (ref 41–51)
PH BLDV: 7.41 (ref 7.32–7.42)
PLATELET # BLD AUTO: 295 K/UL (ref 150–450)
PMV BLD AUTO: 9.6 FL (ref 9.4–12.3)
PO2 BLDV: 41 MMHG
POTASSIUM SERPL-SCNC: 4.5 MMOL/L (ref 3.5–5.1)
PROT SERPL-MCNC: 7.9 G/DL (ref 6.3–8.2)
RBC # BLD AUTO: 4.34 M/UL (ref 4.05–5.2)
SAO2 % BLDV: 75.6 % (ref 65–88)
SERVICE CMNT-IMP: NORMAL
SODIUM SERPL-SCNC: 138 MMOL/L (ref 136–145)
SPECIMEN TYPE: NORMAL
TROPONIN T SERPL HS-MCNC: 16.2 NG/L (ref 0–14)
WBC # BLD AUTO: 8.3 K/UL (ref 4.3–11.1)

## 2025-06-01 PROCEDURE — 2580000003 HC RX 258: Performed by: FAMILY MEDICINE

## 2025-06-01 PROCEDURE — 82803 BLOOD GASES ANY COMBINATION: CPT

## 2025-06-01 PROCEDURE — 6370000000 HC RX 637 (ALT 250 FOR IP): Performed by: FAMILY MEDICINE

## 2025-06-01 PROCEDURE — 85025 COMPLETE CBC W/AUTO DIFF WBC: CPT

## 2025-06-01 PROCEDURE — 6360000002 HC RX W HCPCS: Performed by: FAMILY MEDICINE

## 2025-06-01 PROCEDURE — 96375 TX/PRO/DX INJ NEW DRUG ADDON: CPT

## 2025-06-01 PROCEDURE — 1100000000 HC RM PRIVATE

## 2025-06-01 PROCEDURE — 2500000003 HC RX 250 WO HCPCS: Performed by: FAMILY MEDICINE

## 2025-06-01 PROCEDURE — 36415 COLL VENOUS BLD VENIPUNCTURE: CPT

## 2025-06-01 PROCEDURE — 6360000002 HC RX W HCPCS: Performed by: EMERGENCY MEDICINE

## 2025-06-01 PROCEDURE — 84484 ASSAY OF TROPONIN QUANT: CPT

## 2025-06-01 PROCEDURE — 80053 COMPREHEN METABOLIC PANEL: CPT

## 2025-06-01 RX ORDER — ENOXAPARIN SODIUM 100 MG/ML
40 INJECTION SUBCUTANEOUS DAILY
Status: DISCONTINUED | OUTPATIENT
Start: 2025-06-01 | End: 2025-06-05 | Stop reason: HOSPADM

## 2025-06-01 RX ORDER — AMLODIPINE BESYLATE 5 MG/1
5 TABLET ORAL DAILY
Status: DISCONTINUED | OUTPATIENT
Start: 2025-06-01 | End: 2025-06-05 | Stop reason: HOSPADM

## 2025-06-01 RX ORDER — POTASSIUM CHLORIDE 7.45 MG/ML
10 INJECTION INTRAVENOUS PRN
Status: DISCONTINUED | OUTPATIENT
Start: 2025-06-01 | End: 2025-06-05 | Stop reason: HOSPADM

## 2025-06-01 RX ORDER — ACETAMINOPHEN 650 MG/1
650 SUPPOSITORY RECTAL EVERY 6 HOURS PRN
Status: DISCONTINUED | OUTPATIENT
Start: 2025-06-01 | End: 2025-06-05 | Stop reason: HOSPADM

## 2025-06-01 RX ORDER — LORAZEPAM 1 MG/1
4 TABLET ORAL
Status: DISCONTINUED | OUTPATIENT
Start: 2025-06-01 | End: 2025-06-04

## 2025-06-01 RX ORDER — MAGNESIUM SULFATE IN WATER 40 MG/ML
2000 INJECTION, SOLUTION INTRAVENOUS PRN
Status: DISCONTINUED | OUTPATIENT
Start: 2025-06-01 | End: 2025-06-05 | Stop reason: HOSPADM

## 2025-06-01 RX ORDER — SODIUM CHLORIDE 0.9 % (FLUSH) 0.9 %
5-40 SYRINGE (ML) INJECTION PRN
Status: DISCONTINUED | OUTPATIENT
Start: 2025-06-01 | End: 2025-06-05 | Stop reason: HOSPADM

## 2025-06-01 RX ORDER — METOPROLOL TARTRATE 50 MG
50 TABLET ORAL 2 TIMES DAILY
Status: DISCONTINUED | OUTPATIENT
Start: 2025-06-01 | End: 2025-06-05 | Stop reason: HOSPADM

## 2025-06-01 RX ORDER — SUCRALFATE 1 G/1
1 TABLET ORAL
Status: DISCONTINUED | OUTPATIENT
Start: 2025-06-01 | End: 2025-06-05 | Stop reason: HOSPADM

## 2025-06-01 RX ORDER — ZOLPIDEM TARTRATE 5 MG/1
5 TABLET ORAL ONCE
Status: COMPLETED | OUTPATIENT
Start: 2025-06-01 | End: 2025-06-01

## 2025-06-01 RX ORDER — PAROXETINE 20 MG/1
20 TABLET, FILM COATED ORAL EVERY MORNING
Status: DISCONTINUED | OUTPATIENT
Start: 2025-06-01 | End: 2025-06-04

## 2025-06-01 RX ORDER — ONDANSETRON 2 MG/ML
4 INJECTION INTRAMUSCULAR; INTRAVENOUS EVERY 6 HOURS PRN
Status: DISCONTINUED | OUTPATIENT
Start: 2025-06-01 | End: 2025-06-05 | Stop reason: HOSPADM

## 2025-06-01 RX ORDER — LORAZEPAM 1 MG/1
3 TABLET ORAL
Status: DISCONTINUED | OUTPATIENT
Start: 2025-06-01 | End: 2025-06-04

## 2025-06-01 RX ORDER — PANTOPRAZOLE SODIUM 40 MG/1
40 TABLET, DELAYED RELEASE ORAL
Status: DISCONTINUED | OUTPATIENT
Start: 2025-06-01 | End: 2025-06-02

## 2025-06-01 RX ORDER — LANOLIN ALCOHOL/MO/W.PET/CERES
100 CREAM (GRAM) TOPICAL DAILY
Status: DISCONTINUED | OUTPATIENT
Start: 2025-06-01 | End: 2025-06-05 | Stop reason: HOSPADM

## 2025-06-01 RX ORDER — ACETAMINOPHEN 325 MG/1
650 TABLET ORAL EVERY 6 HOURS PRN
Status: DISCONTINUED | OUTPATIENT
Start: 2025-06-01 | End: 2025-06-05 | Stop reason: HOSPADM

## 2025-06-01 RX ORDER — LORAZEPAM 1 MG/1
1 TABLET ORAL
Status: DISCONTINUED | OUTPATIENT
Start: 2025-06-01 | End: 2025-06-04

## 2025-06-01 RX ORDER — POLYETHYLENE GLYCOL 3350 17 G/17G
17 POWDER, FOR SOLUTION ORAL DAILY PRN
Status: DISCONTINUED | OUTPATIENT
Start: 2025-06-01 | End: 2025-06-05 | Stop reason: HOSPADM

## 2025-06-01 RX ORDER — SODIUM CHLORIDE, SODIUM LACTATE, POTASSIUM CHLORIDE, CALCIUM CHLORIDE 600; 310; 30; 20 MG/100ML; MG/100ML; MG/100ML; MG/100ML
INJECTION, SOLUTION INTRAVENOUS CONTINUOUS
Status: ACTIVE | OUTPATIENT
Start: 2025-06-01 | End: 2025-06-02

## 2025-06-01 RX ORDER — SODIUM CHLORIDE 9 MG/ML
INJECTION, SOLUTION INTRAVENOUS PRN
Status: DISCONTINUED | OUTPATIENT
Start: 2025-06-01 | End: 2025-06-05 | Stop reason: HOSPADM

## 2025-06-01 RX ORDER — MULTIVITAMIN WITH IRON
1 TABLET ORAL DAILY
Status: DISCONTINUED | OUTPATIENT
Start: 2025-06-01 | End: 2025-06-05 | Stop reason: HOSPADM

## 2025-06-01 RX ORDER — LORAZEPAM 1 MG/1
2 TABLET ORAL
Status: DISCONTINUED | OUTPATIENT
Start: 2025-06-01 | End: 2025-06-04

## 2025-06-01 RX ORDER — HYDRALAZINE HYDROCHLORIDE 20 MG/ML
10 INJECTION INTRAMUSCULAR; INTRAVENOUS EVERY 4 HOURS PRN
Status: DISCONTINUED | OUTPATIENT
Start: 2025-06-01 | End: 2025-06-05 | Stop reason: HOSPADM

## 2025-06-01 RX ORDER — SODIUM CHLORIDE 0.9 % (FLUSH) 0.9 %
5-40 SYRINGE (ML) INJECTION EVERY 12 HOURS SCHEDULED
Status: DISCONTINUED | OUTPATIENT
Start: 2025-06-01 | End: 2025-06-05 | Stop reason: HOSPADM

## 2025-06-01 RX ORDER — ONDANSETRON 4 MG/1
4 TABLET, ORALLY DISINTEGRATING ORAL EVERY 8 HOURS PRN
Status: DISCONTINUED | OUTPATIENT
Start: 2025-06-01 | End: 2025-06-05 | Stop reason: HOSPADM

## 2025-06-01 RX ORDER — POTASSIUM CHLORIDE 1500 MG/1
40 TABLET, EXTENDED RELEASE ORAL PRN
Status: DISCONTINUED | OUTPATIENT
Start: 2025-06-01 | End: 2025-06-05 | Stop reason: HOSPADM

## 2025-06-01 RX ADMIN — SUCRALFATE 1 G: 1 TABLET ORAL at 20:38

## 2025-06-01 RX ADMIN — SUCRALFATE 1 G: 1 TABLET ORAL at 12:00

## 2025-06-01 RX ADMIN — SODIUM CHLORIDE, PRESERVATIVE FREE 10 ML: 5 INJECTION INTRAVENOUS at 08:21

## 2025-06-01 RX ADMIN — LORAZEPAM 2 MG: 2 INJECTION INTRAMUSCULAR; INTRAVENOUS at 20:38

## 2025-06-01 RX ADMIN — PAROXETINE 20 MG: 20 TABLET, FILM COATED ORAL at 08:16

## 2025-06-01 RX ADMIN — ZOLPIDEM TARTRATE 5 MG: 5 TABLET ORAL at 03:20

## 2025-06-01 RX ADMIN — METOPROLOL TARTRATE 50 MG: 50 TABLET, FILM COATED ORAL at 08:16

## 2025-06-01 RX ADMIN — SODIUM CHLORIDE, SODIUM LACTATE, POTASSIUM CHLORIDE, AND CALCIUM CHLORIDE: .6; .31; .03; .02 INJECTION, SOLUTION INTRAVENOUS at 04:51

## 2025-06-01 RX ADMIN — LORAZEPAM 1 MG: 1 TABLET ORAL at 16:00

## 2025-06-01 RX ADMIN — SODIUM CHLORIDE, PRESERVATIVE FREE 10 ML: 5 INJECTION INTRAVENOUS at 08:14

## 2025-06-01 RX ADMIN — AMLODIPINE BESYLATE 5 MG: 5 TABLET ORAL at 08:16

## 2025-06-01 RX ADMIN — METOPROLOL TARTRATE 50 MG: 50 TABLET, FILM COATED ORAL at 20:38

## 2025-06-01 RX ADMIN — SODIUM CHLORIDE, SODIUM LACTATE, POTASSIUM CHLORIDE, AND CALCIUM CHLORIDE: .6; .31; .03; .02 INJECTION, SOLUTION INTRAVENOUS at 18:00

## 2025-06-01 RX ADMIN — PANTOPRAZOLE SODIUM 40 MG: 40 TABLET, DELAYED RELEASE ORAL at 16:00

## 2025-06-01 RX ADMIN — ZOLPIDEM TARTRATE 5 MG: 5 TABLET ORAL at 22:03

## 2025-06-01 RX ADMIN — LORAZEPAM 1 MG: 2 INJECTION INTRAMUSCULAR; INTRAVENOUS at 00:29

## 2025-06-01 RX ADMIN — Medication 100 MG: at 08:16

## 2025-06-01 RX ADMIN — LORAZEPAM 2 MG: 1 TABLET ORAL at 04:56

## 2025-06-01 RX ADMIN — LORAZEPAM 1 MG: 1 TABLET ORAL at 12:08

## 2025-06-01 RX ADMIN — ENOXAPARIN SODIUM 40 MG: 100 INJECTION SUBCUTANEOUS at 08:14

## 2025-06-01 RX ADMIN — SODIUM CHLORIDE, PRESERVATIVE FREE 10 ML: 5 INJECTION INTRAVENOUS at 20:41

## 2025-06-01 RX ADMIN — B-COMPLEX W/ C & FOLIC ACID TAB 1 TABLET: TAB at 08:16

## 2025-06-01 RX ADMIN — SUCRALFATE 1 G: 1 TABLET ORAL at 17:50

## 2025-06-01 RX ADMIN — SUCRALFATE 1 G: 1 TABLET ORAL at 08:16

## 2025-06-01 ASSESSMENT — PAIN - FUNCTIONAL ASSESSMENT: PAIN_FUNCTIONAL_ASSESSMENT: ACTIVITIES ARE NOT PREVENTED

## 2025-06-01 ASSESSMENT — PAIN DESCRIPTION - PAIN TYPE: TYPE: ACUTE PAIN

## 2025-06-01 ASSESSMENT — PAIN DESCRIPTION - DESCRIPTORS: DESCRIPTORS: ACHING

## 2025-06-01 ASSESSMENT — PAIN DESCRIPTION - LOCATION: LOCATION: HEAD

## 2025-06-01 ASSESSMENT — PAIN SCALES - GENERAL: PAINLEVEL_OUTOF10: 8

## 2025-06-01 NOTE — ED TRIAGE NOTES
Pt presents to Ed with c/o chest and abd pain. Pt states the pain has been on and off all day. Pt thought pain was indigestion but that it has not gone away and now her BP is elevated.

## 2025-06-01 NOTE — ED PROVIDER NOTES
Emergency Department Provider Note       [facility] EMERGENCY DEPT   PCP: None, None   Age: 69 y.o.   Sex: female     DISPOSITION      ICD-10-CM    1. Alcohol withdrawal syndrome without complication (HCC)  F10.930       2. Acute chest pain  R07.9       3. Elevated liver enzymes  R74.8           Medical Decision Making     69-year-old  female with history of alcoholism presents to the emergency department complaining of falling off the wagon, drinking heavily for the last 3 weeks, and now having abdominal discomfort with nausea vomiting and diarrhea as well as some chest discomfort.  She denies any fever or chills, did have some alcohol prior to coming in so she would be able to ambulate, as when she goes into withdrawal a lot of times she has a lot of spasticity and difficulty with ambulation.  Patient states on average she drinks about 1100 mL of wine on a daily basis, and has been drinking heavier than that for the last 3 days.  Patient denies any melena or hematochezia, most of her abdominal pain is lower and described as cramping in nature.  She also describes some substernal discomfort with the vomiting.  On exam, the patient is tachycardic, hypertensive, mildly tremulous and complaining of continued vomiting and diarrhea.  Abdomen is essentially benign.  Patient was given IV fluids, Zofran for her nausea, EKG revealed possible old anterior septal infarct, but no acute process.  Troponins were 16.3 presentation and 18 at 90 minutes, making chest pain primary cardiac standpoint less likely.  Lactic acid was normal at 1.3, CBC normal, and CMP was remarkable for a CO2 of 18 with an anion gap of 20 and moderately elevated liver enzymes within ALT of 155, AST of 251 and alkaline phosphatase of 123 (slightly worse than baseline).  On recheck after Zofran and IV fluids Pepcid, the patient was complaining of increasing nausea, blood pressure was slightly worse, as was her tremor.  CIWA equal to 14 and patient was

## 2025-06-01 NOTE — ASSESSMENT & PLAN NOTE
- Significant h/o alcohol abuse  - Recently been drinking even more due to her \"insomnia\"  - [facility] protocol ordered

## 2025-06-01 NOTE — CARE COORDINATION
RNCM met with patient in room 356 to discuss discharge planning.   Patient is a 69 year old female admitted with alcohol withdrawal syndrome, per MD notes.    Patient sleeping. Unresponsive to verbal stimuli. Audible snoring observed. Per RN, patient alert intermittently and answering questions appropriately.  Per chart review, patient typically lives in entry level apartment with spouse. At baseline, patient is independent with ADLs. Per RNViviana, patient requests rehabilitation services.     Patient has [facility] Medicare.  Primary care referral was made to [facility] for hospital follow up during last admission.     Patient discharge needs are pending clinical progression. [facility] consult placed. Case management will continue to follow.  Please notify if there are any changes.     1433:Patient noted to be sleeping and unresponsive to verbal stimuli.          06/01/25 1000   Service Assessment   Patient Orientation Unresponsive  (patient sleeping, unresponsive to verbal stimuli, audibly snoring)   Cognition Other (see comment)  (TBD)   History Provided By Medical Record;Unable to Assess   Primary Caregiver Self   Accompanied By/Relationship n/a   Support Systems Spouse/Significant Other   Patient's Healthcare Decision Maker is: Legal Next of Kin  (Terence Lujan, spouse [phone])   PCP Verified by CM No   Prior Functional Level Independent in ADLs/IADLs   Current Functional Level Other (see comment)  (tbd)   Can patient return to prior living arrangement Unknown at present   Ability to make needs known: Other (see comment)  (tbd)   Would you like for me to discuss the discharge plan with any other family members/significant others, and if so, who? No   Financial Resources Medicare   Community Resources None   CM/SW Referral Functions dependency needs   Social/Functional History   Lives With Spouse   Type of Home Apartment   Home Layout One level   Home Access Level entry   Bathroom Shower/Tub Tub/Shower

## 2025-06-01 NOTE — ED NOTES
TRANSFER - OUT REPORT:    Verbal report given to RN on April Lujan  being transferred to 356 for routine progression of patient care       Report consisted of patient's Situation, Background, Assessment and   Recommendations(SBAR).     Information from the following report(s) ED SBAR was reviewed with the receiving nurse.    Lines:   Peripheral IV 05/31/25 Distal;Right;Dorsal Forearm (Active)        Opportunity for questions and clarification was provided.      Patient transported with:  Registered Nurse

## 2025-06-01 NOTE — PLAN OF CARE
Problem: Pain  Goal: Verbalizes/displays adequate comfort level or baseline comfort level  6/1/2025 0418 by Sabino Mon, RN  Outcome: Progressing     Denies abdominal pain. Eating.  Problem: Safety - Adult  Goal: Free from fall injury  6/1/2025 0418 by Sabino Mon, RN  Outcome: Progressing     Problem: Discharge Planning  Goal: Discharge to home or other facility with appropriate resources  6/1/2025 0418 by Sabino Mon, RN  Outcome: Progressing     Pt wishes to speak to social work re: rehab for alcohol abuse. Is willing to discuss psychiatry referral also

## 2025-06-01 NOTE — H&P
Hospitalist History and Physical   Admit Date:  2025  8:23 PM   Name:  April Lujan   Age:  69 y.o.  Sex:  female  :  1955   MRN:  [id number]     Presenting Complaint: chest and abdominal pain  Reason(s) for Admission: Acute chest pain [R07.9]  Elevated liver enzymes [R74.8]  Alcohol withdrawal syndrome without complication (HCC) [F10.930]     History of Present Illness:   April Lujan is a 69 y.o. female with medical history of alcoholism who presented to ED with chest and abdominal discomfort which started yesterday.  She reports that she thought it was just indigestion, but it has persisted, so she came in for further evaluation.  She does admit to very heavy alcohol consumption over the past month.  Upon ER evaluation, patient is mildly tachycardic with HR in the 110s.  BP is also elevated.  Troponins stable at 16.3, 18.0.  LFTs elevated with , , .  Lipase is normal at 59.  EtOH level is 29.  WBC is 8.  VBG is normal. Lactic acid is normal at 1.3.  Anion gap widened to 20.  Hospitalist asked to admit.    Review of Systems:  10 systems reviewed and negative except as noted in HPI.  Assessment & Plan:   * Alcohol withdrawal syndrome without complication (HCC)  Assessment & Plan  - Was told by ER that patient had evidence of acute alcohol withdrawal, upon my bedside exam, she is not exhibiting any signs of active withdrawal.   However, now that she is admitted, she will likely go through withdrawal  - [facility] protocol ordered    Alcohol abuse  Assessment & Plan  - Significant h/o alcohol abuse  - Recently been drinking even more due to her \"insomnia\"  - [facility] protocol ordered    Primary hypertension  Assessment & Plan  - BP stable  - Home meds        Disposition: inpatient      Past medical history reviewed.    Past Medical History:   Diagnosis Date    Acute encephalopathy     Alcohol abuse, continuous 2019    Alcohol dependence with withdrawal (HCC) 2019    Alcohol

## 2025-06-01 NOTE — ASSESSMENT & PLAN NOTE
- Was told by ER that patient had evidence of acute alcohol withdrawal, upon my bedside exam, she is not exhibiting any signs of active withdrawal.   However, now that she is admitted, she will likely go through withdrawal  - [facility] protocol ordered

## 2025-06-02 LAB
ALBUMIN SERPL-MCNC: 3.4 G/DL (ref 3.2–4.6)
ALBUMIN/GLOB SERPL: 0.9 (ref 1–1.9)
ALP SERPL-CCNC: 94 U/L (ref 35–104)
ALT SERPL-CCNC: 110 U/L (ref 8–45)
ANION GAP SERPL CALC-SCNC: 12 MMOL/L (ref 7–16)
AST SERPL-CCNC: 150 U/L (ref 15–37)
BASOPHILS # BLD: 0.05 K/UL (ref 0–0.2)
BASOPHILS NFR BLD: 0.8 % (ref 0–2)
BILIRUB SERPL-MCNC: 0.6 MG/DL (ref 0–1.2)
BUN SERPL-MCNC: 10 MG/DL (ref 8–23)
CALCIUM SERPL-MCNC: 9.7 MG/DL (ref 8.8–10.2)
CHLORIDE SERPL-SCNC: 102 MMOL/L (ref 98–107)
CO2 SERPL-SCNC: 23 MMOL/L (ref 20–29)
CREAT SERPL-MCNC: 0.78 MG/DL (ref 0.6–1.1)
DIFFERENTIAL METHOD BLD: ABNORMAL
EKG ATRIAL RATE: 109 BPM
EKG DIAGNOSIS: NORMAL
EKG P AXIS: 70 DEGREES
EKG P-R INTERVAL: 166 MS
EKG Q-T INTERVAL: 328 MS
EKG QRS DURATION: 70 MS
EKG QTC CALCULATION (BAZETT): 441 MS
EKG R AXIS: 72 DEGREES
EKG T AXIS: 54 DEGREES
EKG VENTRICULAR RATE: 109 BPM
EOSINOPHIL # BLD: 0.14 K/UL (ref 0–0.8)
EOSINOPHIL NFR BLD: 2.2 % (ref 0.5–7.8)
ERYTHROCYTE [DISTWIDTH] IN BLOOD BY AUTOMATED COUNT: 12.3 % (ref 11.9–14.6)
GLOBULIN SER CALC-MCNC: 3.8 G/DL (ref 2.3–3.5)
GLUCOSE SERPL-MCNC: 118 MG/DL (ref 70–99)
HCT VFR BLD AUTO: 38.7 % (ref 35.8–46.3)
HGB BLD-MCNC: 13.3 G/DL (ref 11.7–15.4)
IMM GRANULOCYTES # BLD AUTO: 0.01 K/UL (ref 0–0.5)
IMM GRANULOCYTES NFR BLD AUTO: 0.2 % (ref 0–5)
LYMPHOCYTES # BLD: 1.8 K/UL (ref 0.5–4.6)
LYMPHOCYTES NFR BLD: 28.4 % (ref 13–44)
MCH RBC QN AUTO: 34.3 PG (ref 26.1–32.9)
MCHC RBC AUTO-ENTMCNC: 34.4 G/DL (ref 31.4–35)
MCV RBC AUTO: 99.7 FL (ref 82–102)
MONOCYTES # BLD: 0.54 K/UL (ref 0.1–1.3)
MONOCYTES NFR BLD: 8.5 % (ref 4–12)
NEUTS SEG # BLD: 3.79 K/UL (ref 1.7–8.2)
NEUTS SEG NFR BLD: 59.9 % (ref 43–78)
NRBC # BLD: 0 K/UL (ref 0–0.2)
PLATELET # BLD AUTO: 235 K/UL (ref 150–450)
PMV BLD AUTO: 9.9 FL (ref 9.4–12.3)
POTASSIUM SERPL-SCNC: 3.9 MMOL/L (ref 3.5–5.1)
PROT SERPL-MCNC: 7.2 G/DL (ref 6.3–8.2)
RBC # BLD AUTO: 3.88 M/UL (ref 4.05–5.2)
SODIUM SERPL-SCNC: 137 MMOL/L (ref 136–145)
WBC # BLD AUTO: 6.3 K/UL (ref 4.3–11.1)

## 2025-06-02 PROCEDURE — 97161 PT EVAL LOW COMPLEX 20 MIN: CPT

## 2025-06-02 PROCEDURE — 6360000002 HC RX W HCPCS: Performed by: FAMILY MEDICINE

## 2025-06-02 PROCEDURE — 1100000000 HC RM PRIVATE

## 2025-06-02 PROCEDURE — 6360000002 HC RX W HCPCS: Performed by: INTERNAL MEDICINE

## 2025-06-02 PROCEDURE — 36415 COLL VENOUS BLD VENIPUNCTURE: CPT

## 2025-06-02 PROCEDURE — 80053 COMPREHEN METABOLIC PANEL: CPT

## 2025-06-02 PROCEDURE — 97165 OT EVAL LOW COMPLEX 30 MIN: CPT

## 2025-06-02 PROCEDURE — 97535 SELF CARE MNGMENT TRAINING: CPT

## 2025-06-02 PROCEDURE — 97530 THERAPEUTIC ACTIVITIES: CPT

## 2025-06-02 PROCEDURE — 85025 COMPLETE CBC W/AUTO DIFF WBC: CPT

## 2025-06-02 PROCEDURE — 93010 ELECTROCARDIOGRAM REPORT: CPT | Performed by: INTERNAL MEDICINE

## 2025-06-02 PROCEDURE — 2500000003 HC RX 250 WO HCPCS: Performed by: FAMILY MEDICINE

## 2025-06-02 PROCEDURE — 2580000003 HC RX 258: Performed by: INTERNAL MEDICINE

## 2025-06-02 PROCEDURE — 6370000000 HC RX 637 (ALT 250 FOR IP): Performed by: FAMILY MEDICINE

## 2025-06-02 PROCEDURE — 2580000003 HC RX 258: Performed by: FAMILY MEDICINE

## 2025-06-02 RX ADMIN — PANTOPRAZOLE SODIUM 40 MG: 40 TABLET, DELAYED RELEASE ORAL at 06:36

## 2025-06-02 RX ADMIN — PANTOPRAZOLE SODIUM 40 MG: 40 INJECTION, POWDER, FOR SOLUTION INTRAVENOUS at 16:16

## 2025-06-02 RX ADMIN — SUCRALFATE 1 G: 1 TABLET ORAL at 16:28

## 2025-06-02 RX ADMIN — SUCRALFATE 1 G: 1 TABLET ORAL at 11:42

## 2025-06-02 RX ADMIN — METOPROLOL TARTRATE 50 MG: 50 TABLET, FILM COATED ORAL at 08:53

## 2025-06-02 RX ADMIN — Medication 100 MG: at 08:54

## 2025-06-02 RX ADMIN — METOPROLOL TARTRATE 50 MG: 50 TABLET, FILM COATED ORAL at 22:06

## 2025-06-02 RX ADMIN — SUCRALFATE 1 G: 1 TABLET ORAL at 22:06

## 2025-06-02 RX ADMIN — SUCRALFATE 1 G: 1 TABLET ORAL at 06:36

## 2025-06-02 RX ADMIN — SODIUM CHLORIDE, PRESERVATIVE FREE 10 ML: 5 INJECTION INTRAVENOUS at 22:07

## 2025-06-02 RX ADMIN — LORAZEPAM 2 MG: 2 INJECTION INTRAMUSCULAR; INTRAVENOUS at 16:15

## 2025-06-02 RX ADMIN — ENOXAPARIN SODIUM 40 MG: 100 INJECTION SUBCUTANEOUS at 08:53

## 2025-06-02 RX ADMIN — AMLODIPINE BESYLATE 5 MG: 5 TABLET ORAL at 08:54

## 2025-06-02 RX ADMIN — LORAZEPAM 1 MG: 1 TABLET ORAL at 03:41

## 2025-06-02 RX ADMIN — B-COMPLEX W/ C & FOLIC ACID TAB 1 TABLET: TAB at 08:54

## 2025-06-02 RX ADMIN — SODIUM CHLORIDE, PRESERVATIVE FREE 10 ML: 5 INJECTION INTRAVENOUS at 08:54

## 2025-06-02 RX ADMIN — PAROXETINE 20 MG: 20 TABLET, FILM COATED ORAL at 08:53

## 2025-06-02 ASSESSMENT — PAIN SCALES - GENERAL: PAINLEVEL_OUTOF10: 8

## 2025-06-02 NOTE — PLAN OF CARE
Problem: Discharge Planning  Goal: Discharge to home or other facility with appropriate resources  Outcome: Progressing  Flowsheets (Taken 6/2/2025 0248)  Discharge to home or other facility with appropriate resources:   Identify barriers to discharge with patient and caregiver   Identify discharge learning needs (meds, wound care, etc)   Arrange for interpreters to assist at discharge as needed     Problem: Pain  Goal: Verbalizes/displays adequate comfort level or baseline comfort level  Outcome: Progressing  Flowsheets (Taken 6/2/2025 0248)  Verbalizes/displays adequate comfort level or baseline comfort level:   Assess pain using appropriate pain scale   Implement non-pharmacological measures as appropriate and evaluate response   Consider cultural and social influences on pain and pain management     Problem: Safety - Adult  Goal: Free from fall injury  Outcome: Progressing  Flowsheets (Taken 6/2/2025 0248)  Free From Fall Injury: Instruct family/caregiver on patient safety      [facility]  Office: [address], DO, Mary Pleitez, DO, Maggy Avila, DO, Char Conley Blood, DO, Renay Mejia MD, Monique Mcconnell MD, Toni oWoten MD, Mendez Dawkins MD,  Chirag Plaza MD, Danny Lee MD, Carlos East, DO, Rizwan De Luna MD,  Karlos Cantrell MD, Emmie Ortega MD, Inocente Soulier, DO, Yuri Eden MD, Marilyn Damico MD, Nathalia Rodriguez DO, Nadeem Peters MD, Jethro South MD, Henrique Reed MD, Karrie Pruitt MD, Forrest Sierra, DO, Vandana Damon MD, Jazmin Pederson MD, Rufus Christian, CNP,  Deniz Scott, CNP, Hector Saeed, CNP, Moiz Garcia, CNP,  Adenike Marvin, [facility], Veronica Camargo, CNP, Valdo Felton, CNP, Jake Diaz, CNP, Mary Beth Colon, CNP, Zachary Torres, CNP, Jordy Messina PA-C, Risa Silveira, CNS, Dylan Yoo, DNP, Ifeanyi Pérez, CNP, Rekha Ramirez, [facility], [facility]      Daily Progress Note     Admit Date: 11/30/2022  Bed/Room No.  2042/2042-01  Admitting Physician : Toni Wooten MD  Code Status :[place] Day:  LOS: 1 day   Chief Complaint:     No chief complaint on file. Principal Problem:    NSTEMI (non-ST elevated myocardial infarction) [facility])  Active Problems:    Depression    Sleep apnea    Arthritis    Prediabetes  Resolved Problems:    * No resolved hospital problems. *    Subjective : Interval History/Significant events :  12/01/22    Patient is doing well . He remains chest pain free. He wants to go home . Cardiac cath was normal  , he is breathing on room air . Vitals - Stable afebrile  Labs - normal kidney function . Nursing notes , Consults notes reviewed. Overnight events and updates discussed with Nursing staff . Background History:         Maria Teresa Short is 46 y.o. male  Who was admitted to the hospital on 11/30/2022 for treatment of NSTEMI (non-ST elevated myocardial infarction) ([facility] [address]    Patient was transferred from [facility] emergency room where he presented with intermittent chest pain for 4 days. Patient reported decrease exercise tolerance for last few months. Patient is former smoker and alcohol user and quit about 40 years before. He also had sleep apnea which is better after lifestyle changes. He denies any history of hypertension, hyperlipidemia. He has underlying history of prediabetes. Evaluation emergency room showed elevated troponin. Patient had stress test 1 year before that was negative and echo showing preserved ejection fraction. Patient denies any family history of heart disease. PMH:  Past Medical History:   Diagnosis Date    Arthritis     Depression     Reflux     Sleep apnea       Allergies: No Known Allergies   Medications :  pantoprazole, 40 mg, Oral, QAM AC  Vitamin D, 1,000 Units, Oral, Daily  sodium chloride flush, 5-40 mL, IntraVENous, 2 times per day  atorvastatin, 80 mg, Oral, Nightly  aspirin, 81 mg, Oral, Daily  insulin lispro, 0-4 Units, SubCUTAneous, TID WC  insulin lispro, 0-4 Units, SubCUTAneous, Nightly  dilTIAZem, 120 mg, Oral, Daily        Review of Systems   Review of Systems   Constitutional:  Negative for activity change, appetite change, fatigue, fever and unexpected weight change. HENT:  Negative for congestion, nosebleeds, rhinorrhea, sinus pressure, sneezing and voice change. Respiratory:  Negative for cough, choking, chest tightness, shortness of breath and wheezing. Cardiovascular:  Negative for chest pain, palpitations and leg swelling. Gastrointestinal:  Negative for abdominal pain, constipation, diarrhea, nausea and vomiting. Genitourinary:  Negative for difficulty urinating, dysuria, frequency, penile discharge and testicular pain. Musculoskeletal:  Negative for back pain. Skin:  Negative for rash. Neurological:  Negative for dizziness, weakness, light-headedness and headaches. Hematological:  Does not bruise/bleed easily.    Psychiatric/Behavioral: Negative for agitation, behavioral problems, confusion, self-injury, sleep disturbance and suicidal ideas. Objective :      Current Vitals : Temp: 98.3 °F (36.8 °C),  Heart Rate: 52, Resp: 16, BP: 106/71, SpO2: 97 %  Last 24 Hrs Vitals   Patient Vitals for the past 24 hrs:   BP Temp Temp src Pulse Resp SpO2 Weight   12/01/22 0700 106/71 98.3 °F (36.8 °C) Temporal 52 16 97 % --   12/01/22 0400 89/69 98.2 °F (36.8 °C) Oral 62 -- 96 % 180 lb 1.6 oz (81.7 kg)   12/01/22 0100 (!) 88/52 -- -- 54 -- -- --   12/01/22 0000 101/67 97.8 °F (36.6 °C) Oral 55 -- 96 % --   11/30/22 2000 103/80 97.7 °F (36.5 °C) Oral 64 -- 98 % --   11/30/22 1830 101/73 -- -- 65 -- 96 % --   11/30/22 1815 103/63 -- -- 64 -- 96 % --   11/30/22 1800 105/77 -- -- 64 -- 96 % --   11/30/22 1745 105/71 -- -- 59 -- 95 % --   11/30/22 1730 104/73 -- -- 54 -- 97 % --   11/30/22 1715 131/70 -- -- 72 -- 96 % --   11/30/22 1700 109/78 -- -- 61 -- 97 % --   11/30/22 1645 102/80 -- -- 57 -- 96 % --   11/30/22 1630 110/75 -- -- 57 -- 96 % --   11/30/22 1615 118/81 97.8 °F (36.6 °C) Temporal 64 18 95 % --   11/30/22 1400 101/70 -- -- 57 -- 96 % --   11/30/22 1300 -- -- -- -- -- 95 % --   11/30/22 1200 101/88 -- -- 53 16 93 % --   11/30/22 1100 111/82 -- -- 57 -- 98 % --   11/30/22 1034 101/76 -- -- 66 -- -- --   11/30/22 1000 101/76 -- -- 75 -- 96 % --   11/30/22 0900 -- -- -- -- -- 97 % --     Intake / output   11/29 1901 - 12/01 0700  In: 668 [P.O.:390; I.V.:278]  Out: 1200 [Urine:1200]  Physical Exam:  Physical Exam  Vitals and nursing note reviewed. Constitutional:       General: He is not in acute distress. Appearance: He is not diaphoretic. HENT:      Head: Normocephalic and atraumatic. Nose:      Right Sinus: No maxillary sinus tenderness or frontal sinus tenderness. Left Sinus: No maxillary sinus tenderness or frontal sinus tenderness. Mouth/Throat:      Pharynx: No oropharyngeal exudate.    Eyes:      General: No scleral icterus. Conjunctiva/sclera: Conjunctivae normal.      Pupils: Pupils are equal, round, and reactive to light. Neck:      Thyroid: No thyromegaly. Vascular: No JVD. Cardiovascular:      Rate and Rhythm: Normal rate and regular rhythm. Pulses:           Dorsalis pedis pulses are 2+ on the right side and 2+ on the left side. Heart sounds: Normal heart sounds. No murmur heard. Pulmonary:      Effort: Pulmonary effort is normal.      Breath sounds: Normal breath sounds. No wheezing or rales. Abdominal:      Palpations: Abdomen is soft. There is no mass. Tenderness: There is no abdominal tenderness. Musculoskeletal:      Cervical back: Full passive range of motion without pain and neck supple. Lymphadenopathy:      Head:      Right side of head: No submandibular adenopathy. Left side of head: No submandibular adenopathy. Cervical: No cervical adenopathy. Skin:     General: Skin is warm. Neurological:      Mental Status: He is alert and oriented to person, place, and time. Motor: No tremor. Psychiatric:         Behavior: Behavior is cooperative. Laboratory findings:    Recent Labs     11/29/22  1340 12/01/22  0514   WBC 6.1 6.4   HGB 12.6* 11.5*   HCT 38.2* 36.7*    290     Recent Labs     11/29/22  1340 12/01/22  0514    136   K 4.1 4.3    104   CO2 24 22   GLUCOSE 105* 95   BUN 16 18   CREATININE 1.03 1.08   MG  --  2.0   CALCIUM 9.0 8.8     Recent Labs     11/30/22  0405 12/01/22  0514   PROT  --  6.2*   LABALBU  --  3.5   AST  --  20   ALT  --  26   ALKPHOS  --  57   BILITOT  --  0.4   CHOL 188  --    HDL 63  --    LDLCHOLESTEROL 116  --    CHOLHDLRATIO 3.0  --    TRIG 43  --       No results found for: LABA1C      No results found for: SPECGRAV, PROTEINU, RBCUA, BLOODU, BACTERIA, NITRU, WBCUA, LEUKOCYTESUR    Imaging / Clinical Data :-   XR CHEST PORTABLE    Result Date: 11/29/2022  No significant abnormalities detected.      CTA CHEST ABDOMEN PELVIS W CONTRAST    Result Date: 11/29/2022  1. No evidence for aortic aneurysm or dissection. 2. No acute infective inflammatory process. Clinical Course : stable  Assessment and Plan  :        NSTEMI - treated with Heparin infusion, aspirin, Lipitor, Lopressor. Cardiology consulted and had normal cardiac cath. Prediabetes  - on metformin . Hold medication for 48 Hrs   H/o sleep apnea not using CPAP after lifestyle changes. Discharge home   Plan and updates discussed with patient ,  answers  explained to satisfaction.    Plan discussed with Staff [facility]     (Please note that portions of this note were completed with a voice recognition program. Efforts were made to edit the dictations but occasionally words are mis-transcribed.)      John De Leon MD  12/1/2022

## 2025-06-03 LAB
ALBUMIN SERPL-MCNC: 3.3 G/DL (ref 3.2–4.6)
ALBUMIN/GLOB SERPL: 0.8 (ref 1–1.9)
ALP SERPL-CCNC: 93 U/L (ref 35–104)
ALT SERPL-CCNC: 141 U/L (ref 8–45)
ANION GAP SERPL CALC-SCNC: 14 MMOL/L (ref 7–16)
AST SERPL-CCNC: 213 U/L (ref 15–37)
BASOPHILS # BLD: 0.05 K/UL (ref 0–0.2)
BASOPHILS NFR BLD: 1 % (ref 0–2)
BILIRUB SERPL-MCNC: 0.7 MG/DL (ref 0–1.2)
BUN SERPL-MCNC: 7 MG/DL (ref 8–23)
CALCIUM SERPL-MCNC: 9.6 MG/DL (ref 8.8–10.2)
CHLORIDE SERPL-SCNC: 103 MMOL/L (ref 98–107)
CO2 SERPL-SCNC: 21 MMOL/L (ref 20–29)
CREAT SERPL-MCNC: 0.66 MG/DL (ref 0.6–1.1)
DIFFERENTIAL METHOD BLD: ABNORMAL
EOSINOPHIL # BLD: 0.18 K/UL (ref 0–0.8)
EOSINOPHIL NFR BLD: 3.4 % (ref 0.5–7.8)
ERYTHROCYTE [DISTWIDTH] IN BLOOD BY AUTOMATED COUNT: 12.3 % (ref 11.9–14.6)
GLOBULIN SER CALC-MCNC: 3.9 G/DL (ref 2.3–3.5)
GLUCOSE SERPL-MCNC: 113 MG/DL (ref 70–99)
HCT VFR BLD AUTO: 39.6 % (ref 35.8–46.3)
HGB BLD-MCNC: 13.5 G/DL (ref 11.7–15.4)
IMM GRANULOCYTES # BLD AUTO: 0.01 K/UL (ref 0–0.5)
IMM GRANULOCYTES NFR BLD AUTO: 0.2 % (ref 0–5)
LYMPHOCYTES # BLD: 1.67 K/UL (ref 0.5–4.6)
LYMPHOCYTES NFR BLD: 32 % (ref 13–44)
MCH RBC QN AUTO: 34.7 PG (ref 26.1–32.9)
MCHC RBC AUTO-ENTMCNC: 34.1 G/DL (ref 31.4–35)
MCV RBC AUTO: 101.8 FL (ref 82–102)
MONOCYTES # BLD: 0.49 K/UL (ref 0.1–1.3)
MONOCYTES NFR BLD: 9.4 % (ref 4–12)
NEUTS SEG # BLD: 2.82 K/UL (ref 1.7–8.2)
NEUTS SEG NFR BLD: 54 % (ref 43–78)
NRBC # BLD: 0 K/UL (ref 0–0.2)
PLATELET # BLD AUTO: 143 K/UL (ref 150–450)
PMV BLD AUTO: 10.8 FL (ref 9.4–12.3)
POTASSIUM SERPL-SCNC: 4 MMOL/L (ref 3.5–5.1)
PROT SERPL-MCNC: 7.2 G/DL (ref 6.3–8.2)
RBC # BLD AUTO: 3.89 M/UL (ref 4.05–5.2)
SODIUM SERPL-SCNC: 138 MMOL/L (ref 136–145)
WBC # BLD AUTO: 5.2 K/UL (ref 4.3–11.1)

## 2025-06-03 PROCEDURE — 6370000000 HC RX 637 (ALT 250 FOR IP)

## 2025-06-03 PROCEDURE — 2580000003 HC RX 258: Performed by: FAMILY MEDICINE

## 2025-06-03 PROCEDURE — 80053 COMPREHEN METABOLIC PANEL: CPT

## 2025-06-03 PROCEDURE — 36415 COLL VENOUS BLD VENIPUNCTURE: CPT

## 2025-06-03 PROCEDURE — 2500000003 HC RX 250 WO HCPCS: Performed by: FAMILY MEDICINE

## 2025-06-03 PROCEDURE — 1100000000 HC RM PRIVATE

## 2025-06-03 PROCEDURE — 85025 COMPLETE CBC W/AUTO DIFF WBC: CPT

## 2025-06-03 PROCEDURE — 6360000002 HC RX W HCPCS: Performed by: INTERNAL MEDICINE

## 2025-06-03 PROCEDURE — 6360000002 HC RX W HCPCS: Performed by: FAMILY MEDICINE

## 2025-06-03 PROCEDURE — 97530 THERAPEUTIC ACTIVITIES: CPT

## 2025-06-03 PROCEDURE — 6370000000 HC RX 637 (ALT 250 FOR IP): Performed by: FAMILY MEDICINE

## 2025-06-03 PROCEDURE — 2580000003 HC RX 258: Performed by: INTERNAL MEDICINE

## 2025-06-03 RX ORDER — CHLORDIAZEPOXIDE HYDROCHLORIDE 5 MG/1
5 CAPSULE, GELATIN COATED ORAL 3 TIMES DAILY
Status: DISCONTINUED | OUTPATIENT
Start: 2025-06-03 | End: 2025-06-04

## 2025-06-03 RX ORDER — PANTOPRAZOLE SODIUM 40 MG/1
40 TABLET, DELAYED RELEASE ORAL
Status: DISCONTINUED | OUTPATIENT
Start: 2025-06-03 | End: 2025-06-05 | Stop reason: HOSPADM

## 2025-06-03 RX ADMIN — METOPROLOL TARTRATE 50 MG: 50 TABLET, FILM COATED ORAL at 21:20

## 2025-06-03 RX ADMIN — SUCRALFATE 1 G: 1 TABLET ORAL at 17:13

## 2025-06-03 RX ADMIN — METOPROLOL TARTRATE 50 MG: 50 TABLET, FILM COATED ORAL at 09:48

## 2025-06-03 RX ADMIN — B-COMPLEX W/ C & FOLIC ACID TAB 1 TABLET: TAB at 09:48

## 2025-06-03 RX ADMIN — Medication 100 MG: at 09:48

## 2025-06-03 RX ADMIN — LORAZEPAM 2 MG: 2 INJECTION INTRAMUSCULAR; INTRAVENOUS at 05:59

## 2025-06-03 RX ADMIN — PANTOPRAZOLE SODIUM 40 MG: 40 INJECTION, POWDER, FOR SOLUTION INTRAVENOUS at 05:57

## 2025-06-03 RX ADMIN — PAROXETINE 20 MG: 20 TABLET, FILM COATED ORAL at 09:48

## 2025-06-03 RX ADMIN — LORAZEPAM 2 MG: 2 INJECTION INTRAMUSCULAR; INTRAVENOUS at 09:56

## 2025-06-03 RX ADMIN — CHLORDIAZEPOXIDE HYDROCHLORIDE 5 MG: 5 CAPSULE ORAL at 12:08

## 2025-06-03 RX ADMIN — SUCRALFATE 1 G: 1 TABLET ORAL at 21:20

## 2025-06-03 RX ADMIN — SUCRALFATE 1 G: 1 TABLET ORAL at 12:08

## 2025-06-03 RX ADMIN — SODIUM CHLORIDE, PRESERVATIVE FREE 10 ML: 5 INJECTION INTRAVENOUS at 10:01

## 2025-06-03 RX ADMIN — PANTOPRAZOLE SODIUM 40 MG: 40 INJECTION, POWDER, FOR SOLUTION INTRAVENOUS at 17:14

## 2025-06-03 RX ADMIN — CHLORDIAZEPOXIDE HYDROCHLORIDE 5 MG: 5 CAPSULE ORAL at 21:20

## 2025-06-03 RX ADMIN — SODIUM CHLORIDE, PRESERVATIVE FREE 10 ML: 5 INJECTION INTRAVENOUS at 21:20

## 2025-06-03 RX ADMIN — SUCRALFATE 1 G: 1 TABLET ORAL at 09:48

## 2025-06-03 RX ADMIN — LORAZEPAM 2 MG: 2 INJECTION INTRAMUSCULAR; INTRAVENOUS at 17:25

## 2025-06-03 RX ADMIN — AMLODIPINE BESYLATE 5 MG: 5 TABLET ORAL at 09:48

## 2025-06-04 LAB
ANION GAP SERPL CALC-SCNC: 11 MMOL/L (ref 7–16)
BASOPHILS # BLD: 0.03 K/UL (ref 0–0.2)
BASOPHILS NFR BLD: 0.5 % (ref 0–2)
BUN SERPL-MCNC: 9 MG/DL (ref 8–23)
CALCIUM SERPL-MCNC: 9.6 MG/DL (ref 8.8–10.2)
CHLORIDE SERPL-SCNC: 105 MMOL/L (ref 98–107)
CO2 SERPL-SCNC: 24 MMOL/L (ref 20–29)
CREAT SERPL-MCNC: 0.73 MG/DL (ref 0.6–1.1)
DIFFERENTIAL METHOD BLD: ABNORMAL
EOSINOPHIL # BLD: 0.18 K/UL (ref 0–0.8)
EOSINOPHIL NFR BLD: 3.2 % (ref 0.5–7.8)
ERYTHROCYTE [DISTWIDTH] IN BLOOD BY AUTOMATED COUNT: 12.1 % (ref 11.9–14.6)
GLUCOSE SERPL-MCNC: 116 MG/DL (ref 70–99)
HBV SURFACE AG SER QL: NONREACTIVE
HCT VFR BLD AUTO: 40 % (ref 35.8–46.3)
HGB BLD-MCNC: 13.5 G/DL (ref 11.7–15.4)
IMM GRANULOCYTES # BLD AUTO: 0.01 K/UL (ref 0–0.5)
IMM GRANULOCYTES NFR BLD AUTO: 0.2 % (ref 0–5)
LYMPHOCYTES # BLD: 1.94 K/UL (ref 0.5–4.6)
LYMPHOCYTES NFR BLD: 34.6 % (ref 13–44)
MCH RBC QN AUTO: 33.7 PG (ref 26.1–32.9)
MCHC RBC AUTO-ENTMCNC: 33.8 G/DL (ref 31.4–35)
MCV RBC AUTO: 99.8 FL (ref 82–102)
MONOCYTES # BLD: 0.63 K/UL (ref 0.1–1.3)
MONOCYTES NFR BLD: 11.2 % (ref 4–12)
NEUTS SEG # BLD: 2.82 K/UL (ref 1.7–8.2)
NEUTS SEG NFR BLD: 50.3 % (ref 43–78)
NRBC # BLD: 0 K/UL (ref 0–0.2)
PLATELET # BLD AUTO: 261 K/UL (ref 150–450)
PMV BLD AUTO: 10.4 FL (ref 9.4–12.3)
POTASSIUM SERPL-SCNC: 3.8 MMOL/L (ref 3.5–5.1)
RBC # BLD AUTO: 4.01 M/UL (ref 4.05–5.2)
SODIUM SERPL-SCNC: 140 MMOL/L (ref 136–145)
WBC # BLD AUTO: 5.6 K/UL (ref 4.3–11.1)

## 2025-06-04 PROCEDURE — 86803 HEPATITIS C AB TEST: CPT

## 2025-06-04 PROCEDURE — 85025 COMPLETE CBC W/AUTO DIFF WBC: CPT

## 2025-06-04 PROCEDURE — 1100000000 HC RM PRIVATE

## 2025-06-04 PROCEDURE — 2500000003 HC RX 250 WO HCPCS: Performed by: FAMILY MEDICINE

## 2025-06-04 PROCEDURE — 36415 COLL VENOUS BLD VENIPUNCTURE: CPT

## 2025-06-04 PROCEDURE — 6370000000 HC RX 637 (ALT 250 FOR IP): Performed by: FAMILY MEDICINE

## 2025-06-04 PROCEDURE — 87340 HEPATITIS B SURFACE AG IA: CPT

## 2025-06-04 PROCEDURE — 6370000000 HC RX 637 (ALT 250 FOR IP)

## 2025-06-04 PROCEDURE — 80048 BASIC METABOLIC PNL TOTAL CA: CPT

## 2025-06-04 RX ORDER — LORAZEPAM 1 MG/1
1 TABLET ORAL EVERY 4 HOURS PRN
Status: DISCONTINUED | OUTPATIENT
Start: 2025-06-04 | End: 2025-06-05 | Stop reason: HOSPADM

## 2025-06-04 RX ORDER — PAROXETINE 20 MG/1
30 TABLET, FILM COATED ORAL EVERY MORNING
Status: DISCONTINUED | OUTPATIENT
Start: 2025-06-05 | End: 2025-06-05 | Stop reason: HOSPADM

## 2025-06-04 RX ADMIN — PAROXETINE 20 MG: 20 TABLET, FILM COATED ORAL at 08:45

## 2025-06-04 RX ADMIN — SODIUM CHLORIDE, PRESERVATIVE FREE 10 ML: 5 INJECTION INTRAVENOUS at 08:46

## 2025-06-04 RX ADMIN — PANTOPRAZOLE SODIUM 40 MG: 40 TABLET, DELAYED RELEASE ORAL at 15:11

## 2025-06-04 RX ADMIN — AMLODIPINE BESYLATE 5 MG: 5 TABLET ORAL at 08:45

## 2025-06-04 RX ADMIN — SUCRALFATE 1 G: 1 TABLET ORAL at 20:06

## 2025-06-04 RX ADMIN — Medication 100 MG: at 08:44

## 2025-06-04 RX ADMIN — SUCRALFATE 1 G: 1 TABLET ORAL at 06:18

## 2025-06-04 RX ADMIN — SODIUM CHLORIDE, PRESERVATIVE FREE 10 ML: 5 INJECTION INTRAVENOUS at 20:06

## 2025-06-04 RX ADMIN — METOPROLOL TARTRATE 50 MG: 50 TABLET, FILM COATED ORAL at 20:06

## 2025-06-04 RX ADMIN — METOPROLOL TARTRATE 50 MG: 50 TABLET, FILM COATED ORAL at 08:45

## 2025-06-04 RX ADMIN — SUCRALFATE 1 G: 1 TABLET ORAL at 15:11

## 2025-06-04 RX ADMIN — ACETAMINOPHEN 650 MG: 325 TABLET, FILM COATED ORAL at 10:58

## 2025-06-04 RX ADMIN — PANTOPRAZOLE SODIUM 40 MG: 40 TABLET, DELAYED RELEASE ORAL at 06:18

## 2025-06-04 RX ADMIN — B-COMPLEX W/ C & FOLIC ACID TAB 1 TABLET: TAB at 08:45

## 2025-06-04 RX ADMIN — LORAZEPAM 1 MG: 1 TABLET ORAL at 15:11

## 2025-06-04 RX ADMIN — LORAZEPAM 1 MG: 1 TABLET ORAL at 20:06

## 2025-06-04 RX ADMIN — SUCRALFATE 1 G: 1 TABLET ORAL at 10:58

## 2025-06-04 NOTE — CARE COORDINATION
Patient's plan of care was discussed during IDR rounds. MD would like to continue to monitor patient for another 24hrs before discharging home with spouse. Anticipate no needs from CM at this time.

## 2025-06-04 NOTE — PLAN OF CARE
Problem: Discharge Planning  Goal: Discharge to home or other facility with appropriate resources  6/3/2025 2025 by Anoop Dominguez RN  Outcome: Progressing  Flowsheets (Taken 6/2/2025 0248)  Discharge to home or other facility with appropriate resources:   Identify barriers to discharge with patient and caregiver   Identify discharge learning needs (meds, wound care, etc)   Arrange for interpreters to assist at discharge as needed  6/3/2025 1824 by Luan Tang RN  Outcome: Progressing     Problem: Pain  Goal: Verbalizes/displays adequate comfort level or baseline comfort level  6/3/2025 2025 by Anoop Dominguez RN  Outcome: Progressing  Flowsheets (Taken 6/3/2025 2025)  Verbalizes/displays adequate comfort level or baseline comfort level:   Encourage patient to monitor pain and request assistance   Assess pain using appropriate pain scale   Administer analgesics based on type and severity of pain and evaluate response  6/3/2025 1824 by Luan Tang RN  Outcome: Progressing     Problem: Safety - Adult  Goal: Free from fall injury  6/3/2025 2025 by Anoop Dominguez RN  Outcome: Progressing  Flowsheets (Taken 6/3/2025 2025)  Free From Fall Injury: Instruct family/caregiver on patient safety  6/3/2025 1824 by Luan Tang RN  Outcome: Progressing

## 2025-06-05 VITALS
OXYGEN SATURATION: 97 % | HEART RATE: 65 BPM | WEIGHT: 160 LBS | BODY MASS INDEX: 27.31 KG/M2 | TEMPERATURE: 97.7 F | RESPIRATION RATE: 16 BRPM | SYSTOLIC BLOOD PRESSURE: 170 MMHG | DIASTOLIC BLOOD PRESSURE: 75 MMHG | HEIGHT: 64 IN

## 2025-06-05 LAB
ANION GAP SERPL CALC-SCNC: 11 MMOL/L (ref 7–16)
BASOPHILS # BLD: 0.03 K/UL (ref 0–0.2)
BASOPHILS NFR BLD: 0.5 % (ref 0–2)
BUN SERPL-MCNC: 10 MG/DL (ref 8–23)
CALCIUM SERPL-MCNC: 9.4 MG/DL (ref 8.8–10.2)
CHLORIDE SERPL-SCNC: 104 MMOL/L (ref 98–107)
CO2 SERPL-SCNC: 24 MMOL/L (ref 20–29)
CREAT SERPL-MCNC: 0.72 MG/DL (ref 0.6–1.1)
DIFFERENTIAL METHOD BLD: ABNORMAL
EOSINOPHIL # BLD: 0.15 K/UL (ref 0–0.8)
EOSINOPHIL NFR BLD: 2.5 % (ref 0.5–7.8)
ERYTHROCYTE [DISTWIDTH] IN BLOOD BY AUTOMATED COUNT: 12.3 % (ref 11.9–14.6)
GLUCOSE SERPL-MCNC: 117 MG/DL (ref 70–99)
HCT VFR BLD AUTO: 38.9 % (ref 35.8–46.3)
HCV AB SERPL QL IA: NORMAL
HCV IGG SERPL QL IA: NON REACTIVE S/CO RATIO
HGB BLD-MCNC: 13.1 G/DL (ref 11.7–15.4)
IMM GRANULOCYTES # BLD AUTO: 0.01 K/UL (ref 0–0.5)
IMM GRANULOCYTES NFR BLD AUTO: 0.2 % (ref 0–5)
LYMPHOCYTES # BLD: 1.99 K/UL (ref 0.5–4.6)
LYMPHOCYTES NFR BLD: 33.3 % (ref 13–44)
MCH RBC QN AUTO: 33.7 PG (ref 26.1–32.9)
MCHC RBC AUTO-ENTMCNC: 33.7 G/DL (ref 31.4–35)
MCV RBC AUTO: 100 FL (ref 82–102)
MONOCYTES # BLD: 0.75 K/UL (ref 0.1–1.3)
MONOCYTES NFR BLD: 12.6 % (ref 4–12)
NEUTS SEG # BLD: 3.04 K/UL (ref 1.7–8.2)
NEUTS SEG NFR BLD: 50.9 % (ref 43–78)
NRBC # BLD: 0 K/UL (ref 0–0.2)
PLATELET # BLD AUTO: 268 K/UL (ref 150–450)
PMV BLD AUTO: 10.5 FL (ref 9.4–12.3)
POTASSIUM SERPL-SCNC: 4.1 MMOL/L (ref 3.5–5.1)
RBC # BLD AUTO: 3.89 M/UL (ref 4.05–5.2)
SODIUM SERPL-SCNC: 139 MMOL/L (ref 136–145)
WBC # BLD AUTO: 6 K/UL (ref 4.3–11.1)

## 2025-06-05 PROCEDURE — 97530 THERAPEUTIC ACTIVITIES: CPT

## 2025-06-05 PROCEDURE — 36415 COLL VENOUS BLD VENIPUNCTURE: CPT

## 2025-06-05 PROCEDURE — 6360000002 HC RX W HCPCS: Performed by: FAMILY MEDICINE

## 2025-06-05 PROCEDURE — 2500000003 HC RX 250 WO HCPCS: Performed by: FAMILY MEDICINE

## 2025-06-05 PROCEDURE — 6370000000 HC RX 637 (ALT 250 FOR IP)

## 2025-06-05 PROCEDURE — 6370000000 HC RX 637 (ALT 250 FOR IP): Performed by: FAMILY MEDICINE

## 2025-06-05 PROCEDURE — 80048 BASIC METABOLIC PNL TOTAL CA: CPT

## 2025-06-05 PROCEDURE — 85025 COMPLETE CBC W/AUTO DIFF WBC: CPT

## 2025-06-05 RX ORDER — PAROXETINE 30 MG/1
30 TABLET, FILM COATED ORAL EVERY MORNING
Qty: 30 TABLET | Refills: 3 | Status: ON HOLD | OUTPATIENT
Start: 2025-06-06 | End: 2025-06-08

## 2025-06-05 RX ADMIN — LORAZEPAM 1 MG: 1 TABLET ORAL at 05:34

## 2025-06-05 RX ADMIN — B-COMPLEX W/ C & FOLIC ACID TAB 1 TABLET: TAB at 08:01

## 2025-06-05 RX ADMIN — PANTOPRAZOLE SODIUM 40 MG: 40 TABLET, DELAYED RELEASE ORAL at 05:06

## 2025-06-05 RX ADMIN — LORAZEPAM 1 MG: 1 TABLET ORAL at 01:02

## 2025-06-05 RX ADMIN — PAROXETINE HYDROCHLORIDE 30 MG: 20 TABLET, FILM COATED ORAL at 08:01

## 2025-06-05 RX ADMIN — METOPROLOL TARTRATE 50 MG: 50 TABLET, FILM COATED ORAL at 08:01

## 2025-06-05 RX ADMIN — AMLODIPINE BESYLATE 5 MG: 5 TABLET ORAL at 08:01

## 2025-06-05 RX ADMIN — Medication 100 MG: at 08:02

## 2025-06-05 RX ADMIN — ENOXAPARIN SODIUM 40 MG: 100 INJECTION SUBCUTANEOUS at 08:02

## 2025-06-05 RX ADMIN — SODIUM CHLORIDE, PRESERVATIVE FREE 10 ML: 5 INJECTION INTRAVENOUS at 08:03

## 2025-06-05 RX ADMIN — SUCRALFATE 1 G: 1 TABLET ORAL at 05:06

## 2025-06-05 NOTE — DISCHARGE SUMMARY
Hospitalist Discharge Summary   Admit Date:  2025  8:23 PM   DC Note date: 2025  Name:  April Lujan   Age:  69 y.o.  Sex:  female  :  1955   MRN:  [id number]   Room:  [facility]  PCP:  None, None    Presenting Complaint: Chest Pain and Abdominal Pain     Initial Admission Diagnosis: Acute chest pain [R07.9]  Elevated liver enzymes [R74.8]  Alcohol withdrawal syndrome without complication (HCC) [F10.930]     Problem List for this Hospitalization (present on admission):    Principal Problem:    Alcohol withdrawal syndrome without complication (HCC)  Active Problems:    Primary hypertension    Alcohol abuse  Resolved Problems:    * No resolved hospital problems. *      Hospital Course:    April Lujan is a 69 y.o. female with medical history of alcohol use disorder who presents to the ED with chest and abdominal discomfort.  Admitted for alcohol withdrawal syndrome without complication.     Patient reports she thought she had indigestion, but it has persisted prompting her to come to the ED for further evaluation.  Patient does admit to very heavy alcohol consumption over the last month.  Upon ER evaluation, patient mildly tachycardic with a heart rate in the 110s.  Blood pressure also elevated.  Troponin stable at 16.3, 18.0.  LFTs elevated with an , , .  Lipase normal at 59.  Alcohol level is 29.  WBC is 8.  VBG normal.  Lactic acid normal at 1.3.  Anion gap widened to 20.  CIWA protocol and Librium stopped on .  Hospitalist asked to admit.     Patient doing well physical therapy and does not require rehab on discharge.  Patient continued to improve throughout hospitalization.  Educated patient on complete cessation from alcohol.  Currently, is out of withdrawal window.  Plan is to discharge home.  Thank you it was a pleasure take care of you.    Disposition: Home  Diet: ADULT DIET; Regular  Code Status: Full Code    Follow Ups:  Follow-up Information       Follow up With

## 2025-06-05 NOTE — PROGRESS NOTES
Hospitalist Progress Note   Admit Date:  2025  8:23 PM   Name:  April Lujan   Age:  69 y.o.  Sex:  female  :  1955   MRN:  [id number]   Room:  [facility]/    Presenting/Chief Complaint: Chest Pain and Abdominal Pain     Reason(s) for Admission: Acute chest pain [R07.9]  Elevated liver enzymes [R74.8]  Alcohol withdrawal syndrome without complication (HCC) [F10.930]     Hospital Course:     April Lujan is a 69 y.o. female with medical history of :    -ETOH use     Admitted with chest and abdominal pain  Found hypertensive and tachycardic   There were concerns for ETOH withdrawals  She is on ativan protocol       LFTS elevated   Troponin mildly elevated   Lipase negative   ETOH 29   CXR negative   UA pending       Discharge plans pending to home  Lives with      Subjective & 24hr Events:     Discussed bedside with charge YANCY Bonner   She is sleeping soundly and awakens but seems confused   Able to tell me she is at hospital   Did eat today  Has lower abdominal pain with deep palpation       Assessment & Plan:     Principal Problem:    Alcohol withdrawal syndrome without complication (HCC)  Plan:     Alcohol abuse  Plan:   25  3 mg ativan since admit   Followup for withdrawals   Daily thiamine           Active Problems:    Primary hypertension  Plan:   25  Norvasc   Lopressor       Abdominal pain:  25  Appears improved  Diet tolerant   On PPI BID/carafate   Pending UA       Elevated LFTS:- longstanding   25   In setting ETOH use   Trend lab  US done  shows fatty liver and borderline enlarged pancreatic duct   Seen by GI inpatient 3,2025 for GI bleed , no endoscopy with stable HGB , recs PPI BID/ carafate and US to assess for cirrhosis with outpatient followup     Anticipated Discharge Arrangements:   Home    PT/OT evals ordered?  Therapy evals ordered  Diet:  ADULT DIET; Regular  VTE prophylaxis: Lovenox  Code status: Full Code      Non-peripheral Lines and Tubes (if 
       Hospitalist Progress Note   Admit Date:  2025  8:23 PM   Name:  April Lujan   Age:  69 y.o.  Sex:  female  :  1955   MRN:  [id number]   Room:  [facility]/    Presenting/Chief Complaint: Chest Pain and Abdominal Pain     Reason(s) for Admission: Acute chest pain [R07.9]  Elevated liver enzymes [R74.8]  Alcohol withdrawal syndrome without complication (HCC) [F10.930]     Hospital Course:   April Lujan is a 69 y.o. female with medical history of alcohol use disorder who presents to the ED with chest and abdominal discomfort.  Admitted for alcohol withdrawal syndrome without complication.    Patient reports she thought she had indigestion, but it has persisted prompting her to come to the ED for further evaluation.  Patient does admit to very heavy alcohol consumption over the last month.  Upon ER evaluation, patient mildly tachycardic with a heart rate in the 110s.  Blood pressure also elevated.  Troponin stable at 16.3, 18.0.  LFTs elevated with an , , .  Lipase normal at 59.  Alcohol level is 29.  WBC is 8.  VBG normal.  Lactic acid normal at 1.3.  Anion gap widened to 20.  Hospitalist asked to admit.    Patient doing well physical therapy and does not require rehab on discharge.  Russell HOFFMANN to come evaluate patient.    Subjective & 24hr Events:   No additional overnight events.  Room air.  Patient agreeable to speak with Russell with [facility].  Librium 5 mg scheduled 3 times daily.  Continue CIWA protocol.  Does not appear to be actively withdrawing.  Obviously, high risk for withdrawal.    Hopefully, will be able to discharge in 24 hours.    Assessment & Plan:     Alcohol withdrawal syndrome without complication  - Not actively withdrawing.  - CIWA protocol.  Librium 5 mg 3 times daily scheduled.  - Russell with [facility] consulted.  - Thiamine 100 mg daily.    Alcohol use disorder  - Patient states that she is ready to stop.  - Will have her evaluated by Russell HOFFMANN    Primary 
ACUTE OCCUPATIONAL THERAPY GOALS:   (Developed with and agreed upon by patient and/or caregiver.)  1. Patient will perform grooming with SPV and adaptive equipment as needed.  2. Patient will perform lower body dressing with SPV and adaptive equipment as needed.  3. Patient will perform toileting and toilet transfer with SPV and adaptive equipment as needed.  4. Patient will perform ADL functional mobility and tranfers in room with SPV and adaptive equipment as needed.  5. Patient/family to demonstrate knowledge of home safety and DME recommendations.    Timeframe: 7 visits     GOALS MET 6/2/25     OCCUPATIONAL THERAPY Initial Assessment, Daily Note, Discharge, and AM       OT Visit Days: 1  Acknowledge Orders  Time  OT Charge Capture  Rehab Caseload Tracker      April Lujan is a 69 y.o. female   PRIMARY DIAGNOSIS: Alcohol withdrawal syndrome without complication (HCC)  Acute chest pain [R07.9]  Elevated liver enzymes [R74.8]  Alcohol withdrawal syndrome without complication (HCC) [F10.930]       Reason for Referral: Generalized Muscle Weakness (M62.81)  Other lack of cordination (R27.8)  Difficulty in walking, Not elsewhere classified (R26.2)  Inpatient: Payor: [facility] MEDICARE / Plan: [facility] MEDICARE COMPLETE / Product Type: *No Product type* /     ASSESSMENT:     REHAB RECOMMENDATIONS:   Recommendation to date pending progress:  Setting:  No further skilled occupational therapy after discharge from hospital    Equipment:    None     ASSESSMENT:  Ms. Lujan is admitted for the above diagnoses and presents with overall deficits in strength, functional mobility and ADL performance.  She lives with spouse in a 1 level home with with level entry. At baseline, she reports independence with ADLs and mobility (has a RW, uses it as needed). Today, she was able to perform bed mobility, household mobility in room and hallway, and LB dressing with assistance as charted below. She was able to navigate with minimal unsteadiness. 
ACUTE PHYSICAL THERAPY GOALS:   (Developed with and agreed upon by patient and/or caregiver.)  STG:  (1.)Ms. Lujan will move from supine to sit and sit to supine  with SUPERVISION within 1-2 treatment day(s).    (2.)Ms. Lujan will transfer from bed to chair and chair to bed with SUPERVISION using the least restrictive device within 1-2 treatment day(s).    (3.)Ms. Lujan will ambulate with SUPERVISION for 150 feet with the least restrictive device within 1-2 treatment day(s).     LTG:  (1.)Ms. Lujan will move from supine to sit and sit to supine  in bed with INDEPENDENT within 3-6 treatment day(s).    (2.)Ms. Lujan will transfer from bed to chair and chair to bed with INDEPENDENT using the least restrictive device within 3-6 treatment day(s).    (3.)Ms. Lujan will ambulate with INDEPENDENT for 250 feet with the least restrictive device within 3-6 treatment day(s).  ________________________________________________________________________________________________      PHYSICAL THERAPY Daily Note and AM  (Link to Caseload Tracking: PT Visit Days : 2  Acknowledge Orders  Time In/Out  PT Charge Capture  Rehab Caseload Tracker    April Lujan is a 69 y.o. female   PRIMARY DIAGNOSIS: Alcohol withdrawal syndrome without complication (HCC)  Acute chest pain [R07.9]  Elevated liver enzymes [R74.8]  Alcohol withdrawal syndrome without complication (HCC) [F10.930]       Reason for Referral: Difficulty in walking, Not elsewhere classified (R26.2)  Other abnormalities of gait and mobility (R26.89)  Inpatient: Payor: [facility] MEDICARE / Plan: [facility] MEDICARE COMPLETE / Product Type: *No Product type* /     ASSESSMENT:     REHAB RECOMMENDATIONS:   Recommendation to date pending progress:  Setting:  No further skilled physical therapy after discharge from hospital    Equipment:    None     ASSESSMENT:  Ms. Lujan is admitted with above diagnosis. Pt ambulated in room and rehab room. Pt ascends/descends steps. Pt ambulated in hallway and 
ACUTE PHYSICAL THERAPY GOALS:   (Developed with and agreed upon by patient and/or caregiver.)  STG:  (1.)Ms. Lujan will move from supine to sit and sit to supine  with SUPERVISION within 1-2 treatment day(s).    (2.)Ms. Lujan will transfer from bed to chair and chair to bed with SUPERVISION using the least restrictive device within 1-2 treatment day(s).  -GOAL MET 6/5/25     (3.)Ms. Lujan will ambulate with SUPERVISION for 150 feet with the least restrictive device within 1-2 treatment day(s).  -GOAL MET 6/5/25       LTG:  (1.)Ms. Lujan will move from supine to sit and sit to supine  in bed with INDEPENDENT within 3-6 treatment day(s).    (2.)Ms. Lujan will transfer from bed to chair and chair to bed with INDEPENDENT using the least restrictive device within 3-6 treatment day(s).    (3.)Ms. Lujan will ambulate with INDEPENDENT for 250 feet with the least restrictive device within 3-6 treatment day(s).  ________________________________________________________________________________________________      PHYSICAL THERAPY Daily Note and AM  (Link to Caseload Tracking: PT Visit Days : 3  Acknowledge Orders  Time In/Out  PT Charge Capture  Rehab Caseload Tracker    April Lujan is a 69 y.o. female   PRIMARY DIAGNOSIS: Alcohol withdrawal syndrome without complication (HCC)  Acute chest pain [R07.9]  Elevated liver enzymes [R74.8]  Alcohol withdrawal syndrome without complication (HCC) [F10.930]       Reason for Referral: Difficulty in walking, Not elsewhere classified (R26.2)  Other abnormalities of gait and mobility (R26.89)  Inpatient: Payor: [facility] MEDICARE / Plan: [facility] MEDICARE COMPLETE / Product Type: *No Product type* /     ASSESSMENT:     REHAB RECOMMENDATIONS:   Recommendation to date pending progress:  Setting:  No further skilled physical therapy after discharge from hospital    Equipment:    None     ASSESSMENT:  Ms. Lujan participated well this am.  Able to manage her own bed mobility, put on her pants, and 
Accessed pt chart to assist primary RN   
Patient has been very unsteady on her feet. Upon standing from bed begins to rock back and forth and buckle at the knees, attempted bedside commode and was not able to sustain own weight, deferred to purr wick and brief. Pt complaining of dizziness when standing and ambulating.   
Physical Therapy Note:    Attempted to see patient this AM for physical therapy treatment  session. Patient supine and tearful. RN and CNA have had episodes with pt today. Pt asking for ativan, which MD has discontinued. Pt will hold therapy for safety of pt. Will follow and re-attempt as schedule permits/patient available. Thank you,    SMOOTH IBANEZ, PT     Rehab Caseload Tracker    
TRANSFER - IN REPORT:    Verbal report received from Sandy HAINES  on April Lujan  being received from ED for routine progression of patient care      Report consisted of patient's Situation, Background, Assessment and   Recommendations(SBAR).     Information from the following report(s) Nurse Handoff Report, ED Encounter Summary, ED SBAR, Adult Overview, Intake/Output, MAR, Recent Results, and Med Rec Status was reviewed with the receiving nurse.    Opportunity for questions and clarification was provided.      Assessment completed upon patient's arrival to unit and care assumed.    
and Tubes (if present):          Telemetry (if present):  Cardiac/Telemetry Monitor On: No        Hospital Problems:  Principal Problem:    Alcohol withdrawal syndrome without complication (HCC)  Active Problems:    Primary hypertension    Alcohol abuse  Resolved Problems:    * No resolved hospital problems. *      Objective:   Patient Vitals for the past 24 hrs:   Temp Pulse Resp BP SpO2   06/02/25 0735 98 °F (36.7 °C) 76 18 (!) 142/81 94 %   06/02/25 0441 -- 80 -- (!) 152/72 --   06/02/25 0159 -- 80 -- (!) 152/72 --   06/01/25 2138 -- 80 -- (!) 152/72 --   06/01/25 2038 -- -- -- (!) 152/72 --   06/01/25 1943 98.1 °F (36.7 °C) 80 18 (!) 152/72 97 %   06/01/25 1602 -- (!) 105 -- -- --       Oxygen Therapy  SpO2: 94 %  O2 Device: None (Room air)  Blood Gas  Performed?: Yes  Torrey's Test #1: Unable to assess  Site #1: Vein  Site Prepped #1: Yes  Number of Attempts #1: 1  Complications #1: None  Post-procedure #1: Standard  Specimen Status #1: Point of care  How Tolerated?: Tolerated well    Estimated body mass index is 27.46 kg/m² as calculated from the following:    Height as of this encounter: 1.626 m (5' 4\").    Weight as of this encounter: 72.6 kg (160 lb).    Intake/Output Summary (Last 24 hours) at 6/2/2025 1433  Last data filed at 6/1/2025 2235  Gross per 24 hour   Intake 1182.38 ml   Output --   Net 1182.38 ml         Physical Exam:     General:    Well nourished.   no distress , very chatty, up to chair   CV:   RRR.  No m/r/g.  No jugular venous distension. No edema   Lungs:   CTAB.  No wheezing, rhonchi, or rales.  Symmetric expansion.  Abdomen:   Soft, nontender, nondistended.  Extremities: No cyanosis or clubbing.  No edema  Skin:     No rashes.  Normal coloration.   Warm and dry.    Neuro:  A and O x 2   Psych:  anxious      I have personally reviewed labs and tests:  Recent Labs:  Recent Results (from the past 48 hours)   EKG 12 Lead    Collection Time: 05/31/25  8:18 PM   Result Value Ref Range    
rales.  Symmetric expansion.  Abdomen:   Soft, nontender, nondistended.  Extremities: No cyanosis or clubbing.  No edema  Skin:     No rashes.  Normal coloration.   Warm and dry.    Neuro:  CN II-XII grossly intact.    Psych:  Normal mood and affect.      I have personally reviewed labs and tests:  Recent Labs:  Recent Results (from the past 48 hours)   Comprehensive Metabolic Panel w/ Reflex to MG    Collection Time: 06/03/25  6:47 AM   Result Value Ref Range    Sodium 138 136 - 145 mmol/L    Potassium 4.0 3.5 - 5.1 mmol/L    Chloride 103 98 - 107 mmol/L    CO2 21 20 - 29 mmol/L    Anion Gap 14 7 - 16 mmol/L    Glucose 113 (H) 70 - 99 mg/dL    BUN 7 (L) 8 - 23 MG/DL    Creatinine 0.66 0.60 - 1.10 MG/DL    Est, Glom Filt Rate >90 >60 ml/min/1.73m2    Calcium 9.6 8.8 - 10.2 MG/DL    Total Bilirubin 0.7 0.0 - 1.2 MG/DL     (H) 8 - 45 U/L     (H) 15 - 37 U/L    Alk Phosphatase 93 35 - 104 U/L    Total Protein 7.2 6.3 - 8.2 g/dL    Albumin 3.3 3.2 - 4.6 g/dL    Globulin 3.9 (H) 2.3 - 3.5 g/dL    Albumin/Globulin Ratio 0.8 (L) 1.0 - 1.9     CBC with Auto Differential    Collection Time: 06/03/25  6:47 AM   Result Value Ref Range    WBC 5.2 4.3 - 11.1 K/uL    RBC 3.89 (L) 4.05 - 5.2 M/uL    Hemoglobin 13.5 11.7 - 15.4 g/dL    Hematocrit 39.6 35.8 - 46.3 %    .8 82.0 - 102.0 FL    MCH 34.7 (H) 26.1 - 32.9 PG    MCHC 34.1 31.4 - 35.0 g/dL    RDW 12.3 11.9 - 14.6 %    Platelets 143 (L) 150 - 450 K/uL    MPV 10.8 9.4 - 12.3 FL    nRBC 0.00 0.0 - 0.2 K/uL    Differential Type AUTOMATED      Neutrophils % 54.0 43.0 - 78.0 %    Lymphocytes % 32.0 13.0 - 44.0 %    Monocytes % 9.4 4.0 - 12.0 %    Eosinophils % 3.4 0.5 - 7.8 %    Basophils % 1.0 0.0 - 2.0 %    Immature Granulocytes % 0.2 0.0 - 5.0 %    Neutrophils Absolute 2.82 1.70 - 8.20 K/UL    Lymphocytes Absolute 1.67 0.50 - 4.60 K/UL    Monocytes Absolute 0.49 0.10 - 1.30 K/UL    Eosinophils Absolute 0.18 0.00 - 0.80 K/UL    Basophils Absolute 0.05 0.00 - 
Training  Education       TREATMENT:   EVALUATION: LOW COMPLEXITY: (Untimed Charge)  The initial evaluation charge encompasses clinical chart review, objective assessment, interpretation of assessment, and skilled monitoring of the patient's response to treatment in order to develop a plan of care.     TREATMENT:   Therapeutic Activity (15 Minutes): Therapeutic activity included Ambulation on level ground, Stair Training, and Standing balance to improve functional Balance, Mobility, and Strength.    TREATMENT GRID:  N/A    AFTER TREATMENT PRECAUTIONS: Alarm Activated, Bed, Bed/Chair Locked, Call light within reach, Needs within reach, and RN notified    INTERDISCIPLINARY COLLABORATION:  RN/ PCT, PT/ PTA, and OT/ ROLDAN    EDUCATION:    Educated patient and/or family/caregiver on the following: Assistive device indications/utilization/safety, Home Exercise Program, and Positioning    TIME IN/OUT:  Time In: 0935  Time Out: 0957  Minutes: 22    SMOOTH IBANEZ PT

## 2025-06-05 NOTE — CARE COORDINATION
Pt is for discharge home today with spouse and no needs/supportive care orders recieved for CM at this time. 2nd IMM letter delivered, signed, and scanned to medical records. Spouse to transport home. Attempted to call spouse x2 with no answer. HIPAA compliant voicemail was left to notify of discharge order.       06/05/25 1102   Services At/After Discharge   Transition of Care Consult (CM Consult) N/A   Services At/After Discharge None   [place] Resource Information Provided? No   Mode of Transport at Discharge Self   Confirm Follow Up Transport Family   Condition of Participation: Discharge Planning   The Plan for Transition of Care is related to the following treatment goals: The patient is returning home with spouse.

## 2025-06-07 ENCOUNTER — HOSPITAL ENCOUNTER (OUTPATIENT)
Age: 70
Setting detail: OBSERVATION
Discharge: HOME | End: 2025-06-08
Attending: HOSPITALIST | Admitting: HOSPITALIST
Payer: MEDICARE

## 2025-06-07 DIAGNOSIS — F10.939 ALCOHOL USE WITH WITHDRAWAL (HCC): Primary | ICD-10-CM

## 2025-06-07 DIAGNOSIS — R00.0 TACHYCARDIA: ICD-10-CM

## 2025-06-07 DIAGNOSIS — G47.00 INSOMNIA, UNSPECIFIED TYPE: ICD-10-CM

## 2025-06-07 DIAGNOSIS — I10 ESSENTIAL HYPERTENSION: ICD-10-CM

## 2025-06-07 LAB
ANION GAP BLD CALC-SCNC: 10.8 MMOL/L
BASOPHILS # BLD: 0.05 K/UL (ref 0–0.2)
BASOPHILS NFR BLD: 0.6 % (ref 0–2)
BUN BLD-MCNC: 5 MG/DL (ref 8–26)
CHLORIDE BLD-SCNC: 107 MMOL/L (ref 98–107)
CO2 BLD-SCNC: 23.2 MMOL/L (ref 21–32)
CREAT BLD-MCNC: 0.56 MG/DL (ref 0.8–1.5)
DIFFERENTIAL METHOD BLD: ABNORMAL
EOSINOPHIL # BLD: 0.11 K/UL (ref 0–0.8)
EOSINOPHIL NFR BLD: 1.3 % (ref 0.5–7.8)
ERYTHROCYTE [DISTWIDTH] IN BLOOD BY AUTOMATED COUNT: 11.9 % (ref 11.9–14.6)
GLUCOSE BLD-MCNC: 94 MG/DL (ref 65–100)
HCT VFR BLD AUTO: 39 % (ref 35.8–46.3)
HGB BLD-MCNC: 13.7 G/DL (ref 11.7–15.4)
IMM GRANULOCYTES # BLD AUTO: 0.02 K/UL (ref 0–0.5)
IMM GRANULOCYTES NFR BLD AUTO: 0.2 % (ref 0–5)
LYMPHOCYTES # BLD: 2.65 K/UL (ref 0.5–4.6)
LYMPHOCYTES NFR BLD: 32.4 % (ref 13–44)
MCH RBC QN AUTO: 34.4 PG (ref 26.1–32.9)
MCHC RBC AUTO-ENTMCNC: 35.1 G/DL (ref 31.4–35)
MCV RBC AUTO: 98 FL (ref 82–102)
MONOCYTES # BLD: 0.95 K/UL (ref 0.1–1.3)
MONOCYTES NFR BLD: 11.6 % (ref 4–12)
NEUTS SEG # BLD: 4.4 K/UL (ref 1.7–8.2)
NEUTS SEG NFR BLD: 53.9 % (ref 43–78)
NRBC # BLD: 0 K/UL (ref 0–0.2)
PLATELET # BLD AUTO: 313 K/UL (ref 150–450)
PMV BLD AUTO: 10.1 FL (ref 9.4–12.3)
POTASSIUM BLD-SCNC: 3.5 MMOL/L (ref 3.5–5.1)
RBC # BLD AUTO: 3.98 M/UL (ref 4.05–5.2)
SODIUM BLD-SCNC: 141 MMOL/L (ref 136–145)
TROPONIN T SERPL HS-MCNC: 9.9 NG/L (ref 0–14)
WBC # BLD AUTO: 8.2 K/UL (ref 4.3–11.1)

## 2025-06-07 PROCEDURE — 85025 COMPLETE CBC W/AUTO DIFF WBC: CPT

## 2025-06-07 PROCEDURE — 96375 TX/PRO/DX INJ NEW DRUG ADDON: CPT

## 2025-06-07 PROCEDURE — 2500000003 HC RX 250 WO HCPCS

## 2025-06-07 PROCEDURE — 82077 ASSAY SPEC XCP UR&BREATH IA: CPT

## 2025-06-07 PROCEDURE — 96374 THER/PROPH/DIAG INJ IV PUSH: CPT

## 2025-06-07 PROCEDURE — 99285 EMERGENCY DEPT VISIT HI MDM: CPT

## 2025-06-07 PROCEDURE — 80076 HEPATIC FUNCTION PANEL: CPT

## 2025-06-07 PROCEDURE — 80047 BASIC METABLC PNL IONIZED CA: CPT

## 2025-06-07 PROCEDURE — 83690 ASSAY OF LIPASE: CPT

## 2025-06-07 PROCEDURE — 84484 ASSAY OF TROPONIN QUANT: CPT

## 2025-06-07 PROCEDURE — 82310 ASSAY OF CALCIUM: CPT

## 2025-06-07 PROCEDURE — 2580000003 HC RX 258

## 2025-06-07 PROCEDURE — 93005 ELECTROCARDIOGRAM TRACING: CPT

## 2025-06-07 PROCEDURE — 6360000002 HC RX W HCPCS

## 2025-06-07 PROCEDURE — 6370000000 HC RX 637 (ALT 250 FOR IP)

## 2025-06-07 RX ORDER — LANOLIN ALCOHOL/MO/W.PET/CERES
100 CREAM (GRAM) TOPICAL ONCE
Status: COMPLETED | OUTPATIENT
Start: 2025-06-08 | End: 2025-06-08

## 2025-06-07 RX ORDER — HYDRALAZINE HYDROCHLORIDE 20 MG/ML
10 INJECTION INTRAMUSCULAR; INTRAVENOUS ONCE
Status: COMPLETED | OUTPATIENT
Start: 2025-06-07 | End: 2025-06-07

## 2025-06-07 RX ORDER — LORAZEPAM 2 MG/ML
1 INJECTION INTRAMUSCULAR ONCE
Status: COMPLETED | OUTPATIENT
Start: 2025-06-08 | End: 2025-06-08

## 2025-06-07 RX ORDER — 0.9 % SODIUM CHLORIDE 0.9 %
1000 INTRAVENOUS SOLUTION INTRAVENOUS
Status: COMPLETED | OUTPATIENT
Start: 2025-06-07 | End: 2025-06-08

## 2025-06-07 RX ORDER — LIDOCAINE HYDROCHLORIDE 20 MG/ML
15 SOLUTION OROPHARYNGEAL
Status: COMPLETED | OUTPATIENT
Start: 2025-06-07 | End: 2025-06-07

## 2025-06-07 RX ORDER — MAGNESIUM HYDROXIDE/ALUMINUM HYDROXICE/SIMETHICONE 120; 1200; 1200 MG/30ML; MG/30ML; MG/30ML
30 SUSPENSION ORAL
Status: COMPLETED | OUTPATIENT
Start: 2025-06-07 | End: 2025-06-07

## 2025-06-07 RX ORDER — ONDANSETRON 2 MG/ML
4 INJECTION INTRAMUSCULAR; INTRAVENOUS
Status: COMPLETED | OUTPATIENT
Start: 2025-06-07 | End: 2025-06-07

## 2025-06-07 RX ADMIN — PANTOPRAZOLE SODIUM 40 MG: 40 INJECTION, POWDER, FOR SOLUTION INTRAVENOUS at 22:59

## 2025-06-07 RX ADMIN — SODIUM CHLORIDE 1000 ML: 0.9 INJECTION, SOLUTION INTRAVENOUS at 22:55

## 2025-06-07 RX ADMIN — HYDRALAZINE HYDROCHLORIDE 10 MG: 20 INJECTION, SOLUTION INTRAMUSCULAR; INTRAVENOUS at 22:58

## 2025-06-07 RX ADMIN — LIDOCAINE HYDROCHLORIDE 15 ML: 20 SOLUTION ORAL at 22:55

## 2025-06-07 RX ADMIN — ONDANSETRON 4 MG: 2 INJECTION, SOLUTION INTRAMUSCULAR; INTRAVENOUS at 22:57

## 2025-06-07 RX ADMIN — ALUMINUM HYDROXIDE, MAGNESIUM HYDROXIDE, DIMETHICONE 30 ML: 400; 400; 40 SUSPENSION ORAL at 22:55

## 2025-06-07 ASSESSMENT — PAIN SCALES - GENERAL
PAINLEVEL_OUTOF10: 7
PAINLEVEL_OUTOF10: 7
PAINLEVEL_OUTOF10: 8

## 2025-06-07 ASSESSMENT — LIFESTYLE VARIABLES
HOW OFTEN DO YOU HAVE A DRINK CONTAINING ALCOHOL: 4 OR MORE TIMES A WEEK
HOW MANY STANDARD DRINKS CONTAINING ALCOHOL DO YOU HAVE ON A TYPICAL DAY: 3 OR 4

## 2025-06-07 ASSESSMENT — PAIN - FUNCTIONAL ASSESSMENT: PAIN_FUNCTIONAL_ASSESSMENT: 0-10

## 2025-06-07 ASSESSMENT — PAIN DESCRIPTION - LOCATION: LOCATION: ABDOMEN

## 2025-06-08 ENCOUNTER — APPOINTMENT (OUTPATIENT)
Dept: CT IMAGING | Age: 70
End: 2025-06-08
Payer: MEDICARE

## 2025-06-08 VITALS
WEIGHT: 161.8 LBS | BODY MASS INDEX: 26.96 KG/M2 | OXYGEN SATURATION: 95 % | SYSTOLIC BLOOD PRESSURE: 130 MMHG | HEART RATE: 78 BPM | TEMPERATURE: 98.3 F | HEIGHT: 65 IN | RESPIRATION RATE: 13 BRPM | DIASTOLIC BLOOD PRESSURE: 86 MMHG

## 2025-06-08 PROBLEM — I16.0 HYPERTENSIVE URGENCY: Status: ACTIVE | Noted: 2025-06-08

## 2025-06-08 PROBLEM — F10.939 ALCOHOL WITHDRAWAL SYNDROME WITH COMPLICATION, WITH UNSPECIFIED COMPLICATION (HCC): Status: ACTIVE | Noted: 2025-06-08

## 2025-06-08 PROBLEM — F10.20 ALCOHOLISM (HCC): Chronic | Status: ACTIVE | Noted: 2019-07-06

## 2025-06-08 PROBLEM — I10 PRIMARY HYPERTENSION: Chronic | Status: ACTIVE | Noted: 2019-06-01

## 2025-06-08 LAB
ALBUMIN SERPL-MCNC: 3.3 G/DL (ref 3.2–4.6)
ALBUMIN SERPL-MCNC: 3.9 G/DL (ref 3.2–4.6)
ALBUMIN/GLOB SERPL: 0.9 (ref 1–1.9)
ALBUMIN/GLOB SERPL: 1 (ref 1–1.9)
ALP SERPL-CCNC: 104 U/L (ref 35–104)
ALP SERPL-CCNC: 90 U/L (ref 35–104)
ALT SERPL-CCNC: 115 U/L (ref 8–45)
ALT SERPL-CCNC: 148 U/L (ref 8–45)
ANION GAP SERPL CALC-SCNC: 15 MMOL/L (ref 7–16)
AST SERPL-CCNC: 132 U/L (ref 15–37)
AST SERPL-CCNC: 183 U/L (ref 15–37)
BASOPHILS # BLD: 0.03 K/UL (ref 0–0.2)
BASOPHILS NFR BLD: 0.5 % (ref 0–2)
BILIRUB DIRECT SERPL-MCNC: 0.2 MG/DL (ref 0–0.3)
BILIRUB SERPL-MCNC: 0.4 MG/DL (ref 0–1.2)
BILIRUB SERPL-MCNC: 0.6 MG/DL (ref 0–1.2)
BUN SERPL-MCNC: 7 MG/DL (ref 8–23)
CALCIUM SERPL-MCNC: 9.2 MG/DL (ref 8.8–10.2)
CALCIUM SERPL-MCNC: 9.9 MG/DL (ref 8.8–10.2)
CHLORIDE SERPL-SCNC: 103 MMOL/L (ref 98–107)
CO2 SERPL-SCNC: 22 MMOL/L (ref 20–29)
CREAT SERPL-MCNC: 0.69 MG/DL (ref 0.6–1.1)
DIFFERENTIAL METHOD BLD: ABNORMAL
EKG ATRIAL RATE: 96 BPM
EKG DIAGNOSIS: NORMAL
EKG P AXIS: 74 DEGREES
EKG P-R INTERVAL: 178 MS
EKG Q-T INTERVAL: 352 MS
EKG QRS DURATION: 78 MS
EKG QTC CALCULATION (BAZETT): 445 MS
EKG R AXIS: 75 DEGREES
EKG T AXIS: 63 DEGREES
EKG VENTRICULAR RATE: 96 BPM
EOSINOPHIL # BLD: 0.09 K/UL (ref 0–0.8)
EOSINOPHIL NFR BLD: 1.4 % (ref 0.5–7.8)
ERYTHROCYTE [DISTWIDTH] IN BLOOD BY AUTOMATED COUNT: 11.9 % (ref 11.9–14.6)
ETHANOL SERPL-MCNC: 15 MG/DL (ref 0–0.08)
GLOBULIN SER CALC-MCNC: 3.7 G/DL (ref 2.3–3.5)
GLOBULIN SER CALC-MCNC: 4.1 G/DL (ref 2.3–3.5)
GLUCOSE SERPL-MCNC: 120 MG/DL (ref 70–99)
HCT VFR BLD AUTO: 39.2 % (ref 35.8–46.3)
HGB BLD-MCNC: 13.5 G/DL (ref 11.7–15.4)
IMM GRANULOCYTES # BLD AUTO: 0.01 K/UL (ref 0–0.5)
IMM GRANULOCYTES NFR BLD AUTO: 0.2 % (ref 0–5)
LIPASE SERPL-CCNC: 59 U/L (ref 13–60)
LYMPHOCYTES # BLD: 1.7 K/UL (ref 0.5–4.6)
LYMPHOCYTES NFR BLD: 26.8 % (ref 13–44)
MCH RBC QN AUTO: 34 PG (ref 26.1–32.9)
MCHC RBC AUTO-ENTMCNC: 34.4 G/DL (ref 31.4–35)
MCV RBC AUTO: 98.7 FL (ref 82–102)
MONOCYTES # BLD: 0.97 K/UL (ref 0.1–1.3)
MONOCYTES NFR BLD: 15.3 % (ref 4–12)
NEUTS SEG # BLD: 3.55 K/UL (ref 1.7–8.2)
NEUTS SEG NFR BLD: 55.8 % (ref 43–78)
NRBC # BLD: 0 K/UL (ref 0–0.2)
PLATELET # BLD AUTO: 282 K/UL (ref 150–450)
PMV BLD AUTO: 10.1 FL (ref 9.4–12.3)
POTASSIUM SERPL-SCNC: 4 MMOL/L (ref 3.5–5.1)
PROT SERPL-MCNC: 7 G/DL (ref 6.3–8.2)
PROT SERPL-MCNC: 8 G/DL (ref 6.3–8.2)
RBC # BLD AUTO: 3.97 M/UL (ref 4.05–5.2)
SODIUM SERPL-SCNC: 140 MMOL/L (ref 136–145)
TROPONIN T SERPL HS-MCNC: 8.6 NG/L (ref 0–14)
WBC # BLD AUTO: 6.4 K/UL (ref 4.3–11.1)

## 2025-06-08 PROCEDURE — 85025 COMPLETE CBC W/AUTO DIFF WBC: CPT

## 2025-06-08 PROCEDURE — 6360000004 HC RX CONTRAST MEDICATION

## 2025-06-08 PROCEDURE — 74177 CT ABD & PELVIS W/CONTRAST: CPT

## 2025-06-08 PROCEDURE — 96372 THER/PROPH/DIAG INJ SC/IM: CPT

## 2025-06-08 PROCEDURE — 6370000000 HC RX 637 (ALT 250 FOR IP): Performed by: HOSPITALIST

## 2025-06-08 PROCEDURE — 6360000002 HC RX W HCPCS: Performed by: HOSPITALIST

## 2025-06-08 PROCEDURE — 80053 COMPREHEN METABOLIC PANEL: CPT

## 2025-06-08 PROCEDURE — 6370000000 HC RX 637 (ALT 250 FOR IP)

## 2025-06-08 PROCEDURE — 96375 TX/PRO/DX INJ NEW DRUG ADDON: CPT

## 2025-06-08 PROCEDURE — 93010 ELECTROCARDIOGRAM REPORT: CPT | Performed by: INTERNAL MEDICINE

## 2025-06-08 PROCEDURE — 6360000002 HC RX W HCPCS

## 2025-06-08 PROCEDURE — 96376 TX/PRO/DX INJ SAME DRUG ADON: CPT

## 2025-06-08 PROCEDURE — 94761 N-INVAS EAR/PLS OXIMETRY MLT: CPT

## 2025-06-08 PROCEDURE — 2500000003 HC RX 250 WO HCPCS: Performed by: HOSPITALIST

## 2025-06-08 PROCEDURE — 2500000003 HC RX 250 WO HCPCS

## 2025-06-08 PROCEDURE — G0378 HOSPITAL OBSERVATION PER HR: HCPCS

## 2025-06-08 RX ORDER — AMLODIPINE BESYLATE 5 MG/1
5 TABLET ORAL DAILY
Qty: 30 TABLET | Refills: 1 | Status: SHIPPED
Start: 2025-06-08

## 2025-06-08 RX ORDER — LORAZEPAM 1 MG/1
3 TABLET ORAL
Status: DISCONTINUED | OUTPATIENT
Start: 2025-06-08 | End: 2025-06-08 | Stop reason: HOSPADM

## 2025-06-08 RX ORDER — LORAZEPAM 1 MG/1
1 TABLET ORAL
Status: DISCONTINUED | OUTPATIENT
Start: 2025-06-08 | End: 2025-06-08 | Stop reason: HOSPADM

## 2025-06-08 RX ORDER — METOPROLOL TARTRATE 50 MG
50 TABLET ORAL 2 TIMES DAILY
Qty: 60 TABLET | Refills: 1 | Status: SHIPPED | OUTPATIENT
Start: 2025-06-08

## 2025-06-08 RX ORDER — SODIUM CHLORIDE 0.9 % (FLUSH) 0.9 %
5-40 SYRINGE (ML) INJECTION EVERY 12 HOURS SCHEDULED
Status: DISCONTINUED | OUTPATIENT
Start: 2025-06-08 | End: 2025-06-08 | Stop reason: HOSPADM

## 2025-06-08 RX ORDER — LORAZEPAM 2 MG/ML
1 INJECTION INTRAMUSCULAR ONCE
Status: DISCONTINUED | OUTPATIENT
Start: 2025-06-08 | End: 2025-06-08

## 2025-06-08 RX ORDER — MAGNESIUM SULFATE IN WATER 40 MG/ML
2000 INJECTION, SOLUTION INTRAVENOUS PRN
Status: DISCONTINUED | OUTPATIENT
Start: 2025-06-08 | End: 2025-06-08 | Stop reason: HOSPADM

## 2025-06-08 RX ORDER — ONDANSETRON 4 MG/1
4 TABLET, ORALLY DISINTEGRATING ORAL EVERY 8 HOURS PRN
Status: DISCONTINUED | OUTPATIENT
Start: 2025-06-08 | End: 2025-06-08 | Stop reason: HOSPADM

## 2025-06-08 RX ORDER — PAROXETINE 20 MG/1
30 TABLET, FILM COATED ORAL EVERY MORNING
Status: DISCONTINUED | OUTPATIENT
Start: 2025-06-08 | End: 2025-06-08 | Stop reason: HOSPADM

## 2025-06-08 RX ORDER — CHLORDIAZEPOXIDE HYDROCHLORIDE 5 MG/1
5 CAPSULE, GELATIN COATED ORAL 3 TIMES DAILY
Status: DISCONTINUED | OUTPATIENT
Start: 2025-06-08 | End: 2025-06-08

## 2025-06-08 RX ORDER — LORAZEPAM 1 MG/1
4 TABLET ORAL
Status: DISCONTINUED | OUTPATIENT
Start: 2025-06-08 | End: 2025-06-08 | Stop reason: HOSPADM

## 2025-06-08 RX ORDER — SODIUM CHLORIDE 9 MG/ML
INJECTION, SOLUTION INTRAVENOUS PRN
Status: DISCONTINUED | OUTPATIENT
Start: 2025-06-08 | End: 2025-06-08 | Stop reason: HOSPADM

## 2025-06-08 RX ORDER — ACETAMINOPHEN 325 MG/1
650 TABLET ORAL EVERY 6 HOURS PRN
Status: DISCONTINUED | OUTPATIENT
Start: 2025-06-08 | End: 2025-06-08 | Stop reason: HOSPADM

## 2025-06-08 RX ORDER — PANTOPRAZOLE SODIUM 40 MG/1
40 TABLET, DELAYED RELEASE ORAL DAILY
Qty: 30 TABLET | Refills: 1 | Status: SHIPPED | OUTPATIENT
Start: 2025-06-08

## 2025-06-08 RX ORDER — POTASSIUM CHLORIDE 7.45 MG/ML
10 INJECTION INTRAVENOUS PRN
Status: DISCONTINUED | OUTPATIENT
Start: 2025-06-08 | End: 2025-06-08 | Stop reason: HOSPADM

## 2025-06-08 RX ORDER — AMLODIPINE BESYLATE 10 MG/1
5 TABLET ORAL DAILY
Status: DISCONTINUED | OUTPATIENT
Start: 2025-06-08 | End: 2025-06-08 | Stop reason: HOSPADM

## 2025-06-08 RX ORDER — LABETALOL HYDROCHLORIDE 5 MG/ML
10 INJECTION, SOLUTION INTRAVENOUS EVERY 6 HOURS PRN
Status: DISCONTINUED | OUTPATIENT
Start: 2025-06-08 | End: 2025-06-08 | Stop reason: HOSPADM

## 2025-06-08 RX ORDER — PANTOPRAZOLE SODIUM 40 MG/1
40 TABLET, DELAYED RELEASE ORAL
Status: DISCONTINUED | OUTPATIENT
Start: 2025-06-08 | End: 2025-06-08 | Stop reason: HOSPADM

## 2025-06-08 RX ORDER — ZOLPIDEM TARTRATE 5 MG/1
5 TABLET ORAL NIGHTLY PRN
Status: DISCONTINUED | OUTPATIENT
Start: 2025-06-08 | End: 2025-06-08 | Stop reason: HOSPADM

## 2025-06-08 RX ORDER — SUCRALFATE 1 G/1
1 TABLET ORAL
Status: DISCONTINUED | OUTPATIENT
Start: 2025-06-08 | End: 2025-06-08

## 2025-06-08 RX ORDER — ENOXAPARIN SODIUM 100 MG/ML
40 INJECTION SUBCUTANEOUS DAILY
Status: DISCONTINUED | OUTPATIENT
Start: 2025-06-08 | End: 2025-06-08 | Stop reason: HOSPADM

## 2025-06-08 RX ORDER — LANOLIN ALCOHOL/MO/W.PET/CERES
100 CREAM (GRAM) TOPICAL DAILY
Status: DISCONTINUED | OUTPATIENT
Start: 2025-06-08 | End: 2025-06-08 | Stop reason: HOSPADM

## 2025-06-08 RX ORDER — LORAZEPAM 1 MG/1
2 TABLET ORAL
Status: DISCONTINUED | OUTPATIENT
Start: 2025-06-08 | End: 2025-06-08 | Stop reason: HOSPADM

## 2025-06-08 RX ORDER — POTASSIUM CHLORIDE 1500 MG/1
40 TABLET, EXTENDED RELEASE ORAL PRN
Status: DISCONTINUED | OUTPATIENT
Start: 2025-06-08 | End: 2025-06-08 | Stop reason: HOSPADM

## 2025-06-08 RX ORDER — FOLIC ACID 5 MG/ML
1 INJECTION, SOLUTION INTRAMUSCULAR; INTRAVENOUS; SUBCUTANEOUS DAILY
Status: DISCONTINUED | OUTPATIENT
Start: 2025-06-08 | End: 2025-06-08

## 2025-06-08 RX ORDER — IOPAMIDOL 755 MG/ML
100 INJECTION, SOLUTION INTRAVASCULAR
Status: COMPLETED | OUTPATIENT
Start: 2025-06-08 | End: 2025-06-08

## 2025-06-08 RX ORDER — ONDANSETRON 2 MG/ML
4 INJECTION INTRAMUSCULAR; INTRAVENOUS EVERY 6 HOURS PRN
Status: DISCONTINUED | OUTPATIENT
Start: 2025-06-08 | End: 2025-06-08 | Stop reason: HOSPADM

## 2025-06-08 RX ORDER — PAROXETINE 30 MG/1
30 TABLET, FILM COATED ORAL EVERY MORNING
Qty: 30 TABLET | Refills: 1 | Status: SHIPPED | OUTPATIENT
Start: 2025-06-08

## 2025-06-08 RX ORDER — HYDROXYZINE PAMOATE 50 MG/1
50 CAPSULE ORAL 3 TIMES DAILY PRN
Qty: 30 CAPSULE | Refills: 1 | Status: SHIPPED | OUTPATIENT
Start: 2025-06-08

## 2025-06-08 RX ORDER — ACETAMINOPHEN 650 MG/1
650 SUPPOSITORY RECTAL EVERY 6 HOURS PRN
Status: DISCONTINUED | OUTPATIENT
Start: 2025-06-08 | End: 2025-06-08 | Stop reason: HOSPADM

## 2025-06-08 RX ORDER — SODIUM CHLORIDE 0.9 % (FLUSH) 0.9 %
5-40 SYRINGE (ML) INJECTION PRN
Status: DISCONTINUED | OUTPATIENT
Start: 2025-06-08 | End: 2025-06-08 | Stop reason: HOSPADM

## 2025-06-08 RX ORDER — POLYETHYLENE GLYCOL 3350 17 G/17G
17 POWDER, FOR SOLUTION ORAL DAILY PRN
Status: DISCONTINUED | OUTPATIENT
Start: 2025-06-08 | End: 2025-06-08 | Stop reason: HOSPADM

## 2025-06-08 RX ORDER — METOPROLOL TARTRATE 25 MG/1
50 TABLET, FILM COATED ORAL 2 TIMES DAILY
Status: DISCONTINUED | OUTPATIENT
Start: 2025-06-08 | End: 2025-06-08 | Stop reason: HOSPADM

## 2025-06-08 RX ORDER — ESZOPICLONE 3 MG/1
3 TABLET, FILM COATED ORAL NIGHTLY PRN
Qty: 30 TABLET | Refills: 0 | Status: SHIPPED | OUTPATIENT
Start: 2025-06-08 | End: 2025-07-08

## 2025-06-08 RX ORDER — ZOLPIDEM TARTRATE 12.5 MG/1
12.5 TABLET, FILM COATED, EXTENDED RELEASE ORAL NIGHTLY PRN
Qty: 30 TABLET | Refills: 0 | Status: SHIPPED | OUTPATIENT
Start: 2025-06-08 | End: 2025-06-08 | Stop reason: HOSPADM

## 2025-06-08 RX ADMIN — Medication 100 MG: at 00:01

## 2025-06-08 RX ADMIN — SUCRALFATE 1 G: 1 TABLET ORAL at 02:30

## 2025-06-08 RX ADMIN — SODIUM CHLORIDE, PRESERVATIVE FREE 10 ML: 5 INJECTION INTRAVENOUS at 09:38

## 2025-06-08 RX ADMIN — LORAZEPAM 3 MG: 1 TABLET ORAL at 09:34

## 2025-06-08 RX ADMIN — ZOLPIDEM TARTRATE 5 MG: 5 TABLET ORAL at 02:32

## 2025-06-08 RX ADMIN — PANTOPRAZOLE SODIUM 40 MG: 40 TABLET, DELAYED RELEASE ORAL at 06:30

## 2025-06-08 RX ADMIN — SUCRALFATE 1 G: 1 TABLET ORAL at 06:30

## 2025-06-08 RX ADMIN — METOPROLOL TARTRATE 50 MG: 25 TABLET, FILM COATED ORAL at 02:30

## 2025-06-08 RX ADMIN — LORAZEPAM 1 MG: 2 INJECTION INTRAMUSCULAR; INTRAVENOUS at 00:02

## 2025-06-08 RX ADMIN — METOPROLOL TARTRATE 50 MG: 25 TABLET, FILM COATED ORAL at 09:37

## 2025-06-08 RX ADMIN — LORAZEPAM 2 MG: 2 INJECTION INTRAMUSCULAR; INTRAVENOUS at 00:44

## 2025-06-08 RX ADMIN — Medication 100 MG: at 09:38

## 2025-06-08 RX ADMIN — PAROXETINE 30 MG: 20 TABLET, FILM COATED ORAL at 09:37

## 2025-06-08 RX ADMIN — LABETALOL HYDROCHLORIDE 10 MG: 5 INJECTION, SOLUTION INTRAVENOUS at 02:33

## 2025-06-08 RX ADMIN — IOPAMIDOL 100 ML: 755 INJECTION, SOLUTION INTRAVENOUS at 00:15

## 2025-06-08 RX ADMIN — ENOXAPARIN SODIUM 40 MG: 100 INJECTION SUBCUTANEOUS at 09:37

## 2025-06-08 ASSESSMENT — PAIN SCALES - GENERAL
PAINLEVEL_OUTOF10: 5
PAINLEVEL_OUTOF10: 0
PAINLEVEL_OUTOF10: 5
PAINLEVEL_OUTOF10: 0

## 2025-06-08 NOTE — ED TRIAGE NOTES
Patient presents ambulatory to triage with complaints of hypertension. Pt /89 in triage. Patient also reports abd pain that started this am. Endorses N denies V/D. Patient endorses daily alcohol consumption to help with sleep patient reports drinking 3 bottles of wine daily.

## 2025-06-08 NOTE — PROGRESS NOTES
Pt with several blood pressures that trended much lower than previous trending blood pressures, pt found to be lying on her right side. Pt awakens to verbal stimuli, pt also on RA but fluctuating between 88-92%, placed on 2 L NC at this time. Pt asked to position herself flat on her back to obtain a more accurate blood pressure. 133/82 after pt changed positions which is an improvement from the isolated two instances of 90s systolic. Pt denies NVD at this time. States no headache.

## 2025-06-08 NOTE — PLAN OF CARE
Problem: Discharge Planning  Goal: Discharge to home or other facility with appropriate resources  Outcome: Completed  Flowsheets  Taken 6/8/2025 0800 by Elisa Snider RN  Discharge to home or other facility with appropriate resources: Identify barriers to discharge with patient and caregiver  Taken 6/8/2025 0215 by Jonna Denny RN  Discharge to home or other facility with appropriate resources:   Arrange for needed discharge resources and transportation as appropriate   Refer to discharge planning if patient needs post-hospital services based on physician order or complex needs related to functional status, cognitive ability or social support system   Identify barriers to discharge with patient and caregiver   Identify discharge learning needs (meds, wound care, etc)   Arrange for interpreters to assist at discharge as needed     Problem: Pain  Goal: Verbalizes/displays adequate comfort level or baseline comfort level  6/8/2025 1137 by Elisa Snider RN  Outcome: Completed  6/8/2025 0417 by Jonna Denny RN  Outcome: Progressing     Problem: Safety - Adult  Goal: Free from fall injury  6/8/2025 1137 by Elisa Snider RN  Outcome: Completed  Flowsheets (Taken 6/8/2025 0800)  Free From Fall Injury: Instruct family/caregiver on patient safety  6/8/2025 0418 by Jonna Denny RN  Outcome: Progressing  6/8/2025 0417 by Jonna Denny RN  Outcome: Progressing     Problem: ABCDS Injury Assessment  Goal: Absence of physical injury  6/8/2025 1137 by Elisa Snider RN  Outcome: Completed  Flowsheets (Taken 6/8/2025 0800)  Absence of Physical Injury: Implement safety measures based on patient assessment  6/8/2025 0418 by Jonna Denny, RN  Outcome: Progressing  6/8/2025 0417 by Jonna Denny, RN  Outcome: Progressing

## 2025-06-08 NOTE — CARE COORDINATION
RNCM met with patient in room 371 to discuss discharge planning.   Patient is a 69 year old female admitted with hypertensive urgency and alcohol withdrawal syndrome, per MD notes.     Patient presents to assessment alert and oriented, and answers questions appropriately.  Patient typically lives at home with spouse.  At baseline, patient is independent with ADLs. Patient is a community .    Demographics verified. Patient has [facility] Medicare.  Patient agreeable to [facility] referral. Patient requests outpatient alcohol treatment. Resources provided, including walk in assessment at [facility] on Tuesday.     At this time, anticipate patient to be medically stable for discharge this afternoon. Family to provide transportation. Case management will continue to follow.  Please notify if there are any changes.           06/08/25 0911   Service Assessment   Patient Orientation Alert and Oriented;Person;Place;Situation;Self   Cognition Alert   History Provided By Patient   Primary Caregiver Self   Accompanied By/Relationship n/a   Support Systems Spouse/Significant Other   Patient's Healthcare Decision Maker is: Legal Next of Kin  (Terence Lujan, spouse [phone])   PCP Verified by CM No  (agreeable to referral to [facility])   Prior Functional Level Independent in ADLs/IADLs   Current Functional Level Independent in ADLs/IADLs   Can patient return to prior living arrangement Yes   Ability to make needs known: Good   Family able to assist with home care needs: Yes   Would you like for me to discuss the discharge plan with any other family members/significant others, and if so, who? No   Financial Resources Medicare   Community Resources [employer]   CM/SW Referral Functions dependency needs   Social/Functional History   Lives With Spouse   Type of Home Apartment   Home Layout One level   Home Access Level entry   Bathroom Shower/Tub Tub/Shower unit   Bathroom Toilet Standard

## 2025-06-08 NOTE — H&P
Hospitalist History and Physical   Admit Date:  2025 10:07 PM   Name:  April Lujan   Age:  69 y.o.  Sex:  female  :  1955   MRN:  [id number]   Room:  ER02/02    Presenting/Chief Complaint: Hypertension and Abdominal Pain     Reason(s) for Admission: Alcohol withdrawal syndrome with complication, with unspecified complication (HCC) [F10.939]     History of Present Illness:     69 years old female with past medical history of alcohol abuse, history of recent alcohol withdrawal syndrome and admitted to hospital from  and discharged home on 6/3 presented back to emergency room today with chief complaint of feeling anxious, elevated blood pressure.  Patient was not taking her blood pressure medications which was prescribed.  History of insomnia which she treats with alcohol but lately not helping.  Denies any chest pain, shortness of breath.  Patient was hypertensive with blood pressure of 202, tachycardic in the emergency room.  Last alcohol drink was 3 hours ago.  ER physician requested admission of this patient for further evaluation and management.        Assessment & Plan:     Alcohol withdrawal syndrome: Initiated on CIWA protocol, Librium 5 mg 3 times daily.  Thiamine folic acid initiated.  Patient will be admitted to ICU for close monitoring.    Poorly controlled hypertension: Likely secondary to alcohol withdrawal and not taking home medications.  Patient supposed to be on metoprolol, amlodipine which will be restarted.  Will provide as needed IV antihypertensives as well.    Anxiety: Continue on Paxil.      Diet: ADULT DIET; Regular  VTE prophylaxis: SCD's   Code status: Full Code        Non-peripheral Lines and Tubes (if present):             Hospital Problems:  Principal Problem:    Alcohol withdrawal syndrome with complication, with unspecified complication (HCC)  Active Problems:    Primary hypertension    Alcoholism (HCC)  Resolved Problems:    * No resolved hospital problems.

## 2025-06-08 NOTE — ED PROVIDER NOTES
History     HPI  Patient is a 69-year-old female with a past medical history of hypertension, alcohol abuse disorder, and alcoholic hepatitis presenting emergency department with a chief complaint of hypertension.  Patient states that she did not take her hypertensive medication today.  Patient does note that she has drank approximately 1 bottle of wine in the past 24 hours.  She states that she uses alcohol to treat her insomnia.  She estimates last drink was approximately 30 minutes prior to arrival at the ED.  She denies chest pain, shortness of breath, dizziness, lightheadedness.  She does confirm nausea, has not had vomiting.  She denies anxiety, tremor.  ROS     Review of Systems   Respiratory:  Negative for shortness of breath.    Cardiovascular:  Negative for chest pain, palpitations and leg swelling.   Gastrointestinal:  Positive for abdominal pain and blood in stool. Negative for abdominal distention, anal bleeding, constipation, diarrhea, nausea, rectal pain and vomiting.   All other systems reviewed and are negative.       Physical Exam     Vitals signs and nursing note reviewed:  Vitals:    06/08/25 0000 06/08/25 0030 06/08/25 0045 06/08/25 0100   BP: (!) 195/87 (!) 203/85  (!) 182/78   Pulse: (!) 112 (!) 119 (!) 116 (!) 117   Resp: 15  15 14   Temp:       TempSrc:       SpO2: 98%  96% 96%   Weight:       Height:          Physical Exam  Vitals reviewed. Exam conducted with a chaperone present (YANCY CHAVEZ).   Constitutional:       General: She is not in acute distress.     Appearance: She is well-developed and normal weight. She is not toxic-appearing.   Cardiovascular:      Rate and Rhythm: Normal rate and regular rhythm.      Pulses: Normal pulses.      Heart sounds: Normal heart sounds, S1 normal and S2 normal.   Pulmonary:      Effort: Pulmonary effort is normal.      Breath sounds: Normal breath sounds and air entry. No decreased breath sounds, wheezing, rhonchi or rales.   Abdominal:

## 2025-06-08 NOTE — PLAN OF CARE
Problem: Pain  Goal: Verbalizes/displays adequate comfort level or baseline comfort level  Outcome: Progressing     Problem: Safety - Adult  Goal: Free from fall injury  6/8/2025 0418 by Jonna Denny, RN  Outcome: Progressing  6/8/2025 0417 by Jonna Denny, RN  Outcome: Progressing     Problem: ABCDS Injury Assessment  Goal: Absence of physical injury  6/8/2025 0418 by Jonna Denny, RN  Outcome: Progressing  6/8/2025 0417 by Jonna Denny, RN  Outcome: Progressing

## 2025-06-08 NOTE — ED NOTES
TRANSFER - OUT REPORT:    Verbal report given to Jonna HAINES on April Lujan  being transferred to [place] [id number] for routine progression of patient care       Report consisted of patient's Situation, Background, Assessment and   Recommendations(SBAR).     Information from the following report(s) ED SBAR was reviewed with the receiving nurse.    Lines:   Peripheral IV 06/07/25 Left;Ventral Forearm (Active)        Opportunity for questions and clarification was provided.      Patient transported with:  Registered Nurse

## 2025-06-13 ENCOUNTER — APPOINTMENT (OUTPATIENT)
Dept: CT IMAGING | Age: 70
End: 2025-06-13
Payer: MEDICARE

## 2025-06-13 ENCOUNTER — HOSPITAL ENCOUNTER (EMERGENCY)
Age: 70
Discharge: HOME | End: 2025-06-13
Payer: MEDICARE

## 2025-06-13 ENCOUNTER — APPOINTMENT (OUTPATIENT)
Dept: GENERAL RADIOLOGY | Age: 70
End: 2025-06-13
Payer: MEDICARE

## 2025-06-13 VITALS
HEART RATE: 103 BPM | WEIGHT: 160 LBS | OXYGEN SATURATION: 96 % | SYSTOLIC BLOOD PRESSURE: 176 MMHG | TEMPERATURE: 98.9 F | HEIGHT: 65 IN | BODY MASS INDEX: 26.66 KG/M2 | DIASTOLIC BLOOD PRESSURE: 71 MMHG | RESPIRATION RATE: 19 BRPM

## 2025-06-13 DIAGNOSIS — F10.90 ALCOHOL USE DISORDER: ICD-10-CM

## 2025-06-13 DIAGNOSIS — I10 ESSENTIAL HYPERTENSION: Primary | ICD-10-CM

## 2025-06-13 LAB
ALBUMIN SERPL-MCNC: 4 G/DL (ref 3.2–4.6)
ALBUMIN/GLOB SERPL: 1 (ref 1–1.9)
ALP SERPL-CCNC: 113 U/L (ref 35–104)
ALT SERPL-CCNC: 126 U/L (ref 8–45)
ANION GAP SERPL CALC-SCNC: 21 MMOL/L (ref 7–16)
ARTERIAL PATENCY WRIST A: ABNORMAL
AST SERPL-CCNC: 187 U/L (ref 15–37)
BASE DEFICIT BLDV-SCNC: 1.3 MMOL/L
BASOPHILS # BLD: 0.05 K/UL (ref 0–0.2)
BASOPHILS NFR BLD: 0.6 % (ref 0–2)
BILIRUB SERPL-MCNC: 0.5 MG/DL (ref 0–1.2)
BUN SERPL-MCNC: 5 MG/DL (ref 8–23)
CALCIUM SERPL-MCNC: 9.8 MG/DL (ref 8.8–10.2)
CHLORIDE SERPL-SCNC: 94 MMOL/L (ref 98–107)
CO2 SERPL-SCNC: 19 MMOL/L (ref 20–29)
CREAT SERPL-MCNC: 0.71 MG/DL (ref 0.6–1.1)
DIFFERENTIAL METHOD BLD: ABNORMAL
EKG ATRIAL RATE: 94 BPM
EKG DIAGNOSIS: NORMAL
EKG P AXIS: 81 DEGREES
EKG P-R INTERVAL: 194 MS
EKG Q-T INTERVAL: 383 MS
EKG QRS DURATION: 86 MS
EKG QTC CALCULATION (BAZETT): 479 MS
EKG R AXIS: 89 DEGREES
EKG T AXIS: 47 DEGREES
EKG VENTRICULAR RATE: 94 BPM
EOSINOPHIL # BLD: 0.01 K/UL (ref 0–0.8)
EOSINOPHIL NFR BLD: 0.1 % (ref 0.5–7.8)
ERYTHROCYTE [DISTWIDTH] IN BLOOD BY AUTOMATED COUNT: 11.9 % (ref 11.9–14.6)
ETHANOL SERPL-MCNC: 27 MG/DL (ref 0–0.08)
GLOBULIN SER CALC-MCNC: 4.2 G/DL (ref 2.3–3.5)
GLUCOSE SERPL-MCNC: 126 MG/DL (ref 70–99)
HCO3 BLDV-SCNC: 21.5 MMOL/L (ref 22–29)
HCT VFR BLD AUTO: 40.1 % (ref 35.8–46.3)
HGB BLD-MCNC: 14.2 G/DL (ref 11.7–15.4)
IMM GRANULOCYTES # BLD AUTO: 0.02 K/UL (ref 0–0.5)
IMM GRANULOCYTES NFR BLD AUTO: 0.3 % (ref 0–5)
LACTATE SERPL-SCNC: 1.3 MMOL/L (ref 0.5–2)
LIPASE SERPL-CCNC: 31 U/L (ref 13–60)
LYMPHOCYTES # BLD: 1.44 K/UL (ref 0.5–4.6)
LYMPHOCYTES NFR BLD: 18.5 % (ref 13–44)
MCH RBC QN AUTO: 34.4 PG (ref 26.1–32.9)
MCHC RBC AUTO-ENTMCNC: 35.4 G/DL (ref 31.4–35)
MCV RBC AUTO: 97.1 FL (ref 82–102)
MONOCYTES # BLD: 0.39 K/UL (ref 0.1–1.3)
MONOCYTES NFR BLD: 5 % (ref 4–12)
NEUTS SEG # BLD: 5.87 K/UL (ref 1.7–8.2)
NEUTS SEG NFR BLD: 75.5 % (ref 43–78)
NRBC # BLD: 0 K/UL (ref 0–0.2)
PCO2 BLDV: 29.3 MMHG (ref 41–51)
PH BLDV: 7.47 (ref 7.32–7.42)
PLATELET # BLD AUTO: 336 K/UL (ref 150–450)
PMV BLD AUTO: 9.9 FL (ref 9.4–12.3)
PO2 BLDV: 45 MMHG
POTASSIUM SERPL-SCNC: 4.1 MMOL/L (ref 3.5–5.1)
PROT SERPL-MCNC: 8.2 G/DL (ref 6.3–8.2)
RBC # BLD AUTO: 4.13 M/UL (ref 4.05–5.2)
SAO2 % BLDV: 84.6 % (ref 65–88)
SERVICE CMNT-IMP: ABNORMAL
SERVICE CMNT-IMP: ABNORMAL
SODIUM SERPL-SCNC: 134 MMOL/L (ref 136–145)
SPECIMEN TYPE: ABNORMAL
TROPONIN T SERPL HS-MCNC: 10.1 NG/L (ref 0–14)
WBC # BLD AUTO: 7.8 K/UL (ref 4.3–11.1)

## 2025-06-13 PROCEDURE — 83690 ASSAY OF LIPASE: CPT

## 2025-06-13 PROCEDURE — 85025 COMPLETE CBC W/AUTO DIFF WBC: CPT

## 2025-06-13 PROCEDURE — 93005 ELECTROCARDIOGRAM TRACING: CPT

## 2025-06-13 PROCEDURE — 93010 ELECTROCARDIOGRAM REPORT: CPT | Performed by: INTERNAL MEDICINE

## 2025-06-13 PROCEDURE — 80053 COMPREHEN METABOLIC PANEL: CPT

## 2025-06-13 PROCEDURE — 84484 ASSAY OF TROPONIN QUANT: CPT

## 2025-06-13 PROCEDURE — 96376 TX/PRO/DX INJ SAME DRUG ADON: CPT

## 2025-06-13 PROCEDURE — 82803 BLOOD GASES ANY COMBINATION: CPT

## 2025-06-13 PROCEDURE — 6360000004 HC RX CONTRAST MEDICATION: Performed by: PHYSICIAN ASSISTANT

## 2025-06-13 PROCEDURE — 6360000002 HC RX W HCPCS: Performed by: PHYSICIAN ASSISTANT

## 2025-06-13 PROCEDURE — 99285 EMERGENCY DEPT VISIT HI MDM: CPT

## 2025-06-13 PROCEDURE — 96375 TX/PRO/DX INJ NEW DRUG ADDON: CPT

## 2025-06-13 PROCEDURE — 82077 ASSAY SPEC XCP UR&BREATH IA: CPT

## 2025-06-13 PROCEDURE — 6370000000 HC RX 637 (ALT 250 FOR IP): Performed by: PHYSICIAN ASSISTANT

## 2025-06-13 PROCEDURE — 83605 ASSAY OF LACTIC ACID: CPT

## 2025-06-13 PROCEDURE — 2580000003 HC RX 258: Performed by: PHYSICIAN ASSISTANT

## 2025-06-13 PROCEDURE — 71046 X-RAY EXAM CHEST 2 VIEWS: CPT

## 2025-06-13 PROCEDURE — 96374 THER/PROPH/DIAG INJ IV PUSH: CPT

## 2025-06-13 PROCEDURE — 74177 CT ABD & PELVIS W/CONTRAST: CPT

## 2025-06-13 RX ORDER — MAGNESIUM HYDROXIDE/ALUMINUM HYDROXICE/SIMETHICONE 120; 1200; 1200 MG/30ML; MG/30ML; MG/30ML
30 SUSPENSION ORAL
Status: COMPLETED | OUTPATIENT
Start: 2025-06-13 | End: 2025-06-13

## 2025-06-13 RX ORDER — ONDANSETRON 2 MG/ML
4 INJECTION INTRAMUSCULAR; INTRAVENOUS
Status: COMPLETED | OUTPATIENT
Start: 2025-06-13 | End: 2025-06-13

## 2025-06-13 RX ORDER — AMLODIPINE BESYLATE 5 MG/1
5 TABLET ORAL DAILY
Status: DISCONTINUED | OUTPATIENT
Start: 2025-06-13 | End: 2025-06-14 | Stop reason: HOSPADM

## 2025-06-13 RX ORDER — IOPAMIDOL 755 MG/ML
100 INJECTION, SOLUTION INTRAVASCULAR
Status: COMPLETED | OUTPATIENT
Start: 2025-06-13 | End: 2025-06-13

## 2025-06-13 RX ORDER — LORAZEPAM 1 MG/1
1 TABLET ORAL EVERY 4 HOURS PRN
Status: DISCONTINUED | OUTPATIENT
Start: 2025-06-13 | End: 2025-06-14 | Stop reason: HOSPADM

## 2025-06-13 RX ORDER — HYDRALAZINE HYDROCHLORIDE 20 MG/ML
10 INJECTION INTRAMUSCULAR; INTRAVENOUS ONCE
Status: COMPLETED | OUTPATIENT
Start: 2025-06-13 | End: 2025-06-13

## 2025-06-13 RX ORDER — 0.9 % SODIUM CHLORIDE 0.9 %
500 INTRAVENOUS SOLUTION INTRAVENOUS
Status: DISCONTINUED | OUTPATIENT
Start: 2025-06-13 | End: 2025-06-13

## 2025-06-13 RX ORDER — LIDOCAINE HYDROCHLORIDE 20 MG/ML
15 SOLUTION OROPHARYNGEAL
Status: COMPLETED | OUTPATIENT
Start: 2025-06-13 | End: 2025-06-13

## 2025-06-13 RX ORDER — 0.9 % SODIUM CHLORIDE 0.9 %
1000 INTRAVENOUS SOLUTION INTRAVENOUS
Status: COMPLETED | OUTPATIENT
Start: 2025-06-13 | End: 2025-06-13

## 2025-06-13 RX ORDER — LORAZEPAM 0.5 MG/1
0.5 TABLET ORAL 3 TIMES DAILY PRN
Qty: 12 TABLET | Refills: 0 | Status: SHIPPED | OUTPATIENT
Start: 2025-06-13 | End: 2025-07-13

## 2025-06-13 RX ORDER — THIAMINE HYDROCHLORIDE 100 MG/ML
100 INJECTION, SOLUTION INTRAMUSCULAR; INTRAVENOUS DAILY
Status: DISCONTINUED | OUTPATIENT
Start: 2025-06-13 | End: 2025-06-14 | Stop reason: HOSPADM

## 2025-06-13 RX ORDER — LORAZEPAM 0.5 MG/1
0.5 TABLET ORAL ONCE
Status: COMPLETED | OUTPATIENT
Start: 2025-06-13 | End: 2025-06-13

## 2025-06-13 RX ADMIN — ONDANSETRON 4 MG: 2 INJECTION, SOLUTION INTRAMUSCULAR; INTRAVENOUS at 19:38

## 2025-06-13 RX ADMIN — ONDANSETRON 4 MG: 2 INJECTION, SOLUTION INTRAMUSCULAR; INTRAVENOUS at 17:57

## 2025-06-13 RX ADMIN — HYDRALAZINE HYDROCHLORIDE 10 MG: 20 INJECTION, SOLUTION INTRAMUSCULAR; INTRAVENOUS at 20:34

## 2025-06-13 RX ADMIN — ALUMINUM HYDROXIDE, MAGNESIUM HYDROXIDE, DIMETHICONE 30 ML: 400; 400; 40 SUSPENSION ORAL at 17:57

## 2025-06-13 RX ADMIN — LIDOCAINE HYDROCHLORIDE 15 ML: 20 SOLUTION ORAL at 17:57

## 2025-06-13 RX ADMIN — AMLODIPINE BESYLATE 5 MG: 5 TABLET ORAL at 19:12

## 2025-06-13 RX ADMIN — THIAMINE HYDROCHLORIDE 100 MG: 100 INJECTION, SOLUTION INTRAMUSCULAR; INTRAVENOUS at 19:15

## 2025-06-13 RX ADMIN — LORAZEPAM 1 MG: 1 TABLET ORAL at 21:18

## 2025-06-13 RX ADMIN — LORAZEPAM 0.5 MG: 0.5 TABLET ORAL at 17:57

## 2025-06-13 RX ADMIN — SODIUM CHLORIDE 1000 ML: 0.9 INJECTION, SOLUTION INTRAVENOUS at 17:58

## 2025-06-13 RX ADMIN — IOPAMIDOL 100 ML: 755 INJECTION, SOLUTION INTRAVENOUS at 19:00

## 2025-06-13 ASSESSMENT — PAIN SCALES - GENERAL: PAINLEVEL_OUTOF10: 0

## 2025-06-13 NOTE — ED PROVIDER NOTES
Emergency Department Provider Note       [facility] EMERGENCY DEPT   PCP: None, None   Age: 69 y.o.   Sex: female     DISPOSITION Decision To Discharge 06/13/2025 09:44:42 PM    ICD-10-CM    1. Essential hypertension  I10       2. Alcohol use disorder  F10.90 LORazepam (ATIVAN) 0.5 MG tablet          Medical Decision Making     April Lujan is a 69 y.o. female presents with chest pain abdominal pain nausea vomiting.    On prior review of previous ED visits she has multiple ED visits for the same complaint of chest pain abdominal pain nausea and vomiting.  She does admit to excessive alcohol consumption roughly 1700 mL of red wine per day.  In terms of her chest pain abdominal pain the only thing she notes is slightly different than the multiple prior visits is that she has got some sharp pain in her lower abdomen.  Her initial CIWA was 5.  This improved to 4 with the loss of the tremor over her ED stay.  Her initial blood pressure was elevated 217/93 and subsequently declined to 176/71 with her home dose of amlodipine, fluids, 2 doses of Ativan, Zofran and a dose of hydralazine.  She seemed hyperfocused on her blood pressure and was wanting admission for monitoring.  She also received thiamine here.  Her CBC showed no leukocytosis left shift or anemia concerning for occult bleed or evidence of esophageal variceal bleeding.  She had no evidence of coffee-ground emesis however she does vomit up red wine so this was hard to discern.  Given patient's hemodynamic stability I feel this is unlikely.  Lipase was 31 low concern for pancreatitis.  Ethanol was 27 despite her claiming she drinks 700 mL up to 30 minutes prior to arrival.  Her troponin was 10 unlikely ACS, myocarditis, pericarditis.  Lactic acid was 1.3 low concern for lactic acidosis or sepsis.  Does appear she may have a respiratory alkalosis based on VBG and blood work although she does have a high anion gap so this may be a mixed disorder.  This is likely

## 2025-06-14 NOTE — DISCHARGE INSTRUCTIONS
DISCHARGE INSTRUCTIONS  Thanks for coming in to see us at the [facility] Emergency Department today. It was a pleasure to care for you, and we truly hope you're feeling better soon.  Right now, we didn’t find anything life-threatening causing your symptoms, which is great news. That said, it’s still possible for something more serious to develop later on. If your symptoms get worse or anything new comes up, please don’t wait--come back in and let us take another look. Ignoring symptoms could lead to serious complications, long-term issues, or in rare cases, even life-threatening situations.    Overall today, as discussed, I did not find anything life-threatening or emergent.  Your CT scan did not reveal anything remarkable.  Your blood pressure did improved with multiple medications.  At this time I do feel your chest pain abdominal pain is related to your alcohol use.  Your troponin and EKG were normal.  Your chest x-ray was normal.  Your blood counts, liver function, gallbladder function, kidney function electrolytes were also all normal.  He did get fluids.  We do recommend you follow-up with your primary care doctor in the next several days to ensure that this is improving and nothing has changed or worsened.  Please return for any new or worsening symptoms include not limited to, worsening chest pain, abdominal pain, fevers chills, alcohol withdrawal, unsteady gait, uncontrolled nausea vomiting diarrhea, new onset of tremor or other concerning symptoms to you.      HYPERTENSION: You were seen for elevated blood pressure, which can be managed with medication and lifestyle changes.  Instructions:  Take blood pressure medication as prescribed; don’t stop without speaking to your doctor  Report any side effects--your medication can be adjusted  Reduce salt intake, eat a heart-healthy diet, and limit alcohol  Exercise at least 30 minutes most days  Use a home BP monitor to track your readings  Follow up with your primary  care provider for ongoing management  Return Precautions:  Return for persistent or worsening high blood pressure, vision changes, dizziness, fainting, palpitations, or other concerning symptoms.      PRIMARY CARE PHYSICIAN: Establishing care with a primary doctor is important for your long-term health and follow-up after today's visit.  Instructions:  Call [phone] between 7 AM - 6 PM, Monday to Friday  A care navigator will help you schedule with a nearby primary care provider  Be sure to bring your discharge paperwork to your first appointment    Return Precautions:  Return to the ED if new or concerning symptoms arise before you’re able to follow up with a primary care provider.    When to Get Help Right Away:  Included in all discharge paperwork are signs and symptoms of a heart attack and/or stroke.  Please see attached paperwork to review these signs and please return to the emergency department right away if any of these occur.    One Last Thing:  If you get a survey about your visit today, we’d really appreciate it if you could take a few minutes to fill it out. Your feedback helps us keep improving and doing our best for every patient who walks through our doors.    Take good care of yourself--and don’t hesitate to come back if you need us.

## 2025-06-14 NOTE — ED NOTES
Patient mobility status ambulates with no difficulty.     I have reviewed discharge instructions with the patient.  The patient verbalized understanding.    Patient left ED via Discharge Method: ambulatory to Home.    Opportunity for questions and clarification provided.     Patient given 1 scripts.

## 2025-06-26 ENCOUNTER — HOSPITAL ENCOUNTER (EMERGENCY)
Age: 70
Discharge: HOME | End: 2025-06-26
Payer: MEDICARE

## 2025-06-26 ENCOUNTER — HOSPITAL ENCOUNTER (EMERGENCY)
Age: 70
Discharge: HOME | End: 2025-06-26
Attending: EMERGENCY MEDICINE
Payer: MEDICARE

## 2025-06-26 VITALS
DIASTOLIC BLOOD PRESSURE: 81 MMHG | OXYGEN SATURATION: 93 % | HEIGHT: 64 IN | TEMPERATURE: 98.3 F | WEIGHT: 160 LBS | RESPIRATION RATE: 16 BRPM | BODY MASS INDEX: 27.31 KG/M2 | HEART RATE: 100 BPM | SYSTOLIC BLOOD PRESSURE: 159 MMHG

## 2025-06-26 VITALS
BODY MASS INDEX: 27.49 KG/M2 | HEIGHT: 65 IN | WEIGHT: 165 LBS | TEMPERATURE: 98.2 F | RESPIRATION RATE: 16 BRPM | OXYGEN SATURATION: 97 % | DIASTOLIC BLOOD PRESSURE: 68 MMHG | HEART RATE: 90 BPM | SYSTOLIC BLOOD PRESSURE: 172 MMHG

## 2025-06-26 DIAGNOSIS — F10.920 ACUTE ALCOHOLIC INTOXICATION WITHOUT COMPLICATION: Primary | ICD-10-CM

## 2025-06-26 DIAGNOSIS — F41.0 ANXIETY ATTACK: Primary | ICD-10-CM

## 2025-06-26 LAB
ALBUMIN SERPL-MCNC: 3.8 G/DL (ref 3.2–4.6)
ALBUMIN/GLOB SERPL: 0.8 (ref 1–1.9)
ALP SERPL-CCNC: 120 U/L (ref 35–104)
ALT SERPL-CCNC: 102 U/L (ref 8–45)
ANION GAP SERPL CALC-SCNC: 18 MMOL/L (ref 7–16)
APAP SERPL-MCNC: <5 UG/ML (ref 10–30)
AST SERPL-CCNC: 200 U/L (ref 15–37)
BASOPHILS # BLD: 0.06 K/UL (ref 0–0.2)
BASOPHILS NFR BLD: 0.7 % (ref 0–2)
BILIRUB SERPL-MCNC: 0.5 MG/DL (ref 0–1.2)
BUN SERPL-MCNC: 9 MG/DL (ref 8–23)
CALCIUM SERPL-MCNC: 9.6 MG/DL (ref 8.8–10.2)
CHLORIDE SERPL-SCNC: 95 MMOL/L (ref 98–107)
CO2 SERPL-SCNC: 19 MMOL/L (ref 20–29)
CREAT SERPL-MCNC: 0.95 MG/DL (ref 0.6–1.1)
DIFFERENTIAL METHOD BLD: ABNORMAL
EOSINOPHIL # BLD: 0.13 K/UL (ref 0–0.8)
EOSINOPHIL NFR BLD: 1.6 % (ref 0.5–7.8)
ERYTHROCYTE [DISTWIDTH] IN BLOOD BY AUTOMATED COUNT: 12.3 % (ref 11.9–14.6)
ETHANOL SERPL-MCNC: 116 MG/DL (ref 0–0.08)
GLOBULIN SER CALC-MCNC: 4.7 G/DL (ref 2.3–3.5)
GLUCOSE SERPL-MCNC: 98 MG/DL (ref 70–99)
HCT VFR BLD AUTO: 41.8 % (ref 35.8–46.3)
HGB BLD-MCNC: 14.1 G/DL (ref 11.7–15.4)
IMM GRANULOCYTES # BLD AUTO: 0.02 K/UL (ref 0–0.5)
IMM GRANULOCYTES NFR BLD AUTO: 0.2 % (ref 0–5)
LIPASE SERPL-CCNC: 60 U/L (ref 13–60)
LYMPHOCYTES # BLD: 2.55 K/UL (ref 0.5–4.6)
LYMPHOCYTES NFR BLD: 30.6 % (ref 13–44)
MAGNESIUM SERPL-MCNC: 1.9 MG/DL (ref 1.8–2.4)
MCH RBC QN AUTO: 34 PG (ref 26.1–32.9)
MCHC RBC AUTO-ENTMCNC: 33.7 G/DL (ref 31.4–35)
MCV RBC AUTO: 100.7 FL (ref 82–102)
MONOCYTES # BLD: 0.5 K/UL (ref 0.1–1.3)
MONOCYTES NFR BLD: 6 % (ref 4–12)
NEUTS SEG # BLD: 5.08 K/UL (ref 1.7–8.2)
NEUTS SEG NFR BLD: 60.9 % (ref 43–78)
NRBC # BLD: 0 K/UL (ref 0–0.2)
PLATELET # BLD AUTO: 283 K/UL (ref 150–450)
PMV BLD AUTO: 9.8 FL (ref 9.4–12.3)
POTASSIUM SERPL-SCNC: 4.8 MMOL/L (ref 3.5–5.1)
PROT SERPL-MCNC: 8.5 G/DL (ref 6.3–8.2)
RBC # BLD AUTO: 4.15 M/UL (ref 4.05–5.2)
SALICYLATES SERPL-MCNC: <0.5 MG/DL
SODIUM SERPL-SCNC: 132 MMOL/L (ref 136–145)
WBC # BLD AUTO: 8.3 K/UL (ref 4.3–11.1)

## 2025-06-26 PROCEDURE — 6360000002 HC RX W HCPCS: Performed by: STUDENT IN AN ORGANIZED HEALTH CARE EDUCATION/TRAINING PROGRAM

## 2025-06-26 PROCEDURE — 99283 EMERGENCY DEPT VISIT LOW MDM: CPT

## 2025-06-26 PROCEDURE — 99284 EMERGENCY DEPT VISIT MOD MDM: CPT

## 2025-06-26 PROCEDURE — 6370000000 HC RX 637 (ALT 250 FOR IP): Performed by: STUDENT IN AN ORGANIZED HEALTH CARE EDUCATION/TRAINING PROGRAM

## 2025-06-26 PROCEDURE — 85025 COMPLETE CBC W/AUTO DIFF WBC: CPT

## 2025-06-26 PROCEDURE — 80179 DRUG ASSAY SALICYLATE: CPT

## 2025-06-26 PROCEDURE — 83690 ASSAY OF LIPASE: CPT

## 2025-06-26 PROCEDURE — 80143 DRUG ASSAY ACETAMINOPHEN: CPT

## 2025-06-26 PROCEDURE — 6370000000 HC RX 637 (ALT 250 FOR IP): Performed by: EMERGENCY MEDICINE

## 2025-06-26 PROCEDURE — 83735 ASSAY OF MAGNESIUM: CPT

## 2025-06-26 PROCEDURE — 96372 THER/PROPH/DIAG INJ SC/IM: CPT

## 2025-06-26 PROCEDURE — 82077 ASSAY SPEC XCP UR&BREATH IA: CPT

## 2025-06-26 PROCEDURE — 80053 COMPREHEN METABOLIC PANEL: CPT

## 2025-06-26 RX ORDER — HYOSCYAMINE SULFATE 0.12 MG/1
0.25 TABLET SUBLINGUAL
Status: COMPLETED | OUTPATIENT
Start: 2025-06-26 | End: 2025-06-26

## 2025-06-26 RX ORDER — HYOSCYAMINE SULFATE 0.12 MG/1
0.12 TABLET SUBLINGUAL 4 TIMES DAILY PRN
Qty: 30 TABLET | Refills: 0 | Status: SHIPPED | OUTPATIENT
Start: 2025-06-26 | End: 2025-07-06

## 2025-06-26 RX ORDER — ONDANSETRON 4 MG/1
4 TABLET, ORALLY DISINTEGRATING ORAL
Status: COMPLETED | OUTPATIENT
Start: 2025-06-26 | End: 2025-06-26

## 2025-06-26 RX ORDER — ONDANSETRON 2 MG/ML
4 INJECTION INTRAMUSCULAR; INTRAVENOUS
Status: DISCONTINUED | OUTPATIENT
Start: 2025-06-26 | End: 2025-06-26

## 2025-06-26 RX ORDER — ONDANSETRON 4 MG/1
4 TABLET, ORALLY DISINTEGRATING ORAL 4 TIMES DAILY PRN
Qty: 21 TABLET | Refills: 0 | Status: SHIPPED | OUTPATIENT
Start: 2025-06-26

## 2025-06-26 RX ORDER — LORAZEPAM 2 MG/ML
0.5 INJECTION INTRAMUSCULAR ONCE
Status: COMPLETED | OUTPATIENT
Start: 2025-06-26 | End: 2025-06-26

## 2025-06-26 RX ORDER — LORAZEPAM 2 MG/ML
0.5 INJECTION INTRAMUSCULAR ONCE
Status: DISCONTINUED | OUTPATIENT
Start: 2025-06-26 | End: 2025-06-26 | Stop reason: SDUPTHER

## 2025-06-26 RX ADMIN — LORAZEPAM 0.5 MG: 2 INJECTION INTRAMUSCULAR; INTRAVENOUS at 19:32

## 2025-06-26 RX ADMIN — ONDANSETRON 4 MG: 4 TABLET, ORALLY DISINTEGRATING ORAL at 08:52

## 2025-06-26 RX ADMIN — ONDANSETRON 4 MG: 4 TABLET, ORALLY DISINTEGRATING ORAL at 19:32

## 2025-06-26 RX ADMIN — HYOSCYAMINE SULFATE 0.25 MG: 0.12 TABLET ORAL; SUBLINGUAL at 08:52

## 2025-06-26 ASSESSMENT — PAIN SCALES - GENERAL
PAINLEVEL_OUTOF10: 7
PAINLEVEL_OUTOF10: 3

## 2025-06-26 ASSESSMENT — PAIN - FUNCTIONAL ASSESSMENT
PAIN_FUNCTIONAL_ASSESSMENT: 0-10
PAIN_FUNCTIONAL_ASSESSMENT: NONE - DENIES PAIN

## 2025-06-26 ASSESSMENT — LIFESTYLE VARIABLES
HOW MANY STANDARD DRINKS CONTAINING ALCOHOL DO YOU HAVE ON A TYPICAL DAY: 3 OR 4
HOW OFTEN DO YOU HAVE A DRINK CONTAINING ALCOHOL: 4 OR MORE TIMES A WEEK
HOW OFTEN DO YOU HAVE A DRINK CONTAINING ALCOHOL: 2-3 TIMES A WEEK
HOW MANY STANDARD DRINKS CONTAINING ALCOHOL DO YOU HAVE ON A TYPICAL DAY: 10 OR MORE

## 2025-06-26 ASSESSMENT — ENCOUNTER SYMPTOMS
ABDOMINAL PAIN: 1
SHORTNESS OF BREATH: 0
NAUSEA: 1
TROUBLE SWALLOWING: 0
PHOTOPHOBIA: 0
SHORTNESS OF BREATH: 0
ABDOMINAL PAIN: 0
VOMITING: 0
FACIAL SWELLING: 0
COUGH: 0

## 2025-06-26 NOTE — ED PROVIDER NOTES
Emergency Department Provider Note       [facility] EMERGENCY DEPT   PCP: None, None   Age: 69 y.o.   Sex: female     DISPOSITION Decision To Discharge 06/26/2025 08:30:00 AM    ICD-10-CM    1. Anxiety attack  F41.0           Medical Decision Making     68 yo female pt presents via EMS after wittnessed panic attack  Resolved with Versed  Pt with some nausea, medicated with zofran, levsin  NO current tremors.  No diaphoresis  Vital signs are all within Normal limits    Plan  DC and close pmd follow up  Rx for zofran and levsin    Return precautions discussed     1 or more chronic illnesses with a severe exacerbation or progression.  Prescription drug management performed.  Patient was discharged risks and benefits of hospitalization were considered.  Shared medical decision making was utilized in creating the patients health plan today.  I independently ordered and reviewed each unique test.    I reviewed external records: ED visit note from a different ED.   I reviewed external records: provider visit note from PCP.                     History     68 yo female presents with complaints of anxiety and bad dreams  EMS reports pt very anxious and given Versed with improvement  Hx of alcoholism    Pt reports being poorly compliant with her regular mediactions    States feeling better after meds.  Still having some nausea    The history is provided by the patient and the EMS personnel.   Anxiety  This is a recurrent problem. The current episode started 1 to 2 hours ago. The problem occurs constantly. The problem has been gradually improving. Pertinent negatives include no chest pain, no abdominal pain and no shortness of breath. Nothing aggravates the symptoms. Nothing relieves the symptoms. She has tried nothing for the symptoms.     Physical Exam     Vitals signs and nursing note reviewed:  Vitals:    06/26/25 0713 06/26/25 0730   BP: (!) 197/80 (!) 176/74   Pulse: 97 90   Resp: 17 16   Temp: 98.2 °F (36.8 °C)    TempSrc:

## 2025-06-26 NOTE — FLOWSHEET NOTE
Patient arrived from home via EMS with reports of anxiety and inability to ambulate w/o fear of falling. Patient had a very vivid dream, woke up, and began having an anxiety attack. Patient has a HX of ataxia which was exacerbated by anxiety. Patient rec'd 5 mg of IM versed en route.

## 2025-06-26 NOTE — ED PROVIDER NOTES
Result Value Ref Range    Ventricular Rate 104 BPM    Atrial Rate 104 BPM    P-R Interval 157 ms    QRS Duration 76 ms    Q-T Interval 337 ms    QTc Calculation (Bazett) 444 ms    P Axis 75 degrees    R Axis 80 degrees    T Axis 48 degrees    Diagnosis       Sinus tachycardia  Biatrial enlargement  Anterior infarct, old           No orders to display                No results for input(s): \"COVID19\" in the last 72 hours.     Voice dictation software was used during the making of this note.  This software is not perfect and grammatical and other typographical errors may be present.  This note has not been completely proofread for errors.     Enmanuel Barker PA  06/26/25 [id number]

## 2025-06-26 NOTE — DISCHARGE INSTRUCTIONS
You are medically clear to go to [facility].  Push plenty of fluids.  Stop drinking alcohol.  Return here for new or worsening symptoms

## 2025-06-26 NOTE — DISCHARGE INSTRUCTIONS
Rest  Take medications as prescribed  Push fluids  Limit alcohol consumption  Call your doctor or the follow up doctor to set up appointment for recheck visit  We would love to help you get a primary care doctor for follow-up after your emergency department visit.    Please call [phone] between 7AM - 6PM Monday to Friday.  A care navigator will be able to assist you with setting up a doctor close to your home.       Return to ER for any worsening symptoms or new problems which may arise

## 2025-06-27 LAB
EKG ATRIAL RATE: 104 BPM
EKG DIAGNOSIS: NORMAL
EKG P AXIS: 75 DEGREES
EKG P-R INTERVAL: 157 MS
EKG Q-T INTERVAL: 337 MS
EKG QRS DURATION: 76 MS
EKG QTC CALCULATION (BAZETT): 444 MS
EKG R AXIS: 80 DEGREES
EKG T AXIS: 48 DEGREES
EKG VENTRICULAR RATE: 104 BPM